# Patient Record
Sex: MALE | Race: WHITE | NOT HISPANIC OR LATINO | Employment: OTHER | URBAN - METROPOLITAN AREA
[De-identification: names, ages, dates, MRNs, and addresses within clinical notes are randomized per-mention and may not be internally consistent; named-entity substitution may affect disease eponyms.]

---

## 2017-03-17 ENCOUNTER — OFFICE VISIT (OUTPATIENT)
Dept: MEDICAL GROUP | Age: 67
End: 2017-03-17
Payer: COMMERCIAL

## 2017-03-17 VITALS
HEIGHT: 69 IN | TEMPERATURE: 97.7 F | DIASTOLIC BLOOD PRESSURE: 80 MMHG | OXYGEN SATURATION: 97 % | WEIGHT: 195 LBS | HEART RATE: 61 BPM | BODY MASS INDEX: 28.88 KG/M2 | SYSTOLIC BLOOD PRESSURE: 122 MMHG

## 2017-03-17 DIAGNOSIS — E11.40 CONTROLLED TYPE 2 DIABETES MELLITUS WITH DIABETIC NEUROPATHY, WITHOUT LONG-TERM CURRENT USE OF INSULIN (HCC): ICD-10-CM

## 2017-03-17 DIAGNOSIS — E78.5 DYSLIPIDEMIA ASSOCIATED WITH TYPE 2 DIABETES MELLITUS (HCC): ICD-10-CM

## 2017-03-17 DIAGNOSIS — Z23 NEED FOR PNEUMOCOCCAL VACCINATION: ICD-10-CM

## 2017-03-17 DIAGNOSIS — R80.9 MICROALBUMINURIA DUE TO TYPE 2 DIABETES MELLITUS (HCC): ICD-10-CM

## 2017-03-17 DIAGNOSIS — E11.69 DYSLIPIDEMIA ASSOCIATED WITH TYPE 2 DIABETES MELLITUS (HCC): ICD-10-CM

## 2017-03-17 DIAGNOSIS — E11.29 MICROALBUMINURIA DUE TO TYPE 2 DIABETES MELLITUS (HCC): ICD-10-CM

## 2017-03-17 DIAGNOSIS — I10 ESSENTIAL HYPERTENSION: ICD-10-CM

## 2017-03-17 PROCEDURE — 90732 PPSV23 VACC 2 YRS+ SUBQ/IM: CPT | Performed by: INTERNAL MEDICINE

## 2017-03-17 PROCEDURE — 99214 OFFICE O/P EST MOD 30 MIN: CPT | Mod: 25 | Performed by: INTERNAL MEDICINE

## 2017-03-17 PROCEDURE — 90471 IMMUNIZATION ADMIN: CPT | Performed by: INTERNAL MEDICINE

## 2017-03-17 ASSESSMENT — PAIN SCALES - GENERAL: PAINLEVEL: NO PAIN

## 2017-03-17 NOTE — ASSESSMENT & PLAN NOTE
He is taking Lipitor 40 mg daily. His last chemistry panel showed normal liver enzymes. His LDL at goal. He gained a few pounds since last visit his. Patient reported that he decreased physical exercise lately. He is advised to do walking exercise or cardio exercise 3-4 times a week to lose weight. We also discussed diabetes diet and low-fat diet today.

## 2017-03-17 NOTE — ASSESSMENT & PLAN NOTE
Patient reported that he is taking metformin 1000 mg twice a day and exenatide 2 mg every week. He is doing well with current regimens. He denied hypoglycemia or side effects from taking current medications. Last A1c was 7 on 10/10/16. He is going to follow-up with his endocrinologist at [facility] next month. Patient reported that he already has eye exam last year. We will request retinal exam report from ophthalmologist.

## 2017-03-17 NOTE — Clinical Note
[facility]  Venus Claros M.D.  [address]  [place] [address]  Fax: [phone]   Authorization for Release/Disclosure of   Protected Health Information   Name: AMAYA CLAIRE : 1950 SSN: [id number]   Address: [address] [place]  [place] [address] Phone:    [phone] (home)    I authorize the entity listed below to release/disclose the PHI below to:   [facility]/Venus Claros M.D. and Venus Claros M.D.   Provider or Entity Name:     Address   City, State, Zip   Phone:      Fax:     Reason for request: continuity of care   Information to be released:    [  ] LAST COLONOSCOPY,  including any PATH REPORT and follow-up  [  ] LAST FIT/COLOGUARD RESULT [  ] LAST DEXA  [  ] LAST MAMMOGRAM  [  ] LAST PAP  [  ] LAST LABS [ X ] RETINA EXAM REPORT  [  ] IMMUNIZATION RECORDS  [  ] Release all info      [  ] Check here and initial the line next to each item to release ALL health information INCLUDING  _____ Care and treatment for drug and / or alcohol abuse  _____ HIV testing, infection status, or AIDS  _____ Genetic Testing    DATES OF SERVICE OR TIME PERIOD TO BE DISCLOSED: _____________  I understand and acknowledge that:  * This Authorization may be revoked at any time by you in writing, except if your health information has already been used or disclosed.  * Your health information that will be used or disclosed as a result of you signing this authorization could be re-disclosed by the recipient. If this occurs, your re-disclosed health information may no longer be protected by State or Federal laws.  * You may refuse to sign this Authorization. Your refusal will not affect your ability to obtain treatment.  * This Authorization becomes effective upon signing and will  on (date) __________.      If no date is indicated, this Authorization will  one (1) year from the signature date.    Name: Amaya Claire    Signature:   Date:     3/17/2017       PLEASE FAX REQUESTED  RECORDS BACK TO: [phone]

## 2017-03-17 NOTE — ASSESSMENT & PLAN NOTE
Patient is taking amlodipine 5 mg daily and lisinopril 40 mg daily. His blood pressure is slightly elevated in clinic. He reported that his blood pressure reading at home has been 140-150 in systole. He does not want to increase or add on new medication for blood pressure. He agreed to control his blood pressure with healthy diet and exercise. He is advised to closely monitor his blood pressure at home and advised to call or return to clinic sooner if his blood pressure is not well-controlled. He has not had blood tests yet. He will do his blood tests in May and will follow-up in clinic in June.

## 2017-03-17 NOTE — PROGRESS NOTES
Subjective:   Ashish Wiley is a 67 y.o. male here today for evaluation and management of:      Controlled type 2 diabetes mellitus with diabetic neuropathy, without long-term current use of insulin (CMS-HCC)  Patient reported that he is taking metformin 1000 mg twice a day and exenatide 2 mg every week. He is doing well with current regimens. He denied hypoglycemia or side effects from taking current medications. Last A1c was 7 on 10/10/16. He is going to follow-up with his endocrinologist at [facility] next month. Patient reported that he already has eye exam last year. We will request retinal exam report from ophthalmologist.    Dyslipidemia associated with type 2 diabetes mellitus ([facility])  He is taking Lipitor 40 mg daily. His last chemistry panel showed normal liver enzymes. His LDL at goal. He gained a few pounds since last visit his. Patient reported that he decreased physical exercise lately. He is advised to do walking exercise or cardio exercise 3-4 times a week to lose weight. We also discussed diabetes diet and low-fat diet today.    Essential hypertension  Patient is taking amlodipine 5 mg daily and lisinopril 40 mg daily. His blood pressure is slightly elevated in clinic. He reported that his blood pressure reading at home has been 140-150 in systole. He does not want to increase or add on new medication for blood pressure. He agreed to control his blood pressure with healthy diet and exercise. He is advised to closely monitor his blood pressure at home and advised to call or return to clinic sooner if his blood pressure is not well-controlled. He has not had blood tests yet. He will do his blood tests in May and will follow-up in clinic in June.    Microalbuminuria due to type 2 diabetes mellitus (CMS-HCC)  Patient is taking lisinopril 40 mg daily. His microalbuminuria slightly improved on 10/10/16. He denies side effects from taking lisinopril. He is advised to control his blood pressure and diabetes  "well to prevent worsening of kidney function. He is advised to avoid NSAIDs as much as he can.          Current medicines (including changes today)  Current Outpatient Prescriptions   Medication Sig Dispense Refill   • Insulin Syringe-Needle U-100 31G X 5/16\" 0.5 ML Misc Use to inject lantus insulin once a day 100 Each 1   • Exenatide ER 2 MG Pen-injector Inject 2 mg as instructed every 7 days. 12 Each 0   • insulin glargine (LANTUS) 100 UNIT/ML Solution Inject  as instructed every evening.     • metformin (GLUCOPHAGE) 1000 MG tablet Take 1,000 mg by mouth 2 times a day, with meals.     • lisinopril (PRINIVIL, ZESTRIL) 40 MG tablet Take 40 mg by mouth every day.     • amlodipine (NORVASC) 5 MG Tab Take 5 mg by mouth every day.     • atorvastatin (LIPITOR) 40 MG Tab Take 40 mg by mouth every evening.     • aspirin EC (ECOTRIN) 81 MG Tablet Delayed Response Take 81 mg by mouth every day.       No current facility-administered medications for this visit.     He  has a past medical history of Diabetes (CMS-[facility]); Hypertension; and Hyperlipidemia.    ROS   No chest pain, no shortness of breath, no abdominal pain       Objective:     Blood pressure 122/80, pulse 61, temperature 36.5 °C (97.7 °F), height 1.753 m (5' 9.02\"), weight 88.451 kg (195 lb), SpO2 97 %. Body mass index is 28.78 kg/(m^2).   Physical Exam:  General: Alert, oriented and no acute distress.  Eye contact is good, speech goal directed, affect calm  HEENT: conjunctiva non-injected, sclera non-icteric.  Oral mucous membranes pink and moist with no lesions.  Pinna normal.  Lungs: Normal respiratory effort, clear to auscultation bilaterally with good excursion.  CV: regular rate and rhythm. No murmurs.   Abdomen: soft, non distended, nontender, Bowel sound normal.  Ext: no edema, color normal, vascularity normal, temperature normal      Assessment and Plan:   The following treatment plan was discussed     1. Controlled type 2 diabetes mellitus with diabetic " neuropathy, without long-term current use of insulin (CMS-HCC)  - Well-controlled. Continue current regimens. Recheck lab 1-2 weeks before next follow up visit.  - Follow up with endocrinologist in [place].  - We will request retinal exam report from ophthalmologist.  - Counseled to comply with medication and diet.   - Counseled signs and symptoms of hypoglycemia and management of hypoglycemia.   - Recommend to have retinal eye exam once a year.  - Advised to check both feet daily.     2. Dyslipidemia associated with type 2 diabetes mellitus ([facility])  - Well-controlled. Continue current regimens. Recheck lab 1-2 weeks before next follow up visit.    3. Essential hypertension  - Patient reported that his blood pressure at home was 140-150 in systole. Advise patient to keep monitor blood pressure and heart rate at home.  - Advised him to return to clinic sooner if his blood pressure is not well controlled. Target blood pressure for him is less than 130/80. He agrees with the plan.  - He will continue current regimens for now.    4. Microalbuminuria due to type 2 diabetes mellitus (CMS-HCC)  - Continue lisinopril 40 mg daily. Reviewed side effects of this lisinopril and amlodipine with patient.  - Advised to controlled diabetes and blood pressure well to prevent progression of kidney disease.  - Advised to avoid NSAID.  - Advised to keep hydrating well.    5. Need for pneumococcal vaccination  - Pneumovax 23 vaccine was given today after reviewing risks and benefits as well as side effects of vaccine.  - PNEUMOCOCCAL POLYSACCHARIDE VACCINE 23-VALENT =>3YO SQ/IM      Followup: Return in about 3 months (around 6/17/2017), or if symptoms worsen or fail to improve, for diabetes, hypertension, hyperlipidemia, microalbuminuria, lab review.      Please note that this dictation was created using voice recognition software. I have made every reasonable attempt to correct obvious errors, but I expect that there may have unintended  errors in text, spelling, punctuation, or grammar that I did not discover.

## 2017-03-17 NOTE — ASSESSMENT & PLAN NOTE
Patient is taking lisinopril 40 mg daily. His microalbuminuria slightly improved on 10/10/16. He denies side effects from taking lisinopril. He is advised to control his blood pressure and diabetes well to prevent worsening of kidney function. He is advised to avoid NSAIDs as much as he can.

## 2017-03-17 NOTE — MR AVS SNAPSHOT
"        Ashish Her Inez   3/17/2017 8:00 AM   Office Visit   MRN: [id number]    Department:  [address] [place] [address]   Dept Phone:  [phone]    Description:  Male : 1950   Provider:  Venus Claros M.D.           Reason for Visit     Follow-Up DM check up    Diabetes Mellitus     Hypertension     Hyperlipidemia           Allergies as of 3/17/2017     Allergen Noted Reactions    Sulfa Drugs 2016   Unspecified    Kidney Stones      You were diagnosed with     Controlled type 2 diabetes mellitus with diabetic neuropathy, without long-term current use of insulin (CMS-HCC)   [6179096]       Dyslipidemia associated with type 2 diabetes mellitus (CMS-HCC)   [748507]       Essential hypertension   [4184246]       Microalbuminuria due to type 2 diabetes mellitus (CMS-HCC)   [5989889]       Need for pneumococcal vaccination   [874516]         Vital Signs     Blood Pressure Pulse Temperature Height Weight Body Mass Index    122/80 mmHg 61 36.5 °C (97.7 °F) 1.753 m (5' 9.02\") 88.451 kg (195 lb) 28.78 kg/m2    Oxygen Saturation Smoking Status                97% Former Smoker          Basic Information     Date Of Birth Sex Race Ethnicity Preferred Language    1950 Male White Non- English      Your appointments     2017  8:20 AM   Established Patient with Venus Claros M.D.   [address] [id number] [phone]           You will be receiving a confirmation call a few days before your appointment from our automated call confirmation system.              Problem List              ICD-10-CM Priority Class Noted - Resolved    Controlled type 2 diabetes mellitus with diabetic neuropathy, without long-term current use of insulin (CMS-HCC) E11.40   2016 - Present    Dyslipidemia associated with type 2 diabetes mellitus ([facility]) E11.69, E78.5   2016 - Present    Essential hypertension I10   2016 - Present    Diabetic " peripheral neuropathy associated with type 2 diabetes mellitus (CMS-HCC) E11.42   6/16/2016 - Present    Microalbuminuria due to type 2 diabetes mellitus (CMS-HCC) E11.29, R80.9   10/18/2016 - Present      Health Maintenance        Date Due Completion Dates    RETINAL SCREENING 1/21/1967 ---    IMM DTaP/Tdap/Td Vaccine (1 - Tdap) 1/21/1968 ---    IMM ZOSTER VACCINE 1/21/2009 ---    IMM INFLUENZA (1) 9/1/2016 ---    A1C SCREENING 4/10/2017 10/10/2016, 7/21/2016    SERUM CREATININE 7/21/2017 7/21/2016, 7/18/2013, 6/28/2012, 5/2/2012, 10/3/2011    FASTING LIPID PROFILE 10/10/2017 10/10/2016, 7/21/2016, 7/18/2013, 6/28/2012, 5/2/2012, 10/3/2011    URINE ACR / MICROALBUMIN 10/10/2017 10/10/2016, 7/21/2016, 7/18/2013, 5/2/2012, 10/3/2011    DIABETES MONOFILAMENT / LE EXAM 10/18/2017 10/18/2016, 6/16/2016    IMM PNEUMOCOCCAL 65+ (ADULT) LOW/MEDIUM RISK SERIES (2 of 2 - PCV13) 3/17/2018 3/17/2017    COLONOSCOPY 6/14/2018 6/14/2011            Current Immunizations     Pneumococcal polysaccharide vaccine (PPSV-23) 3/17/2017      Below and/or attached are the medications your provider expects you to take. Review all of your home medications and newly ordered medications with your provider and/or pharmacist. Follow medication instructions as directed by your provider and/or pharmacist. Please keep your medication list with you and share with your provider. Update the information when medications are discontinued, doses are changed, or new medications (including over-the-counter products) are added; and carry medication information at all times in the event of emergency situations     Allergies:  SULFA DRUGS - Unspecified               Medications  Valid as of: March 17, 2017 -  8:47 AM    Generic Name Brand Name Tablet Size Instructions for use    AmLODIPine Besylate (Tab) NORVASC 5 MG Take 5 mg by mouth every day.        Aspirin (Tablet Delayed Response) ECOTRIN 81 MG Take 81 mg by mouth every day.        Atorvastatin Calcium  "(Tab) LIPITOR 40 MG Take 40 mg by mouth every evening.        Exenatide (Pen-injector) Exenatide ER 2 MG Inject 2 mg as instructed every 7 days.        Insulin Glargine (Solution) LANTUS 100 UNIT/ML Inject  as instructed every evening.        Insulin Syringe-Needle U-100 (Misc) Insulin Syringe-Needle U-100 31G X 5/16\" 0.5 ML Use to inject lantus insulin once a day        Lisinopril (Tab) PRINIVIL, ZESTRIL 40 MG Take 40 mg by mouth every day.        MetFORMIN HCl (Tab) GLUCOPHAGE 1000 MG Take 1,000 mg by mouth 2 times a day, with meals.        .                 Medicines prescribed today were sent to:     [facility] - [address] [address]    Phone: [phone] Fax: [phone]    Open 24 Hours?: No    [id number] - [place], [place] - [address]    [address] [facility] [address] [address]    Phone: [phone] Fax: [phone]    Open 24 Hours?: No      Medication refill instructions:       If your prescription bottle indicates you have medication refills left, it is not necessary to call your provider’s office. Please contact your pharmacy and they will refill your medication.    If your prescription bottle indicates you do not have any refills left, you may request refills at any time through one of the following ways: The online [employer] system (except Urgent Care), by calling your provider’s office, or by asking your pharmacy to contact your provider’s office with a refill request. Medication refills are processed only during regular business hours and may not be available until the next business day. Your provider may request additional information or to have a follow-up visit with you prior to refilling your medication.   *Please Note: Medication refills are assigned a new Rx number when refilled electronically. Your pharmacy may indicate that no refills were authorized even though a new prescription for the same medication is " available at the pharmacy. Please request the medicine by name with the pharmacy before contacting your provider for a refill.           MyChart Status: Patient Declined

## 2017-05-01 ENCOUNTER — TELEPHONE (OUTPATIENT)
Dept: MEDICAL GROUP | Age: 67
End: 2017-05-01

## 2017-05-01 DIAGNOSIS — E11.40 CONTROLLED TYPE 2 DIABETES MELLITUS WITH DIABETIC NEUROPATHY, WITHOUT LONG-TERM CURRENT USE OF INSULIN (HCC): ICD-10-CM

## 2017-05-01 NOTE — TELEPHONE ENCOUNTER
Patient requested to refer to [facility] endocrinologist. Referral was placed.    Venus Claros M.D.

## 2017-05-01 NOTE — TELEPHONE ENCOUNTER
Phone Number Called: [phone] (home)     Message: Left message for the patient to call us back regarding the note below.    Left Message for patient to call back: yes

## 2017-05-22 ENCOUNTER — HOSPITAL ENCOUNTER (OUTPATIENT)
Dept: LAB | Facility: MEDICAL CENTER | Age: 67
End: 2017-05-22
Attending: INTERNAL MEDICINE
Payer: COMMERCIAL

## 2017-05-22 DIAGNOSIS — E78.5 DYSLIPIDEMIA ASSOCIATED WITH TYPE 2 DIABETES MELLITUS (HCC): ICD-10-CM

## 2017-05-22 DIAGNOSIS — E11.40 CONTROLLED TYPE 2 DIABETES MELLITUS WITH DIABETIC NEUROPATHY, WITHOUT LONG-TERM CURRENT USE OF INSULIN (HCC): ICD-10-CM

## 2017-05-22 DIAGNOSIS — E11.69 DYSLIPIDEMIA ASSOCIATED WITH TYPE 2 DIABETES MELLITUS (HCC): ICD-10-CM

## 2017-05-22 DIAGNOSIS — I10 ESSENTIAL HYPERTENSION: ICD-10-CM

## 2017-05-22 LAB
ALBUMIN SERPL BCP-MCNC: 4 G/DL (ref 3.2–4.9)
ALBUMIN/GLOB SERPL: 1.4 G/DL
ALP SERPL-CCNC: 86 U/L (ref 30–99)
ALT SERPL-CCNC: 22 U/L (ref 2–50)
ANION GAP SERPL CALC-SCNC: 7 MMOL/L (ref 0–11.9)
AST SERPL-CCNC: 25 U/L (ref 12–45)
BASOPHILS # BLD AUTO: 0.7 % (ref 0–1.8)
BASOPHILS # BLD: 0.05 K/UL (ref 0–0.12)
BILIRUB SERPL-MCNC: 0.4 MG/DL (ref 0.1–1.5)
BUN SERPL-MCNC: 42 MG/DL (ref 8–22)
CALCIUM SERPL-MCNC: 9.3 MG/DL (ref 8.5–10.5)
CHLORIDE SERPL-SCNC: 107 MMOL/L (ref 96–112)
CHOLEST SERPL-MCNC: 105 MG/DL (ref 100–199)
CO2 SERPL-SCNC: 22 MMOL/L (ref 20–33)
CREAT SERPL-MCNC: 2.02 MG/DL (ref 0.5–1.4)
CREAT UR-MCNC: 114.9 MG/DL
EOSINOPHIL # BLD AUTO: 0.18 K/UL (ref 0–0.51)
EOSINOPHIL NFR BLD: 2.5 % (ref 0–6.9)
ERYTHROCYTE [DISTWIDTH] IN BLOOD BY AUTOMATED COUNT: 43.8 FL (ref 35.9–50)
EST. AVERAGE GLUCOSE BLD GHB EST-MCNC: 163 MG/DL
GFR SERPL CREATININE-BSD FRML MDRD: 33 ML/MIN/1.73 M 2
GLOBULIN SER CALC-MCNC: 2.8 G/DL (ref 1.9–3.5)
GLUCOSE SERPL-MCNC: 103 MG/DL (ref 65–99)
HBA1C MFR BLD: 7.3 % (ref 0–5.6)
HCT VFR BLD AUTO: 34.4 % (ref 42–52)
HDLC SERPL-MCNC: 42 MG/DL
HGB BLD-MCNC: 11.4 G/DL (ref 14–18)
IMM GRANULOCYTES # BLD AUTO: 0.02 K/UL (ref 0–0.11)
IMM GRANULOCYTES NFR BLD AUTO: 0.3 % (ref 0–0.9)
LDLC SERPL CALC-MCNC: 40 MG/DL
LYMPHOCYTES # BLD AUTO: 1.36 K/UL (ref 1–4.8)
LYMPHOCYTES NFR BLD: 19.1 % (ref 22–41)
MCH RBC QN AUTO: 30.9 PG (ref 27–33)
MCHC RBC AUTO-ENTMCNC: 33.1 G/DL (ref 33.7–35.3)
MCV RBC AUTO: 93.2 FL (ref 81.4–97.8)
MICROALBUMIN UR-MCNC: 39.5 MG/DL
MICROALBUMIN/CREAT UR: 344 MG/G (ref 0–30)
MONOCYTES # BLD AUTO: 0.75 K/UL (ref 0–0.85)
MONOCYTES NFR BLD AUTO: 10.5 % (ref 0–13.4)
NEUTROPHILS # BLD AUTO: 4.75 K/UL (ref 1.82–7.42)
NEUTROPHILS NFR BLD: 66.9 % (ref 44–72)
NRBC # BLD AUTO: 0 K/UL
NRBC BLD AUTO-RTO: 0 /100 WBC
PLATELET # BLD AUTO: 259 K/UL (ref 164–446)
PMV BLD AUTO: 10.2 FL (ref 9–12.9)
POTASSIUM SERPL-SCNC: 5.2 MMOL/L (ref 3.6–5.5)
PROT SERPL-MCNC: 6.8 G/DL (ref 6–8.2)
RBC # BLD AUTO: 3.69 M/UL (ref 4.7–6.1)
SODIUM SERPL-SCNC: 136 MMOL/L (ref 135–145)
TRIGL SERPL-MCNC: 117 MG/DL (ref 0–149)
WBC # BLD AUTO: 7.1 K/UL (ref 4.8–10.8)

## 2017-05-22 PROCEDURE — 80053 COMPREHEN METABOLIC PANEL: CPT

## 2017-05-22 PROCEDURE — 85025 COMPLETE CBC W/AUTO DIFF WBC: CPT

## 2017-05-22 PROCEDURE — 36415 COLL VENOUS BLD VENIPUNCTURE: CPT

## 2017-05-22 PROCEDURE — 82043 UR ALBUMIN QUANTITATIVE: CPT

## 2017-05-22 PROCEDURE — 80061 LIPID PANEL: CPT

## 2017-05-22 PROCEDURE — 83036 HEMOGLOBIN GLYCOSYLATED A1C: CPT

## 2017-05-22 PROCEDURE — 82570 ASSAY OF URINE CREATININE: CPT

## 2017-09-11 ENCOUNTER — TELEPHONE (OUTPATIENT)
Dept: MEDICAL GROUP | Age: 67
End: 2017-09-11

## 2017-09-11 PROBLEM — N18.30 CKD (CHRONIC KIDNEY DISEASE) STAGE 3, GFR 30-59 ML/MIN: Status: ACTIVE | Noted: 2017-09-11

## 2017-09-11 NOTE — TELEPHONE ENCOUNTER
ESTABLISHED PATIENT PRE-VISIT PLANNING     Note: Patient will not be contacted if there is no indication to call.     1.  Reviewed notes from the last few office visits within the medical group: Yes    2.  If any orders were placed at last visit or intended to be done for this visit (i.e. 6 mos follow-up), do we have Results/Consult Notes?        •  Labs - Labs ordered, completed and results are in chart.       •  Imaging - Imaging was not ordered at last office visit.       •  Referrals - No referrals were ordered at last office visit.    3. Is this appointment scheduled as a Hospital Follow-Up? No    4.  Immunizations were updated in [facility] using [employer]?: Yes       •  [employer] Recommendations: FLU    5.  Patient is due for the following Health Maintenance Topics:   Health Maintenance Due   Topic Date Due   • RETINAL SCREENING  01/21/1967   • IMM DTaP/Tdap/Td Vaccine (1 - Tdap) 01/21/1968   • IMM ZOSTER VACCINE  01/21/2009   • IMM INFLUENZA (1) 09/01/2017       - Patient has completed PNEUMOVAX (PPSV23), PREVNAR (PCV13) , TDAP and ZOSTAVAX (Shingles) Immunization(s) per [employer]. Chart has been updated.      6.  Patient was NOT informed to arrive 15 min prior to their scheduled appointment and bring in their medication bottles.

## 2017-09-12 ENCOUNTER — OFFICE VISIT (OUTPATIENT)
Dept: MEDICAL GROUP | Age: 67
End: 2017-09-12
Payer: COMMERCIAL

## 2017-09-12 VITALS
OXYGEN SATURATION: 97 % | SYSTOLIC BLOOD PRESSURE: 140 MMHG | WEIGHT: 194 LBS | DIASTOLIC BLOOD PRESSURE: 80 MMHG | TEMPERATURE: 98.1 F | HEART RATE: 87 BPM | BODY MASS INDEX: 28.73 KG/M2 | HEIGHT: 69 IN

## 2017-09-12 DIAGNOSIS — E78.5 DYSLIPIDEMIA ASSOCIATED WITH TYPE 2 DIABETES MELLITUS (HCC): ICD-10-CM

## 2017-09-12 DIAGNOSIS — I10 ESSENTIAL HYPERTENSION: ICD-10-CM

## 2017-09-12 DIAGNOSIS — R80.9 TYPE 2 DIABETES MELLITUS WITH MICROALBUMINURIA, WITH LONG-TERM CURRENT USE OF INSULIN (HCC): ICD-10-CM

## 2017-09-12 DIAGNOSIS — D64.9 NORMOCYTIC ANEMIA: ICD-10-CM

## 2017-09-12 DIAGNOSIS — Z79.4 TYPE 2 DIABETES MELLITUS WITH MICROALBUMINURIA, WITH LONG-TERM CURRENT USE OF INSULIN (HCC): ICD-10-CM

## 2017-09-12 DIAGNOSIS — Z23 NEED FOR VACCINATION: ICD-10-CM

## 2017-09-12 DIAGNOSIS — E11.29 TYPE 2 DIABETES MELLITUS WITH MICROALBUMINURIA, WITH LONG-TERM CURRENT USE OF INSULIN (HCC): ICD-10-CM

## 2017-09-12 DIAGNOSIS — N18.30 CKD (CHRONIC KIDNEY DISEASE) STAGE 3, GFR 30-59 ML/MIN (HCC): ICD-10-CM

## 2017-09-12 DIAGNOSIS — E11.69 DYSLIPIDEMIA ASSOCIATED WITH TYPE 2 DIABETES MELLITUS (HCC): ICD-10-CM

## 2017-09-12 PROCEDURE — 90662 IIV NO PRSV INCREASED AG IM: CPT | Performed by: INTERNAL MEDICINE

## 2017-09-12 PROCEDURE — 90471 IMMUNIZATION ADMIN: CPT | Performed by: INTERNAL MEDICINE

## 2017-09-12 PROCEDURE — 99215 OFFICE O/P EST HI 40 MIN: CPT | Mod: 25 | Performed by: INTERNAL MEDICINE

## 2017-09-12 RX ORDER — METOPROLOL SUCCINATE 25 MG/1
25 TABLET, EXTENDED RELEASE ORAL DAILY
Qty: 90 TAB | Refills: 3 | Status: SHIPPED | OUTPATIENT
Start: 2017-09-12 | End: 2018-06-21

## 2017-09-12 ASSESSMENT — PAIN SCALES - GENERAL: PAINLEVEL: NO PAIN

## 2017-09-12 NOTE — LETTER
[facility] [employer] [facility]  Venus Claros M.D.  [address]  [place] [address]  Fax: [phone]   Authorization for Release/Disclosure of   Protected Health Information   Name: AMAYA CLAIRE : 1950 SSN: [id number]   Address: [address] [place]  [place] [address] Phone:    [phone] (home)    I authorize the entity listed below to release/disclose the PHI below to:   [employer] [facility]/Venus Claros M.D. and Venus Claros M.D.   Provider or Entity Name:  [facility]   Address   City, State, Zip   Phone:      Fax:     Reason for request: continuity of care   Information to be released:    [  ] LAST COLONOSCOPY,  including any PATH REPORT and follow-up  [  ] LAST FIT/COLOGUARD RESULT [  ] LAST DEXA  [  ] LAST MAMMOGRAM  [  ] LAST PAP  [  ] LAST LABS [xxx  ] RETINA EXAM REPORT  [  ] IMMUNIZATION RECORDS  [  ] Release all info      [  ] Check here and initial the line next to each item to release ALL health information INCLUDING  _____ Care and treatment for drug and / or alcohol abuse  _____ HIV testing, infection status, or AIDS  _____ Genetic Testing    DATES OF SERVICE OR TIME PERIOD TO BE DISCLOSED: _____________  I understand and acknowledge that:  * This Authorization may be revoked at any time by you in writing, except if your health information has already been used or disclosed.  * Your health information that will be used or disclosed as a result of you signing this authorization could be re-disclosed by the recipient. If this occurs, your re-disclosed health information may no longer be protected by State or Federal laws.  * You may refuse to sign this Authorization. Your refusal will not affect your ability to obtain treatment.  * This Authorization becomes effective upon signing and will  on (date) __________.      If no date is indicated, this Authorization will  one (1) year from the signature date.    Name: Amaya Claire    Signature:   Date:     2017          PLEASE FAX REQUESTED RECORDS BACK TO: [phone]

## 2017-09-12 NOTE — ASSESSMENT & PLAN NOTE
Patient is taking Lipitor 40 mg every evening. He denies side effects from taking Lipitor. Last lipid panel on 5/22/17 showed that LDL was at goal.  Review his previous blood tests with him today.    Results for ALETHEA AMAYA SHAKA (MRN [id number]) as of 9/11/2017 19:24   Ref. Range 5/22/2017 06:55   Cholesterol,Tot Latest Ref Range: 100 - 199 mg/dL 105   Triglycerides Latest Ref Range: 0 - 149 mg/dL 117   HDL Latest Ref Range: >=40 mg/dL 42   LDL Latest Ref Range: <100 mg/dL 40

## 2017-09-12 NOTE — ASSESSMENT & PLAN NOTE
Patient is taking lisinopril 40 mg daily and amlodipine 5 mg daily. He has worsening kidney function with increased creatinine and lowering GFR. Creatinine clearance above 30, so we will continue lisinopril 40 mg daily. He denies side effects from taking lisinopril and amlodipine. His blood pressure is not well-controlled with current regimens. His blood pressure is between 140-160 in systole. We discussed to add on metoprolol SR 25 mg daily.

## 2017-09-12 NOTE — ASSESSMENT & PLAN NOTE
Patient has normocytic anemia. He denied abdominal pain or bloody bowel movement or black tarry stool. He stated that he took NSAIDs 3 Dose in one month's and rarely use it. He also drinks alcohol once or twice a week only. And he only drinks one or 2 glasses of wine each time. He denies binge drinking.

## 2017-09-12 NOTE — ASSESSMENT & PLAN NOTE
Patient has gradually worsening kidney function with elevated creatinine and decreasing GFR. He is taking lisinopril 40 mg daily. His creatinine clearance is above 30. We discussed to see nephrologist for his chronic kidney disease. Patient agreed to refer to nephrologist as his kidney function gets worse.    Results for AMAYA CLAIRE (MRN [id number]) as of 9/11/2017 19:24   Ref. Range 10/3/2011 07:24 5/2/2012 06:44 6/28/2012 07:00 7/18/2013 06:49 7/21/2016 06:51 10/10/2016 06:52 5/22/2017 06:55   Bun Latest Ref Range: 8 - 22 mg/dL 30 (H) 19 16 26 (H) 22  42 (H)   Creatinine Latest Ref Range: 0.50 - 1.40 mg/dL 1.22 1.08 1.09 1.28 1.29  2.02 (H)   GFR If  Latest Ref Range: >60 mL/min/1.73 m 2 >60 >60 >60 >60 >60  40 (A)   GFR If Non  Latest Ref Range: >60 mL/min/1.73 m 2 >60 >60 >60 56 (A) 55 (A)  33 (A)

## 2017-09-12 NOTE — ASSESSMENT & PLAN NOTE
Patient is taking metformin 1000 mg twice a day, insulin glargine 14 units every evening, exenatide 2 mg every weekly.  His last A1c increased to 7.3 on 5/22/17. He follows with Samaria Anderson, endocrinologist at [facility]. He denies side effects from taking current diabetes medications. He had retinal exam last month. We will request the report from ophthalmologist.  I discussed with patient to switch metformin to insulin since his kidney function gets worse. Patient stated that he just saw his endocrinologist one and half month ago who already knew his recent kidney function. He will call his endocrinologist today to confirm that he is okay to continue metformin. He also has follow-up appointment with endocrine next month.

## 2017-09-22 ENCOUNTER — OFFICE VISIT (OUTPATIENT)
Dept: NEPHROLOGY | Facility: MEDICAL CENTER | Age: 67
End: 2017-09-22
Payer: COMMERCIAL

## 2017-09-22 VITALS
BODY MASS INDEX: 28.73 KG/M2 | OXYGEN SATURATION: 98 % | HEIGHT: 69 IN | SYSTOLIC BLOOD PRESSURE: 134 MMHG | DIASTOLIC BLOOD PRESSURE: 76 MMHG | WEIGHT: 194 LBS | RESPIRATION RATE: 16 BRPM | HEART RATE: 92 BPM | TEMPERATURE: 97.8 F

## 2017-09-22 DIAGNOSIS — E11.29 TYPE 2 DIABETES MELLITUS WITH MICROALBUMINURIA, WITH LONG-TERM CURRENT USE OF INSULIN (HCC): ICD-10-CM

## 2017-09-22 DIAGNOSIS — Z79.4 TYPE 2 DIABETES MELLITUS WITH MICROALBUMINURIA, WITH LONG-TERM CURRENT USE OF INSULIN (HCC): ICD-10-CM

## 2017-09-22 DIAGNOSIS — R80.9 MICROALBUMINURIA DUE TO TYPE 2 DIABETES MELLITUS (HCC): ICD-10-CM

## 2017-09-22 DIAGNOSIS — E11.29 MICROALBUMINURIA DUE TO TYPE 2 DIABETES MELLITUS (HCC): ICD-10-CM

## 2017-09-22 DIAGNOSIS — N18.30 CKD (CHRONIC KIDNEY DISEASE) STAGE 3, GFR 30-59 ML/MIN (HCC): ICD-10-CM

## 2017-09-22 DIAGNOSIS — R80.9 TYPE 2 DIABETES MELLITUS WITH MICROALBUMINURIA, WITH LONG-TERM CURRENT USE OF INSULIN (HCC): ICD-10-CM

## 2017-09-22 PROCEDURE — 99203 OFFICE O/P NEW LOW 30 MIN: CPT | Performed by: INTERNAL MEDICINE

## 2017-09-22 ASSESSMENT — ENCOUNTER SYMPTOMS
FLANK PAIN: 0
CHILLS: 0
PALPITATIONS: 0
FEVER: 0

## 2017-09-22 NOTE — PROGRESS NOTES
"Subjective:      Ashish Wiley is a 67 y.o. male who presents with No chief complaint on file.            HPI     67 year old with a history of DM since 1997, has had good control over that period of time. He saw an endocrinologist since 1997.  In 1966 had a ruptured kidney playing football, had hematuria for a week after.  Has had a kidney stone 20 years ago. Serum Cr has gone up from 1.28 in 2013 to 1.29 in 2016 to 2.02 in May 2017 which prompted the visit. He has a history of taking ibuprofen which he took occasionally, though he took at 800mg TID for several days at a time. No family history of kidney disease. History of smoking until the age of 39.  No problems urinating or emptying his bladder. Wakes up once per night to urinate. No herbal medications or supplements. He is on glucophage 1000mg BID.    On an ACEi, microalbumin is 344.     Review of Systems   Constitutional: Negative for chills and fever.   Cardiovascular: Positive for chest pain. Negative for palpitations.   Genitourinary: Negative for dysuria, flank pain, frequency, hematuria and urgency.          Objective:     /76   Pulse 92   Temp 36.6 °C (97.8 °F)   Resp 16   Ht 1.753 m (5' 9.02\")   Wt 88 kg (194 lb)   SpO2 98%   BMI 28.64 kg/m²      Physical Exam   Constitutional: He is oriented to person, place, and time. He appears well-developed and well-nourished.   Cardiovascular: Normal rate and regular rhythm.    Pulmonary/Chest: Effort normal and breath sounds normal.   Abdominal: Soft. Bowel sounds are normal.   Neurological: He is alert and oriented to person, place, and time.   Skin: Skin is warm and dry.   Psychiatric: He has a normal mood and affect. His behavior is normal.               Assessment/Plan:     1. CKD (chronic kidney disease) stage 3, GFR 30-59 ml/min  Likely DM2, however unclear why he has more aggressive progression. Will check US, no obstructive sx. Check 24 hour urine. Suggest changing metformin as " below.    2. Type 2 diabetes mellitus with microalbuminuria, with long-term current use of insulin (CMS-[facility])  Already on ACEi, would suggest changing metformin now, currently GFR my MDRD 33 ml/min, if possible would stop metformin.     3. Microalbuminuria due to type 2 diabetes mellitus (CMS-[facility])  Continue ACEi, appears controlled.    PLAN  -Renal US  -Labs to monitor CKD, including 24 hour urine  -Would stop metformin if possible  -Follow up in 1 month

## 2017-09-28 ENCOUNTER — HOSPITAL ENCOUNTER (OUTPATIENT)
Dept: LAB | Facility: MEDICAL CENTER | Age: 67
End: 2017-09-28
Attending: INTERNAL MEDICINE
Payer: COMMERCIAL

## 2017-09-28 ENCOUNTER — HOSPITAL ENCOUNTER (OUTPATIENT)
Facility: MEDICAL CENTER | Age: 67
End: 2017-09-28
Attending: INTERNAL MEDICINE
Payer: COMMERCIAL

## 2017-09-28 DIAGNOSIS — N18.30 CKD (CHRONIC KIDNEY DISEASE) STAGE 3, GFR 30-59 ML/MIN (HCC): ICD-10-CM

## 2017-09-28 LAB
25(OH)D3 SERPL-MCNC: 24 NG/ML (ref 30–100)
ANION GAP SERPL CALC-SCNC: 6 MMOL/L (ref 0–11.9)
BUN SERPL-MCNC: 20 MG/DL (ref 8–22)
CALCIUM SERPL-MCNC: 9.4 MG/DL (ref 8.5–10.5)
CHLORIDE SERPL-SCNC: 111 MMOL/L (ref 96–112)
CO2 SERPL-SCNC: 23 MMOL/L (ref 20–33)
CREAT 24H UR-MSRATE: 1474 MG/24 HR (ref 1000–2000)
CREAT SERPL-MCNC: 1.29 MG/DL (ref 0.5–1.4)
CREAT UR-MCNC: 71.9 MG/DL
ERYTHROCYTE [DISTWIDTH] IN BLOOD BY AUTOMATED COUNT: 45.3 FL (ref 35.9–50)
GFR SERPL CREATININE-BSD FRML MDRD: 55 ML/MIN/1.73 M 2
GLUCOSE SERPL-MCNC: 161 MG/DL (ref 65–99)
HCT VFR BLD AUTO: 35.5 % (ref 42–52)
HGB BLD-MCNC: 11.7 G/DL (ref 14–18)
MCH RBC QN AUTO: 31 PG (ref 27–33)
MCHC RBC AUTO-ENTMCNC: 33 G/DL (ref 33.7–35.3)
MCV RBC AUTO: 93.9 FL (ref 81.4–97.8)
PLATELET # BLD AUTO: 261 K/UL (ref 164–446)
PMV BLD AUTO: 10.5 FL (ref 9–12.9)
POTASSIUM SERPL-SCNC: 5 MMOL/L (ref 3.6–5.5)
PROT UR-MCNC: 172.6 MG/DL (ref 0–15)
PTH-INTACT SERPL-MCNC: 62.1 PG/ML (ref 14–72)
RBC # BLD AUTO: 3.78 M/UL (ref 4.7–6.1)
SODIUM SERPL-SCNC: 140 MMOL/L (ref 135–145)
SPECIMEN VOL UR: 2050 ML
WBC # BLD AUTO: 7.1 K/UL (ref 4.8–10.8)

## 2017-09-28 PROCEDURE — 82274 ASSAY TEST FOR BLOOD FECAL: CPT

## 2017-09-28 PROCEDURE — 84156 ASSAY OF PROTEIN URINE: CPT

## 2017-09-28 PROCEDURE — 83970 ASSAY OF PARATHORMONE: CPT

## 2017-09-28 PROCEDURE — 82306 VITAMIN D 25 HYDROXY: CPT

## 2017-09-28 PROCEDURE — 80048 BASIC METABOLIC PNL TOTAL CA: CPT

## 2017-09-28 PROCEDURE — 82570 ASSAY OF URINE CREATININE: CPT

## 2017-09-28 PROCEDURE — 82575 CREATININE CLEARANCE TEST: CPT

## 2017-09-28 PROCEDURE — 36415 COLL VENOUS BLD VENIPUNCTURE: CPT

## 2017-09-28 PROCEDURE — 81050 URINALYSIS VOLUME MEASURE: CPT

## 2017-09-28 PROCEDURE — 85027 COMPLETE CBC AUTOMATED: CPT

## 2017-09-29 ENCOUNTER — HOSPITAL ENCOUNTER (OUTPATIENT)
Dept: RADIOLOGY | Facility: MEDICAL CENTER | Age: 67
End: 2017-09-29
Attending: INTERNAL MEDICINE
Payer: COMMERCIAL

## 2017-09-29 DIAGNOSIS — N18.30 CKD (CHRONIC KIDNEY DISEASE) STAGE 3, GFR 30-59 ML/MIN (HCC): ICD-10-CM

## 2017-09-29 LAB
COLLECT DURATION TIME UR: 24 HR
CREAT CL/1.73 SQ M ?TM UR+SERPL-ARVRAT: 66 ML/MIN (ref 97–137)
CREAT SERPL-MCNC: 1.29 MG/DL (ref 0.5–1.4)
CREAT UR-MCNC: 70.5 MG/DL
SPECIMEN VOL UR: 2050 ML
URINE CREATININE EXCRETED 1125: 1445 MG/24 HR

## 2017-09-29 PROCEDURE — 76775 US EXAM ABDO BACK WALL LIM: CPT

## 2017-10-03 DIAGNOSIS — D64.9 NORMOCYTIC ANEMIA: ICD-10-CM

## 2017-10-03 LAB — HEMOCCULT STL QL IA: NEGATIVE

## 2017-10-24 ENCOUNTER — TELEPHONE (OUTPATIENT)
Dept: MEDICAL GROUP | Age: 67
End: 2017-10-24

## 2017-10-24 PROBLEM — E55.9 VITAMIN D INSUFFICIENCY: Status: ACTIVE | Noted: 2017-10-24

## 2017-10-24 NOTE — ASSESSMENT & PLAN NOTE
Repeated BMP showed improvement on creatinine and GFR. He is still taking lisinopril 40 mg daily. Patient already seen by nephrologist.   Nephrologist agreed to stop metformin and to continue lisinopril.  Renal ultrasound was negative for blockage or hydronephrosis.      Results for ALETHEA AMAYA MATT (MRN [id number]) as of 10/24/2017 08:53   Ref. Range 7/21/2016 06:51 10/10/2016 06:52 5/22/2017 06:55 9/28/2017 06:55   Bun Latest Ref Range: 8 - 22 mg/dL 22  42 (H) 20   Creatinine Latest Ref Range: 0.50 - 1.40 mg/dL 1.29  2.02 (H) 1.29   GFR If  Latest Ref Range: >60 mL/min/1.73 m 2 >60  40 (A) >60   GFR If Non  Latest Ref Range: >60 mL/min/1.73 m 2 55 (A)  33 (A) 55 (A)

## 2017-10-24 NOTE — ASSESSMENT & PLAN NOTE
Patient has low vitamin D at 24 on 9/28/17. Patient has not taken any supplements yet. We discussed to stop vitamin D 4000 units daily.

## 2017-10-24 NOTE — ASSESSMENT & PLAN NOTE
Patient reported taking Lipitor 40 mg every evening as instructed. He denies side effects from taking it. Last lipid panel on 5/22/17 showed LDL was at goal.  His liver enzymes on 5/22/17 were normal.    Results for ALETHEA AMAYA MATT (MRN [id number]) as of 10/24/2017 08:53   Ref. Range 5/22/2017 06:55   Cholesterol,Tot Latest Ref Range: 100 - 199 mg/dL 105   Triglycerides Latest Ref Range: 0 - 149 mg/dL 117   HDL Latest Ref Range: >=40 mg/dL 42   LDL Latest Ref Range: <100 mg/dL 40

## 2017-10-24 NOTE — TELEPHONE ENCOUNTER
ESTABLISHED PATIENT PRE-VISIT PLANNING     Note: Patient will not be contacted if there is no indication to call.     1.  Reviewed notes from the last few office visits within the medical group: Yes    2.  If any orders were placed at last visit or intended to be done for this visit (i.e. 6 mos follow-up), do we have Results/Consult Notes?        •  Labs - Labs ordered, completed on 9/28 and results are in chart.   Note: If patient appointment is for lab review and patient did not complete labs,                check with provider if OK to reschedule patient until labs completed.       •  Imaging - Imaging ordered, completed and results are in chart.       •  Referrals - Referral ordered, patient has NOT been seen.    3. Is this appointment scheduled as a Hospital Follow-Up? No    4.  Immunizations were updated in Epic using WebIZ?: Epic matches WebIZ       •  Web Iz Recommendations: TD    5.  Patient is due for the following Health Maintenance Topics:   Health Maintenance Due   Topic Date Due   • RETINAL SCREENING  01/21/1968   • DIABETES MONOFILAMENT / LE EXAM  10/18/2017           6.  Patient was NOT informed to arrive 15 min prior to their scheduled appointment and bring in their medication bottles.

## 2017-10-24 NOTE — ASSESSMENT & PLAN NOTE
His blood pressure is his stable with current regimens. Lisinopril 40 mg daily and amlodipine 5 mg daily. He denies side effects from taking his current regimens. He is on baby aspirin. His kidney function slightly improved on recent blood tests on 9/28/17

## 2017-10-24 NOTE — ASSESSMENT & PLAN NOTE
Patient follows with nightly our endocrinologist, Samaria CAMPOS.   We discussed in previous visit on 9/12/17 2 she which metformin to insulin due to worsening kidney function. He is taking Lantus 14 units every evening, exenatide 2 mg every weekly. He already stopped taking metformin 1000 mg twice a day for one month.    Last A1c was 7.3 on 5/22/17.  He is overdue for retinal exam. He agreed to refer to ophthalmologist today.

## 2017-10-25 ENCOUNTER — OFFICE VISIT (OUTPATIENT)
Dept: MEDICAL GROUP | Age: 67
End: 2017-10-25
Payer: COMMERCIAL

## 2017-10-25 VITALS
OXYGEN SATURATION: 97 % | TEMPERATURE: 97.5 F | DIASTOLIC BLOOD PRESSURE: 70 MMHG | HEIGHT: 69 IN | BODY MASS INDEX: 28.11 KG/M2 | WEIGHT: 189.8 LBS | SYSTOLIC BLOOD PRESSURE: 124 MMHG | HEART RATE: 94 BPM

## 2017-10-25 DIAGNOSIS — E55.9 VITAMIN D INSUFFICIENCY: ICD-10-CM

## 2017-10-25 DIAGNOSIS — N18.30 CKD (CHRONIC KIDNEY DISEASE) STAGE 3, GFR 30-59 ML/MIN (HCC): ICD-10-CM

## 2017-10-25 DIAGNOSIS — E78.5 DYSLIPIDEMIA ASSOCIATED WITH TYPE 2 DIABETES MELLITUS (HCC): ICD-10-CM

## 2017-10-25 DIAGNOSIS — I10 ESSENTIAL HYPERTENSION: ICD-10-CM

## 2017-10-25 DIAGNOSIS — R80.9 TYPE 2 DIABETES MELLITUS WITH MICROALBUMINURIA, WITH LONG-TERM CURRENT USE OF INSULIN (HCC): ICD-10-CM

## 2017-10-25 DIAGNOSIS — E11.69 DYSLIPIDEMIA ASSOCIATED WITH TYPE 2 DIABETES MELLITUS (HCC): ICD-10-CM

## 2017-10-25 DIAGNOSIS — Z79.4 TYPE 2 DIABETES MELLITUS WITH MICROALBUMINURIA, WITH LONG-TERM CURRENT USE OF INSULIN (HCC): ICD-10-CM

## 2017-10-25 DIAGNOSIS — E11.29 TYPE 2 DIABETES MELLITUS WITH MICROALBUMINURIA, WITH LONG-TERM CURRENT USE OF INSULIN (HCC): ICD-10-CM

## 2017-10-25 PROCEDURE — 99214 OFFICE O/P EST MOD 30 MIN: CPT | Performed by: INTERNAL MEDICINE

## 2017-10-25 ASSESSMENT — PATIENT HEALTH QUESTIONNAIRE - PHQ9: CLINICAL INTERPRETATION OF PHQ2 SCORE: 0

## 2017-10-25 NOTE — PROGRESS NOTES
Subjective:   Ashish Claire is a 67 y.o. male here today for evaluation and management of:      Type 2 diabetes mellitus with microalbuminuria, with long-term current use of insulin (CMS-HCC)  Patient follows with nightly our endocrinologist, Samaria CAMPOS.   We discussed in previous visit on 9/12/17 2 she which metformin to insulin due to worsening kidney function. He is taking Lantus 14 units every evening, exenatide 2 mg every weekly. He already stopped taking metformin 1000 mg twice a day for one month.    Last A1c was 7.3 on 5/22/17.  He is overdue for retinal exam. He agreed to refer to ophthalmologist today.      Essential hypertension  His blood pressure is his stable with current regimens. Lisinopril 40 mg daily and amlodipine 5 mg daily. He denies side effects from taking his current regimens. He is on baby aspirin. His kidney function slightly improved on recent blood tests on 9/28/17    Dyslipidemia associated with type 2 diabetes mellitus ([facility])  Patient reported taking Lipitor 40 mg every evening as instructed. He denies side effects from taking it. Last lipid panel on 5/22/17 showed LDL was at goal.  His liver enzymes on 5/22/17 were normal.    Results for ASHISH CLAIRE (MRN [id number]) as of 10/24/2017 08:53   Ref. Range 5/22/2017 06:55   Cholesterol,Tot Latest Ref Range: 100 - 199 mg/dL 105   Triglycerides Latest Ref Range: 0 - 149 mg/dL 117   HDL Latest Ref Range: >=40 mg/dL 42   LDL Latest Ref Range: <100 mg/dL 40       CKD (chronic kidney disease) stage 3, GFR 30-59 ml/min  Repeated BMP showed improvement on creatinine and GFR. He is still taking lisinopril 40 mg daily. Patient already seen by nephrologist.   Nephrologist agreed to stop metformin and to continue lisinopril.  Renal ultrasound was negative for blockage or hydronephrosis.      Results for ASHISH CLAIRE (MRN [id number]) as of 10/24/2017 08:53   Ref. Range 7/21/2016 06:51 10/10/2016 06:52 5/22/2017 06:55  "9/28/2017 06:55   Bun Latest Ref Range: 8 - 22 mg/dL 22  42 (H) 20   Creatinine Latest Ref Range: 0.50 - 1.40 mg/dL 1.29  2.02 (H) 1.29   GFR If  Latest Ref Range: >60 mL/min/1.73 m 2 >60  40 (A) >60   GFR If Non  Latest Ref Range: >60 mL/min/1.73 m 2 55 (A)  33 (A) 55 (A)       Vitamin D insufficiency  Patient has low vitamin D at 24 on 9/28/17. Patient has not taken any supplements yet. We discussed to stop vitamin D 4000 units daily.         Current medicines (including changes today)  Current Outpatient Prescriptions   Medication Sig Dispense Refill   • metoprolol SR (TOPROL XL) 25 MG TABLET SR 24 HR Take 1 Tab by mouth every day. 90 Tab 3   • Insulin Syringe-Needle U-100 31G X 5/16\" 0.5 ML Misc Use to inject lantus insulin once a day 100 Each 1   • Exenatide ER 2 MG Pen-injector Inject 2 mg as instructed every 7 days. 12 Each 0   • insulin glargine (LANTUS) 100 UNIT/ML Solution Inject  as instructed every evening.     • lisinopril (PRINIVIL, ZESTRIL) 40 MG tablet Take 40 mg by mouth every day.     • amlodipine (NORVASC) 5 MG Tab Take 5 mg by mouth every day.     • atorvastatin (LIPITOR) 40 MG Tab Take 40 mg by mouth every evening.     • aspirin EC (ECOTRIN) 81 MG Tablet Delayed Response Take 81 mg by mouth every day.       No current facility-administered medications for this visit.      He  has a past medical history of Diabetes (CMS-[facility]); Hyperlipidemia; and Hypertension.    ROS   No chest pain, no shortness of breath, no abdominal pain       Objective:     Blood pressure 124/70, pulse 94, temperature 36.4 °C (97.5 °F), height 1.753 m (5' 9\"), weight 86.1 kg (189 lb 12.8 oz), SpO2 97 %. Body mass index is 28.03 kg/m².   Physical Exam:  General: Alert, oriented and no acute distress.  Eye contact is good, speech goal directed, affect calm  HEENT: conjunctiva non-injected, sclera non-icteric.  Oral mucous membranes pink and moist with no lesions.  Pinna normal.  Lungs: Normal " respiratory effort, clear to auscultation bilaterally with good excursion.  CV: regular rate and rhythm. No murmurs.   Abdomen: soft, non distended, nontender, Bowel sound normal.  Ext: no edema, color normal, vascularity normal, temperature normal    Diabetic foot exam:  Monofilament testing with a 10 gram force: sensation: decreased bilaterally  Visual Inspection: Feet without maceration, ulcers, or fissures. has calosity on bottom of right foot.  Pedal pulses: intact bilaterally      Assessment and Plan:   The following treatment plan was discussed     1. Type 2 diabetes mellitus with microalbuminuria, with long-term current use of insulin (CMS-HCC)  - Fairly controlled. Will continue current regimens. Continue to stop metformin. Patient will follow with his endocrinologist for A1c check and adjust his medication. Refer to ophthalmologist.  - Counseled to comply with medication and diet.   - Counseled signs and symptoms of hypoglycemia and management of hypoglycemia.   - Recommend to have retinal eye exam once a year.  - Advised to check both feet daily.   - REFERRAL TO OPHTHALMOLOGY  - Diabetic Monofilament Lower Extremity Exam    2. Essential hypertension  - Well-controlled. Continue current regimens. Recheck lab 1-2 weeks before next follow up visit.  - Recommend to monitor blood pressure and heart rate at home.  - CBC WITH DIFFERENTIAL; Future  - COMP METABOLIC PANEL; Future    3. Dyslipidemia associated with type 2 diabetes mellitus (HCC)  - Well-controlled. Continue current regimens. Recheck lab 1-2 weeks before next follow up visit.  - Advised to eat low fat, low carbohydrate and high fiber diet as well as do cardio physical exercise regularly.   - COMP METABOLIC PANEL; Future  - LIPID PROFILE; Future    4. CKD (chronic kidney disease) stage 3, GFR 30-59 ml/min  - Kidney function improved. Continue lisinopril.  - Recommend to keep well hydrated with water.  - Recommend to avoid NSAIDs.  - Discussed to  control blood sugar and blood pressure very well.    5. Vitamin D insufficiency  - Advised to take vitamin D 4000 units daily. Recheck vitamin D in 3 months.  - VITAMIN D,25 HYDROXY; Future      Followup: Return in about 6 months (around 4/25/2018), or if symptoms worsen or fail to improve, for diabetes, hypertension, hyperlipidemia, normocytic anemia, CK, stage III, vitamin D insufficiency.      Please note that this dictation was created using voice recognition software. I have made every reasonable attempt to correct obvious errors, but I expect that there may have unintended errors in text, spelling, punctuation, or grammar that I did not discover.

## 2017-10-25 NOTE — LETTER
[employer]  Venus Claros M.D.  [address]  [place] [address]  Fax: [phone]   Authorization for Release/Disclosure of   Protected Health Information   Name: AMAYA CLAIRE : 1950 SSN: [id number]   Address: [address]  [place] [address] Phone:    [phone] (home)    I authorize the entity listed below to release/disclose the PHI below to:   [employer]/Venus Claros M.D. and Venus Claros M.D.   Provider or Entity Name:  Ivan Maher M.D.   Address   [facility], [facility], [facility]   Phone:[phone]      Fax:  [phone]   Reason for request: continuity of care   Information to be released:    [  ] LAST COLONOSCOPY,  including any PATH REPORT and follow-up  [  ] LAST FIT/COLOGUARD RESULT [  ] LAST DEXA  [  ] LAST MAMMOGRAM  [  ] LAST PAP  [  ] LAST LABS [xxx  ] RETINA EXAM REPORT  [  ] IMMUNIZATION RECORDS  [  ] Release all info      [  ] Check here and initial the line next to each item to release ALL health information INCLUDING  _____ Care and treatment for drug and / or alcohol abuse  _____ HIV testing, infection status, or AIDS  _____ Genetic Testing    DATES OF SERVICE OR TIME PERIOD TO BE DISCLOSED: _____________  I understand and acknowledge that:  * This Authorization may be revoked at any time by you in writing, except if your health information has already been used or disclosed.  * Your health information that will be used or disclosed as a result of you signing this authorization could be re-disclosed by the recipient. If this occurs, your re-disclosed health information may no longer be protected by State or Federal laws.  * You may refuse to sign this Authorization. Your refusal will not affect your ability to obtain treatment.  * This Authorization becomes effective upon signing and will  on (date) __________.      If no date is indicated, this Authorization will  one (1) year from the signature date.    Name: Amaya Claire    Signature:   Date:     10/25/2017       PLEASE FAX REQUESTED RECORDS BACK TO: [phone]

## 2017-11-10 ENCOUNTER — OFFICE VISIT (OUTPATIENT)
Dept: NEPHROLOGY | Facility: MEDICAL CENTER | Age: 67
End: 2017-11-10
Payer: COMMERCIAL

## 2017-11-10 VITALS
TEMPERATURE: 98.2 F | SYSTOLIC BLOOD PRESSURE: 118 MMHG | OXYGEN SATURATION: 96 % | HEART RATE: 85 BPM | HEIGHT: 69 IN | DIASTOLIC BLOOD PRESSURE: 60 MMHG

## 2017-11-10 DIAGNOSIS — E11.29 TYPE 2 DIABETES MELLITUS WITH MICROALBUMINURIA, WITH LONG-TERM CURRENT USE OF INSULIN (HCC): ICD-10-CM

## 2017-11-10 DIAGNOSIS — Z79.4 TYPE 2 DIABETES MELLITUS WITH MICROALBUMINURIA, WITH LONG-TERM CURRENT USE OF INSULIN (HCC): ICD-10-CM

## 2017-11-10 DIAGNOSIS — N18.30 CKD (CHRONIC KIDNEY DISEASE) STAGE 3, GFR 30-59 ML/MIN (HCC): ICD-10-CM

## 2017-11-10 DIAGNOSIS — I10 ESSENTIAL HYPERTENSION: ICD-10-CM

## 2017-11-10 DIAGNOSIS — N18.2 STAGE 2 CHRONIC KIDNEY DISEASE: ICD-10-CM

## 2017-11-10 DIAGNOSIS — R80.9 TYPE 2 DIABETES MELLITUS WITH MICROALBUMINURIA, WITH LONG-TERM CURRENT USE OF INSULIN (HCC): ICD-10-CM

## 2017-11-10 PROCEDURE — 99214 OFFICE O/P EST MOD 30 MIN: CPT | Performed by: INTERNAL MEDICINE

## 2017-11-10 ASSESSMENT — ENCOUNTER SYMPTOMS
PALPITATIONS: 0
FLANK PAIN: 0
CHILLS: 0
FEVER: 0

## 2017-11-10 NOTE — PROGRESS NOTES
"Subjective:      Ashish Wiley is a 67 y.o. male who presents with Follow-Up ([facility] for labs)            HPI  67 year old with a history of DM since 1997, has had good control over that period of time. He saw an endocrinologist since 1997.  In 1966 had a ruptured kidney playing football, had hematuria for a week after.  Has had a kidney stone 20 years ago. Serum Cr has gone up from 1.28 in 2013 to 1.29 in 2016 to 2.02 in May 2017 which prompted the visit. He has a history of taking ibuprofen which he took occasionally, though he took at 800mg TID for several days at a time. No family history of kidney disease. History of smoking until the age of 39.  No problems urinating or emptying his bladder. Wakes up once per night to urinate. No herbal medications or supplements. He is on glucophage 1000mg BID.     Had labs done and 24 hour urine shows GFR 66 ml/min. Overall doing well. Now off metformin. Continues to take ACEi, and ASA. No sx at this point. Drinking fluids, but having some difficulty as he tends not to drink much. Renal US looks good.      Review of Systems   Constitutional: Negative for chills and fever.   Cardiovascular: Negative for chest pain and palpitations.   Genitourinary: Negative for dysuria, flank pain, frequency, hematuria and urgency.          Objective:     /60   Pulse 85   Temp 36.8 °C (98.2 °F)   Ht 1.753 m (5' 9\")   SpO2 96%      Physical Exam   Constitutional: He is oriented to person, place, and time. He appears well-developed and well-nourished.   Cardiovascular: Normal rate and regular rhythm.    Murmur heard.  Pulmonary/Chest: Effort normal and breath sounds normal.   Musculoskeletal: He exhibits no edema or deformity.   Neurological: He is alert and oriented to person, place, and time.   Skin: Skin is warm and dry.               Assessment/Plan:     1. CKD (chronic kidney disease) stage 2   Cr stable, appears to be doing well. GFR 66 ml/min, better than MDRD. On ACEi, ASA. " Off metformin.    2. Essential hypertension  BP doing well, better than prior. No hypotension.    4. Type 2 diabetes mellitus with microalbuminuria, with long-term current use of insulin (CMS-HCC)  Blood sugars are controlled    -discussed workup with patient, doing well, now CKD stage II  -Continue with current medications and ACEi  -Encourage him to continue to drink plenty of fluids, especially as he is on ACEi  -Avoid NSAIDs  -Follow up yearly for CKD II

## 2017-12-17 ENCOUNTER — OFFICE VISIT (OUTPATIENT)
Dept: URGENT CARE | Facility: CLINIC | Age: 67
End: 2017-12-17
Payer: COMMERCIAL

## 2017-12-17 VITALS
RESPIRATION RATE: 16 BRPM | TEMPERATURE: 97.6 F | BODY MASS INDEX: 29.09 KG/M2 | OXYGEN SATURATION: 97 % | HEART RATE: 90 BPM | SYSTOLIC BLOOD PRESSURE: 150 MMHG | DIASTOLIC BLOOD PRESSURE: 96 MMHG | WEIGHT: 196.4 LBS | HEIGHT: 69 IN

## 2017-12-17 DIAGNOSIS — H10.33 ACUTE BACTERIAL CONJUNCTIVITIS OF BOTH EYES: ICD-10-CM

## 2017-12-17 PROCEDURE — 99204 OFFICE O/P NEW MOD 45 MIN: CPT | Performed by: PHYSICIAN ASSISTANT

## 2017-12-17 RX ORDER — TOBRAMYCIN 3 MG/ML
1 SOLUTION/ DROPS OPHTHALMIC EVERY 4 HOURS
Qty: 1 BOTTLE | Refills: 0 | Status: ON HOLD | OUTPATIENT
Start: 2017-12-17 | End: 2018-07-02

## 2017-12-17 ASSESSMENT — ENCOUNTER SYMPTOMS
PHOTOPHOBIA: 0
COUGH: 0
CHILLS: 0
EYE DISCHARGE: 1
NAUSEA: 0
FEVER: 0
HEADACHES: 0
EYE REDNESS: 1
FOREIGN BODY SENSATION: 0
SINUS PAIN: 0
VOMITING: 0
EYE PAIN: 1
BLURRED VISION: 0
DOUBLE VISION: 0
DIZZINESS: 0
EYE ITCHING: 0
SORE THROAT: 0
MYALGIAS: 0

## 2017-12-17 ASSESSMENT — PAIN SCALES - GENERAL: PAINLEVEL: NO PAIN

## 2017-12-17 NOTE — PROGRESS NOTES
Subjective:      Asihsh Wiley is a 67 y.o. male who presents with Conjunctivitis (began friday night, has been fighting a cold )            Eye Problem    Both eyes are affected.This is a new problem. Episode onset: 2 days. The problem occurs constantly. The problem has been unchanged. There was no injury mechanism. The pain is mild. There is known exposure (grandson had pink eye) to pink eye. He does not wear contacts. Associated symptoms include an eye discharge, eye redness and a recent URI. Pertinent negatives include no blurred vision, double vision, fever, foreign body sensation, itching, nausea, photophobia or vomiting. He has tried nothing for the symptoms.     Past Medical History:   Diagnosis Date   • Diabetes (CMS-HCC)    • Hyperlipidemia    • Hypertension      History reviewed. No pertinent surgical history.    Family History   Problem Relation Age of Onset   • Lung Disease Mother      COPD   • Heart Disease Father    • Heart Failure Father    • Hypertension Father    • Hyperlipidemia Father    • Cancer Maternal Grandmother      BRAIN TUMOR   • Stroke Maternal Grandfather    • Other Paternal Grandmother      INTESTINAL PROBLEM   • Stroke Paternal Grandfather        Allergies   Allergen Reactions   • Sulfa Drugs Unspecified     Kidney Stones       Medications, Allergies, and current problem list reviewed today in Epic      Review of Systems   Constitutional: Negative for chills, fever and malaise/fatigue.   HENT: Positive for congestion. Negative for ear discharge, ear pain, sinus pain and sore throat.    Eyes: Positive for pain (slight irritation), discharge and redness. Negative for blurred vision, double vision, photophobia and itching.   Respiratory: Negative for cough.    Gastrointestinal: Negative for nausea and vomiting.   Musculoskeletal: Negative for myalgias.   Skin: Negative for rash.   Neurological: Negative for dizziness and headaches.     All other systems reviewed and are negative.  "       Objective:     /96   Pulse 90   Temp 36.4 °C (97.6 °F)   Resp 16   Ht 1.753 m (5' 9\")   Wt 89.1 kg (196 lb 6.4 oz)   SpO2 97%   BMI 29.00 kg/m²      Physical Exam   Constitutional: He is oriented to person, place, and time. He appears well-developed and well-nourished. No distress.   HENT:   Head: Normocephalic and atraumatic.   Right Ear: External ear normal.   Left Ear: External ear normal.   Nose: Nose normal.   Mouth/Throat: Oropharynx is clear and moist. No oropharyngeal exudate.   Eyes: EOM and lids are normal. Pupils are equal, round, and reactive to light. Right eye exhibits discharge and exudate. Left eye exhibits discharge and exudate. Right conjunctiva is injected. Left conjunctiva is injected.   Cardiovascular: Normal rate, regular rhythm and normal heart sounds.  Exam reveals no gallop and no friction rub.    No murmur heard.  Pulmonary/Chest: Effort normal and breath sounds normal. No respiratory distress. He has no wheezes. He has no rales.   Neurological: He is alert and oriented to person, place, and time. No cranial nerve deficit.   Skin: Skin is warm and dry.   Psychiatric: He has a normal mood and affect. His behavior is normal. Judgment and thought content normal.               Assessment/Plan:     1. Acute bacterial conjunctivitis of both eyes    - tobramycin (TOBREX) 0.3 % Solution ophthalmic solution; Place 1 Drop in both eyes every 4 hours. While awake for 5-7 days until symptoms resolve.  Dispense: 1 Bottle; Refill: 0     Differential diagnoses, Supportive care, and indications for immediate follow-up discussed with patient.   Instructed to return to clinic or nearest emergency department for any change in condition, further concerns, or worsening of symptoms.    The patient demonstrated a good understanding and agreed with the treatment plan.    Deanna Garrido P.A.-C.      "

## 2017-12-26 ENCOUNTER — OFFICE VISIT (OUTPATIENT)
Dept: URGENT CARE | Facility: CLINIC | Age: 67
End: 2017-12-26
Payer: COMMERCIAL

## 2017-12-26 VITALS
TEMPERATURE: 97.9 F | SYSTOLIC BLOOD PRESSURE: 160 MMHG | OXYGEN SATURATION: 97 % | HEART RATE: 89 BPM | WEIGHT: 191 LBS | RESPIRATION RATE: 14 BRPM | BODY MASS INDEX: 28.29 KG/M2 | HEIGHT: 69 IN | DIASTOLIC BLOOD PRESSURE: 86 MMHG

## 2017-12-26 DIAGNOSIS — J02.9 PHARYNGITIS, UNSPECIFIED ETIOLOGY: ICD-10-CM

## 2017-12-26 DIAGNOSIS — J02.9 SORE THROAT: ICD-10-CM

## 2017-12-26 LAB
INT CON NEG: NEGATIVE
INT CON POS: POSITIVE
S PYO AG THROAT QL: NEGATIVE

## 2017-12-26 PROCEDURE — 99214 OFFICE O/P EST MOD 30 MIN: CPT | Performed by: NURSE PRACTITIONER

## 2017-12-26 PROCEDURE — 87880 STREP A ASSAY W/OPTIC: CPT | Performed by: NURSE PRACTITIONER

## 2017-12-26 RX ORDER — AMOXICILLIN 500 MG/1
500 CAPSULE ORAL 3 TIMES DAILY
Qty: 30 CAP | Refills: 0 | Status: SHIPPED | OUTPATIENT
Start: 2017-12-26 | End: 2018-01-05

## 2017-12-26 ASSESSMENT — ENCOUNTER SYMPTOMS
SORE THROAT: 1
FEVER: 0
CHILLS: 0
TROUBLE SWALLOWING: 1
COUGH: 1
SWOLLEN GLANDS: 1

## 2017-12-26 NOTE — PROGRESS NOTES
Subjective:      Ashish Wiley is a 67 y.o. male who presents with Other (sore throat)    Past Medical History:   Diagnosis Date   • Diabetes (CMS-HCC)    • Hyperlipidemia    • Hypertension        Social History     Social History   • Marital status:      Spouse name: N/A   • Number of children: N/A   • Years of education: N/A     Occupational History   • Not on file.     Social History Main Topics   • Smoking status: Former Smoker     Packs/day: 1.00     Years: 22.00     Quit date: 1/1/1989   • Smokeless tobacco: Never Used      Comment: stop cigar in 2015   • Alcohol use 0.6 oz/week     1 Glasses of wine per week      Comment: OCCASIONALLY   • Drug use: No   • Sexual activity: Yes     Partners: Female     Other Topics Concern   • Not on file     Social History Narrative   • No narrative on file     Family History   Problem Relation Age of Onset   • Lung Disease Mother      COPD   • Heart Disease Father    • Heart Failure Father    • Hypertension Father    • Hyperlipidemia Father    • Cancer Maternal Grandmother      BRAIN TUMOR   • Stroke Maternal Grandfather    • Other Paternal Grandmother      INTESTINAL PROBLEM   • Stroke Paternal Grandfather        Allergies: Sulfa drugs    Patient presents today with complaint of sore throat over the last 2 weeks. Blood pressure was also noted to be elevated at 184 for 96 upon check-in. Patient states he does have a history of hypertension and has not yet taken his medication this morning. Denies chest pain, shortness breath, or headache.        Pharyngitis    This is a new problem. The current episode started 1 to 4 weeks ago. The problem has been unchanged. Neither side of throat is experiencing more pain than the other. There has been no fever. Associated symptoms include congestion, coughing, swollen glands and trouble swallowing. He has tried nothing for the symptoms. The treatment provided no relief.       Review of Systems   Constitutional: Positive for  "malaise/fatigue. Negative for chills and fever.   HENT: Positive for congestion, sore throat and trouble swallowing.    Respiratory: Positive for cough.    Skin: Negative.    All other systems reviewed and are negative.    poct strep:      Objective:     BP (!) 184/96   Pulse 89   Temp 36.6 °C (97.9 °F)   Resp 14   Ht 1.753 m (5' 9\")   Wt 86.6 kg (191 lb)   SpO2 97%   BMI 28.21 kg/m²      Physical Exam   Constitutional: He is oriented to person, place, and time. He appears well-developed and well-nourished.   HENT:   Head: Normocephalic.   Right Ear: External ear normal.   Left Ear: External ear normal.   Nose: Nose normal.   Mouth/Throat: No oropharyngeal exudate.   Oropharynx slightly red   Eyes: Conjunctivae and EOM are normal. Pupils are equal, round, and reactive to light.   Neck: Normal range of motion. Neck supple.   Cardiovascular: Normal rate and regular rhythm.    Pulmonary/Chest: Effort normal and breath sounds normal.   Musculoskeletal: Normal range of motion.   Neurological: He is alert and oriented to person, place, and time.   Skin: Skin is warm and dry. Capillary refill takes less than 2 seconds.   Psychiatric: He has a normal mood and affect. His behavior is normal. Judgment and thought content normal.   Vitals reviewed.    poct strep: negative           Assessment/Plan:     1. Sore throat    -warm saltwater gargles  -amoxil; start only for worsening of symptoms.  -follow up if symptoms persisti or worsen    2. Hypertension    Recheck BP at 9:30: 160/86  -take BP meds upon returning home  -monitor BP with cuff at home today  -return to  for BP persistently above 160/110  -ER precautions for chest pain, SOB, headache.       "

## 2018-02-21 ENCOUNTER — OFFICE VISIT (OUTPATIENT)
Dept: MEDICAL GROUP | Age: 68
End: 2018-02-21
Payer: COMMERCIAL

## 2018-02-21 ENCOUNTER — TELEPHONE (OUTPATIENT)
Dept: MEDICAL GROUP | Facility: MEDICAL CENTER | Age: 68
End: 2018-02-21

## 2018-02-21 VITALS
HEIGHT: 70 IN | TEMPERATURE: 96.4 F | SYSTOLIC BLOOD PRESSURE: 120 MMHG | DIASTOLIC BLOOD PRESSURE: 80 MMHG | BODY MASS INDEX: 28.63 KG/M2 | WEIGHT: 200 LBS | HEART RATE: 98 BPM | OXYGEN SATURATION: 94 %

## 2018-02-21 DIAGNOSIS — E11.69 DYSLIPIDEMIA ASSOCIATED WITH TYPE 2 DIABETES MELLITUS (HCC): ICD-10-CM

## 2018-02-21 DIAGNOSIS — E55.9 VITAMIN D INSUFFICIENCY: ICD-10-CM

## 2018-02-21 DIAGNOSIS — I10 ESSENTIAL HYPERTENSION: ICD-10-CM

## 2018-02-21 DIAGNOSIS — E78.5 DYSLIPIDEMIA ASSOCIATED WITH TYPE 2 DIABETES MELLITUS (HCC): ICD-10-CM

## 2018-02-21 DIAGNOSIS — N18.2 STAGE 2 CHRONIC KIDNEY DISEASE: ICD-10-CM

## 2018-02-21 DIAGNOSIS — R80.9 TYPE 2 DIABETES MELLITUS WITH MICROALBUMINURIA, WITH LONG-TERM CURRENT USE OF INSULIN (HCC): ICD-10-CM

## 2018-02-21 DIAGNOSIS — E11.29 TYPE 2 DIABETES MELLITUS WITH MICROALBUMINURIA, WITH LONG-TERM CURRENT USE OF INSULIN (HCC): ICD-10-CM

## 2018-02-21 DIAGNOSIS — Z79.4 TYPE 2 DIABETES MELLITUS WITH MICROALBUMINURIA, WITH LONG-TERM CURRENT USE OF INSULIN (HCC): ICD-10-CM

## 2018-02-21 LAB
HBA1C MFR BLD: 9.7 % (ref ?–5.8)
INT CON NEG: NEGATIVE
INT CON POS: POSITIVE

## 2018-02-21 PROCEDURE — 83036 HEMOGLOBIN GLYCOSYLATED A1C: CPT | Performed by: INTERNAL MEDICINE

## 2018-02-21 PROCEDURE — 99214 OFFICE O/P EST MOD 30 MIN: CPT | Performed by: INTERNAL MEDICINE

## 2018-02-21 RX ORDER — GLIPIZIDE 5 MG/1
5 TABLET ORAL
Qty: 90 TAB | Refills: 3 | Status: SHIPPED | OUTPATIENT
Start: 2018-02-21 | End: 2018-06-21

## 2018-02-21 RX ORDER — CHOLECALCIFEROL (VITAMIN D3) 50 MCG
4000 TABLET ORAL DAILY
Status: ON HOLD | COMMUNITY
End: 2018-07-02

## 2018-02-21 ASSESSMENT — PATIENT HEALTH QUESTIONNAIRE - PHQ9: CLINICAL INTERPRETATION OF PHQ2 SCORE: 0

## 2018-02-21 NOTE — ASSESSMENT & PLAN NOTE
Patient stopped taking metformin in September 2017 due to worsening kidney function. He is currently taking Lantus insulin 14 units daily and exenatide 2 mg once a week. His A1c in clinic today was 9.7. Patient stated that he has eye exam last year. We will request a report from ophthalmologist.  I discussed with patient to retry metformin if his kidney function improved. However, patient showed hesitation to retry metformin.  Patient left after A1c test and I have not had a chance to discuss with him for medication changes in office visit. I called patient and left voice message to advise him increase the dose of Lantus insulin to 16 units daily and to continue exenatide 2 mg once a week. I also advised patient to add on glipizide with low-dose 5 mg to take once a day with large meal. I also advised patient to follow with [facility] endocrinologist, Samaria Anderson to discuss diabetes treatment as well. Patient is advised to call back to our office for any concerning or question regarding diabetes treatment.

## 2018-02-21 NOTE — TELEPHONE ENCOUNTER
Left detailed message for pt. Regarding A1c and new medication also stated he needs to increase his Lantis to 16 units and take new meds with food, if pt. Has any questions asked to call us at [phone]

## 2018-02-21 NOTE — ASSESSMENT & PLAN NOTE
Patient has low vitamin D level at 24 on 9/28/17. Patient stated that he takes vitamin D supplement 1000 units daily only. We discussed to increase the dose of vitamin D to 4000 units daily.

## 2018-02-21 NOTE — ASSESSMENT & PLAN NOTE
Patient reported that he already saw nephrologist, which does not concern of his kidney function currently. I advised patient to control his blood pressure and diabetes very well to prevent worsening kidney function. I also advised patient to avoid NSAIDs. I ordered CMP in previous visit. However, patient has not done yet.

## 2018-02-21 NOTE — PROGRESS NOTES
Subjective:   Ashish Wiley is a 68 y.o. male here today for evaluation and management of:      Vitamin D insufficiency  Patient has low vitamin D level at 24 on 9/28/17. Patient stated that he takes vitamin D supplement 1000 units daily only. We discussed to increase the dose of vitamin D to 4000 units daily.    Type 2 diabetes mellitus with microalbuminuria, with long-term current use of insulin (CMS-HCC)  Patient stopped taking metformin in September 2017 due to worsening kidney function. He is currently taking Lantus insulin 14 units daily and exenatide 2 mg once a week. His A1c in clinic today was 9.7. Patient stated that he has eye exam last year. We will request a report from ophthalmologist.  I discussed with patient to retry metformin if his kidney function improved. However, patient showed hesitation to retry metformin.  Patient left after A1c test and I have not had a chance to discuss with him for medication changes in office visit. I called patient and left voice message to advise him increase the dose of Lantus insulin to 16 units daily and to continue exenatide 2 mg once a week. I also advised patient to add on glipizide with low-dose 5 mg to take once a day with large meal. I also advised patient to follow with [facility] endocrinologist, Samaria Anderson to discuss diabetes treatment as well. Patient is advised to call back to our office for any concerning or question regarding diabetes treatment.    Essential hypertension  He is taking lisinopril 40 mg daily, metoprolol XL 25 mg daily and amlodipine 5 mg daily. He denies side effects from taking lisinopril, amlodipine and metoprolol. His blood pressure is stable with current regimens.    Dyslipidemia associated with type 2 diabetes mellitus (HCC)  He is taking Lipitor 40 mg every evening. He denies side effects from taking Lipitor. His last lipid panel on 5/22/17 showed LDL 40, which is at goal. I ordered fasting blood test in previous visit, but  "patient has not done yet.    Stage 2 chronic kidney disease  Patient reported that he already saw nephrologist, which does not concern of his kidney function currently. I advised patient to control his blood pressure and diabetes very well to prevent worsening kidney function. I also advised patient to avoid NSAIDs. I ordered CMP in previous visit. However, patient has not done yet.         Current medicines (including changes today)  Current Outpatient Prescriptions   Medication Sig Dispense Refill   • glipiZIDE (GLUCOTROL) 5 MG Tab Take 1 Tab by mouth every day with lunch. 90 Tab 3   • Cholecalciferol (VITAMIN D) 2000 UNIT Tab Take 4,000 Units by mouth every day.     • tobramycin (TOBREX) 0.3 % Solution ophthalmic solution Place 1 Drop in both eyes every 4 hours. While awake for 5-7 days until symptoms resolve. 1 Bottle 0   • metoprolol SR (TOPROL XL) 25 MG TABLET SR 24 HR Take 1 Tab by mouth every day. 90 Tab 3   • Insulin Syringe-Needle U-100 31G X 5/16\" 0.5 ML Misc Use to inject lantus insulin once a day 100 Each 1   • Exenatide ER 2 MG Pen-injector Inject 2 mg as instructed every 7 days. 12 Each 0   • insulin glargine (LANTUS) 100 UNIT/ML Solution Inject 16 Units as instructed every evening.     • lisinopril (PRINIVIL, ZESTRIL) 40 MG tablet Take 40 mg by mouth every day.     • amlodipine (NORVASC) 5 MG Tab Take 5 mg by mouth every day.     • atorvastatin (LIPITOR) 40 MG Tab Take 40 mg by mouth every evening.     • aspirin EC (ECOTRIN) 81 MG Tablet Delayed Response Take 81 mg by mouth every day.       No current facility-administered medications for this visit.      He  has a past medical history of Diabetes (CMS-[facility]); Hyperlipidemia; and Hypertension.    ROS   No chest pain, no shortness of breath, no abdominal pain       Objective:     Blood pressure 120/80, pulse 98, temperature (!) 35.8 °C (96.4 °F), height 1.778 m (5' 10\"), weight 90.7 kg (200 lb), SpO2 94 %. Body mass index is 28.7 kg/m².   Physical " Exam:  General: Alert, oriented and no acute distress.  Eye contact is good, speech goal directed, affect calm  HEENT: conjunctiva non-injected, sclera non-icteric.  Oral mucous membranes pink and moist with no lesions.  Pinna normal.   Lungs: Normal respiratory effort, clear to auscultation bilaterally with good excursion.  CV: regular rate and rhythm. No murmurs.   Abdomen: soft, non distended, nontender, Bowel sound normal.  Ext: no edema, color normal, vascularity normal, temperature normal        Assessment and Plan:   The following treatment plan was discussed     1. Type 2 diabetes mellitus with microalbuminuria, with long-term current use of insulin (CMS-HCC)  - Not well controlled. I discussed with patient to retry metformin if his kidney function improved. , However, patient showed hesitation to retry metformin. He is past due for A1c test.  - A1c in clinic today was 9.7. Patient is currently taking Lantus insulin 14 units daily and exenatide 2 mg once a week. Patient left after A1c test. I have not had a chance to discuss medication changes with him in office visit.  - I called patient and left voice message to advise him to increase the dose of Lantus insulin from 14 units once a day to 16 units once a day, to continue exenatide 2 mg once a week and to add glipizide 5 mg once a day with large meal. I also advised patient to follow with [facility] endocrinologist to discuss his diabetes treatment as well.  - I also left voice message for patient to call back if he has any question or concern.  - Counseled to comply with medication and diet.   - Counseled signs and symptoms of hypoglycemia and management of hypoglycemia.   - Recommend to have retinal eye exam once a year.  - Advised to check both feet daily.     - POCT Hemoglobin A1C  - glipiZIDE (GLUCOTROL) 5 MG Tab; Take 1 Tab by mouth every day with lunch.  Dispense: 90 Tab; Refill: 3    2. Essential hypertension  - Well controlled. Continue lisinopril 40 mg  daily, metoprolol XL 25 mg daily and amlodipine 5 mg daily.  - Recommend to monitor blood pressure and heart rate at home.    3. Dyslipidemia associated with type 2 diabetes mellitus (HCC)  - LDL at goal based on previous lipid panel in May 2017. Continue atorvastatin 40 mg every evening. Reviewed potential side effects atorvastatin with patient.  - Advised to eat low fat, low carbohydrate and high fiber diet as well as do cardio physical exercise regularly.   - Reprinted blood test order for patient to do before next follow-up visit.    4. Vitamin D insufficiency  - Advised patient to take vitamin D 4000 units daily and recheck vitamin D in 3 months. Reprinted blood test order for patient.    5. Stage 2 chronic kidney disease  - Recommend patient to avoid taking NSAIDs. Advised to keep well hydrated.  - Discussed to control diabetes and blood pressure very well to prevent progression of kidney disease.        Followup: Return in about 3 months (around 5/21/2018), or if symptoms worsen or fail to improve, for diabetes, dyslipidemia, hypertension, vitamin D insufficiency, chronic kidney disease, lab review.      Please note that this dictation was created using voice recognition software. I have made every reasonable attempt to correct obvious errors, but I expect that there may have unintended errors in text, spelling, punctuation, or grammar that I did not discover.

## 2018-02-21 NOTE — LETTER
[facility]  Venus Claros M.D.  [address]  [place] [address]  Fax: [phone]   Authorization for Release/Disclosure of   Protected Health Information   Name: ASHISH CLAIRE : 1950 SSN: [id number]   Address: [address]  [place] [address] Phone:    [phone] (home)    I authorize the entity listed below to release/disclose the PHI below to:   [facility]/Venus Claros M.D. and Venus Claros M.D.   Provider or Entity Name:  Dr. Maher   Address   [facility], [address]  [address] [facility]  [place] Phone:      Fax:  [phone]   Reason for request: continuity of care   Information to be released:    [  ] LAST COLONOSCOPY,  including any PATH REPORT and follow-up  [  ] LAST FIT/COLOGUARD RESULT [  ] LAST DEXA  [  ] LAST MAMMOGRAM  [  ] LAST PAP  [  ] LAST LABS [  ] RETINA EXAM REPORT  [  ] IMMUNIZATION RECORDS  [ XXXX ] Release all info      [  ] Check here and initial the line next to each item to release ALL health information INCLUDING  _____ Care and treatment for drug and / or alcohol abuse  _____ HIV testing, infection status, or AIDS  _____ Genetic Testing    DATES OF SERVICE OR TIME PERIOD TO BE DISCLOSED: _____________  I understand and acknowledge that:  * This Authorization may be revoked at any time by you in writing, except if your health information has already been used or disclosed.  * Your health information that will be used or disclosed as a result of you signing this authorization could be re-disclosed by the recipient. If this occurs, your re-disclosed health information may no longer be protected by State or Federal laws.  * You may refuse to sign this Authorization. Your refusal will not affect your ability to obtain treatment.  * This Authorization becomes effective upon signing and will  on (date) __________.      If no date is indicated, this Authorization will  one (1) year from the signature date.    Name: Ashish Claire    Signature:   Date:     2/21/2018       PLEASE FAX REQUESTED RECORDS BACK TO: [phone]

## 2018-02-21 NOTE — ASSESSMENT & PLAN NOTE
He is taking Lipitor 40 mg every evening. He denies side effects from taking Lipitor. His last lipid panel on 5/22/17 showed LDL 40, which is at goal. I ordered fasting blood test in previous visit, but patient has not done yet.

## 2018-04-24 ENCOUNTER — TELEPHONE (OUTPATIENT)
Dept: HEALTH INFORMATION MANAGEMENT | Facility: OTHER | Age: 68
End: 2018-04-24

## 2018-04-24 DIAGNOSIS — E11.29 TYPE 2 DIABETES MELLITUS WITH MICROALBUMINURIA, WITH LONG-TERM CURRENT USE OF INSULIN (HCC): ICD-10-CM

## 2018-04-24 DIAGNOSIS — Z79.4 TYPE 2 DIABETES MELLITUS WITH MICROALBUMINURIA, WITH LONG-TERM CURRENT USE OF INSULIN (HCC): ICD-10-CM

## 2018-04-24 DIAGNOSIS — R80.9 TYPE 2 DIABETES MELLITUS WITH MICROALBUMINURIA, WITH LONG-TERM CURRENT USE OF INSULIN (HCC): ICD-10-CM

## 2018-04-24 NOTE — TELEPHONE ENCOUNTER
Rickey Claros,    [facility] is participating in an initiative to improve blood sugar control in diabetic patients.  Your patient has been identified as having an A1c >9% within the last 12 months. If you feel your patient would benefit from the pharmacotherapy for diabetes management service at [facility], please consider signing pended order.      Thank you for your consideration,  Jaswinder De Paz, PharmD [phone]

## 2018-04-24 NOTE — LETTER
April 26, 2018        Ashish Wiley  [address] [place]  [place] [address]        Dear Ashish:    Dr. Claros's office has been unable to reach you. Please call our office at [phone]. Thank you.        Sincerely,        [facility] Ass[facility]      Electronically Signed

## 2018-04-25 ENCOUNTER — TELEPHONE (OUTPATIENT)
Dept: VASCULAR LAB | Facility: MEDICAL CENTER | Age: 68
End: 2018-04-25

## 2018-04-25 NOTE — TELEPHONE ENCOUNTER
[facility]    Pt refuses to be seen in clinic for DM pharmacotherapy.  Pt states he only wants to work directly with endocrinologist.    Sarwat Pringle, PharmD

## 2018-04-25 NOTE — TELEPHONE ENCOUNTER
Phone Number Called: [phone] (home)     Message: LVM for patient to return call.    Left Message for patient to call back: yes

## 2018-04-25 NOTE — TELEPHONE ENCOUNTER
Please inform patient that I placed the referral for pharmacotherapy services to help to control his diabetes better.  He will receive a call from the referral coordinator regarding the referral within 1-2 week.    Thanks!    Venus Claros M.D.

## 2018-04-30 ENCOUNTER — OFFICE VISIT (OUTPATIENT)
Dept: URGENT CARE | Facility: CLINIC | Age: 68
End: 2018-04-30
Payer: COMMERCIAL

## 2018-04-30 VITALS
TEMPERATURE: 98.8 F | SYSTOLIC BLOOD PRESSURE: 128 MMHG | HEIGHT: 70 IN | WEIGHT: 202 LBS | HEART RATE: 100 BPM | OXYGEN SATURATION: 95 % | DIASTOLIC BLOOD PRESSURE: 80 MMHG | RESPIRATION RATE: 20 BRPM | BODY MASS INDEX: 28.92 KG/M2

## 2018-04-30 DIAGNOSIS — J30.2 SEASONAL ALLERGIC RHINITIS, UNSPECIFIED TRIGGER: ICD-10-CM

## 2018-04-30 DIAGNOSIS — R60.0 BILATERAL LOWER EXTREMITY EDEMA: ICD-10-CM

## 2018-04-30 PROCEDURE — 99214 OFFICE O/P EST MOD 30 MIN: CPT | Performed by: NURSE PRACTITIONER

## 2018-04-30 ASSESSMENT — ENCOUNTER SYMPTOMS
FEVER: 0
SHORTNESS OF BREATH: 0
COUGH: 1
WHEEZING: 0

## 2018-04-30 NOTE — PROGRESS NOTES
Subjective:      Ashish Wiley is a 68 y.o. male who presents with Cough (X 2 wks dry cough and congestion ) and Edema (X 7 days feet swollen .)            This is a new problem. Pt reports he has had sinus congestion, cough and clear nasal drainage for 3 weeks. He is taking allegra with some relief. He admits his most bothersome symptom is he cannot lie flat at night because he starts coughing. He has to sleep sitting up. He denies any fevers.   He also reports some new significant edema to both lower legs. He reports he was standing for long hours this weekend working at a [employer]. He does report that he usually has a more mild degree of edema in both legs, but after this weekend it is significantly worse. He denies any fatigue, SOB or wet cough. Hx chronic kidney disease.        Review of Systems   Constitutional: Negative for fever and malaise/fatigue.   HENT: Positive for congestion.    Respiratory: Positive for cough. Negative for shortness of breath and wheezing.    Cardiovascular: Positive for leg swelling.   All other systems reviewed and are negative.    Past Medical History:   Diagnosis Date   • Diabetes (CMS-HCC) (HCC)    • Hyperlipidemia    • Hypertension     No past surgical history on file.   Social History     Social History   • Marital status:      Spouse name: N/A   • Number of children: N/A   • Years of education: N/A     Occupational History   • Not on file.     Social History Main Topics   • Smoking status: Former Smoker     Packs/day: 1.00     Years: 22.00     Quit date: 1/1/1989   • Smokeless tobacco: Never Used      Comment: stop cigar in 2015   • Alcohol use 0.6 oz/week     1 Glasses of wine per week      Comment: OCCASIONALLY   • Drug use: No   • Sexual activity: Yes     Partners: Female     Other Topics Concern   • Not on file     Social History Narrative   • No narrative on file          Objective:     /80   Pulse 100   Temp 37.1 °C (98.8 °F)   Resp 20   " Ht 1.778 m (5' 10\")   Wt 91.6 kg (202 lb)   SpO2 95%   BMI 28.98 kg/m²      Physical Exam   Constitutional: He is oriented to person, place, and time. Vital signs are normal. He appears well-developed and well-nourished.   HENT:   Head: Normocephalic and atraumatic.   Right Ear: Tympanic membrane and external ear normal.   Left Ear: Tympanic membrane and external ear normal.   Nose: Mucosal edema and rhinorrhea present.   Mouth/Throat: Oropharynx is clear and moist.   Eyes: EOM are normal. Pupils are equal, round, and reactive to light.   Neck: Normal range of motion.   Cardiovascular: Normal rate and regular rhythm.    Pulmonary/Chest: Effort normal and breath sounds normal.   Musculoskeletal: Normal range of motion.        Right lower leg: He exhibits edema.        Left lower leg: He exhibits edema.   3+ pitting edema bilaterally  Distal [place] intact   Neurological: He is alert and oriented to person, place, and time.   Skin: Skin is warm and dry. Capillary refill takes less than 2 seconds.   Psychiatric: He has a normal mood and affect. His speech is normal and behavior is normal. Thought content normal.   Vitals reviewed.              Assessment/Plan:     1. Seasonal allergic rhinitis, unspecified trigger    Continue Allegra daily. Add in benadryl at night to help promote sleep and decrease cough. Use humidifier in room at night to increase moisture in the air    2. Bilateral lower extremity edema    Discussed with patient I would like for him to obtain SOTO hose compression stockings and wear this regularly for at least the next 3 days to help improve circulation and edema  Elevate legs at night when he gets home to again help improve circulation  Discussed with patient I would like to try these modalities first before we jump to putting him on a short course of medication to help with edema, especially with some chronic kidney disease, this could be more harmful than helpful. He understands.  He has an appt " with PCP next week, advised him to keep appt  Supportive care, differential diagnoses, and indications for immediate follow-up discussed with patient.    Pathogenesis of diagnosis discussed including typical length and natural progression.      Instructed to return to  or nearest emergency department if symptoms fail to improve, for any change in condition, further concerns, or new concerning symptoms.  Patient states understanding of the plan of care and discharge instructions.

## 2018-05-10 ENCOUNTER — HOSPITAL ENCOUNTER (OUTPATIENT)
Dept: LAB | Facility: MEDICAL CENTER | Age: 68
End: 2018-05-10
Attending: INTERNAL MEDICINE
Payer: COMMERCIAL

## 2018-05-10 DIAGNOSIS — I10 ESSENTIAL HYPERTENSION: ICD-10-CM

## 2018-05-10 DIAGNOSIS — N18.30 CKD (CHRONIC KIDNEY DISEASE) STAGE 3, GFR 30-59 ML/MIN (HCC): ICD-10-CM

## 2018-05-10 DIAGNOSIS — R80.9 TYPE 2 DIABETES MELLITUS WITH MICROALBUMINURIA, WITH LONG-TERM CURRENT USE OF INSULIN (HCC): ICD-10-CM

## 2018-05-10 DIAGNOSIS — E11.69 DYSLIPIDEMIA ASSOCIATED WITH TYPE 2 DIABETES MELLITUS (HCC): ICD-10-CM

## 2018-05-10 DIAGNOSIS — E78.5 DYSLIPIDEMIA ASSOCIATED WITH TYPE 2 DIABETES MELLITUS (HCC): ICD-10-CM

## 2018-05-10 DIAGNOSIS — E55.9 VITAMIN D INSUFFICIENCY: ICD-10-CM

## 2018-05-10 DIAGNOSIS — E11.29 TYPE 2 DIABETES MELLITUS WITH MICROALBUMINURIA, WITH LONG-TERM CURRENT USE OF INSULIN (HCC): ICD-10-CM

## 2018-05-10 DIAGNOSIS — Z79.4 TYPE 2 DIABETES MELLITUS WITH MICROALBUMINURIA, WITH LONG-TERM CURRENT USE OF INSULIN (HCC): ICD-10-CM

## 2018-05-10 DIAGNOSIS — D64.9 NORMOCYTIC ANEMIA: ICD-10-CM

## 2018-05-10 LAB
25(OH)D3 SERPL-MCNC: 33 NG/ML (ref 30–100)
ALBUMIN SERPL BCP-MCNC: 3.6 G/DL (ref 3.2–4.9)
ALBUMIN/GLOB SERPL: 1.2 G/DL
ALP SERPL-CCNC: 87 U/L (ref 30–99)
ALT SERPL-CCNC: 27 U/L (ref 2–50)
ANION GAP SERPL CALC-SCNC: 6 MMOL/L (ref 0–11.9)
AST SERPL-CCNC: 24 U/L (ref 12–45)
BASOPHILS # BLD AUTO: 1 % (ref 0–1.8)
BASOPHILS # BLD: 0.08 K/UL (ref 0–0.12)
BILIRUB SERPL-MCNC: 0.5 MG/DL (ref 0.1–1.5)
BUN SERPL-MCNC: 23 MG/DL (ref 8–22)
CALCIUM SERPL-MCNC: 9.3 MG/DL (ref 8.5–10.5)
CHLORIDE SERPL-SCNC: 107 MMOL/L (ref 96–112)
CHOLEST SERPL-MCNC: 108 MG/DL (ref 100–199)
CO2 SERPL-SCNC: 26 MMOL/L (ref 20–33)
CREAT SERPL-MCNC: 1.87 MG/DL (ref 0.5–1.4)
CREAT UR-MCNC: 119.6 MG/DL
EOSINOPHIL # BLD AUTO: 0.13 K/UL (ref 0–0.51)
EOSINOPHIL NFR BLD: 1.6 % (ref 0–6.9)
ERYTHROCYTE [DISTWIDTH] IN BLOOD BY AUTOMATED COUNT: 50.5 FL (ref 35.9–50)
EST. AVERAGE GLUCOSE BLD GHB EST-MCNC: 166 MG/DL
FERRITIN SERPL-MCNC: 50.6 NG/ML (ref 22–322)
FOLATE SERPL-MCNC: >24 NG/ML
GLOBULIN SER CALC-MCNC: 2.9 G/DL (ref 1.9–3.5)
GLUCOSE SERPL-MCNC: 75 MG/DL (ref 65–99)
HBA1C MFR BLD: 7.4 % (ref 0–5.6)
HCT VFR BLD AUTO: 37.7 % (ref 42–52)
HDLC SERPL-MCNC: 41 MG/DL
HGB BLD-MCNC: 12.1 G/DL (ref 14–18)
IMM GRANULOCYTES # BLD AUTO: 0.03 K/UL (ref 0–0.11)
IMM GRANULOCYTES NFR BLD AUTO: 0.4 % (ref 0–0.9)
IRON SATN MFR SERPL: 14 % (ref 15–55)
IRON SERPL-MCNC: 48 UG/DL (ref 50–180)
LDLC SERPL CALC-MCNC: 45 MG/DL
LYMPHOCYTES # BLD AUTO: 0.86 K/UL (ref 1–4.8)
LYMPHOCYTES NFR BLD: 10.4 % (ref 22–41)
MCH RBC QN AUTO: 31.3 PG (ref 27–33)
MCHC RBC AUTO-ENTMCNC: 32.1 G/DL (ref 33.7–35.3)
MCV RBC AUTO: 97.7 FL (ref 81.4–97.8)
MICROALBUMIN UR-MCNC: 119.3 MG/DL
MICROALBUMIN/CREAT UR: 997 MG/G (ref 0–30)
MONOCYTES # BLD AUTO: 0.55 K/UL (ref 0–0.85)
MONOCYTES NFR BLD AUTO: 6.7 % (ref 0–13.4)
NEUTROPHILS # BLD AUTO: 6.6 K/UL (ref 1.82–7.42)
NEUTROPHILS NFR BLD: 79.9 % (ref 44–72)
NRBC # BLD AUTO: 0 K/UL
NRBC BLD-RTO: 0 /100 WBC
PLATELET # BLD AUTO: 296 K/UL (ref 164–446)
PMV BLD AUTO: 9.7 FL (ref 9–12.9)
POTASSIUM SERPL-SCNC: 4.6 MMOL/L (ref 3.6–5.5)
PROT SERPL-MCNC: 6.5 G/DL (ref 6–8.2)
RBC # BLD AUTO: 3.86 M/UL (ref 4.7–6.1)
SODIUM SERPL-SCNC: 139 MMOL/L (ref 135–145)
TIBC SERPL-MCNC: 349 UG/DL (ref 250–450)
TRIGL SERPL-MCNC: 109 MG/DL (ref 0–149)
VIT B12 SERPL-MCNC: 812 PG/ML (ref 211–911)
WBC # BLD AUTO: 8.3 K/UL (ref 4.8–10.8)

## 2018-05-10 PROCEDURE — 80053 COMPREHEN METABOLIC PANEL: CPT

## 2018-05-10 PROCEDURE — 82746 ASSAY OF FOLIC ACID SERUM: CPT

## 2018-05-10 PROCEDURE — 36415 COLL VENOUS BLD VENIPUNCTURE: CPT

## 2018-05-10 PROCEDURE — 82728 ASSAY OF FERRITIN: CPT

## 2018-05-10 PROCEDURE — 83036 HEMOGLOBIN GLYCOSYLATED A1C: CPT

## 2018-05-10 PROCEDURE — 82043 UR ALBUMIN QUANTITATIVE: CPT

## 2018-05-10 PROCEDURE — 82306 VITAMIN D 25 HYDROXY: CPT

## 2018-05-10 PROCEDURE — 82570 ASSAY OF URINE CREATININE: CPT

## 2018-05-10 PROCEDURE — 80061 LIPID PANEL: CPT

## 2018-05-10 PROCEDURE — 83540 ASSAY OF IRON: CPT

## 2018-05-10 PROCEDURE — 83550 IRON BINDING TEST: CPT

## 2018-05-10 PROCEDURE — 82607 VITAMIN B-12: CPT

## 2018-05-10 PROCEDURE — 85025 COMPLETE CBC W/AUTO DIFF WBC: CPT

## 2018-05-23 ENCOUNTER — OFFICE VISIT (OUTPATIENT)
Dept: MEDICAL GROUP | Age: 68
End: 2018-05-23
Payer: COMMERCIAL

## 2018-05-23 ENCOUNTER — HOSPITAL ENCOUNTER (OUTPATIENT)
Dept: RADIOLOGY | Facility: MEDICAL CENTER | Age: 68
End: 2018-05-23
Attending: INTERNAL MEDICINE
Payer: COMMERCIAL

## 2018-05-23 ENCOUNTER — TELEPHONE (OUTPATIENT)
Dept: MEDICAL GROUP | Age: 68
End: 2018-05-23

## 2018-05-23 ENCOUNTER — HOSPITAL ENCOUNTER (OUTPATIENT)
Dept: LAB | Facility: MEDICAL CENTER | Age: 68
End: 2018-05-23
Attending: INTERNAL MEDICINE
Payer: COMMERCIAL

## 2018-05-23 VITALS
DIASTOLIC BLOOD PRESSURE: 84 MMHG | HEIGHT: 70 IN | SYSTOLIC BLOOD PRESSURE: 122 MMHG | WEIGHT: 202.6 LBS | HEART RATE: 90 BPM | TEMPERATURE: 97.6 F | BODY MASS INDEX: 29.01 KG/M2 | RESPIRATION RATE: 12 BRPM | OXYGEN SATURATION: 88 %

## 2018-05-23 DIAGNOSIS — R80.9 MICROALBUMINURIA DUE TO TYPE 2 DIABETES MELLITUS (HCC): ICD-10-CM

## 2018-05-23 DIAGNOSIS — Z79.4 TYPE 2 DIABETES MELLITUS WITH MICROALBUMINURIA, WITH LONG-TERM CURRENT USE OF INSULIN (HCC): ICD-10-CM

## 2018-05-23 DIAGNOSIS — E11.29 TYPE 2 DIABETES MELLITUS WITH MICROALBUMINURIA, WITH LONG-TERM CURRENT USE OF INSULIN (HCC): ICD-10-CM

## 2018-05-23 DIAGNOSIS — R60.0 BILATERAL LEG EDEMA: ICD-10-CM

## 2018-05-23 DIAGNOSIS — I10 ESSENTIAL HYPERTENSION: ICD-10-CM

## 2018-05-23 DIAGNOSIS — J20.9 ACUTE BRONCHITIS, UNSPECIFIED ORGANISM: ICD-10-CM

## 2018-05-23 DIAGNOSIS — R80.9 TYPE 2 DIABETES MELLITUS WITH MICROALBUMINURIA, WITH LONG-TERM CURRENT USE OF INSULIN (HCC): ICD-10-CM

## 2018-05-23 DIAGNOSIS — R06.02 SHORTNESS OF BREATH ON EXERTION: ICD-10-CM

## 2018-05-23 DIAGNOSIS — R06.01 ORTHOPNEA: ICD-10-CM

## 2018-05-23 DIAGNOSIS — E78.5 DYSLIPIDEMIA ASSOCIATED WITH TYPE 2 DIABETES MELLITUS (HCC): ICD-10-CM

## 2018-05-23 DIAGNOSIS — E11.29 MICROALBUMINURIA DUE TO TYPE 2 DIABETES MELLITUS (HCC): ICD-10-CM

## 2018-05-23 DIAGNOSIS — E11.69 DYSLIPIDEMIA ASSOCIATED WITH TYPE 2 DIABETES MELLITUS (HCC): ICD-10-CM

## 2018-05-23 LAB — BNP SERPL-MCNC: 980 PG/ML (ref 0–100)

## 2018-05-23 PROCEDURE — 36415 COLL VENOUS BLD VENIPUNCTURE: CPT

## 2018-05-23 PROCEDURE — 83880 ASSAY OF NATRIURETIC PEPTIDE: CPT

## 2018-05-23 PROCEDURE — 71046 X-RAY EXAM CHEST 2 VIEWS: CPT

## 2018-05-23 PROCEDURE — 99215 OFFICE O/P EST HI 40 MIN: CPT | Performed by: INTERNAL MEDICINE

## 2018-05-23 RX ORDER — FUROSEMIDE 40 MG/1
40 TABLET ORAL DAILY
Qty: 14 TAB | Refills: 0 | Status: SHIPPED | OUTPATIENT
Start: 2018-05-23 | End: 2018-06-06 | Stop reason: SDUPTHER

## 2018-05-23 RX ORDER — DOXYCYCLINE HYCLATE 100 MG
100 TABLET ORAL 2 TIMES DAILY
Qty: 14 TAB | Refills: 0 | Status: SHIPPED | OUTPATIENT
Start: 2018-05-23 | End: 2018-05-30

## 2018-05-23 NOTE — ASSESSMENT & PLAN NOTE
He reported having shortness of breath when he lies down flat. He reported that he has to sit in a recliner to sleep in past 6 weeks.

## 2018-05-23 NOTE — PROGRESS NOTES
Subjective:   Ashish Wiley is a 68 y.o. male here today for evaluation and management of:      Type 2 diabetes mellitus with microalbuminuria, with long-term current use of insulin (CMS-HCC)  Patient is taking exenatide 2 mg once a week and, Lantus insulin 16 units every evening. He stated that he tries to eat better and his diabetes improved. A1c decreased from 9.7 to 7.4 on 5/10/18. Patient is due to for retinal exam. He will follow with ophthalmologist for eye exam. He denies side effects from taking Lantus insulin and exenatide. He follows with endocrinologist from [facility].    Shortness of breath on exertion  This is a new problem. Patient reported that she noticed shortness of breath when he has daily activity or walking or exert himself. He noticed shortness of breath for 6 weeks. Symptoms gradually worse last week.    Orthopnea  He reported having shortness of breath when he lies down flat. He reported that he has to sit in a recliner to sleep in past 6 weeks.    Bilateral leg edema  Patient also noticed bilateral leg swelling for 6 weeks. He also has shortness of breath on physical exertion and and he is not able to lie flat at night due to the shortness of breath. He reported that he sometimes wake up with short of breath at night and he coughes at night. He reported that he has tried cough more. However, he reported having sinus allergies and he is taking Benadryl frequently throughout the day. I advised patient to stop taking Benadryl and try Claritin 10 mg daily and to wash sinus with [place] pot, then use Flonase nasal spray after sinus rinse. I also recommend patient to eat low salt diet and avoid flavor water drink.    Microalbuminuria due to type 2 diabetes mellitus (CMS-HCC)  Patient is taking lisinopril 40 mg daily. He still has microalbuminuria. His kidney function is slightly declining from previous blood test. His potassium is normal. Patient and wife stated that he is not good at  hydration.  Patient is advised to follow-up with nephrologist again.    Results for AMAYA CLAIRE (MRN [id number]) as of 5/23/2018 10:32   Ref. Range 5/10/2018 06:41   Micro Alb Creat Ratio Latest Ref Range: 0 - 30 mg/g 997 (H)   Creatinine, Urine Latest Units: mg/dL 119.60   Microalbumin, Urine Random Latest Units: mg/dL 119.3       Essential hypertension  Patient is taking amlodipine 5 mg daily, metoprolol XL 25 mg daily and lisinopril 40 mg daily. His blood pressure is stable and well-controlled with current regimens. He denies side effects from taking his current medication. Given his significant bilateral peripheral edema and orthopnea, we are going to try Lasix 40 mg daily. I advised patient to stop taking amlodipine when he is taking Lasix. I also discussed with patient and wife that his kidney function can be impaired more from taking Lasix. We will only tried Lasix 40 mg daily for 2 weeks. Patient and wife agree to try Lasix.    Dyslipidemia associated with type 2 diabetes mellitus (HCC)  Patient is taking Lipitor 40 mg every evening. He tolerates Lipitor without side effects. His liver enzymes are within normal. His lipid panel is well controlled with Lipitor.    Results for AMAYA CLAIRE (MRN [id number]) as of 5/23/2018 10:32   Ref. Range 5/10/2018 06:41   Cholesterol,Tot Latest Ref Range: 100 - 199 mg/dL 108   Triglycerides Latest Ref Range: 0 - 149 mg/dL 109   HDL Latest Ref Range: >=40 mg/dL 41   LDL Latest Ref Range: <100 mg/dL 45          Current medicines (including changes today)  Current Outpatient Prescriptions   Medication Sig Dispense Refill   • furosemide (LASIX) 40 MG Tab Take 1 Tab by mouth every day. 14 Tab 0   • doxycycline (VIBRAMYCIN) 100 MG Tab Take 1 Tab by mouth 2 times a day for 7 days. 14 Tab 0   • glipiZIDE (GLUCOTROL) 5 MG Tab Take 1 Tab by mouth every day with lunch. 90 Tab 3   • Cholecalciferol (VITAMIN D) 2000 UNIT Tab Take 4,000 Units by mouth every day.     •  "tobramycin (TOBREX) 0.3 % Solution ophthalmic solution Place 1 Drop in both eyes every 4 hours. While awake for 5-7 days until symptoms resolve. 1 Bottle 0   • metoprolol SR (TOPROL XL) 25 MG TABLET SR 24 HR Take 1 Tab by mouth every day. 90 Tab 3   • Exenatide ER 2 MG Pen-injector Inject 2 mg as instructed every 7 days. 12 Each 0   • insulin glargine (LANTUS) 100 UNIT/ML Solution Inject 16 Units as instructed every evening.     • lisinopril (PRINIVIL, ZESTRIL) 40 MG tablet Take 40 mg by mouth every day.     • amlodipine (NORVASC) 5 MG Tab Take 5 mg by mouth every day.     • atorvastatin (LIPITOR) 40 MG Tab Take 40 mg by mouth every evening.     • aspirin EC (ECOTRIN) 81 MG Tablet Delayed Response Take 81 mg by mouth every day.     • Insulin Syringe-Needle U-100 31G X 5/16\" 0.5 ML Misc Use to inject lantus insulin once a day 100 Each 1     No current facility-administered medications for this visit.      He  has a past medical history of Diabetes (HCC); Hyperlipidemia; and Hypertension.    ROS   No chest pain, no shortness of breath, no abdominal pain       Objective:     Blood pressure 122/84, pulse 90, temperature 36.4 °C (97.6 °F), resp. rate 12, height 1.778 m (5' 10\"), weight 91.9 kg (202 lb 9.6 oz), SpO2 88 %. Body mass index is 29.07 kg/m².   Physical Exam:  General: Alert, oriented and no acute distress.  Eye contact is good, speech goal directed, affect calm  HEENT: conjunctiva non-injected, sclera non-icteric.  Oral mucous membranes pink and moist with no lesions.  Pinna normal.   Lungs: Normal respiratory effort, clear to auscultation bilaterally with good excursion., No wheezing. No bibasilar crackles.  CV: regular rate and rhythm. No murmurs.   Abdomen: soft, non distended, nontender, Bowel sound normal.  Ext: no edema, color normal, vascularity normal, temperature normal      Assessment and Plan:   The following treatment plan was discussed     1. Type 2 diabetes mellitus with microalbuminuria, with " long-term current use of insulin (HCC)  - Improved. Continue current regimens.  - Counseled to comply with medication and diet.   - Counseled signs and symptoms of hypoglycemia and management of hypoglycemia.   - Recommend to have retinal eye exam once a year.  - Advised to check both feet daily.   - COMP METABOLIC PANEL; Future  - HEMOGLOBIN A1C; Future  - CBC WITH DIFFERENTIAL; Future  - MICROALBUMIN CREAT RATIO URINE; Future    2. Microalbuminuria due to type 2 diabetes mellitus (HCC)  - Not at goal yet. Continue lisinopril 40 mg daily.  - MICROALBUMIN CREAT RATIO URINE; Future    3. Essential hypertension  - Well-controlled. Continue lisinopril 40 mg daily. Metoprolol XL 25 mg daily. He was stop taking amlodipine 5 mg daily for 2 weeks when he is taking Lasix.  - Recommend to monitor blood pressure and heart rate at home.   - COMP METABOLIC PANEL; Future  - CBC WITH DIFFERENTIAL; Future    4. Dyslipidemia associated with type 2 diabetes mellitus (HCC)  - Well-controlled. Continue current regimens. Recheck lab 1-2 weeks before next follow up visit., Consider to cut down atorvastatin to 20 mg in the future.  - Recheck lab in 3 months.  - COMP METABOLIC PANEL; Future  - LIPID PROFILE; Future    5. Orthopnea  - Check BMP and echocardiogram for further workup. Will try Lasix 40 mg daily for 2 weeks and repeat BMP to follow up in kidney function and electrolytes in 2 weeks.  - Recommend to limit sodium intake and avoid drinking flavored water.  - Advised to go to ER if he has worsening symptoms or short of breath, chest congestion or chest pain or worsening leg swelling, or uncontrolled blood pressure or any urinary symptoms.   - B TYPE NATRIURETIC  - ECHOCARDIOGRAM COMP W/O CONT; Future    6. Bilateral leg edema  - Prescribed Lasix 40 mg to take one tablet once a day for 2 weeks. Recommend to stop taking amlodipine when he is taking Lasix.  - Patient is advised to do BNP, echocardiogram as soon as possible. He will  have basic metabolic panel 2 weeks after taking Lasix to follow-up on kidney and electrolytes.  - Return to clinic in 2 weeks for follow-up with blood tests.  - furosemide (LASIX) 40 MG Tab; Take 1 Tab by mouth every day.  Dispense: 14 Tab; Refill: 0  - BASIC METABOLIC PANEL; Future  - B TYPE NATRIURETIC  - ECHOCARDIOGRAM COMP W/O CONT; Future    7. Shortness of breath on exertion  - No acute respiratory distress on exam. Prescribed Lasix 40 mg daily for 2 weeks.  - Discussed ED precautions with patient: seek emergency evaluation for symptoms including but not limited to: worsening symptoms or developing any new symptoms, crushing chest pain, chest pain associated with difficulty breathing, nausea, or sweats, heart rate irregular or too fast to count.   - Any change or worsening of signs or symptoms, patient encouraged to follow-up or report to emergency room for further evaluation. Patient understands and agrees  - furosemide (LASIX) 40 MG Tab; Take 1 Tab by mouth every day.  Dispense: 14 Tab; Refill: 0  - BASIC METABOLIC PANEL; Future  - B TYPE NATRIURETIC  - ECHOCARDIOGRAM COMP W/O CONT; Future    8. Acute bronchitis, unspecified organism  - Patient is advised to take doxycycline 100 mg one tablet twice a day for 7 days. Reviewed potential side effects of doxycycline with patient and wife. Patient is advised to take probiotic once a day.  - Discussed at length to rinse sinuses with over the counter nasal wash or [place] pot once or twice a day and then use flonase nasal spray once or twice a day after rinsing sinuses  - Advised to try nasal steam therapy.   - Patient is a former smoker and he stopped cigarette smoking in 1989. Patient still smokes cigar daily. Discussed to stop smoking cigar and avoid all smoking.  - Patient does not feel wheezy or chest congestion. We will hold off inhaler. Patient is advised to seek urgent medical attention if he has any worsening symptoms or more shortness of breath.  - BASIC  METABOLIC PANEL; Future  - doxycycline (VIBRAMYCIN) 100 MG Tab; Take 1 Tab by mouth 2 times a day for 7 days.  Dispense: 14 Tab; Refill: 0  - DX-CHEST-2 VIEWS; Future    Face-to-face time spent 40 minutes with patient and more than half of that time spent for counseling and cooperating of care for medical problems listed above.       Followup: Return in about 2 weeks (around 6/6/2018), or if symptoms worsen or fail to improve, for shortness of breath, orthopnea, bilateral leg edema, chronic kidney disease stage III, lab review.      Please note that this dictation was created using voice recognition software. I have made every reasonable attempt to correct obvious errors, but I expect that there may have unintended errors in text, spelling, punctuation, or grammar that I did not discover.

## 2018-05-23 NOTE — ASSESSMENT & PLAN NOTE
Patient also noticed bilateral leg swelling for 6 weeks. He also has shortness of breath on physical exertion and and he is not able to lie flat at night due to the shortness of breath. He reported that he sometimes wake up with short of breath at night and he coughes at night. He reported that he has tried cough more. However, he reported having sinus allergies and he is taking Benadryl frequently throughout the day. I advised patient to stop taking Benadryl and try Claritin 10 mg daily and to wash sinus with [place] pot, then use Flonase nasal spray after sinus rinse. I also recommend patient to eat low salt diet and avoid flavor water drink.

## 2018-05-23 NOTE — ASSESSMENT & PLAN NOTE
Patient is taking exenatide 2 mg once a week and, Lantus insulin 16 units every evening. He stated that he tries to eat better and his diabetes improved. A1c decreased from 9.7 to 7.4 on 5/10/18. Patient is due to for retinal exam. He will follow with ophthalmologist for eye exam. He denies side effects from taking Lantus insulin and exenatide. He follows with endocrinologist from [facility].

## 2018-05-23 NOTE — TELEPHONE ENCOUNTER
----- Message from Venus Claros M.D. sent at 5/23/2018  2:29 PM PDT -----  Please call to inform patient that his recent chest x-ray did not show pneumonia but he has fluid inside his lung that is called pulmonary edema and trace pleural fluid on both lungs. He also has high BNP at 980 today.     So he needs to take Lasix 40 mg one tablet as we discussed today. I also want him to continue to take doxycycline 100 mg twice a day for 7 days for bronchitis.  Please advise patient to eat low-sodium diet.    Thanks!  Venus Claros M.D.

## 2018-05-23 NOTE — ASSESSMENT & PLAN NOTE
Patient is taking amlodipine 5 mg daily, metoprolol XL 25 mg daily and lisinopril 40 mg daily. His blood pressure is stable and well-controlled with current regimens. He denies side effects from taking his current medication. Given his significant bilateral peripheral edema and orthopnea, we are going to try Lasix 40 mg daily. I advised patient to stop taking amlodipine when he is taking Lasix. I also discussed with patient and wife that his kidney function can be impaired more from taking Lasix. We will only tried Lasix 40 mg daily for 2 weeks. Patient and wife agree to try Lasix.

## 2018-05-23 NOTE — ASSESSMENT & PLAN NOTE
This is a new problem. Patient reported that she noticed shortness of breath when he has daily activity or walking or exert himself. He noticed shortness of breath for 6 weeks. Symptoms gradually worse last week.

## 2018-05-23 NOTE — TELEPHONE ENCOUNTER
Phone Number Called: [phone] (home)       Message: informed pt    Left Message for patient to call back: no

## 2018-05-23 NOTE — ASSESSMENT & PLAN NOTE
Patient is taking Lipitor 40 mg every evening. He tolerates Lipitor without side effects. His liver enzymes are within normal. His lipid panel is well controlled with Lipitor.    Results for AMAYA CLAIRE (MRN [id number]) as of 5/23/2018 10:32   Ref. Range 5/10/2018 06:41   Cholesterol,Tot Latest Ref Range: 100 - 199 mg/dL 108   Triglycerides Latest Ref Range: 0 - 149 mg/dL 109   HDL Latest Ref Range: >=40 mg/dL 41   LDL Latest Ref Range: <100 mg/dL 45

## 2018-05-24 ENCOUNTER — TELEPHONE (OUTPATIENT)
Dept: MEDICAL GROUP | Age: 68
End: 2018-05-24

## 2018-05-24 NOTE — TELEPHONE ENCOUNTER
Phone Number Called: [phone] (home)     Message: I spoke to Dr. Ivan Maher M.D. (eye doctor) to request medical records, they stated that they do not have any records on the patient in the last 3 years when Dr. Maher joined the office.    I spoke to Ashish to let him know that Dr. Maher's office doesn't have any records on him in the last 3 years, he stated he hasn't since the eye doctor in 8 years but plans on making a future appointment.    Left Message for patient to call back: N\A

## 2018-06-01 ENCOUNTER — TELEPHONE (OUTPATIENT)
Dept: MEDICAL GROUP | Age: 68
End: 2018-06-01

## 2018-06-01 ENCOUNTER — HOSPITAL ENCOUNTER (OUTPATIENT)
Dept: CARDIOLOGY | Facility: MEDICAL CENTER | Age: 68
End: 2018-06-01
Attending: INTERNAL MEDICINE
Payer: COMMERCIAL

## 2018-06-01 DIAGNOSIS — R06.01 ORTHOPNEA: ICD-10-CM

## 2018-06-01 DIAGNOSIS — R06.02 SHORTNESS OF BREATH ON EXERTION: ICD-10-CM

## 2018-06-01 DIAGNOSIS — I35.0 AORTIC VALVE STENOSIS, ETIOLOGY OF CARDIAC VALVE DISEASE UNSPECIFIED: ICD-10-CM

## 2018-06-01 DIAGNOSIS — R60.0 BILATERAL LEG EDEMA: ICD-10-CM

## 2018-06-01 DIAGNOSIS — I50.21 ACUTE SYSTOLIC HEART FAILURE (HCC): ICD-10-CM

## 2018-06-01 LAB
LV EJECT FRACT  99904: 35
LV EJECT FRACT MOD 2C 99903: 42.3
LV EJECT FRACT MOD 4C 99902: 34.59
LV EJECT FRACT MOD BP 99901: 38.54

## 2018-06-01 PROCEDURE — 93306 TTE W/DOPPLER COMPLETE: CPT | Mod: 26 | Performed by: INTERNAL MEDICINE

## 2018-06-01 PROCEDURE — 93306 TTE W/DOPPLER COMPLETE: CPT

## 2018-06-01 NOTE — TELEPHONE ENCOUNTER
Please call to inform patient and wife that recent echocardiogram showed he has heart failure with aortic stenosis. I placed the urgent referral to cardiologist. Please advise patient to schedule with cardiologist as soon as possible.    Venus Claros M.D.

## 2018-06-02 NOTE — TELEPHONE ENCOUNTER
Phone Number Called: [phone] (home)       Message: Spoke to patient's wife regarding note below.  She understood.    Left Message for patient to call back: no

## 2018-06-04 ENCOUNTER — TELEPHONE (OUTPATIENT)
Dept: CARDIOLOGY | Facility: MEDICAL CENTER | Age: 68
End: 2018-06-04

## 2018-06-04 ENCOUNTER — TELEPHONE (OUTPATIENT)
Dept: MEDICAL GROUP | Age: 68
End: 2018-06-04

## 2018-06-04 DIAGNOSIS — I35.0 AORTIC STENOSIS, SEVERE: ICD-10-CM

## 2018-06-04 NOTE — TELEPHONE ENCOUNTER
Message   Received: Today   Message Contents   ANDRES Ellington.  Payal Mejia R.N.             Will you please place referral to cardiology-valve program and we can get him scheduled? :) thanks for the referral!    Previous Messages      ----- Message -----   From: Payal Mejia R.N.   Sent: 6/1/2018   5:09 PM   To: ANG Ellington Dr. is requesting this patient be seen next week for Severe Aortic Stenosis.       New patient.  Not established.       Possible TAVR candidate.  Can you help me get him scheduled?     Thank you!     Payal Fall initiated

## 2018-06-04 NOTE — TELEPHONE ENCOUNTER
----- Message from Venus Claros M.D. sent at 6/1/2018  4:54 PM PDT -----  Please call to inform patient and wife that recent echocardiogram showed he has heart failure with aortic stenosis. I placed the urgent referral to cardiologist. Please advise patient to schedule with cardiologist as soon as possible.     Venus Claros M.D.

## 2018-06-04 NOTE — TELEPHONE ENCOUNTER
"Phone Number Called: [phone] (home)       Message: informed pt. Pt state his appointment will be in 07/27/2018 and wondering if that should be scheduled earlier since its urgent. Pt state \"it doesn't sound urgent since the Cardiologist made an appointment with him that long.\"     Left Message for patient to call back: no        "

## 2018-06-05 ENCOUNTER — HOSPITAL ENCOUNTER (OUTPATIENT)
Dept: LAB | Facility: MEDICAL CENTER | Age: 68
End: 2018-06-05
Attending: INTERNAL MEDICINE
Payer: COMMERCIAL

## 2018-06-05 DIAGNOSIS — R06.02 SHORTNESS OF BREATH ON EXERTION: ICD-10-CM

## 2018-06-05 DIAGNOSIS — R60.0 BILATERAL LEG EDEMA: ICD-10-CM

## 2018-06-05 DIAGNOSIS — J20.9 ACUTE BRONCHITIS, UNSPECIFIED ORGANISM: ICD-10-CM

## 2018-06-05 LAB
ANION GAP SERPL CALC-SCNC: 10 MMOL/L (ref 0–11.9)
BUN SERPL-MCNC: 35 MG/DL (ref 8–22)
CALCIUM SERPL-MCNC: 9.3 MG/DL (ref 8.5–10.5)
CHLORIDE SERPL-SCNC: 107 MMOL/L (ref 96–112)
CO2 SERPL-SCNC: 24 MMOL/L (ref 20–33)
CREAT SERPL-MCNC: 2.01 MG/DL (ref 0.5–1.4)
GLUCOSE SERPL-MCNC: 102 MG/DL (ref 65–99)
POTASSIUM SERPL-SCNC: 5.1 MMOL/L (ref 3.6–5.5)
SODIUM SERPL-SCNC: 141 MMOL/L (ref 135–145)

## 2018-06-05 PROCEDURE — 80048 BASIC METABOLIC PNL TOTAL CA: CPT

## 2018-06-05 PROCEDURE — 36415 COLL VENOUS BLD VENIPUNCTURE: CPT

## 2018-06-05 NOTE — TELEPHONE ENCOUNTER
Please have patient know that I placed urgent referral to cardiologist. I will recommend to keep current appointment with cardiologist.     Please ask patient to do basal metabolic panel (BMP) to follow-up on his kidney function before his follow-up visit with me on 6/6/18.    Thanks!  Venus Claros M.D.

## 2018-06-06 ENCOUNTER — OFFICE VISIT (OUTPATIENT)
Dept: MEDICAL GROUP | Age: 68
End: 2018-06-06
Payer: COMMERCIAL

## 2018-06-06 VITALS
DIASTOLIC BLOOD PRESSURE: 70 MMHG | TEMPERATURE: 97.7 F | WEIGHT: 194.6 LBS | HEART RATE: 107 BPM | BODY MASS INDEX: 27.86 KG/M2 | HEIGHT: 70 IN | SYSTOLIC BLOOD PRESSURE: 120 MMHG | OXYGEN SATURATION: 90 %

## 2018-06-06 DIAGNOSIS — R06.02 SHORTNESS OF BREATH ON EXERTION: ICD-10-CM

## 2018-06-06 DIAGNOSIS — N18.30 STAGE 3 CHRONIC KIDNEY DISEASE (HCC): ICD-10-CM

## 2018-06-06 DIAGNOSIS — I50.21 ACUTE SYSTOLIC HEART FAILURE (HCC): ICD-10-CM

## 2018-06-06 DIAGNOSIS — R60.0 BILATERAL LEG EDEMA: ICD-10-CM

## 2018-06-06 PROCEDURE — 99214 OFFICE O/P EST MOD 30 MIN: CPT | Performed by: INTERNAL MEDICINE

## 2018-06-06 RX ORDER — FUROSEMIDE 20 MG/1
20 TABLET ORAL DAILY
Qty: 90 TAB | Refills: 1 | Status: ON HOLD | OUTPATIENT
Start: 2018-06-06 | End: 2018-06-29

## 2018-06-06 NOTE — ASSESSMENT & PLAN NOTE
Patient's wife does not accompany him today. I have discussed his clinical conditions and reviewed blood tests and echocardiogram report with patient physically presented in clinic and his wife through speaker phone. Patient feels much better on shortness of breath and less cough at night when he lies flat. He is able to lie flat and sleep. He stated that he still has shortness of breath on physical exertion when he plays golf yesterday, but he feels much better compared to 2 weeks ago.

## 2018-06-06 NOTE — PROGRESS NOTES
Subjective:   Ashish Wiley is a 68 y.o. male here today for evaluation and management of:      Shortness of breath on exertion  Patient's wife does not accompany him today. I have discussed his clinical conditions and reviewed blood tests and echocardiogram report with patient physically presented in clinic and his wife through speaker phone. Patient feels much better on shortness of breath and less cough at night when he lies flat. He is able to lie flat and sleep. He stated that he still has shortness of breath on physical exertion when he plays golf yesterday, but he feels much better compared to 2 weeks ago.     Bilateral leg edema  His leg swelling improved with Lasix 40 mg daily for 2 weeks. We discussed to discontinue amlodipine 5 mg daily as amlodipine can cause peripheral edema. I decreased the dose of Lasix to 20 mg daily as his kidney function get worse.    Acute systolic heart failure (HCC)  Patient was seen in clinic 2 weeks ago for increasing shortness of breath, orthopnea, peripheral edema and cough. I prescribed Lasix 40 mg daily for 2 weeks. I ordered chest x-ray on 5/23/18 that showed pulmonary edema. BNP was 980 on 5/23/18.  Echocardiogram on 61/18 showed moderately reduced left ventricular systolic function with EF 35%, global hypokinesis, moderate aortic stenosis, moderate mitral regurgitation.  Patient denied chest pain or chest pressure or chest congestion currently. Patient reports that he feels much better overall. He played golf yesterday.  Patient has appointment with cardiologist in 2 weeks.  I discussed with patient and wife for ER precaution. I advised him to call 911 and to go to ER if he has chest pain or chest congestion or shortness of breath or palpitation or irregular heartbeat or dizziness or syncopal or any worsening symptoms.  I also advised patient to keep in touch with me if he has any concern.     I discussed with patient to continue aspirin 81 mg daily, atorvastatin  "40 mg every evening, Lasix 20 mg daily, lisinopril 40 mg daily and metoprolol XL 25 mg daily. I advised patient to discontinue amlodipine. I recommend patient to close monitor his blood pressure and pulse at home. I also advised patient to limit his sodium intake.  I also discussed with patient to add on spironolactone after discussion with cardiologist. We also discussed to cut down lisinopril if he add on spironolactone due to risks of hyperkalemia and hypotension. Patient and wife understand and they will discuss with cardiologist in appointment.    Stage 3 chronic kidney disease  Patient has chronic kidney disease from diabetes and hypertension. He was seen by Dr. Kirkland, nephrologist on 11/10/17. His kidney function is worsening on recent blood test and it is likely due to recent diuresis for acute systolic heart failure. Patient is advised to call nephrologist to schedule follow-up appointment as soon as possible. His electrolytes remained stable, but his potassium is high normal.  Patient stopped taking metformin for a few months already.           Current medicines (including changes today)  Current Outpatient Prescriptions   Medication Sig Dispense Refill   • furosemide (LASIX) 20 MG Tab Take 1 Tab by mouth every day. 90 Tab 1   • glipiZIDE (GLUCOTROL) 5 MG Tab Take 1 Tab by mouth every day with lunch. 90 Tab 3   • Cholecalciferol (VITAMIN D) 2000 UNIT Tab Take 4,000 Units by mouth every day.     • tobramycin (TOBREX) 0.3 % Solution ophthalmic solution Place 1 Drop in both eyes every 4 hours. While awake for 5-7 days until symptoms resolve. 1 Bottle 0   • metoprolol SR (TOPROL XL) 25 MG TABLET SR 24 HR Take 1 Tab by mouth every day. 90 Tab 3   • Insulin Syringe-Needle U-100 31G X 5/16\" 0.5 ML Misc Use to inject lantus insulin once a day 100 Each 1   • Exenatide ER 2 MG Pen-injector Inject 2 mg as instructed every 7 days. 12 Each 0   • insulin glargine (LANTUS) 100 UNIT/ML Solution Inject 16 Units as " "instructed every evening.     • lisinopril (PRINIVIL, ZESTRIL) 40 MG tablet Take 40 mg by mouth every day.     • atorvastatin (LIPITOR) 40 MG Tab Take 40 mg by mouth every evening.     • aspirin EC (ECOTRIN) 81 MG Tablet Delayed Response Take 81 mg by mouth every day.       No current facility-administered medications for this visit.      He  has a past medical history of Diabetes (HCC); Hyperlipidemia; and Hypertension.    ROS   No chest pain, no shortness of breath, no abdominal pain       Objective:     Blood pressure 120/70, pulse (!) 107, temperature 36.5 °C (97.7 °F), height 1.778 m (5' 10\"), weight 88.3 kg (194 lb 9.6 oz), SpO2 90 %. Body mass index is 27.92 kg/m².   Physical Exam:  General: Alert, oriented and no acute distress.  Eye contact is good, speech goal directed, affect calm  HEENT: conjunctiva non-injected, sclera non-icteric.  Oral mucous membranes pink and moist with no lesions.  Pinna normal.   Lungs: Normal respiratory effort, clear to auscultation bilaterally with good excursion.  CV: regular rate and rhythm. Systolic murmurs on aortic and mitral.   Abdomen: soft, non distended, nontender, No CVAT, Bowel sound normal.  Ext: trace edema on both lower extremities, edema improved compared to previous exam 2 weeks ago, color normal, vascularity normal, temperature normal.        Assessment and Plan:   The following treatment plan was discussed     1. Acute systolic heart failure (HCC)  - Patient will continue aspirin 81 mg daily, Lasix 20 mg daily, metoprolol XL 25 mg daily, lisinopril 40 mg daily. We discussed to limit sodium intake. I advised patient to check his blood pressure closely at home. He will stop taking amlodipine 5 mg today. He can take amlodipine only if blood pressure is over 140/90.  - Patient has appointment with Dr. Fuller, cardiologist on 6/21/18.  - Echocardiogram showed low ejection fraction at 35% with hypokinesis. Patient denied chest pain. I concerned of any underlying " ischemic heart disease. I advised patient to call to ER if he has any chest pain, or any worsening symptoms. Patient is asymptomatic currently and his shortness of breath and peripheral edema improved.  - I ordered BNP and basal metabolic panel and I advised patient to repeat blood tests 2 days before follow-up with cardiologist in 2 weeks.  - Patient is advised to discuss with cardiologist to add on spironolactone.  - BASIC METABOLIC PANEL; Future  - BTYPE NATRIURETIC PEPTIDE; Future    2. Bilateral leg edema  - Improved. Decreased the dose of Lasix from 40 mg daily to 20 mg daily. His potassium is in high normal.   - Discontinue amlodipine.  - furosemide (LASIX) 20 MG Tab; Take 1 Tab by mouth every day.  Dispense: 90 Tab; Refill: 1  - BASIC METABOLIC PANEL; Future  - BTYPE NATRIURETIC PEPTIDE; Future    3. Shortness of breath on exertion  - Improved. Denied chest pain or shortness of breath at rest. He reported having short of breath on physical exertion. He does not have orthopnea or PND anymore. Continue Lasix 20 mg daily.  - furosemide (LASIX) 20 MG Tab; Take 1 Tab by mouth every day.  Dispense: 90 Tab; Refill: 1  - BASIC METABOLIC PANEL; Future  - BTYPE NATRIURETIC PEPTIDE; Future    4. Stage 3 chronic kidney disease  - Will repeat basic metabolic panel to follow-up on any function and electrolytes. Patient is advised to call his nephrologist to schedule appointment as soon as possible. Patient agrees with the plan.  - Patient does not take metformin. Patient is advised to avoid NSAIDs.    - Discussed ED precautions with patient: seek emergency evaluation for symptoms including but not limited to: worsening symptoms or developing any new symptoms, crushing chest pain, chest pain associated with difficulty breathing, nausea, or sweats, heart rate irregular or too fast to count.   - Any change or worsening of signs or symptoms, patient encouraged to follow-up or report to emergency room for further evaluation.  Patient and wife understand and agree.        Followup: Return in about 3 months (around 9/6/2018), or if symptoms worsen or fail to improve, for Diabetes, Hypertension, Hyperlipidemia, systolic heart failure, Lab review.      Please note that this dictation was created using voice recognition software. I have made every reasonable attempt to correct obvious errors, but I expect that there may have unintended errors in text, spelling, punctuation, or grammar that I did not discover.

## 2018-06-06 NOTE — ASSESSMENT & PLAN NOTE
Patient has chronic kidney disease from diabetes and hypertension. He was seen by Dr. Kirkland, nephrologist on 11/10/17. His kidney function is worsening on recent blood test and it is likely due to recent diuresis for acute systolic heart failure. Patient is advised to call nephrologist to schedule follow-up appointment as soon as possible. His electrolytes remained stable, but his potassium is high normal.  Patient stopped taking metformin for a few months already.

## 2018-06-06 NOTE — ASSESSMENT & PLAN NOTE
His leg swelling improved with Lasix 40 mg daily for 2 weeks. We discussed to discontinue amlodipine 5 mg daily as amlodipine can cause peripheral edema. I decreased the dose of Lasix to 20 mg daily as his kidney function get worse.

## 2018-06-08 ENCOUNTER — OFFICE VISIT (OUTPATIENT)
Dept: NEPHROLOGY | Facility: MEDICAL CENTER | Age: 68
End: 2018-06-08
Payer: COMMERCIAL

## 2018-06-08 VITALS
OXYGEN SATURATION: 97 % | HEIGHT: 68 IN | HEART RATE: 90 BPM | SYSTOLIC BLOOD PRESSURE: 120 MMHG | DIASTOLIC BLOOD PRESSURE: 74 MMHG | TEMPERATURE: 97.8 F | BODY MASS INDEX: 29.7 KG/M2 | RESPIRATION RATE: 14 BRPM | WEIGHT: 196 LBS

## 2018-06-08 DIAGNOSIS — N18.30 STAGE 3 CHRONIC KIDNEY DISEASE (HCC): ICD-10-CM

## 2018-06-08 DIAGNOSIS — R80.1 PERSISTENT PROTEINURIA: ICD-10-CM

## 2018-06-08 PROCEDURE — 99214 OFFICE O/P EST MOD 30 MIN: CPT | Performed by: INTERNAL MEDICINE

## 2018-06-08 ASSESSMENT — ENCOUNTER SYMPTOMS
FEVER: 0
PALPITATIONS: 0
CHILLS: 0

## 2018-06-08 NOTE — PROGRESS NOTES
"Subjective:      Ashish Wiley is a 68 y.o. male who presents with progressive CKD Follow-Up            HPI  67 year old with a history of DM since 1997, has had good control over that period of time. He saw an endocrinologist since 1997.  In 1966 had a ruptured kidney playing football, had hematuria for a week after.  Has had a kidney stone 20 years ago. Serum Cr has gone up from 1.28 in 2013 to 1.29 in 2016 to 2.02 in May 2017 which prompted the initial visit. He has a history of taking ibuprofen which he took occasionally, though he took at 800mg TID for several days at a time. No family history of kidney disease. History of smoking until the age of 39.  No problems urinating or emptying his bladder. Wakes up once per night to urinate. No herbal medications or supplements.       Last seen Nov 2017, since that time had an echocardiogram as he was feeling tired, and showed EF of 35% with moderate AS. No history of chest pain, last nuclear study in 2008. No history of MI, or chest pain. Given his Cr was once again elevated, he is now back to seeing us. Proteinuria noted 997 mg (estimated).  BNP is elevated at 980. Had LE edema, but was on lasix and edema has resolved. He is breathing better.    Review of Systems   Constitutional: Positive for malaise/fatigue. Negative for chills and fever.   Cardiovascular: Negative for chest pain and palpitations.   All other systems reviewed and are negative.         Objective:     /74   Pulse 90   Temp 36.6 °C (97.8 °F)   Resp 14   Ht 1.727 m (5' 8\")   Wt 88.9 kg (196 lb)   SpO2 97%   BMI 29.80 kg/m²      Physical Exam   Constitutional: He is oriented to person, place, and time. He appears well-developed and well-nourished.   HENT:   Head: Normocephalic and atraumatic.   Cardiovascular: Normal rate and regular rhythm.    Pulmonary/Chest: Effort normal and breath sounds normal.   Abdominal: Soft. Bowel sounds are normal.   Musculoskeletal: He exhibits no edema " or deformity.   Neurological: He is alert and oriented to person, place, and time.   Skin: Skin is warm and dry.   Psychiatric: He has a normal mood and affect. His behavior is normal.               Assessment/Plan:     1. Stage 3 chronic kidney disease  Appears to have significant hemodynamic variation, likely from reduced EF, on top of DN. D/w patient, new 24 hour urine for now, to also quantify protein. Will monitor carefully. May need contrast study by cardiology, okay from a renal standpoint with IVF.    - PTH INTACT (PTH ONLY); Future  - BASIC METABOLIC PANEL; Future  - VITAMIN D,25 HYDROXY; Future  - CBC WITHOUT DIFFERENTIAL; Future  - URINE SODIUM, 24 HR  - CREATININE 24 HR URINE  - PROTEIN TOTAL, URINE    2. Persistent proteinuria  From DN, await 24 hour reading.

## 2018-06-18 ENCOUNTER — HOSPITAL ENCOUNTER (OUTPATIENT)
Dept: LAB | Facility: MEDICAL CENTER | Age: 68
End: 2018-06-18
Attending: INTERNAL MEDICINE
Payer: COMMERCIAL

## 2018-06-18 ENCOUNTER — HOSPITAL ENCOUNTER (OUTPATIENT)
Facility: MEDICAL CENTER | Age: 68
End: 2018-06-18
Attending: INTERNAL MEDICINE
Payer: COMMERCIAL

## 2018-06-18 DIAGNOSIS — R60.0 BILATERAL LEG EDEMA: ICD-10-CM

## 2018-06-18 DIAGNOSIS — R06.02 SHORTNESS OF BREATH ON EXERTION: ICD-10-CM

## 2018-06-18 DIAGNOSIS — I50.21 ACUTE SYSTOLIC HEART FAILURE (HCC): ICD-10-CM

## 2018-06-18 DIAGNOSIS — N18.30 STAGE 3 CHRONIC KIDNEY DISEASE (HCC): ICD-10-CM

## 2018-06-18 LAB
25(OH)D3 SERPL-MCNC: 49 NG/ML (ref 30–100)
ANION GAP SERPL CALC-SCNC: 9 MMOL/L (ref 0–11.9)
BNP SERPL-MCNC: 1647 PG/ML (ref 0–100)
BUN SERPL-MCNC: 25 MG/DL (ref 8–22)
CALCIUM SERPL-MCNC: 9 MG/DL (ref 8.5–10.5)
CHLORIDE SERPL-SCNC: 105 MMOL/L (ref 96–112)
CO2 SERPL-SCNC: 24 MMOL/L (ref 20–33)
CREAT 24H UR-MSRATE: 1488 MG/24 HR (ref 1000–2000)
CREAT SERPL-MCNC: 1.34 MG/DL (ref 0.5–1.4)
CREAT UR-MCNC: 141.7 MG/DL
ERYTHROCYTE [DISTWIDTH] IN BLOOD BY AUTOMATED COUNT: 44.3 FL (ref 35.9–50)
GLUCOSE SERPL-MCNC: 115 MG/DL (ref 65–99)
HCT VFR BLD AUTO: 37.1 % (ref 42–52)
HGB BLD-MCNC: 11.7 G/DL (ref 14–18)
MCH RBC QN AUTO: 29.7 PG (ref 27–33)
MCHC RBC AUTO-ENTMCNC: 31.5 G/DL (ref 33.7–35.3)
MCV RBC AUTO: 94.2 FL (ref 81.4–97.8)
PLATELET # BLD AUTO: 470 K/UL (ref 164–446)
PMV BLD AUTO: 9.2 FL (ref 9–12.9)
POTASSIUM SERPL-SCNC: 4.6 MMOL/L (ref 3.6–5.5)
PROT 24H UR-MCNC: 2516.9 MG/24 HR (ref 30–150)
PROT 24H UR-MRATE: 239.7 MG/DL (ref 0–15)
PTH-INTACT SERPL-MCNC: 72.6 PG/ML (ref 14–72)
RBC # BLD AUTO: 3.94 M/UL (ref 4.7–6.1)
SODIUM SERPL-SCNC: 138 MMOL/L (ref 135–145)
SPECIMEN VOL UR: 1050 ML
SPECIMEN VOL UR: 1050 ML
WBC # BLD AUTO: 8.8 K/UL (ref 4.8–10.8)

## 2018-06-18 PROCEDURE — 81050 URINALYSIS VOLUME MEASURE: CPT

## 2018-06-18 PROCEDURE — 84300 ASSAY OF URINE SODIUM: CPT

## 2018-06-18 PROCEDURE — 82570 ASSAY OF URINE CREATININE: CPT

## 2018-06-18 PROCEDURE — 85027 COMPLETE CBC AUTOMATED: CPT

## 2018-06-18 PROCEDURE — 80048 BASIC METABOLIC PNL TOTAL CA: CPT

## 2018-06-18 PROCEDURE — 84156 ASSAY OF PROTEIN URINE: CPT

## 2018-06-18 PROCEDURE — 82306 VITAMIN D 25 HYDROXY: CPT

## 2018-06-18 PROCEDURE — 83880 ASSAY OF NATRIURETIC PEPTIDE: CPT

## 2018-06-18 PROCEDURE — 83970 ASSAY OF PARATHORMONE: CPT

## 2018-06-18 PROCEDURE — 36415 COLL VENOUS BLD VENIPUNCTURE: CPT

## 2018-06-20 LAB
COLLECT DURATION TIME SPEC: 24 HRS
CREAT 24H UR-MCNC: 127 MG/DL
CREAT 24H UR-MRATE: 1334 MG/D (ref 800–2100)
SODIUM 24H UR-SCNC: 88 MMOL/L
SODIUM 24H UR-SRATE: 92 MMOL/D (ref 51–286)
SPECIMEN VOL ?TM UR: 1050 ML

## 2018-06-21 ENCOUNTER — TELEPHONE (OUTPATIENT)
Dept: CARDIOLOGY | Facility: MEDICAL CENTER | Age: 68
End: 2018-06-21

## 2018-06-21 ENCOUNTER — OFFICE VISIT (OUTPATIENT)
Dept: CARDIOLOGY | Facility: MEDICAL CENTER | Age: 68
End: 2018-06-21
Payer: COMMERCIAL

## 2018-06-21 VITALS
HEIGHT: 69 IN | HEART RATE: 100 BPM | BODY MASS INDEX: 29.62 KG/M2 | DIASTOLIC BLOOD PRESSURE: 80 MMHG | WEIGHT: 200 LBS | OXYGEN SATURATION: 98 % | SYSTOLIC BLOOD PRESSURE: 140 MMHG

## 2018-06-21 DIAGNOSIS — I35.9 AORTIC VALVE DISORDER: ICD-10-CM

## 2018-06-21 DIAGNOSIS — I42.0 DILATED CARDIOMYOPATHY (HCC): ICD-10-CM

## 2018-06-21 DIAGNOSIS — I35.0 AORTIC VALVE STENOSIS, ETIOLOGY OF CARDIAC VALVE DISEASE UNSPECIFIED: ICD-10-CM

## 2018-06-21 DIAGNOSIS — N18.30 STAGE 3 CHRONIC KIDNEY DISEASE (HCC): ICD-10-CM

## 2018-06-21 DIAGNOSIS — I50.22 CHRONIC SYSTOLIC CHF (CONGESTIVE HEART FAILURE) (HCC): ICD-10-CM

## 2018-06-21 DIAGNOSIS — I10 ESSENTIAL HYPERTENSION: ICD-10-CM

## 2018-06-21 PROBLEM — I50.21 ACUTE SYSTOLIC HEART FAILURE (HCC): Status: RESOLVED | Noted: 2018-06-06 | Resolved: 2018-06-21

## 2018-06-21 LAB — EKG IMPRESSION: NORMAL

## 2018-06-21 PROCEDURE — 99204 OFFICE O/P NEW MOD 45 MIN: CPT | Mod: 25 | Performed by: INTERNAL MEDICINE

## 2018-06-21 PROCEDURE — 93000 ELECTROCARDIOGRAM COMPLETE: CPT | Performed by: INTERNAL MEDICINE

## 2018-06-21 ASSESSMENT — ENCOUNTER SYMPTOMS
GASTROINTESTINAL NEGATIVE: 1
EYES NEGATIVE: 1
WHEEZING: 1
MUSCULOSKELETAL NEGATIVE: 1
NERVOUS/ANXIOUS: 0
SHORTNESS OF BREATH: 1
DIZZINESS: 0
DOUBLE VISION: 0
WEIGHT LOSS: 0
FOCAL WEAKNESS: 0
VOMITING: 0
HEADACHES: 0
CHILLS: 0
COUGH: 1
NEUROLOGICAL NEGATIVE: 1
PALPITATIONS: 0
DEPRESSION: 1
WEAKNESS: 0
ORTHOPNEA: 1
NAUSEA: 0
BRUISES/BLEEDS EASILY: 1
ABDOMINAL PAIN: 0
MYALGIAS: 0
CLAUDICATION: 0
BLURRED VISION: 0
FEVER: 0

## 2018-06-21 NOTE — TELEPHONE ENCOUNTER
Patient scheduled for RHC and LHC w/poss (pre TAVR) on 6-22-18 at [facility] with Dr. Fuller. NOAH Weeks.

## 2018-06-21 NOTE — PROGRESS NOTES
Chief Complaint   Patient presents with   • Aortic Stenosis     New Patient Valve        Subjective:   Ashish Wiley is a 68 y.o. male who presents today as a consult from [facility] for aortic stenosis.    Thank you for allowing me to evaluate Mr. Wiley, who as you know is a 68 year old male with cardiovascular history including aortic stenosis and dilated cardiomyopathy. He was well until 2 and half month ago when he began to experience progressive severe fatigue, shortness of breath, dyspnea on exertion, orthopnea, and lower extremity edema. He has reduced his diuretic dose and his lower extremity edema has increased.    Past Medical History:   Diagnosis Date   • Aortic stenosis    • CHF (congestive heart failure) (HCC)    • Diabetes (HCC)    • Dilated cardiomyopathy (HCC)    • Hyperlipidemia    • Hypertension      Past Surgical History:   Procedure Laterality Date   • TONSILLECTOMY       Family History   Problem Relation Age of Onset   • Lung Disease Mother      COPD   • Heart Disease Father    • Heart Failure Father    • Hypertension Father    • Hyperlipidemia Father    • Cancer Maternal Grandmother      BRAIN TUMOR   • Stroke Maternal Grandfather    • Other Paternal Grandmother      INTESTINAL PROBLEM   • Stroke Paternal Grandfather      Social History     Social History   • Marital status:      Spouse name: N/A   • Number of children: N/A   • Years of education: N/A     Occupational History   • Not on file.     Social History Main Topics   • Smoking status: Former Smoker     Packs/day: 1.00     Years: 22.00     Quit date: 1/1/1989   • Smokeless tobacco: Never Used      Comment: stop cigar in 2015   • Alcohol use 0.6 oz/week     1 Glasses of wine per week      Comment: OCCASIONALLY   • Drug use: No   • Sexual activity: Yes     Partners: Female     Other Topics Concern   • Not on file     Social History Narrative   • No narrative on file     Allergies   Allergen Reactions   • Sulfa Drugs  "Unspecified     Kidney Stones     Medications reviewed.    Outpatient Encounter Prescriptions as of 6/21/2018   Medication Sig Dispense Refill   • furosemide (LASIX) 20 MG Tab Take 1 Tab by mouth every day. 90 Tab 1   • Cholecalciferol (VITAMIN D) 2000 UNIT Tab Take 4,000 Units by mouth every day.     • tobramycin (TOBREX) 0.3 % Solution ophthalmic solution Place 1 Drop in both eyes every 4 hours. While awake for 5-7 days until symptoms resolve. 1 Bottle 0   • Insulin Syringe-Needle U-100 31G X 5/16\" 0.5 ML Misc Use to inject lantus insulin once a day 100 Each 1   • Exenatide ER 2 MG Pen-injector Inject 2 mg as instructed every 7 days. 12 Each 0   • insulin glargine (LANTUS) 100 UNIT/ML Solution Inject 16 Units as instructed every evening.     • lisinopril (PRINIVIL, ZESTRIL) 40 MG tablet Take 40 mg by mouth every day.     • atorvastatin (LIPITOR) 40 MG Tab Take 40 mg by mouth every evening.     • aspirin EC (ECOTRIN) 81 MG Tablet Delayed Response Take 81 mg by mouth every day.     • [DISCONTINUED] glipiZIDE (GLUCOTROL) 5 MG Tab Take 1 Tab by mouth every day with lunch. (Patient not taking: Reported on 6/21/2018) 90 Tab 3   • [DISCONTINUED] metoprolol SR (TOPROL XL) 25 MG TABLET SR 24 HR Take 1 Tab by mouth every day. (Patient not taking: Reported on 6/21/2018) 90 Tab 3     No facility-administered encounter medications on file as of 6/21/2018.      Review of Systems   Constitutional: Positive for malaise/fatigue. Negative for chills, fever and weight loss.   HENT: Positive for nosebleeds. Negative for hearing loss.    Eyes: Negative.  Negative for blurred vision and double vision.   Respiratory: Positive for cough, shortness of breath and wheezing.    Cardiovascular: Positive for orthopnea and leg swelling. Negative for chest pain, palpitations and claudication.   Gastrointestinal: Negative.  Negative for abdominal pain, nausea and vomiting.   Genitourinary: Negative.  Negative for dysuria and urgency. " "  Musculoskeletal: Negative.  Negative for joint pain and myalgias.   Skin: Negative.  Negative for itching and rash.   Neurological: Negative.  Negative for dizziness, focal weakness, weakness and headaches.   Endo/Heme/Allergies: Positive for environmental allergies. Bruises/bleeds easily.   Psychiatric/Behavioral: Positive for depression. The patient is not nervous/anxious.         Objective:   /80   Pulse 100   Ht 1.753 m (5' 9\")   Wt 90.7 kg (200 lb)   SpO2 98%   BMI 29.53 kg/m²     Physical Exam   Constitutional: He is oriented to person, place, and time. He appears well-developed and well-nourished.   HENT:   Head: Normocephalic and atraumatic.   Eyes: Conjunctivae are normal. Pupils are equal, round, and reactive to light.   Neck: Normal range of motion. Neck supple.   Cardiovascular: Normal rate and regular rhythm.    Murmur heard.  Pulmonary/Chest: Effort normal and breath sounds normal.   Abdominal: Soft. Bowel sounds are normal.   Musculoskeletal: Normal range of motion. He exhibits edema.   Neurological: He is alert and oriented to person, place, and time.   Skin: Skin is warm and dry. There is pallor.   Psychiatric: He has a normal mood and affect.     CARDIAC STUDIES/PROCEDURES:    ECHOCARDIOGRAM CONCLUSIONS (06/01/18)  Moderately reduced left ventricular systolic function.  Left ventricular ejection fraction is visually estimated to be 35%.  Global hypokinesis with regional variation with severe hypokinesis of the anteroapical and septal walls.  Moderate aortic stenosis; severely of aortic stenosis   Aortic valve area calculated from the continuity equation is 0.82.   Vmax is 2.8 m/s. Transvalvular gradients are - Peak: 32 mmHg, Mean: 21 mmHg.  Unable to estimate pulmonary artery pressure due to an inadequate tricuspid regurgitant jet.  Moderate mitral regurgitation.  probably underestimated due to low ejection fraction.No prior study is available for comparison.     EKG was ordered for " aortic stenosis, performed on (06/21/18) and reviewed: EKG shows sinus rhythm.    Laboratory results of (06/18/18) were reviewed. Bun of 25 mg/dl, creatinine levels of 1.34 mg/dl noted.    Assessment:     1. Aortic valve stenosis, etiology of cardiac valve disease unspecified  EKG    [facility] EKG (Clinic Performed)   2. Chronic systolic CHF (congestive heart failure) (HCC)     3. Dilated cardiomyopathy (HCC)     4. Essential hypertension     5. Stage 3 chronic kidney disease       Medical Decision Making:  Today's Assessment / Status / Plan:     1. Aortic stenosis: He is symptomatic with his aortic stenosis, NYHA class III-IV. He does not appear to be a good surgical candidate due to excessive risk. We will schedule dobutamine stress echocardiogram and cardiac catheterization and refer him to Dr. Strong. He understands the risks and benefits and agrees with plan.  2. Acute on chronic systolic congestive heart failure with cardiomyopathy: The overall volume status is elevated. We will increase his furosemide dose.  3. Chronic kidney disease stage III (managed by Dr. Kirkland): We will continue to follow labs.  4. Additional information: his wife works in IT department at [employer].    The risks, benefits, and alternatives to coronary angiography with IV sedation were discussed in great detail. Specific risks mentioned include bleeding, infection, kidney damage, allergic reaction, cardiac perforation with possible tamponade requiring kenneth-cardiocentesis or possible open heart surgery. In addition, we discussed that 10% of patients will experience small to moderate bruising at the side of the arterial puncture. Lastly the risks of heart attack, stroke, and death were discussed; the risks of major complications such as heart attack or stroke caused by the angiogram is less than 1%; the risk of death is approximately 1 in 1000. The patient verbalized understanding of these potential complications and  wishes to proceed with this procedure.    The risks, benefits, and alternatives to TAVR, general anesthesia and transesophageal echocardiogram were discussed in great detail. Specific risks mentioned include bleeding, infection, kidney damage, allergic reaction, cardiac perforation with possible tamponade requiring kenneth-cardiocentesis or possible open heart surgery if the patient is a candidate. Lastly the risks of heart attack, stroke, and death were discussed; the risks of major complications includingall cause mortality of 2.2%, disabling stroke of 1.1%, the risk on new pacemaker of 12% and the risk of vascular complications of 4.1%. The patient verbalized understanding of these potential complications and wishes to proceed with this procedure. ([facility]).  The procedure will be performed completely in collaboration with cardiac surgery.    We will follow up in one month in valve clinic.    Thank you for this consult.

## 2018-06-21 NOTE — LETTER
[facility]   [address]  [place] [address]  Phone: [phone]  Fax: [phone]              Ashish Wiley  1950    Encounter Date: 6/21/2018    Markus Fuller M.D.          PROGRESS NOTE:  Chief Complaint   Patient presents with   • Aortic Stenosis     New Patient Valve        Subjective:   Ashish Wiley is a 68 y.o. male who presents today as a consult from Venus Collins for aortic stenosis.    Thank you for allowing me to evaluate Mr. Wiley, who as you know is a 68 year old male with cardiovascular history including aortic stenosis and dilated cardiomyopathy. He was well until 2 and half month ago when he began to experience progressive severe fatigue, shortness of breath, dyspnea on exertion, orthopnea, and lower extremity edema. He has reduced his diuretic dose and his lower extremity edema has increased.    Past Medical History:   Diagnosis Date   • Aortic stenosis    • CHF (congestive heart failure) (HCC)    • Diabetes (HCC)    • Dilated cardiomyopathy (HCC)    • Hyperlipidemia    • Hypertension      Past Surgical History:   Procedure Laterality Date   • TONSILLECTOMY       Family History   Problem Relation Age of Onset   • Lung Disease Mother      COPD   • Heart Disease Father    • Heart Failure Father    • Hypertension Father    • Hyperlipidemia Father    • Cancer Maternal Grandmother      BRAIN TUMOR   • Stroke Maternal Grandfather    • Other Paternal Grandmother      INTESTINAL PROBLEM   • Stroke Paternal Grandfather      Social History     Social History   • Marital status:      Spouse name: N/A   • Number of children: N/A   • Years of education: N/A     Occupational History   • Not on file.     Social History Main Topics   • Smoking status: Former Smoker     Packs/day: 1.00     Years: 22.00     Quit date: 1/1/1989   • Smokeless tobacco: Never Used      Comment: stop cigar in 2015   • Alcohol use 0.6 oz/week    "1 Glasses of wine per week      Comment: OCCASIONALLY   • Drug use: No   • Sexual activity: Yes     Partners: Female     Other Topics Concern   • Not on file     Social History Narrative   • No narrative on file     Allergies   Allergen Reactions   • Sulfa Drugs Unspecified     Kidney Stones     Medications reviewed.    Outpatient Encounter Prescriptions as of 6/21/2018   Medication Sig Dispense Refill   • furosemide (LASIX) 20 MG Tab Take 1 Tab by mouth every day. 90 Tab 1   • Cholecalciferol (VITAMIN D) 2000 UNIT Tab Take 4,000 Units by mouth every day.     • tobramycin (TOBREX) 0.3 % Solution ophthalmic solution Place 1 Drop in both eyes every 4 hours. While awake for 5-7 days until symptoms resolve. 1 Bottle 0   • Insulin Syringe-Needle U-100 31G X 5/16\" 0.5 ML Misc Use to inject lantus insulin once a day 100 Each 1   • Exenatide ER 2 MG Pen-injector Inject 2 mg as instructed every 7 days. 12 Each 0   • insulin glargine (LANTUS) 100 UNIT/ML Solution Inject 16 Units as instructed every evening.     • lisinopril (PRINIVIL, ZESTRIL) 40 MG tablet Take 40 mg by mouth every day.     • atorvastatin (LIPITOR) 40 MG Tab Take 40 mg by mouth every evening.     • aspirin EC (ECOTRIN) 81 MG Tablet Delayed Response Take 81 mg by mouth every day.     • [DISCONTINUED] glipiZIDE (GLUCOTROL) 5 MG Tab Take 1 Tab by mouth every day with lunch. (Patient not taking: Reported on 6/21/2018) 90 Tab 3   • [DISCONTINUED] metoprolol SR (TOPROL XL) 25 MG TABLET SR 24 HR Take 1 Tab by mouth every day. (Patient not taking: Reported on 6/21/2018) 90 Tab 3     No facility-administered encounter medications on file as of 6/21/2018.      Review of Systems   Constitutional: Positive for malaise/fatigue. Negative for chills, fever and weight loss.   HENT: Positive for nosebleeds. Negative for hearing loss.    Eyes: Negative.  Negative for blurred vision and double vision.   Respiratory: Positive for cough, shortness of breath and wheezing.    " "  Cardiovascular: Positive for orthopnea and leg swelling. Negative for chest pain, palpitations and claudication.   Gastrointestinal: Negative.  Negative for abdominal pain, nausea and vomiting.   Genitourinary: Negative.  Negative for dysuria and urgency.   Musculoskeletal: Negative.  Negative for joint pain and myalgias.   Skin: Negative.  Negative for itching and rash.   Neurological: Negative.  Negative for dizziness, focal weakness, weakness and headaches.   Endo/Heme/Allergies: Positive for environmental allergies. Bruises/bleeds easily.   Psychiatric/Behavioral: Positive for depression. The patient is not nervous/anxious.         Objective:   /80   Pulse 100   Ht 1.753 m (5' 9\")   Wt 90.7 kg (200 lb)   SpO2 98%   BMI 29.53 kg/m²      Physical Exam   Constitutional: He is oriented to person, place, and time. He appears well-developed and well-nourished.   HENT:   Head: Normocephalic and atraumatic.   Eyes: Conjunctivae are normal. Pupils are equal, round, and reactive to light.   Neck: Normal range of motion. Neck supple.   Cardiovascular: Normal rate and regular rhythm.    Murmur heard.  Pulmonary/Chest: Effort normal and breath sounds normal.   Abdominal: Soft. Bowel sounds are normal.   Musculoskeletal: Normal range of motion. He exhibits edema.   Neurological: He is alert and oriented to person, place, and time.   Skin: Skin is warm and dry. There is pallor.   Psychiatric: He has a normal mood and affect.     CARDIAC STUDIES/PROCEDURES:    ECHOCARDIOGRAM CONCLUSIONS (06/01/18)  Moderately reduced left ventricular systolic function.  Left ventricular ejection fraction is visually estimated to be 35%.  Global hypokinesis with regional variation with severe hypokinesis of the anteroapical and septal walls.  Moderate aortic stenosis; severely of aortic stenosis   Aortic valve area calculated from the continuity equation is 0.82.   Vmax is 2.8 m/s. Transvalvular gradients are - Peak: 32 mmHg, Mean: " 21 mmHg.  Unable to estimate pulmonary artery pressure due to an inadequate tricuspid regurgitant jet.  Moderate mitral regurgitation.  probably underestimated due to low ejection fraction.No prior study is available for comparison.     EKG was ordered for aortic stenosis, performed on (06/21/18) and reviewed: EKG shows sinus rhythm.    Laboratory results of (06/18/18) were reviewed. Bun of 25 mg/dl, creatinine levels of 1.34 mg/dl noted.    Assessment:     1. Aortic valve stenosis, etiology of cardiac valve disease unspecified  EKG    [facility] EKG (Clinic Performed)   2. Chronic systolic CHF (congestive heart failure) ([facility])     3. Dilated cardiomyopathy ([facility])     4. Essential hypertension     5. Stage 3 chronic kidney disease       Medical Decision Making:  Today's Assessment / Status / Plan:     1. Aortic stenosis: He is symptomatic with his aortic stenosis, NYHA class III-IV. He does not appear to be a good surgical candidate due to excessive risk. We will schedule dobutamine stress echocardiogram and cardiac catheterization and refer him to Dr. Strong. He understands the risks and benefits and agrees with plan.  2. Acute on chronic systolic congestive heart failure with cardiomyopathy: The overall volume status is elevated. We will increase his furosemide dose.  3. Chronic kidney disease stage III (managed by Dr. Kirkland): We will continue to follow labs.  4. Additional information: his wife works in IT department at [employer].    The risks, benefits, and alternatives to coronary angiography with IV sedation were discussed in great detail. Specific risks mentioned include bleeding, infection, kidney damage, allergic reaction, cardiac perforation with possible tamponade requiring kenneth-cardiocentesis or possible open heart surgery. In addition, we discussed that 10% of patients will experience small to moderate bruising at the side of the arterial puncture. Lastly the risks of heart attack,  stroke, and death were discussed; the risks of major complications such as heart attack or stroke caused by the angiogram is less than 1%; the risk of death is approximately 1 in 1000. The patient verbalized understanding of these potential complications and wishes to proceed with this procedure.    The risks, benefits, and alternatives to TAVR, general anesthesia and transesophageal echocardiogram were discussed in great detail. Specific risks mentioned include bleeding, infection, kidney damage, allergic reaction, cardiac perforation with possible tamponade requiring kenneth-cardiocentesis or possible open heart surgery if the patient is a candidate. Lastly the risks of heart attack, stroke, and death were discussed; the risks of major complications includingall cause mortality of 2.2%, disabling stroke of 1.1%, the risk on new pacemaker of 12% and the risk of vascular complications of 4.1%. The patient verbalized understanding of these potential complications and wishes to proceed with this procedure. ([facility]).  The procedure will be performed completely in collaboration with cardiac surgery.    We will follow up in one month in valve clinic.    Thank you for this consult.        No Recipients

## 2018-06-22 ENCOUNTER — HOSPITAL ENCOUNTER (OUTPATIENT)
Dept: CARDIOLOGY | Facility: MEDICAL CENTER | Age: 68
End: 2018-06-22
Attending: NURSE PRACTITIONER
Payer: COMMERCIAL

## 2018-06-22 ENCOUNTER — APPOINTMENT (OUTPATIENT)
Dept: RADIOLOGY | Facility: MEDICAL CENTER | Age: 68
DRG: 250 | End: 2018-06-22
Attending: INTERNAL MEDICINE
Payer: COMMERCIAL

## 2018-06-22 ENCOUNTER — HOSPITAL ENCOUNTER (INPATIENT)
Facility: MEDICAL CENTER | Age: 68
LOS: 7 days | DRG: 250 | End: 2018-06-29
Attending: INTERNAL MEDICINE | Admitting: INTERNAL MEDICINE
Payer: COMMERCIAL

## 2018-06-22 LAB
APTT PPP: 93.2 SEC (ref 24.7–36)
GLUCOSE BLD-MCNC: 298 MG/DL (ref 65–99)
INR PPP: 1 (ref 0.87–1.13)
LV EJECT FRACT  99904: 30
LV EJECT FRACT MOD 2C 99903: 30.42
LV EJECT FRACT MOD 4C 99902: 28.17
LV EJECT FRACT MOD BP 99901: 30.88
PROTHROMBIN TIME: 12.9 SEC (ref 12–14.6)

## 2018-06-22 PROCEDURE — C1769 GUIDE WIRE: HCPCS

## 2018-06-22 PROCEDURE — 360979 HCHG DIAGNOSTIC CATH

## 2018-06-22 PROCEDURE — C1894 INTRO/SHEATH, NON-LASER: HCPCS

## 2018-06-22 PROCEDURE — A9270 NON-COVERED ITEM OR SERVICE: HCPCS | Performed by: NURSE PRACTITIONER

## 2018-06-22 PROCEDURE — 4A023N6 MEASUREMENT OF CARDIAC SAMPLING AND PRESSURE, RIGHT HEART, PERCUTANEOUS APPROACH: ICD-10-PCS | Performed by: INTERNAL MEDICINE

## 2018-06-22 PROCEDURE — 700101 HCHG RX REV CODE 250

## 2018-06-22 PROCEDURE — 99152 MOD SED SAME PHYS/QHP 5/>YRS: CPT

## 2018-06-22 PROCEDURE — 71046 X-RAY EXAM CHEST 2 VIEWS: CPT

## 2018-06-22 PROCEDURE — 93880 EXTRACRANIAL BILAT STUDY: CPT | Mod: 26 | Performed by: INTERNAL MEDICINE

## 2018-06-22 PROCEDURE — 160002 HCHG RECOVERY MINUTES (STAT)

## 2018-06-22 PROCEDURE — 700102 HCHG RX REV CODE 250 W/ 637 OVERRIDE(OP)

## 2018-06-22 PROCEDURE — 93350 STRESS TTE ONLY: CPT

## 2018-06-22 PROCEDURE — 93880 EXTRACRANIAL BILAT STUDY: CPT

## 2018-06-22 PROCEDURE — B2111ZZ FLUOROSCOPY OF MULTIPLE CORONARY ARTERIES USING LOW OSMOLAR CONTRAST: ICD-10-PCS | Performed by: INTERNAL MEDICINE

## 2018-06-22 PROCEDURE — 93350 STRESS TTE ONLY: CPT | Mod: 26 | Performed by: INTERNAL MEDICINE

## 2018-06-22 PROCEDURE — 700111 HCHG RX REV CODE 636 W/ 250 OVERRIDE (IP)

## 2018-06-22 PROCEDURE — 307093 HCHG TR BAND RADIAL

## 2018-06-22 PROCEDURE — 700101 HCHG RX REV CODE 250: Performed by: NURSE PRACTITIONER

## 2018-06-22 PROCEDURE — 99153 MOD SED SAME PHYS/QHP EA: CPT

## 2018-06-22 PROCEDURE — A9270 NON-COVERED ITEM OR SERVICE: HCPCS

## 2018-06-22 PROCEDURE — 85610 PROTHROMBIN TIME: CPT

## 2018-06-22 PROCEDURE — 82962 GLUCOSE BLOOD TEST: CPT

## 2018-06-22 PROCEDURE — 700102 HCHG RX REV CODE 250 W/ 637 OVERRIDE(OP): Performed by: NURSE PRACTITIONER

## 2018-06-22 PROCEDURE — 36415 COLL VENOUS BLD VENIPUNCTURE: CPT

## 2018-06-22 PROCEDURE — 93456 R HRT CORONARY ARTERY ANGIO: CPT

## 2018-06-22 PROCEDURE — 361014 HCHG 6FR ARROW SWAN/THERMO

## 2018-06-22 PROCEDURE — 85730 THROMBOPLASTIN TIME PARTIAL: CPT

## 2018-06-22 PROCEDURE — 770020 HCHG ROOM/CARE - TELE (206)

## 2018-06-22 RX ORDER — INSULIN GLARGINE 100 [IU]/ML
16 INJECTION, SOLUTION SUBCUTANEOUS EVERY EVENING
Status: DISCONTINUED | OUTPATIENT
Start: 2018-06-22 | End: 2018-06-29 | Stop reason: HOSPADM

## 2018-06-22 RX ORDER — HALOPERIDOL 5 MG/ML
1 INJECTION INTRAMUSCULAR EVERY 6 HOURS PRN
Status: DISCONTINUED | OUTPATIENT
Start: 2018-06-22 | End: 2018-06-26

## 2018-06-22 RX ORDER — ONDANSETRON 2 MG/ML
4 INJECTION INTRAMUSCULAR; INTRAVENOUS EVERY 4 HOURS PRN
Status: DISCONTINUED | OUTPATIENT
Start: 2018-06-22 | End: 2018-06-26

## 2018-06-22 RX ORDER — FUROSEMIDE 10 MG/ML
40 INJECTION INTRAMUSCULAR; INTRAVENOUS ONCE
Status: COMPLETED | OUTPATIENT
Start: 2018-06-22 | End: 2018-06-22

## 2018-06-22 RX ORDER — FUROSEMIDE 10 MG/ML
INJECTION INTRAMUSCULAR; INTRAVENOUS
Status: COMPLETED
Start: 2018-06-22 | End: 2018-06-22

## 2018-06-22 RX ORDER — HEPARIN SODIUM 1000 [USP'U]/ML
3200 INJECTION, SOLUTION INTRAVENOUS; SUBCUTANEOUS PRN
Status: DISCONTINUED | OUTPATIENT
Start: 2018-06-22 | End: 2018-06-24

## 2018-06-22 RX ORDER — MIDAZOLAM HYDROCHLORIDE 1 MG/ML
INJECTION INTRAMUSCULAR; INTRAVENOUS
Status: COMPLETED
Start: 2018-06-22 | End: 2018-06-22

## 2018-06-22 RX ORDER — LISINOPRIL 20 MG/1
40 TABLET ORAL DAILY
Status: DISCONTINUED | OUTPATIENT
Start: 2018-06-23 | End: 2018-06-22

## 2018-06-22 RX ORDER — FUROSEMIDE 10 MG/ML
40 INJECTION INTRAMUSCULAR; INTRAVENOUS
Status: DISCONTINUED | OUTPATIENT
Start: 2018-06-23 | End: 2018-06-23

## 2018-06-22 RX ORDER — DEXAMETHASONE SODIUM PHOSPHATE 4 MG/ML
4 INJECTION, SOLUTION INTRA-ARTICULAR; INTRALESIONAL; INTRAMUSCULAR; INTRAVENOUS; SOFT TISSUE
Status: DISCONTINUED | OUTPATIENT
Start: 2018-06-22 | End: 2018-06-26

## 2018-06-22 RX ORDER — TOBRAMYCIN 3 MG/ML
1 SOLUTION/ DROPS OPHTHALMIC EVERY 4 HOURS
Status: DISCONTINUED | OUTPATIENT
Start: 2018-06-22 | End: 2018-06-23

## 2018-06-22 RX ORDER — LISINOPRIL 20 MG/1
40 TABLET ORAL DAILY
Status: DISCONTINUED | OUTPATIENT
Start: 2018-06-22 | End: 2018-06-24

## 2018-06-22 RX ORDER — VERAPAMIL HYDROCHLORIDE 2.5 MG/ML
INJECTION, SOLUTION INTRAVENOUS
Status: DISCONTINUED
Start: 2018-06-22 | End: 2018-06-23

## 2018-06-22 RX ORDER — SCOLOPAMINE TRANSDERMAL SYSTEM 1 MG/1
1 PATCH, EXTENDED RELEASE TRANSDERMAL
Status: DISCONTINUED | OUTPATIENT
Start: 2018-06-22 | End: 2018-06-26

## 2018-06-22 RX ORDER — SODIUM CHLORIDE 9 MG/ML
INJECTION, SOLUTION INTRAVENOUS
Status: DISCONTINUED | OUTPATIENT
Start: 2018-06-22 | End: 2018-06-24

## 2018-06-22 RX ORDER — ATORVASTATIN CALCIUM 40 MG/1
40 TABLET, FILM COATED ORAL NIGHTLY
Status: DISCONTINUED | OUTPATIENT
Start: 2018-06-22 | End: 2018-06-29 | Stop reason: HOSPADM

## 2018-06-22 RX ORDER — LABETALOL HYDROCHLORIDE 5 MG/ML
INJECTION, SOLUTION INTRAVENOUS
Status: DISCONTINUED
Start: 2018-06-22 | End: 2018-06-22

## 2018-06-22 RX ORDER — HEPARIN SODIUM,PORCINE 1000/ML
VIAL (ML) INJECTION
Status: DISCONTINUED
Start: 2018-06-22 | End: 2018-06-23

## 2018-06-22 RX ADMIN — FENTANYL CITRATE 100 MCG: 50 INJECTION, SOLUTION INTRAMUSCULAR; INTRAVENOUS at 12:35

## 2018-06-22 RX ADMIN — INSULIN GLARGINE 16 UNITS: 100 INJECTION, SOLUTION SUBCUTANEOUS at 20:48

## 2018-06-22 RX ADMIN — DOBUTAMINE HYDROCHLORIDE: 100 INJECTION INTRAVENOUS at 09:30

## 2018-06-22 RX ADMIN — FUROSEMIDE 40 MG: 10 INJECTION, SOLUTION INTRAMUSCULAR; INTRAVENOUS at 13:14

## 2018-06-22 RX ADMIN — MIDAZOLAM 2 MG: 1 INJECTION INTRAMUSCULAR; INTRAVENOUS at 12:30

## 2018-06-22 RX ADMIN — TOBRAMYCIN 1 DROP: 3 SOLUTION OPHTHALMIC at 19:12

## 2018-06-22 RX ADMIN — SODIUM CHLORIDE: 9 INJECTION, SOLUTION INTRAVENOUS at 10:40

## 2018-06-22 RX ADMIN — ATORVASTATIN CALCIUM 40 MG: 40 TABLET, FILM COATED ORAL at 19:08

## 2018-06-22 RX ADMIN — FUROSEMIDE 40 MG: 10 INJECTION INTRAMUSCULAR; INTRAVENOUS at 13:14

## 2018-06-22 RX ADMIN — HEPARIN SODIUM 1450 UNITS/HR: 5000 INJECTION, SOLUTION INTRAVENOUS at 13:15

## 2018-06-22 RX ADMIN — LISINOPRIL 40 MG: 20 TABLET ORAL at 19:08

## 2018-06-22 RX ADMIN — ASPIRIN 81 MG: 81 TABLET, COATED ORAL at 14:00

## 2018-06-22 ASSESSMENT — LIFESTYLE VARIABLES
ALCOHOL_USE: YES
TOTAL SCORE: 0
EVER FELT BAD OR GUILTY ABOUT YOUR DRINKING: NO
AVERAGE NUMBER OF DAYS PER WEEK YOU HAVE A DRINK CONTAINING ALCOHOL: 3
TOTAL SCORE: 0
EVER HAD A DRINK FIRST THING IN THE MORNING TO STEADY YOUR NERVES TO GET RID OF A HANGOVER: NO
TOTAL SCORE: 0
CONSUMPTION TOTAL: NEGATIVE
HOW MANY TIMES IN THE PAST YEAR HAVE YOU HAD 5 OR MORE DRINKS IN A DAY: 0
HAVE PEOPLE ANNOYED YOU BY CRITICIZING YOUR DRINKING: NO
HAVE YOU EVER FELT YOU SHOULD CUT DOWN ON YOUR DRINKING: NO
ON A TYPICAL DAY WHEN YOU DRINK ALCOHOL HOW MANY DRINKS DO YOU HAVE: 1

## 2018-06-22 ASSESSMENT — PAIN SCALES - GENERAL
PAINLEVEL_OUTOF10: 0

## 2018-06-22 ASSESSMENT — PATIENT HEALTH QUESTIONNAIRE - PHQ9
1. LITTLE INTEREST OR PLEASURE IN DOING THINGS: NOT AT ALL
SUM OF ALL RESPONSES TO PHQ9 QUESTIONS 1 AND 2: 0
2. FEELING DOWN, DEPRESSED, IRRITABLE, OR HOPELESS: NOT AT ALL

## 2018-06-22 NOTE — PROGRESS NOTES
Dobutamine Stress Test    PRE-PROCEDURE:   Clinical History: Completed, valvular HF  Medical Diagnosis: {MEDICAL DIAGNOSIS ( STRESS TEST): AS, DCM, severe fatigue., dyspnea,   Pertinent Previous Physical Findings: Murmurs  History of Symptoms: Shortness of Breath  Recent Health: None  Other Habits: None  Family History: Not Applicable  NPO Status: after MN  Medications Taken Day of Test:   Consent: Completed  Time Out: Completed    ASSESSMENT:  Neuro: Within Normal Limits  Pulse Rate: Regular   Resting Blood Pressure: 140/80  Lung Auscultation: Clear to Auscultation, Good Aeration, No Wheezes, Rales or Rhonchi  Palpation and Auscultation of Carotid Arteries: not done  Auscultation of Heart Sounds: Within Normal Limits  Palpation and Inspection of Lower Extremities: {EDEMA ( STRESS TEST):none    CONTRAINDICATIONS: {CONTRAINDICATIONS ( STRESS TEST):none    PROCEDURE PREPARATION:  IV started: # 20 gauge IV started in R Antecubital   Explanation of procedure to patient: done  Crash cart: present  Labetalol, atropine, and nitro at bedside: yes    PRE-PROCEDURE:  HR: 93  BP: 155/90    5 mcg at 5 minutes:  HR: 94  BP:160/83    10 mcg at 10 minutes:  HR:105  BP: 160/70    15 mcg at 15 minutes:  HR: 115  BP: 155/66    STOPPED: at 15 mcg, patient reached the mean gradient of 40 mmHg & velocity of 4m/s  HR   114  BP: 150/66    Please refer to MAR for medications.     POST PROCEDURE (RECOVERY):  HR: 104  BP: 150/75  IV Discontinued: {COMPLETED:no going for cath    TEST TERMINATION: {TEST TERMINATION 1 ( STRESS T EST): reached 40 mmHg mean gradient , Dr dodge was notified  [facility] Hospitalist Progress Note        ROS     Laboratory/Imaging   No Data Recorded                                   Physical Exam                                    Quality-Core Measures

## 2018-06-22 NOTE — PROCEDURES
DATE OF SERVICE:  06/22/2018    REFERRING PHYSICIAN:  Nixon Claros MD    PROCEDURE:  1.  Right heart catheterization.  2.  Coronary angiography.    PREPROCEDURE DIAGNOSIS:    1.  Low flow, low gradient severe symptomatic aortic stenosis,   New York Heart Association class IV.    POSTPROCEDURE DIAGNOSES:  1.  Multivessel severe coronary artery disease with long, high-grade mid left   anterior descending artery stenosis, high-grade ostial first and second   diagonal branch stenosis, nonobstructive ostial circumflex artery stenosis and   nonobstructive right coronary artery stenosis.  2.  Elevated right heart pressures with PA systolic pressure of 75 mmHg.    INDICATION:  The patient is a 68-year-old male with past medical history   significant for chronic kidney disease stage III.  He was well until 2-1/2 months   ago when he began to experience progressive fatigue, shortness of breath   and lower extremity edema.  He underwent an echocardiogram which showed  moderate aortic stenosis with new dilated cardiomyopathy of 35%.  He was   diagnosed with acute systolic congestive heart failure and started on diuretic  therapy.  He underwent a dobutamine stress echocardiogram today, which   confirmed low flow, low gradient severe aortic stenosis with Vmax of 4.1 and   mean gradient of 44 mmHg at peak stress.  He was then scheduled for cardiac   catheterization.    DESCRIPTION OF PROCEDURE:  After informed consent was signed with the   patient, the patient was brought to the cardiac catheterization laboratory.  He was   prepped and draped in usual sterile manner.  The right brachial area was   anesthetized with 2% Xylocaine.  A 6-Mosotho sheath was inserted into the right   brachial artery over an existing IV.  A 5.5-Mosotho [place]-Stefano catheter was   positioned at the pulmonary artery.  Thermodilution cardiac outputs were   obtained.  Right heart pressure measurements were obtained.  The Pal[place]-Stefano   catheter was removed.  The  right wrist area was anesthetized with 2%   Xylocaine.  A 6-Faroese sheath was inserted into the right radial artery using   the modified Seldinger technique.  Intra-arterial verapamil and nitroglycerin   were given.  IV heparin was given.  A JL 3.5 catheter was positioned into the   left main coronary artery.  Coronary angiography was performed.  This was   exchanged for a JR4 catheter, which was positioned into the right coronary   artery.  Coronary angiography was performed.  The patient tolerated the   procedure well.  At the end of the procedure, all catheters were removed.  The   case was discussed with Dr. Melissa Strong.  The sheath was removed and   Hemoband was placed.  He was started on IV heparin.  He was transferred to PPU   in stable condition.    HEMODYNAMIC DATA:  Hemodynamic data shows right heart pressures of RA 25 mmHg,   RV 75/30 with mean of 33 mmHg, PA 75/50 with mean of 60 mmHg.  Pulmonary   wedge 40 mmHg.  Cardiac output by thermodilution 4.2, cardiac index 2.05.    Left heart pressures, /80 with mean of 100 mmHg.    AORTIC VALVE:  Aortic valve was not crossed.    LEFT VENTRICULOGRAM:  Left ventriculogram was not performed.    ANGIOGRAM:  Left main coronary artery:  Left main coronary artery is a short,   moderate-to-large caliber vessel free of disease.  Left anterior descending artery:  Left anterior descending artery is a long   moderate-to-large caliber vessel.  Mid portion of the vessel, there is a   concentric 70-80% stenosis.  Following a short segment of relatively free   vessel, there is a concentric 99% stenosis.  There is another 30-40% stenosis   to distal portion of the mid vessel.  At the proximal lesion, there are 2   moderate caliber diagonal branches noted, both with high-grade ostial stenosis   of 80% for the first diagonal and 90% for the second diagonal.  Left circumflex artery:  Left circumflex artery is a nondominant   moderate-caliber vessel with proximal diffuse  luminal irregularities of   30-40%.  There is a moderate caliber OM1 branch with ostial concentric 50-60%   stenosis.  Right coronary artery:  Right coronary artery is a dominant moderate-caliber   vessel with diffuse luminal irregularities of 30-40%.  Distally, there is a   long large posterior descending artery branch and small posterolateral   branches noted free of disease.    IMPRESSION:   1.  Multivessel severe coronary artery disease with long, high-grade mid left   anterior descending artery stenosis, high-grade ostial first and second   diagonal branch stenosis, nonobstructive ostial circumflex artery stenosis and   nonobstructive right coronary artery stenosis.  2.  Elevated right heart pressures with PA systolic pressure of 75 mmHg.    RECOMMENDATIONS:  Recommend for the patient to be admitted, started on IV   heparin for the high-grade LAD stenosis.  We will also start IV Lasix for   diuresis.  He will consult with Dr. Strong in the next 2-3 days and plan for   surgical aortic valve replacement and coronary artery bypass graft surgery.       ____________________________________     MD JASE KIRBY / CLARIBEL    DD:  06/22/2018 13:12:25  DT:  06/22/2018 14:04:28    D#:  [id number]  Job#:  [id number]

## 2018-06-22 NOTE — PROGRESS NOTES
1330 pt to PPU 9, s/p angiogram. VSS, no c/o pain or discomfort. Right brachial access site has clean clear dressing in place. Transradial band in place on right wrist, no bleeding noted. Heparin drip infusing at 1450 units/ hr. Wife at bedside and plan of care reviewed w/ them. Call light within reach.    1415 pt resting, snacks and water given. TR band in place, no bleeding noted.     1445 3ml of air removed from TR band.     1500 3ml of air removed from TR band, no bleeding noted.     1505 Bed assignment received, pt to transfer to [facility]. Report called to receiving RN. Pt updated w/ plan of care. Request for transport placed.     1530 3ml of air removed from TR band, no bleeding noted.     1555 3ml of air removed from TR band, no bleeding noted. No c/o pain or discomfort. Awaiting for transport at this time.     1615 pt transferred to room w/ out incident.

## 2018-06-22 NOTE — PROGRESS NOTES
Patient arrived to room at 1615 in stable condition.  Ambulated to bathroom.  Oriented to room.  Call bell and personal belongings in place.

## 2018-06-23 LAB
ANION GAP SERPL CALC-SCNC: 8 MMOL/L (ref 0–11.9)
APTT PPP: 80.4 SEC (ref 24.7–36)
APTT PPP: 94.7 SEC (ref 24.7–36)
BUN SERPL-MCNC: 28 MG/DL (ref 8–22)
CALCIUM SERPL-MCNC: 8.6 MG/DL (ref 8.5–10.5)
CHLORIDE SERPL-SCNC: 109 MMOL/L (ref 96–112)
CO2 SERPL-SCNC: 22 MMOL/L (ref 20–33)
CREAT SERPL-MCNC: 1.57 MG/DL (ref 0.5–1.4)
GLUCOSE BLD-MCNC: 141 MG/DL (ref 65–99)
GLUCOSE BLD-MCNC: 168 MG/DL (ref 65–99)
GLUCOSE SERPL-MCNC: 230 MG/DL (ref 65–99)
POTASSIUM SERPL-SCNC: 4.5 MMOL/L (ref 3.6–5.5)
SODIUM SERPL-SCNC: 139 MMOL/L (ref 135–145)

## 2018-06-23 PROCEDURE — 700111 HCHG RX REV CODE 636 W/ 250 OVERRIDE (IP): Performed by: NURSE PRACTITIONER

## 2018-06-23 PROCEDURE — 82962 GLUCOSE BLOOD TEST: CPT

## 2018-06-23 PROCEDURE — 80048 BASIC METABOLIC PNL TOTAL CA: CPT

## 2018-06-23 PROCEDURE — 700111 HCHG RX REV CODE 636 W/ 250 OVERRIDE (IP): Performed by: INTERNAL MEDICINE

## 2018-06-23 PROCEDURE — 700102 HCHG RX REV CODE 250 W/ 637 OVERRIDE(OP): Performed by: INTERNAL MEDICINE

## 2018-06-23 PROCEDURE — 770020 HCHG ROOM/CARE - TELE (206)

## 2018-06-23 PROCEDURE — A9270 NON-COVERED ITEM OR SERVICE: HCPCS | Performed by: INTERNAL MEDICINE

## 2018-06-23 PROCEDURE — 85730 THROMBOPLASTIN TIME PARTIAL: CPT

## 2018-06-23 PROCEDURE — A9270 NON-COVERED ITEM OR SERVICE: HCPCS | Performed by: NURSE PRACTITIONER

## 2018-06-23 PROCEDURE — 700102 HCHG RX REV CODE 250 W/ 637 OVERRIDE(OP): Performed by: NURSE PRACTITIONER

## 2018-06-23 PROCEDURE — 36415 COLL VENOUS BLD VENIPUNCTURE: CPT

## 2018-06-23 RX ORDER — FUROSEMIDE 10 MG/ML
40 INJECTION INTRAMUSCULAR; INTRAVENOUS
Status: DISCONTINUED | OUTPATIENT
Start: 2018-06-25 | End: 2018-06-24

## 2018-06-23 RX ORDER — DEXTROSE MONOHYDRATE 25 G/50ML
25 INJECTION, SOLUTION INTRAVENOUS
Status: DISCONTINUED | OUTPATIENT
Start: 2018-06-23 | End: 2018-06-23

## 2018-06-23 RX ORDER — CARVEDILOL 6.25 MG/1
6.25 TABLET ORAL 2 TIMES DAILY WITH MEALS
Status: DISCONTINUED | OUTPATIENT
Start: 2018-06-23 | End: 2018-06-24

## 2018-06-23 RX ORDER — DEXTROSE MONOHYDRATE 25 G/50ML
25 INJECTION, SOLUTION INTRAVENOUS
Status: DISCONTINUED | OUTPATIENT
Start: 2018-06-23 | End: 2018-06-29 | Stop reason: HOSPADM

## 2018-06-23 RX ADMIN — LISINOPRIL 40 MG: 20 TABLET ORAL at 06:01

## 2018-06-23 RX ADMIN — HEPARIN SODIUM 1450 UNITS/HR: 5000 INJECTION, SOLUTION INTRAVENOUS at 06:09

## 2018-06-23 RX ADMIN — CARVEDILOL 6.25 MG: 6.25 TABLET, FILM COATED ORAL at 12:54

## 2018-06-23 RX ADMIN — INSULIN HUMAN 2 UNITS: 100 INJECTION, SOLUTION PARENTERAL at 20:36

## 2018-06-23 RX ADMIN — ASPIRIN 81 MG: 81 TABLET, COATED ORAL at 06:02

## 2018-06-23 RX ADMIN — CARVEDILOL 6.25 MG: 6.25 TABLET, FILM COATED ORAL at 17:21

## 2018-06-23 RX ADMIN — FUROSEMIDE 40 MG: 10 INJECTION, SOLUTION INTRAMUSCULAR; INTRAVENOUS at 06:02

## 2018-06-23 RX ADMIN — ATORVASTATIN CALCIUM 40 MG: 40 TABLET, FILM COATED ORAL at 17:21

## 2018-06-23 RX ADMIN — INSULIN GLARGINE 16 UNITS: 100 INJECTION, SOLUTION SUBCUTANEOUS at 20:36

## 2018-06-23 ASSESSMENT — PAIN SCALES - GENERAL
PAINLEVEL_OUTOF10: 0
PAINLEVEL_OUTOF10: 0

## 2018-06-23 ASSESSMENT — ENCOUNTER SYMPTOMS
FEVER: 0
CLAUDICATION: 0
PND: 0
VOMITING: 0
COUGH: 0
ORTHOPNEA: 1
PALPITATIONS: 0
SHORTNESS OF BREATH: 1
NAUSEA: 0
CHILLS: 0

## 2018-06-23 NOTE — PROGRESS NOTES
Report received at bedside.  RN assumed care.  Chart reviewed.  Patient resting in bed. Updated plan of care with patient. Patient verified on telemetry.   Call light in reach.  Bed in lowest position.  Non-slip socks on.  Patient alert and oriented x4.  Pain addressed.

## 2018-06-23 NOTE — PROGRESS NOTES
Cardiology Progress Note               Author: Lalo Sánchez Date & Time created: 2018  12:24 PM     Admitted for PRE TAVR cath    History of chronic kidney disease stage III, feeling well up until 2 months ago , he began to experience progressive fatigue, dyspnea, lower extremity edema, echo showed moderate aortic stenosis with new dilated cardiomyopathy of 35%, dobutamine stress test on 18 confirmed low flow low gradient severe aortic stenosis with a V-max of 4.1 and mean gradient of 44 mmHg at peak stress, diabetes, hypertension, hyperlipidemia      Coronary angiography 18 showed multivessel CAD with high-grade mid LAD, high-grade ostial first and second diagonal branch, nonobstructive circumflex, nonobstructive RCA, elevated right heart pressures with  PA systolic pressure of 75 mmHg      Labs reviewed  Sodium, potassium stable  Creatinine 1.57 post cath, repeat BMP in a.m.        Blood pressure = 150/98  Heartrate =  normal sinus rhythm    Review of Systems   Respiratory: Positive for shortness of breath. Negative for cough.    Cardiovascular: Positive for orthopnea. Negative for chest pain, palpitations, claudication, leg swelling and PND.       Physical Exam   Constitutional: He is oriented to person, place, and time.   HENT:   Head: Normocephalic.   Eyes: Conjunctivae are normal.   Neck: No thyromegaly present.   Cardiovascular:   Murmur heard.   Systolic murmur is present   Pulses:       Carotid pulses are 2+ on the right side, and 2+ on the left side.       Radial pulses are 2+ on the right side, and 2+ on the left side.   Pulmonary/Chest: He has no wheezes.   Abdominal: Soft.   Musculoskeletal: He exhibits no edema.   Neurological: He is alert and oriented to person, place, and time.   Skin: Skin is warm and dry.       Hemodynamics:  Temp (24hrs), Av.2 °C (97.2 °F), Min:35.9 °C (96.6 °F), Max:37.1 °C (98.8 °F)  Temperature: 36.1 °C (97 °F)  Pulse  Av.3  Min: 75  Max:  106Heart Rate (Monitored): 71  Blood Pressure : 151/98, NIBP: (!) 166/91     Respiratory:    Respiration: 17, Pulse Oximetry: 98 %           Fluids:  Date 06/23/18 0700 - 06/24/18 0659   Shift 7221-6584 4043-6439 5911-1094 24 Hour Total   I  N  T  A  K  E   P.O. 360   360    Shift Total 360   360   O  U  T  P  U  T   Urine 850   850    Shift Total 850   850   Weight (kg) 89.4 89.4 89.4 89.4       Weight: 89.4 kg (197 lb 1.5 oz)  GI/Nutrition:  Orders Placed This Encounter   Procedures   • Diet Order Cardiac, Diabetic     Standing Status:   Standing     Number of Occurrences:   1     Order Specific Question:   Diet:     Answer:   Cardiac [6]     Order Specific Question:   Diet:     Answer:   Diabetic [3]     Lab Results:      Recent Labs      06/23/18   0138   SODIUM  139   POTASSIUM  4.5   CHLORIDE  109   CO2  22   GLUCOSE  230*   BUN  28*   CREATININE  1.57*   CALCIUM  8.6     Recent Labs      06/22/18   1122  06/22/18   1932  06/23/18   0139  06/23/18   0742   APTT   --   93.2*  94.7*  80.4*   INR  1.00   --    --    --                      Medical Decision Making, by Problem:  Multivessel CAD  Severe aortic stenosis  Chronic kidney disease stage III  Ischemic cardiomyopathy, EF 35%  Hypertension  Hyperlipidemia  Diabetes      Assessment/Plan:    Multivessel CAD by recent cath 6/22/18  -Has high-grade LAD disease, in addition high-grade diagonal  -Plan for aortic valve replacement and CABG next week  -On IV heparin  -CBC in a.m.    Severe aortic stenosis  -Low flow low gradient  -DST 6/22/18 showed  V-max 4, mean gradient 44 mmHg  -AVR next week      Mild bump in creatinine  -Repeat BMP in a.m.  -Received IV Lasix this am      Valvular heart failure  -Check creatinine in a.m. prior to Lasix dose      Cardiomyopathy, LVEF 35%  -Has LAD disease  -Plan for CABG and aortic valve replacement      Currently on aspirin 81, Lipitor 40, carvedilol 6.25, lisinopril 40 mg      Diabetes 2  -start  insulin scale,  medium    Quality-Core Measures

## 2018-06-23 NOTE — CARE PLAN
Problem: Safety  Goal: Will remain free from injury  Outcome: PROGRESSING AS EXPECTED  Assist patient with mobility if needed.  Goal: Will remain free from falls  Outcome: PROGRESSING AS EXPECTED  Patient to remain free from falls while ambulating with medical equipment

## 2018-06-23 NOTE — PROGRESS NOTES
Received report from Vianney at change of shift. Pt sitting up in bed. No complaints of pain or sob. Call light within reach.

## 2018-06-23 NOTE — PROGRESS NOTES
Cardiology Progress Note               Author: Tammi Martinez Date & Time created: 2018  10:51 AM     Interval History:  BP high, diuresing, tolerating hep gtt, no ectopy on tele.  Creat up a bit. Not on BB, not sure why but started coreg today.  No chest pain, no palpitations.  Still has dyspnea and pedal edema but both better.    Data reviewed by me personally:  Current medications  Tele- NSR  ECG 18  sinus tach, 101, LVH with strain  Echo 18 EF 35% with WMA, Mod MR, mod AS, RVSP could not be calculated  Low dose  stress echo CW severe AS  RHC/LHC 18, severe 2 vessel disease, severe sec pulm HTN      Chief Complaint:  Severe AS, CMO    Review of Systems   Constitutional: Negative for chills and fever.   Gastrointestinal: Negative for nausea and vomiting.   Skin: Negative for itching and rash.       Physical Exam   General: No acute distress. Well nourished.  HEENT: EOM grossly intact, no scleral icterus, no pharyngeal erythema.   Neck:  + JVD, no bruits, trachea midline  CVS: RRR. 3/6 late peaking syst murmur, Trace LE edema.  2+ radial pulses, 2+ PT pulses  Resp: CTAB. No wheezing or crackles/rhonchi. Normal respiratory effort.  Abdomen: Soft, NT, no adolfo hepatomegaly.  MSK/Ext: No clubbing or cyanosis.  Skin: Warm and dry, no rashes.  Neurological: CN III-XII grossly intact. No focal deficits.   Psych: A&O x 3, appropriate affect, good judgement      Hemodynamics:  Temp (24hrs), Av.2 °C (97.2 °F), Min:35.9 °C (96.6 °F), Max:37.1 °C (98.8 °F)  Temperature: 36.1 °C (97 °F)  Pulse  Av.3  Min: 75  Max: 106Heart Rate (Monitored): 71  Blood Pressure : 151/98, NIBP: (!) 166/91     Respiratory:    Respiration: 17, Pulse Oximetry: 98 %           Fluids:  Date 18 0700 - 18 0659   Shift 4857-5813 6959-5130 4969-8833 24 Hour Total   I  N  T  A  K  E   Shift Total       O  U  T  P  U  T   Urine 475   475    Shift Total 475   475   Weight (kg) 89.4 89.4 89.4 89.4       Weight:  89.4 kg (197 lb 1.5 oz)  GI/Nutrition:  Orders Placed This Encounter   Procedures   • Diet Order Cardiac, Diabetic     Standing Status:   Standing     Number of Occurrences:   1     Order Specific Question:   Diet:     Answer:   Cardiac [6]     Order Specific Question:   Diet:     Answer:   Diabetic [3]     Lab Results:      Recent Labs      06/23/18   0138   SODIUM  139   POTASSIUM  4.5   CHLORIDE  109   CO2  22   GLUCOSE  230*   BUN  28*   CREATININE  1.57*   CALCIUM  8.6     Recent Labs      06/22/18   1122  06/22/18   1932  06/23/18   0139  06/23/18   0742   APTT   --   93.2*  94.7*  80.4*   INR  1.00   --    --    --                      Medical Decision Making, by Problem:  There are no active hospital problems to display for this patient.      Plan:  1. Symptomatic severe AS, NHYC III-IV, low flow, low gradient  2. Severe CAD, LAD, Circ  3. Ischemic CMO, EF 35%, also due to AS  4. Severe secondary pulm HTN, PA syst pressure 75 mmHg.  5. CKD 3, creat up a bit  6. HTN, not controlled  7. DM2    -On heparin for high grade LAD lesion  -Diuresis at tolerated, on IV lasix  - on ASA, statin and ACE but no BB, I am not sure why, initiated low dose coreg and titrate  -Creat up after cath, consider holding ACE briefly tomorrow if higher, cont lasix  -[facility] consult for  AV replacement and CABG   *Our service will contact Dr. Strong on Monday to ensure consult is passed off since all providers changing over*  -HENOK Galeano with [facility], they are aware  -Appreciate Hospitalist service assistance    Quality-Core Measures   Reviewed items::  EKG reviewed, Labs reviewed, Medications reviewed and Radiology images reviewed

## 2018-06-24 LAB
ANION GAP SERPL CALC-SCNC: 10 MMOL/L (ref 0–11.9)
APTT PPP: 135.9 SEC (ref 24.7–36)
APTT PPP: 60.9 SEC (ref 24.7–36)
BASOPHILS # BLD AUTO: 0.6 % (ref 0–1.8)
BASOPHILS # BLD: 0.05 K/UL (ref 0–0.12)
BNP SERPL-MCNC: 1474 PG/ML (ref 0–100)
BUN SERPL-MCNC: 24 MG/DL (ref 8–22)
CALCIUM SERPL-MCNC: 9.2 MG/DL (ref 8.5–10.5)
CHLORIDE SERPL-SCNC: 107 MMOL/L (ref 96–112)
CO2 SERPL-SCNC: 24 MMOL/L (ref 20–33)
CREAT SERPL-MCNC: 1.45 MG/DL (ref 0.5–1.4)
EOSINOPHIL # BLD AUTO: 0.19 K/UL (ref 0–0.51)
EOSINOPHIL NFR BLD: 2.2 % (ref 0–6.9)
ERYTHROCYTE [DISTWIDTH] IN BLOOD BY AUTOMATED COUNT: 43.8 FL (ref 35.9–50)
GLUCOSE BLD-MCNC: 129 MG/DL (ref 65–99)
GLUCOSE BLD-MCNC: 190 MG/DL (ref 65–99)
GLUCOSE BLD-MCNC: 217 MG/DL (ref 65–99)
GLUCOSE BLD-MCNC: 225 MG/DL (ref 65–99)
GLUCOSE SERPL-MCNC: 76 MG/DL (ref 65–99)
HCT VFR BLD AUTO: 36.2 % (ref 42–52)
HGB BLD-MCNC: 11.5 G/DL (ref 14–18)
IMM GRANULOCYTES # BLD AUTO: 0.03 K/UL (ref 0–0.11)
IMM GRANULOCYTES NFR BLD AUTO: 0.3 % (ref 0–0.9)
LYMPHOCYTES # BLD AUTO: 0.73 K/UL (ref 1–4.8)
LYMPHOCYTES NFR BLD: 8.4 % (ref 22–41)
MCH RBC QN AUTO: 29.4 PG (ref 27–33)
MCHC RBC AUTO-ENTMCNC: 31.8 G/DL (ref 33.7–35.3)
MCV RBC AUTO: 92.6 FL (ref 81.4–97.8)
MONOCYTES # BLD AUTO: 0.5 K/UL (ref 0–0.85)
MONOCYTES NFR BLD AUTO: 5.8 % (ref 0–13.4)
NEUTROPHILS # BLD AUTO: 7.16 K/UL (ref 1.82–7.42)
NEUTROPHILS NFR BLD: 82.7 % (ref 44–72)
NRBC # BLD AUTO: 0 K/UL
NRBC BLD-RTO: 0 /100 WBC
PLATELET # BLD AUTO: 341 K/UL (ref 164–446)
PMV BLD AUTO: 9.9 FL (ref 9–12.9)
POTASSIUM SERPL-SCNC: 4.3 MMOL/L (ref 3.6–5.5)
RBC # BLD AUTO: 3.91 M/UL (ref 4.7–6.1)
SODIUM SERPL-SCNC: 141 MMOL/L (ref 135–145)
WBC # BLD AUTO: 8.7 K/UL (ref 4.8–10.8)

## 2018-06-24 PROCEDURE — 36415 COLL VENOUS BLD VENIPUNCTURE: CPT

## 2018-06-24 PROCEDURE — 85025 COMPLETE CBC W/AUTO DIFF WBC: CPT

## 2018-06-24 PROCEDURE — A9270 NON-COVERED ITEM OR SERVICE: HCPCS | Performed by: NURSE PRACTITIONER

## 2018-06-24 PROCEDURE — 770020 HCHG ROOM/CARE - TELE (206)

## 2018-06-24 PROCEDURE — 85730 THROMBOPLASTIN TIME PARTIAL: CPT | Mod: 91

## 2018-06-24 PROCEDURE — 80048 BASIC METABOLIC PNL TOTAL CA: CPT

## 2018-06-24 PROCEDURE — 700111 HCHG RX REV CODE 636 W/ 250 OVERRIDE (IP): Performed by: NURSE PRACTITIONER

## 2018-06-24 PROCEDURE — A9270 NON-COVERED ITEM OR SERVICE: HCPCS | Performed by: INTERNAL MEDICINE

## 2018-06-24 PROCEDURE — 83880 ASSAY OF NATRIURETIC PEPTIDE: CPT

## 2018-06-24 PROCEDURE — 82962 GLUCOSE BLOOD TEST: CPT

## 2018-06-24 PROCEDURE — 700111 HCHG RX REV CODE 636 W/ 250 OVERRIDE (IP): Performed by: INTERNAL MEDICINE

## 2018-06-24 PROCEDURE — 700102 HCHG RX REV CODE 250 W/ 637 OVERRIDE(OP): Performed by: INTERNAL MEDICINE

## 2018-06-24 PROCEDURE — 700102 HCHG RX REV CODE 250 W/ 637 OVERRIDE(OP): Performed by: NURSE PRACTITIONER

## 2018-06-24 RX ORDER — CARVEDILOL 12.5 MG/1
12.5 TABLET ORAL 2 TIMES DAILY WITH MEALS
Status: DISCONTINUED | OUTPATIENT
Start: 2018-06-24 | End: 2018-06-29 | Stop reason: HOSPADM

## 2018-06-24 RX ORDER — FUROSEMIDE 10 MG/ML
40 INJECTION INTRAMUSCULAR; INTRAVENOUS
Status: DISCONTINUED | OUTPATIENT
Start: 2018-06-24 | End: 2018-06-29

## 2018-06-24 RX ORDER — CARVEDILOL 12.5 MG/1
12.5 TABLET ORAL 2 TIMES DAILY WITH MEALS
Status: DISCONTINUED | OUTPATIENT
Start: 2018-06-24 | End: 2018-06-24

## 2018-06-24 RX ORDER — LISINOPRIL 10 MG/1
10 TABLET ORAL DAILY
Status: DISCONTINUED | OUTPATIENT
Start: 2018-06-25 | End: 2018-06-29 | Stop reason: HOSPADM

## 2018-06-24 RX ADMIN — INSULIN HUMAN 3 UNITS: 100 INJECTION, SOLUTION PARENTERAL at 12:34

## 2018-06-24 RX ADMIN — INSULIN GLARGINE 16 UNITS: 100 INJECTION, SOLUTION SUBCUTANEOUS at 20:30

## 2018-06-24 RX ADMIN — CARVEDILOL 12.5 MG: 12.5 TABLET, FILM COATED ORAL at 18:49

## 2018-06-24 RX ADMIN — ATORVASTATIN CALCIUM 40 MG: 40 TABLET, FILM COATED ORAL at 18:48

## 2018-06-24 RX ADMIN — LISINOPRIL 40 MG: 20 TABLET ORAL at 05:46

## 2018-06-24 RX ADMIN — ASPIRIN 81 MG: 81 TABLET, COATED ORAL at 05:46

## 2018-06-24 RX ADMIN — HEPARIN SODIUM 1450 UNITS: 5000 INJECTION, SOLUTION INTRAVENOUS at 00:11

## 2018-06-24 RX ADMIN — CARVEDILOL 12.5 MG: 12.5 TABLET, FILM COATED ORAL at 09:34

## 2018-06-24 RX ADMIN — INSULIN HUMAN 3 UNITS: 100 INJECTION, SOLUTION PARENTERAL at 20:31

## 2018-06-24 RX ADMIN — FUROSEMIDE 40 MG: 10 INJECTION, SOLUTION INTRAMUSCULAR; INTRAVENOUS at 13:54

## 2018-06-24 ASSESSMENT — ENCOUNTER SYMPTOMS
CHILLS: 0
FEVER: 0
NAUSEA: 0
SHORTNESS OF BREATH: 0
VOMITING: 0
ORTHOPNEA: 0
CLAUDICATION: 0
PND: 0
COUGH: 0
PALPITATIONS: 0

## 2018-06-24 ASSESSMENT — PAIN SCALES - GENERAL
PAINLEVEL_OUTOF10: 0

## 2018-06-24 NOTE — PROGRESS NOTES
Cardiology Progress Note               Author: Lalo Sánchez Date & Time created: 2018  1:37 PM     Admitted for PRE TAVR cath    History of chronic kidney disease stage III, feeling well up until 2 months ago , he began to experience progressive fatigue, dyspnea, lower extremity edema, echo showed moderate aortic stenosis with new dilated cardiomyopathy of 35%, dobutamine stress test on 18 confirmed low flow low gradient severe aortic stenosis with a V-max of 4.1 and mean gradient of 44 mmHg at peak stress, diabetes, hypertension, hyperlipidemia      Coronary angiography 18 showed multivessel CAD with high-grade mid LAD, high-grade ostial first and second diagonal branch, nonobstructive circumflex, nonobstructive RCA, elevated right heart pressures with  PA systolic pressure of 75 mmHg      Labs reviewed  Sodium, potassium stable  Creatinine 1.45 post cath, repeat BMP in a.m.  CBC stable        Blood pressure = 145/85  Heartrate = 80 normal sinus rhythm    Review of Systems   Respiratory: Negative for cough and shortness of breath.    Cardiovascular: Negative for chest pain, palpitations, orthopnea, claudication, leg swelling and PND.       Physical Exam   Constitutional: He is oriented to person, place, and time.   HENT:   Head: Normocephalic.   Eyes: Conjunctivae are normal.   Neck: No thyromegaly present.   Cardiovascular:   Murmur heard.   Systolic murmur is present   Pulses:       Carotid pulses are 2+ on the right side, and 2+ on the left side.       Radial pulses are 2+ on the right side, and 2+ on the left side.   Pulmonary/Chest: He has no wheezes.   Abdominal: Soft.   Musculoskeletal: He exhibits no edema.   Neurological: He is alert and oriented to person, place, and time.   Skin: Skin is warm and dry.       Hemodynamics:  Temp (24hrs), Av.2 °C (97.2 °F), Min:36.1 °C (96.9 °F), Max:36.5 °C (97.7 °F)  Temperature: 36.5 °C (97.7 °F)  Pulse  Av.3  Min: 73  Max: 106   Blood Pressure  : 145/85     Respiratory:    Respiration: 18, Pulse Oximetry: 97 %           Fluids:  Date 06/23/18 0700 - 06/24/18 0659   Shift 6878-8520 5561-6012 7535-0778 24 Hour Total   I  N  T  A  K  E   P.O. 360   360    Shift Total 360   360   O  U  T  P  U  T   Urine 850   850    Shift Total 850   850   Weight (kg) 89.4 89.4 89.4 89.4       Weight: 92.4 kg (203 lb 11.3 oz)  GI/Nutrition:  Orders Placed This Encounter   Procedures   • Diet Order Cardiac, Diabetic     Standing Status:   Standing     Number of Occurrences:   1     Order Specific Question:   Diet:     Answer:   Cardiac [6]     Order Specific Question:   Diet:     Answer:   Diabetic [3]     Lab Results:  Recent Labs      06/24/18   0750   WBC  8.7   RBC  3.91*   HEMOGLOBIN  11.5*   HEMATOCRIT  36.2*   MCV  92.6   MCH  29.4   MCHC  31.8*   RDW  43.8   PLATELETCT  341   MPV  9.9     Recent Labs      06/23/18   0138  06/24/18   0751   SODIUM  139  141   POTASSIUM  4.5  4.3   CHLORIDE  109  107   CO2  22  24   GLUCOSE  230*  76   BUN  28*  24*   CREATININE  1.57*  1.45*   CALCIUM  8.6  9.2     Recent Labs      06/22/18   1122   06/23/18   0139  06/23/18   0742  06/24/18   0751   APTT   --    < >  94.7*  80.4*  135.9*   INR  1.00   --    --    --    --     < > = values in this interval not displayed.     Recent Labs      06/24/18   0750   BNPBTYPENAT  1474*     Recent Labs      06/24/18   0750   BNPBTYPENAT  1474*             Medical Decision Making, by Problem:  Multivessel CAD  Severe aortic stenosis  Chronic kidney disease stage III  Ischemic cardiomyopathy, EF 35%  Hypertension  Hyperlipidemia  Diabetes      Assessment/Plan:     -NO rhythm issues overnight  -Dyspnea much improved  -overnight high BP, coreg was increased  -BNP 1400, continue with IV Lasix  -Cr improved, repeat in am     Multivessel CAD by recent cath 6/22/18  -Has high-grade LAD disease, in addition high-grade diagonal  -Plan for aortic valve replacement and CABG next week  -On IV  heparin      Severe aortic stenosis  -Low flow low gradient  -DST 6/22/18 showed  V-max 4, mean gradient 44 mmHg  -AVR next week      Mild bump in creatinine post cath  -Cr improved 1.45  -Repeat BMP in a.m.  -continue with  IV Lasix   -Decrease lisinopril 10 mg      Valvular heart failure  -Plan for SAVR      Cardiomyopathy, LVEF 35%  -Has LAD disease  -Plan for CABG and aortic valve replacement      Currently on aspirin 81, Lipitor 40, carvedilol 12,5 mg BID  lisinopril 10 mg      Diabetes 2  -start  insulin scale, medium    Quality-Core Measures

## 2018-06-24 NOTE — PROGRESS NOTES
Report received at bedside.  RN assumed care.  Chart reviewed.  Patient resting in bed. Updated plan of care. Patient verified on telemetry.  Call light in reach.  Bed in lowest position.  Non-slip socks on.  Patient alert and oriented x4.  Pain addressed.

## 2018-06-24 NOTE — PROGRESS NOTES
Received report from Vianney at change of shift. Pt sitting up in bed. No complaints of pain or sob. call light with reach.

## 2018-06-24 NOTE — PROGRESS NOTES
Patient is awake and is in bed.  Denies pain.  Call bell in reach. All needs addressed at this time.  Will continue to monitor.

## 2018-06-24 NOTE — PROGRESS NOTES
Patient is in bed and resting.  Denies pain.  Call bell in reach. All needs addressed at this time.  Will continue to monitor.

## 2018-06-24 NOTE — CARE PLAN
Problem: Infection  Goal: Will remain free from infection  Outcome: PROGRESSING AS EXPECTED  Standard precautions in place

## 2018-06-24 NOTE — PROGRESS NOTES
Cardiology Progress Note               Author: Tammi Martinez Date & Time created: 2018  8:17 AM     Interval History:  BP high at times but better, diuresing, tolerating hep gtt, 5 beats NSVT on tele  Still on heparin, no chest pain  I/O 2.3 L negative  Creat looking good  No chest pain, no palpitations.  Almost no more dyspnea and pedal edema- both better.    Data reviewed by me personally:  Current medications  Tele- NSR,  5 beats NSVT   ECG 18  sinus tach, 101, LVH with strain  Echo 18 EF 35% with WMA, Mod MR, mod AS, RVSP could not be calculated  Low dose  stress echo CW severe AS  RHC/LHC 18, severe 2 vessel disease, severe sec pulm HTN      Chief Complaint:  Severe AS, CMO    Review of Systems   Constitutional: Negative for chills and fever.   Gastrointestinal: Negative for nausea and vomiting.   Skin: Negative for itching and rash.       Physical Exam     General: No acute distress. Well nourished.  HEENT: EOM grossly intact, no scleral icterus, no pharyngeal erythema.   Neck:  + JVD, no bruits, trachea midline  CVS: RRR. 3/6 late peaking syst murmur, Trace LE edema.  2+ radial pulses, 2+ PT pulses  Resp: CTAB. No wheezing or crackles/rhonchi. Normal respiratory effort.  Abdomen: Soft, NT, no adolfo hepatomegaly.  MSK/Ext: No clubbing or cyanosis.  Skin: Warm and dry, no rashes.  Neurological: CN III-XII grossly intact. No focal deficits.   Psych: A&O x 3, appropriate affect, good judgement        Hemodynamics:  Temp (24hrs), Av.1 °C (97 °F), Min:36.1 °C (96.9 °F), Max:36.2 °C (97.2 °F)  Temperature: 36.1 °C (96.9 °F)  Pulse  Av.6  Min: 75  Max: 106   Blood Pressure : (!) 168/98 (manual)     Respiratory:    Respiration: 18, Pulse Oximetry: 96 %           Fluids:  Date 18 0700 - 18 0659   Shift 5192-6226 6049-3689 8674-3811 24 Hour Total   I  N  T  A  K  E   Shift Total       O  U  T  P  U  T   Urine 475   475    Shift Total 475   475   Weight (kg) 89.4 89.4 89.4  89.4       Weight: 92.4 kg (203 lb 11.3 oz)  GI/Nutrition:  Orders Placed This Encounter   Procedures   • Diet Order Cardiac, Diabetic     Standing Status:   Standing     Number of Occurrences:   1     Order Specific Question:   Diet:     Answer:   Cardiac [6]     Order Specific Question:   Diet:     Answer:   Diabetic [3]     Lab Results:      Recent Labs      06/23/18   0138   SODIUM  139   POTASSIUM  4.5   CHLORIDE  109   CO2  22   GLUCOSE  230*   BUN  28*   CREATININE  1.57*   CALCIUM  8.6     Recent Labs      06/22/18   1122  06/22/18   1932  06/23/18   0139  06/23/18   0742   APTT   --   93.2*  94.7*  80.4*   INR  1.00   --    --    --                      Medical Decision Making, by Problem:  There are no active hospital problems to display for this patient.      Plan:  1. Symptomatic severe AS, NHYC III-IV, low flow, low gradient  2. Severe CAD, LAD, Circ  3. Ischemic CMO, EF 35%, also due to AS  4. Severe secondary pulm HTN, PA syst pressure 75 mmHg.  5. CKD 3, creat up a bit  6. HTN, not controlled  7. DM2, Insulin sliding scale and lantus    -On heparin for high grade LAD lesion but I am going to stop this now.  No chest pain.  -Has been on IV lasix BID- will reduce to once daily  -Increase coreg  -Cont Lantus, ISS  -BMP/CBC pending, lowered dose of ACE-I to 10 mg temporarily while diuresing  - on ASA, statin and ACE, BB  -[facility] consult for  AV replacement and CABG   *Our service will contact Dr. Strong on Monday to ensure consult is passed off since all providers changing over*  -HENOK Galeano with [facility], they are aware  -Dr. Carpenter will see tomorrow      Quality-Core Measures   Reviewed items::  EKG reviewed, Labs reviewed, Medications reviewed and Radiology images reviewed

## 2018-06-25 ENCOUNTER — TELEPHONE (OUTPATIENT)
Dept: CARDIOLOGY | Facility: MEDICAL CENTER | Age: 68
End: 2018-06-25

## 2018-06-25 PROBLEM — I50.23 NYHA CLASS 3 ACUTE ON CHRONIC SYSTOLIC HEART FAILURE (HCC): Status: ACTIVE | Noted: 2018-06-25

## 2018-06-25 PROBLEM — I35.0 SEVERE AORTIC STENOSIS: Status: ACTIVE | Noted: 2018-06-25

## 2018-06-25 PROBLEM — I25.10 CAD (CORONARY ARTERY DISEASE): Status: ACTIVE | Noted: 2018-06-25

## 2018-06-25 PROBLEM — I50.9 ACUTE EXACERBATION OF CHF (CONGESTIVE HEART FAILURE) (HCC): Status: ACTIVE | Noted: 2018-06-25

## 2018-06-25 LAB
ALBUMIN SERPL BCP-MCNC: 3.1 G/DL (ref 3.2–4.9)
ALBUMIN/GLOB SERPL: 1 G/DL
ALP SERPL-CCNC: 96 U/L (ref 30–99)
ALT SERPL-CCNC: 30 U/L (ref 2–50)
ANION GAP SERPL CALC-SCNC: 10 MMOL/L (ref 0–11.9)
ANION GAP SERPL CALC-SCNC: 10 MMOL/L (ref 0–11.9)
AST SERPL-CCNC: 23 U/L (ref 12–45)
BILIRUB SERPL-MCNC: 0.3 MG/DL (ref 0.1–1.5)
BUN SERPL-MCNC: 30 MG/DL (ref 8–22)
BUN SERPL-MCNC: 30 MG/DL (ref 8–22)
CALCIUM SERPL-MCNC: 9.2 MG/DL (ref 8.5–10.5)
CALCIUM SERPL-MCNC: 9.2 MG/DL (ref 8.5–10.5)
CHLORIDE SERPL-SCNC: 105 MMOL/L (ref 96–112)
CHLORIDE SERPL-SCNC: 105 MMOL/L (ref 96–112)
CO2 SERPL-SCNC: 24 MMOL/L (ref 20–33)
CO2 SERPL-SCNC: 24 MMOL/L (ref 20–33)
CREAT SERPL-MCNC: 1.8 MG/DL (ref 0.5–1.4)
CREAT SERPL-MCNC: 1.8 MG/DL (ref 0.5–1.4)
GLOBULIN SER CALC-MCNC: 3.2 G/DL (ref 1.9–3.5)
GLUCOSE BLD-MCNC: 181 MG/DL (ref 65–99)
GLUCOSE BLD-MCNC: 187 MG/DL (ref 65–99)
GLUCOSE BLD-MCNC: 212 MG/DL (ref 65–99)
GLUCOSE BLD-MCNC: 290 MG/DL (ref 65–99)
GLUCOSE SERPL-MCNC: 96 MG/DL (ref 65–99)
GLUCOSE SERPL-MCNC: 96 MG/DL (ref 65–99)
POTASSIUM SERPL-SCNC: 4.4 MMOL/L (ref 3.6–5.5)
POTASSIUM SERPL-SCNC: 4.4 MMOL/L (ref 3.6–5.5)
PROT SERPL-MCNC: 6.3 G/DL (ref 6–8.2)
SODIUM SERPL-SCNC: 139 MMOL/L (ref 135–145)
SODIUM SERPL-SCNC: 139 MMOL/L (ref 135–145)

## 2018-06-25 PROCEDURE — 770020 HCHG ROOM/CARE - TELE (206)

## 2018-06-25 PROCEDURE — 82962 GLUCOSE BLOOD TEST: CPT

## 2018-06-25 PROCEDURE — 700102 HCHG RX REV CODE 250 W/ 637 OVERRIDE(OP): Performed by: NURSE PRACTITIONER

## 2018-06-25 PROCEDURE — A9270 NON-COVERED ITEM OR SERVICE: HCPCS | Performed by: NURSE PRACTITIONER

## 2018-06-25 PROCEDURE — 700102 HCHG RX REV CODE 250 W/ 637 OVERRIDE(OP): Performed by: INTERNAL MEDICINE

## 2018-06-25 PROCEDURE — A9270 NON-COVERED ITEM OR SERVICE: HCPCS | Performed by: INTERNAL MEDICINE

## 2018-06-25 PROCEDURE — 93005 ELECTROCARDIOGRAM TRACING: CPT | Performed by: NURSE PRACTITIONER

## 2018-06-25 PROCEDURE — 36415 COLL VENOUS BLD VENIPUNCTURE: CPT

## 2018-06-25 PROCEDURE — 80053 COMPREHEN METABOLIC PANEL: CPT

## 2018-06-25 PROCEDURE — 700105 HCHG RX REV CODE 258: Performed by: NURSE PRACTITIONER

## 2018-06-25 RX ORDER — SODIUM CHLORIDE 9 MG/ML
INJECTION, SOLUTION INTRAVENOUS CONTINUOUS
Status: DISCONTINUED | OUTPATIENT
Start: 2018-06-25 | End: 2018-06-27

## 2018-06-25 RX ADMIN — INSULIN GLARGINE 16 UNITS: 100 INJECTION, SOLUTION SUBCUTANEOUS at 21:05

## 2018-06-25 RX ADMIN — ASPIRIN 81 MG: 81 TABLET, COATED ORAL at 05:03

## 2018-06-25 RX ADMIN — INSULIN HUMAN 3 UNITS: 100 INJECTION, SOLUTION PARENTERAL at 21:04

## 2018-06-25 RX ADMIN — SODIUM CHLORIDE: 9 INJECTION, SOLUTION INTRAVENOUS at 21:00

## 2018-06-25 RX ADMIN — INSULIN HUMAN 2 UNITS: 100 INJECTION, SOLUTION PARENTERAL at 17:28

## 2018-06-25 RX ADMIN — CARVEDILOL 12.5 MG: 12.5 TABLET, FILM COATED ORAL at 17:26

## 2018-06-25 RX ADMIN — ATORVASTATIN CALCIUM 40 MG: 40 TABLET, FILM COATED ORAL at 21:05

## 2018-06-25 RX ADMIN — INSULIN HUMAN 5 UNITS: 100 INJECTION, SOLUTION PARENTERAL at 11:58

## 2018-06-25 RX ADMIN — CARVEDILOL 12.5 MG: 12.5 TABLET, FILM COATED ORAL at 08:56

## 2018-06-25 RX ADMIN — LISINOPRIL 10 MG: 10 TABLET ORAL at 05:03

## 2018-06-25 RX ADMIN — INSULIN HUMAN 2 UNITS: 100 INJECTION, SOLUTION PARENTERAL at 09:00

## 2018-06-25 ASSESSMENT — PAIN SCALES - GENERAL
PAINLEVEL_OUTOF10: 0

## 2018-06-25 ASSESSMENT — ENCOUNTER SYMPTOMS
MEMORY LOSS: 0
SENSORY CHANGE: 0
DIZZINESS: 0
MYALGIAS: 0
INSOMNIA: 0
DIAPHORESIS: 0
LOSS OF CONSCIOUSNESS: 0
DOUBLE VISION: 0
FALLS: 0
BRUISES/BLEEDS EASILY: 0
SHORTNESS OF BREATH: 1
DEPRESSION: 0
HEADACHES: 0
CHILLS: 0
FEVER: 0
PALPITATIONS: 0
BLURRED VISION: 0
COUGH: 0
ABDOMINAL PAIN: 0
ORTHOPNEA: 0
WEAKNESS: 1
PND: 0

## 2018-06-25 ASSESSMENT — PATIENT HEALTH QUESTIONNAIRE - PHQ9
SUM OF ALL RESPONSES TO PHQ9 QUESTIONS 1 AND 2: 0
2. FEELING DOWN, DEPRESSED, IRRITABLE, OR HOPELESS: NOT AT ALL
1. LITTLE INTEREST OR PLEASURE IN DOING THINGS: NOT AT ALL

## 2018-06-25 NOTE — CONSULTS
DATE OF SERVICE:  06/25/2018    REFERRING PHYSICIAN:  Markus Fuller MD    REASON FOR CONSULTATION:  Consideration for aortic valve replacement and   coronary artery bypass grafting.    CHIEF COMPLAINT:  Shortness of breath.    HISTORY OF PRESENT ILLNESS:  The patient is a very pleasant 68-year-old white   male with worsening shortness of breath and fatigue usually with exertion.    This has become progressively worse over the past 2-1/2 months.  He has also   noted some lower extremity edema.  The patient underwent cardiac   catheterization last Friday, which revealed a very tight left anterior   descending artery stenosis and 2 small diagonal branch stenoses.  There was   mild disease elsewhere.  Left ventriculography was not performed.    Echocardiography showed aortic stenosis with a peak and mean transvalvular   gradients of 32 and 21 mmHg respectively.  However, his left ventricular   ejection fraction was severely depressed approximately 30-35%.  He   subsequently underwent a dobutamine stress echocardiography, which confirmed   severe aortic stenosis (low-flow, low-gradient) with peak and mean   transvalvular gradients of 72 and 43 mmHg respectively.  His left ventricular   ejection fraction was approximately 30%.  The patient denies any chest pain,   nausea, vomiting, diaphoresis, fever, or syncope.    PAST MEDICAL HISTORY:  Significant for aortic stenosis, congestive heart   failure (NYHA class III), diabetes mellitus, dilated cardiomyopathy,   hypertension, dyslipidemia.    ALLERGIES:  TO SULFA DRUGS.    MEDICATIONS:  Lasix 20 mg p.o. daily, vitamin D 4000 units p.o. daily,   insulin, exenatide ER 2 mg every 7 days, lisinopril 40 mg p.o. daily,   atorvastatin 40 mg p.o. at bedtime, aspirin 81 mg p.o. daily.    FAMILY HISTORY:  Negative for premature coronary artery disease.  His father   had heart failure.    PSYCHOSOCIAL HISTORY:  The patient has a 22 pack-year history of smoking.  He   quit in 1989.   He has 1 glass of wine per week.    REVIEW OF SYSTEMS:  NEUROLOGIC:  There is no history of strokes.  RESPIRATORY:  Shortness of breath with exertion.  CARDIAC:  As per history of present illness.  All the rest of the systems were   reviewed with the patient and were negative.    PHYSICAL EXAMINATION:  GENERAL:  Well-developed, well-nourished 68-year-old white male, in no acute   distress.  He is alert and oriented x3.  VITAL SIGNS:  Blood pressure is 140/88, heart rate 94 and regular,   respirations 18.  Height 1.73 meters, weight 91.4 kilograms.  NECK:  Supple, without jugular venous distention.  There is no cervical   lymphadenopathy.  HEENT:  Normocephalic, atraumatic.  Extraocular muscles intact.  His nasal and   oral mucosa are pink and moist.  SKIN:  Normal.  RESPIRATORY:  Lungs are clear to auscultation bilaterally.  CARDIAC:  Regular rate and rhythm with a II/VI systolic ejection murmur.  ABDOMEN:  Soft, nondistended, nontender with bowel sounds present.  EXTREMITIES:  There is no clubbing, cyanosis, or edema.  VASCULAR:  There are good peripheral pulses bilaterally.  MUSCULOSKELETAL:  Normal range of motion.  NEUROLOGIC:  Grossly intact.    IMPRESSION:  Severe aortic stenosis (low-flow, low-gradient), coronary artery   disease, chronic left ventricular systolic and diastolic failure, dilated   cardiomyopathy, congestive heart failure (New York Heart Association class   III).    PLAN:  I do not recommend surgical aortic valve replacement and coronary   artery bypass grafting due to excessive operative risk.  In addition to the   aortic valve and coronary artery disease, the patient also has at least   moderate mitral regurgitation due to degenerative disease.  I estimate his   operative risk to be greater than 8%.  His STS mortality risk score is 5.5%,   and his morbidity and mortality risk score is 38.3%.  It would be preferable   if the patient could be treated with percutaneous interventions to the left    anterior descending artery followed by transcatheter aortic valve replacement.    Risks, benefits, potential complications, and alternative treatments were   discussed with the patient in detail including his risks should he decide not   to undergo my recommended treatment.  The patient understands his risks and is   willing to proceed.  The case has been discussed extensively with Dr. Fuller.    Thank you for this very challenging consultation and participation in the   patient's care.       ____________________________________     MD CORINA MANNING / CLARIBEL    DD:  06/25/2018 13:07:50  DT:  06/25/2018 13:38:06    D#:  [id number]  Job#:  [id number]    cc: ALFREDITO FULLER MD

## 2018-06-25 NOTE — PROGRESS NOTES
Cardiology Progress Note               Author: Markus Fuller Date & Time created: 2018  1:22 PM     Interval History.Chief Complaint:  68 year old male with severe aortic stenosis, acute systolic congestive heart failure, cardiomyopathy, coronary artery disease and chronic kidney disease.    Review of Systems   Constitutional: Positive for malaise/fatigue. Negative for chills and fever.   HENT: Negative for congestion.    Eyes: Negative for blurred vision.   Respiratory: Positive for shortness of breath.    Cardiovascular: Negative for chest pain, palpitations, orthopnea, leg swelling and PND.   Gastrointestinal: Negative for abdominal pain.   Genitourinary: Negative for dysuria.   Musculoskeletal: Negative for joint pain and myalgias.   Skin: Negative for rash.   Neurological: Positive for weakness. Negative for dizziness and loss of consciousness.   Psychiatric/Behavioral: The patient does not have insomnia.        Physical Exam   Constitutional: He is oriented to person, place, and time. He appears well-developed and well-nourished.   HENT:   Head: Normocephalic and atraumatic.   Eyes: Conjunctivae are normal. Pupils are equal, round, and reactive to light.   Neck: Normal range of motion. Neck supple.   Cardiovascular: Normal rate and regular rhythm.    Murmur heard.  Pulmonary/Chest: Effort normal and breath sounds normal.   Abdominal: Soft. Bowel sounds are normal.   Musculoskeletal: Normal range of motion. He exhibits no edema.   Neurological: He is alert and oriented to person, place, and time.   Skin: Skin is warm and dry.   Psychiatric: He has a normal mood and affect.       Hemodynamics:  Temp (24hrs), Av.6 °C (97.8 °F), Min:36.4 °C (97.6 °F), Max:36.8 °C (98.2 °F)  Temperature: 36.5 °C (97.7 °F)  Pulse  Av.8  Min: 73  Max: 106   Blood Pressure : 140/88     Respiratory:    Respiration: 17, Pulse Oximetry: 90 %           Fluids:     Weight: 91.4 kg (201 lb 8 oz)  GI/Nutrition:  Orders Placed  This Encounter   Procedures   • Diet Order Cardiac, Diabetic     Standing Status:   Standing     Number of Occurrences:   1     Order Specific Question:   Diet:     Answer:   Cardiac [6]     Order Specific Question:   Diet:     Answer:   Diabetic [3]     Lab Results:  Recent Labs      06/24/18   0750   WBC  8.7   RBC  3.91*   HEMOGLOBIN  11.5*   HEMATOCRIT  36.2*   MCV  92.6   MCH  29.4   MCHC  31.8*   RDW  43.8   PLATELETCT  341   MPV  9.9     Recent Labs      06/23/18   0138  06/24/18   0751  06/25/18   0223   SODIUM  139  141  139   POTASSIUM  4.5  4.3  4.4   CHLORIDE  109  107  105   CO2  22  24  24   GLUCOSE  230*  76  96   BUN  28*  24*  30*   CREATININE  1.57*  1.45*  1.80*   CALCIUM  8.6  9.2  9.2     Recent Labs      06/23/18   0742  06/24/18   0751  06/24/18   1636   APTT  80.4*  135.9*  60.9*     Recent Labs      06/24/18   0750   BNPBTYPENAT  1474*     Recent Labs      06/24/18   0750   BNPBTYPENAT  1474*         Medications reviewed.      Current Facility-Administered Medications:   •  lisinopril (PRINIVIL) 10 MG tablet 10 mg, 10 mg, Oral, DAILY, Tammi Martinez M.D., 10 mg at 06/25/18 0503  •  carvedilol (COREG) tablet 12.5 mg, 12.5 mg, Oral, BID WITH MEALS, Tammi Martinez M.D., 12.5 mg at 06/25/18 0856  •  furosemide (LASIX) injection 40 mg, 40 mg, Intravenous, Q DAY, Lalo Sánchez, A.P.N., 40 mg at 06/24/18 1354  •  insulin regular (HUMULIN R) injection 2-9 Units, 2-9 Units, Subcutaneous, 4X/DAY ACHS, 5 Units at 06/25/18 1158 **AND** Accu-Chek ACHS, , , Q AC AND BEDTIME(S) **AND** NOTIFY MD and PharmD, , , Once **AND** glucose 4 g chewable tablet 16 g, 16 g, Oral, Q15 MIN PRN **AND** dextrose 50% (D50W) injection 25 mL, 25 mL, Intravenous, Q15 MIN PRN, LUAN Rivas  •  ondansetron (ZOFRAN) syringe/vial injection 4 mg, 4 mg, Intravenous, Q4HRS PRN, Markus Fuller M.D.  •  dexamethasone (DECADRON) injection 4 mg, 4 mg, Intravenous, Once PRN, Markus Fuller M.D.  •  haloperidol  lactate (HALDOL) injection 1 mg, 1 mg, Intravenous, Q6HRS PRN, Markus Fuller M.D.  •  scopolamine (TRANSDERM-SCOP) patch 1 Patch, 1 Patch, Transdermal, Q72HRS PRN, Markus Fuller M.D.  •  aspirin EC (ECOTRIN) tablet 81 mg, 81 mg, Oral, DAILY, Markus Fuller M.D., 81 mg at 06/25/18 0503  •  atorvastatin (LIPITOR) tablet 40 mg, 40 mg, Oral, Nightly, [facility], A.P.N., 40 mg at 06/24/18 1848  •  insulin glargine (LANTUS) injection 16 Units, 16 Units, Subcutaneous, Q EVENING, [facility], A.P.N., 16 Units at 06/24/18 2030    CARDIAC STUDIES/PROCEDURES:    CARDIAC CATHETERIZATION CONCLUSIONS (06/02/18)  1.  Multivessel severe coronary artery disease with long, high-grade mid left   anterior descending artery stenosis, high-grade ostial first and second   diagonal branch stenosis, nonobstructive ostial circumflex artery stenosis and   nonobstructive right coronary artery stenosis.  2.  Elevated right heart pressures with PA systolic pressure of 75 mmHg.    CAROTID ULTRASOUND (06/22/18)  Mild (<50%) bilateral internal carotid disease  Normal vertebral and subclavianartery flow bilaterally.    DOBUTAMINE STRESS ECHOCARDIOGRAM (06/22/18)  Abnormal resting echo with mild dyskinesis of the LV apex and EF 30%. Resting aortic valve peak and mean gradients 36 / 20.  Stress images reveal increased contractility of basal to mid anterior and inferior walls, with more pronounced apical dyskinesis.  Peak / mean aortic valve gradients on 15 mcg dobutamine are 72 /43  Abnormal dobutamine stress echo demonstrating significant increase in aortic valve gradients with dobutamine infusion.  LV dysfunction at rest and stress. Worsening apical dyskinesis with stress suggesting ischemia.     ECHOCARDIOGRAM CONCLUSIONS (06/01/18)  Moderately reduced left ventricular systolic function.  Left ventricular ejection fraction is visually estimated to be 35%.  Global hypokinesis with regional variation with severe hypokinesis of the  anteroapical and septal walls.  Moderate aortic stenosis; severely of aortic stenosis   Aortic valve area calculated from the continuity equation is 0.82.   Vmax is 2.8 m/s. Transvalvular gradients are - Peak: 32 mmHg, Mean: 21 mmHg.  Unable to estimate pulmonary artery pressure due to an inadequate tricuspid regurgitant jet.  Moderate mitral regurgitation.  probably underestimated due to low ejection fraction.No prior study is available for comparison.      EKG performed on (06/21/18) was reviewed: EKG shows sinus rhythm.     Laboratory results of (06/25/18) were reviewed. Bun of 30 mg/dl, creatinine levels of 1.8 mg/dl noted.  Laboratory results of (06/18/18) Bun of 25 mg/dl, creatinine levels of 1.34 mg/dl noted.    Medical Decision Making, by Problem:  Active Hospital Problems    Diagnosis   • *Severe aortic stenosis [I35.0]   • Acute exacerbation of CHF (congestive heart failure) ([facility]) [I50.9]   • Dilated cardiomyopathy ([facility]) [I42.0]   • CAD (coronary artery disease) [I25.10]   • Stage 3 chronic kidney disease [N18.3]   • NYHA class 3 acute on chronic systolic heart failure ([facility]) [I50.23]       Plan:    1. Aortic stenosis: He is symptomatic with his aortic stenosis, NYHA class III-IV. He does not appear to be a good surgical candidate per Dr. Strong. We will plan for TAVR. He understands the risks and benefits and agrees with plan.  2. Acute on chronic systolic congestive heart failure with cardiomyopathy: The overall volume status is elevated. His furosemide has been held due to worsened renal function.  3. Coronary artery disease:  We will plan for percutaneous intervention with stent placement of his left anterior descending artery only. He understands the risks incruding renal failure and benefits and agrees with plan.  4. Chronic kidney disease stage III (managed by Dr. Kirkland): We will continue to follow labs.  5. 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      The risks, benefits, and alternatives to coronary angiography with IV sedation were discussed in great detail. Specific risks mentioned include bleeding, infection, kidney damage, allergic reaction, cardiac perforation with possible tamponade requiring kenneth-cardiocentesis or possible open heart surgery. In addition, we discussed that 10% of patients will experience small to moderate bruising at the side of the arterial puncture. Lastly the risks of heart attack, stroke, and death were discussed; the risks of major complications such as heart attack or stroke caused by the angiogram is less than 1%; the risk of death is approximately 1 in 1000. The patient verbalized understanding of these potential complications and wishes to proceed with this procedure.     The risks, benefits, and alternatives to TAVR, general anesthesia and transesophageal echocardiogram were discussed in great detail. Specific risks mentioned include bleeding, infection, kidney damage, allergic reaction, cardiac perforation with possible tamponade requiring kenneth-cardiocentesis or possible open heart surgery if the patient is a candidate. Lastly the risks of heart attack, stroke, and death were discussed; the risks of major complications includingall cause mortality of 2.2%, disabling stroke of 1.1%, the risk on new pacemaker of 12% and the risk of vascular complications of 4.1%. The patient verbalized understanding of these potential complications and wishes to proceed with this procedure. ([facility]).  The procedure will be performed completely in collaboration with cardiac surgery.    CC Dr. Nixon Claros MD    Quality-Core Measures

## 2018-06-25 NOTE — PROGRESS NOTES
Cardiology Progress Note               Author: Vincenzo To Date & Time created: 2018  10:44 AM     Interval History:  No telemetry events.  No acute events overnight.  Feeling a little better.    Chief Complaint:  Dyspnea.    Review of Systems   Constitutional: Negative for diaphoresis and fever.   HENT: Negative for nosebleeds.    Eyes: Negative for blurred vision and double vision.   Respiratory: Positive for shortness of breath. Negative for cough.    Cardiovascular: Negative for chest pain and palpitations.   Gastrointestinal: Negative for abdominal pain.   Genitourinary: Negative for dysuria and frequency.   Musculoskeletal: Negative for falls and myalgias.   Skin: Negative for rash.   Neurological: Negative for dizziness, sensory change and headaches.   Endo/Heme/Allergies: Does not bruise/bleed easily.   Psychiatric/Behavioral: Negative for depression and memory loss.       Physical Exam   Constitutional: He is oriented to person, place, and time. No distress.   HENT:   Head: Atraumatic.   Eyes: EOM are normal. Right eye exhibits no discharge. Left eye exhibits no discharge.   Neck: Neck supple. No JVD present.   Cardiovascular: Normal rate and regular rhythm.    Murmur heard.  there is 2-3/6 systolic murmur heard best at the right border of the aortic valve area. The murmur does not radiate to the carotids.     Pulmonary/Chest: No respiratory distress. He has no rales.   Abdominal: There is no rebound and no guarding.   Musculoskeletal: He exhibits no edema or tenderness.   Neurological: He is alert and oriented to person, place, and time.   Skin: Skin is warm and dry.   Psychiatric: He has a normal mood and affect.   Nursing note and vitals reviewed.      Hemodynamics:  Temp (24hrs), Av.6 °C (97.8 °F), Min:36.4 °C (97.6 °F), Max:36.8 °C (98.2 °F)  Temperature: 36.6 °C (97.8 °F)  Pulse  Av.7  Min: 73  Max: 106   Blood Pressure : 140/92     Respiratory:    Respiration: 19, Pulse Oximetry:  92 %           Fluids:     Weight: 91.4 kg (201 lb 8 oz)  GI/Nutrition:  Orders Placed This Encounter   Procedures   • Diet Order Cardiac, Diabetic     Standing Status:   Standing     Number of Occurrences:   1     Order Specific Question:   Diet:     Answer:   Cardiac [6]     Order Specific Question:   Diet:     Answer:   Diabetic [3]     Lab Results:  Recent Labs      06/24/18   0750   WBC  8.7   RBC  3.91*   HEMOGLOBIN  11.5*   HEMATOCRIT  36.2*   MCV  92.6   MCH  29.4   MCHC  31.8*   RDW  43.8   PLATELETCT  341   MPV  9.9     Recent Labs      06/23/18   0138  06/24/18   0751  06/25/18   0223   SODIUM  139  141  139   POTASSIUM  4.5  4.3  4.4   CHLORIDE  109  107  105   CO2  22  24  24   GLUCOSE  230*  76  96   BUN  28*  24*  30*   CREATININE  1.57*  1.45*  1.80*   CALCIUM  8.6  9.2  9.2     Recent Labs      06/22/18   1122   06/23/18   0742  06/24/18   0751  06/24/18   1636   APTT   --    < >  80.4*  135.9*  60.9*   INR  1.00   --    --    --    --     < > = values in this interval not displayed.     Recent Labs      06/24/18   0750   BNPBTYPENAT  1474*     Recent Labs      06/24/18   0750   BNPBTYPENAT  1474*             Medical Decision Making, by Problem:  Severe AS (symptomatic)  CAD   Stage 3 CKD.  HTN  Heart failure exacerbation   Stage C systolic HF LVEF of 35%  NYHA class 3      Plan:    Awaiting evaluation from CTS.  In the meantime, will optimize medical therapy.  Coreg 12.5 mg bid, Lisinopril 10 mg po daily.  Lasix 40 mg IV daily.  Optimize electrolytes to keep Mg above 2.5 and Potassium above 4.5    Thank you for referring this patient to our cardiology service.  We will follow patient with you.    Vincenzo Carpenter MD.  [facility].      Quality-Core Measures

## 2018-06-25 NOTE — PROGRESS NOTES
Patient is A&0 X4 sitting up in a chair at this time. Currently awaiting decision from MD concerning surgical plan. Call bell within reach, all needs are currently met, will continue to monitor.

## 2018-06-25 NOTE — PROGRESS NOTES
Patient is currently up in a chair, no complaints of pain at this time. Call light in reach, all needs are currently met, will continue to monitor.

## 2018-06-25 NOTE — CARE PLAN
Problem: Safety  Goal: Will remain free from injury  Outcome: PROGRESSING AS EXPECTED  Pt A&O x 4, up self, calling appropriately.    Problem: Venous Thromboembolism (VTW)/Deep Vein Thrombosis (DVT) Prevention:  Goal: Patient will participate in Venous Thrombosis (VTE)/Deep Vein Thrombosis (DVT)Prevention Measures  Outcome: PROGRESSING AS EXPECTED  Per MD and orders, pt not indicated for mechanical or pharmacological DVT prophylaxis. Up self ambulating.

## 2018-06-25 NOTE — PROGRESS NOTES
Consent for cardiac cath and angiography signed and placed in chart at this time. Discussed with pt and pt to be NPO at 0000.

## 2018-06-25 NOTE — TELEPHONE ENCOUNTER
----- Message from ANG Ellington sent at 2018  4:23 PM PDT -----  Regarding: VALVE NEW  VALVE NEW    Name: Ashish Wiley  : [age] y/o M  MR: [id number]  Referring MD: Venus Claros MD  Inpatient/Outpatient Referral: outpatient  Referral to Valve Program done: yes  Referral to [facility] done: yes, completed today  Echo Done: yes, needs DSE  Echo Reading: EF 35%, Vmax 2.8 m/s, Peak 31 mmHg, mean 19 mmHg  Symptoms of AS: [place], fatigue, heart failure symptoms  Cardiology Consult: yes, Ichino  Angiogram: tomorrow 18 with Ichino  Need to schedule angiogram: scheduled today in office, needs IV hydration pre and post cath  Pertinent Hx: CKD, DM, anemia, aorta dilation, HTN, HLD, and rEF of 35%  Allergy to metal or contrast: sulfa  Cr level (within 30 days): 1.34  Oral/IV hydration needed prior to CTA/cath: YES NEEDS, alerted cath lab of needing IV hydration  Social Concerns: unknown at this time  Tentative Plan: TAVR v. SAVR (young but rEF)  STS: 4.2% (I will have to fax over the STS print out), CCI score of 6/2% survival at 10 years      PATIENT CLASS: TAVR v. SAVR  [facility] CONSULT WARRANTED AT THIS TIME: yes, please schedule on SLOT C on 7/3 and     PER YOLI, this patient is spiraling and needs to be seen and worked up soon.

## 2018-06-25 NOTE — PROGRESS NOTES
Received report from Vianney at change of shift. Pt laying in bed. No complaints of pain or sob. Call light within reach.

## 2018-06-26 ENCOUNTER — APPOINTMENT (OUTPATIENT)
Dept: RADIOLOGY | Facility: MEDICAL CENTER | Age: 68
DRG: 250 | End: 2018-06-26
Attending: NURSE PRACTITIONER
Payer: COMMERCIAL

## 2018-06-26 LAB
ALBUMIN SERPL BCP-MCNC: 3.4 G/DL (ref 3.2–4.9)
ALBUMIN/GLOB SERPL: 1.1 G/DL
ALP SERPL-CCNC: 115 U/L (ref 30–99)
ALT SERPL-CCNC: 42 U/L (ref 2–50)
ANION GAP SERPL CALC-SCNC: 8 MMOL/L (ref 0–11.9)
AST SERPL-CCNC: 27 U/L (ref 12–45)
BILIRUB SERPL-MCNC: 0.4 MG/DL (ref 0.1–1.5)
BUN SERPL-MCNC: 31 MG/DL (ref 8–22)
CALCIUM SERPL-MCNC: 9.3 MG/DL (ref 8.5–10.5)
CHLORIDE SERPL-SCNC: 108 MMOL/L (ref 96–112)
CO2 SERPL-SCNC: 23 MMOL/L (ref 20–33)
CREAT SERPL-MCNC: 1.63 MG/DL (ref 0.5–1.4)
EKG IMPRESSION: NORMAL
GLOBULIN SER CALC-MCNC: 3.1 G/DL (ref 1.9–3.5)
GLUCOSE BLD-MCNC: 107 MG/DL (ref 65–99)
GLUCOSE BLD-MCNC: 129 MG/DL (ref 65–99)
GLUCOSE BLD-MCNC: 150 MG/DL (ref 65–99)
GLUCOSE BLD-MCNC: 211 MG/DL (ref 65–99)
GLUCOSE SERPL-MCNC: 117 MG/DL (ref 65–99)
POTASSIUM SERPL-SCNC: 4.5 MMOL/L (ref 3.6–5.5)
PROT SERPL-MCNC: 6.5 G/DL (ref 6–8.2)
SODIUM SERPL-SCNC: 139 MMOL/L (ref 135–145)

## 2018-06-26 PROCEDURE — C1769 GUIDE WIRE: HCPCS

## 2018-06-26 PROCEDURE — B2101ZZ FLUOROSCOPY OF SINGLE CORONARY ARTERY USING LOW OSMOLAR CONTRAST: ICD-10-PCS | Performed by: INTERNAL MEDICINE

## 2018-06-26 PROCEDURE — 99152 MOD SED SAME PHYS/QHP 5/>YRS: CPT

## 2018-06-26 PROCEDURE — A9270 NON-COVERED ITEM OR SERVICE: HCPCS | Performed by: NURSE PRACTITIONER

## 2018-06-26 PROCEDURE — 700102 HCHG RX REV CODE 250 W/ 637 OVERRIDE(OP): Performed by: NURSE PRACTITIONER

## 2018-06-26 PROCEDURE — 93010 ELECTROCARDIOGRAM REPORT: CPT | Performed by: INTERNAL MEDICINE

## 2018-06-26 PROCEDURE — 92920 PRQ TRLUML C ANGIOP 1ART&/BR: CPT | Mod: LD

## 2018-06-26 PROCEDURE — 93454 CORONARY ARTERY ANGIO S&I: CPT

## 2018-06-26 PROCEDURE — 304952 HCHG R 2 PADS

## 2018-06-26 PROCEDURE — C1725 CATH, TRANSLUMIN NON-LASER: HCPCS

## 2018-06-26 PROCEDURE — C1894 INTRO/SHEATH, NON-LASER: HCPCS

## 2018-06-26 PROCEDURE — A9270 NON-COVERED ITEM OR SERVICE: HCPCS

## 2018-06-26 PROCEDURE — 700102 HCHG RX REV CODE 250 W/ 637 OVERRIDE(OP)

## 2018-06-26 PROCEDURE — C1887 CATHETER, GUIDING: HCPCS

## 2018-06-26 PROCEDURE — 99153 MOD SED SAME PHYS/QHP EA: CPT

## 2018-06-26 PROCEDURE — 700102 HCHG RX REV CODE 250 W/ 637 OVERRIDE(OP): Performed by: INTERNAL MEDICINE

## 2018-06-26 PROCEDURE — 307093 HCHG TR BAND RADIAL

## 2018-06-26 PROCEDURE — 82962 GLUCOSE BLOOD TEST: CPT | Mod: 91

## 2018-06-26 PROCEDURE — A9270 NON-COVERED ITEM OR SERVICE: HCPCS | Performed by: INTERNAL MEDICINE

## 2018-06-26 PROCEDURE — 700111 HCHG RX REV CODE 636 W/ 250 OVERRIDE (IP)

## 2018-06-26 PROCEDURE — 700117 HCHG RX CONTRAST REV CODE 255: Performed by: INTERNAL MEDICINE

## 2018-06-26 PROCEDURE — 770020 HCHG ROOM/CARE - TELE (206)

## 2018-06-26 PROCEDURE — 80053 COMPREHEN METABOLIC PANEL: CPT

## 2018-06-26 PROCEDURE — 93005 ELECTROCARDIOGRAM TRACING: CPT | Performed by: INTERNAL MEDICINE

## 2018-06-26 PROCEDURE — 027035Z DILATION OF CORONARY ARTERY, ONE ARTERY WITH TWO DRUG-ELUTING INTRALUMINAL DEVICES, PERCUTANEOUS APPROACH: ICD-10-PCS | Performed by: INTERNAL MEDICINE

## 2018-06-26 PROCEDURE — 36415 COLL VENOUS BLD VENIPUNCTURE: CPT

## 2018-06-26 RX ORDER — DEXAMETHASONE SODIUM PHOSPHATE 4 MG/ML
4 INJECTION, SOLUTION INTRA-ARTICULAR; INTRALESIONAL; INTRAMUSCULAR; INTRAVENOUS; SOFT TISSUE
Status: DISCONTINUED | OUTPATIENT
Start: 2018-06-26 | End: 2018-06-29 | Stop reason: HOSPADM

## 2018-06-26 RX ORDER — BIVALIRUDIN 250 MG/5ML
INJECTION, POWDER, LYOPHILIZED, FOR SOLUTION INTRAVENOUS
Status: COMPLETED
Start: 2018-06-26 | End: 2018-06-26

## 2018-06-26 RX ORDER — DIPHENHYDRAMINE HYDROCHLORIDE 50 MG/ML
25 INJECTION INTRAMUSCULAR; INTRAVENOUS EVERY 6 HOURS PRN
Status: DISCONTINUED | OUTPATIENT
Start: 2018-06-26 | End: 2018-06-29 | Stop reason: HOSPADM

## 2018-06-26 RX ORDER — SODIUM CHLORIDE 9 MG/ML
INJECTION, SOLUTION INTRAVENOUS CONTINUOUS
Status: ACTIVE | OUTPATIENT
Start: 2018-06-26 | End: 2018-06-26

## 2018-06-26 RX ORDER — CLOPIDOGREL 300 MG/1
TABLET, FILM COATED ORAL
Status: COMPLETED
Start: 2018-06-26 | End: 2018-06-26

## 2018-06-26 RX ORDER — HEPARIN SODIUM,PORCINE 1000/ML
VIAL (ML) INJECTION
Status: COMPLETED
Start: 2018-06-26 | End: 2018-06-26

## 2018-06-26 RX ORDER — MIDAZOLAM HYDROCHLORIDE 1 MG/ML
INJECTION INTRAMUSCULAR; INTRAVENOUS
Status: COMPLETED
Start: 2018-06-26 | End: 2018-06-26

## 2018-06-26 RX ORDER — SCOLOPAMINE TRANSDERMAL SYSTEM 1 MG/1
1 PATCH, EXTENDED RELEASE TRANSDERMAL
Status: DISCONTINUED | OUTPATIENT
Start: 2018-06-26 | End: 2018-06-29 | Stop reason: HOSPADM

## 2018-06-26 RX ORDER — HALOPERIDOL 5 MG/ML
1 INJECTION INTRAMUSCULAR EVERY 6 HOURS PRN
Status: DISCONTINUED | OUTPATIENT
Start: 2018-06-26 | End: 2018-06-29 | Stop reason: HOSPADM

## 2018-06-26 RX ORDER — ONDANSETRON 2 MG/ML
4 INJECTION INTRAMUSCULAR; INTRAVENOUS EVERY 4 HOURS PRN
Status: DISCONTINUED | OUTPATIENT
Start: 2018-06-26 | End: 2018-06-29 | Stop reason: HOSPADM

## 2018-06-26 RX ORDER — CLOPIDOGREL BISULFATE 75 MG/1
75 TABLET ORAL DAILY
Status: DISCONTINUED | OUTPATIENT
Start: 2018-06-27 | End: 2018-06-29 | Stop reason: HOSPADM

## 2018-06-26 RX ADMIN — FENTANYL CITRATE 100 MCG: 50 INJECTION, SOLUTION INTRAMUSCULAR; INTRAVENOUS at 09:27

## 2018-06-26 RX ADMIN — NITROGLYCERIN 10 ML: 20 INJECTION INTRAVENOUS at 08:43

## 2018-06-26 RX ADMIN — BIVALIRUDIN 250 MG: 250 INJECTION, POWDER, LYOPHILIZED, FOR SOLUTION INTRAVENOUS at 09:27

## 2018-06-26 RX ADMIN — INSULIN HUMAN 3 UNITS: 100 INJECTION, SOLUTION PARENTERAL at 20:07

## 2018-06-26 RX ADMIN — CARVEDILOL 12.5 MG: 12.5 TABLET, FILM COATED ORAL at 17:00

## 2018-06-26 RX ADMIN — BIVALIRUDIN 250 MG: 250 INJECTION, POWDER, LYOPHILIZED, FOR SOLUTION INTRAVENOUS at 08:44

## 2018-06-26 RX ADMIN — LISINOPRIL 10 MG: 10 TABLET ORAL at 05:05

## 2018-06-26 RX ADMIN — CLOPIDOGREL BISULFATE 300 MG: 300 TABLET, FILM COATED ORAL at 09:48

## 2018-06-26 RX ADMIN — ATORVASTATIN CALCIUM 40 MG: 40 TABLET, FILM COATED ORAL at 20:08

## 2018-06-26 RX ADMIN — CARVEDILOL 12.5 MG: 12.5 TABLET, FILM COATED ORAL at 07:42

## 2018-06-26 RX ADMIN — MIDAZOLAM 2 MG: 1 INJECTION INTRAMUSCULAR; INTRAVENOUS at 09:27

## 2018-06-26 RX ADMIN — INSULIN GLARGINE 16 UNITS: 100 INJECTION, SOLUTION SUBCUTANEOUS at 20:06

## 2018-06-26 RX ADMIN — IOHEXOL 40 ML: 350 INJECTION, SOLUTION INTRAVENOUS at 10:00

## 2018-06-26 RX ADMIN — HEPARIN SODIUM 2000 UNITS: 200 INJECTION, SOLUTION INTRAVENOUS at 08:43

## 2018-06-26 RX ADMIN — HEPARIN SODIUM: 1000 INJECTION, SOLUTION INTRAVENOUS; SUBCUTANEOUS at 08:43

## 2018-06-26 RX ADMIN — ASPIRIN 81 MG: 81 TABLET, COATED ORAL at 05:05

## 2018-06-26 ASSESSMENT — PAIN SCALES - GENERAL
PAINLEVEL_OUTOF10: 0

## 2018-06-26 NOTE — PROGRESS NOTES
Pt back from cath lab. R radial hemoband in place. No drainage or swelling at this time. Pt denies pain, denies numbness or tingling. Post op vitals in place. Angiomax running from cath lab at this time. Discussed with cath lab RN and MD to place appropriate orders. Pt sitting up in bed eating breakfast at this time and reminded to limit his R wrist movement.

## 2018-06-26 NOTE — CARE PLAN
Problem: Safety  Goal: Will remain free from injury  Outcome: PROGRESSING AS EXPECTED  Pt A&O x 4, up self steady on feet.    Problem: Infection  Goal: Will remain free from infection  Outcome: PROGRESSING AS EXPECTED  No new s/s of infection at this time.

## 2018-06-26 NOTE — TELEPHONE ENCOUNTER
Message     The referral to Cardiothoracic Surgery will be sent to [facility]. The contact number is [phone].

## 2018-06-26 NOTE — PROGRESS NOTES
Pt A&O x 4, up self, steady on feet. Pt denies pain, denies numbness and tingling. NPO since 0000 for cath lab today. Pt's wife at bedside. Plan of care discussed and pt resting at this time.

## 2018-06-26 NOTE — HEART FAILURE PROGRAM
Cardiovascular Nurse Navigator () Advanced Heart Failure Program Inpatient Progress Note:    Discussed patient with Xiomara PALMER. Patient likely to DC tomorrow and has a follow up appointment at the Valve Program on 7/5. This would be one day too late for acute HF guidelines.    Desi will find and schedule a sooner appointment for the patient.    Please do not discharge patient without a seven day follow up appointment.     Thank you and please call with questions, Deanna    Jun 28, 2018 11:20 AM PDT  Valve Established Patient with Markus Fuller M.D.  [facility] (--) [address]  [place] [address]  [phone]

## 2018-06-26 NOTE — PROGRESS NOTES
Air removed from radial band and gauze with tegaderm placed, CDI at this time. Pt given IS and encouraging use, O2 dropping to 89 on RA, currently 93% on 1L. Pt pulling 1500 on IS. Continuing to monitor. Pt denies any pain.

## 2018-06-26 NOTE — PROCEDURES
DATE OF SERVICE:  06/26/2018    REFERRING PHYSICIAN:  Venus Claros MD and Melissa Strong MD    PROCEDURE:  1.  Coronary angiography.  2.  PTCA of the mid left anterior descending artery.  3.  Unsuccessful further percutaneous intervention or stent placement.    PREROCEDURE DIAGNOSIS:  Known coronary artery disease including high-grade   proximal and mid left anterior descending artery stenosis.    POSTPROCEDURE DIAGNOSIS:  Percutaneous transluminal coronary angioplasty of   the mid left anterior descending artery stenosis with 1.5x15 mm    balloon; however, unsuccessful attempts to balloon with any additional larger   size equipment.    INDICATION:  The patient is a 68-year-old male with past medical history   significant for severe symptomatic aortic stenosis, NYHA class IIIB/4, acute   systolic congestive heart failure with ejection fraction of 35% and stage III   chronic kidney disease.    He underwent cardiac catheterization recently, which showed high-grade   proximal and mid left anterior descending artery as described above.  He was   evaluated by Dr. Strong and was not found to be a surgical candidate with   STS score greater than 8.  He was scheduled for percutaneous intervention.    DESCRIPTION OF PROCEDURE:  After informed consent was signed by the patient,   the patient was brought to the cardiac catheterization laboratory.  He was   prepped and draped in the usual sterile manner.  The right wrist area was   anesthetized with 2% Xylocaine.  A 6-Greenlandic sheath was inserted into the right   radial artery using the modified Seldinger technique.  Intra-arterial   verapamil and nitroglycerin were given.  IV heparin was given.  An EBU 3.5   guide catheter was positioned into the left main coronary artery.  Coronary   angiography was performed.  IV Angiomax was started.  A Prowater wire was used   to cross the identified stenosis.  Attempts to advance a 1.5  was   unsuccessful.  Attempts  were made to position ____ wires including Whisper and    150 wire were unsuccessful.  A Guidezilla was positioned into place.  A   1.5x15 mm  balloon was used to predilate the identified stenosis up   to 20 atmospheres.  With a Guidezilla, attempts were made to position a 1.5x8   mm Emerge balloon, however, but this was unsuccessful.  The Guidezilla was   removed.  A second Prowater wire was used to cross the identified stenosis.    Again, attempts were made to position a 1.5x8 mm Emerge and 2.0x8 mm Emerge   balloons were unsuccessful.  The case was discussed with Dr. Stanley.    The patient tolerated the procedure well.  At the end of the procedure, all   wires, balloons, guide, and sheaths were removed.  Hemoband was placed on the   right wrist.  He was given oral Plavix and transferred to telemetry in stable   condition.    HEMODYNAMIC DATA:  Hemodynamic data shows aortic pressures of 140/80 with mean   of 100 mmHg.    AORTIC VALVE:  Aortic valve was not crossed.    LEFT VENTRICULOGRAM:  Left ventriculogram was not performed.    ANGIOGRAM:  For full complete angiogram results, please refer to my prior   cardiac catheterization.  However, there is a high-grade heavily calcified   proximal left anterior descending artery stenosis as well as high-grade   concentric 99 grade stenosis again heavily calcified.  From the proximal   stenosis, there are very small caliber diagonal branches noted.    PERCUTANEOUS INTERVENTION:  PTCA with 1.2x12 mm  balloon unsuccessful   to cross with 1.5x8 mm and 2.0x8 mm Emerge balloons.    IMPRESSION:  Percutaneous transluminal coronary angioplasty of the mid left   anterior descending artery stenosis with 1.5x15 mm  balloon; however,   unsuccessful attempts to balloon with any additional larger size equipment.    RECOMMENDATIONS:  Recommend to proceed with  type procedure with Dr. Stanley with possible use of laser therapy.        ____________________________________     MD JASE KIRBY    DD:  06/26/2018 10:00:16  DT:  06/26/2018 11:03:53    D#:  [id number]  Job#:  [id number]

## 2018-06-26 NOTE — CARE PLAN
Problem: Communication  Goal: The ability to communicate needs accurately and effectively will improve  Outcome: PROGRESSING AS EXPECTED  Patient communicates needs accurately and effectively.    Problem: Safety  Goal: Will remain free from falls  Outcome: PROGRESSING AS EXPECTED  Patient is up independently with steady gait.

## 2018-06-26 NOTE — CONSULTS
DATE OF SERVICE:  06/26/2018    CARDIOVASCULAR SURGERY CONSULT, SECOND OPINION.    REFERRING PHYSICIAN:  Dr. Markus Fuller.    REASON FOR CONSULTATION:  Evaluation of aortic stenosis, SAVR versus TAVR.    CHIEF COMPLAINT:  Shortness of breath and lower extremity edema.    HISTORY OF PRESENT ILLNESS:  The patient is a 68-year-old white male who   presented to Dr. Fuller with worsening shortness of breath and fatigue   associated with exertion.  It had occurred in 2-1/2 months prior to this   admission, also noted lower extremity edema.  The patient was admitted,   underwent heart catheterization and angiogram, which revealed a tight disease   of the left anterior descending and 2 small diagonal branch stenosis.  Mild   disease in the left system of the circumflex and right coronary artery.  An   echocardiogram was performed which ultimately revealed severely depressed   ejection fraction of 30-35%, underwent a dopamine stress echocardiography,   which showed severe aortic stenosis, and low flow, low gradient with a peak   transvalvular gradient of 72 and a mean of 43.  The ejection fraction was   thought to be approximately 30%  Presently, the patient denies any chest pain,   nausea, vomiting, diaphoresis, fever, or syncope.    PAST MEDICAL HISTORY:  Significant for congestive heart failure, diabetes,   dilated cardiomyopathy, aortic stenosis, hypertension, and dyslipidemia.    ALLERGIES:  TO SULFA DRUGS.    MEDICATIONS:  Patient is presently on Lasix, lisinopril, atorvastatin,   exenatide, and aspirin.    FAMILY HISTORY:  Is negative for premature coronary artery disease.    PSYCHOSOCIAL HISTORY:  The patient has a 22-pack-year history of smoking.  He   quit in 1989.  Moderate alcohol consumption.    REVIEW OF SYSTEMS:  NEUROLOGIC:  No history of strokes or TIAs.  RESPIRATORY:  Shortness of breath.  CARDIAC:  As noted above.    PHYSICAL EXAMINATION:  GENERAL:  Well-developed, well-nourished, 68-year-old male, in no  acute   distress.  VITAL SIGNS:  Blood pressure 140/88, heart rate 94.  NECK:  No jugular venous distention, no cervical adenopathy.  HEENT:  Extraocular muscles intact.  Mucosa is pink and moist.  SKIN:  Normal.  RESPIRATORY:  Lungs are clear.  Auscultatory II/VI systolic ejection murmur.  ABDOMEN:  No masses.  Positive bowel sounds.  EXTREMITIES:  There is no clubbing, cyanosis or edema.  MUSCULOSKELETAL:  Normal range of motion.  NEUROLOGIC:  ____ grossly intact.    IMPRESSION:  Severe aortic stenosis, symptomatic, with coronary artery   disease.    PLAN:  After reviewing the patient's chart and discussing with the patient and   discussing it with Dr. Strong.  I agreed with Dr. Strong that the patient   has a high operative mortality of STS risk score of 5.5 and morbidity   mortality risk score of 38.3; therefore, I agree with Dr. Strong.  The   patient should be considered for stenting of the left anterior descending and   TAVR.       ____________________________________     MD ALEKSANDAR CRESPO / CLARIBEL    DD:  06/26/2018 09:40:28  DT:  06/26/2018 10:25:59    D#:  [id number]  Job#:  [id number]

## 2018-06-27 ENCOUNTER — APPOINTMENT (OUTPATIENT)
Dept: RADIOLOGY | Facility: MEDICAL CENTER | Age: 68
DRG: 250 | End: 2018-06-27
Attending: NURSE PRACTITIONER
Payer: COMMERCIAL

## 2018-06-27 DIAGNOSIS — Z00.6 EXAMINATION OF PARTICIPANT IN CLINICAL TRIAL: ICD-10-CM

## 2018-06-27 DIAGNOSIS — I35.0 SEVERE AORTIC STENOSIS: ICD-10-CM

## 2018-06-27 LAB
ALBUMIN SERPL BCP-MCNC: 3.2 G/DL (ref 3.2–4.9)
ALBUMIN/GLOB SERPL: 1 G/DL
ALP SERPL-CCNC: 114 U/L (ref 30–99)
ALT SERPL-CCNC: 47 U/L (ref 2–50)
ANION GAP SERPL CALC-SCNC: 8 MMOL/L (ref 0–11.9)
ANION GAP SERPL CALC-SCNC: 8 MMOL/L (ref 0–11.9)
APTT PPP: 41.7 SEC (ref 24.7–36)
AST SERPL-CCNC: 38 U/L (ref 12–45)
BILIRUB SERPL-MCNC: 0.4 MG/DL (ref 0.1–1.5)
BUN SERPL-MCNC: 33 MG/DL (ref 8–22)
BUN SERPL-MCNC: 34 MG/DL (ref 8–22)
CALCIUM SERPL-MCNC: 8.7 MG/DL (ref 8.5–10.5)
CALCIUM SERPL-MCNC: 8.9 MG/DL (ref 8.5–10.5)
CHLORIDE SERPL-SCNC: 107 MMOL/L (ref 96–112)
CHLORIDE SERPL-SCNC: 108 MMOL/L (ref 96–112)
CO2 SERPL-SCNC: 22 MMOL/L (ref 20–33)
CO2 SERPL-SCNC: 23 MMOL/L (ref 20–33)
CREAT SERPL-MCNC: 1.69 MG/DL (ref 0.5–1.4)
CREAT SERPL-MCNC: 1.84 MG/DL (ref 0.5–1.4)
EKG IMPRESSION: NORMAL
ERYTHROCYTE [DISTWIDTH] IN BLOOD BY AUTOMATED COUNT: 45.8 FL (ref 35.9–50)
ERYTHROCYTE [DISTWIDTH] IN BLOOD BY AUTOMATED COUNT: 46.8 FL (ref 35.9–50)
GLOBULIN SER CALC-MCNC: 3.1 G/DL (ref 1.9–3.5)
GLUCOSE BLD-MCNC: 101 MG/DL (ref 65–99)
GLUCOSE BLD-MCNC: 186 MG/DL (ref 65–99)
GLUCOSE BLD-MCNC: 206 MG/DL (ref 65–99)
GLUCOSE BLD-MCNC: 98 MG/DL (ref 65–99)
GLUCOSE SERPL-MCNC: 119 MG/DL (ref 65–99)
GLUCOSE SERPL-MCNC: 257 MG/DL (ref 65–99)
HCT VFR BLD AUTO: 33.6 % (ref 42–52)
HCT VFR BLD AUTO: 34.1 % (ref 42–52)
HGB BLD-MCNC: 10.6 G/DL (ref 14–18)
HGB BLD-MCNC: 10.8 G/DL (ref 14–18)
INR PPP: 1.09 (ref 0.87–1.13)
MCH RBC QN AUTO: 29.4 PG (ref 27–33)
MCH RBC QN AUTO: 30.2 PG (ref 27–33)
MCHC RBC AUTO-ENTMCNC: 31.1 G/DL (ref 33.7–35.3)
MCHC RBC AUTO-ENTMCNC: 32.1 G/DL (ref 33.7–35.3)
MCV RBC AUTO: 93.9 FL (ref 81.4–97.8)
MCV RBC AUTO: 94.7 FL (ref 81.4–97.8)
PLATELET # BLD AUTO: 297 K/UL (ref 164–446)
PLATELET # BLD AUTO: 300 K/UL (ref 164–446)
PMV BLD AUTO: 9.5 FL (ref 9–12.9)
PMV BLD AUTO: 9.5 FL (ref 9–12.9)
POTASSIUM SERPL-SCNC: 4.6 MMOL/L (ref 3.6–5.5)
POTASSIUM SERPL-SCNC: 4.8 MMOL/L (ref 3.6–5.5)
PROT SERPL-MCNC: 6.3 G/DL (ref 6–8.2)
PROTHROMBIN TIME: 13.8 SEC (ref 12–14.6)
RBC # BLD AUTO: 3.58 M/UL (ref 4.7–6.1)
RBC # BLD AUTO: 3.6 M/UL (ref 4.7–6.1)
SODIUM SERPL-SCNC: 137 MMOL/L (ref 135–145)
SODIUM SERPL-SCNC: 139 MMOL/L (ref 135–145)
WBC # BLD AUTO: 8 K/UL (ref 4.8–10.8)
WBC # BLD AUTO: 8.1 K/UL (ref 4.8–10.8)

## 2018-06-27 PROCEDURE — 71046 X-RAY EXAM CHEST 2 VIEWS: CPT

## 2018-06-27 PROCEDURE — 700102 HCHG RX REV CODE 250 W/ 637 OVERRIDE(OP): Performed by: INTERNAL MEDICINE

## 2018-06-27 PROCEDURE — 74176 CT ABD & PELVIS W/O CONTRAST: CPT

## 2018-06-27 PROCEDURE — 80048 BASIC METABOLIC PNL TOTAL CA: CPT

## 2018-06-27 PROCEDURE — 85027 COMPLETE CBC AUTOMATED: CPT

## 2018-06-27 PROCEDURE — 85730 THROMBOPLASTIN TIME PARTIAL: CPT

## 2018-06-27 PROCEDURE — 36415 COLL VENOUS BLD VENIPUNCTURE: CPT

## 2018-06-27 PROCEDURE — 770020 HCHG ROOM/CARE - TELE (206)

## 2018-06-27 PROCEDURE — A9270 NON-COVERED ITEM OR SERVICE: HCPCS | Performed by: INTERNAL MEDICINE

## 2018-06-27 PROCEDURE — 85610 PROTHROMBIN TIME: CPT

## 2018-06-27 PROCEDURE — 700102 HCHG RX REV CODE 250 W/ 637 OVERRIDE(OP): Performed by: NURSE PRACTITIONER

## 2018-06-27 PROCEDURE — 82962 GLUCOSE BLOOD TEST: CPT | Mod: 91

## 2018-06-27 PROCEDURE — A9270 NON-COVERED ITEM OR SERVICE: HCPCS | Performed by: NURSE PRACTITIONER

## 2018-06-27 PROCEDURE — 80053 COMPREHEN METABOLIC PANEL: CPT

## 2018-06-27 RX ORDER — SODIUM CHLORIDE 9 MG/ML
INJECTION, SOLUTION INTRAVENOUS CONTINUOUS
Status: DISCONTINUED | OUTPATIENT
Start: 2018-06-27 | End: 2018-06-28

## 2018-06-27 RX ADMIN — CARVEDILOL 12.5 MG: 12.5 TABLET, FILM COATED ORAL at 07:31

## 2018-06-27 RX ADMIN — CARVEDILOL 12.5 MG: 12.5 TABLET, FILM COATED ORAL at 17:10

## 2018-06-27 RX ADMIN — ASPIRIN 81 MG: 81 TABLET, COATED ORAL at 06:04

## 2018-06-27 RX ADMIN — INSULIN HUMAN 2 UNITS: 100 INJECTION, SOLUTION PARENTERAL at 21:30

## 2018-06-27 RX ADMIN — ATORVASTATIN CALCIUM 40 MG: 40 TABLET, FILM COATED ORAL at 21:28

## 2018-06-27 RX ADMIN — INSULIN HUMAN 3 UNITS: 100 INJECTION, SOLUTION PARENTERAL at 12:04

## 2018-06-27 RX ADMIN — LISINOPRIL 10 MG: 10 TABLET ORAL at 06:05

## 2018-06-27 RX ADMIN — CLOPIDOGREL 75 MG: 75 TABLET, FILM COATED ORAL at 06:05

## 2018-06-27 ASSESSMENT — ENCOUNTER SYMPTOMS
VOMITING: 0
ORTHOPNEA: 0
WEAKNESS: 0
DIZZINESS: 0
HEMOPTYSIS: 0
NAUSEA: 0
LOSS OF CONSCIOUSNESS: 0
INSOMNIA: 0
CLAUDICATION: 0
CHILLS: 0
SPUTUM PRODUCTION: 0
BLURRED VISION: 0
PND: 0
ABDOMINAL PAIN: 0
WEAKNESS: 1
PALPITATIONS: 0
SHORTNESS OF BREATH: 1
COUGH: 0
FEVER: 0
MYALGIAS: 0
WHEEZING: 0

## 2018-06-27 ASSESSMENT — COGNITIVE AND FUNCTIONAL STATUS - GENERAL
SUGGESTED CMS G CODE MODIFIER MOBILITY: CH
SUGGESTED CMS G CODE MODIFIER DAILY ACTIVITY: CH
MOBILITY SCORE: 24
DAILY ACTIVITIY SCORE: 24

## 2018-06-27 ASSESSMENT — PAIN SCALES - GENERAL
PAINLEVEL_OUTOF10: 0
PAINLEVEL_OUTOF10: 2

## 2018-06-27 NOTE — CARE PLAN
Problem: Safety  Goal: Will remain free from falls    Intervention: Implement fall precautions   06/26/18 2000 06/26/18 0050   OTHER   Environmental Precautions Treaded Slipper Socks on Patient;Personal Belongings, Wastebasket, Call Bell etc. in Easy Reach;Report Given to Other Health Care Providers Regarding Fall Risk;Bed in Low Position;Communication Sign for Patients & Families;Mobility Assessed & Appropriate Sign Placed --    IV Pole on Same Side of Bed as Bathroom --  Yes   Bedrails --  Bedrails Closest to Bathroom Down         Problem: Knowledge Deficit  Goal: Knowledge of disease process/condition, treatment plan, diagnostic tests, and medications will improve    Intervention: Explain information regarding disease process/condition, treatment plan, diagnostic tests, and medications and document in education  Discussed information regarding plan of care, post cath instructions, and medications; all questions answered

## 2018-06-27 NOTE — PROGRESS NOTES
Pt back from CT. Placed on RA and ambulated around unit, 500 feet. O2 sat monitored and pt noted to sat 93% on 1L O2. On RA, pt sat dropping to 85% with ambulation and 87% with rest. Encouraging IS and pt pulling 1500. Continuing to monitor O2 sats.

## 2018-06-27 NOTE — PROGRESS NOTES
Patient is resting comfortably in bed. Needs addressed; nothing needed at this time. Provided 1 water bottle. Bed is locked and in lowest position. Side rails up x2. Call light, phone, and personal possessions within reach. Will continue to monitor.

## 2018-06-27 NOTE — PROGRESS NOTES
Cardiology Progress Note               Author: Salas Beltran Date & Time created: 6/27/2018  8:35 AM     Interval History:  68 years old male who presented with elective coronary angiogram.  He was noted to have multiple severe coronary artery disease.  He was evaluated by CT surgeon in which they deemed him high risk with high STS score.  Recommended high risk PCI and TAVR.       HX: of chronic kidney disease stage III, feeling well up until 2 months ago , he began to experience progressive fatigue, dyspnea, lower extremity edema, echo showed moderate aortic stenosis with new dilated cardiomyopathy of 35%, dobutamine stress test on 6/22/18 confirmed low flow low gradient severe aortic stenosis with a V-max of 4.1 and mean gradient of 44 mmHg at peak stress, diabetes, hypertension, hyperlipidemia    6/27: patient is SOB which is stable. Sitting up in a chair, talking in full sentence.     Cardiac catheterization  6/27/18  Percutaneous transluminal coronary angioplasty of the mid left   anterior descending artery stenosis with 1.5x15 mm  balloon; however,   unsuccessful attempts to balloon with any additional larger size equipment.      Cardiac catheterization  6/22/18  1.  Multivessel severe coronary artery disease with long, high-grade mid left   anterior descending artery stenosis, high-grade ostial first and second   diagonal branch stenosis, nonobstructive ostial circumflex artery stenosis and   nonobstructive right coronary artery stenosis.  2.  Elevated right heart pressures with PA systolic pressure of 75 mmHg.    Review of Systems   Constitutional: Negative for malaise/fatigue.   Respiratory: Positive for shortness of breath. Negative for cough, hemoptysis, sputum production and wheezing.    Cardiovascular: Positive for leg swelling. Negative for chest pain, palpitations, orthopnea, claudication and PND.   Gastrointestinal: Negative for nausea and vomiting.   Neurological: Negative for dizziness and  weakness.       Physical Exam   Constitutional: He is oriented to person, place, and time. He appears well-developed and well-nourished. No distress.   HENT:   Head: Normocephalic.   Neck: Normal range of motion. No JVD present.   Cardiovascular: Normal rate, regular rhythm and intact distal pulses.    Murmur heard.  Pulmonary/Chest: Effort normal and breath sounds normal. No respiratory distress. He has no wheezes.   Abdominal: He exhibits no distension. There is no tenderness.   Musculoskeletal: He exhibits edema (2+ pitting edema noted L>R). He exhibits no tenderness.   Neurological: He is alert and oriented to person, place, and time.   Skin: Skin is warm and dry. No rash noted. He is not diaphoretic.   Psychiatric: His behavior is normal. Judgment normal.   Nursing note and vitals reviewed.      Hemodynamics:  Temp (24hrs), Av.6 °C (97.9 °F), Min:36.3 °C (97.4 °F), Max:37.1 °C (98.7 °F)  Temperature: 37.1 °C (98.7 °F)  Pulse  Av.7  Min: 70  Max: 106   Blood Pressure : 128/78     Respiratory:    Respiration: 18, Pulse Oximetry: 92 %        RUL Breath Sounds: Clear, RML Breath Sounds: Clear, RLL Breath Sounds: Diminished, DANDY Breath Sounds: Clear, LLL Breath Sounds: Diminished  Fluids:     Weight: 91.6 kg (201 lb 15.1 oz)  GI/Nutrition:  Orders Placed This Encounter   Procedures   • Diet Order Cardiac     Standing Status:   Standing     Number of Occurrences:   1     Order Specific Question:   Diet:     Answer:   Cardiac [6]     Lab Results:  Recent Labs      18   0341   WBC  8.0   RBC  3.60*   HEMOGLOBIN  10.6*   HEMATOCRIT  34.1*   MCV  94.7   MCH  29.4   MCHC  31.1*   RDW  46.8   PLATELETCT  300   MPV  9.5     Recent Labs      18   0223  18   0741  18   0341   SODIUM  139  139  139  139   POTASSIUM  4.4  4.4  4.5  4.8   CHLORIDE  105  105  108  108   CO2  24  24  23  23   GLUCOSE  96  96  117*  119*   BUN  30*  30*  31*  34*   CREATININE  1.80*  1.80*  1.63*  1.84*    CALCIUM  9.2  9.2  9.3  8.9     Recent Labs      06/24/18   1636   APTT  60.9*                     Medical Decision Making, by Problem:  Active Hospital Problems    Diagnosis   • *Severe aortic stenosis [I35.0]   • Acute exacerbation of CHF (congestive heart failure) ([facility]) [I50.9]   • Dilated cardiomyopathy (HCC) [I42.0]   • CAD (coronary artery disease) [I25.10]   • Stage 3 chronic kidney disease [N18.3]   • NYHA class 3 acute on chronic systolic heart failure (HCC) [I50.23]       Plan:  1. Multivessel CAD by recent cath 6/22/18  -Has high-grade LAD disease, in addition high-grade diagonal  -CTS recommend high risk PCI and TAVR not SAVR or CABG candidate   - unsuccessful PCI LAD, with Dr. Bruce tomorrow, TAVR on July 2  - cath tomorrow, NPO at midnight     2. Severe aortic stenosis  -Low flow low gradient  -DST 6/22/18 showed  V-max 4, mean gradient 44 mmHg  - plan for TAVR (July 2) after PCI        3. CKD:  - creatinine up 1.84  - will give NS 50ml/hr prior to cath tomorrow     Future Appointments  Date Time Provider Department Center   6/28/2018 11:20 AM Markus Fuller M.D. [facility] None   9/10/2018 8:20 AM Venus Claros M.D. [facility]               Quality-Core Measures

## 2018-06-27 NOTE — DIETARY
Nutrition Services: cardiac rehab consult panel - CAD  Duplicate consult - RD visited pt on 6/23 for same consult.   RD visit was documented in education tab - diabetic and heart healthy nutrition were discussed.     RD available prn

## 2018-06-27 NOTE — PROGRESS NOTES
Pt A&O x 4, c/o slight pain to R wrist cath site, 2/10, declining medication. Pt denies numbness and tingling. Up self ambulating in room. Pt on 1L oxygen at this time, O2 sats dropping to 89 on RA. Encouraging ambulation and IS, pt performing effectively. Plan of care discussed with pt and pt's wife. Pt resting at this time.

## 2018-06-27 NOTE — CARE PLAN
Problem: Bowel/Gastric:  Goal: Normal bowel function is maintained or improved  Outcome: PROGRESSING AS EXPECTED  Bowel sounds normoactive, last BM yesterday    Problem: Pain Management  Goal: Pain level will decrease to patient's comfort goal  Outcome: PROGRESSING AS EXPECTED  Pt states pain tolerable at this time

## 2018-06-27 NOTE — PROGRESS NOTES
Cardiology Progress Note               Author: Markus Fuller Date & Time created: 2018  8:19 AM     Interval History/Chief Complaint:  68 year old male with low flow low gradient severe symptomatic aortic stenosis, acute systolic congestive heart failure, cardiomyopathy, coronary artery disease and chronic kidney disease.    Review of Systems   Constitutional: Positive for malaise/fatigue. Negative for chills and fever.   HENT: Negative for congestion.    Eyes: Negative for blurred vision.   Respiratory: Positive for shortness of breath.    Cardiovascular: Positive for leg swelling. Negative for chest pain, palpitations, orthopnea and PND.   Gastrointestinal: Negative for abdominal pain.   Genitourinary: Negative for dysuria.   Musculoskeletal: Negative for joint pain and myalgias.   Skin: Negative for rash.   Neurological: Positive for weakness. Negative for dizziness and loss of consciousness.   Psychiatric/Behavioral: The patient does not have insomnia.        Physical Exam   Constitutional: He is oriented to person, place, and time. He appears well-developed and well-nourished.   HENT:   Head: Normocephalic and atraumatic.   Eyes: Conjunctivae are normal.   Neck: Normal range of motion. Neck supple.   Cardiovascular: Normal rate and regular rhythm.    Murmur heard.  Pulmonary/Chest: Effort normal and breath sounds normal.   Abdominal: Soft. Bowel sounds are normal.   Musculoskeletal: Normal range of motion. He exhibits edema.   Neurological: He is alert and oriented to person, place, and time.   Skin: Skin is warm and dry.   Psychiatric: He has a normal mood and affect.       Hemodynamics:  Temp (24hrs), Av.6 °C (97.8 °F), Min:36.3 °C (97.4 °F), Max:36.7 °C (98.1 °F)  Temperature: 36.7 °C (98 °F)  Pulse  Av.8  Min: 70  Max: 106   Blood Pressure : 138/89     Respiratory:    Respiration: 17, Pulse Oximetry: 94 %        RUL Breath Sounds: Clear, RML Breath Sounds: Clear, RLL Breath Sounds: Diminished,  DANDY Breath Sounds: Clear, LLL Breath Sounds: Diminished  Fluids:     Weight: 91.6 kg (201 lb 15.1 oz)  GI/Nutrition:  Orders Placed This Encounter   Procedures   • Diet Order Cardiac     Standing Status:   Standing     Number of Occurrences:   1     Order Specific Question:   Diet:     Answer:   Cardiac [6]     Lab Results:  Recent Labs      06/27/18   0341   WBC  8.0   RBC  3.60*   HEMOGLOBIN  10.6*   HEMATOCRIT  34.1*   MCV  94.7   MCH  29.4   MCHC  31.1*   RDW  46.8   PLATELETCT  300   MPV  9.5     Recent Labs      06/25/18   0223  06/26/18   0741  06/27/18   0341   SODIUM  139  139  139  139   POTASSIUM  4.4  4.4  4.5  4.8   CHLORIDE  105  105  108  108   CO2  24  24  23  23   GLUCOSE  96  96  117*  119*   BUN  30*  30*  31*  34*   CREATININE  1.80*  1.80*  1.63*  1.84*   CALCIUM  9.2  9.2  9.3  8.9     Recent Labs      06/24/18   1636   APTT  60.9*                 Medications reviewed.      Current Facility-Administered Medications:   •  ondansetron (ZOFRAN) syringe/vial injection 4 mg, 4 mg, Intravenous, Q4HRS PRN, Markus Fuller M.D.  •  dexamethasone (DECADRON) injection 4 mg, 4 mg, Intravenous, Once PRN, Markus Fuller M.D.  •  diphenhydrAMINE (BENADRYL) injection 25 mg, 25 mg, Intravenous, Q6HRS PRN, Markus Fuller M.D.  •  haloperidol lactate (HALDOL) injection 1 mg, 1 mg, Intravenous, Q6HRS PRN, Markus Fuller M.D.  •  scopolamine (TRANSDERM-SCOP) patch 1 Patch, 1 Patch, Transdermal, Q72HRS PRN, Markus Fuller M.D.  •  clopidogrel (PLAVIX) tablet 75 mg, 75 mg, Oral, DAILY, Markus Fuller M.D., 75 mg at 06/27/18 0605  •  NS infusion, , Intravenous, Continuous, Desi Solano A.P.R.NHeidi, Last Rate: 50 mL/hr at 06/25/18 2100  •  lisinopril (PRINIVIL) 10 MG tablet 10 mg, 10 mg, Oral, DAILY, Tammi Martinez M.D., 10 mg at 06/27/18 0605  •  carvedilol (COREG) tablet 12.5 mg, 12.5 mg, Oral, BID WITH MEALS, Tammi Martinez M.D., 12.5 mg at 06/27/18 0731  •  furosemide (LASIX) injection 40 mg,  40 mg, Intravenous, Q DAY, Sayeh Fesharaki, A.P.N., Stopped at 06/26/18 0600  •  insulin regular (HUMULIN R) injection 2-9 Units, 2-9 Units, Subcutaneous, 4X/DAY ACHS, Stopped at 06/27/18 0733 **AND** Accu-Chek ACHS, , , Q AC AND BEDTIME(S) **AND** NOTIFY MD and PharmD, , , Once **AND** glucose 4 g chewable tablet 16 g, 16 g, Oral, Q15 MIN PRN **AND** dextrose 50% (D50W) injection 25 mL, 25 mL, Intravenous, Q15 MIN PRN, Sayeh Fesharaki, A.P.N.  •  aspirin EC (ECOTRIN) tablet 81 mg, 81 mg, Oral, DAILY, Markus Fuller M.D., 81 mg at 06/27/18 0604  •  atorvastatin (LIPITOR) tablet 40 mg, 40 mg, Oral, Nightly, Sayeh Fesharaki, A.P.N., 40 mg at 06/26/18 2008  •  insulin glargine (LANTUS) injection 16 Units, 16 Units, Subcutaneous, Q EVENING, Sayeh Fesharaki, A.P.N., 16 Units at 06/26/18 2006    CARDIAC STUDIES/PROCEDURES:    CARDIAC CATHETERIZATION CONCLUSIONS (06/26/18)  Percutaneous transluminal coronary angioplasty of the mid left   anterior descending artery stenosis with 1.5x15 mm  balloon; however,   unsuccessful attempts to balloon with any additional larger size equipment.      CARDIAC CATHETERIZATION CONCLUSIONS (06/02/18)  1.  Multivessel severe coronary artery disease with long, high-grade mid left   anterior descending artery stenosis, high-grade ostial first and second   diagonal branch stenosis, nonobstructive ostial circumflex artery stenosis and   nonobstructive right coronary artery stenosis.  2.  Elevated right heart pressures with PA systolic pressure of 75 mmHg.     CAROTID ULTRASOUND (06/22/18)  Mild (<50%) bilateral internal carotid disease  Normal vertebral and subclavianartery flow bilaterally.     DOBUTAMINE STRESS ECHOCARDIOGRAM (06/22/18)  Abnormal resting echo with mild dyskinesis of the LV apex and EF 30%. Resting aortic valve peak and mean gradients 36 / 20.  Stress images reveal increased contractility of basal to mid anterior and inferior walls, with more pronounced apical  dyskinesis.  Peak / mean aortic valve gradients on 15 mcg dobutamine are 72 /43  Abnormal dobutamine stress echo demonstrating significant increase in aortic valve gradients with dobutamine infusion.  LV dysfunction at rest and stress. Worsening apical dyskinesis with stress suggesting ischemia.     ECHOCARDIOGRAM CONCLUSIONS (06/01/18)  Moderately reduced left ventricular systolic function.  Left ventricular ejection fraction is visually estimated to be 35%.  Global hypokinesis with regional variation with severe hypokinesis of the anteroapical and septal walls.  Moderate aortic stenosis; severely of aortic stenosis   Aortic valve area calculated from the continuity equation is 0.82.   Vmax is 2.8 m/s. Transvalvular gradients are - Peak: 32 mmHg, Mean: 21 mmHg.  Unable to estimate pulmonary artery pressure due to an inadequate tricuspid regurgitant jet.  Moderate mitral regurgitation.  probably underestimated due to low ejection fraction.No prior study is available for comparison.      EKG performed on (06/26/18) was reviewed: EKG shows sinus rhythm with anterior Q waves with non-specific T wave abnormalities.  EKG performed on (06/25/18) was reviewed: EKG shows sinus rhythm with anterior Q waves with lateral ST segment depressions.  EKG performed on (06/21/18) EKG shows sinus rhythm.     Laboratory results of (06/27/18) were reviewed. Bun of 34 mg/dl, creatinine levels of 1.84 mg/dl noted.  Laboratory results of (06/26/18) were reviewed. Bun of 31 mg/dl, creatinine levels of 1.63 mg/dl noted.  Laboratory results of (06/25/18) Bun of 30 mg/dl, creatinine levels of 1.8 mg/dl noted.  Laboratory results of (06/18/18) Bun of 25 mg/dl, creatinine levels of 1.34 mg/dl noted.    Medical Decision Making, by Problem:  Active Hospital Problems    Diagnosis   • Severe aortic stenosis [I35.0]   • Acute exacerbation of CHF (congestive heart failure) (HCC) [I50.9]   • Dilated cardiomyopathy (HCC) [I42.0]   • CAD (coronary artery  disease) [I25.10]   • Stage 3 chronic kidney disease [N18.3]   • NYHA class 3 acute on chronic systolic heart failure (HCC) [I50.23]       Plan:    1. Aortic stenosis: He is symptomatic with his aortic stenosis, NYHA class III-IV. He is not surgical candidate per Dr. Strong with STS score of 5.5. We will plan for TAVR for 07/02/18. He is scheduled for non contrast CT tomorrow. He understands the risks and benefits and agrees with plan.  2. Acute on chronic systolic congestive heart failure with cardiomyopathy: The overall volume status has improved. We will continue to hold his furosemide worsening renal function.  3. Coronary artery disease:  We will plan for percutaneous intervention with stent placement of his left anterior descending artery as  work with Dr. Stanley tomorrow. He understands the risks incruding renal failure and benefits and agrees with plan.  4. Chronic kidney disease stage III (managed by Dr. Kirkland): He will be prehydrated tonight. We will continue to follow labs.  5. 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.     The risks, benefits, and alternatives to coronary angiography with IV sedation were discussed in great detail. Specific risks mentioned include bleeding, infection, kidney damage, allergic reaction, cardiac perforation with possible tamponade requiring kenneth-cardiocentesis or possible open heart surgery. In addition, we discussed that 10% of patients will experience small to moderate bruising at the side of the arterial puncture. Lastly the risks of heart attack, stroke, and death were discussed; the risks of major complications such as heart attack or stroke caused by the angiogram is less than 1%; the risk of death is approximately 1 in 1000. The patient verbalized understanding of these potential complications and wishes to proceed with this procedure.     The risks, benefits, and alternatives to TAVR, general anesthesia and  transesophageal echocardiogram were discussed in great detail. Specific risks mentioned include bleeding, infection, kidney damage, allergic reaction, cardiac perforation with possible tamponade requiring kenneth-cardiocentesis or possible open heart surgery if the patient is a candidate. Lastly the risks of heart attack, stroke, and death were discussed; the risks of major complications includingall cause mortality of 2.2%, disabling stroke of 1.1%, the risk on new pacemaker of 12% and the risk of vascular complications of 4.1%. The patient verbalized understanding of these potential complications and wishes to proceed with this procedure. ([facility]).  The procedure will be performed completely in collaboration with cardiac surgery.     CC Dr. Nixon Claros MD      Quality-Core Measures

## 2018-06-28 LAB
ANION GAP SERPL CALC-SCNC: 8 MMOL/L (ref 0–11.9)
BUN SERPL-MCNC: 30 MG/DL (ref 8–22)
CALCIUM SERPL-MCNC: 8.4 MG/DL (ref 8.5–10.5)
CHLORIDE SERPL-SCNC: 109 MMOL/L (ref 96–112)
CO2 SERPL-SCNC: 19 MMOL/L (ref 20–33)
CREAT SERPL-MCNC: 1.34 MG/DL (ref 0.5–1.4)
GLUCOSE BLD-MCNC: 132 MG/DL (ref 65–99)
GLUCOSE BLD-MCNC: 197 MG/DL (ref 65–99)
GLUCOSE BLD-MCNC: 93 MG/DL (ref 65–99)
GLUCOSE SERPL-MCNC: 119 MG/DL (ref 65–99)
POTASSIUM SERPL-SCNC: 4.8 MMOL/L (ref 3.6–5.5)
SODIUM SERPL-SCNC: 136 MMOL/L (ref 135–145)

## 2018-06-28 PROCEDURE — C1894 INTRO/SHEATH, NON-LASER: HCPCS

## 2018-06-28 PROCEDURE — C1725 CATH, TRANSLUMIN NON-LASER: HCPCS

## 2018-06-28 PROCEDURE — 99152 MOD SED SAME PHYS/QHP 5/>YRS: CPT

## 2018-06-28 PROCEDURE — 99153 MOD SED SAME PHYS/QHP EA: CPT

## 2018-06-28 PROCEDURE — 700102 HCHG RX REV CODE 250 W/ 637 OVERRIDE(OP): Performed by: INTERNAL MEDICINE

## 2018-06-28 PROCEDURE — 700102 HCHG RX REV CODE 250 W/ 637 OVERRIDE(OP)

## 2018-06-28 PROCEDURE — 93454 CORONARY ARTERY ANGIO S&I: CPT

## 2018-06-28 PROCEDURE — C1769 GUIDE WIRE: HCPCS

## 2018-06-28 PROCEDURE — 36415 COLL VENOUS BLD VENIPUNCTURE: CPT

## 2018-06-28 PROCEDURE — A9270 NON-COVERED ITEM OR SERVICE: HCPCS | Performed by: INTERNAL MEDICINE

## 2018-06-28 PROCEDURE — 304952 HCHG R 2 PADS

## 2018-06-28 PROCEDURE — 93010 ELECTROCARDIOGRAM REPORT: CPT | Performed by: INTERNAL MEDICINE

## 2018-06-28 PROCEDURE — 02C03ZZ EXTIRPATION OF MATTER FROM CORONARY ARTERY, ONE ARTERY, PERCUTANEOUS APPROACH: ICD-10-PCS | Performed by: INTERNAL MEDICINE

## 2018-06-28 PROCEDURE — 770020 HCHG ROOM/CARE - TELE (206)

## 2018-06-28 PROCEDURE — A9270 NON-COVERED ITEM OR SERVICE: HCPCS

## 2018-06-28 PROCEDURE — 700102 HCHG RX REV CODE 250 W/ 637 OVERRIDE(OP): Performed by: NURSE PRACTITIONER

## 2018-06-28 PROCEDURE — B2101ZZ FLUOROSCOPY OF SINGLE CORONARY ARTERY USING LOW OSMOLAR CONTRAST: ICD-10-PCS | Performed by: INTERNAL MEDICINE

## 2018-06-28 PROCEDURE — C1874 STENT, COATED/COV W/DEL SYS: HCPCS

## 2018-06-28 PROCEDURE — C1887 CATHETER, GUIDING: HCPCS

## 2018-06-28 PROCEDURE — 80048 BASIC METABOLIC PNL TOTAL CA: CPT

## 2018-06-28 PROCEDURE — C1760 CLOSURE DEV, VASC: HCPCS

## 2018-06-28 PROCEDURE — 700105 HCHG RX REV CODE 258: Performed by: NURSE PRACTITIONER

## 2018-06-28 PROCEDURE — 700111 HCHG RX REV CODE 636 W/ 250 OVERRIDE (IP)

## 2018-06-28 PROCEDURE — 82962 GLUCOSE BLOOD TEST: CPT | Mod: 91

## 2018-06-28 PROCEDURE — 700105 HCHG RX REV CODE 258: Performed by: INTERNAL MEDICINE

## 2018-06-28 PROCEDURE — A9270 NON-COVERED ITEM OR SERVICE: HCPCS | Performed by: NURSE PRACTITIONER

## 2018-06-28 PROCEDURE — C1724 CATH, TRANS ATHEREC,ROTATION: HCPCS

## 2018-06-28 PROCEDURE — C1885 CATH, TRANSLUMIN ANGIO LASER: HCPCS

## 2018-06-28 PROCEDURE — 02703ZZ DILATION OF CORONARY ARTERY, ONE ARTERY, PERCUTANEOUS APPROACH: ICD-10-PCS | Performed by: INTERNAL MEDICINE

## 2018-06-28 PROCEDURE — C9602 PERC D-E COR STENT ATHER S: HCPCS | Mod: LD

## 2018-06-28 PROCEDURE — 700117 HCHG RX CONTRAST REV CODE 255: Performed by: INTERNAL MEDICINE

## 2018-06-28 PROCEDURE — 93005 ELECTROCARDIOGRAM TRACING: CPT | Performed by: INTERNAL MEDICINE

## 2018-06-28 RX ORDER — MIDAZOLAM HYDROCHLORIDE 1 MG/ML
INJECTION INTRAMUSCULAR; INTRAVENOUS
Status: COMPLETED
Start: 2018-06-28 | End: 2018-06-28

## 2018-06-28 RX ORDER — ONDANSETRON 2 MG/ML
4 INJECTION INTRAMUSCULAR; INTRAVENOUS EVERY 4 HOURS PRN
Status: DISCONTINUED | OUTPATIENT
Start: 2018-06-28 | End: 2018-06-28

## 2018-06-28 RX ORDER — HEPARIN SODIUM,PORCINE 1000/ML
VIAL (ML) INJECTION
Status: COMPLETED
Start: 2018-06-28 | End: 2018-06-28

## 2018-06-28 RX ORDER — NITROGLYCERIN 0.4 MG/1
0.4 TABLET SUBLINGUAL
Status: DISCONTINUED | OUTPATIENT
Start: 2018-06-28 | End: 2018-06-29 | Stop reason: HOSPADM

## 2018-06-28 RX ORDER — HALOPERIDOL 5 MG/ML
1 INJECTION INTRAMUSCULAR EVERY 6 HOURS PRN
Status: DISCONTINUED | OUTPATIENT
Start: 2018-06-28 | End: 2018-06-28

## 2018-06-28 RX ORDER — BIVALIRUDIN 250 MG/5ML
INJECTION, POWDER, LYOPHILIZED, FOR SOLUTION INTRAVENOUS
Status: COMPLETED
Start: 2018-06-28 | End: 2018-06-28

## 2018-06-28 RX ORDER — SCOLOPAMINE TRANSDERMAL SYSTEM 1 MG/1
1 PATCH, EXTENDED RELEASE TRANSDERMAL
Status: DISCONTINUED | OUTPATIENT
Start: 2018-06-28 | End: 2018-06-28

## 2018-06-28 RX ORDER — SODIUM CHLORIDE 9 MG/ML
INJECTION, SOLUTION INTRAVENOUS CONTINUOUS
Status: DISPENSED | OUTPATIENT
Start: 2018-06-28 | End: 2018-06-28

## 2018-06-28 RX ORDER — ACETAMINOPHEN 325 MG/1
650 TABLET ORAL EVERY 6 HOURS PRN
Status: DISCONTINUED | OUTPATIENT
Start: 2018-06-28 | End: 2018-06-29 | Stop reason: HOSPADM

## 2018-06-28 RX ORDER — DEXAMETHASONE SODIUM PHOSPHATE 4 MG/ML
4 INJECTION, SOLUTION INTRA-ARTICULAR; INTRALESIONAL; INTRAMUSCULAR; INTRAVENOUS; SOFT TISSUE
Status: DISCONTINUED | OUTPATIENT
Start: 2018-06-28 | End: 2018-06-28

## 2018-06-28 RX ORDER — CLOPIDOGREL 300 MG/1
TABLET, FILM COATED ORAL
Status: COMPLETED
Start: 2018-06-28 | End: 2018-06-28

## 2018-06-28 RX ORDER — VERAPAMIL HYDROCHLORIDE 2.5 MG/ML
INJECTION, SOLUTION INTRAVENOUS
Status: COMPLETED
Start: 2018-06-28 | End: 2018-06-28

## 2018-06-28 RX ORDER — CLOPIDOGREL BISULFATE 75 MG/1
300 TABLET ORAL ONCE
Status: DISCONTINUED | OUTPATIENT
Start: 2018-06-28 | End: 2018-06-28

## 2018-06-28 RX ORDER — DIPHENHYDRAMINE HYDROCHLORIDE 50 MG/ML
25 INJECTION INTRAMUSCULAR; INTRAVENOUS EVERY 6 HOURS PRN
Status: DISCONTINUED | OUTPATIENT
Start: 2018-06-28 | End: 2018-06-28

## 2018-06-28 RX ADMIN — BIVALIRUDIN 250 MG: 250 INJECTION, POWDER, LYOPHILIZED, FOR SOLUTION INTRAVENOUS at 12:03

## 2018-06-28 RX ADMIN — FENTANYL CITRATE 100 MCG: 50 INJECTION, SOLUTION INTRAMUSCULAR; INTRAVENOUS at 10:26

## 2018-06-28 RX ADMIN — HEPARIN SODIUM: 1000 INJECTION, SOLUTION INTRAVENOUS; SUBCUTANEOUS at 10:12

## 2018-06-28 RX ADMIN — HEPARIN SODIUM 2000 UNITS: 200 INJECTION, SOLUTION INTRAVENOUS at 10:14

## 2018-06-28 RX ADMIN — NITROGLYCERIN 20 ML: 20 INJECTION INTRAVENOUS at 10:17

## 2018-06-28 RX ADMIN — MIDAZOLAM 2 MG: 1 INJECTION INTRAMUSCULAR; INTRAVENOUS at 10:26

## 2018-06-28 RX ADMIN — ATORVASTATIN CALCIUM 40 MG: 40 TABLET, FILM COATED ORAL at 20:46

## 2018-06-28 RX ADMIN — CLOPIDOGREL BISULFATE 300 MG: 300 TABLET, FILM COATED ORAL at 12:11

## 2018-06-28 RX ADMIN — NITROGLYCERIN 10 ML: 20 INJECTION INTRAVENOUS at 10:12

## 2018-06-28 RX ADMIN — HEPARIN SODIUM 10000 UNITS: 1000 INJECTION, SOLUTION INTRAVENOUS; SUBCUTANEOUS at 10:17

## 2018-06-28 RX ADMIN — CARVEDILOL 12.5 MG: 12.5 TABLET, FILM COATED ORAL at 09:05

## 2018-06-28 RX ADMIN — MIDAZOLAM 2 MG: 1 INJECTION INTRAMUSCULAR; INTRAVENOUS at 12:12

## 2018-06-28 RX ADMIN — VERAPAMIL HYDROCHLORIDE 5 MG: 2.5 INJECTION, SOLUTION INTRAVENOUS at 10:12

## 2018-06-28 RX ADMIN — ASPIRIN 81 MG: 81 TABLET, COATED ORAL at 04:41

## 2018-06-28 RX ADMIN — CARVEDILOL 12.5 MG: 12.5 TABLET, FILM COATED ORAL at 17:37

## 2018-06-28 RX ADMIN — CLOPIDOGREL 75 MG: 75 TABLET, FILM COATED ORAL at 04:41

## 2018-06-28 RX ADMIN — SODIUM CHLORIDE: 9 INJECTION, SOLUTION INTRAVENOUS at 00:00

## 2018-06-28 RX ADMIN — SODIUM CHLORIDE: 9 INJECTION, SOLUTION INTRAVENOUS at 13:13

## 2018-06-28 RX ADMIN — LIDOCAINE HYDROCHLORIDE 40 MG: 20 INJECTION, SOLUTION INTRAVENOUS at 09:45

## 2018-06-28 RX ADMIN — INSULIN HUMAN 2 UNITS: 100 INJECTION, SOLUTION PARENTERAL at 20:42

## 2018-06-28 RX ADMIN — LISINOPRIL 10 MG: 10 TABLET ORAL at 04:41

## 2018-06-28 RX ADMIN — INSULIN GLARGINE 16 UNITS: 100 INJECTION, SOLUTION SUBCUTANEOUS at 20:44

## 2018-06-28 RX ADMIN — IOHEXOL 174 ML: 350 INJECTION, SOLUTION INTRAVENOUS at 12:11

## 2018-06-28 RX ADMIN — BIVALIRUDIN 250 MG: 250 INJECTION, POWDER, LYOPHILIZED, FOR SOLUTION INTRAVENOUS at 10:13

## 2018-06-28 RX ADMIN — LIDOCAINE HYDROCHLORIDE 20 MG: 20 INJECTION, SOLUTION INTRAVENOUS at 09:45

## 2018-06-28 ASSESSMENT — ENCOUNTER SYMPTOMS
COUGH: 0
SPUTUM PRODUCTION: 0
CLAUDICATION: 0
ORTHOPNEA: 0
SHORTNESS OF BREATH: 1
VOMITING: 0
HEMOPTYSIS: 0
PALPITATIONS: 0
DIZZINESS: 0
NAUSEA: 0
PND: 0
WEAKNESS: 0
WHEEZING: 0

## 2018-06-28 ASSESSMENT — PAIN SCALES - GENERAL
PAINLEVEL_OUTOF10: 0

## 2018-06-28 NOTE — CATH LAB
Immediate Post-Operative Note      PreOp Diagnosis: Sequential calcified 80 and 99% mid LAD stenoses    PostOp Diagnosis: s/p Laser and rotational atherectomy and 2.5x16 mm and 2.25x12 mm Synergy TENISHA to mid LAD no sig residual, MACK III    Procedure(s) :    Laser atherectomy  Rotational atherectomy  PCI LAD    Surgeon(s):  Katelin Stanley M.D.    Type of Anesthesia: Moderate Sedation    Specimen: None    Estimated Blood Loss: 20 cc's    Contrast Media:  167 cc's      Findings: As above    Complications: none      Katelin Stanley M.D.  6/28/2018 12:32 PM

## 2018-06-28 NOTE — PROGRESS NOTES
Fem stop removed. No signs of bleeding. Placed 4x4 and tegaderm over right groin cath site. HOB at 30 degress. Will continue to monitor

## 2018-06-28 NOTE — DIETARY
Nutrition Services: cardiac rehab consult received for cardiac diet education  Duplicate consult- RD provided DM and Cardiac diet education to patient on 6/23, see education tab for further details.     RD available prn

## 2018-06-28 NOTE — PROGRESS NOTES
Cardiology Progress Note               Author: Salas Beltran Date & Time created: 6/28/2018  7:54 AM     Interval History:  68 years old male who presented with elective coronary angiogram.  He was noted to have multiple severe coronary artery disease.  He was evaluated by CT surgeon in which they deemed him high risk with high STS score.  Recommended high risk PCI and TAVR.       HX: of chronic kidney disease stage III, feeling well up until 2 months ago , he began to experience progressive fatigue, dyspnea, lower extremity edema, echo showed moderate aortic stenosis with new dilated cardiomyopathy of 35%, dobutamine stress test on 6/22/18 confirmed low flow low gradient severe aortic stenosis with a V-max of 4.1 and mean gradient of 44 mmHg at peak stress, diabetes, hypertension, hyperlipidemia    6/28: cath today, denies any chest pain and mild SOB     6/27: patient is SOB which is stable. Sitting up in a chair, talking in full sentence.     Cardiac catheterization  6/27/18  Percutaneous transluminal coronary angioplasty of the mid left   anterior descending artery stenosis with 1.5x15 mm  balloon; however,   unsuccessful attempts to balloon with any additional larger size equipment.      Cardiac catheterization  6/22/18  1.  Multivessel severe coronary artery disease with long, high-grade mid left   anterior descending artery stenosis, high-grade ostial first and second   diagonal branch stenosis, nonobstructive ostial circumflex artery stenosis and   nonobstructive right coronary artery stenosis.  2.  Elevated right heart pressures with PA systolic pressure of 75 mmHg.    Review of Systems   Constitutional: Negative for malaise/fatigue.   Respiratory: Positive for shortness of breath. Negative for cough, hemoptysis, sputum production and wheezing.    Cardiovascular: Positive for leg swelling. Negative for chest pain, palpitations, orthopnea, claudication and PND.   Gastrointestinal: Negative for nausea  and vomiting.   Neurological: Negative for dizziness and weakness.       Physical Exam   Constitutional: He is oriented to person, place, and time. He appears well-developed and well-nourished. No distress.   HENT:   Head: Normocephalic.   Neck: Normal range of motion. No JVD present.   Cardiovascular: Normal rate, regular rhythm and intact distal pulses.    Murmur heard.  Pulmonary/Chest: Effort normal and breath sounds normal. No respiratory distress. He has no wheezes.   Abdominal: He exhibits no distension. There is no tenderness.   Musculoskeletal: He exhibits edema (2+ pitting edema noted L>R). He exhibits no tenderness.   Neurological: He is alert and oriented to person, place, and time.   Skin: Skin is warm and dry. No rash noted. He is not diaphoretic.   Psychiatric: His behavior is normal. Judgment normal.   Nursing note and vitals reviewed.    No changes to previous physical assessment.     Hemodynamics:  Temp (24hrs), Av.3 °C (97.4 °F), Min:36 °C (96.8 °F), Max:37.1 °C (98.7 °F)  Temperature: 36 °C (96.8 °F)  Pulse  Av.6  Min: 70  Max: 106   Blood Pressure : 132/80     Respiratory:    Respiration: 16, Pulse Oximetry: 96 %        RUL Breath Sounds: Clear, RML Breath Sounds: Clear, RLL Breath Sounds: Diminished, DANDY Breath Sounds: Clear, LLL Breath Sounds: Diminished  Fluids:     Weight: 92.8 kg (204 lb 9.4 oz)  GI/Nutrition:  Orders Placed This Encounter   Procedures   • Diet NPO after Midnight     Standing Status:   Standing     Number of Occurrences:   9     Order Specific Question:   Restrict to:     Answer:   Sips with Medications [3]     Lab Results:  Recent Labs      18   0341  18   0950   WBC  8.0  8.1   RBC  3.60*  3.58*   HEMOGLOBIN  10.6*  10.8*   HEMATOCRIT  34.1*  33.6*   MCV  94.7  93.9   MCH  29.4  30.2   MCHC  31.1*  32.1*   RDW  46.8  45.8   PLATELETCT  300  297   MPV  9.5  9.5     Recent Labs      18   0741  18   0341  18   0950   SODIUM  139  139   137   POTASSIUM  4.5  4.8  4.6   CHLORIDE  108  108  107   CO2  23  23  22   GLUCOSE  117*  119*  257*   BUN  31*  34*  33*   CREATININE  1.63*  1.84*  1.69*   CALCIUM  9.3  8.9  8.7     Recent Labs      06/27/18   0950   APTT  41.7*   INR  1.09                     Medical Decision Making, by Problem:  Active Hospital Problems    Diagnosis   • *Severe aortic stenosis [I35.0]   • Acute exacerbation of CHF (congestive heart failure) ([facility]) [I50.9]   • Dilated cardiomyopathy (HCC) [I42.0]   • CAD (coronary artery disease) [I25.10]   • Stage 3 chronic kidney disease [N18.3]   • NYHA class 3 acute on chronic systolic heart failure (HCC) [I50.23]       Plan:  1. Multivessel CAD by recent cath 6/22/18  -Has high-grade LAD disease, in addition high-grade diagonal  -CTS recommend high risk PCI and TAVR not SAVR or CABG candidate   - unsuccessful PCI LAD, with Dr. Bruce today TAVR on July 2      2. Severe aortic stenosis  -Low flow low gradient  -DST 6/22/18 showed  V-max 4, mean gradient 44 mmHg  - plan for TAVR (July 2) after PCI        3. CKD:  - creatinine up 1.69, repeat BMP  - iv fluids       Future Appointments  Date Time Provider Department Center   6/28/2018 11:20 AM Markus Fuller M.D. [facility] None   9/10/2018 8:20 AM Venus Claros M.D. [address] [facility]               Quality-Core Measures

## 2018-06-28 NOTE — PROGRESS NOTES
Report received at bedside. Pt resting, no family at bedside. No signs of distress/pain/s.o.b. Fall precautions in place, call light in reach. Will continue to monitor.

## 2018-06-29 VITALS
OXYGEN SATURATION: 94 % | SYSTOLIC BLOOD PRESSURE: 128 MMHG | HEIGHT: 68 IN | WEIGHT: 208.11 LBS | DIASTOLIC BLOOD PRESSURE: 71 MMHG | BODY MASS INDEX: 31.54 KG/M2 | HEART RATE: 90 BPM | TEMPERATURE: 98.4 F | RESPIRATION RATE: 16 BRPM

## 2018-06-29 PROBLEM — Z95.5 STENTED CORONARY ARTERY: Status: ACTIVE | Noted: 2018-06-29

## 2018-06-29 LAB
ANION GAP SERPL CALC-SCNC: 8 MMOL/L (ref 0–11.9)
BUN SERPL-MCNC: 35 MG/DL (ref 8–22)
CALCIUM SERPL-MCNC: 9 MG/DL (ref 8.5–10.5)
CHLORIDE SERPL-SCNC: 110 MMOL/L (ref 96–112)
CO2 SERPL-SCNC: 22 MMOL/L (ref 20–33)
CREAT SERPL-MCNC: 1.74 MG/DL (ref 0.5–1.4)
EKG IMPRESSION: NORMAL
ERYTHROCYTE [DISTWIDTH] IN BLOOD BY AUTOMATED COUNT: 49.4 FL (ref 35.9–50)
GLUCOSE BLD-MCNC: 113 MG/DL (ref 65–99)
GLUCOSE SERPL-MCNC: 119 MG/DL (ref 65–99)
HCT VFR BLD AUTO: 33.8 % (ref 42–52)
HGB BLD-MCNC: 10.1 G/DL (ref 14–18)
MCH RBC QN AUTO: 29 PG (ref 27–33)
MCHC RBC AUTO-ENTMCNC: 29.9 G/DL (ref 33.7–35.3)
MCV RBC AUTO: 97.1 FL (ref 81.4–97.8)
MORPHOLOGY BLD-IMP: NORMAL
PLATELET # BLD AUTO: 284 K/UL (ref 164–446)
PMV BLD AUTO: 9.9 FL (ref 9–12.9)
POTASSIUM SERPL-SCNC: 4.8 MMOL/L (ref 3.6–5.5)
RBC # BLD AUTO: 3.48 M/UL (ref 4.7–6.1)
SODIUM SERPL-SCNC: 140 MMOL/L (ref 135–145)
WBC # BLD AUTO: 7.8 K/UL (ref 4.8–10.8)

## 2018-06-29 PROCEDURE — G8978 MOBILITY CURRENT STATUS: HCPCS | Mod: CH

## 2018-06-29 PROCEDURE — 700102 HCHG RX REV CODE 250 W/ 637 OVERRIDE(OP): Performed by: INTERNAL MEDICINE

## 2018-06-29 PROCEDURE — 700111 HCHG RX REV CODE 636 W/ 250 OVERRIDE (IP): Performed by: NURSE PRACTITIONER

## 2018-06-29 PROCEDURE — A9270 NON-COVERED ITEM OR SERVICE: HCPCS | Performed by: INTERNAL MEDICINE

## 2018-06-29 PROCEDURE — 85027 COMPLETE CBC AUTOMATED: CPT

## 2018-06-29 PROCEDURE — G8980 MOBILITY D/C STATUS: HCPCS | Mod: CH

## 2018-06-29 PROCEDURE — 97161 PT EVAL LOW COMPLEX 20 MIN: CPT

## 2018-06-29 PROCEDURE — 82962 GLUCOSE BLOOD TEST: CPT

## 2018-06-29 PROCEDURE — G8979 MOBILITY GOAL STATUS: HCPCS | Mod: CH

## 2018-06-29 PROCEDURE — 80048 BASIC METABOLIC PNL TOTAL CA: CPT

## 2018-06-29 PROCEDURE — 36415 COLL VENOUS BLD VENIPUNCTURE: CPT

## 2018-06-29 RX ORDER — LISINOPRIL 10 MG/1
10 TABLET ORAL DAILY
Qty: 30 TAB | Refills: 11 | Status: ON HOLD | OUTPATIENT
Start: 2018-06-30 | End: 2018-07-04

## 2018-06-29 RX ORDER — CLOPIDOGREL BISULFATE 75 MG/1
75 TABLET ORAL DAILY
Qty: 30 TAB | Refills: 11 | Status: SHIPPED | OUTPATIENT
Start: 2018-06-30 | End: 2019-04-25 | Stop reason: SDUPTHER

## 2018-06-29 RX ORDER — CARVEDILOL 12.5 MG/1
12.5 TABLET ORAL 2 TIMES DAILY WITH MEALS
Qty: 60 TAB | Refills: 11 | Status: ON HOLD | OUTPATIENT
Start: 2018-06-29 | End: 2018-07-04

## 2018-06-29 RX ADMIN — CARVEDILOL 12.5 MG: 12.5 TABLET, FILM COATED ORAL at 09:08

## 2018-06-29 RX ADMIN — LISINOPRIL 10 MG: 10 TABLET ORAL at 05:57

## 2018-06-29 RX ADMIN — FUROSEMIDE 40 MG: 10 INJECTION, SOLUTION INTRAMUSCULAR; INTRAVENOUS at 05:58

## 2018-06-29 RX ADMIN — ASPIRIN 81 MG: 81 TABLET, COATED ORAL at 05:57

## 2018-06-29 RX ADMIN — CLOPIDOGREL 75 MG: 75 TABLET, FILM COATED ORAL at 05:58

## 2018-06-29 ASSESSMENT — COGNITIVE AND FUNCTIONAL STATUS - GENERAL
SUGGESTED CMS G CODE MODIFIER MOBILITY: CH
MOBILITY SCORE: 24

## 2018-06-29 ASSESSMENT — ENCOUNTER SYMPTOMS
DIAPHORESIS: 0
FEVER: 0
DEPRESSION: 0
MEMORY LOSS: 0
CHILLS: 0
ORTHOPNEA: 0
MYALGIAS: 0
ABDOMINAL PAIN: 0
DOUBLE VISION: 0
SENSORY CHANGE: 0
WEAKNESS: 1
SHORTNESS OF BREATH: 1
FALLS: 0
PALPITATIONS: 0
BLURRED VISION: 0
BRUISES/BLEEDS EASILY: 0
INSOMNIA: 0
DIZZINESS: 0
PND: 0
LOSS OF CONSCIOUSNESS: 0
HEADACHES: 0
COUGH: 0

## 2018-06-29 ASSESSMENT — GAIT ASSESSMENTS
DISTANCE (FEET): 1000
GAIT LEVEL OF ASSIST: SUPERVISED

## 2018-06-29 NOTE — THERAPY
"67 y/o male adm for nonrheumatic aortic valve stenosis s/p cardiac cath. Planned TAVR on 7/2/18 . Pt to DC home today. Cardiac education given.  ECT/activity pacing education. No further acute PT services required at this time.    Physical Therapy Evaluation completed.   Bed Mobility:  Supine to Sit: Independent  Transfers: Sit to Stand: Independent  Gait: Level Of Assist: Supervised with No Equipment Needed       Plan of Care: Patient with no further skilled PT needs in the acute care setting at this time  Discharge Recommendations: Equipment: No Equipment Needed. Post-acute therapy Currently anticipate no further skilled therapy needs once patient is discharged from the inpatient setting.    See \"Rehab Therapy-Acute\" Patient Summary Report for complete documentation.     "

## 2018-06-29 NOTE — DISCHARGE INSTRUCTIONS
Discharge Instructions    Discharged to home by car with relative. Discharged via wheelchair, hospital escort: Yes.  Special equipment needed: Not Applicable    Be sure to schedule a follow-up appointment with your primary care doctor or any specialists as instructed.     Discharge Plan:   Diet Plan: Discussed  Activity Level: Discussed  Confirmed Follow up Appointment: Appointment Scheduled  Confirmed Symptoms Management: Discussed  Medication Reconciliation Updated: Yes  Pneumococcal Vaccine Administered/Refused: Not given - Patient refused pneumococcal vaccine  Influenza Vaccine Indication: Not indicated: Previously immunized this influenza season and > 8 years of age    I understand that a diet low in cholesterol, fat, and sodium is recommended for good health. Unless I have been given specific instructions below for another diet, I accept this instruction as my diet prescription.   Other diet: Cardiac Diabetic    Special Instructions: Yes    DO NOT TAKE LASIX  DO NOT TAKE LISINOPRIL for 2 DAYS    Angiogram, Angioplasty, or Stent Placement  Care After  One of the following procedures was done today.  ANGIOGRAM:  A catheter was placed through the blood vessel in your groin, contrast was injected into the vessels, and pictures were taken.  ANGIOPLASTY:  A catheter was placed through the blood vessel in your groin and directed to an area of blocked blood flow. A balloon, and possibly a metal stent were used to open the blockage. If no other blockages are present below this area, your symptoms should improve. If blockages are present below this area, surgery may still be necessary.  STENT:  A catheter was placed in your groin through which a metal mesh tube was placed in a narrowed part of the artery to facilitate blood flow.  You were given intravenous sedation. These medications are rapidly cleared from your bloodstream. You may feel some discomfort at the insertion site after the local anesthetic wears off. This  discomfort should gradually improve over the next several days.  · Only take over-the-counter or prescription medicines for pain, discomfort, or fever as directed by your caregiver.  · Complications are very uncommon after this procedure. Go to the nearest emergency department if you develop any of the following symptoms:  · Worsening pain.  · Bleeding.  · Swelling at the puncture site.  · Lightheadedness.  · Dizziness or fainting.  · Fever or chills.  · If oozing, bleeding, or a lump appears at the puncture site, apply firm pressure directly to the site steadily for 15 minutes and go to the emergency department.  · Keep the skin around the insertion site dry. You may take showers after 24 hours. If the area does get wet, dry the skin completely. Avoid baths until the skin puncture site heals, usually 5 to 7 days.  · Development of redness, increased soreness, or swelling may be signs of a skin infection. Contact your physician.  · Rest for the remainder of the day and avoid any heavy lifting (more than 10 pounds or 4.5 kg). Do not operate heavy machinery, drive, or make legal decisions for the first 24 hours after the procedure. Have a responsible person drive you home.  · You may resume your usual diet after the procedure. Avoid alcoholic beverages for 24 hours after the procedure.  Document Released: 12/18/2006 Document Revised: 03/11/2013 Document Reviewed: 10/17/2007  ExitCare® Patient Information ©2014 [employer].      Clopidogrel tablets  What is this medicine?  CLOPIDOGREL (kloh PID oh grel) helps to prevent blood clots. This medicine is used to prevent heart attack, stroke, or other vascular events in people who are at high risk.  This medicine may be used for other purposes; ask your health care provider or pharmacist if you have questions.  COMMON BRAND NAME(S): Plavix  What should I tell my health care provider before I take this medicine?  They need to know if you have any of the following  conditions:  -bleeding disorders  -bleeding in the brain  -having surgery  -history of stomach bleeding  -an unusual or allergic reaction to clopidogrel, other medicines, foods, dyes, or preservatives  -pregnant or trying to get pregnant  -breast-feeding  How should I use this medicine?  Take this medicine by mouth with a glass of water. Follow the directions on the prescription label. You may take this medicine with or without food. If it upsets your stomach, take it with food. Take your medicine at regular intervals. Do not take it more often than directed. Do not stop taking except on your doctor's advice.  A special MedGuide will be given to you by the pharmacist with each prescription and refill. Be sure to read this information carefully each time.  Talk to your pediatrician regarding the use of this medicine in children. Special care may be needed.  Overdosage: If you think you have taken too much of this medicine contact a poison control center or emergency room at once.  NOTE: This medicine is only for you. Do not share this medicine with others.  What if I miss a dose?  If you miss a dose, take it as soon as you can. If it is almost time for your next dose, take only that dose. Do not take double or extra doses.  What may interact with this medicine?  Do not take this medicine with the following medications:  -dasabuvir; ombitasvir; paritaprevir; ritonavir  -defibrotide  This medicine may also interact with the following medications:  -antiviral medicines for HIV or AIDS  -aspirin  -certain medicines for depression like citalopram, fluoxetine, fluvoxamine  -certain medicines for fungal infections like ketoconazole, fluconazole, voriconazole  -certain medicines for seizures like felbamate, oxcarbazepine, phenytoin  -certain medicines for stomach problems like cimetidine, omeprazole, esomeprazole  -certain medicines that treat or prevent blood clots like warfarin, enoxaparin, dalteparin, apixaban,  dabigatran, rivaroxaban, ticlopidine  -chloramphenicol  -cilostazol  -fluvastatin  -isoniazid  -modafinil  -nicardipine  -NSAIDS, medicines for pain and inflammation, like ibuprofen or naproxen  -quinine  -repaglinide  -tamoxifen  -tolbutamide  -topiramate  -torsemide  This list may not describe all possible interactions. Give your health care provider a list of all the medicines, herbs, non-prescription drugs, or dietary supplements you use. Also tell them if you smoke, drink alcohol, or use illegal drugs. Some items may interact with your medicine.  What should I watch for while using this medicine?  Visit your doctor or health care professional for regular check ups. Do not stop taking your medicine unless your doctor tells you to.  Notify your doctor or health care professional and seek emergency treatment if you develop breathing problems; changes in vision; chest pain; severe, sudden headache; pain, swelling, warmth in the leg; trouble speaking; sudden numbness or weakness of the face, arm or leg. These can be signs that your condition has gotten worse.  If you are going to have surgery or dental work, tell your doctor or health care professional that you are taking this medicine.  Certain genetic factors may reduce the effect of this medicine. Your doctor may use genetic tests to determine treatment.  What side effects may I notice from receiving this medicine?  Side effects that you should report to your doctor or health care professional as soon as possible:  -allergic reactions like skin rash, itching or hives, swelling of the face, lips, or tongue  -signs and symptoms of bleeding such as bloody or black, tarry stools; red or dark-brown urine; spitting up blood or brown material that looks like coffee grounds; red spots on the skin; unusual bruising or bleeding from the eye, gums, or nose  -signs and symptoms of a blood clot such as breathing problems; changes in vision; chest pain; severe, sudden  headache; pain, swelling, warmth in the leg; trouble speaking; sudden numbness or weakness of the face, arm or leg  Side effects that usually do not require medical attention (report to your doctor or health care professional if they continue or are bothersome):  -constipation  -diarrhea  -headache  -upset stomach  This list may not describe all possible side effects. Call your doctor for medical advice about side effects. You may report side effects to FDA at 3-991-DQT-1608.  Where should I keep my medicine?  Keep out of the reach of children.  Store at room temperature of 59 to 86 degrees F (15 to 30 degrees C). Throw away any unused medicine after the expiration date.  NOTE: This sheet is a summary. It may not cover all possible information. If you have questions about this medicine, talk to your doctor, pharmacist, or health care provider.  © 2018 Elsevier/Gold Standard (2016-09-22 10:00:44)      Depression / Suicide Risk    As you are discharged from this [facility] facility, it is important to learn how to keep safe from harming yourself.    Recognize the warning signs:  · Abrupt changes in personality, positive or negative- including increase in energy   · Giving away possessions  · Change in eating patterns- significant weight changes-  positive or negative  · Change in sleeping patterns- unable to sleep or sleeping all the time   · Unwillingness or inability to communicate  · Depression  · Unusual sadness, discouragement and loneliness  · Talk of wanting to die  · Neglect of personal appearance   · Rebelliousness- reckless behavior  · Withdrawal from people/activities they love  · Confusion- inability to concentrate     If you or a loved one observes any of these behaviors or has concerns about self-harm, here's what you can do:  · Talk about it- your feelings and reasons for harming yourself  · Remove any means that you might use to hurt yourself (examples: pills, rope, extension cords, firearm)  · Get  professional help from the community (Mental Health, Substance Abuse, psychological counseling)  · Do not be alone:Call your Safe Contact- someone whom you trust who will be there for you.  · Call your local CRISIS HOTLINE 375-6102 or 979-847-6783  · Call your local Children's Mobile Crisis Response Team [place] (151) 574-7224 or www.[employer]  · Call the toll free National Suicide Prevention Hotlines   · National Suicide Prevention Lifeline 371-339-UFOT (4260)  · National Hope Line Network 800-SUICIDE (115-0404)

## 2018-06-29 NOTE — PROCEDURES
DATE OF SERVICE:  06/28/2018    PROCEDURE:  1.  Atherectomy and stenting of the left anterior descending artery.  2.  Supervision of conscious sedation.    INDICATION:  Severe calcified left anterior descending artery stenosis along   with severe aortic stenosis in need of coronary intervention prior to transcatheter   aortic valve replacement.    COMPLICATIONS:  None.    DESCRIPTION OF PROCEDURE:  After informed consent was obtained, the patient   was brought to cardiac catheterization laboratory in fasting state.  Both   groins were prepped and draped in the usual sterile fashion.  Versed and   fentanyl were administered for conscious sedation.  Xylocaine 2% was then used   as local anesthesia.  Using portable ultrasound, the right common femoral   artery was identified.  A 7-Lao sheath was then placed in the right common   femoral artery using modified Seldinger technique.    Next, a 7-Lao EBU 3.5 guide was seated into the left main.  Guiding shots   were taken in two views.  Due to the severe calcification of the left anterior   descending artery and failed attempt with balloon angioplasty by another   interventionalist a couple of days ago, we initially planned to perform rotational   atherectomy.  Angiomax was then started for anticoagulation.  A Whisper wire   was then advanced to the left anterior descending artery.  With relatively   brief manipulation, it crossed the stenosis into the distal left anterior   descending artery.  A  1.2x12 mm balloon was then advanced over the   Whisper wire to allow wire exchange into rotablator wire, but it could not be   advanced pass the more distal lesion.    At this point, we decided to perform laser athrectomy.   A 0.009 Excimer laser was then advanced into the left anterior descending artery.    Multiple passes were carried out starting with 45x25 fluency and frequency   setting. Some improvement in the stenosis was noted, but severe stenosis   remained.   Multiple passes were then carried out using 45 fluency and 80   frequency, particularly in the more distal lesion.  Next, we performed balloon   angioplasty using 2x8 Emerge balloon.  However, despite of high pressure up   to 14 atmospheres, the balloon was not seen expanded.  Subsequent angiography   also continued to show significant residual stenosis in the left anterior   descending artery.  Due to the fact that the balloon was not visible, we were   uncertain about placing the stent.  At this point, we decided to perform rotational  atherectomy. A  balloon was then advanced to the distal left anterior   descending artery. This time, it could be successfully advanced to allow exchange   of the wire into the [place].  Rotational atherectomy was subsequently  performed using 1.5 mm bur at 150 RPM.  Multiple passes were carried out on   both lesions.  Subsequent angiography showed improvement in stenosis, but   with significant dissection at the more distal lesion.  The [place] was then   exchanged to Ironman wire.  Additional balloon angioplasty was then performed with   2 mm balloon.  At this time, the balloon appeared to be inflated reasonably well.    The balloon was then withdrawn back and a Guidezilla was then advanced into   the mid left anterior descending artery followed by placement of Synergy   2.25x12 mm balloon in the more distal lesion and Synergy 2.5x16 mm into the   more proximal segment. The more proximal stent was postdilated with a 2.5x12 mm  noncompliant balloon up to 16 atmospheres.  Subsequent angiography showed   minimal residual stenosis in the stented segments with brisk MACK-3 flow.  We, therefore, decided to terminate the procedure. The guidewire was withdrawn back.  The final angiography was performed with confirmed acceptable result.    The guiding catheter was removed.    Angiography of the right common femoral artery was performed. It was felt to be   suitable for closure  device.  We attempted to place Perclose, but was not   successful.  Hemostasis obtained using manual compression followed by   placement of FemoStop.  He was given 300 mg of Plavix reloading.  Angiomax was  discontinued at the end of the case.  The patient tolerated the procedure well.    He had no chest discomfort and was transported back to his room in stable   condition.      I did supervise monitoring under conscious sedation starting at   10:11 a.m. until the end of the case at 12:22 p.m.  Of note, Dr. Freddy Phillip was also present as an assistant during the laser atherectomy   portion.    FINDINGS:  Pre-intervention the left anterior descending artery had severely   calcified, eccentric 80% stenosis in the mid portion right around the   first diagonal branch followed by another focal 99% calcified stenosis.    Antegrade flow was slightly sluggish with MACK-2.  Beyond that,   there is another calcified moderate stenosis in the range of 50%.      After intervention, there was about 10-20% residual stenosis in the stented   segments in the mid left anterior descending artery with brisk MACK-3 flow.    PLAN:  Bed rest for 6 hours.  Hydration.  Follow renal function closely.    Dual antiplatelet therapy.  Aggressive risk factor modification.       ____________________________________     MD RANDELL ACOSTA / CLARIBEL    DD:  06/28/2018 17:31:33  DT:  06/28/2018 20:26:28    D#:  [id number]  Job#:  [id number]

## 2018-06-29 NOTE — PROGRESS NOTES
Assumed pt care.  Pt in chair, VSS.  Denies any pain.  Titrated 1.5 L O2 to .5L O2.  SaO2 on 1.5 L 92-96%. Fall precautions in place, call light w/in reach. Will continue to monitor.

## 2018-06-29 NOTE — PROGRESS NOTES
Cardiology Progress Note               Author: Markus Fuller Date & Time created: 2018  6:55 AM     Interval History/Chief Complaint:  68 year old male with low flow low gradient severe symptomatic aortic stenosis, acute systolic congestive heart failure, cardiomyopathy, coronary artery disease with percutaneous intervention with stent placement and chronic kidney disease.    Review of Systems   Constitutional: Positive for malaise/fatigue. Negative for chills and fever.   HENT: Negative for congestion.    Eyes: Negative for blurred vision.   Respiratory: Positive for shortness of breath.    Cardiovascular: Positive for leg swelling. Negative for chest pain, palpitations, orthopnea and PND.   Gastrointestinal: Negative for abdominal pain.   Genitourinary: Negative for dysuria.   Musculoskeletal: Negative for joint pain and myalgias.   Skin: Negative for rash.   Neurological: Positive for weakness. Negative for dizziness and loss of consciousness.   Psychiatric/Behavioral: The patient does not have insomnia.        Physical Exam   Constitutional: He is oriented to person, place, and time. He appears well-developed and well-nourished.   HENT:   Head: Normocephalic and atraumatic.   Eyes: Conjunctivae are normal.   Neck: Normal range of motion. Neck supple.   Cardiovascular: Normal rate and regular rhythm.    Murmur heard.  Pulmonary/Chest: Effort normal and breath sounds normal.   Abdominal: Soft. Bowel sounds are normal.   Musculoskeletal: Normal range of motion. He exhibits edema.   Neurological: He is alert and oriented to person, place, and time.   Skin: Skin is warm and dry.   Psychiatric: He has a normal mood and affect.       Hemodynamics:  Temp (24hrs), Av.4 °C (97.6 °F), Min:36.3 °C (97.3 °F), Max:36.8 °C (98.3 °F)  Temperature: 36.3 °C (97.3 °F)  Pulse  Av.3  Min: 70  Max: 106   Blood Pressure : 106/69     Respiratory:    Respiration: 20, Pulse Oximetry: 98 %     Work Of Breathing / Effort:  Mild  RUL Breath Sounds: Clear, RML Breath Sounds: Clear, RLL Breath Sounds: Diminished, DANDY Breath Sounds: Clear, LLL Breath Sounds: Diminished  Fluids:     Weight: 94.4 kg (208 lb 1.8 oz)  GI/Nutrition:  Orders Placed This Encounter   Procedures   • Diet Order Cardiac, Diabetic     Standing Status:   Standing     Number of Occurrences:   1     Order Specific Question:   Diet:     Answer:   Cardiac [6]     Order Specific Question:   Diet:     Answer:   Diabetic [3]     Lab Results:  Recent Labs      06/27/18   0341  06/27/18   0950  06/29/18   0414   WBC  8.0  8.1  7.8   RBC  3.60*  3.58*  3.48*   HEMOGLOBIN  10.6*  10.8*  10.1*   HEMATOCRIT  34.1*  33.6*  33.8*   MCV  94.7  93.9  97.1   MCH  29.4  30.2  29.0   MCHC  31.1*  32.1*  29.9*   RDW  46.8  45.8  49.4   PLATELETCT  300  297  284   MPV  9.5  9.5  9.9     Recent Labs      06/27/18   0950  06/28/18   1520  06/29/18   0413   SODIUM  137  136  140   POTASSIUM  4.6  4.8  4.8   CHLORIDE  107  109  110   CO2  22  19*  22   GLUCOSE  257*  119*  119*   BUN  33*  30*  35*   CREATININE  1.69*  1.34  1.74*   CALCIUM  8.7  8.4*  9.0     Recent Labs      06/27/18   0950   APTT  41.7*   INR  1.09                 Medications reviewed.      Current Facility-Administered Medications:   •  nitroglycerin (NITROSTAT) tablet 0.4 mg, 0.4 mg, Sublingual, Q5 MIN PRN, Katelin Stanley M.D.  •  acetaminophen (TYLENOL) tablet 650 mg, 650 mg, Oral, Q6HRS PRN, Katelin Stanley M.D.  •  ondansetron (ZOFRAN) syringe/vial injection 4 mg, 4 mg, Intravenous, Q4HRS PRN, Markus Fuller M.D.  •  dexamethasone (DECADRON) injection 4 mg, 4 mg, Intravenous, Once PRN, Markus Fuller M.D.  •  diphenhydrAMINE (BENADRYL) injection 25 mg, 25 mg, Intravenous, Q6HRS PRN, Markus Fuller M.D.  •  haloperidol lactate (HALDOL) injection 1 mg, 1 mg, Intravenous, Q6HRS PRN, Markus Fuller M.D.  •  scopolamine (TRANSDERM-SCOP) patch 1 Patch, 1 Patch, Transdermal, Q72HRS PRN, Markus Fuller M.D.  •   clopidogrel (PLAVIX) tablet 75 mg, 75 mg, Oral, DAILY, Markus Fuller M.D., 75 mg at 06/29/18 0558  •  lisinopril (PRINIVIL) 10 MG tablet 10 mg, 10 mg, Oral, DAILY, Tammi Martinez M.D., 10 mg at 06/29/18 0557  •  carvedilol (COREG) tablet 12.5 mg, 12.5 mg, Oral, BID WITH MEALS, Tammi Martinez M.D., 12.5 mg at 06/28/18 1737  •  furosemide (LASIX) injection 40 mg, 40 mg, Intravenous, Q DAY, Lalo Fesharaki, A.P.N., 40 mg at 06/29/18 0558  •  insulin regular (HUMULIN R) injection 2-9 Units, 2-9 Units, Subcutaneous, 4X/DAY ACHS, 2 Units at 06/28/18 2042 **AND** Accu-Chek ACHS, , , Q AC AND BEDTIME(S) **AND** NOTIFY MD and PharmD, , , Once **AND** glucose 4 g chewable tablet 16 g, 16 g, Oral, Q15 MIN PRN **AND** dextrose 50% (D50W) injection 25 mL, 25 mL, Intravenous, Q15 MIN PRN, Sayeh Fesharaki, A.P.N.  •  aspirin EC (ECOTRIN) tablet 81 mg, 81 mg, Oral, DAILY, Markus Fuller M.D., 81 mg at 06/29/18 0557  •  atorvastatin (LIPITOR) tablet 40 mg, 40 mg, Oral, Nightly, Lalo Fesharaki, A.P.N., 40 mg at 06/28/18 2046  •  insulin glargine (LANTUS) injection 16 Units, 16 Units, Subcutaneous, Q EVENING, Darryleh Fesharaki, A.P.N., 16 Units at 06/28/18 2044    CARDIAC STUDIES/PROCEDURES:    CARDIAC CATHETERIZATION CONCLUSIONS by Dr. Stanley (06/28/18)  Atherectomy and stenting of the left anterior descending artery with placement orSynergy 2.5x16 mm into the more proximal segment     CARDIAC CATHETERIZATION CONCLUSIONS (06/26/18)  Percutaneous transluminal coronary angioplasty of the mid left   anterior descending artery stenosis with 1.5x15 mm  balloon; however,   unsuccessful attempts to balloon with any additional larger size equipment.      CARDIAC CATHETERIZATION CONCLUSIONS (06/02/18)  1.  Multivessel severe coronary artery disease with long, high-grade mid left   anterior descending artery stenosis, high-grade ostial first and second   diagonal branch stenosis, nonobstructive ostial circumflex artery  stenosis and   nonobstructive right coronary artery stenosis.  2.  Elevated right heart pressures with PA systolic pressure of 75 mmHg.     CAROTID ULTRASOUND (06/22/18)  Mild (<50%) bilateral internal carotid disease  Normal vertebral and subclavianartery flow bilaterally.    CTA OF CHEST AND ABDOMEN (06/27/18)  1.  Very limited exam for evaluation of aortic structures as no intravenous contrast could be administered.  2.  Aortic annulus area of approximately 534 sq mm.  3.  Coronary ostia appear to be greater than 10 mm from the annulus.  4.  Bilateral pleural fluid collections, moderate on the RIGHT and small on the LEFT, with associated atelectasis.  5.  Mildly prominent pulmonary interstitium concerning for edema.  6.  Minimal ascites.  7.  No abdominal aortic aneurysm.  8.  Mild atherosclerotic calcification of the iliac arterial system bilaterally.  Evaluation is limited due to lack of IV contrast.  9.  Increased colonic stool.    DOBUTAMINE STRESS ECHOCARDIOGRAM (06/22/18)  Abnormal resting echo with mild dyskinesis of the LV apex and EF 30%. Resting aortic valve peak and mean gradients 36 / 20.  Stress images reveal increased contractility of basal to mid anterior and inferior walls, with more pronounced apical dyskinesis.  Peak / mean aortic valve gradients on 15 mcg dobutamine are 72 /43  Abnormal dobutamine stress echo demonstrating significant increase in aortic valve gradients with dobutamine infusion.  LV dysfunction at rest and stress. Worsening apical dyskinesis with stress suggesting ischemia.     ECHOCARDIOGRAM CONCLUSIONS (06/01/18)  Moderately reduced left ventricular systolic function.  Left ventricular ejection fraction is visually estimated to be 35%.  Global hypokinesis with regional variation with severe hypokinesis of the anteroapical and septal walls.  Moderate aortic stenosis; severely of aortic stenosis   Aortic valve area calculated from the continuity equation is 0.82.   Vmax is 2.8  m/s. Transvalvular gradients are - Peak: 32 mmHg, Mean: 21 mmHg.  Unable to estimate pulmonary artery pressure due to an inadequate tricuspid regurgitant jet.  Moderate mitral regurgitation.  probably underestimated due to low ejection fraction.No prior study is available for comparison.      EKG performed on (06/28/18) was reviewed: EKG shows sinus rhythm with low voltage of frontal leads and non-specific T wave abnormalities..  EKG performed on (06/26/18) EKG shows sinus rhythm with anterior Q waves with non-specific T wave abnormalities.  EKG performed on (06/25/18) EKG shows sinus rhythm with anterior Q waves with lateral ST segment depressions.  EKG performed on (06/21/18) EKG shows sinus rhythm.     Laboratory results of (06/29/18) were reviewed. Bun of 35 mg/dl, creatinine levels of 1.75 mg/dl noted.  Laboratory results of (06/28/18) were reviewed. Bun of 30 mg/dl, creatinine levels of 1.34 mg/dl noted.  Laboratory results of (06/27/18) Bun of 34 mg/dl, creatinine levels of 1.84 mg/dl noted.  Laboratory results of (06/26/18) Bun of 31 mg/dl, creatinine levels of 1.63 mg/dl noted.  Laboratory results of (06/25/18) Bun of 30 mg/dl, creatinine levels of 1.8 mg/dl noted.  Laboratory results of (06/18/18) Bun of 25 mg/dl, creatinine levels of 1.34 mg/dl noted.    Medical Decision Making, by Problem:  Active Hospital Problems    Diagnosis   • Severe aortic stenosis [I35.0]   • Acute exacerbation of CHF (congestive heart failure) (HCC) [I50.9]   • Dilated cardiomyopathy (HCC) [I42.0]   • CAD (coronary artery disease) [I25.10]   • Stage 3 chronic kidney disease [N18.3]   • NYHA class 3 acute on chronic systolic heart failure (HCC) [I50.23]       Plan:    1. Aortic stenosis: He is symptomatic with his aortic stenosis, NYHA class III-IV. He is not surgical candidate per Dr. Strong with STS score of 5.5. We will plan for TAVR for 07/02/18. he understands the risks and benefits and agrees with plan.  2. Status post  acute on chronic systolic congestive heart failure with cardiomyopathy: The overall volume status is aequtate.  3. Coronary artery disease with stent placement: He is clinically doing well without recurrence of his angina.  We will continue with current medical care.  4. Chronic kidney disease stage III (managed by Dr. Kirkland): We will continue to follow labs.  5. 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  6. Disposition: We will plan for discharge today.     The risks, benefits, and alternatives to TAVR, general anesthesia and transesophageal echocardiogram were discussed in great detail. Specific risks mentioned include bleeding, infection, kidney damage, allergic reaction, cardiac perforation with possible tamponade requiring kenneth-cardiocentesis or possible open heart surgery if the patient is a candidate. Lastly the risks of heart attack, stroke, and death were discussed; the risks of major complications includingall cause mortality of 2.2%, disabling stroke of 1.1%, the risk on new pacemaker of 12% and the risk of vascular complications of 4.1%. The patient verbalized understanding of these potential complications and wishes to proceed with this procedure. (Source [facility] Registry).  The procedure will be performed completely in collaboration with cardiac surgery.     CC Dr. Nixon Claros MD      Quality-Core Measures

## 2018-06-29 NOTE — PROGRESS NOTES
"Pt alert and oriented. Needs minimal assistance with ADLs and ambulation. VSS on 1.5L BNC. /70   Pulse 89   Temp 36.4 °C (97.6 °F)   Resp 18   Ht 1.727 m (5' 8\")   Wt 94.4 kg (208 lb 1.8 oz)   SpO2 98%   BMI 31.64 kg/m²  No complaints of pain during shift. Dressing to R groin site is dry and intact. No signs of hematoma. Telemetry monitoring continued. Blood glucose monitoring continued. Safety and fall precautions maintained. No significant events overnight. Will continue to monitor.   "

## 2018-06-30 NOTE — DISCHARGE SUMMARY
DATE OF ADMISSION:  06/22/2018.    DATE OF DISCHARGE:  06/29/2018.    DISCHARGE DIAGNOSES:  1.  Low flow, low gradient severe aortic stenosis noted by Dobutamine   stress test  06/22/2018, plan for transcatheter aortic valve replacement   on 07/02/2018.  2.  New cardiomyopathy with ejection fraction of 30-35%.  3.  Coronary angiography on 06/22/2018 revealed tight left anterior descending   stenosis with 2 small diagonal branches stenosis.  4.  Coronary angiography on 06/26/2018 s/p percutaneous transluminal coronary   angiography to left anterior descending.  5.  Coronary angiography on 06/28/2018 status post laser and rotational   atherectomy & drug-eluting stents x2 to mid LAD.  6.  Diabetes mellitus type 2.  7.  Valvular heart failure, New York Heart Association class III/IV.  8.  Hypertension.  9.  Dyslipidemia.  10.  Chronic kidney disease stage III.  11. Moderate MR    CONSULTS:  Dr. Strong.    PROCEDURES:  1.  Dobutamine stress test on 06/22/2018 reveals low flow, low gradient,   severe aortic stenosis.  2.  Echocardiogram on 06/01/2018 revealed LVEF 35%, global hypokinesis,   moderate to severe aortic stenosis, moderate mitral regurgitation.  3.  Coronary angiography by Dr. Fuller on 06/22/2018 revealed high-grade mid   LAD stenosis, high-grade ostial first and second diagonal branch stenosis,   non-obstructive ostial circumflex and non-obstructive RCA stenosis.  4.  Coronary angiography on 06/26/2018, PTCA of the mid LAD, unsuccessful   surgery of PCI or stent placement of the mid left anterior descending.  5.  Coronary angiography on 06/28/2018 by Dr. Stanley, status post laser   and rotational atherectomy and drug-eluting stents x2 to the mid LAD with no   significant residual.    HISTORY OF PRESENT ILLNESS AND HOSPITAL COURSE:  The patient is a very   pleasant 68-year-old gentleman who presented with worsening of dyspnea and   fatigue with exertion.  The patient has been experiencing LE edema and  [place]   for the past 2-1/2 months . He underwent cardiac catheterization on 6/22/18  which revealed tight disease to mid LAD and two small diagonal branch stenosis,  echocardiogram showed severely depressed EF with LVEF of 30-35%.  He underwent dobutamine stress echo which showed severe   aortic stenosis, low flow, low gradient with peak gradient of 72 and mean   gradient of 43.  He also has history of diabetes , hypertension, and dyslipidemia.    Dr. Strong was consulted and he recommended TAVR secondary to   high operative mortality and morbidity with SAVR.  He then underwent PCI of the LAD on 6/28/18.    Patient is being discharged today in stable condition.  He is scheduled for   transcatheter aortic valve replacement on 07/02/2018.  His creatinine is  elevated this morning.  Lasix was discontinued , repeat BMP prior to TAVR, .   Case was discussed with DR Fuller.    LABORATORY DATA:  WBC 7.8, hemoglobin 10, hematocrit 33.8, and platelet count   284.  Sodium 140, potassium 4.8, creatinine is 1.74, and BUN 35.    DISCHARGE MEDICATIONS:  1.  Resume aspirin 81 mg daily.  2.  Resume Lantus insulin 16 units as instructed every evening.  3.  Resume Lipitor 40 mg daily.  4.  Stop Lisinopril 40 mg daily.  5.  Start Lisinopril 10 mg daily.  6.  Carvedilol 12.5 mg twice a day.  7.  Stop Lasix.  8.  Clopidogrel (Plavix) 75 mg daily.  9.  Resume tobramycin ophthalmic solution 1 drop in both eyes.  10.  Resume vitamin D 2000 units daily.    DISCHARGE INSTRUCTIONS:  1.  The patient is scheduled for transcatheter aortic valve replacement     on Monday 07/02/2018.  2.  Our office will contact the patient for repeat BMP prior to   TAVR.  3.  The patient understands to contact our office with   any change in clinical condition.       ____________________________________     HENOK MONTILLA / CLARIBEL    DD:  06/29/2018 16:23:31  DT:  06/29/2018 18:12:52    D#:  [id number]  Job#:  [id number]

## 2018-06-30 NOTE — PROGRESS NOTES
Cardiology Progress Note               Author: Vincenzo To Date & Time created: 2018  5:32 PM     Interval History:  No telemetry events.  No acute events overnight.  Feeling a little better.    PCI of LAD done.    Chief Complaint:  Dyspnea.    Review of Systems   Constitutional: Negative for diaphoresis and fever.   HENT: Negative for nosebleeds.    Eyes: Negative for blurred vision and double vision.   Respiratory: Positive for shortness of breath. Negative for cough.    Cardiovascular: Negative for chest pain and palpitations.   Gastrointestinal: Negative for abdominal pain.   Genitourinary: Negative for dysuria and frequency.   Musculoskeletal: Negative for falls and myalgias.   Skin: Negative for rash.   Neurological: Negative for dizziness, sensory change and headaches.   Endo/Heme/Allergies: Does not bruise/bleed easily.   Psychiatric/Behavioral: Negative for depression and memory loss.       Physical Exam   Constitutional: He is oriented to person, place, and time. No distress.   HENT:   Head: Atraumatic.   Eyes: EOM are normal. Right eye exhibits no discharge. Left eye exhibits no discharge.   Neck: Neck supple. No JVD present.   Cardiovascular: Normal rate and regular rhythm.    Murmur heard.  there is 4/6 systolic murmur heard best at the right border of the aortic valve area. The murmur does not radiate to the carotids.     Pulmonary/Chest: No respiratory distress. He has no rales.   Abdominal: There is no rebound and no guarding.   Musculoskeletal: He exhibits no edema or tenderness.   Neurological: He is alert and oriented to person, place, and time.   Skin: Skin is warm and dry.   Psychiatric: He has a normal mood and affect.   Nursing note and vitals reviewed.      Hemodynamics:  Temp (24hrs), Av.6 °C (97.9 °F), Min:36.3 °C (97.3 °F), Max:36.9 °C (98.4 °F)  Temperature: 36.9 °C (98.4 °F)  Pulse  Av.3  Min: 70  Max: 106   Blood Pressure : 128/71     Respiratory:    Respiration: 16,  Pulse Oximetry: 94 %     Work Of Breathing / Effort: Mild  RUL Breath Sounds: Clear, RML Breath Sounds: Clear, RLL Breath Sounds: Diminished, DANDY Breath Sounds: Clear, LLL Breath Sounds: Diminished  Fluids:     Weight: 94.4 kg (208 lb 1.8 oz)  GI/Nutrition:  No orders of the defined types were placed in this encounter.    Lab Results:  Recent Labs      06/27/18   0341  06/27/18   0950  06/29/18   0414   WBC  8.0  8.1  7.8   RBC  3.60*  3.58*  3.48*   HEMOGLOBIN  10.6*  10.8*  10.1*   HEMATOCRIT  34.1*  33.6*  33.8*   MCV  94.7  93.9  97.1   MCH  29.4  30.2  29.0   MCHC  31.1*  32.1*  29.9*   RDW  46.8  45.8  49.4   PLATELETCT  300  297  284   MPV  9.5  9.5  9.9     Recent Labs      06/27/18   0950  06/28/18   1520  06/29/18   0413   SODIUM  137  136  140   POTASSIUM  4.6  4.8  4.8   CHLORIDE  107  109  110   CO2  22  19*  22   GLUCOSE  257*  119*  119*   BUN  33*  30*  35*   CREATININE  1.69*  1.34  1.74*   CALCIUM  8.7  8.4*  9.0     Recent Labs      06/27/18   0950   APTT  41.7*   INR  1.09                     Medical Decision Making, by Problem:  Severe AS (symptomatic)  CAD   Stage 3 CKD.  HTN  Heart failure exacerbation   Stage C systolic HF LVEF of 35%  NYHA class 3      Plan:    Cont current medications at current dose.   DC home.  Back on Monday for TAVR.    Vincenzo Carpenter MD.  [facility].      Quality-Core Measures

## 2018-07-02 ENCOUNTER — HOSPITAL ENCOUNTER (INPATIENT)
Facility: MEDICAL CENTER | Age: 68
LOS: 2 days | DRG: 266 | End: 2018-07-04
Attending: INTERNAL MEDICINE | Admitting: INTERNAL MEDICINE
Payer: COMMERCIAL

## 2018-07-02 ENCOUNTER — APPOINTMENT (OUTPATIENT)
Dept: RADIOLOGY | Facility: MEDICAL CENTER | Age: 68
DRG: 266 | End: 2018-07-02
Attending: NURSE PRACTITIONER
Payer: COMMERCIAL

## 2018-07-02 PROBLEM — Z95.2 S/P TAVR (TRANSCATHETER AORTIC VALVE REPLACEMENT): Status: ACTIVE | Noted: 2018-07-02

## 2018-07-02 PROBLEM — I35.0 SEVERE AORTIC STENOSIS: Status: RESOLVED | Noted: 2018-06-25 | Resolved: 2018-07-02

## 2018-07-02 PROBLEM — I35.0 AORTIC STENOSIS: Status: RESOLVED | Noted: 2018-06-21 | Resolved: 2018-07-02

## 2018-07-02 LAB
ABO GROUP BLD: NORMAL
ABO GROUP BLD: NORMAL
ALBUMIN SERPL BCP-MCNC: 2.7 G/DL (ref 3.2–4.9)
ALBUMIN/GLOB SERPL: 1.1 G/DL
ALP SERPL-CCNC: 95 U/L (ref 30–99)
ALT SERPL-CCNC: 19 U/L (ref 2–50)
ANION GAP SERPL CALC-SCNC: 5 MMOL/L (ref 0–11.9)
AST SERPL-CCNC: 13 U/L (ref 12–45)
BILIRUB SERPL-MCNC: 0.4 MG/DL (ref 0.1–1.5)
BLD GP AB SCN SERPL QL: NORMAL
BNP SERPL-MCNC: 2554 PG/ML (ref 0–100)
BNP SERPL-MCNC: 3326 PG/ML (ref 0–100)
BUN SERPL-MCNC: 37 MG/DL (ref 8–22)
CALCIUM SERPL-MCNC: 8.4 MG/DL (ref 8.5–10.5)
CHLORIDE SERPL-SCNC: 110 MMOL/L (ref 96–112)
CO2 SERPL-SCNC: 21 MMOL/L (ref 20–33)
CREAT SERPL-MCNC: 1.81 MG/DL (ref 0.5–1.4)
EKG IMPRESSION: NORMAL
ERYTHROCYTE [DISTWIDTH] IN BLOOD BY AUTOMATED COUNT: 48.7 FL (ref 35.9–50)
GLOBULIN SER CALC-MCNC: 2.5 G/DL (ref 1.9–3.5)
GLUCOSE BLD-MCNC: 115 MG/DL (ref 65–99)
GLUCOSE BLD-MCNC: 118 MG/DL (ref 65–99)
GLUCOSE BLD-MCNC: 171 MG/DL (ref 65–99)
GLUCOSE BLD-MCNC: 250 MG/DL (ref 65–99)
GLUCOSE SERPL-MCNC: 116 MG/DL (ref 65–99)
HCT VFR BLD AUTO: 30.2 % (ref 42–52)
HGB BLD-MCNC: 9.7 G/DL (ref 14–18)
MCH RBC QN AUTO: 30 PG (ref 27–33)
MCHC RBC AUTO-ENTMCNC: 32.1 G/DL (ref 33.7–35.3)
MCV RBC AUTO: 93.5 FL (ref 81.4–97.8)
PLATELET # BLD AUTO: 221 K/UL (ref 164–446)
PMV BLD AUTO: 9.7 FL (ref 9–12.9)
POTASSIUM SERPL-SCNC: 4.9 MMOL/L (ref 3.6–5.5)
PROT SERPL-MCNC: 5.2 G/DL (ref 6–8.2)
RBC # BLD AUTO: 3.23 M/UL (ref 4.7–6.1)
RH BLD: NORMAL
RH BLD: NORMAL
SODIUM SERPL-SCNC: 136 MMOL/L (ref 135–145)
WBC # BLD AUTO: 4.5 K/UL (ref 4.8–10.8)

## 2018-07-02 PROCEDURE — B24BZZ4 ULTRASONOGRAPHY OF HEART WITH AORTA, TRANSESOPHAGEAL: ICD-10-PCS | Performed by: THORACIC SURGERY (CARDIOTHORACIC VASCULAR SURGERY)

## 2018-07-02 PROCEDURE — 93325 DOPPLER ECHO COLOR FLOW MAPG: CPT

## 2018-07-02 PROCEDURE — 160009 HCHG ANES TIME/MIN: Performed by: INTERNAL MEDICINE

## 2018-07-02 PROCEDURE — 700101 HCHG RX REV CODE 250

## 2018-07-02 PROCEDURE — 84295 ASSAY OF SERUM SODIUM: CPT

## 2018-07-02 PROCEDURE — 93010 ELECTROCARDIOGRAM REPORT: CPT | Performed by: INTERNAL MEDICINE

## 2018-07-02 PROCEDURE — 110372 HCHG SHELL REV 278: Performed by: INTERNAL MEDICINE

## 2018-07-02 PROCEDURE — A9270 NON-COVERED ITEM OR SERVICE: HCPCS | Performed by: NURSE PRACTITIONER

## 2018-07-02 PROCEDURE — A6402 STERILE GAUZE <= 16 SQ IN: HCPCS | Performed by: INTERNAL MEDICINE

## 2018-07-02 PROCEDURE — 160036 HCHG PACU - EA ADDL 30 MINS PHASE I: Performed by: INTERNAL MEDICINE

## 2018-07-02 PROCEDURE — C1883 ADAPT/EXT, PACING/NEURO LEAD: HCPCS | Performed by: INTERNAL MEDICINE

## 2018-07-02 PROCEDURE — C1760 CLOSURE DEV, VASC: HCPCS | Performed by: INTERNAL MEDICINE

## 2018-07-02 PROCEDURE — 83880 ASSAY OF NATRIURETIC PEPTIDE: CPT | Mod: 91

## 2018-07-02 PROCEDURE — 160048 HCHG OR STATISTICAL LEVEL 1-5: Performed by: INTERNAL MEDICINE

## 2018-07-02 PROCEDURE — C1769 GUIDE WIRE: HCPCS | Performed by: INTERNAL MEDICINE

## 2018-07-02 PROCEDURE — B3101ZZ FLUOROSCOPY OF THORACIC AORTA USING LOW OSMOLAR CONTRAST: ICD-10-PCS | Performed by: INTERNAL MEDICINE

## 2018-07-02 PROCEDURE — 85347 COAGULATION TIME ACTIVATED: CPT

## 2018-07-02 PROCEDURE — 160035 HCHG PACU - 1ST 60 MINS PHASE I: Performed by: INTERNAL MEDICINE

## 2018-07-02 PROCEDURE — 82330 ASSAY OF CALCIUM: CPT

## 2018-07-02 PROCEDURE — 93321 DOPPLER ECHO F-UP/LMTD STD: CPT

## 2018-07-02 PROCEDURE — X2RF332 REPLACEMENT OF AORTIC VALVE USING ZOOPLASTIC TISSUE, RAPID DEPLOYMENT TECHNIQUE, PERCUTANEOUS APPROACH, NEW TECHNOLOGY GROUP 2: ICD-10-PCS | Performed by: INTERNAL MEDICINE

## 2018-07-02 PROCEDURE — 85027 COMPLETE CBC AUTOMATED: CPT

## 2018-07-02 PROCEDURE — 700105 HCHG RX REV CODE 258

## 2018-07-02 PROCEDURE — 503001 HCHG PERFUSION: Performed by: INTERNAL MEDICINE

## 2018-07-02 PROCEDURE — 85014 HEMATOCRIT: CPT

## 2018-07-02 PROCEDURE — 700111 HCHG RX REV CODE 636 W/ 250 OVERRIDE (IP)

## 2018-07-02 PROCEDURE — 503000 HCHG SUTURE, OHS: Performed by: INTERNAL MEDICINE

## 2018-07-02 PROCEDURE — 700102 HCHG RX REV CODE 250 W/ 637 OVERRIDE(OP): Performed by: NURSE PRACTITIONER

## 2018-07-02 PROCEDURE — 93312 ECHO TRANSESOPHAGEAL: CPT

## 2018-07-02 PROCEDURE — 80053 COMPREHEN METABOLIC PANEL: CPT

## 2018-07-02 PROCEDURE — 84132 ASSAY OF SERUM POTASSIUM: CPT

## 2018-07-02 PROCEDURE — 86900 BLOOD TYPING SEROLOGIC ABO: CPT

## 2018-07-02 PROCEDURE — 82962 GLUCOSE BLOOD TEST: CPT | Mod: 91

## 2018-07-02 PROCEDURE — C1894 INTRO/SHEATH, NON-LASER: HCPCS | Performed by: INTERNAL MEDICINE

## 2018-07-02 PROCEDURE — 160042 HCHG SURGERY MINUTES - EA ADDL 1 MIN LEVEL 5: Performed by: INTERNAL MEDICINE

## 2018-07-02 PROCEDURE — 770020 HCHG ROOM/CARE - TELE (206)

## 2018-07-02 PROCEDURE — 86850 RBC ANTIBODY SCREEN: CPT

## 2018-07-02 PROCEDURE — 86901 BLOOD TYPING SEROLOGIC RH(D): CPT

## 2018-07-02 PROCEDURE — 82803 BLOOD GASES ANY COMBINATION: CPT

## 2018-07-02 PROCEDURE — 500002 HCHG ADHESIVE, DERMABOND: Performed by: INTERNAL MEDICINE

## 2018-07-02 PROCEDURE — 160002 HCHG RECOVERY MINUTES (STAT): Performed by: INTERNAL MEDICINE

## 2018-07-02 PROCEDURE — 93005 ELECTROCARDIOGRAM TRACING: CPT | Performed by: NURSE PRACTITIONER

## 2018-07-02 PROCEDURE — 160031 HCHG SURGERY MINUTES - 1ST 30 MINS LEVEL 5: Performed by: INTERNAL MEDICINE

## 2018-07-02 PROCEDURE — 71045 X-RAY EXAM CHEST 1 VIEW: CPT

## 2018-07-02 PROCEDURE — 94760 N-INVAS EAR/PLS OXIMETRY 1: CPT

## 2018-07-02 PROCEDURE — 700111 HCHG RX REV CODE 636 W/ 250 OVERRIDE (IP): Performed by: NURSE PRACTITIONER

## 2018-07-02 PROCEDURE — 82947 ASSAY GLUCOSE BLOOD QUANT: CPT

## 2018-07-02 PROCEDURE — 502240 HCHG MISC OR SUPPLY RC 0272: Performed by: INTERNAL MEDICINE

## 2018-07-02 PROCEDURE — C1725 CATH, TRANSLUMIN NON-LASER: HCPCS | Performed by: INTERNAL MEDICINE

## 2018-07-02 DEVICE — KIT TRANSCATHETER HEART VALVE  SAPIEN-3 29MM: Type: IMPLANTABLE DEVICE | Status: FUNCTIONAL

## 2018-07-02 DEVICE — DEVICE CLSR 6FR HMST IMPL SLF STS PLUS ANGIOSEAL (10EA/CA): Type: IMPLANTABLE DEVICE | Status: FUNCTIONAL

## 2018-07-02 RX ORDER — SODIUM CHLORIDE 9 MG/ML
INJECTION, SOLUTION INTRAVENOUS CONTINUOUS
Status: ACTIVE | OUTPATIENT
Start: 2018-07-02 | End: 2018-07-02

## 2018-07-02 RX ORDER — SODIUM CHLORIDE 9 MG/ML
INJECTION, SOLUTION INTRAVENOUS
Status: DISPENSED
Start: 2018-07-02 | End: 2018-07-02

## 2018-07-02 RX ORDER — LIDOCAINE HYDROCHLORIDE 10 MG/ML
INJECTION, SOLUTION INFILTRATION; PERINEURAL
Status: COMPLETED
Start: 2018-07-02 | End: 2018-07-02

## 2018-07-02 RX ORDER — CARVEDILOL 12.5 MG/1
12.5 TABLET ORAL 2 TIMES DAILY WITH MEALS
Status: DISCONTINUED | OUTPATIENT
Start: 2018-07-02 | End: 2018-07-02

## 2018-07-02 RX ORDER — POTASSIUM CHLORIDE 20 MEQ/1
20 TABLET, EXTENDED RELEASE ORAL DAILY
Status: DISCONTINUED | OUTPATIENT
Start: 2018-07-02 | End: 2018-07-04 | Stop reason: HOSPADM

## 2018-07-02 RX ORDER — ENALAPRILAT 1.25 MG/ML
2.5 INJECTION INTRAVENOUS EVERY 6 HOURS PRN
Status: DISCONTINUED | OUTPATIENT
Start: 2018-07-02 | End: 2018-07-04 | Stop reason: HOSPADM

## 2018-07-02 RX ORDER — FUROSEMIDE 10 MG/ML
40 INJECTION INTRAMUSCULAR; INTRAVENOUS ONCE
Status: COMPLETED | OUTPATIENT
Start: 2018-07-02 | End: 2018-07-02

## 2018-07-02 RX ORDER — DIPHENHYDRAMINE HCL 25 MG
25 TABLET ORAL NIGHTLY PRN
Status: DISCONTINUED | OUTPATIENT
Start: 2018-07-02 | End: 2018-07-04 | Stop reason: HOSPADM

## 2018-07-02 RX ORDER — DIPHENHYDRAMINE HYDROCHLORIDE 50 MG/ML
25 INJECTION INTRAMUSCULAR; INTRAVENOUS EVERY 6 HOURS PRN
Status: DISCONTINUED | OUTPATIENT
Start: 2018-07-02 | End: 2018-07-04 | Stop reason: HOSPADM

## 2018-07-02 RX ORDER — INSULIN GLARGINE 100 [IU]/ML
16 INJECTION, SOLUTION SUBCUTANEOUS NIGHTLY
Status: DISCONTINUED | OUTPATIENT
Start: 2018-07-02 | End: 2018-07-04 | Stop reason: HOSPADM

## 2018-07-02 RX ORDER — ATORVASTATIN CALCIUM 40 MG/1
40 TABLET, FILM COATED ORAL NIGHTLY
Status: DISCONTINUED | OUTPATIENT
Start: 2018-07-02 | End: 2018-07-04 | Stop reason: HOSPADM

## 2018-07-02 RX ORDER — LISINOPRIL 10 MG/1
10 TABLET ORAL DAILY
Status: DISCONTINUED | OUTPATIENT
Start: 2018-07-03 | End: 2018-07-02

## 2018-07-02 RX ORDER — LIDOCAINE HYDROCHLORIDE 10 MG/ML
0.5 INJECTION, SOLUTION INFILTRATION; PERINEURAL
Status: ACTIVE | OUTPATIENT
Start: 2018-07-02 | End: 2018-07-03

## 2018-07-02 RX ORDER — FUROSEMIDE 10 MG/ML
INJECTION INTRAMUSCULAR; INTRAVENOUS
Status: COMPLETED
Start: 2018-07-02 | End: 2018-07-02

## 2018-07-02 RX ORDER — CARVEDILOL 25 MG/1
25 TABLET ORAL 2 TIMES DAILY WITH MEALS
Status: DISCONTINUED | OUTPATIENT
Start: 2018-07-02 | End: 2018-07-04 | Stop reason: HOSPADM

## 2018-07-02 RX ORDER — FUROSEMIDE 10 MG/ML
40 INJECTION INTRAMUSCULAR; INTRAVENOUS
Status: DISCONTINUED | OUTPATIENT
Start: 2018-07-02 | End: 2018-07-04 | Stop reason: HOSPADM

## 2018-07-02 RX ORDER — CLOPIDOGREL BISULFATE 75 MG/1
75 TABLET ORAL DAILY
Status: DISCONTINUED | OUTPATIENT
Start: 2018-07-02 | End: 2018-07-04 | Stop reason: HOSPADM

## 2018-07-02 RX ORDER — LISINOPRIL 20 MG/1
20 TABLET ORAL DAILY
Status: DISCONTINUED | OUTPATIENT
Start: 2018-07-03 | End: 2018-07-04 | Stop reason: HOSPADM

## 2018-07-02 RX ORDER — ONDANSETRON 2 MG/ML
4 INJECTION INTRAMUSCULAR; INTRAVENOUS EVERY 4 HOURS PRN
Status: DISCONTINUED | OUTPATIENT
Start: 2018-07-02 | End: 2018-07-04 | Stop reason: HOSPADM

## 2018-07-02 RX ORDER — ACETAMINOPHEN 325 MG/1
650 TABLET ORAL EVERY 6 HOURS PRN
Status: DISCONTINUED | OUTPATIENT
Start: 2018-07-02 | End: 2018-07-04 | Stop reason: HOSPADM

## 2018-07-02 RX ORDER — HYDRALAZINE HYDROCHLORIDE 20 MG/ML
10 INJECTION INTRAMUSCULAR; INTRAVENOUS
Status: DISCONTINUED | OUTPATIENT
Start: 2018-07-02 | End: 2018-07-02

## 2018-07-02 RX ORDER — SODIUM CHLORIDE 9 MG/ML
INJECTION, SOLUTION INTRAVENOUS CONTINUOUS
Status: DISCONTINUED | OUTPATIENT
Start: 2018-07-02 | End: 2018-07-02

## 2018-07-02 RX ADMIN — ATORVASTATIN CALCIUM 40 MG: 40 TABLET, FILM COATED ORAL at 17:58

## 2018-07-02 RX ADMIN — FUROSEMIDE 40 MG: 10 INJECTION INTRAMUSCULAR; INTRAVENOUS at 11:06

## 2018-07-02 RX ADMIN — INSULIN GLARGINE 16 UNITS: 100 INJECTION, SOLUTION SUBCUTANEOUS at 20:36

## 2018-07-02 RX ADMIN — FUROSEMIDE 40 MG: 10 INJECTION, SOLUTION INTRAMUSCULAR; INTRAVENOUS at 17:58

## 2018-07-02 RX ADMIN — POTASSIUM CHLORIDE 20 MEQ: 1500 TABLET, EXTENDED RELEASE ORAL at 13:52

## 2018-07-02 RX ADMIN — HYDRALAZINE HYDROCHLORIDE 10 MG: 20 INJECTION, SOLUTION INTRAMUSCULAR; INTRAVENOUS at 14:21

## 2018-07-02 RX ADMIN — CARVEDILOL 25 MG: 25 TABLET, FILM COATED ORAL at 17:57

## 2018-07-02 RX ADMIN — INSULIN LISPRO 2 UNITS: 100 INJECTION, SOLUTION INTRAVENOUS; SUBCUTANEOUS at 18:02

## 2018-07-02 RX ADMIN — FUROSEMIDE 40 MG: 10 INJECTION, SOLUTION INTRAMUSCULAR; INTRAVENOUS at 11:06

## 2018-07-02 RX ADMIN — VITAMIN D, TAB 1000IU (100/BT) 4000 UNITS: 25 TAB at 13:52

## 2018-07-02 RX ADMIN — INSULIN LISPRO 3 UNITS: 100 INJECTION, SOLUTION INTRAVENOUS; SUBCUTANEOUS at 20:37

## 2018-07-02 ASSESSMENT — ENCOUNTER SYMPTOMS
BLURRED VISION: 0
NAUSEA: 0
ORTHOPNEA: 1
WEIGHT LOSS: 0
PALPITATIONS: 0
PSYCHIATRIC NEGATIVE: 1
NERVOUS/ANXIOUS: 0
EYES NEGATIVE: 1
VOMITING: 0
WEAKNESS: 1
DOUBLE VISION: 0
BRUISES/BLEEDS EASILY: 0
WEAKNESS: 0
DIZZINESS: 0
MUSCULOSKELETAL NEGATIVE: 1
CLAUDICATION: 0
CONSTITUTIONAL NEGATIVE: 1
BRUISES/BLEEDS EASILY: 1
CHILLS: 0
FEVER: 0
NEUROLOGICAL NEGATIVE: 1
SHORTNESS OF BREATH: 1
GASTROINTESTINAL NEGATIVE: 1
FOCAL WEAKNESS: 0
COUGH: 0
DEPRESSION: 0
MYALGIAS: 0
HEADACHES: 0
ABDOMINAL PAIN: 0

## 2018-07-02 ASSESSMENT — PAIN SCALES - GENERAL
PAINLEVEL_OUTOF10: 0

## 2018-07-02 ASSESSMENT — COPD QUESTIONNAIRES
COPD SCREENING SCORE: 6
DO YOU EVER COUGH UP ANY MUCUS OR PHLEGM?: NO/ONLY WITH OCCASIONAL COLDS OR INFECTIONS
DURING THE PAST 4 WEEKS HOW MUCH DID YOU FEEL SHORT OF BREATH: SOME OF THE TIME
HAVE YOU SMOKED AT LEAST 100 CIGARETTES IN YOUR ENTIRE LIFE: YES

## 2018-07-02 ASSESSMENT — LIFESTYLE VARIABLES
EVER FELT BAD OR GUILTY ABOUT YOUR DRINKING: NO
EVER_SMOKED: YES
TOTAL SCORE: 0
EVER HAD A DRINK FIRST THING IN THE MORNING TO STEADY YOUR NERVES TO GET RID OF A HANGOVER: NO
HOW MANY TIMES IN THE PAST YEAR HAVE YOU HAD 5 OR MORE DRINKS IN A DAY: 0
ON A TYPICAL DAY WHEN YOU DRINK ALCOHOL HOW MANY DRINKS DO YOU HAVE: 1
TOTAL SCORE: 0
TOTAL SCORE: 0
AVERAGE NUMBER OF DAYS PER WEEK YOU HAVE A DRINK CONTAINING ALCOHOL: 3
PACK_YEARS: 22
EVER_SMOKED: YES
HAVE YOU EVER FELT YOU SHOULD CUT DOWN ON YOUR DRINKING: NO
HAVE PEOPLE ANNOYED YOU BY CRITICIZING YOUR DRINKING: NO
ALCOHOL_USE: YES
CONSUMPTION TOTAL: NEGATIVE

## 2018-07-02 ASSESSMENT — COGNITIVE AND FUNCTIONAL STATUS - GENERAL
SUGGESTED CMS G CODE MODIFIER MOBILITY: CH
SUGGESTED CMS G CODE MODIFIER DAILY ACTIVITY: CH
DAILY ACTIVITIY SCORE: 24
MOBILITY SCORE: 24

## 2018-07-02 ASSESSMENT — PATIENT HEALTH QUESTIONNAIRE - PHQ9
SUM OF ALL RESPONSES TO PHQ9 QUESTIONS 1 AND 2: 0
1. LITTLE INTEREST OR PLEASURE IN DOING THINGS: NOT AT ALL
2. FEELING DOWN, DEPRESSED, IRRITABLE, OR HOPELESS: NOT AT ALL

## 2018-07-02 NOTE — OP REPORT
DATE OF PROCEDURE: 07/02/18    REFERRING PHYSICIAN: Dr. Venus Claros    PROCEDURES:  1. Transcatheter aortic valve replacement.  2. Preclose closure.  3. Angio-Seal closure.  4. Ultrasound guided femoral artery and femoral vein access.    PRE PROCEDURAL DIAGNOSIS:  1. Severe symptomatic aortic stenosis, NYHA IIIB/IV.    POST PROCEDURAL DIAGNOSIS:  1. Successful transcatheter aortic valve replacement (TAVR) with # 29 Monroy Prashanth S3 valve, transfemoral approach, under general anesthesia.  2. Successful Preclose closure.  3. Successful Angio-Seal closure.    INDICATION:    68 year old male with low flow low gradient severe symptomatic aortic stenosis, acute systolic congestive heart failure, cardiomyopathy, coronary artery disease with percutaneous intervention with stent placement and chronic kidney disease. Due to his symptoms he was scheduled for TAVR.    DESCRIPTION OF PROCEDURE:  After informed consent was signed by the   patient, the patient was brought into the OR 19.  He was prepped and draped in   usual sterile manner.  Prior to the procedure, right radial arterial insertion, intubation   and transesophageal echocardiogram were performed by Kimo Pena.    The initial timeout was performed.     A 6-Greenlandic sheath was placed in the right femoral artery and a 6-Greenlandic sheath was   placed in the right femoral vein by myself. A 6-Greenlandic arterial sheath was placed in the left   femoral artery by Melissa Carney.  Of note, all sheaths were placed under ultrasound guidance.     Through the venous femoral sheath, a temporary pacemaker was positioned at the   right ventricle.  Pacing was confirmed.  Through the right arterial sheath, a pigtail catheter   was positioned in the distal aorta and aortogram was performed. This catheter was advanced   to the ascending aorta at the valve level and aortogram was repeated. Over the left femoral   sheath PreClose closure was performed with 2 devices. An 8 -Greenlandic  Sheath was placed.   Through this 8-Anguillan sheath, an AL1 was positioned into the ascending aorta. This catheter   and sheath were removed after the insertion of a Lunderquist wire.  Over the Lunderquist   wire a 16-Anguillan expandable-sheath was advanced by Dr. Strong.  IV heparin was given.   Through the expandable sheath,  an AL1 catheter was positioned at the ascending aorta.    The Lunderquist wire was removed and exchanged for a straightwire. The AL1 catheter   was used to cross the aortic valve with this wire.  The AL1 catheter was exchanged for an   exchange length J wire.  Over this wire, a 6-Anguillan pigtail catheter was positioned into   the left ventricle. Through the pigtail catheter an Amplatz extra stiff wire was positioned   across  the aortic valve. Over the Amplatz wire the Commander delivery system was inserted.    The Prashanth S3 valve stent was loaded onto the balloon and positioned across the aortic valve.    Rapid pacing was performed and the stent was deployed. The delivery system was removed.   The valve was deployed by Dr. Strong.    The patient tolerated the procedure well. At the end of procedure, all pacemaker wire,   pigtail catheters and sheaths were removed.  The left femoral arteriotomy site was closed   via the PreClose system.  The eight femoral arteriotomy site was closed via Angio-Seal.   The patient was then extubated and transferred to PACU in stable condition.    IMPRESSION:  1. Successful transcatheter aortic valve replacement (TAVR) with # 29 Monroy Prashanth S3 valve, transfemoral approach, under general anesthesia.  2. Successful Preclose closure.  3. Successful Angio-Seal closure.    RECOMMENDATION: Medical therapy with addition of Plavix for one year after coronary stent placement.

## 2018-07-02 NOTE — PROGRESS NOTES
Cardiology Progress Note TAVR               Author: Markus LISA Mcnairguillaume Date & Time created: 2018  10:34 AM     Interval History/Chief Complaint:  68 year old male status post TAVR.    Review of Systems   Constitutional: Negative.  Negative for chills, fever, malaise/fatigue and weight loss.   HENT: Negative.  Negative for hearing loss.    Eyes: Negative.  Negative for blurred vision and double vision.   Respiratory: Positive for shortness of breath. Negative for cough.    Cardiovascular: Positive for leg swelling. Negative for chest pain, palpitations and claudication.   Gastrointestinal: Negative.  Negative for abdominal pain, nausea and vomiting.   Genitourinary: Negative.  Negative for dysuria and urgency.   Musculoskeletal: Negative.  Negative for joint pain and myalgias.   Skin: Negative.  Negative for itching and rash.   Neurological: Negative.  Negative for dizziness, focal weakness, weakness and headaches.   Endo/Heme/Allergies: Negative.  Does not bruise/bleed easily.   Psychiatric/Behavioral: Negative.  Negative for depression. The patient is not nervous/anxious.        Physical Exam   Constitutional: He appears well-developed and well-nourished.   HENT:   Head: Normocephalic and atraumatic.   Eyes: Conjunctivae are normal. Pupils are equal, round, and reactive to light.   Neck: Normal range of motion. Neck supple.   Cardiovascular: Normal rate and regular rhythm.    No murmur heard.  Pulmonary/Chest: Effort normal and breath sounds normal.   Decreased sounds of right base.   Abdominal: Soft. Bowel sounds are normal.   Musculoskeletal: Normal range of motion. He exhibits edema.   Skin: Skin is warm and dry.   Psychiatric: He has a normal mood and affect.       Hemodynamics:  Temp (24hrs), Av.9 °C (96.6 °F), Min:35.9 °C (96.6 °F), Max:35.9 °C (96.6 °F)  Temperature: 35.9 °C (96.6 °F)  Pulse  Av  Min: 87  Max: 87   Blood Pressure : 138/76     Respiratory:    Respiration: 14, Pulse Oximetry: 93 %            Fluids:     Weight: 92.1 kg (203 lb 0.7 oz)  GI/Nutrition:  No orders of the defined types were placed in this encounter.    Lab Results:              Recent [facility]      [facility]02[facility]18 0620   [facility]     [facility] Labs      07/02/18 0620   [facility]         Medications reviewed.      Current Facility-Administered Medications:   •  lidocaine (XYLOCAINE) 1%  injection, 0.5 mL, Intradermal, Once PRN, Markus Fuller M.D.  •  aspirin EC (ECOTRIN) tablet 81 mg, 81 mg, Oral, DAILY, Desi Solano, A.P.R.N.  •  atorvastatin (LIPITOR) tablet 40 mg, 40 mg, Oral, Nightly, Desi Solano, A.P.R.N.  •  vitamin D (cholecalciferol) tablet 4,000 Units, 4,000 Units, Oral, DAILY, Desi Solano, A.P.R.N.  •  carvedilol (COREG) tablet 12.5 mg, 12.5 mg, Oral, BID WITH MEALS, Desi Solano, A.P.R.N.  •  clopidogrel (PLAVIX) tablet 75 mg, 75 mg, Oral, DAILY, Desi Solano, A.P.R.N.  •  insulin glargine (LANTUS) injection 16 Units, 16 Units, Subcutaneous, Nightly, Desi Solaon, A.P.R.N.  •  lisinopril (PRINIVIL) tablet 10 mg, 10 mg, Oral, DAILY, Desi Solano, A.P.R.N.  •  Respiratory Care per Protocol, , Nebulization, Continuous RT, Desi Solano, A.P.R.N.  •  NS infusion, , Intravenous, Continuous, Desi Solano, A.P.R.N.  •  hydrALAZINE (APRESOLINE) injection 10 mg, 10 mg, Intravenous, Q30 MIN PRN, Desi Solano, A.P.R.N.  •  acetaminophen (TYLENOL) tablet 650 mg, 650 mg, Oral, Q6HRS PRN, Desi Solano, A.P.R.N.  •  diphenhydrAMINE (BENADRYL) tablet/capsule 25 mg, 25 mg, Oral, HS PRN, Desi Solano, A.P.R.N.  •  ondansetron (ZOFRAN) syringe/vial injection 4 mg, 4 mg, Intravenous, Q4HRS PRN, Desi Solano, A.P.R.N.  •  diphenhydrAMINE (BENADRYL) injection 25 mg, 25 mg, Intravenous, Q6HRS PRN, Desi Solano, A.P.R.N.  •  insulin lispro (HUMALOG) injection 2-9 Units, 2-9 Units, Subcutaneous, 4X/DAY ACHS, Desi Solano, A.P.R.N.  •   furosemide (LASIX) injection 40 mg, 40 mg, Intravenous, BID DIURETIC, Desi Solano, A.P.R.N.  •  potassium chloride SA (Kdur) tablet 20 mEq, 20 mEq, Oral, DAILY, Desi Solano, A.P.R.N.    CARDIAC STUDIES/PROCEDURES:     CARDIAC CATHETERIZATION CONCLUSIONS by Dr. Stanley (06/28/18)  Atherectomy and stenting of the left anterior descending artery with placement orSynergy 2.5x16 mm into the more proximal segment      CARDIAC CATHETERIZATION CONCLUSIONS (06/26/18)  Percutaneous transluminal coronary angioplasty of the mid left   anterior descending artery stenosis with 1.5x15 mm  balloon; however,   unsuccessful attempts to balloon with any additional larger size equipment.     CARDIAC CATHETERIZATION CONCLUSIONS (06/02/18)  1.  Multivessel severe coronary artery disease with long, high-grade mid left   anterior descending artery stenosis, high-grade ostial first and second   diagonal branch stenosis, nonobstructive ostial circumflex artery stenosis and   nonobstructive right coronary artery stenosis.  2.  Elevated right heart pressures with PA systolic pressure of 75 mmHg.     CAROTID ULTRASOUND (06/22/18)  Mild (<50%) bilateral internal carotid disease  Normal vertebral and subclavianartery flow bilaterally.     CTA OF CHEST AND ABDOMEN (06/27/18)  1.  Very limited exam for evaluation of aortic structures as no intravenous contrast could be administered.  2.  Aortic annulus area of approximately 534 sq mm.  3.  Coronary ostia appear to be greater than 10 mm from the annulus.  4.  Bilateral pleural fluid collections, moderate on the RIGHT and small on the LEFT, with associated atelectasis.  5.  Mildly prominent pulmonary interstitium concerning for edema.  6.  Minimal ascites.  7.  No abdominal aortic aneurysm.  8.  Mild atherosclerotic calcification of the iliac arterial system bilaterally.  Evaluation is limited due to lack of IV contrast.  9.  Increased colonic stool.     DOBUTAMINE STRESS  ECHOCARDIOGRAM (06/22/18)  Abnormal resting echo with mild dyskinesis of the LV apex and EF 30%. Resting aortic valve peak and mean gradients 36 / 20.  Stress images reveal increased contractility of basal to mid anterior and inferior walls, with more pronounced apical dyskinesis.  Peak / mean aortic valve gradients on 15 mcg dobutamine are 72 /43  Abnormal dobutamine stress echo demonstrating significant increase in aortic valve gradients with dobutamine infusion.  LV dysfunction at rest and stress. Worsening apical dyskinesis with stress suggesting ischemia.     ECHOCARDIOGRAM CONCLUSIONS (06/01/18)  Moderately reduced left ventricular systolic function.  Left ventricular ejection fraction is visually estimated to be 35%.  Global hypokinesis with regional variation with severe hypokinesis of the anteroapical and septal walls.  Moderate aortic stenosis; severely of aortic stenosis   Aortic valve area calculated from the continuity equation is 0.82.   Vmax is 2.8 m/s. Transvalvular gradients are - Peak: 32 mmHg, Mean: 21 mmHg.  Unable to estimate pulmonary artery pressure due to an inadequate tricuspid regurgitant jet.  Moderate mitral regurgitation.  probably underestimated due to low ejection fraction.No prior study is available for comparison.      EKG performed on (07/02/18) was reviewed: EKG shows sinus rhythm with low voltage of frontal leads and non-specific T wave abnormalities..  EKG performed on (06/28/18) EKG shows sinus rhythm with low voltage of frontal leads and non-specific T wave abnormalities.  EKG performed on (06/26/18) EKG shows sinus rhythm with anterior Q waves with non-specific T wave abnormalities.  EKG performed on (06/25/18) EKG shows sinus rhythm with anterior Q waves with lateral ST segment depressions.  EKG performed on (06/21/18) EKG shows sinus rhythm.     Laboratory results of (07/02/18) were reviewed. Bun of 37 mg/dl, creatinine levels of 1.81 mg/dl noted.  Laboratory results of  (06/29/18) Bun of 35 mg/dl, creatinine levels of 1.75 mg/dl noted.  Laboratory results of (06/28/18) Bun of 30 mg/dl, creatinine levels of 1.34 mg/dl noted.  Laboratory results of (06/27/18) Bun of 34 mg/dl, creatinine levels of 1.84 mg/dl noted.  Laboratory results of (06/26/18) Bun of 31 mg/dl, creatinine levels of 1.63 mg/dl noted.  Laboratory results of (06/25/18) Bun of 30 mg/dl, creatinine levels of 1.8 mg/dl noted.  Laboratory results of (06/18/18) Bun of 25 mg/dl, creatinine levels of 1.34 mg/dl noted.     TRANSESOPHAGEAL ECHOCARDIOGRAM CONCLUSIONS by Dr. Godwin (07/02/18)  Results pending.    Medical Decision Making, by Problem:  Active Hospital Problems    Diagnosis   • *S/P TAVR (transcatheter aortic valve replacement) [Z95.2]   • Acute exacerbation of CHF (congestive heart failure) ([facility]) [I50.9]   • Stented coronary artery [Z95.5]   • CAD (coronary artery disease) [I25.10]   • Stage 3 chronic kidney disease [N18.3]       Plan:    1. Successful transcatheter aortic valve replacement (TAVR) with # 29 Monroy Prashanth S3 valve, transfemoral approach, under general anesthesia (07/02/18).  2. Acute on chronic systolic congestive heart failure with cardiomyopathy: The overall volume status is elevated. We have started diuresis.  3. Coronary artery disease with stent placement: He is clinically doing well without recurrence of his angina.  e will continue with current medical care.  4. Pleural effusion: We will order thoracentesis for tomorrow.  5. Chronic kidney disease stage III (managed by Dr. Kirkland): We will continue to follow labs.  6. Additional information: his wife works in IT department at [employer].    CC Dr. Nixon Claros MD      Quality-Core Measures

## 2018-07-02 NOTE — OP REPORT
DATE OF SERVICE:  07/02/2018    REFERRING PHYSICIAN:  Markus Fuller MD and Venus Claros MD    PREOPERATIVE DIAGNOSES:  Severe aortic stenosis (calcific or degenerative),   acute on chronic left ventricular systolic and diastolic failure, congestive   heart failure (NYHA class 3-4), dilated cardiomyopathy, chronic kidney disease   (stage III), coronary artery disease, status post recent percutaneous   coronary intervention.    POSTOPERATIVE DIAGNOSES:  Severe aortic stenosis (calcific or degenerative),   acute on chronic left ventricular systolic and diastolic failure, congestive   heart failure (NYHA class 3-4), dilated cardiomyopathy, chronic kidney disease   (stage III), coronary artery disease, status post recent percutaneous   coronary intervention.    PROCEDURES:  Transcatheter aortic valve replacement (TAVR 29 mm S3 Monroy   pericardial valve), and intraoperative transesophageal echocardiography.    SURGEON:  Melissa Strong MD and Markus Fuller MD    ASSISTANT:  Stephon Tamayo MD    ANESTHESIOLOGIST:  Kimo Godwin MD    ANESTHESIA:  General endotracheal.    ESTIMATED BLOOD LOSS:  Minimal.    COMPLICATIONS:  None.    INDICATIONS:  The patient is a very pleasant 68-year-old white male with   severe symptomatic aortic stenosis.  Echocardiography (DSE) showed peak and   mean transvalvular gradients of 72 and 43 mmHg respectively.  His left   ventricular ejection fraction was approximately 30%.  Cardiac catheterization   showed 2-vessel coronary artery disease.  He underwent stenting of the left   anterior descending artery.    DESCRIPTION OF PROCEDURE:  The patient was brought to the operating room and   placed on the operating room table in the supine position.  After successful   induction of general anesthesia and endotracheal intubation, the patient was   prepped and draped in the usual sterile fashion.  Intraoperative   transesophageal echocardiography confirmed a size 29 aortic valve.  In    collaboration with Dr. Fuller, bilateral femoral ultrasound-guided   arteriovenous access was obtained.  Dr. Fuller positioned a temporary   transvenous pacemaker in the right ventricle and a pigtail catheter in the   right coronary sinus.  The deployment angle was determined.  A small 1 cm   incision was made in the left groin and 2 Perclose sutures were deployed.  A   16-[place]ian eSheath was then placed in the left common femoral artery and its   tip was positioned in the abdominal aorta.  The aortic valve was crossed with   a wire.  Aortic balloon valvuloplasty was not performed.  Deployment catheter   with a 29 mm S3 Monroy pericardial valve at its tip was positioned across the   aortic annulus.  Dr. Fuller inflated the balloon during valve implantation.    Wires and catheters were removed.  Intraoperative transesophageal   echocardiography showed a rather trivial paravalvular leak.  The left femoral   eSheath was removed and the Perclose sutures were tightened down.  When   adequate hemostasis had been obtained, a small left groin incision was closed   in 2 layers using Vicryl sutures.  The remainder of the operation will be   dictated by Dr. Fuller.  There were no apparent complications.  The patient   tolerated the procedure well and left the operating room in stable condition.       ____________________________________     MD CORINA MANNING / CLARIBEL    DD:  07/02/2018 10:35:42  DT:  07/02/2018 10:50:08    D#:  [id number]  Job#:  [id number]    cc: OLESYA FALLON MD

## 2018-07-02 NOTE — CARE PLAN
Problem: Pain  Goal: Alleviation of Pain or a reduction in pain to the patient's comfort goal  No signs or symptoms of pain at this time. Educated pt on informing staff of any discomfort and pain.

## 2018-07-02 NOTE — H&P
Physician H&P    Patient ID:  Ashish Wiley  [id number]  68 y.o. male  1950    History:  Primary Diagnosis: Outpatient TAVR.    HPI:  68 year old male with low flow low gradient severe symptomatic aortic stenosis, acute systolic congestive heart failure, cardiomyopathy, coronary artery disease with percutaneous intervention with stent placement and chronic kidney disease. Due to his symptoms he was scheduled for TAVR.    Past Medical History:  has a past medical history of Acute exacerbation of CHF (congestive heart failure) ([facility]) (6/25/2018); Aortic stenosis; CHF (congestive heart failure) ([facility]); Diabetes ([facility]); Dilated cardiomyopathy ([facility]); Hyperlipidemia; Hypertension; NYHA class 3 acute on chronic systolic heart failure ([facility]) (6/25/2018); and Severe aortic stenosis (6/25/2018).  Past Surgical History:  has a past surgical history that includes tonsillectomy.  Past Social History:  reports that he quit smoking about 29 years ago. He has a 22.00 pack-year smoking history. He has never used smokeless tobacco. He reports that he drinks about 0.6 oz of alcohol per week . He reports that he does not use drugs.  Past Family History:   Family History   Problem Relation Age of Onset   • Lung Disease Mother      COPD   • Heart Disease Father    • Heart Failure Father    • Hypertension Father    • Hyperlipidemia Father    • Cancer Maternal Grandmother      BRAIN TUMOR   • Stroke Maternal Grandfather    • Other Paternal Grandmother      INTESTINAL PROBLEM   • Stroke Paternal Grandfather      Allergies: Sulfa drugs    Medications reviewed.    Current Medications:  Prior to Admission medications    Medication Sig Start Date End Date Taking? Authorizing Provider   Cholecalciferol (HM VITAMIN D3) 4000 units Cap Take 1 Cap by mouth every day.   Yes Physician Outpatient   lisinopril (PRINIVIL) 10 MG Tab Take 1 Tab by mouth every day. 6/30/18   LUAN Rivas   carvedilol (COREG) 12.5 MG Tab Take 1 Tab by mouth 2  "times a day, with meals. 6/29/18   LUAN Rivas   clopidogrel (PLAVIX) 75 MG Tab Take 1 Tab by mouth every day. 6/30/18   LUAN Rivas   Exenatide ER 2 MG Pen-injector Inject 2 mg as instructed every 7 days. 10/18/16   Venus Claros M.D.   insulin glargine (LANTUS) 100 UNIT/ML Solution Inject 16 Units as instructed every evening. Pt had 8 units night prior to surgery    Physician Outpatient   atorvastatin (LIPITOR) 40 MG Tab Take 40 mg by mouth every evening.    Physician Outpatient   aspirin EC (ECOTRIN) 81 MG Tablet Delayed Response Take 81 mg by mouth every day.    Physician Outpatient       Review of Systems:  Review of Systems   Constitutional: Positive for malaise/fatigue. Negative for chills, fever and weight loss.   HENT: Negative.  Negative for hearing loss.    Eyes: Negative.  Negative for blurred vision and double vision.   Respiratory: Positive for shortness of breath. Negative for cough.    Cardiovascular: Positive for orthopnea and leg swelling. Negative for chest pain, palpitations and claudication.   Gastrointestinal: Negative.  Negative for abdominal pain, nausea and vomiting.   Genitourinary: Negative.  Negative for dysuria and urgency.   Musculoskeletal: Negative.  Negative for joint pain and myalgias.   Skin: Negative.  Negative for itching and rash.   Neurological: Positive for weakness. Negative for dizziness, focal weakness and headaches.   Endo/Heme/Allergies: Positive for environmental allergies. Bruises/bleeds easily.   Psychiatric/Behavioral: Negative.  Negative for depression. The patient is not nervous/anxious.      Blood pressure 138/76, pulse 87, temperature 35.9 °C (96.6 °F), resp. rate 14, height 1.727 m (5' 8\"), weight 92.1 kg (203 lb 0.7 oz), SpO2 93 %.    Physical Examination:  Physical Exam   Constitutional: He is oriented to person, place, and time. He appears well-developed and well-nourished.   HENT:   Head: Normocephalic and atraumatic.   Eyes: " Conjunctivae are normal. Pupils are equal, round, and reactive to light.   Neck: Normal range of motion. Neck supple.   Cardiovascular: Normal rate and regular rhythm.    Murmur heard.  Pulmonary/Chest: Effort normal and breath sounds normal.   Abdominal: Soft. Bowel sounds are normal.   Musculoskeletal: Normal range of motion. He exhibits no edema.   Neurological: He is alert and oriented to person, place, and time.   Skin: Skin is warm and dry.   Psychiatric: He has a normal mood and affect.     CARDIAC STUDIES/PROCEDURES:     CARDIAC CATHETERIZATION CONCLUSIONS by Dr. Stanley (06/28/18)  Atherectomy and stenting of the left anterior descending artery with placement orSynergy 2.5x16 mm into the more proximal segment      CARDIAC CATHETERIZATION CONCLUSIONS (06/26/18)  Percutaneous transluminal coronary angioplasty of the mid left   anterior descending artery stenosis with 1.5x15 mm  balloon; however,   unsuccessful attempts to balloon with any additional larger size equipment.      CARDIAC CATHETERIZATION CONCLUSIONS (06/02/18)  1.  Multivessel severe coronary artery disease with long, high-grade mid left   anterior descending artery stenosis, high-grade ostial first and second   diagonal branch stenosis, nonobstructive ostial circumflex artery stenosis and   nonobstructive right coronary artery stenosis.  2.  Elevated right heart pressures with PA systolic pressure of 75 mmHg.     CAROTID ULTRASOUND (06/22/18)  Mild (<50%) bilateral internal carotid disease  Normal vertebral and subclavianartery flow bilaterally.     CTA OF CHEST AND ABDOMEN (06/27/18)  1.  Very limited exam for evaluation of aortic structures as no intravenous contrast could be administered.  2.  Aortic annulus area of approximately 534 sq mm.  3.  Coronary ostia appear to be greater than 10 mm from the annulus.  4.  Bilateral pleural fluid collections, moderate on the RIGHT and small on the LEFT, with associated atelectasis.  5.   Mildly prominent pulmonary interstitium concerning for edema.  6.  Minimal ascites.  7.  No abdominal aortic aneurysm.  8.  Mild atherosclerotic calcification of the iliac arterial system bilaterally.  Evaluation is limited due to lack of IV contrast.  9.  Increased colonic stool.     DOBUTAMINE STRESS ECHOCARDIOGRAM (06/22/18)  Abnormal resting echo with mild dyskinesis of the LV apex and EF 30%. Resting aortic valve peak and mean gradients 36 / 20.  Stress images reveal increased contractility of basal to mid anterior and inferior walls, with more pronounced apical dyskinesis.  Peak / mean aortic valve gradients on 15 mcg dobutamine are 72 /43  Abnormal dobutamine stress echo demonstrating significant increase in aortic valve gradients with dobutamine infusion.  LV dysfunction at rest and stress. Worsening apical dyskinesis with stress suggesting ischemia.     ECHOCARDIOGRAM CONCLUSIONS (06/01/18)  Moderately reduced left ventricular systolic function.  Left ventricular ejection fraction is visually estimated to be 35%.  Global hypokinesis with regional variation with severe hypokinesis of the anteroapical and septal walls.  Moderate aortic stenosis; severely of aortic stenosis   Aortic valve area calculated from the continuity equation is 0.82.   Vmax is 2.8 m/s. Transvalvular gradients are - Peak: 32 mmHg, Mean: 21 mmHg.  Unable to estimate pulmonary artery pressure due to an inadequate tricuspid regurgitant jet.  Moderate mitral regurgitation.  probably underestimated due to low ejection fraction.No prior study is available for comparison.      EKG performed on (06/28/18) EKG shows sinus rhythm with low voltage of frontal leads and non-specific T wave abnormalities..  EKG performed on (06/26/18) EKG shows sinus rhythm with anterior Q waves with non-specific T wave abnormalities.  EKG performed on (06/25/18) EKG shows sinus rhythm with anterior Q waves with lateral ST segment depressions.  EKG performed on  (06/21/18) EKG shows sinus rhythm.     Laboratory results of (06/29/18) Bun of 35 mg/dl, creatinine levels of 1.75 mg/dl noted.  Laboratory results of (06/28/18) Bun of 30 mg/dl, creatinine levels of 1.34 mg/dl noted.  Laboratory results of (06/27/18) Bun of 34 mg/dl, creatinine levels of 1.84 mg/dl noted.  Laboratory results of (06/26/18) Bun of 31 mg/dl, creatinine levels of 1.63 mg/dl noted.  Laboratory results of (06/25/18) Bun of 30 mg/dl, creatinine levels of 1.8 mg/dl noted.  Laboratory results of (06/18/18) Bun of 25 mg/dl, creatinine levels of 1.34 mg/dl noted.    Impression/Plan:    1. Aortic stenosis: He is symptomatic with his aortic stenosis, NYHA class III-IV. He is not surgical candidate per Dr. Strong with STS score of 5.5. We will proceed with TAVR. He understands the risks and benefits and agrees with plan.  2. Status post acute on chronic systolic congestive heart failure with cardiomyopathy: The overall volume status is aequtate.  3. Coronary artery disease with stent placement: He is clinically doing well without recurrence of his angina.  We will continue with current medical care.  4. Chronic kidney disease stage III (managed by Dr. Kirkland): We will continue to follow labs.  5. Additional information: his wife works in IT department at [employer].  6. Disposition: We will plan for discharge today.     The risks, benefits, and alternatives to TAVR, general anesthesia and transesophageal echocardiogram were discussed in great detail. Specific risks mentioned include bleeding, infection, kidney damage, allergic reaction, cardiac perforation with possible tamponade requiring kenneth-cardiocentesis or possible open heart surgery if the patient is a candidate. Lastly the risks of heart attack, stroke, and death were discussed; the risks of major complications includingall cause mortality of 2.2%, disabling stroke of 1.1%, the risk on new pacemaker of 12% and the risk of vascular  complications of 4.1%. The patient verbalized understanding of these potential complications and wishes to proceed with this procedure. ([employer]).  The procedure will be performed completely in collaboration with cardiac surgery.     CC Dr. Nixon Claros MD    More than 31 minutes from 04:00 to 4:20 pm on (06/25/18) and 05:41 to 05:22 am on (07/02/18) were spent to review the patient's chart including patient information, all studies, labs and EKG and recommendations from the device company. The information was presented by myself at the [facility] [facility] Conference on (07/27/18). The decision was made by this committee to proceed with TAVR.    Pre Procedure Assessment:  <EXAMSHORT2>      Pre Procedure update:   No changes.    Markus Fuller  7/2/2018

## 2018-07-02 NOTE — PROGRESS NOTES
Received pt via [facility]. Pt care assumed. Wife and daughter at bedside. Telemetry monitor placed on pt. SR. Pt AAOx4, pt denies pain at this time. Right groin site oozing small amount of blood, but is soft with no hematoma. Gauze and tegaderm dressing placed on site. No further bleeding at this time. Left groin site CDI, DANILO with dermabond. Site soft with no hematoma. Pt on 3L oxymask. Denies SOB. No signs of acute distress noted at this time. Pt to be off of bedrest at 1610 per recovery RN. POC discussed with pt and verbalizes no questions. Pt denies any additional needs at this time. Bed in lowest position, bed alarm on, pt educated on fall risk and verbalized understanding, call light within reach, hourly rounding initiated.

## 2018-07-02 NOTE — OR SURGEON
Immediate Post OP Note    PreOp Diagnosis: AS    PostOp Diagnosis: AS    Procedure(s):  TAVR (29mm S3 Monroy pericardial valve)  DAYANNA    Surgeon(s):  RASTA Gruber M.D.    Assistant:  Stephon Tamayo M.D.    Anesthesiologist/Type of Anesthesia:  Anesthesiologist: Kimo Godwin M.D./General    Surgical Staff:  Circulator: Kristi Lai R.N.; Jonathan Loomis R.N.  Perfusionist: Sonali Lam  Scrub Person: AYAH Eastman IV; Timothy Lassiter  Radiology Technologist: Paula Cabral  Cath Lab Tech: Calos Decker    Specimens removed if any: None    Estimated Blood Loss: Min    Findings: AS    Complications: None        7/2/2018 10:28 AM Melissa Strong M.D.

## 2018-07-02 NOTE — OR NURSING
"Pt A&OX4. VSS. Pt on 4 L of oxygen via oxymask. Bilateral groin sites post TAVR, Dermabond closure. Sites soft and pt has no complaints of pain. Bruising to R groin noted upon arrival from OR. Pt states tingling to LE. 2+ bilateral pedal pulses. No complaints of nausea. ART line removed from R radial artery, dressing CDI. Pt states he \"feels better\" post IV Lasix administration. Pt's initial compliant in PACU was \"I can't breathe.\" STAT CXR, EKG, and labs obtained. Pt to go to IR post bedrest for R pleurocentesis based on CXR results. Report called to [facility] Nguyen, Tele 7. Pt out of PACU to room via kaley, with ACLS RNs and monitor.   "

## 2018-07-02 NOTE — PROGRESS NOTES
Desi WHITING and Dr. Fuller at bedside. RN notified them of ST depression on tele strips, pt's SBPs having been in the 140+s-150s. More hydralazine IV prn will be needed to sustain BP less than 140. Also, RN notified them that per ultrasound department, pt's thoracentesis is deferred until tomorrow. No orders received at this time.

## 2018-07-02 NOTE — CARE PLAN
Problem: Safety  Goal: Will remain free from injury  Bed alarm on. Call light within reach. Treaded socks on. RN educated pt on using call light for any assistance.

## 2018-07-03 ENCOUNTER — APPOINTMENT (OUTPATIENT)
Dept: RADIOLOGY | Facility: MEDICAL CENTER | Age: 68
DRG: 266 | End: 2018-07-03
Attending: NURSE PRACTITIONER
Payer: COMMERCIAL

## 2018-07-03 LAB
ALBUMIN SERPL BCP-MCNC: 2.9 G/DL (ref 3.2–4.9)
ALBUMIN/GLOB SERPL: 1.2 G/DL
ALP SERPL-CCNC: 77 U/L (ref 30–99)
ALT SERPL-CCNC: 16 U/L (ref 2–50)
ANION GAP SERPL CALC-SCNC: 6 MMOL/L (ref 0–11.9)
AST SERPL-CCNC: 15 U/L (ref 12–45)
BILIRUB SERPL-MCNC: 0.5 MG/DL (ref 0.1–1.5)
BNP SERPL-MCNC: 2208 PG/ML (ref 0–100)
BUN SERPL-MCNC: 42 MG/DL (ref 8–22)
CALCIUM SERPL-MCNC: 9.2 MG/DL (ref 8.5–10.5)
CHLORIDE SERPL-SCNC: 105 MMOL/L (ref 96–112)
CO2 SERPL-SCNC: 25 MMOL/L (ref 20–33)
CREAT SERPL-MCNC: 2.18 MG/DL (ref 0.5–1.4)
EKG IMPRESSION: NORMAL
ERYTHROCYTE [DISTWIDTH] IN BLOOD BY AUTOMATED COUNT: 49.1 FL (ref 35.9–50)
GLOBULIN SER CALC-MCNC: 2.5 G/DL (ref 1.9–3.5)
GLUCOSE BLD-MCNC: 117 MG/DL (ref 65–99)
GLUCOSE BLD-MCNC: 161 MG/DL (ref 65–99)
GLUCOSE BLD-MCNC: 172 MG/DL (ref 65–99)
GLUCOSE SERPL-MCNC: 156 MG/DL (ref 65–99)
HCT VFR BLD AUTO: 30.5 % (ref 42–52)
HGB BLD-MCNC: 9.6 G/DL (ref 14–18)
LV EJECT FRACT  99904: 35
LV EJECT FRACT MOD 2C 99903: 42.41
LV EJECT FRACT MOD 4C 99902: 47
LV EJECT FRACT MOD BP 99901: 43.42
MCH RBC QN AUTO: 29.5 PG (ref 27–33)
MCHC RBC AUTO-ENTMCNC: 31.5 G/DL (ref 33.7–35.3)
MCV RBC AUTO: 93.8 FL (ref 81.4–97.8)
PLATELET # BLD AUTO: 246 K/UL (ref 164–446)
PMV BLD AUTO: 10.2 FL (ref 9–12.9)
POTASSIUM SERPL-SCNC: 5.1 MMOL/L (ref 3.6–5.5)
PROT SERPL-MCNC: 5.4 G/DL (ref 6–8.2)
RBC # BLD AUTO: 3.25 M/UL (ref 4.7–6.1)
SODIUM SERPL-SCNC: 136 MMOL/L (ref 135–145)
WBC # BLD AUTO: 9.2 K/UL (ref 4.8–10.8)

## 2018-07-03 PROCEDURE — 93005 ELECTROCARDIOGRAM TRACING: CPT | Performed by: NURSE PRACTITIONER

## 2018-07-03 PROCEDURE — 83880 ASSAY OF NATRIURETIC PEPTIDE: CPT

## 2018-07-03 PROCEDURE — G8978 MOBILITY CURRENT STATUS: HCPCS | Mod: CI

## 2018-07-03 PROCEDURE — A9270 NON-COVERED ITEM OR SERVICE: HCPCS | Performed by: NURSE PRACTITIONER

## 2018-07-03 PROCEDURE — G8980 MOBILITY D/C STATUS: HCPCS | Mod: CI

## 2018-07-03 PROCEDURE — 93010 ELECTROCARDIOGRAM REPORT: CPT | Performed by: INTERNAL MEDICINE

## 2018-07-03 PROCEDURE — 700111 HCHG RX REV CODE 636 W/ 250 OVERRIDE (IP): Performed by: NURSE PRACTITIONER

## 2018-07-03 PROCEDURE — 71045 X-RAY EXAM CHEST 1 VIEW: CPT

## 2018-07-03 PROCEDURE — 82962 GLUCOSE BLOOD TEST: CPT | Mod: 91

## 2018-07-03 PROCEDURE — 0W993ZZ DRAINAGE OF RIGHT PLEURAL CAVITY, PERCUTANEOUS APPROACH: ICD-10-PCS | Performed by: RADIOLOGY

## 2018-07-03 PROCEDURE — 4410569 US-THORACENTESIS PUNCTURE

## 2018-07-03 PROCEDURE — 32555 ASPIRATE PLEURA W/ IMAGING: CPT | Mod: RT

## 2018-07-03 PROCEDURE — 97161 PT EVAL LOW COMPLEX 20 MIN: CPT

## 2018-07-03 PROCEDURE — 93306 TTE W/DOPPLER COMPLETE: CPT | Mod: 26 | Performed by: INTERNAL MEDICINE

## 2018-07-03 PROCEDURE — 80053 COMPREHEN METABOLIC PANEL: CPT

## 2018-07-03 PROCEDURE — 85027 COMPLETE CBC AUTOMATED: CPT

## 2018-07-03 PROCEDURE — 93306 TTE W/DOPPLER COMPLETE: CPT

## 2018-07-03 PROCEDURE — 770020 HCHG ROOM/CARE - TELE (206)

## 2018-07-03 PROCEDURE — 36415 COLL VENOUS BLD VENIPUNCTURE: CPT

## 2018-07-03 PROCEDURE — G8979 MOBILITY GOAL STATUS: HCPCS | Mod: CI

## 2018-07-03 PROCEDURE — 700102 HCHG RX REV CODE 250 W/ 637 OVERRIDE(OP): Performed by: NURSE PRACTITIONER

## 2018-07-03 RX ADMIN — CARVEDILOL 25 MG: 25 TABLET, FILM COATED ORAL at 08:52

## 2018-07-03 RX ADMIN — FUROSEMIDE 40 MG: 10 INJECTION, SOLUTION INTRAMUSCULAR; INTRAVENOUS at 17:39

## 2018-07-03 RX ADMIN — ATORVASTATIN CALCIUM 40 MG: 40 TABLET, FILM COATED ORAL at 17:39

## 2018-07-03 RX ADMIN — INSULIN GLARGINE 16 UNITS: 100 INJECTION, SOLUTION SUBCUTANEOUS at 22:33

## 2018-07-03 RX ADMIN — CLOPIDOGREL 75 MG: 75 TABLET, FILM COATED ORAL at 12:36

## 2018-07-03 RX ADMIN — FUROSEMIDE 40 MG: 10 INJECTION, SOLUTION INTRAMUSCULAR; INTRAVENOUS at 06:01

## 2018-07-03 RX ADMIN — LISINOPRIL 20 MG: 20 TABLET ORAL at 06:01

## 2018-07-03 RX ADMIN — ASPIRIN 81 MG: 81 TABLET, COATED ORAL at 12:36

## 2018-07-03 RX ADMIN — INSULIN LISPRO 3 UNITS: 100 INJECTION, SOLUTION INTRAVENOUS; SUBCUTANEOUS at 22:33

## 2018-07-03 RX ADMIN — INSULIN LISPRO 2 UNITS: 100 INJECTION, SOLUTION INTRAVENOUS; SUBCUTANEOUS at 12:37

## 2018-07-03 RX ADMIN — VITAMIN D, TAB 1000IU (100/BT) 4000 UNITS: 25 TAB at 06:02

## 2018-07-03 RX ADMIN — CARVEDILOL 25 MG: 25 TABLET, FILM COATED ORAL at 17:39

## 2018-07-03 RX ADMIN — POTASSIUM CHLORIDE 20 MEQ: 1500 TABLET, EXTENDED RELEASE ORAL at 06:01

## 2018-07-03 RX ADMIN — INSULIN LISPRO 2 UNITS: 100 INJECTION, SOLUTION INTRAVENOUS; SUBCUTANEOUS at 08:52

## 2018-07-03 ASSESSMENT — ENCOUNTER SYMPTOMS
MUSCULOSKELETAL NEGATIVE: 1
DIZZINESS: 0
BLURRED VISION: 0
EYES NEGATIVE: 1
PALPITATIONS: 0
MYALGIAS: 0
FOCAL WEAKNESS: 0
CHILLS: 0
CLAUDICATION: 0
WEIGHT LOSS: 0
HEADACHES: 0
PSYCHIATRIC NEGATIVE: 1
COUGH: 0
FEVER: 0
NERVOUS/ANXIOUS: 0
GASTROINTESTINAL NEGATIVE: 1
ABDOMINAL PAIN: 0
WEAKNESS: 0
DOUBLE VISION: 0
NEUROLOGICAL NEGATIVE: 1
DEPRESSION: 0
NAUSEA: 0
VOMITING: 0
SHORTNESS OF BREATH: 1
BRUISES/BLEEDS EASILY: 0
CONSTITUTIONAL NEGATIVE: 1

## 2018-07-03 ASSESSMENT — GAIT ASSESSMENTS
DISTANCE (FEET): 200
GAIT LEVEL OF ASSIST: MODIFIED INDEPENDENT

## 2018-07-03 ASSESSMENT — COGNITIVE AND FUNCTIONAL STATUS - GENERAL
MOBILITY SCORE: 23
CLIMB 3 TO 5 STEPS WITH RAILING: A LITTLE
SUGGESTED CMS G CODE MODIFIER MOBILITY: CI

## 2018-07-03 ASSESSMENT — PAIN SCALES - GENERAL
PAINLEVEL_OUTOF10: 0
PAINLEVEL_OUTOF10: 3

## 2018-07-03 NOTE — PROGRESS NOTES
Shift report received and assessment completed. Wife at bedside. Pt indicates no distress. Call light placed within reach, bed in lowest position, and pt educated to call for assistance. Will continue to monitor patient.

## 2018-07-03 NOTE — PROGRESS NOTES
Cardiology Progress Note TAVR               Author: Markus LISA Alina Date & Time created: 7/3/2018  6:40 AM     Interval History/Chief Complaint:  68 year old male status post TAVR.     Review of Systems   Constitutional: Negative.  Negative for chills, fever, malaise/fatigue and weight loss.   HENT: Negative.  Negative for hearing loss.    Eyes: Negative.  Negative for blurred vision and double vision.   Respiratory: Positive for shortness of breath. Negative for cough.    Cardiovascular: Positive for leg swelling. Negative for chest pain, palpitations and claudication.   Gastrointestinal: Negative.  Negative for abdominal pain, nausea and vomiting.   Genitourinary: Negative.  Negative for dysuria and urgency.   Musculoskeletal: Negative.  Negative for joint pain and myalgias.   Skin: Negative.  Negative for itching and rash.   Neurological: Negative.  Negative for dizziness, focal weakness, weakness and headaches.   Endo/Heme/Allergies: Negative.  Does not bruise/bleed easily.   Psychiatric/Behavioral: Negative.  Negative for depression. The patient is not nervous/anxious.    (ROS essentially unchanged from 18)      Physical Exam   Constitutional: He appears well-developed and well-nourished.   HENT:   Head: Normocephalic and atraumatic.   Eyes: Conjunctivae are normal. Pupils are equal, round, and reactive to light.   Neck: Normal range of motion. Neck supple.   Cardiovascular: Normal rate and regular rhythm.    No murmur heard.  Pulmonary/Chest: Effort normal and breath sounds normal.   Decreased sounds of right base.   Abdominal: Soft. Bowel sounds are normal.   Musculoskeletal: Normal range of motion. He exhibits edema.   Skin: Skin is warm and dry.   Psychiatric: He has a normal mood and affect.   (Physical examination essentially unchanged from 18)    Hemodynamics:  Temp (24hrs), Av.3 °C (97.4 °F), Min:36.2 °C (97.1 °F), Max:36.6 °C (97.8 °F)  Temperature: 36.6 °C (97.8 °F)  Pulse  Av  Min:  78  Max: 99Heart Rate (Monitored): 84  Blood Pressure : 112/64, Arterial BP: 145/76, NIBP: 145/81     Respiratory:    Respiration: 16, Pulse Oximetry: 95 %     Work Of Breathing / Effort: Shallow;Mild  RUL Breath Sounds: Diminished, RML Breath Sounds: Diminished, RLL Breath Sounds: Diminished, DANDY Breath Sounds: Clear, LLL Breath Sounds: Diminished  Fluids:        GI/Nutrition:  Orders Placed This Encounter   Procedures   • Diet Order Cardiac     Standing Status:   Standing     Number of Occurrences:   1     Order Specific Question:   Diet:     Answer:   Cardiac [6]     Lab Results:  Recent Labs      07/02/18   1041  07/03/18   0335   WBC  4.5*  9.2   RBC  3.23*  3.25*   HEMOGLOBIN  9.7*  9.6*   HEMATOCRIT  30.2*  30.5*   MCV  93.5  93.8   MCH  30.0  29.5   MCHC  32.1*  31.5*   RDW  48.7  49.1   PLATELETCT  221  246   MPV  9.7  10.2     Recent Labs      07/02/18   1041  07/03/18   0335   SODIUM  136  136   POTASSIUM  4.9  5.1   CHLORIDE  110  105   CO2  21  25   GLUCOSE  116*  156*   BUN  37*  42*   CREATININE  1.81*  2.18*   CALCIUM  8.4*  9.2         Recent Labs      07/02/18   0620  07/02/18   1041  07/03/18   0335   BNPBTYPENAT  2554*  3326*  2208*     Recent Labs      07/02/18   0620  07/02/18   1041  07/03/18   0335   BNPBTYPENAT  2554*  3326*  2208*         Medications reviewed.      Current Facility-Administered Medications:   •  aspirin EC (ECOTRIN) tablet 81 mg, 81 mg, Oral, DAILY, Desi Solano, A.P.R.N., Stopped at 07/02/18 1341  •  atorvastatin (LIPITOR) tablet 40 mg, 40 mg, Oral, Nightly, Desi Solano, A.P.R.N., 40 mg at 07/02/18 1758  •  vitamin D (cholecalciferol) tablet 4,000 Units, 4,000 Units, Oral, DAILY, Desi Solano, A.P.R.N., 4,000 Units at 07/03/18 0602  •  clopidogrel (PLAVIX) tablet 75 mg, 75 mg, Oral, DAILY, Desi Solano, A.P.R.N., Stopped at 07/02/18 1340  •  insulin glargine (LANTUS) injection 16 Units, 16 Units, Subcutaneous, Nightly, Desi Solano,  A.P.R.N., 16 Units at 07/02/18 2036  •  Respiratory Care per Protocol, , Nebulization, Continuous RT, GUCCI Ellington.P.R.N.  •  acetaminophen (TYLENOL) tablet 650 mg, 650 mg, Oral, Q6HRS PRN, GUCCI Ellington.P.R.N.  •  diphenhydrAMINE (BENADRYL) tablet/capsule 25 mg, 25 mg, Oral, HS PRN, GUCCI Ellington.P.R.N.  •  ondansetron (ZOFRAN) syringe/vial injection 4 mg, 4 mg, Intravenous, Q4HRS PRN, GUCCI Ellington.P.R.N.  •  diphenhydrAMINE (BENADRYL) injection 25 mg, 25 mg, Intravenous, Q6HRS PRN, GUCCI Ellington.P.R.N.  •  insulin lispro (HUMALOG) injection 2-9 Units, 2-9 Units, Subcutaneous, 4X/DAY ACHS, Desi Solano A.P.R.N., 3 Units at 07/02/18 2037  •  furosemide (LASIX) injection 40 mg, 40 mg, Intravenous, BID DIURETIC, Desi Solano, A.P.R.N., 40 mg at 07/03/18 0601  •  potassium chloride SA (Kdur) tablet 20 mEq, 20 mEq, Oral, DAILY, Desi Solano A.P.R.N., 20 mEq at 07/03/18 0601  •  carvedilol (COREG) tablet 25 mg, 25 mg, Oral, BID WITH MEALS, Desi Solano, A.P.R.N., 25 mg at 07/02/18 1757  •  lisinopril (PRINIVIL) tablet 20 mg, 20 mg, Oral, DAILY, Desi Solano, A.P.R.N., 20 mg at 07/03/18 0601  •  enalaprilat (VASOTEC) injection 2.5 mg, 2.5 mg, Intravenous, Q6HRS PRN, Desi Solano A.P.R.N.    CARDIAC STUDIES/PROCEDURES:     CARDIAC CATHETERIZATION CONCLUSIONS by Dr. Stanley (06/28/18)  Atherectomy and stenting of the left anterior descending artery with placement orSynergy 2.5x16 mm into the more proximal segment      CARDIAC CATHETERIZATION CONCLUSIONS (06/26/18)  Percutaneous transluminal coronary angioplasty of the mid left   anterior descending artery stenosis with 1.5x15 mm  balloon; however,   unsuccessful attempts to balloon with any additional larger size equipment.     CARDIAC CATHETERIZATION CONCLUSIONS (06/02/18)  1.  Multivessel severe coronary artery disease with long, high-grade mid left   anterior  descending artery stenosis, high-grade ostial first and second   diagonal branch stenosis, nonobstructive ostial circumflex artery stenosis and   nonobstructive right coronary artery stenosis.  2.  Elevated right heart pressures with PA systolic pressure of 75 mmHg.     CAROTID ULTRASOUND (06/22/18)  Mild (<50%) bilateral internal carotid disease  Normal vertebral and subclavianartery flow bilaterally.     CTA OF CHEST AND ABDOMEN (06/27/18)  1.  Very limited exam for evaluation of aortic structures as no intravenous contrast could be administered.  2.  Aortic annulus area of approximately 534 sq mm.  3.  Coronary ostia appear to be greater than 10 mm from the annulus.  4.  Bilateral pleural fluid collections, moderate on the RIGHT and small on the LEFT, with associated atelectasis.  5.  Mildly prominent pulmonary interstitium concerning for edema.  6.  Minimal ascites.  7.  No abdominal aortic aneurysm.  8.  Mild atherosclerotic calcification of the iliac arterial system bilaterally.  Evaluation is limited due to lack of IV contrast.  9.  Increased colonic stool.     DOBUTAMINE STRESS ECHOCARDIOGRAM (06/22/18)  Abnormal resting echo with mild dyskinesis of the LV apex and EF 30%. Resting aortic valve peak and mean gradients 36 / 20.  Stress images reveal increased contractility of basal to mid anterior and inferior walls, with more pronounced apical dyskinesis.  Peak / mean aortic valve gradients on 15 mcg dobutamine are 72 /43  Abnormal dobutamine stress echo demonstrating significant increase in aortic valve gradients with dobutamine infusion.  LV dysfunction at rest and stress. Worsening apical dyskinesis with stress suggesting ischemia.     ECHOCARDIOGRAM CONCLUSIONS (06/01/18)  Moderately reduced left ventricular systolic function.  Left ventricular ejection fraction is visually estimated to be 35%.  Global hypokinesis with regional variation with severe hypokinesis of the anteroapical and septal  walls.  Moderate aortic stenosis; severely of aortic stenosis   Aortic valve area calculated from the continuity equation is 0.82.   Vmax is 2.8 m/s. Transvalvular gradients are - Peak: 32 mmHg, Mean: 21 mmHg.  Unable to estimate pulmonary artery pressure due to an inadequate tricuspid regurgitant jet.  Moderate mitral regurgitation.  probably underestimated due to low ejection fraction.No prior study is available for comparison.      EKG performed on (07/03/18) was reviewed: EKG shows sinus rhythm with low voltage of frontal leads and non-specific T wave abnormalities.  EKG performed on (07/02/18) EKG shows sinus rhythm with low voltage of frontal leads and non-specific T wave abnormalities.  EKG performed on (06/28/18) EKG shows sinus rhythm with low voltage of frontal leads and non-specific T wave abnormalities.  EKG performed on (06/26/18) EKG shows sinus rhythm with anterior Q waves with non-specific T wave abnormalities.  EKG performed on (06/25/18) EKG shows sinus rhythm with anterior Q waves with lateral ST segment depressions.  EKG performed on (06/21/18) EKG shows sinus rhythm.     Laboratory results of (07/03/18) were reviewed. Bun of 42 mg/dl, creatinine levels of 2.18 mg/dl noted.  Laboratory results of (07/02/18) Bun of 37 mg/dl, creatinine levels of 1.81 mg/dl noted.  Laboratory results of (06/29/18) Bun of 35 mg/dl, creatinine levels of 1.75 mg/dl noted.  Laboratory results of (06/28/18) Bun of 30 mg/dl, creatinine levels of 1.34 mg/dl noted.  Laboratory results of (06/27/18) Bun of 34 mg/dl, creatinine levels of 1.84 mg/dl noted.  Laboratory results of (06/26/18) Bun of 31 mg/dl, creatinine levels of 1.63 mg/dl noted.  Laboratory results of (06/25/18) Bun of 30 mg/dl, creatinine levels of 1.8 mg/dl noted.  Laboratory results of (06/18/18) Bun of 25 mg/dl, creatinine levels of 1.34 mg/dl noted.     TRANSESOPHAGEAL ECHOCARDIOGRAM CONCLUSIONS by Dr. Godwin (07/02/18)  Results pending.    Medical  Decision Making, by Problem:  Active Hospital Problems    Diagnosis   • *S/P TAVR (transcatheter aortic valve replacement) [Z95.2]   • Acute exacerbation of CHF (congestive heart failure) ([facility]) [I50.9]   • Stented coronary artery [Z95.5]   • CAD (coronary artery disease) [I25.10]   • Stage 3 chronic kidney disease [N18.3]       Plan:    1. Successful transcatheter aortic valve replacement (TAVR) with # 29 Monroy Prashanth S3 valve, transfemoral approach, under general anesthesia (07/02/18). He is clinically doing well. We will repeat an echocardiogram.  2. Acute on chronic systolic congestive heart failure with cardiomyopathy: The overall volume status is improving on diuresis.  3. Coronary artery disease with stent placement: He is clinically doing well without recurrence of his angina. W e will continue with current medical care.  4. Pleural effusion: We will order thoracentesis for today.  5. Chronic kidney disease stage III (managed by Dr. Kirkland): We will continue to follow labs.  6. Additional information: his wife works in IT department at [employer].    CC Dr. Nixon Claros MD      Quality-Core Measures

## 2018-07-03 NOTE — PROGRESS NOTES
Bedside report received, assumed care of patient. Assessment performed. Patient has significant bruising to groin and scrotum from previous procedures. Current groin sites CDI and soft.  Patient up to chair for dinner. Discussed plan of care with pt.  Call light within reach, pt educated to call for assistance.

## 2018-07-03 NOTE — THERAPY
"Physical Therapy Cardiac Evaluation completed.   Bed Mobility:  Supine to Sit: Independent  Transfers: Sit to Stand: Independent  Gait: Level Of Assist: Modified Independent with No Equipment Needed       Plan of Care: Patient with no further skilled PT needs in the acute care setting at this time  Discharge Recommendations: Equipment: No Equipment Needed.     See \"Rehab Therapy-Acute\" Patient Summary Report for complete documentation.     Pt presented to PT s/p TAVR and thorcentesis. Pt was able to demonstrate Mod I for all functional mobility at this time. Pt demonstrate with baseline gait mechanics on flat level surfaces. However, after climb up/down 15 steps pt reported of lightheadedness/dizziness and demonstrated 1 adolfo LOB and reqiured to sit down for a rest break. Pt demonstrated with a decline in BP and HR after climb up/down stairs; baseline HR 89, /60 after stairs: HR 80, /59. After a 5 mins rest break pt reported of no lightheadedness/dizziness and was able to ambulate back to the room w/SPV  and no adolfo LOB. RN aware of concerns and current physical activity limitations. Pt educated on current activity limitations and understood the RPE scale use for current phase 1 cardiac rehab walking program at this time. Pt is in no acute skilled PT needs at this time, however, will strongly recommend outpatient cardiac rehabiliation given current objective findings.  "

## 2018-07-03 NOTE — PROGRESS NOTES
Pt returned from US. Site is [facility]. Pt is calm and without noted pain. MR makenzie.

## 2018-07-03 NOTE — CARE PLAN
Problem: Safety  Goal: Will remain free from injury  Fall risk assessed, fall precautions in place, pt educated to call for assistance, pt verbalizes understanding of fall risk.    Problem: Knowledge Deficit  Goal: Knowledge of disease process/condition, treatment plan, diagnostic tests, and medications will improve  Pt and family educated on POC, all questions answered in regards to disease process, treatment and diet. Pt and family verbalize understanding and voice no further questions at this time.

## 2018-07-03 NOTE — PROGRESS NOTES
Brought patient down and Dr Morley consented the patient.  She performed the right thoracentesis.  Vitals were monitored during exam.  Removed 1000 ml and sent 0 ml to lab.  Patient tolerated procedure well and left in stable condition.

## 2018-07-03 NOTE — PROGRESS NOTES
2 RN admission skin check done. Left groin site with dermabond DANILO, CDI, soft with no hematoma. Right groin site oozing small amount of blood. Significant bruising to bilateral groin to scrotum. Small dry scab to left and right 2nd toes.

## 2018-07-03 NOTE — HEART FAILURE PROGRAM
Cardiovascular Nurse Navigator () Advanced Heart Failure Program Inpatient Progress Note:    Jul 06, 2018  1:20 PM PDT  Valve Established Patient with ANG Ellington  [facility] (--) [address], [address]  [place] [id number] [phone]       Thank you and please call with questions, Deanna

## 2018-07-03 NOTE — CARE PLAN
Problem: Knowledge Deficit  Goal: Maintain or improve baseline circulation, motion and sensation    Intervention: Explain tests, procedures, equipment  Patient educated on assessment needs following procedure.      Problem: Oxygenation/Respiratory Function  Goal: Patient will Achieve/Maintain Optimum Respiratory Rate/Effort    Intervention: Administer/Titrate Oxygen as Ordered  Oxygen titrated as necessary for patient.

## 2018-07-04 VITALS
HEART RATE: 67 BPM | OXYGEN SATURATION: 96 % | DIASTOLIC BLOOD PRESSURE: 71 MMHG | SYSTOLIC BLOOD PRESSURE: 119 MMHG | BODY MASS INDEX: 30.71 KG/M2 | WEIGHT: 202.6 LBS | RESPIRATION RATE: 18 BRPM | HEIGHT: 68 IN | TEMPERATURE: 97.8 F

## 2018-07-04 DIAGNOSIS — I35.0 NONRHEUMATIC AORTIC VALVE STENOSIS: ICD-10-CM

## 2018-07-04 DIAGNOSIS — I50.33 ACUTE ON CHRONIC DIASTOLIC HEART FAILURE (HCC): ICD-10-CM

## 2018-07-04 LAB
ALBUMIN SERPL BCP-MCNC: 2.8 G/DL (ref 3.2–4.9)
ALBUMIN/GLOB SERPL: 1.4 G/DL
ALP SERPL-CCNC: 81 U/L (ref 30–99)
ALT SERPL-CCNC: 14 U/L (ref 2–50)
ANION GAP SERPL CALC-SCNC: 7 MMOL/L (ref 0–11.9)
AST SERPL-CCNC: 11 U/L (ref 12–45)
BILIRUB SERPL-MCNC: 0.5 MG/DL (ref 0.1–1.5)
BNP SERPL-MCNC: 908 PG/ML (ref 0–100)
BUN SERPL-MCNC: 47 MG/DL (ref 8–22)
CALCIUM SERPL-MCNC: 8.4 MG/DL (ref 8.5–10.5)
CHLORIDE SERPL-SCNC: 105 MMOL/L (ref 96–112)
CO2 SERPL-SCNC: 24 MMOL/L (ref 20–33)
CREAT SERPL-MCNC: 2.4 MG/DL (ref 0.5–1.4)
EKG IMPRESSION: NORMAL
ERYTHROCYTE [DISTWIDTH] IN BLOOD BY AUTOMATED COUNT: 50.1 FL (ref 35.9–50)
GLOBULIN SER CALC-MCNC: 2 G/DL (ref 1.9–3.5)
GLUCOSE BLD-MCNC: 129 MG/DL (ref 65–99)
GLUCOSE BLD-MCNC: 244 MG/DL (ref 65–99)
GLUCOSE SERPL-MCNC: 134 MG/DL (ref 65–99)
HCT VFR BLD AUTO: 27.4 % (ref 42–52)
HGB BLD-MCNC: 8.6 G/DL (ref 14–18)
MCH RBC QN AUTO: 29.3 PG (ref 27–33)
MCHC RBC AUTO-ENTMCNC: 31.4 G/DL (ref 33.7–35.3)
MCV RBC AUTO: 93.2 FL (ref 81.4–97.8)
PLATELET # BLD AUTO: 229 K/UL (ref 164–446)
PMV BLD AUTO: 10.1 FL (ref 9–12.9)
POTASSIUM SERPL-SCNC: 4.5 MMOL/L (ref 3.6–5.5)
PROT SERPL-MCNC: 4.8 G/DL (ref 6–8.2)
RBC # BLD AUTO: 2.94 M/UL (ref 4.7–6.1)
SODIUM SERPL-SCNC: 136 MMOL/L (ref 135–145)
WBC # BLD AUTO: 8.9 K/UL (ref 4.8–10.8)

## 2018-07-04 PROCEDURE — 85027 COMPLETE CBC AUTOMATED: CPT

## 2018-07-04 PROCEDURE — 700102 HCHG RX REV CODE 250 W/ 637 OVERRIDE(OP): Performed by: NURSE PRACTITIONER

## 2018-07-04 PROCEDURE — A9270 NON-COVERED ITEM OR SERVICE: HCPCS | Performed by: NURSE PRACTITIONER

## 2018-07-04 PROCEDURE — 93005 ELECTROCARDIOGRAM TRACING: CPT | Performed by: NURSE PRACTITIONER

## 2018-07-04 PROCEDURE — 83880 ASSAY OF NATRIURETIC PEPTIDE: CPT

## 2018-07-04 PROCEDURE — 80053 COMPREHEN METABOLIC PANEL: CPT

## 2018-07-04 PROCEDURE — 36415 COLL VENOUS BLD VENIPUNCTURE: CPT

## 2018-07-04 PROCEDURE — 82962 GLUCOSE BLOOD TEST: CPT

## 2018-07-04 PROCEDURE — 93010 ELECTROCARDIOGRAM REPORT: CPT | Performed by: INTERNAL MEDICINE

## 2018-07-04 PROCEDURE — 700111 HCHG RX REV CODE 636 W/ 250 OVERRIDE (IP): Performed by: NURSE PRACTITIONER

## 2018-07-04 RX ORDER — POTASSIUM CHLORIDE 20 MEQ/1
20 TABLET, EXTENDED RELEASE ORAL DAILY
Qty: 60 TAB | Refills: 11 | Status: SHIPPED | OUTPATIENT
Start: 2018-07-05 | End: 2018-07-23

## 2018-07-04 RX ORDER — CARVEDILOL 25 MG/1
25 TABLET ORAL 2 TIMES DAILY WITH MEALS
Qty: 60 TAB | Refills: 11 | Status: SHIPPED | OUTPATIENT
Start: 2018-07-04 | End: 2019-08-02 | Stop reason: SDUPTHER

## 2018-07-04 RX ORDER — FUROSEMIDE 20 MG/1
40 TABLET ORAL 2 TIMES DAILY
Qty: 60 TAB | Refills: 1 | Status: SHIPPED | OUTPATIENT
Start: 2018-07-04 | End: 2018-07-13 | Stop reason: SDUPTHER

## 2018-07-04 RX ORDER — LISINOPRIL 20 MG/1
20 TABLET ORAL DAILY
Qty: 30 TAB | Refills: 11 | Status: SHIPPED | OUTPATIENT
Start: 2018-07-05 | End: 2018-12-24

## 2018-07-04 RX ADMIN — FUROSEMIDE 40 MG: 10 INJECTION, SOLUTION INTRAMUSCULAR; INTRAVENOUS at 04:44

## 2018-07-04 RX ADMIN — ASPIRIN 81 MG: 81 TABLET, COATED ORAL at 04:44

## 2018-07-04 RX ADMIN — VITAMIN D, TAB 1000IU (100/BT) 4000 UNITS: 25 TAB at 04:44

## 2018-07-04 RX ADMIN — POTASSIUM CHLORIDE 20 MEQ: 1500 TABLET, EXTENDED RELEASE ORAL at 04:45

## 2018-07-04 RX ADMIN — LISINOPRIL 20 MG: 20 TABLET ORAL at 04:44

## 2018-07-04 RX ADMIN — CARVEDILOL 25 MG: 25 TABLET, FILM COATED ORAL at 09:05

## 2018-07-04 RX ADMIN — CLOPIDOGREL 75 MG: 75 TABLET, FILM COATED ORAL at 04:44

## 2018-07-04 ASSESSMENT — PATIENT HEALTH QUESTIONNAIRE - PHQ9
2. FEELING DOWN, DEPRESSED, IRRITABLE, OR HOPELESS: NOT AT ALL
SUM OF ALL RESPONSES TO PHQ9 QUESTIONS 1 AND 2: 0
1. LITTLE INTEREST OR PLEASURE IN DOING THINGS: NOT AT ALL

## 2018-07-04 ASSESSMENT — PAIN SCALES - GENERAL
PAINLEVEL_OUTOF10: 2
PAINLEVEL_OUTOF10: 0

## 2018-07-04 NOTE — PROGRESS NOTES
Shift report received and assessment completed. No family at bedside. Pt indicates no distress. Call light placed within reach, bed in lowest position, and pt educated to call for assistance. Will continue to monitor patient.

## 2018-07-04 NOTE — PROGRESS NOTES
Pt is alert and oriented. Pt off floor with RN to d/c to home via  with family.  Addressed all concerns and questions.

## 2018-07-04 NOTE — DISCHARGE INSTRUCTIONS
Discharge Instructions    Discharged to home by car with relative. Discharged via walking, hospital escort: Yes.  Special equipment needed: Not Applicable    Be sure to schedule a follow-up appointment with your primary care doctor or any specialists as instructed.     Discharge Plan:   Diet Plan: Discussed  Activity Level: Discussed  Confirmed Follow up Appointment: Appointment Scheduled  Confirmed Symptoms Management: Discussed  Medication Reconciliation Updated: Yes  Influenza Vaccine Indication: Not indicated: Previously immunized this influenza season and > 8 years of age    I understand that a diet low in cholesterol, fat, and sodium is recommended for good health. Unless I have been given specific instructions below for another diet, I accept this instruction as my diet prescription.         Other diet: Consistent Carb, Cardiac Heart Healthy          Special Instructions:   Transcatheter Aortic Valve Replacement (TAVR)    General Instructions:  1. Take Medication as directed.  You will likely need to take aspirin and another blood thinner (antiplatelet medication) for a period.  You'll need to take the aspirin for the rest of your life.  Be sure your doctor knows about all other medicines you take, including over-the-counter medicines and dietary supplements.    Incision Care Instructions:  1. Look and feel the site(s) of your TAVR TODAY so you can recognize changes that should be called to your doctor (see below).  2. It's normal for your incision to be bruised, itchy, or sore while it's healing.  Your incision may take a week or more to heal.  3. Bruising may occur.  Some of the discoloration may travel down the leg.  As it resolves, the color should change from blue to green to yellow-brown.  4. A small, round lump under the TAVR site may remain for up to 6 weeks.  5. Wash your incision site every day with warm water and soap.  Gently pat it dry.  Don't put powder, lotion, or ointment on the incision until  it's healed.    Activity:  1. No driving for one week  2. If you must take a long car ride (not as a ), stop every hour and walk around the car.  3. No lifting or pulling objects over 5 pounds for 4-5 days.  4. Warm showers or baths are permitted after the bandage is removed.  5. Walk regularly.  One of the best ways to get stronger is to walk.  Walk a little more each day.  Take someone with you until you feel OK to walk alone.    When to call your health care provider:  1. You develop a fever.  2. Redness, swelling, bleeding, warmth, or fluid draining at the incision site.  3. Bruising appears to be new or does not look like it is getting better.  4. The small, round lump in the groin in the area of the TAVR site increases in size.    Seek immediate medical help if you have any of the following symptoms:  1. You experience any leg numbness, aching, or discomfort  2. Chest pain or trouble breathing (call 911)  3. Sudden numbness or weakness in your face, arms, or legs (call 911)  4. Bowel movement that is bright red  5. Dizziness or fainting  6. Weight gain of more than 2 pounds in 24 hours or more than 5 pounds in 1 week  7. Swelling in your hands, feet, or ankles  8. Shortness of breath that doesn't get better when you rest  9. Pain that gets worse or doesn't go away  10. Fast or irregular pulse       · Is patient discharged on Warfarin / Coumadin?   No     Depression / Suicide Risk    As you are discharged from this [facility] facility, it is important to learn how to keep safe from harming yourself.    Recognize the warning signs:  · Abrupt changes in personality, positive or negative- including increase in energy   · Giving away possessions  · Change in eating patterns- significant weight changes-  positive or negative  · Change in sleeping patterns- unable to sleep or sleeping all the time   · Unwillingness or inability to communicate  · Depression  · Unusual sadness, discouragement and  loneliness  · Talk of wanting to die  · Neglect of personal appearance   · Rebelliousness- reckless behavior  · Withdrawal from people/activities they love  · Confusion- inability to concentrate     If you or a loved one observes any of these behaviors or has concerns about self-harm, here's what you can do:  · Talk about it- your feelings and reasons for harming yourself  · Remove any means that you might use to hurt yourself (examples: pills, rope, extension cords, firearm)  · Get professional help from the community (Mental Health, Substance Abuse, psychological counseling)  · Do not be alone:Call your Safe Contact- someone whom you trust who will be there for you.  · Call your local CRISIS HOTLINE 451-7054 or 525-471-8601  · Call your local Children's Mobile Crisis Response Team [place] (299) 570-4466 or www.Oculeve  · Call the toll free National Suicide Prevention Hotlines   · National Suicide Prevention Lifeline 526-893-KXEU (0904)  · [employer] 800-SUICIDE (552-1007)        Heart Failure  Heart failure means your heart has trouble pumping blood. This makes it hard for your body to work well. Heart failure is usually a long-term (chronic) condition. You must take good care of yourself and follow your doctor's treatment plan.  HOME CARE  · Take your heart medicine as told by your doctor.  ¨ Do not stop taking medicine unless your doctor tells you to.  ¨ Do not skip any dose of medicine.  ¨ Refill your medicines before they run out.  ¨ Take other medicines only as told by your doctor or pharmacist.  · Stay active if told by your doctor. The elderly and people with severe heart failure should talk with a doctor about physical activity.  · Eat heart-healthy foods. Choose foods that are without trans fat and are low in saturated fat, cholesterol, and salt (sodium). This includes fresh or frozen fruits and vegetables, fish, lean meats, fat-free or low-fat dairy foods, whole grains, and  high-fiber foods. Lentils and dried peas and beans (legumes) are also good choices.  · Limit salt if told by your doctor.  · Cook in a healthy way. Roast, grill, broil, bake, poach, steam, or stir-christianson foods.  · Limit fluids as told by your doctor.  · Weigh yourself every morning. Do this after you pee (urinate) and before you eat breakfast. Write down your weight to give to your doctor.  · Take your blood pressure and write it down if your doctor tells you to.  · Ask your doctor how to check your pulse. Check your pulse as told.  · Lose weight if told by your doctor.  · Stop smoking or chewing tobacco. Do not use gum or patches that help you quit without your doctor's approval.  · Schedule and go to doctor visits as told.  · Nonpregnant women should have no more than 1 drink a day. Men should have no more than 2 drinks a day. Talk to your doctor about drinking alcohol.  · Stop illegal drug use.  · Stay current with shots (immunizations).  · Manage your health conditions as told by your doctor.  · Learn to manage your stress.  · Rest when you are tired.  · If it is really hot outside:  ¨ Avoid intense activities.  ¨ Use air conditioning or fans, or get in a cooler place.  ¨ Avoid caffeine and alcohol.  ¨ Wear loose-fitting, lightweight, and light-colored clothing.  · If it is really cold outside:  ¨ Avoid intense activities.  ¨ Layer your clothing.  ¨ Wear mittens or gloves, a hat, and a scarf when going outside.  ¨ Avoid alcohol.  · Learn about heart failure and get support as needed.  · Get help to maintain or improve your quality of life and your ability to care for yourself as needed.  GET HELP IF:   · You gain weight quickly.  · You are more short of breath than usual.  · You cannot do your normal activities.  · You tire easily.  · You cough more than normal, especially with activity.  · You have any or more puffiness (swelling) in areas such as your hands, feet, ankles, or belly (abdomen).  · You cannot sleep  because it is hard to breathe.  · You feel like your heart is beating fast (palpitations).  · You get dizzy or light-headed when you stand up.  GET HELP RIGHT AWAY IF:   · You have trouble breathing.  · There is a change in mental status, such as becoming less alert or not being able to focus.  · You have chest pain or discomfort.  · You faint.  MAKE SURE YOU:   · Understand these instructions.  · Will watch your condition.  · Will get help right away if you are not doing well or get worse.  This information is not intended to replace advice given to you by your health care provider. Make sure you discuss any questions you have with your health care provider.  Document Released: 09/26/2009 Document Revised: 01/08/2016 Document Reviewed: 02/03/2014  Elsevier Interactive Patient Education © 2017 [employer]

## 2018-07-04 NOTE — DISCHARGE SUMMARY
Discharge Summary    PRIMARY DISCHARGE DIAGNOSIS: Status post transcatheter aortic valve replacement.    PROCEDURES:    1. Successful transcatheter aortic valve replacement (TAVR) with #29 Edward Prashanth 3 valve, transfemoral approach under general anesthesia on 7/2/18.  2. Intraoperative transesophageal echocardiogram on 7/2/18 confirmed a size 29 aortic valve.  3. Echocardiogram on 7/3/18 showing LVEF of 35%, restrictive diastolic function, moderate mitral regurgitation and normal function of known TAVR with normal transvalvular gradients.  4. CXR on 7/3/18 showing improvement of the right pleural fluid collection, persistent right lung base consolidation likely to be atelectasis and no pneumothorax.  5. EKG on 7/4/18 showing sinus rhythm with non-specific T abnormalities.     HOSPITAL COURSE: The patient is a pleasant 68 year old male with severe symptomatic aortic stenosis, acute systolic congestive heart failure, cardiomyopathy, coronary artery disease with percutaneous intervention with stent placement and chronic kidney disease. Due to the patient's symptoms, the patient underwent successful TAVR on 7/2/18 described as above. Post-procedure, the patient did develop a right pleural effusion requiring thoracentesis. Patient did require IV diuresis during their stay and will continue on oral diuretics post-operatively. He is now able to ambulate without difficulty. No further events were noted during his stay. Patient is now off oxygen with O2 saturation on room air of 94% and are to be discharged to home.    DISCHARGE INSTRUCTIONS: They are given discharge instructions on potential post-operative complications and symptoms to watch out for. Bilateral groin sites were checked and were clean, dry, and intact with ecchymosis noted. Patient and family instructed to notify us for any complications noted on the discharge instructions. They will follow up with Desi Solano on Friday in our cardiology office. They  will have a CMP and BNP drawn tomorrow before their follow up appointment. They will then follow up with Dr. Fuller with a repeat echocardiogram in one month for post TAVR assessment.        FOLLOW UP  Future Appointments  Date Time Provider Department Center   7/6/2018 1:20 PM ANG Ellington [facility] None   9/10/2018 8:20 AM Venus Claros M.D. [facility]       MEDICATIONS ON DISCHARGE     Medication List      START taking these medications      Instructions   furosemide 20 MG Tabs  Commonly known as:  LASIX   Take 2 Tabs by mouth 2 times a day.  Dose:  40 mg     potassium chloride SA 20 MEQ Tbcr  Start taking on:  7/5/2018  Commonly known as:  Kdur   Take 1 Tab by mouth every day.  Dose:  20 mEq        CHANGE how you take these medications      Instructions   carvedilol 25 MG Tabs  What changed:  · medication strength  · how much to take  Commonly known as:  COREG   Take 1 Tab by mouth 2 times a day, with meals.  Dose:  25 mg     lisinopril 20 MG Tabs  Start taking on:  7/5/2018  What changed:  · medication strength  · how much to take  Commonly known as:  PRINIVIL   Take 1 Tab by mouth every day.  Dose:  20 mg        CONTINUE taking these medications      Instructions   aspirin EC 81 MG Tbec  Commonly known as:  ECOTRIN   Take 81 mg by mouth every day.  Dose:  81 mg     atorvastatin 40 MG Tabs  Commonly known as:  LIPITOR   Take 40 mg by mouth every evening.  Dose:  40 mg     clopidogrel 75 MG Tabs  Commonly known as:  PLAVIX   Take 1 Tab by mouth every day.  Dose:  75 mg     Exenatide ER 2 MG Pen   Inject 2 mg as instructed every 7 days.  Dose:  2 mg     HM VITAMIN D3 4000 units Caps  Generic drug:  Cholecalciferol   Take 1 Cap by mouth every day.  Dose:  1 Cap     insulin glargine 100 UNIT/ML Soln  Commonly known as:  LANTUS   Inject 16 Units as instructed every evening. Pt had 8 units night prior to surgery  Dose:  16 Units            Allergies  Allergies   Allergen Reactions   • Sulfa Drugs  Unspecified     Kidney Stones

## 2018-07-04 NOTE — PROGRESS NOTES
Report received. Patient oriented x4. Pain level 0 out of 10. Denies SOB. bilat groin sites, extensive bruising per pt from prior cath. Fall risk interventions in place, bed in low position, pt up self. Assessment completed.

## 2018-07-05 ENCOUNTER — HOSPITAL ENCOUNTER (OUTPATIENT)
Dept: LAB | Facility: MEDICAL CENTER | Age: 68
End: 2018-07-05
Attending: NURSE PRACTITIONER
Payer: COMMERCIAL

## 2018-07-05 DIAGNOSIS — I50.33 ACUTE ON CHRONIC DIASTOLIC HEART FAILURE (HCC): ICD-10-CM

## 2018-07-05 DIAGNOSIS — I35.0 NONRHEUMATIC AORTIC VALVE STENOSIS: ICD-10-CM

## 2018-07-05 LAB
ALBUMIN SERPL BCP-MCNC: 3.1 G/DL (ref 3.2–4.9)
ALBUMIN/GLOB SERPL: 1.1 G/DL
ALP SERPL-CCNC: 81 U/L (ref 30–99)
ALT SERPL-CCNC: 19 U/L (ref 2–50)
ANION GAP SERPL CALC-SCNC: 9 MMOL/L (ref 0–11.9)
AST SERPL-CCNC: 15 U/L (ref 12–45)
BILIRUB SERPL-MCNC: 0.5 MG/DL (ref 0.1–1.5)
BNP SERPL-MCNC: 1386 PG/ML (ref 0–100)
BUN SERPL-MCNC: 54 MG/DL (ref 8–22)
CALCIUM SERPL-MCNC: 8.5 MG/DL (ref 8.5–10.5)
CHLORIDE SERPL-SCNC: 104 MMOL/L (ref 96–112)
CO2 SERPL-SCNC: 22 MMOL/L (ref 20–33)
CREAT SERPL-MCNC: 2.26 MG/DL (ref 0.5–1.4)
GLOBULIN SER CALC-MCNC: 2.7 G/DL (ref 1.9–3.5)
GLUCOSE SERPL-MCNC: 178 MG/DL (ref 65–99)
POTASSIUM SERPL-SCNC: 4.9 MMOL/L (ref 3.6–5.5)
PROT SERPL-MCNC: 5.8 G/DL (ref 6–8.2)
SODIUM SERPL-SCNC: 135 MMOL/L (ref 135–145)

## 2018-07-05 PROCEDURE — 36415 COLL VENOUS BLD VENIPUNCTURE: CPT

## 2018-07-05 PROCEDURE — 80053 COMPREHEN METABOLIC PANEL: CPT

## 2018-07-05 PROCEDURE — 83880 ASSAY OF NATRIURETIC PEPTIDE: CPT

## 2018-07-06 ENCOUNTER — OFFICE VISIT (OUTPATIENT)
Dept: CARDIOLOGY | Facility: MEDICAL CENTER | Age: 68
End: 2018-07-06
Payer: COMMERCIAL

## 2018-07-06 VITALS
HEIGHT: 68 IN | OXYGEN SATURATION: 92 % | DIASTOLIC BLOOD PRESSURE: 70 MMHG | HEART RATE: 94 BPM | SYSTOLIC BLOOD PRESSURE: 122 MMHG

## 2018-07-06 DIAGNOSIS — Z79.4 TYPE 2 DIABETES MELLITUS WITH MICROALBUMINURIA, WITH LONG-TERM CURRENT USE OF INSULIN (HCC): ICD-10-CM

## 2018-07-06 DIAGNOSIS — Z95.2 S/P TAVR (TRANSCATHETER AORTIC VALVE REPLACEMENT): ICD-10-CM

## 2018-07-06 DIAGNOSIS — I10 ESSENTIAL HYPERTENSION: ICD-10-CM

## 2018-07-06 DIAGNOSIS — N18.30 STAGE 3 CHRONIC KIDNEY DISEASE (HCC): ICD-10-CM

## 2018-07-06 DIAGNOSIS — I50.23 ACUTE ON CHRONIC SYSTOLIC (CONGESTIVE) HEART FAILURE (HCC): ICD-10-CM

## 2018-07-06 DIAGNOSIS — E11.69 DYSLIPIDEMIA ASSOCIATED WITH TYPE 2 DIABETES MELLITUS (HCC): ICD-10-CM

## 2018-07-06 DIAGNOSIS — R80.9 TYPE 2 DIABETES MELLITUS WITH MICROALBUMINURIA, WITH LONG-TERM CURRENT USE OF INSULIN (HCC): ICD-10-CM

## 2018-07-06 DIAGNOSIS — Z95.5 STENTED CORONARY ARTERY: ICD-10-CM

## 2018-07-06 DIAGNOSIS — I25.10 CORONARY ARTERY DISEASE INVOLVING NATIVE HEART WITHOUT ANGINA PECTORIS, UNSPECIFIED VESSEL OR LESION TYPE: ICD-10-CM

## 2018-07-06 DIAGNOSIS — I42.0 DILATED CARDIOMYOPATHY (HCC): ICD-10-CM

## 2018-07-06 DIAGNOSIS — R60.0 BILATERAL LEG EDEMA: ICD-10-CM

## 2018-07-06 DIAGNOSIS — E11.29 TYPE 2 DIABETES MELLITUS WITH MICROALBUMINURIA, WITH LONG-TERM CURRENT USE OF INSULIN (HCC): ICD-10-CM

## 2018-07-06 DIAGNOSIS — E78.5 DYSLIPIDEMIA ASSOCIATED WITH TYPE 2 DIABETES MELLITUS (HCC): ICD-10-CM

## 2018-07-06 PROBLEM — R06.01 ORTHOPNEA: Status: RESOLVED | Noted: 2018-05-23 | Resolved: 2018-07-06

## 2018-07-06 PROBLEM — I50.22 CHRONIC SYSTOLIC CHF (CONGESTIVE HEART FAILURE) (HCC): Status: RESOLVED | Noted: 2018-06-21 | Resolved: 2018-07-06

## 2018-07-06 PROBLEM — R06.02 SHORTNESS OF BREATH ON EXERTION: Status: RESOLVED | Noted: 2018-05-23 | Resolved: 2018-07-06

## 2018-07-06 LAB
ACT BLD: 241 SEC (ref 74–137)
ACTION RANGE TRIGGERED IACRT: NO
BASE EXCESS BLDA CALC-SCNC: -2 MMOL/L (ref -4–3)
BODY TEMPERATURE: ABNORMAL DEGREES
CA-I BLD ISE-SCNC: 1.26 MMOL/L (ref 1.1–1.3)
CO2 BLDA-SCNC: 24 MMOL/L (ref 20–33)
GLUCOSE BLD-MCNC: 113 MG/DL (ref 65–99)
HCO3 BLDA-SCNC: 23.2 MMOL/L (ref 17–25)
HCT VFR BLD CALC: 23 % (ref 42–52)
HGB BLD-MCNC: 7.8 G/DL (ref 14–18)
INST. QUALIFIED PATIENT IIQPT: YES
PCO2 BLDA: 41.8 MMHG (ref 26–37)
PH BLDA: 7.35 [PH] (ref 7.4–7.5)
PO2 BLDA: 304 MMHG (ref 64–87)
POTASSIUM BLD-SCNC: 4.7 MMOL/L (ref 3.6–5.5)
SAO2 % BLDA: 100 % (ref 93–99)
SODIUM BLD-SCNC: 138 MMOL/L (ref 135–145)
SPECIMEN DRAWN FROM PATIENT: ABNORMAL

## 2018-07-06 PROCEDURE — 99214 OFFICE O/P EST MOD 30 MIN: CPT | Performed by: NURSE PRACTITIONER

## 2018-07-06 RX ORDER — BLOOD PRESSURE TEST KIT
1 KIT MISCELLANEOUS ONCE
Qty: 1 KIT | Refills: 0 | Status: SHIPPED | OUTPATIENT
Start: 2018-07-06 | End: 2018-07-06

## 2018-07-06 ASSESSMENT — ENCOUNTER SYMPTOMS
FEVER: 0
ORTHOPNEA: 1
PND: 0
CLAUDICATION: 0
MYALGIAS: 0
PALPITATIONS: 0
ABDOMINAL PAIN: 0
SHORTNESS OF BREATH: 1
COUGH: 0

## 2018-07-06 NOTE — LETTER
[facility]   [address]  [place] [address]  Phone: [phone]  Fax: [phone]              Ashish Wiley  1950    Encounter Date: 7/6/2018    ANG Ellington          PROGRESS NOTE:  Chief Complaint   Patient presents with   • Aortic Stenosis     Subjective:   Ashish Wiley is a 68 y.o. male who presents today for hospital follow up S/P TAVR, acute heart failure exacerbation, and S/P PCI with  arthrectomy to the LAD.    He is a patient of Dr. Fuller, but we will get him set up with Dr. Martinez for his heart failure.    Hx of rEF of 35% with acute heart failure exacerbation, severe AS now S/P TAVR, HTN, HLD, DM on insulin therapy, and CKD.    He is overall doing okay. He has his ups/downs with recovery. He was rushed through the TAVR program due to worsening clinical status with heart failure.    He did have post-operative R pleural effusion that was drained 1,000 ml. He tolerated this well.    He still has lower extremity     Past Medical History:   Diagnosis Date   • Acute exacerbation of CHF (congestive heart failure) ([facility]) 6/25/2018   • Aortic stenosis     S/P TAVR    • CAD (coronary artery disease)    • CHF (congestive heart failure) ([facility])    • Diabetes ([facility])    • Dilated cardiomyopathy ([facility])    • Hyperlipidemia    • Hypertension    • NYHA class 3 acute on chronic systolic heart failure ([facility]) 6/25/2018   • Severe aortic stenosis 6/25/2018     Past Surgical History:   Procedure Laterality Date   • TRANSCATHETER AORTIC VALVE REPLACEMENT  7/2/2018    Procedure: TRANSCATHETER AORTIC VALVE REPLACEMENT;  Surgeon: Markus Fuller M.D.;  Location: SURGERY [facility];  Service: Cardiac   • DAYANNA  7/2/2018    Procedure: DAYANNA;  Surgeon: Markus Fuller M.D.;  Location: SURGERY [facility];  Service: Cardiac   • AORTIC VALVE REPLACEMENT      S/P TAVR    • CARDIAC CATH     • TONSILLECTOMY       Family History   Problem  Relation Age of Onset   • Lung Disease Mother      COPD   • Heart Disease Father    • Heart Failure Father    • Hypertension Father    • Hyperlipidemia Father    • Cancer Maternal Grandmother      BRAIN TUMOR   • Stroke Maternal Grandfather    • Other Paternal Grandmother      INTESTINAL PROBLEM   • Stroke Paternal Grandfather      Social History     Social History   • Marital status:      Spouse name: N/A   • Number of children: N/A   • Years of education: N/A     Occupational History   • Not on file.     Social History Main Topics   • Smoking status: Former Smoker     Packs/day: 1.00     Years: 22.00     Quit date: 1/1/1989   • Smokeless tobacco: Never Used      Comment: stop cigar in 2015   • Alcohol use 0.6 oz/week     1 Glasses of wine per week      Comment: OCCASIONALLY   • Drug use: No   • Sexual activity: Yes     Partners: Female     Other Topics Concern   • Not on file     Social History Narrative   • No narrative on file     Allergies   Allergen Reactions   • Sulfa Drugs Unspecified     Kidney Stones     Outpatient Encounter Prescriptions as of 7/6/2018   Medication Sig Dispense Refill   • Blood Pressure Kit 1 Each by Does not apply route Once for 1 dose. 1 Kit 0   • lisinopril (PRINIVIL) 20 MG Tab Take 1 Tab by mouth every day. 30 Tab 11   • carvedilol (COREG) 25 MG Tab Take 1 Tab by mouth 2 times a day, with meals. 60 Tab 11   • potassium chloride SA (KDUR) 20 MEQ Tab CR Take 1 Tab by mouth every day. 60 Tab 11   • furosemide (LASIX) 20 MG Tab Take 2 Tabs by mouth 2 times a day. 60 Tab 1   • Cholecalciferol (HM VITAMIN D3) 4000 units Cap Take 1 Cap by mouth every day.     • clopidogrel (PLAVIX) 75 MG Tab Take 1 Tab by mouth every day. 30 Tab 11   • Exenatide ER 2 MG Pen-injector Inject 2 mg as instructed every 7 days. 12 Each 0   • insulin glargine (LANTUS) 100 UNIT/ML Solution Inject 16 Units as instructed every evening. Pt had 8 units night prior to surgery     • atorvastatin (LIPITOR) 40 MG Tab  "Take 40 mg by mouth every evening.     • aspirin EC (ECOTRIN) 81 MG Tablet Delayed Response Take 81 mg by mouth every day.     • [DISCONTINUED] Blood Glucose Monitoring Suppl Device Meter: Dispense Device of Insurance Preference. Use as directed for blood sugar monitoring. 1 Device 0     No facility-administered encounter medications on file as of 7/6/2018.      Review of Systems   Constitutional: Positive for malaise/fatigue. Negative for fever.   Respiratory: Positive for shortness of breath. Negative for cough.    Cardiovascular: Positive for orthopnea and leg swelling. Negative for chest pain, palpitations, claudication and PND.   Gastrointestinal: Negative for abdominal pain.   Musculoskeletal: Negative for myalgias.        Objective:   /70   Pulse 94   Ht 1.727 m (5' 8\")   SpO2 92%     Physical Exam   Constitutional: He appears well-developed and well-nourished.   HENT:   Head: Normocephalic and atraumatic.   Eyes: EOM are normal.   Neck: No JVD present.   Cardiovascular: Normal rate, regular rhythm, normal heart sounds and intact distal pulses.    Pulmonary/Chest: Effort normal. He has decreased breath sounds in the right lower field.   Musculoskeletal: Normal range of motion. He exhibits edema.   Bilateral LE edema for 2+ pitting edema;compression stockings in place   Skin: Skin is warm and dry.   Psychiatric: He has a normal mood and affect.   Nursing note and vitals reviewed.      Assessment:     1. Dilated cardiomyopathy (HCC)     2. Acute on chronic systolic (congestive) heart failure (HCC)     3. S/P TAVR (transcatheter aortic valve replacement)     4. Coronary artery disease involving native heart without angina pectoris, unspecified vessel or lesion type     5. Stented coronary artery     6. Bilateral leg edema     7. Dyslipidemia associated with type 2 diabetes mellitus (HCC)     8. Essential hypertension     9. Stage 3 chronic kidney disease     10. Type 2 diabetes mellitus with " microalbuminuria, with long-term current use of insulin (HCC)       Medical Decision Making:  Today's Assessment / Status / Plan:     1. S/P TAVR  -doing well  -groin sites bruised but healing well, small bumps present at sites but not enlarging per patient  -echo 1 month with JI follow up  -SBE card given and understands  -cont asa lifelong  -cardiac rehab apt made    2. Acute on chronic systolic heart failure with rEF of 35%  -orthopnea and edema present  -diminished bases  -consider repeat CXR if symptoms worsen with prior pleural effusion with 1L removed during last hospital stay  -cont bb, ace, and diuretic therapy increase lasix to 40 mg BID  -watch Cr and GFR    3. CAD with recent arthrectomy of LAD   -no angina, [place] with heart failure present  -cont asa lifelong, plavix (consider lifelong), statin, and bb  -follow clinically    4. HLD  -statin  -LDL goal <70 with CAD  -check cmp and lipid yearly    5. HTN  -good control on bb, ace, and lasix    6. DM with CKD  -managed by PCP  -watch Cr with furosemide and diuresis    FU in clinic in 1 week with HF clinic and month with JI with echo    Patient verbalizes understanding and agrees with the plan of care.     Collaborating MD: Alina REHMAN        No Recipients

## 2018-07-06 NOTE — PROGRESS NOTES
Chief Complaint   Patient presents with   • Aortic Stenosis     Subjective:   Ashish Wiley is a 68 y.o. male who presents today for hospital follow up S/P TAVR, acute heart failure exacerbation, and S/P PCI with  arthrectomy to the LAD.    He is a patient of Dr. Fuller, but we will get him set up with Dr. Martinez for his heart failure.    Hx of rEF of 35% with acute heart failure exacerbation, severe AS now S/P TAVR, HTN, HLD, DM on insulin therapy, and CKD.    He is overall doing okay. He has his ups/downs with recovery. He was rushed through the TAVR program due to worsening clinical status with heart failure.    He did have post-operative R pleural effusion that was drained 1,000 ml. He tolerated this well.    He still has lower extremity     Past Medical History:   Diagnosis Date   • Acute exacerbation of CHF (congestive heart failure) ([facility]) 6/25/2018   • Aortic stenosis     S/P TAVR    • CAD (coronary artery disease)    • CHF (congestive heart failure) ([facility])    • Diabetes ([facility])    • Dilated cardiomyopathy ([facility])    • Hyperlipidemia    • Hypertension    • NYHA class 3 acute on chronic systolic heart failure ([facility]) 6/25/2018   • Severe aortic stenosis 6/25/2018     Past Surgical History:   Procedure Laterality Date   • TRANSCATHETER AORTIC VALVE REPLACEMENT  7/2/2018    Procedure: TRANSCATHETER AORTIC VALVE REPLACEMENT;  Surgeon: Markus Fuller M.D.;  Location: SURGERY [facility];  Service: Cardiac   • DAYANNA  7/2/2018    Procedure: DAYANNA;  Surgeon: Markus Fuller M.D.;  Location: SURGERY [facility];  Service: Cardiac   • AORTIC VALVE REPLACEMENT      S/P TAVR    • CARDIAC CATH     • TONSILLECTOMY       Family History   Problem Relation Age of Onset   • Lung Disease Mother      COPD   • Heart Disease Father    • Heart Failure Father    • Hypertension Father    • Hyperlipidemia Father    • Cancer Maternal Grandmother      BRAIN TUMOR   • Stroke Maternal Grandfather    • Other Paternal Grandmother       INTESTINAL PROBLEM   • Stroke Paternal Grandfather      Social History     Social History   • Marital status:      Spouse name: N/A   • Number of children: N/A   • Years of education: N/A     Occupational History   • Not on file.     Social History Main Topics   • Smoking status: Former Smoker     Packs/day: 1.00     Years: 22.00     Quit date: 1/1/1989   • Smokeless tobacco: Never Used      Comment: stop cigar in 2015   • Alcohol use 0.6 oz/week     1 Glasses of wine per week      Comment: OCCASIONALLY   • Drug use: No   • Sexual activity: Yes     Partners: Female     Other Topics Concern   • Not on file     Social History Narrative   • No narrative on file     Allergies   Allergen Reactions   • Sulfa Drugs Unspecified     Kidney Stones     Outpatient Encounter Prescriptions as of 7/6/2018   Medication Sig Dispense Refill   • Blood Pressure Kit 1 Each by Does not apply route Once for 1 dose. 1 Kit 0   • lisinopril (PRINIVIL) 20 MG Tab Take 1 Tab by mouth every day. 30 Tab 11   • carvedilol (COREG) 25 MG Tab Take 1 Tab by mouth 2 times a day, with meals. 60 Tab 11   • potassium chloride SA (KDUR) 20 MEQ Tab CR Take 1 Tab by mouth every day. 60 Tab 11   • furosemide (LASIX) 20 MG Tab Take 2 Tabs by mouth 2 times a day. 60 Tab 1   • Cholecalciferol (HM VITAMIN D3) 4000 units Cap Take 1 Cap by mouth every day.     • clopidogrel (PLAVIX) 75 MG Tab Take 1 Tab by mouth every day. 30 Tab 11   • Exenatide ER 2 MG Pen-injector Inject 2 mg as instructed every 7 days. 12 Each 0   • insulin glargine (LANTUS) 100 UNIT/ML Solution Inject 16 Units as instructed every evening. Pt had 8 units night prior to surgery     • atorvastatin (LIPITOR) 40 MG Tab Take 40 mg by mouth every evening.     • aspirin EC (ECOTRIN) 81 MG Tablet Delayed Response Take 81 mg by mouth every day.     • [DISCONTINUED] Blood Glucose Monitoring Suppl Device Meter: Dispense Device of Insurance Preference. Use as directed for blood sugar monitoring. 1  "Device 0     No facility-administered encounter medications on file as of 7/6/2018.      Review of Systems   Constitutional: Positive for malaise/fatigue. Negative for fever.   Respiratory: Positive for shortness of breath. Negative for cough.    Cardiovascular: Positive for orthopnea and leg swelling. Negative for chest pain, palpitations, claudication and PND.   Gastrointestinal: Negative for abdominal pain.   Musculoskeletal: Negative for myalgias.        Objective:   /70   Pulse 94   Ht 1.727 m (5' 8\")   SpO2 92%     Physical Exam   Constitutional: He appears well-developed and well-nourished.   HENT:   Head: Normocephalic and atraumatic.   Eyes: EOM are normal.   Neck: No JVD present.   Cardiovascular: Normal rate, regular rhythm, normal heart sounds and intact distal pulses.    Pulmonary/Chest: Effort normal. He has decreased breath sounds in the right lower field.   Musculoskeletal: Normal range of motion. He exhibits edema.   Bilateral LE edema for 2+ pitting edema;compression stockings in place   Skin: Skin is warm and dry.   Psychiatric: He has a normal mood and affect.   Nursing note and vitals reviewed.      Assessment:     1. Dilated cardiomyopathy (HCC)     2. Acute on chronic systolic (congestive) heart failure (HCC)     3. S/P TAVR (transcatheter aortic valve replacement)     4. Coronary artery disease involving native heart without angina pectoris, unspecified vessel or lesion type     5. Stented coronary artery     6. Bilateral leg edema     7. Dyslipidemia associated with type 2 diabetes mellitus ([facility])     8. Essential hypertension     9. Stage 3 chronic kidney disease     10. Type 2 diabetes mellitus with microalbuminuria, with long-term current use of insulin ([facility])       Medical Decision Making:  Today's Assessment / Status / Plan:     1. S/P TAVR  -doing well  -groin sites bruised but healing well, small bumps present at sites but not enlarging per patient  -echo 1 month with JI follow " up  -SBE card given and understands  -cont asa lifelong  -cardiac rehab apt made    2. Acute on chronic systolic heart failure with rEF of 35%  -orthopnea and edema present  -diminished bases  -consider repeat CXR if symptoms worsen with prior pleural effusion with 1L removed during last hospital stay  -cont bb, ace, and diuretic therapy increase lasix to 40 mg BID  -watch Cr and GFR    3. CAD with recent arthrectomy of LAD   -no angina, [place] with heart failure present  -cont asa lifelong, plavix (consider lifelong), statin, and bb  -follow clinically    4. HLD  -statin  -LDL goal <70 with CAD  -check cmp and lipid yearly    5. HTN  -good control on bb, ace, and lasix    6. DM with CKD  -managed by PCP  -watch Cr with furosemide and diuresis    FU in clinic in 1 week with HF clinic and month with JI with echo    Patient verbalizes understanding and agrees with the plan of care.     Collaborating MD: Alina REHMAN

## 2018-07-11 ENCOUNTER — PATIENT MESSAGE (OUTPATIENT)
Dept: HEALTH INFORMATION MANAGEMENT | Facility: OTHER | Age: 68
End: 2018-07-11

## 2018-07-13 ENCOUNTER — OFFICE VISIT (OUTPATIENT)
Dept: CARDIOLOGY | Facility: MEDICAL CENTER | Age: 68
End: 2018-07-13
Payer: COMMERCIAL

## 2018-07-13 VITALS
DIASTOLIC BLOOD PRESSURE: 60 MMHG | WEIGHT: 186.8 LBS | HEART RATE: 78 BPM | OXYGEN SATURATION: 93 % | HEIGHT: 68 IN | SYSTOLIC BLOOD PRESSURE: 124 MMHG | BODY MASS INDEX: 28.31 KG/M2

## 2018-07-13 DIAGNOSIS — I25.10 CORONARY ARTERY DISEASE INVOLVING NATIVE HEART WITHOUT ANGINA PECTORIS, UNSPECIFIED VESSEL OR LESION TYPE: ICD-10-CM

## 2018-07-13 DIAGNOSIS — R06.09 DYSPNEA ON EXERTION: ICD-10-CM

## 2018-07-13 DIAGNOSIS — I42.0 DILATED CARDIOMYOPATHY (HCC): ICD-10-CM

## 2018-07-13 DIAGNOSIS — I35.0 SEVERE AORTIC STENOSIS: ICD-10-CM

## 2018-07-13 DIAGNOSIS — E78.5 DYSLIPIDEMIA ASSOCIATED WITH TYPE 2 DIABETES MELLITUS (HCC): ICD-10-CM

## 2018-07-13 DIAGNOSIS — I50.9 HEART FAILURE, NYHA CLASS 2 (HCC): ICD-10-CM

## 2018-07-13 DIAGNOSIS — N18.30 STAGE 3 CHRONIC KIDNEY DISEASE (HCC): ICD-10-CM

## 2018-07-13 DIAGNOSIS — Z95.2 S/P TAVR (TRANSCATHETER AORTIC VALVE REPLACEMENT): ICD-10-CM

## 2018-07-13 DIAGNOSIS — I50.20 ACC/AHA STAGE C SYSTOLIC HEART FAILURE (HCC): ICD-10-CM

## 2018-07-13 DIAGNOSIS — Z95.5 STENTED CORONARY ARTERY: ICD-10-CM

## 2018-07-13 DIAGNOSIS — E11.69 DYSLIPIDEMIA ASSOCIATED WITH TYPE 2 DIABETES MELLITUS (HCC): ICD-10-CM

## 2018-07-13 DIAGNOSIS — I10 ESSENTIAL HYPERTENSION: ICD-10-CM

## 2018-07-13 PROCEDURE — 99214 OFFICE O/P EST MOD 30 MIN: CPT | Mod: 25 | Performed by: NURSE PRACTITIONER

## 2018-07-13 PROCEDURE — 94618 PULMONARY STRESS TESTING: CPT | Performed by: NURSE PRACTITIONER

## 2018-07-13 RX ORDER — FUROSEMIDE 20 MG/1
20 TABLET ORAL 2 TIMES DAILY
Qty: 60 TAB | Refills: 11 | Status: SHIPPED | OUTPATIENT
Start: 2018-07-13 | End: 2018-08-06 | Stop reason: SDUPTHER

## 2018-07-13 ASSESSMENT — MINNESOTA LIVING WITH HEART FAILURE QUESTIONNAIRE (MLHF)
DIFFICULTY SLEEPING WELL AT NIGHT: 4
MAKING YOU FEEL DEPRESSED: 3
TIRED, FATIGUED OR LOW ON ENERGY: 5
GIVING YOU SIDE EFFECTS FROM TREATMENTS: 3
SWELLING IN ANKLES OR LEGS: 4
MAKING YOU WORRY: 3
COSTING YOU MONEY FOR MEDICAL CARE: 5
WORKING AROUND THE HOUSE OR YARD DIFFICULT: 5
HAVING TO SIT OR LIE DOWN DURING THE DAY: 4
DIFFICULTY TO CONCENTRATE OR REMEMBERING THINGS: 2
DIFFICULTY WITH SEXUAL ACTIVITIES: 3
DIFFICULTY GOING AWAY FROM HOME: 3
DIFFICULTY WORKING TO EARN A LIVING: 0
FEELING LIKE A BURDEN TO FAMILY AND FRIENDS: 4
TOTAL_SCORE: 79
MAKING YOU STAY IN A HOSPITAL: 5
DIFFICULTY SOCIALIZING WITH FAMILY OR FRIENDS: 5
WALKING ABOUT OR CLIMBING STAIRS DIFFICULT: 4
LOSS OF SELF CONTROL IN YOUR LIFE: 4
MAKING YOU SHORT OF BREATH: 5
DIFFICULTY WITH RECREATIONAL PASTIMES, SPORTS, HOBBIES: 4
EATING LESS FOODS YOU LIKE: 4

## 2018-07-13 ASSESSMENT — ENCOUNTER SYMPTOMS
SHORTNESS OF BREATH: 1
PND: 0
PALPITATIONS: 0
DIZZINESS: 0
FEVER: 0
MYALGIAS: 0
ORTHOPNEA: 0
ABDOMINAL PAIN: 0
COUGH: 0
CLAUDICATION: 0

## 2018-07-13 ASSESSMENT — 6 MINUTE WALK TEST (6MWT): TOTAL DISTANCE WALKED (METERS): 405.3

## 2018-07-13 NOTE — PROGRESS NOTES
Chief Complaint   Patient presents with   • Congestive Heart Failure     HF NP       Subjective:   Ashish Wiley is a 68 y.o. male who presents today for follow-up on his heart failure with his wife, Geneva.    Patient of Dr. Fuller.  Patient was last seen in clinic on 7/6/2018 with SPENCER Weeks.  Patient was referred over here to the heart failure clinic for his dilated cardiomyopathy.  Patient has a history of aortic stenosis, s/p TAVR on 7/2/2018, CAD with  arthrectomy and PCI.    For his symptoms, patient reports he has been feeling much better with his furosemide increased to 40 mg twice a day.  Patient reports when he left the hospital last week, he was weighing 204 pounds and has weight has dropped down to 186 pounds at home.    Patient reports his lower extremity edema and abdominal bloating has basically resolved.  Patient does continue to report being winded with exertion.  Patient states he has not been pushing himself much.  He denies chest pain, fatigue, palpitations, orthopnea, PND or dizziness/lightheadedness.    Initial 6 minute walk test, patient was able to complete 405 m during his 6 minute walk test. His O2 saturation at baseline was 93% and at the end of the test, the O2 saturation was 93%. He reported level 2 of dyspnea on Vi scale.    MLWHF score 79    Additonally, patient has the following medical problems:    -Severe Aortic stenosis, s/p TAVR on 7/2/2018    -CAD, s/p PCI with  arthrectomy to the LAD.    -CKD: Followed by nephrology, Dr. Aguillon    -Hypertension    -Hyperlipidemia    -Diabetes on insulin therapy      Past Medical History:   Diagnosis Date   • Acute exacerbation of CHF (congestive heart failure) (HCC) 6/25/2018   • Aortic stenosis     S/P TAVR    • CAD (coronary artery disease)    • CHF (congestive heart failure) (HCC)    • Diabetes (HCC)    • Dilated cardiomyopathy (HCC)    • Hyperlipidemia    • Hypertension    • NYHA class 3 acute on chronic systolic  heart failure (HCC) 6/25/2018   • Severe aortic stenosis 6/25/2018     Past Surgical History:   Procedure Laterality Date   • TRANSCATHETER AORTIC VALVE REPLACEMENT  7/2/2018    Procedure: TRANSCATHETER AORTIC VALVE REPLACEMENT;  Surgeon: Markus Fuller M.D.;  Location: SURGERY [facility];  Service: Cardiac   • DAYANNA  7/2/2018    Procedure: DAYANNA;  Surgeon: Markus Fuller M.D.;  Location: SURGERY [facility];  Service: Cardiac   • AORTIC VALVE REPLACEMENT      S/P TAVR    • CARDIAC CATH     • TONSILLECTOMY       Family History   Problem Relation Age of Onset   • Lung Disease Mother      COPD   • Heart Disease Father    • Heart Failure Father    • Hypertension Father    • Hyperlipidemia Father    • Cancer Maternal Grandmother      BRAIN TUMOR   • Stroke Maternal Grandfather    • Other Paternal Grandmother      INTESTINAL PROBLEM   • Stroke Paternal Grandfather      Social History     Social History   • Marital status:      Spouse name: N/A   • Number of children: N/A   • Years of education: N/A     Occupational History   • Not on file.     Social History Main Topics   • Smoking status: Former Smoker     Packs/day: 1.00     Years: 22.00     Quit date: 1/1/1989   • Smokeless tobacco: Never Used      Comment: stop cigar in 2015   • Alcohol use 0.6 oz/week     1 Glasses of wine per week      Comment: OCCASIONALLY   • Drug use: No   • Sexual activity: Yes     Partners: Female     Other Topics Concern   • Not on file     Social History Narrative   • No narrative on file     Allergies   Allergen Reactions   • Sulfa Drugs Unspecified     Kidney Stones     Outpatient Encounter Prescriptions as of 7/13/2018   Medication Sig Dispense Refill   • lisinopril (PRINIVIL) 20 MG Tab Take 1 Tab by mouth every day. 30 Tab 11   • carvedilol (COREG) 25 MG Tab Take 1 Tab by mouth 2 times a day, with meals. 60 Tab 11   • potassium chloride SA (KDUR) 20 MEQ Tab CR Take 1 Tab by mouth every day. 60 Tab 11   • furosemide (LASIX) 20  "MG Tab Take 2 Tabs by mouth 2 times a day. 60 Tab 1   • Cholecalciferol (HM VITAMIN D3) 4000 units Cap Take 1 Cap by mouth every day.     • clopidogrel (PLAVIX) 75 MG Tab Take 1 Tab by mouth every day. 30 Tab 11   • Exenatide ER 2 MG Pen-injector Inject 2 mg as instructed every 7 days. 12 Each 0   • insulin glargine (LANTUS) 100 UNIT/ML Solution Inject 16 Units as instructed every evening. Pt had 8 units night prior to surgery     • atorvastatin (LIPITOR) 40 MG Tab Take 40 mg by mouth every evening.     • aspirin EC (ECOTRIN) 81 MG Tablet Delayed Response Take 81 mg by mouth every day.       No facility-administered encounter medications on file as of 7/13/2018.      Review of Systems   Constitutional: Negative for fever and malaise/fatigue.   Respiratory: Positive for shortness of breath. Negative for cough.    Cardiovascular: Positive for leg swelling (Improved). Negative for chest pain, palpitations, orthopnea, claudication and PND.   Gastrointestinal: Negative for abdominal pain.        Abdominal bloating-improved   Musculoskeletal: Negative for myalgias.   Neurological: Negative for dizziness.   All other systems reviewed and are negative.       Objective:   /60   Pulse 78   Ht 1.727 m (5' 8\")   Wt 84.7 kg (186 lb 12.8 oz)   SpO2 93%   BMI 28.40 kg/m²     Physical Exam   Constitutional: He is oriented to person, place, and time. He appears well-developed and well-nourished.   HENT:   Head: Normocephalic and atraumatic.   Eyes: EOM are normal. Pupils are equal, round, and reactive to light.   Neck: Normal range of motion. Neck supple. No JVD present.   Cardiovascular: Normal rate, regular rhythm and normal heart sounds.    Pulmonary/Chest: Effort normal and breath sounds normal. No respiratory distress. He has no wheezes. He has no rales.   Abdominal: Soft. Bowel sounds are normal.   Musculoskeletal: He exhibits edema.   Neurological: He is alert and oriented to person, place, and time.   Skin: Skin " is warm and dry.   Psychiatric: He has a normal mood and affect. His behavior is normal.   Vitals reviewed.    Lab Results   Component Value Date/Time    CHOLSTRLTOT 108 05/10/2018 06:41 AM    LDL 45 05/10/2018 06:41 AM    HDL 41 05/10/2018 06:41 AM    TRIGLYCERIDE 109 05/10/2018 06:41 AM       Lab Results   Component Value Date/Time    SODIUM 135 07/05/2018 06:39 AM    POTASSIUM 4.9 07/05/2018 06:39 AM    CHLORIDE 104 07/05/2018 06:39 AM    CO2 22 07/05/2018 06:39 AM    GLUCOSE 178 (H) 07/05/2018 06:39 AM    BUN 54 (H) 07/05/2018 06:39 AM    CREATININE 2.26 (H) 07/05/2018 06:39 AM     Lab Results   Component Value Date/Time    ALKPHOSPHAT 81 07/05/2018 06:39 AM    ASTSGOT 15 07/05/2018 06:39 AM    ALTSGPT 19 07/05/2018 06:39 AM    TBILIRUBIN 0.5 07/05/2018 06:39 AM      Lab Results   Component Value Date/Time    BNPBTYPENAT 1386 (H) 07/05/2018 06:39 AM          Stress test 10/30/2008  IMPRESSION:     1. DIPYRIDAMOLE TOLERATED WELL.    2. MYOCARDIAL PERFUSION IMAGES REVEAL MILD REVERSIBLE ISCHEMIA IN THE   ANTEROSEPTAL WALL APEX TO LATE MID SLICES.    3. EJECTION FRACTION AND WALL MOTION AS ABOVE.     4. RAW DATA AS NOTED ABOVE.     Transthoracic Echo Report 6/1/2018  Moderately reduced left ventricular systolic function.  Left ventricular ejection fraction is visually estimated to be 35%.  Global hypokinesis with regional variation with severe hypokinesis of the anteroapical and septal walls.  Moderate aortic stenosis; severely of aortic stenosis Unable to estimate pulmonary artery pressure due to an inadequate tricuspid regurgitant jet.  Moderate mitral regurgitation.  probably underestimated due to low ejection fraction.  No prior study is available for comparison.     Dobutamine Stress Echo Report 6/22/2018  Abnormal resting echo with mild dyskinesis of the LV apex and EF 30%. Resting aortic valve peak and mean gradients 36 / 20.    Stress images reveal increased contractility of basal to mid anterior and  inferior walls, with more   pronounced apical dyskinesis.  Peak / mean aortic valve gradients on 15 mcg dobutamine are 72 /43    Abnormal dobutamine stress echo demonstrating significant increase in aortic valve gradients with dobutamine infusion. LV dysfunction at rest and stress. Worsening apical dyskinesis with stress   suggesting ischemia.    Transesophageal Echo Report 7/2/2018  Calcific aortic stenosis. PG= 24 peak and 15 mean. VTI 55 but EF=20%   Annular area 5.75 via Q lab.  Post .   #29 TAVR . Trace small velocity jet along AML. peak 4 and 3 mean   gradiente. Well functioning valve.   EF low 20% no change after TAVR.   Mild MR.      Transthoracic Echo Report 7/3/2018  Left ventricular ejection fraction is visually estimated to be 35%.  Restrictive diastolic function.  Moderate mitral regurgitation.  Known TAVR aortic valve that is functioning normally with normal transvalvular gradients    Heart Cath 6/28/2018  FINDINGS:  Pre-intervention the left anterior descending artery had severely calcified, eccentric 80% stenosis in the mid portion right around the first diagonal branch followed by another focal 99% calcified stenosis.    Antegrade flow was slightly sluggish with MACK-2.  Beyond that, there is another calcified moderate stenosis in the range of 50%.       After intervention, there was about 10-20% residual stenosis in the stented segments in the mid left anterior descending artery with brisk MACK-3 flow.     PLAN:  Bed rest for 6 hours.  Hydration.  Follow renal function closely.    Dual antiplatelet therapy.  Aggressive risk factor modification.    Assessment:     1. ACC/AHA stage C systolic heart failure (HCC)  BASIC METABOLIC PANEL    furosemide (LASIX) 20 MG Tab    BTYPE NATRIURETIC PEPTIDE   2. Heart failure, NYHA class 2 (HCC)  BASIC METABOLIC PANEL    furosemide (LASIX) 20 MG Tab    BTYPE NATRIURETIC PEPTIDE   3. Dilated cardiomyopathy (HCC)     4. Coronary artery disease involving native  heart without angina pectoris, unspecified vessel or lesion type     5. Stented coronary artery     6. Dyslipidemia associated with type 2 diabetes mellitus (HCC)     7. S/P TAVR (transcatheter aortic valve replacement)     8. Severe aortic stenosis     9. Essential hypertension     10. Stage 3 chronic kidney disease     11. Dyspnea on exertion         Medical Decision Making:  Today's Assessment / Status / Plan:   1. HFrEF, Stage C, class 2: pt has trace ankle edema.  -Decrease furosemide to 20 mg twice a day  -Repeat BNP and BMP in 1 week  -Continue carvedilol 25 mg twice a day  -Continue lisinopril 20 mg daily  -No spironolactone due to CKD  -Reinforced s/sx of worsening heart failure with patient and weight monitoring. Pt verbalizes understanding. Pt to call office or RTC if present.     2. CAD, s/p PCI and  Arthrectomy/HLD: Last LDL 45 on 5/10/2018  -Continue aspirin 81 mg daily  -Continue atorvastatin 40 mg daily  -Continue clopidogrel 75 mg daily    3.  Aortic stenosis, s/p TAVR: Continue to follow-up with the valve clinic.  Normal transvalvular gradients with his postop echo.  -Continue aspirin 81 mg daily  -Patient going to cardiac rehab    4.  Hypertension: Stable  -Recommendations per above    5. CKD: Continue follow-up with nephrology  -We will follow with labs in 1 week    FU in clinic in 1-2 weeks with HF MD. Sooner if needed.    Patient verbalizes understanding and agrees with the plan of care.     Collaborating MD: Jewel Silva MD

## 2018-07-20 ENCOUNTER — HOSPITAL ENCOUNTER (OUTPATIENT)
Dept: LAB | Facility: MEDICAL CENTER | Age: 68
End: 2018-07-20
Attending: NURSE PRACTITIONER
Payer: COMMERCIAL

## 2018-07-20 DIAGNOSIS — I50.20 ACC/AHA STAGE C SYSTOLIC HEART FAILURE (HCC): ICD-10-CM

## 2018-07-20 DIAGNOSIS — I50.9 HEART FAILURE, NYHA CLASS 2 (HCC): ICD-10-CM

## 2018-07-20 LAB
ANION GAP SERPL CALC-SCNC: 6 MMOL/L (ref 0–11.9)
BNP SERPL-MCNC: 1064 PG/ML (ref 0–100)
BUN SERPL-MCNC: 45 MG/DL (ref 8–22)
CALCIUM SERPL-MCNC: 9.5 MG/DL (ref 8.5–10.5)
CHLORIDE SERPL-SCNC: 107 MMOL/L (ref 96–112)
CO2 SERPL-SCNC: 23 MMOL/L (ref 20–33)
CREAT SERPL-MCNC: 1.93 MG/DL (ref 0.5–1.4)
GLUCOSE SERPL-MCNC: 219 MG/DL (ref 65–99)
POTASSIUM SERPL-SCNC: 5.7 MMOL/L (ref 3.6–5.5)
SODIUM SERPL-SCNC: 136 MMOL/L (ref 135–145)

## 2018-07-20 PROCEDURE — 36415 COLL VENOUS BLD VENIPUNCTURE: CPT

## 2018-07-20 PROCEDURE — 83880 ASSAY OF NATRIURETIC PEPTIDE: CPT

## 2018-07-20 PROCEDURE — 80048 BASIC METABOLIC PNL TOTAL CA: CPT

## 2018-07-23 ENCOUNTER — OFFICE VISIT (OUTPATIENT)
Dept: CARDIOLOGY | Facility: MEDICAL CENTER | Age: 68
End: 2018-07-23
Payer: COMMERCIAL

## 2018-07-23 VITALS
HEIGHT: 68 IN | SYSTOLIC BLOOD PRESSURE: 130 MMHG | DIASTOLIC BLOOD PRESSURE: 70 MMHG | BODY MASS INDEX: 27.89 KG/M2 | OXYGEN SATURATION: 97 % | WEIGHT: 184 LBS | HEART RATE: 80 BPM

## 2018-07-23 DIAGNOSIS — I25.10 CORONARY ARTERY DISEASE DUE TO CALCIFIED CORONARY LESION: ICD-10-CM

## 2018-07-23 DIAGNOSIS — I25.84 CORONARY ARTERY DISEASE DUE TO CALCIFIED CORONARY LESION: ICD-10-CM

## 2018-07-23 DIAGNOSIS — I42.0 DILATED CARDIOMYOPATHY (HCC): ICD-10-CM

## 2018-07-23 DIAGNOSIS — Z95.2 S/P TAVR (TRANSCATHETER AORTIC VALVE REPLACEMENT): ICD-10-CM

## 2018-07-23 DIAGNOSIS — I10 ESSENTIAL HYPERTENSION: ICD-10-CM

## 2018-07-23 DIAGNOSIS — I50.20 ACC/AHA STAGE C SYSTOLIC HEART FAILURE (HCC): ICD-10-CM

## 2018-07-23 PROCEDURE — 99214 OFFICE O/P EST MOD 30 MIN: CPT | Performed by: INTERNAL MEDICINE

## 2018-07-23 NOTE — PROGRESS NOTES
Chief Complaint   Patient presents with   • CHF (Chronic)       Subjective:   Ashish Wiley is a 68 y.o. male who presents today for follow-up of his congestive heart failure with a reduced ejection fraction.  He also has coronary artery disease, chronic kidney disease, hypertension and dyslipidemia.    He is walking for about 20 minutes daily without difficulty.  He denies any chest discomfort, dyspnea on exertion, PND, orthopnea or edema.  He is noted no palpitations or lightheadedness.    Past Medical History:   Diagnosis Date   • Acute exacerbation of CHF (congestive heart failure) ([facility]) 6/25/2018   • Aortic stenosis     S/P TAVR    • CAD (coronary artery disease)    • CHF (congestive heart failure) ([facility])    • Diabetes ([facility])    • Dilated cardiomyopathy ([facility])    • Hyperlipidemia    • Hypertension    • NYHA class 3 acute on chronic systolic heart failure ([facility]) 6/25/2018   • Severe aortic stenosis 6/25/2018     Past Surgical History:   Procedure Laterality Date   • TRANSCATHETER AORTIC VALVE REPLACEMENT  7/2/2018    Procedure: TRANSCATHETER AORTIC VALVE REPLACEMENT;  Surgeon: Markus Fuller M.D.;  Location: SURGERY [facility];  Service: Cardiac   • DAYANNA  7/2/2018    Procedure: DAYANNA;  Surgeon: Markus Fuller M.D.;  Location: SURGERY [facility];  Service: Cardiac   • AORTIC VALVE REPLACEMENT      S/P TAVR    • CARDIAC CATH     • TONSILLECTOMY       Family History   Problem Relation Age of Onset   • Lung Disease Mother      COPD   • Heart Disease Father      valve disease   • Heart Failure Father    • Hypertension Father    • Hyperlipidemia Father    • Cancer Maternal Grandmother      BRAIN TUMOR   • Stroke Maternal Grandfather    • Other Paternal Grandmother      INTESTINAL PROBLEM   • Stroke Paternal Grandfather    • Prostate cancer Brother    • Other Brother      ortho     Social History     Social History   • Marital status:      Spouse name: N/A   • Number of children: N/A   • Years of  "education: N/A     Occupational History   • Not on file.     Social History Main Topics   • Smoking status: Former Smoker     Packs/day: 1.00     Years: 22.00     Quit date: 1/1/1989   • Smokeless tobacco: Never Used      Comment: stop cigar in 2015   • Alcohol use 0.6 oz/week     1 Glasses of wine per week      Comment: OCCASIONALLY   • Drug use: No   • Sexual activity: Yes     Partners: Female     Other Topics Concern   • Not on file     Social History Narrative   • No narrative on file     Allergies   Allergen Reactions   • Sulfa Drugs Unspecified     Kidney Stones     Outpatient Encounter Prescriptions as of 7/23/2018   Medication Sig Dispense Refill   • furosemide (LASIX) 20 MG Tab Take 1 Tab by mouth 2 times a day. 60 Tab 11   • lisinopril (PRINIVIL) 20 MG Tab Take 1 Tab by mouth every day. 30 Tab 11   • carvedilol (COREG) 25 MG Tab Take 1 Tab by mouth 2 times a day, with meals. 60 Tab 11   • potassium chloride SA (KDUR) 20 MEQ Tab CR Take 1 Tab by mouth every day. 60 Tab 11   • Cholecalciferol (HM VITAMIN D3) 4000 units Cap Take 1 Cap by mouth every day.     • clopidogrel (PLAVIX) 75 MG Tab Take 1 Tab by mouth every day. 30 Tab 11   • Exenatide ER 2 MG Pen-injector Inject 2 mg as instructed every 7 days. 12 Each 0   • insulin glargine (LANTUS) 100 UNIT/ML Solution Inject 16 Units as instructed every evening. Pt had 8 units night prior to surgery     • atorvastatin (LIPITOR) 40 MG Tab Take 40 mg by mouth every evening.     • aspirin EC (ECOTRIN) 81 MG Tablet Delayed Response Take 81 mg by mouth every day.       No facility-administered encounter medications on file as of 7/23/2018.      ROS     Objective:   /70   Pulse 80   Ht 1.727 m (5' 8\")   Wt 83.5 kg (184 lb)   SpO2 97%   BMI 27.98 kg/m²     Physical Exam   Constitutional: He appears well-developed and well-nourished.   Neck: No JVD present.   Cardiovascular: Normal rate and regular rhythm.    No murmur heard.  Pulmonary/Chest: Effort normal " and breath sounds normal. He has no rales.   Abdominal: Soft. There is no tenderness.   Musculoskeletal: He exhibits edema (Trace left pretibial).     Lab Results   Component Value Date/Time    CHOLSTRLTOT 108 05/10/2018 06:41 AM    LDL 45 05/10/2018 06:41 AM    HDL 41 05/10/2018 06:41 AM    TRIGLYCERIDE 109 05/10/2018 06:41 AM       Lab Results   Component Value Date/Time    SODIUM 136 07/20/2018 08:37 AM    POTASSIUM 5.7 (H) 07/20/2018 08:37 AM    CHLORIDE 107 07/20/2018 08:37 AM    CO2 23 07/20/2018 08:37 AM    GLUCOSE 219 (H) 07/20/2018 08:37 AM    BUN 45 (H) 07/20/2018 08:37 AM    CREATININE 1.93 (H) 07/20/2018 08:37 AM     Lab Results   Component Value Date/Time    ALKPHOSPHAT 81 07/05/2018 06:39 AM    ASTSGOT 15 07/05/2018 06:39 AM    ALTSGPT 19 07/05/2018 06:39 AM    TBILIRUBIN 0.5 07/05/2018 06:39 AM      Lab Results   Component Value Date/Time    BNPBTYPENAT 1064 (H) 07/20/2018 08:37 AM             Assessment:     1. Dilated cardiomyopathy (HCC)     2. ACC/AHA stage C systolic heart failure (HCC)     3. Coronary artery disease due to calcified coronary lesion: Status post atherectomy and stenting of the LAD in June 2018.  Nonobstructive disease in the RCA and circumflex.     4. Essential hypertension     5. S/P TAVR (transcatheter aortic valve replacement)           Medical Decision Making:  Today's Assessment / Status / Plan:     Mr. Wiley is clinically stable.  He is has improved significantly since his LAD stent and TAVR.  We will continue him on his current medications for now except to discontinue his potassium supplementation since his potassium is mildly elevated.  We might ultimately increase his lisinopril or add amlodipine depending on his blood pressure.  We could also consider hydralazine/nitrate therapy.  However this is 3 times a day and I would defer this for now.  Will check a BMP and BNP in a couple of weeks and he will follow-up post.  We might consider a repeat limited echocardiogram  prior to more aggressive therapy.  He has had significant improvement in his functional status.

## 2018-07-23 NOTE — LETTER
[facility]   [address]  [place] [address]  Phone: [phone]  Fax: [phone]              Ashish Wiley  1950    Encounter Date: 7/23/2018    Denis Estrada M.D.          PROGRESS NOTE:  Chief Complaint   Patient presents with   • CHF (Chronic)       Subjective:   Ashish Wiley is a 68 y.o. male who presents today for follow-up of his congestive heart failure with a reduced ejection fraction.  He also has coronary artery disease, chronic kidney disease, hypertension and dyslipidemia.    He is walking for about 20 minutes daily without difficulty.  He denies any chest discomfort, dyspnea on exertion, PND, orthopnea or edema.  He is noted no palpitations or lightheadedness.    Past Medical History:   Diagnosis Date   • Acute exacerbation of CHF (congestive heart failure) ([facility]) 6/25/2018   • Aortic stenosis     S/P TAVR    • CAD (coronary artery disease)    • CHF (congestive heart failure) ([facility])    • Diabetes ([facility])    • Dilated cardiomyopathy ([facility])    • Hyperlipidemia    • Hypertension    • NYHA class 3 acute on chronic systolic heart failure ([facility]) 6/25/2018   • Severe aortic stenosis 6/25/2018     Past Surgical History:   Procedure Laterality Date   • TRANSCATHETER AORTIC VALVE REPLACEMENT  7/2/2018    Procedure: TRANSCATHETER AORTIC VALVE REPLACEMENT;  Surgeon: Markus Fuller M.D.;  Location: [facility];  Service: Cardiac   • DAYANNA  7/2/2018    Procedure: DAYANNA;  Surgeon: Markus Fuller M.D.;  Location: SURGERY [facility];  Service: Cardiac   • AORTIC VALVE REPLACEMENT      S/P TAVR    • CARDIAC CATH     • TONSILLECTOMY       Family History   Problem Relation Age of Onset   • Lung Disease Mother      COPD   • Heart Disease Father      valve disease   • Heart Failure Father    • Hypertension Father    • Hyperlipidemia Father    • Cancer Maternal Grandmother      BRAIN TUMOR   • Stroke Maternal Grandfather    • Other  "Paternal Grandmother      INTESTINAL PROBLEM   • Stroke Paternal Grandfather    • Prostate cancer Brother    • Other Brother      ortho     Social History     Social History   • Marital status:      Spouse name: N/A   • Number of children: N/A   • Years of education: N/A     Occupational History   • Not on file.     Social History Main Topics   • Smoking status: Former Smoker     Packs/day: 1.00     Years: 22.00     Quit date: 1/1/1989   • Smokeless tobacco: Never Used      Comment: stop cigar in 2015   • Alcohol use 0.6 oz/week     1 Glasses of wine per week      Comment: OCCASIONALLY   • Drug use: No   • Sexual activity: Yes     Partners: Female     Other Topics Concern   • Not on file     Social History Narrative   • No narrative on file     Allergies   Allergen Reactions   • Sulfa Drugs Unspecified     Kidney Stones     Outpatient Encounter Prescriptions as of 7/23/2018   Medication Sig Dispense Refill   • furosemide (LASIX) 20 MG Tab Take 1 Tab by mouth 2 times a day. 60 Tab 11   • lisinopril (PRINIVIL) 20 MG Tab Take 1 Tab by mouth every day. 30 Tab 11   • carvedilol (COREG) 25 MG Tab Take 1 Tab by mouth 2 times a day, with meals. 60 Tab 11   • potassium chloride SA (KDUR) 20 MEQ Tab CR Take 1 Tab by mouth every day. 60 Tab 11   • Cholecalciferol (HM VITAMIN D3) 4000 units Cap Take 1 Cap by mouth every day.     • clopidogrel (PLAVIX) 75 MG Tab Take 1 Tab by mouth every day. 30 Tab 11   • Exenatide ER 2 MG Pen-injector Inject 2 mg as instructed every 7 days. 12 Each 0   • insulin glargine (LANTUS) 100 UNIT/ML Solution Inject 16 Units as instructed every evening. Pt had 8 units night prior to surgery     • atorvastatin (LIPITOR) 40 MG Tab Take 40 mg by mouth every evening.     • aspirin EC (ECOTRIN) 81 MG Tablet Delayed Response Take 81 mg by mouth every day.       No facility-administered encounter medications on file as of 7/23/2018.      ROS     Objective:   /70   Pulse 80   Ht 1.727 m (5' 8\")  "  Wt 83.5 kg (184 lb)   SpO2 97%   BMI 27.98 kg/m²      Physical Exam   Constitutional: He appears well-developed and well-nourished.   Neck: No JVD present.   Cardiovascular: Normal rate and regular rhythm.    No murmur heard.  Pulmonary/Chest: Effort normal and breath sounds normal. He has no rales.   Abdominal: Soft. There is no tenderness.   Musculoskeletal: He exhibits edema (Trace left pretibial).     Lab Results   Component Value Date/Time    CHOLSTRLTOT 108 05/10/2018 06:41 AM    LDL 45 05/10/2018 06:41 AM    HDL 41 05/10/2018 06:41 AM    TRIGLYCERIDE 109 05/10/2018 06:41 AM       Lab Results   Component Value Date/Time    SODIUM 136 07/20/2018 08:37 AM    POTASSIUM 5.7 (H) 07/20/2018 08:37 AM    CHLORIDE 107 07/20/2018 08:37 AM    CO2 23 07/20/2018 08:37 AM    GLUCOSE 219 (H) 07/20/2018 08:37 AM    BUN 45 (H) 07/20/2018 08:37 AM    CREATININE 1.93 (H) 07/20/2018 08:37 AM     Lab Results   Component Value Date/Time    ALKPHOSPHAT 81 07/05/2018 06:39 AM    ASTSGOT 15 07/05/2018 06:39 AM    ALTSGPT 19 07/05/2018 06:39 AM    TBILIRUBIN 0.5 07/05/2018 06:39 AM      Lab Results   Component Value Date/Time    BNPBTYPENAT 1064 (H) 07/20/2018 08:37 AM             Assessment:     1. Dilated cardiomyopathy (HCC)     2. ACC/AHA stage C systolic heart failure (HCC)     3. Coronary artery disease due to calcified coronary lesion: Status post atherectomy and stenting of the LAD in June 2018.  Nonobstructive disease in the RCA and circumflex.     4. Essential hypertension     5. S/P TAVR (transcatheter aortic valve replacement)           Medical Decision Making:  Today's Assessment / Status / Plan:     Mr. Wiley is clinically stable.  He is has improved significantly since his LAD stent and [place]ie.  We will continue him on his current medications for now except to discontinue his potassium supplementation since his potassium is mildly elevated.  We might ultimately increase his lisinopril or add amlodipine depending on  his blood pressure.  We could also consider hydralazine/nitrate therapy.  However this is 3 times a day and I would defer this for now.  Will check a BMP and BNP in a couple of weeks and follow-up post.  We might consider a repeat limited echocardiogram prior to more aggressive therapy.  He has had significant improvement in his functional status.      Venus Claros M.D.  [address] [place] [address]  VIA In Basket

## 2018-07-26 ENCOUNTER — NON-PROVIDER VISIT (OUTPATIENT)
Dept: CARDIOLOGY | Facility: MEDICAL CENTER | Age: 68
End: 2018-07-26
Payer: COMMERCIAL

## 2018-07-26 DIAGNOSIS — Z95.2 S/P TAVR (TRANSCATHETER AORTIC VALVE REPLACEMENT): ICD-10-CM

## 2018-07-26 LAB — EKG IMPRESSION: NORMAL

## 2018-07-26 PROCEDURE — G0423 INTENS CARDIAC REHAB NO EXER: HCPCS | Mod: 59 | Performed by: FAMILY MEDICINE

## 2018-07-26 PROCEDURE — G0422 INTENS CARDIAC REHAB W/EXERC: HCPCS | Performed by: FAMILY MEDICINE

## 2018-07-26 ASSESSMENT — PATIENT HEALTH QUESTIONNAIRE - PHQ9: SUM OF ALL RESPONSES TO PHQ QUESTIONS 1-9: 2

## 2018-07-26 NOTE — PROGRESS NOTES
Video watched: 02 Overview of the Pritikin Eating Plan. Length: 34.10 minutes.    Cardiac Rehab Individual Treatment Plan Assessment: Initial 18 Session #     1   MRN: [id number]   Allergies: Sulfa drugs   Patient Name: Ashish Wiley : 1950 Risk Stratification: High    Diagnoses:   1. S/P TAVR (transcatheter aortic valve replacement)     Age: 68 y.o. Physician: Venus Claros M.D.    Date of Event: 18 Specialist: Alina   Risk Factors:  Hypertension, Hyperlipidemia, Diabetes, Family History, Age, Male > 45   Exercise Nutrition Education Psychosocial   Stages of change Stages of change Stages of change Stages of change   Preparation Preparation Preparation Preparation   Fitness Test Lipids Learning Barriers Outcome Survey Tools   DIST: 1232  Available: Yes Learning Barriers: Vision, Ready to Learn (Readers only) FP QOL Overall Score: 23.63   Max HR: 101 Date: 05/10/18 Family Support PHQ-9: 2   RPE: 12 Total: 108 mg/dL Yes Nutrition Screen: 46 %   SPO2: 97 % Tri mg/dL Lives: Spouse Intervention   MET: 2.59 HDL: 41 mg/dL Tobacco Use Behavioral Health Consult: Yes   EF= 35 LDL: 45 mg/dL History   Smoking Status   • Former Smoker   • Packs/day: 1.00   • Years: 22.00   • Quit date: 1989   Smokeless Tobacco   • Never Used     Comment: stop cigar in       Physician Referral: No   Ambulatory Status Diabetes Smoking Intervention Identifies Stressors: Yes   Fall Risk Assessed: Yes Diabetes: Yes Smoking Cessation Referral: N/A Drug Intervention: N/A   Exercise Ambulatory Status Assist Devices: None HbA1C: 7.4 % Date: 05/10/18 Ind. Education / Counseling: N/A Education   Exercise Prescription Monitors BS at Home: No Tobacco Adjunct: N/A Psychosocial Education: Coping Techniques, Positive Support System, S/S Depression   Mode: Treadmill, NuStep, UBE, Airdyne   Frequency: n/a (Pt. is on insulin and is not currently checking blood sugars) Random BS: 107 (2018) Education Intervention Target  "Goal   Frequency:  3 days/week Weight Management Healthy Heart Education: Class Schedule Given, Medications Reviewed, Patient Education Binder Provided, Risk Factors Discussed, Videos Viewed per Donita, Workshops Attended Assess presence or absence of depression using a valid screening: Yes   Duration:  15 to 20 mins Weight: 82.6 kg (182 lb) Target Goal Use Stress Management: Yes   Intensity:  11-13 RPE Height: 172.7 cm (5' 7.99\") Complete Tobacco Cessation: Complete Tobacco Cessation: N/A Adverse Events: Yes   METS:  1.0-3.0 BMI (Calculated): 27.68 Educate / Review and have understanding of cardiac disease prevention: Educate / Review and have understanding of cardiac disease prevention: Yes Unexpected Events: Yes   Progression:  ^ increments of 1-3 to THR/RPE as tolerated Goal weight: 170 Medication Compliance: Yes    THR: THR: 20-30 BPM ^Resting HR History   Alcohol Use   • 0.6 oz/week   • 1 Glasses of wine per week     Comment: OCCASIONALLY         Angina with Exercise?  Angina with Exercise: No      Resistance Training?  Resistance Training: Yes      Hypertension      Diagnosed with HTN: Yes Intervention      Resting BP: 103/65 Dietician Consult/Class: Pending      Peak Ex BP: 102/53 Nurse/Patient Discussion: Yes     Intervention Diabetes Ed Referral: Yes (Pt. has been referred to [facility])     Home Exercise:  Yes Discuss Maintenance /Wt Loss: Yes     Mode: Walk Attend Cooking School: Pending     Duration: 20 to 30 mins Dietary Goal: >=58 Rate Your Plate     Frequency: 7 days active Education     Education Nutrition Education: S&S Hypo/Hyper glycemia, Relate Diabetes to CAD, Healthy Eating, Sodium Reduction     Equipment Orientation, Exercise Safety, S/S to Report, RPE Scale, Warm Up / Cool Down, Physically Active Target Goal     Target Goal LDL-C < 100 if trig. > 200:  N/A       Start Individual Exercise Rx Yes LDL-C < 70 for high risk patient:  Yes       BP < 140/90 or < 130/80 if DM or CKD " "Yes Non HDL-C Should be < 130:  Yes       Aerobic activity 30 + min / day 5 days / wk Yes HbA1C < 7%: Yes         BMI < 25: Yes       Notes: Ashish presents today to [facility] in [facility] with an inverted T-wave and no ectopy noted.  Initial weight routine with no restrictions.  Type 2 diabetes on Insulin but not checking blood sugars at home.  We will monitor blood sugars pre and post exercise.  Initial Assessment:  Heart Sounds: S1S2 audible and regular.       Lung Sounds: Clear bilateral       Edema: None       Right Peripheral Pulses:  Carotid: 2+  Radial: 2+  Dorsalis Pedis: 2+  Posterior Tibialis: 2+   Left Peripheral Pulses:  Carotid: 2+  Radial: 2+  Dorsalis Pedis: 2+  Posterior Tibialis: 2+    Incision: None      Color: Skin is pink and dry       Frame Size: Large        Cognitive Assessment: Memory is good with a pleasant affect      Fall Assessment:    Gait: Steady  Balance: Bilateral Weakness  Upper Body: Full ROM  Lower Body: Full ROM   Recent Falls: Ashish states he fell in his house in November after getting light headed after vomiting from food poisoning  Current Medications and Vaccinations: Reviewed  Patient Stated Goals: \"I want to get down to 170# and learn new diet for diabetes control and get some strength back in my legs.\"  Other: Exercise Current:  Walking 20 minutes daily.  Exercise Goal:  Walking 30 minutes daily.  Progress:  Active 7 days a week.  Nutrition Current:  \"I try to have a low sodium diet but I am a meat and potato kind of mara.\"  Nutrition Goal:  Refer to [facility],  Introduce Pritikin Program and focus on a low sodium, low cholesterol, low fat diet.   Progress:  Ashish's personal goal is to learn a heart healthy diabetic diet.  Stress Current:  \"I don't have any stress, I am retired.\"   Stress Goal:  Healthy mindset lecture and video.  Progress:  Ashish has a healthy attitude toward stress.\"         "

## 2018-07-27 NOTE — PROGRESS NOTES
Date: 7/26/2018    Initial Individual Treatment Plan review for the [facility] Intensive Cardiac Rehabilitation (ICR) Program.    Ashish Wiley, 68 y.o. male, with qualifying diagnosis/diagnoses of S/P Heart Valve Replacement.  Co-morbid conditions include Hypertension, Hyperlipidemia, Diabetes, Family History, Age, Male > 45.    Primary Care Provider: Venus Claros M.D.    Heart Specialist (s): Dr. Fuller    Patient has successfully completed 1 Exercise Sessions, and an appropriate number of corresponding Education Sessions.  Patient is an excellent candidate for the [facility] Intensive Cardiac Rehabilitation (ICR) Program.    I will review the Individual Treatment Plan again at 30 day post-initiation of ICR.    Warren Terrell MD, [facility]  , [facility] Intensive Cardiac Rehabilitation (ICR) Program

## 2018-07-30 ENCOUNTER — HOSPITAL ENCOUNTER (OUTPATIENT)
Dept: CARDIOLOGY | Facility: MEDICAL CENTER | Age: 68
End: 2018-07-30
Attending: INTERNAL MEDICINE
Payer: COMMERCIAL

## 2018-07-30 ENCOUNTER — TELEPHONE (OUTPATIENT)
Dept: CARDIOLOGY | Facility: MEDICAL CENTER | Age: 68
End: 2018-07-30

## 2018-07-30 ENCOUNTER — NON-PROVIDER VISIT (OUTPATIENT)
Dept: CARDIOLOGY | Facility: MEDICAL CENTER | Age: 68
End: 2018-07-30
Payer: COMMERCIAL

## 2018-07-30 DIAGNOSIS — Z95.2 S/P TAVR (TRANSCATHETER AORTIC VALVE REPLACEMENT): ICD-10-CM

## 2018-07-30 LAB
EKG IMPRESSION: NORMAL
LV EJECT FRACT  99904: 45
LV EJECT FRACT MOD 2C 99903: 53.8
LV EJECT FRACT MOD 4C 99902: 49.95
LV EJECT FRACT MOD BP 99901: 51.66

## 2018-07-30 PROCEDURE — G0423 INTENS CARDIAC REHAB NO EXER: HCPCS | Mod: 59 | Performed by: FAMILY MEDICINE

## 2018-07-30 PROCEDURE — 93306 TTE W/DOPPLER COMPLETE: CPT

## 2018-07-30 PROCEDURE — G0422 INTENS CARDIAC REHAB W/EXERC: HCPCS | Performed by: FAMILY MEDICINE

## 2018-07-30 NOTE — TELEPHONE ENCOUNTER
----- Message from Geneva Enciso R.N. sent at 7/30/2018  2:30 PM PDT -----      ----- Message -----  From: ANG Ellington  Sent: 7/30/2018   1:18 PM  To: Geneva Enciso R.N.    Ef improved to 45%. TAVR looks good. FU as planned SC

## 2018-07-30 NOTE — PROGRESS NOTES
Ashish Wiley attended: Exercise Workshop from 3:30-4:30pm.      The topic was: Improved Performance     Patient received handouts regarding the specific exercise information

## 2018-08-01 ENCOUNTER — HOSPITAL ENCOUNTER (OUTPATIENT)
Dept: LAB | Facility: MEDICAL CENTER | Age: 68
End: 2018-08-01
Attending: INTERNAL MEDICINE
Payer: COMMERCIAL

## 2018-08-01 ENCOUNTER — NON-PROVIDER VISIT (OUTPATIENT)
Dept: CARDIOLOGY | Facility: MEDICAL CENTER | Age: 68
End: 2018-08-01
Payer: COMMERCIAL

## 2018-08-01 DIAGNOSIS — Z95.2 S/P TAVR (TRANSCATHETER AORTIC VALVE REPLACEMENT): ICD-10-CM

## 2018-08-01 DIAGNOSIS — I50.20 ACC/AHA STAGE C SYSTOLIC HEART FAILURE (HCC): ICD-10-CM

## 2018-08-01 LAB
ANION GAP SERPL CALC-SCNC: 8 MMOL/L (ref 0–11.9)
BNP SERPL-MCNC: 722 PG/ML (ref 0–100)
BUN SERPL-MCNC: 50 MG/DL (ref 8–22)
CALCIUM SERPL-MCNC: 9.4 MG/DL (ref 8.5–10.5)
CHLORIDE SERPL-SCNC: 107 MMOL/L (ref 96–112)
CO2 SERPL-SCNC: 22 MMOL/L (ref 20–33)
CREAT SERPL-MCNC: 1.8 MG/DL (ref 0.5–1.4)
EKG IMPRESSION: NORMAL
GLUCOSE SERPL-MCNC: 124 MG/DL (ref 65–99)
POTASSIUM SERPL-SCNC: 4.6 MMOL/L (ref 3.6–5.5)
SODIUM SERPL-SCNC: 137 MMOL/L (ref 135–145)

## 2018-08-01 PROCEDURE — 36415 COLL VENOUS BLD VENIPUNCTURE: CPT

## 2018-08-01 PROCEDURE — G0422 INTENS CARDIAC REHAB W/EXERC: HCPCS | Performed by: FAMILY MEDICINE

## 2018-08-01 PROCEDURE — 80048 BASIC METABOLIC PNL TOTAL CA: CPT

## 2018-08-01 PROCEDURE — G0423 INTENS CARDIAC REHAB NO EXER: HCPCS | Mod: 59 | Performed by: FAMILY MEDICINE

## 2018-08-01 PROCEDURE — 83880 ASSAY OF NATRIURETIC PEPTIDE: CPT

## 2018-08-03 ENCOUNTER — NON-PROVIDER VISIT (OUTPATIENT)
Dept: CARDIOLOGY | Facility: MEDICAL CENTER | Age: 68
End: 2018-08-03
Payer: COMMERCIAL

## 2018-08-03 DIAGNOSIS — Z95.2 S/P TAVR (TRANSCATHETER AORTIC VALVE REPLACEMENT): ICD-10-CM

## 2018-08-03 LAB — EKG IMPRESSION: NORMAL

## 2018-08-03 PROCEDURE — G0422 INTENS CARDIAC REHAB W/EXERC: HCPCS | Performed by: FAMILY MEDICINE

## 2018-08-03 PROCEDURE — G0423 INTENS CARDIAC REHAB NO EXER: HCPCS | Mod: 59 | Performed by: FAMILY MEDICINE

## 2018-08-03 NOTE — PROGRESS NOTES
Ashish Wiley attending cooking school from  3:30-4:30pm.    Today  prepared Borscht.     Patient received handouts and nutrition information regarding the specific recipes.

## 2018-08-06 ENCOUNTER — OFFICE VISIT (OUTPATIENT)
Dept: CARDIOLOGY | Facility: MEDICAL CENTER | Age: 68
End: 2018-08-06
Payer: COMMERCIAL

## 2018-08-06 ENCOUNTER — NON-PROVIDER VISIT (OUTPATIENT)
Dept: CARDIOLOGY | Facility: MEDICAL CENTER | Age: 68
End: 2018-08-06
Payer: COMMERCIAL

## 2018-08-06 VITALS
WEIGHT: 182 LBS | HEART RATE: 72 BPM | SYSTOLIC BLOOD PRESSURE: 138 MMHG | BODY MASS INDEX: 27.58 KG/M2 | OXYGEN SATURATION: 99 % | DIASTOLIC BLOOD PRESSURE: 70 MMHG | HEIGHT: 68 IN

## 2018-08-06 DIAGNOSIS — I25.10 CORONARY ARTERY DISEASE DUE TO CALCIFIED CORONARY LESION: ICD-10-CM

## 2018-08-06 DIAGNOSIS — Z95.2 S/P TAVR (TRANSCATHETER AORTIC VALVE REPLACEMENT): ICD-10-CM

## 2018-08-06 DIAGNOSIS — I50.20 ACC/AHA STAGE C SYSTOLIC HEART FAILURE (HCC): ICD-10-CM

## 2018-08-06 DIAGNOSIS — Z95.5 STENTED CORONARY ARTERY: ICD-10-CM

## 2018-08-06 DIAGNOSIS — I42.0 DILATED CARDIOMYOPATHY (HCC): ICD-10-CM

## 2018-08-06 DIAGNOSIS — I35.0 SEVERE AORTIC STENOSIS: ICD-10-CM

## 2018-08-06 DIAGNOSIS — I25.84 CORONARY ARTERY DISEASE DUE TO CALCIFIED CORONARY LESION: ICD-10-CM

## 2018-08-06 DIAGNOSIS — I50.9 HEART FAILURE, NYHA CLASS 2 (HCC): ICD-10-CM

## 2018-08-06 DIAGNOSIS — N18.30 STAGE 3 CHRONIC KIDNEY DISEASE (HCC): ICD-10-CM

## 2018-08-06 DIAGNOSIS — I10 ESSENTIAL HYPERTENSION: ICD-10-CM

## 2018-08-06 LAB — EKG IMPRESSION: NORMAL

## 2018-08-06 PROCEDURE — 99214 OFFICE O/P EST MOD 30 MIN: CPT | Performed by: NURSE PRACTITIONER

## 2018-08-06 RX ORDER — FUROSEMIDE 20 MG/1
20-40 TABLET ORAL DAILY
Qty: 60 TAB | Refills: 11 | Status: SHIPPED | OUTPATIENT
Start: 2018-08-06 | End: 2018-12-24 | Stop reason: SDUPTHER

## 2018-08-06 ASSESSMENT — ENCOUNTER SYMPTOMS
DIZZINESS: 1
ABDOMINAL PAIN: 0
ORTHOPNEA: 0
CLAUDICATION: 0
SHORTNESS OF BREATH: 0
PALPITATIONS: 0
PND: 0
MYALGIAS: 0
COUGH: 0
FEVER: 0

## 2018-08-06 NOTE — PROGRESS NOTES
Chief Complaint   Patient presents with   • CHF (Systolic)     F/V: 2 WEEK       Subjective:   Ashish Wiley is a 68 y.o. male who presents today for follow-up on his heart failure with his wife, [place].    Patient of Dr. Fuller (TAVR). Pt is currently being followed in the Heart Failure clinic.  Patient was last seen in clinic on 7/23/2018.  During that visit, patient's potassium was stopped.    Over the past couple of weeks, patient reports he has been feeling fairly well.  Patient states his fatigue has improved and he is building more endurance especially with cardiac rehab.  Patient does report 2 episodes of feelings of lightheadedness when he was golfing.  Patient states it resolved after a few seconds.    He denies chest pain, palpitations, orthopnea, PND, edema or shortness of breath at rest with ADLs and exertion.  No more abdominal bloating.    Patient reports his home weights have stabilized at 170 pounds.    Patient continues going to cardiac rehab 3 days a week and walks daily for 30 minutes at home.    Additonally, patient has the following medical problems:     -Severe Aortic stenosis, s/p TAVR on 7/2/2018     -CAD, s/p PCI with  arthrectomy to the LAD.     -CKD: Followed by nephrology, Dr. Aguillon     -Hypertension     -Hyperlipidemia     -Diabetes on insulin therapy    Past Medical History:   Diagnosis Date   • Acute exacerbation of CHF (congestive heart failure) ([facility]) 6/25/2018   • Aortic stenosis     S/P TAVR    • CAD (coronary artery disease)    • CHF (congestive heart failure) ([facility])    • Diabetes ([facility])    • Dilated cardiomyopathy ([facility])    • Hyperlipidemia    • Hypertension    • NYHA class 3 acute on chronic systolic heart failure ([facility]) 6/25/2018   • Severe aortic stenosis 6/25/2018     Past Surgical History:   Procedure Laterality Date   • TRANSCATHETER AORTIC VALVE REPLACEMENT  7/2/2018    Procedure: TRANSCATHETER AORTIC VALVE REPLACEMENT;  Surgeon: Markus Fuller M.D.;  Location:  SURGERY [facility];  Service: Cardiac   • DAYANNA  7/2/2018    Procedure: DAYANNA;  Surgeon: Markus Fuller M.D.;  Location: SURGERY [facility];  Service: Cardiac   • AORTIC VALVE REPLACEMENT      S/P TAVR    • CARDIAC CATH     • TONSILLECTOMY       Family History   Problem Relation Age of Onset   • Lung Disease Mother         COPD   • Heart Disease Father         valve disease   • Heart Failure Father    • Hypertension Father    • Hyperlipidemia Father    • Cancer Maternal Grandmother         BRAIN TUMOR   • Stroke Maternal Grandfather    • Other Paternal Grandmother         INTESTINAL PROBLEM   • Stroke Paternal Grandfather    • Prostate cancer Brother    • Other Brother         ortho     Social History     Social History   • Marital status:      Spouse name: N/A   • Number of children: N/A   • Years of education: N/A     Occupational History   • Not on file.     Social History Main Topics   • Smoking status: Former Smoker     Packs/day: 1.00     Years: 22.00     Quit date: 1/1/1989   • Smokeless tobacco: Never Used      Comment: stop cigar in 2015   • Alcohol use 0.6 oz/week     1 Glasses of wine per week      Comment: OCCASIONALLY   • Drug use: No   • Sexual activity: Yes     Partners: Female     Other Topics Concern   • Not on file     Social History Narrative   • No narrative on file     Allergies   Allergen Reactions   • Sulfa Drugs Unspecified     Kidney Stones     Outpatient Encounter Prescriptions as of 8/6/2018   Medication Sig Dispense Refill   • furosemide (LASIX) 20 MG Tab Take 1 Tab by mouth 2 times a day. 60 Tab 11   • lisinopril (PRINIVIL) 20 MG Tab Take 1 Tab by mouth every day. 30 Tab 11   • carvedilol (COREG) 25 MG Tab Take 1 Tab by mouth 2 times a day, with meals. 60 Tab 11   • Cholecalciferol (HM VITAMIN D3) 4000 units Cap Take 1 Cap by mouth every day.     • clopidogrel (PLAVIX) 75 MG Tab Take 1 Tab by mouth every day. 30 Tab 11   • Exenatide ER 2 MG Pen-injector Inject 2 mg as  "instructed every 7 days. 12 Each 0   • insulin glargine (LANTUS) 100 UNIT/ML Solution Inject 16 Units as instructed every evening. Pt had 8 units night prior to surgery     • atorvastatin (LIPITOR) 40 MG Tab Take 40 mg by mouth every evening.     • aspirin EC (ECOTRIN) 81 MG Tablet Delayed Response Take 81 mg by mouth every day.       No facility-administered encounter medications on file as of 8/6/2018.      Review of Systems   Constitutional: Positive for malaise/fatigue. Negative for fever.   Respiratory: Negative for cough and shortness of breath.    Cardiovascular: Negative for chest pain, palpitations, orthopnea, claudication, leg swelling and PND.   Gastrointestinal: Negative for abdominal pain.   Musculoskeletal: Negative for myalgias.   Neurological: Positive for dizziness.   All other systems reviewed and are negative.       Objective:   /70   Pulse 72   Ht 1.727 m (5' 8\")   Wt 82.6 kg (182 lb)   SpO2 99%   BMI 27.67 kg/m²     Physical Exam   Constitutional: He is oriented to person, place, and time. He appears well-developed and well-nourished.   HENT:   Head: Normocephalic and atraumatic.   Eyes: Pupils are equal, round, and reactive to light. EOM are normal.   Neck: Normal range of motion. Neck supple. No JVD present.   Cardiovascular: Normal rate, regular rhythm and normal heart sounds.    Pulmonary/Chest: Effort normal and breath sounds normal. No respiratory distress. He has no wheezes. He has no rales.   Abdominal: Soft. Bowel sounds are normal.   Musculoskeletal: He exhibits no edema.   Neurological: He is alert and oriented to person, place, and time.   Skin: Skin is warm and dry.   Psychiatric: He has a normal mood and affect. His behavior is normal.   Vitals reviewed.    Lab Results   Component Value Date/Time    CHOLSTRLTOT 108 05/10/2018 06:41 AM    LDL 45 05/10/2018 06:41 AM    HDL 41 05/10/2018 06:41 AM    TRIGLYCERIDE 109 05/10/2018 06:41 AM       Lab Results   Component Value " Date/Time    SODIUM 137 08/01/2018 09:32 AM    POTASSIUM 4.6 08/01/2018 09:32 AM    CHLORIDE 107 08/01/2018 09:32 AM    CO2 22 08/01/2018 09:32 AM    GLUCOSE 124 (H) 08/01/2018 09:32 AM    BUN 50 (H) 08/01/2018 09:32 AM    CREATININE 1.80 (H) 08/01/2018 09:32 AM     Lab Results   Component Value Date/Time    ALKPHOSPHAT 81 07/05/2018 06:39 AM    ASTSGOT 15 07/05/2018 06:39 AM    ALTSGPT 19 07/05/2018 06:39 AM    TBILIRUBIN 0.5 07/05/2018 06:39 AM      Stress test 10/30/2008  IMPRESSION:     1. DIPYRIDAMOLE TOLERATED WELL.    2. MYOCARDIAL PERFUSION IMAGES REVEAL MILD REVERSIBLE ISCHEMIA IN THE   ANTEROSEPTAL WALL APEX TO LATE MID SLICES.    3. EJECTION FRACTION AND WALL MOTION AS ABOVE.     4. RAW DATA AS NOTED ABOVE.      Transthoracic Echo Report 6/1/2018  Moderately reduced left ventricular systolic function.  Left ventricular ejection fraction is visually estimated to be 35%.  Global hypokinesis with regional variation with severe hypokinesis of the anteroapical and septal walls.  Moderate aortic stenosis; severely of aortic stenosis Unable to estimate pulmonary artery pressure due to an inadequate tricuspid regurgitant jet.  Moderate mitral regurgitation.  probably underestimated due to low ejection fraction.  No prior study is available for comparison.     Dobutamine Stress Echo Report 6/22/2018  Abnormal resting echo with mild dyskinesis of the LV apex and EF 30%. Resting aortic valve peak and mean gradients 36 / 20.    Stress images reveal increased contractility of basal to mid anterior and inferior walls, with more   pronounced apical dyskinesis.  Peak / mean aortic valve gradients on 15 mcg dobutamine are 72 /43    Abnormal dobutamine stress echo demonstrating significant increase in aortic valve gradients with dobutamine infusion. LV dysfunction at rest and stress. Worsening apical dyskinesis with stress   suggesting ischemia.     Transesophageal Echo Report 7/2/2018  Calcific aortic stenosis. PG= 24  peak and 15 mean. VTI 55 but EF=20%   Annular area 5.75 via Q lab.  Post .   #29 TAVR . Trace small velocity jet along AML. peak 4 and 3 mean   gradiente. Well functioning valve.   EF low 20% no change after TAVR.   Mild MR.       Transthoracic Echo Report 7/3/2018  Left ventricular ejection fraction is visually estimated to be 35%.  Restrictive diastolic function.  Moderate mitral regurgitation.  Known TAVR aortic valve that is functioning normally with normal transvalvular gradients     Heart Cath 6/28/2018  FINDINGS:  Pre-intervention the left anterior descending artery had severely calcified, eccentric 80% stenosis in the mid portion right around the first diagonal branch followed by another focal 99% calcified stenosis.    Antegrade flow was slightly sluggish with MACK-2.  Beyond that, there is another calcified moderate stenosis in the range of 50%.       After intervention, there was about 10-20% residual stenosis in the stented segments in the mid left anterior descending artery with brisk MACK-3 flow.     PLAN:  Bed rest for 6 hours.  Hydration.  Follow renal function closely.    Dual antiplatelet therapy.  Aggressive risk factor modification.    Transthoracic Echo Report 7/30/2018-results reviewed with patient  Prior echo done 07/03/18. Compared to the images of the study done - there has been mild improvement of LVSF.   Mildly reduced left ventricular systolic function.  Left ventricular ejection fraction is visually estimated to be 45%.  Grade I diastolic dysfunction.  Known TAVR aortic valve that is functioning normally with normal transvalvular gradients.  Vmax is 1.9  m/s. Transvalvular gradients are - Peak: 15 mmHg,  Mean: 8 mmHg.   Mild paravalvular leak is noted.  Unable to estimate pulmonary artery pressure due to an inadequate tricuspid regurgitant jet.    Assessment:     1. ACC/AHA stage C systolic heart failure (HCC)  furosemide (LASIX) 20 MG Tab    BASIC METABOLIC PANEL   2. Heart failure,  NYHA class 2 (HCC)  furosemide (LASIX) 20 MG Tab    BASIC METABOLIC PANEL   3. Dilated cardiomyopathy (HCC)     4. Coronary artery disease due to calcified coronary lesion: Status post atherectomy and stenting of the LAD in June 2018.  Nonobstructive disease in the RCA and circumflex.     5. Stented coronary artery     6. Severe aortic stenosis     7. S/P TAVR (transcatheter aortic valve replacement)     8. Essential hypertension     9. Stage 3 chronic kidney disease         Medical Decision Making:  Today's Assessment / Status / Plan:   1. HFrEF, Stage C, Class 1-2, LVEF 45% improved from 35%: Based on physical examination findings, patient is euvolemic. No JVD, lungs are clear to auscultation, no pitting edema in bilateral lower extremities, no ascites.    -Decrease furosemide to 20 mg daily and if needed take an additional 20 mg in the afternoon for increased weight gain, swelling or shortness of breath.  -Continue carvedilol 25 mg twice a day  -Continue lisinopril 20 mg daily  -No spironolactone due to CKD  -BMP in 2 months  -Reinforced s/sx of worsening heart failure with patient and weight monitoring. Pt verbalizes understanding. Pt to call office or RTC if present.     2.  CAD, s/p PCI and  Arthrectomy/HLD: Last LDL 45 and 5/10/2018  -Continue aspirin 81 mg daily  -Continue clopidogrel 75 mg daily (DAPT x 1 year at least)  -Continue atorvastatin 40 mg daily    3.  Aortic stenosis, s/p TAVR:  -Continue follow-up with the valve clinic and Dr. Fuller  -Continue aspirin 81 mg daily  -Continue cardiac rehab    4.  Hypertension: Stable  -Recommendations per above    5.  CKD: Creatinine and eGFR improved  -BMP in 2 months  -Continue follow-up with nephrology    FU in clinic in 2 months with Dr. Estrada. Sooner if needed.    Patient verbalizes understanding and agrees with the plan of care.     Collaborating MD: DIXON Alvarez MD

## 2018-08-07 NOTE — NON-PROVIDER
Ashish Wiley attended: Healthy Mindset Workshop from 3:30-4:30 pm  New Thoughts/New Behaviors-Goal Setting  The topic was:   Patient received handouts regarding the specific information

## 2018-08-08 ENCOUNTER — NON-PROVIDER VISIT (OUTPATIENT)
Dept: CARDIOLOGY | Facility: MEDICAL CENTER | Age: 68
End: 2018-08-08
Payer: COMMERCIAL

## 2018-08-08 DIAGNOSIS — Z95.2 S/P TAVR (TRANSCATHETER AORTIC VALVE REPLACEMENT): ICD-10-CM

## 2018-08-08 LAB — EKG IMPRESSION: NORMAL

## 2018-08-08 PROCEDURE — G0423 INTENS CARDIAC REHAB NO EXER: HCPCS | Mod: 59 | Performed by: FAMILY MEDICINE

## 2018-08-08 PROCEDURE — G0422 INTENS CARDIAC REHAB W/EXERC: HCPCS | Performed by: FAMILY MEDICINE

## 2018-08-09 ENCOUNTER — OFFICE VISIT (OUTPATIENT)
Dept: CARDIOLOGY | Facility: MEDICAL CENTER | Age: 68
End: 2018-08-09
Payer: COMMERCIAL

## 2018-08-09 VITALS
BODY MASS INDEX: 27.89 KG/M2 | SYSTOLIC BLOOD PRESSURE: 148 MMHG | WEIGHT: 184 LBS | DIASTOLIC BLOOD PRESSURE: 82 MMHG | HEIGHT: 68 IN | OXYGEN SATURATION: 96 % | HEART RATE: 82 BPM

## 2018-08-09 DIAGNOSIS — Z95.2 S/P TAVR (TRANSCATHETER AORTIC VALVE REPLACEMENT): ICD-10-CM

## 2018-08-09 DIAGNOSIS — I50.9 CHF DUE TO VALVULAR DISEASE (HCC): ICD-10-CM

## 2018-08-09 DIAGNOSIS — I38 CHF DUE TO VALVULAR DISEASE (HCC): ICD-10-CM

## 2018-08-09 DIAGNOSIS — N18.30 STAGE 3 CHRONIC KIDNEY DISEASE (HCC): ICD-10-CM

## 2018-08-09 DIAGNOSIS — I42.0 DILATED CARDIOMYOPATHY (HCC): ICD-10-CM

## 2018-08-09 PROBLEM — I50.23 ACUTE ON CHRONIC SYSTOLIC (CONGESTIVE) HEART FAILURE (HCC): Status: RESOLVED | Noted: 2018-06-25 | Resolved: 2018-08-09

## 2018-08-09 PROCEDURE — 99214 OFFICE O/P EST MOD 30 MIN: CPT | Performed by: INTERNAL MEDICINE

## 2018-08-09 ASSESSMENT — ENCOUNTER SYMPTOMS
SHORTNESS OF BREATH: 0
ABDOMINAL PAIN: 0
WEAKNESS: 0
WEIGHT LOSS: 0
EYES NEGATIVE: 1
RESPIRATORY NEGATIVE: 1
COUGH: 0
CLAUDICATION: 0
CHILLS: 0
HEADACHES: 0
MUSCULOSKELETAL NEGATIVE: 1
VOMITING: 0
CARDIOVASCULAR NEGATIVE: 1
DOUBLE VISION: 0
DEPRESSION: 0
DIZZINESS: 0
BLURRED VISION: 0
NEUROLOGICAL NEGATIVE: 1
NERVOUS/ANXIOUS: 0
GASTROINTESTINAL NEGATIVE: 1
FEVER: 0
FOCAL WEAKNESS: 0
BRUISES/BLEEDS EASILY: 0
MYALGIAS: 0
NAUSEA: 0
PALPITATIONS: 0
PSYCHIATRIC NEGATIVE: 1
CONSTITUTIONAL NEGATIVE: 1

## 2018-08-09 NOTE — PROGRESS NOTES
No chief complaint on file.      Subjective:   Ashish Wiley is a 68 y.o. male who presents today for hospital follow up.    Since the discharge from [facility] on 07/04/18 status post TAVR, he has been doing well.  He denies fatigue, shortness of breath, dyspnea on exertion, chest pain, dizziness or syncope.    Past Medical History:   Diagnosis Date   • Acute exacerbation of CHF (congestive heart failure) (Formerly [facility]) 6/25/2018   • CAD (coronary artery disease)    • CHF (congestive heart failure) (Formerly [facility])    • Diabetes (Formerly [facility])    • Dilated cardiomyopathy (Formerly [facility])    • Hyperlipidemia    • Hypertension    • NYHA class 3 acute on chronic systolic heart failure (Formerly [facility]) 6/25/2018   • Severe aortic stenosis 6/25/2018     Past Surgical History:   Procedure Laterality Date   • TRANSCATHETER AORTIC VALVE REPLACEMENT  7/2/2018    Procedure: TRANSCATHETER AORTIC VALVE REPLACEMENT;  Surgeon: Markus Fuller M.D.;  Location: SURGERY [facility];  Service: Cardiac   • DAYANNA  7/2/2018    Procedure: DAYANNA;  Surgeon: Markus Fuller M.D.;  Location: SURGERY [facility];  Service: Cardiac   • AORTIC VALVE REPLACEMENT      S/P TAVR    • CARDIAC CATH     • TONSILLECTOMY       Family History   Problem Relation Age of Onset   • Lung Disease Mother         COPD   • Heart Disease Father         valve disease   • Heart Failure Father    • Hypertension Father    • Hyperlipidemia Father    • Cancer Maternal Grandmother         BRAIN TUMOR   • Stroke Maternal Grandfather    • Other Paternal Grandmother         INTESTINAL PROBLEM   • Stroke Paternal Grandfather    • Prostate cancer Brother    • Other Brother         ortho     Social History     Social History   • Marital status:      Spouse name: N/A   • Number of children: N/A   • Years of education: N/A     Occupational History   • Not on file.     Social History Main Topics   • Smoking status: Former Smoker     Packs/day: 1.00     Years: 22.00     Quit date: 1/1/1989   • Smokeless  tobacco: Never Used      Comment: stop cigar in 2015   • Alcohol use 0.6 oz/week     1 Glasses of wine per week      Comment: OCCASIONALLY   • Drug use: No   • Sexual activity: Yes     Partners: Female     Other Topics Concern   • Not on file     Social History Narrative   • No narrative on file     Allergies   Allergen Reactions   • Sulfa Drugs Unspecified     Kidney Stones     Medications reviewed.    Outpatient Encounter Prescriptions as of 8/9/2018   Medication Sig Dispense Refill   • furosemide (LASIX) 20 MG Tab Take 1-2 Tabs by mouth every day. 60 Tab 11   • lisinopril (PRINIVIL) 20 MG Tab Take 1 Tab by mouth every day. 30 Tab 11   • carvedilol (COREG) 25 MG Tab Take 1 Tab by mouth 2 times a day, with meals. 60 Tab 11   • Cholecalciferol (HM VITAMIN D3) 4000 units Cap Take 1 Cap by mouth every day.     • clopidogrel (PLAVIX) 75 MG Tab Take 1 Tab by mouth every day. 30 Tab 11   • Exenatide ER 2 MG Pen-injector Inject 2 mg as instructed every 7 days. 12 Each 0   • insulin glargine (LANTUS) 100 UNIT/ML Solution Inject 16 Units as instructed every evening. Pt had 8 units night prior to surgery     • atorvastatin (LIPITOR) 40 MG Tab Take 40 mg by mouth every evening.     • aspirin EC (ECOTRIN) 81 MG Tablet Delayed Response Take 81 mg by mouth every day.       No facility-administered encounter medications on file as of 8/9/2018.      Review of Systems   Constitutional: Negative.  Negative for chills, fever, malaise/fatigue and weight loss.   HENT: Negative.  Negative for hearing loss.    Eyes: Negative.  Negative for blurred vision and double vision.   Respiratory: Negative.  Negative for cough and shortness of breath.         Chest wall pain.   Cardiovascular: Negative.  Negative for chest pain, palpitations, claudication and leg swelling.   Gastrointestinal: Negative.  Negative for abdominal pain, nausea and vomiting.   Genitourinary: Negative.  Negative for dysuria and urgency.   Musculoskeletal: Negative.   "Negative for joint pain and myalgias.   Skin: Negative.  Negative for itching and rash.   Neurological: Negative.  Negative for dizziness, focal weakness, weakness and headaches.   Endo/Heme/Allergies: Negative.  Does not bruise/bleed easily.   Psychiatric/Behavioral: Negative.  Negative for depression. The patient is not nervous/anxious.         Objective:   /82   Pulse 82   Ht 1.727 m (5' 8\")   Wt 83.5 kg (184 lb)   SpO2 96%   BMI 27.98 kg/m²     Physical Exam   Constitutional: He is oriented to person, place, and time. He appears well-developed and well-nourished.   HENT:   Head: Normocephalic and atraumatic.   Eyes: Pupils are equal, round, and reactive to light. Conjunctivae are normal.   Neck: Normal range of motion. Neck supple.   Cardiovascular: Normal rate and regular rhythm.    Pulmonary/Chest: Effort normal and breath sounds normal.   Abdominal: Soft. Bowel sounds are normal.   Musculoskeletal: Normal range of motion.   Neurological: He is alert and oriented to person, place, and time.   Skin: Skin is warm and dry.   Psychiatric: He has a normal mood and affect.     CARDIAC STUDIES/PROCEDURES:     CARDIAC CATHETERIZATION CONCLUSIONS by Dr. Stanley (06/28/18)  Atherectomy and stenting of the left anterior descending artery with placement of Synergy 2.5x16 mm into the more proximal segment      CARDIAC CATHETERIZATION CONCLUSIONS (06/26/18)  Percutaneous transluminal coronary angioplasty of the mid left   anterior descending artery stenosis with 1.5x15 mm  balloon; however,   unsuccessful attempts to balloon with any additional larger size equipment.     CARDIAC CATHETERIZATION CONCLUSIONS (06/02/18)  1.  Multivessel severe coronary artery disease with long, high-grade mid left   anterior descending artery stenosis, high-grade ostial first and second   diagonal branch stenosis, nonobstructive ostial circumflex artery stenosis and   nonobstructive right coronary artery stenosis.  2.  " Elevated right heart pressures with PA systolic pressure of 75 mmHg.     CAROTID ULTRASOUND (06/22/18)  Mild (<50%) bilateral internal carotid disease  Normal vertebral and subclavianartery flow bilaterally.     CTA OF CHEST AND ABDOMEN (06/27/18)  1.  Very limited exam for evaluation of aortic structures as no intravenous contrast could be administered.  2.  Aortic annulus area of approximately 534 sq mm.  3.  Coronary ostia appear to be greater than 10 mm from the annulus.  4.  Bilateral pleural fluid collections, moderate on the RIGHT and small on the LEFT, with associated atelectasis.  5.  Mildly prominent pulmonary interstitium concerning for edema.  6.  Minimal ascites.  7.  No abdominal aortic aneurysm.  8.  Mild atherosclerotic calcification of the iliac arterial system bilaterally.  Evaluation is limited due to lack of IV contrast.  9.  Increased colonic stool.     DOBUTAMINE STRESS ECHOCARDIOGRAM (06/22/18)  Abnormal resting echo with mild dyskinesis of the LV apex and EF 30%. Resting aortic valve peak and mean gradients 36 / 20.  Stress images reveal increased contractility of basal to mid anterior and inferior walls, with more pronounced apical dyskinesis.  Peak / mean aortic valve gradients on 15 mcg dobutamine are 72 /43  Abnormal dobutamine stress echo demonstrating significant increase in aortic valve gradients with dobutamine infusion.  LV dysfunction at rest and stress. Worsening apical dyskinesis with stress suggesting ischemia.     ECHOCARDIOGRAM CONCLUSIONS (07/30/18)  Prior echo done 07/03/18. Compared to the images of the study done -   there has been mild improvement of LVSF.   Mildly reduced left ventricular systolic function.  Left ventricular ejection fraction is visually estimated to be 45%.  Grade I diastolic dysfunction.  Known TAVR aortic valve that is functioning normally with normal   transvalvular gradients.  Vmax is 1.9  m/s. Transvalvular gradients are - Peak: 15 mmHg,  Mean: 8  mmHg.   Mild paravalvular leak is noted.  Unable to estimate pulmonary artery pressure due to an inadequate   tricuspid regurgitant jet.  (study result reviewed)    ECHOCARDIOGRAM CONCLUSIONS (07/03/18)  Left ventricular ejection fraction is visually estimated to be 35%.  Restrictive diastolic function.  Moderate mitral regurgitation.  Known TAVR aortic valve that is functioning normally with normal transvalvular gradients.  AV Peak Velocity 1.6 m/s                 AV Peak Gradient 10.4 mmHg               AV Mean Gradient 5.5 mmHg   (study result reviewed)    ECHOCARDIOGRAM CONCLUSIONS (06/01/18)  Moderately reduced left ventricular systolic function.  Left ventricular ejection fraction is visually estimated to be 35%.  Global hypokinesis with regional variation with severe hypokinesis of the anteroapical and septal walls.  Moderate aortic stenosis; severely of aortic stenosis   Aortic valve area calculated from the continuity equation is 0.82.   Vmax is 2.8 m/s. Transvalvular gradients are - Peak: 32 mmHg, Mean: 21 mmHg.  Unable to estimate pulmonary artery pressure due to an inadequate tricuspid regurgitant jet.  Moderate mitral regurgitation.  probably underestimated due to low ejection fraction.No prior study is available for comparison.      EKG performed on (07/03/18) was reviewed: EKG shows sinus rhythm with low voltage of frontal leads and non-specific T wave abnormalities.  EKG performed on (07/02/18) EKG shows sinus rhythm with low voltage of frontal leads and non-specific T wave abnormalities.  EKG performed on (06/28/18) EKG shows sinus rhythm with low voltage of frontal leads and non-specific T wave abnormalities.  EKG performed on (06/26/18) EKG shows sinus rhythm with anterior Q waves with non-specific T wave abnormalities.  EKG performed on (06/25/18) EKG shows sinus rhythm with anterior Q waves with lateral ST segment depressions.  EKG performed on (06/21/18) EKG shows sinus rhythm.     Laboratory  results of (08/01/18) were reviewed. Bun of 50 mg/dl, creatinine levels of 1.8 mg/dl noted.  Laboratory results of (07/20/18) were reviewed. Bun of 45 mg/dl, creatinine levels of 1.93 mg/dl noted.  Laboratory results of (07/03/18) Bun of 42 mg/dl, creatinine levels of 2.18 mg/dl noted.  Laboratory results of (07/02/18) Bun of 37 mg/dl, creatinine levels of 1.81 mg/dl noted.  Laboratory results of (06/29/18) Bun of 35 mg/dl, creatinine levels of 1.75 mg/dl noted.  Laboratory results of (06/28/18) Bun of 30 mg/dl, creatinine levels of 1.34 mg/dl noted.  Laboratory results of (06/27/18) Bun of 34 mg/dl, creatinine levels of 1.84 mg/dl noted.  Laboratory results of (06/26/18) Bun of 31 mg/dl, creatinine levels of 1.63 mg/dl noted.  Laboratory results of (06/25/18) Bun of 30 mg/dl, creatinine levels of 1.8 mg/dl noted.  Laboratory results of (06/18/18) Bun of 25 mg/dl, creatinine levels of 1.34 mg/dl noted.     TRANSESOPHAGEAL ECHOCARDIOGRAM CONCLUSIONS by Dr. Godwin (07/02/18)  Calcific aortic stenosis. PG= 24 peak and 15 mean. VTI 55 but EF=20%   Annular area 5.75 via Q lab.  Post .   #29 TAVR . Trace small velocity jet along AML. peak 4 and 3 mean   gradiente. Well functioning valve.   EF low 20% no change after TAVR.   Mild MR.    Assessment:     1. S/P TAVR (transcatheter aortic valve replacement)     2. CHF due to valvular disease (HCC)     3. Dilated cardiomyopathy (HCC)     4. Stage 3 chronic kidney disease       Medical Decision Making:  Today's Assessment / Status / Plan:     1. Successful transcatheter aortic valve replacement (TAVR) with # 29 Monroy Prashanth S3 valve, transfemoral approach, under general anesthesia (07/02/18). He is clinically doing well, NYHA class I. We will repeat an echocardiogram in one year.  2. Chronic systolic and valvular congestive heart failure with cardiomyopathy (managed by [facility]): The overall volume status is adequate.  3. Coronary artery disease  with stent placement: He is clinically doing well without recurrence of his angina. W e will continue with current medical care. We will discontinue Plavix and continue with aspirin therapy only 06/28/19.  4. Pleural effusion with thoracentesis (07/03/18)  5. Chronic kidney disease stage III (managed by Dr. Kirkland): We will continue to follow labs.  6. Additional information: his wife works in IT department at [employer].    We will follow up with him PRN from TAVR standpoint and have him continue follow up with his primary cardiologist, Avel Stokes.     CC Dr. Nixon Claros MD

## 2018-08-09 NOTE — LETTER
[facility]   [address], [facility] [[email or web address]] [address]  [place] [address]  Phone: [phone]  Fax: [phone]              Ashish Wiley  1950    Encounter Date: 8/9/2018    Markus Fuller M.D.          PROGRESS NOTE:  No chief complaint on file.      Subjective:   Ashish Wiley is a 68 y.o. male who presents today for hospital follow up.    Since the discharge from [facility] on 07/04/18 status post TAVR, he has been doing well. He admits to . He denies fatigue, shortness of breath, dyspnea on exertion, chest pain, dizziness or syncope.    Past Medical History:   Diagnosis Date   • Acute exacerbation of CHF (congestive heart failure) ([facility]) 6/25/2018   • CAD (coronary artery disease)    • CHF (congestive heart failure) ([facility])    • Diabetes ([facility])    • Dilated cardiomyopathy ([facility])    • Hyperlipidemia    • Hypertension    • NYHA class 3 acute on chronic systolic heart failure ([facility]) 6/25/2018   • Severe aortic stenosis 6/25/2018     Past Surgical History:   Procedure Laterality Date   • TRANSCATHETER AORTIC VALVE REPLACEMENT  7/2/2018    Procedure: TRANSCATHETER AORTIC VALVE REPLACEMENT;  Surgeon: Markus Fuller M.D.;  Location: SURGERY [facility];  Service: Cardiac   • DAYANNA  7/2/2018    Procedure: DAYANNA;  Surgeon: Markus Fuller M.D.;  Location: SURGERY [facility];  Service: Cardiac   • AORTIC VALVE REPLACEMENT      S/P TAVR    • CARDIAC CATH     • TONSILLECTOMY       Family History   Problem Relation Age of Onset   • Lung Disease Mother         COPD   • Heart Disease Father         valve disease   • Heart Failure Father    • Hypertension Father    • Hyperlipidemia Father    • Cancer Maternal Grandmother         BRAIN TUMOR   • Stroke Maternal Grandfather    • Other Paternal Grandmother         INTESTINAL PROBLEM   • Stroke Paternal Grandfather    • Prostate cancer Brother    • Other Brother         ortho     Social History     Social History   •  Marital status:      Spouse name: N/A   • Number of children: N/A   • Years of education: N/A     Occupational History   • Not on file.     Social History Main Topics   • Smoking status: Former Smoker     Packs/day: 1.00     Years: 22.00     Quit date: 1/1/1989   • Smokeless tobacco: Never Used      Comment: stop cigar in 2015   • Alcohol use 0.6 oz/week     1 Glasses of wine per week      Comment: OCCASIONALLY   • Drug use: No   • Sexual activity: Yes     Partners: Female     Other Topics Concern   • Not on file     Social History Narrative   • No narrative on file     Allergies   Allergen Reactions   • Sulfa Drugs Unspecified     Kidney Stones     Medications reviewed.    Outpatient Encounter Prescriptions as of 8/9/2018   Medication Sig Dispense Refill   • furosemide (LASIX) 20 MG Tab Take 1-2 Tabs by mouth every day. 60 Tab 11   • lisinopril (PRINIVIL) 20 MG Tab Take 1 Tab by mouth every day. 30 Tab 11   • carvedilol (COREG) 25 MG Tab Take 1 Tab by mouth 2 times a day, with meals. 60 Tab 11   • Cholecalciferol (HM VITAMIN D3) 4000 units Cap Take 1 Cap by mouth every day.     • clopidogrel (PLAVIX) 75 MG Tab Take 1 Tab by mouth every day. 30 Tab 11   • Exenatide ER 2 MG Pen-injector Inject 2 mg as instructed every 7 days. 12 Each 0   • insulin glargine (LANTUS) 100 UNIT/ML Solution Inject 16 Units as instructed every evening. Pt had 8 units night prior to surgery     • atorvastatin (LIPITOR) 40 MG Tab Take 40 mg by mouth every evening.     • aspirin EC (ECOTRIN) 81 MG Tablet Delayed Response Take 81 mg by mouth every day.       No facility-administered encounter medications on file as of 8/9/2018.      Review of Systems   Constitutional: Negative.  Negative for chills, fever, malaise/fatigue and weight loss.   HENT: Negative.  Negative for hearing loss.    Eyes: Negative.  Negative for blurred vision and double vision.   Respiratory: Negative.  Negative for cough and shortness of breath.    Cardiovascular:  "Negative.  Negative for chest pain, palpitations, claudication and leg swelling.   Gastrointestinal: Negative.  Negative for abdominal pain, nausea and vomiting.   Genitourinary: Negative.  Negative for dysuria and urgency.   Musculoskeletal: Negative.  Negative for joint pain and myalgias.   Skin: Negative.  Negative for itching and rash.   Neurological: Negative.  Negative for dizziness, focal weakness, weakness and headaches.   Endo/Heme/Allergies: Negative.  Does not bruise/bleed easily.   Psychiatric/Behavioral: Negative.  Negative for depression. The patient is not nervous/anxious.         Objective:   /82   Pulse 82   Ht 1.727 m (5' 8\")   Wt 83.5 kg (184 lb)   SpO2 96%   BMI 27.98 kg/m²      Physical Exam   Constitutional: He is oriented to person, place, and time. He appears well-developed and well-nourished.   HENT:   Head: Normocephalic and atraumatic.   Eyes: Pupils are equal, round, and reactive to light. Conjunctivae are normal.   Neck: Normal range of motion. Neck supple.   Cardiovascular: Normal rate and regular rhythm.    Pulmonary/Chest: Effort normal and breath sounds normal.   Abdominal: Soft. Bowel sounds are normal.   Musculoskeletal: Normal range of motion.   Neurological: He is alert and oriented to person, place, and time.   Skin: Skin is warm and dry.   Psychiatric: He has a normal mood and affect.     CARDIAC STUDIES/PROCEDURES:     CARDIAC CATHETERIZATION CONCLUSIONS by Dr. Stanley (06/28/18)  Atherectomy and stenting of the left anterior descending artery with placement orSynergy 2.5x16 mm into the more proximal segment      CARDIAC CATHETERIZATION CONCLUSIONS (06/26/18)  Percutaneous transluminal coronary angioplasty of the mid left   anterior descending artery stenosis with 1.5x15 mm  balloon; however,   unsuccessful attempts to balloon with any additional larger size equipment.     CARDIAC CATHETERIZATION CONCLUSIONS (06/02/18)  1.  Multivessel severe coronary " artery disease with long, high-grade mid left   anterior descending artery stenosis, high-grade ostial first and second   diagonal branch stenosis, nonobstructive ostial circumflex artery stenosis and   nonobstructive right coronary artery stenosis.  2.  Elevated right heart pressures with PA systolic pressure of 75 mmHg.     CAROTID ULTRASOUND (06/22/18)  Mild (<50%) bilateral internal carotid disease  Normal vertebral and subclavianartery flow bilaterally.     CTA OF CHEST AND ABDOMEN (06/27/18)  1.  Very limited exam for evaluation of aortic structures as no intravenous contrast could be administered.  2.  Aortic annulus area of approximately 534 sq mm.  3.  Coronary ostia appear to be greater than 10 mm from the annulus.  4.  Bilateral pleural fluid collections, moderate on the RIGHT and small on the LEFT, with associated atelectasis.  5.  Mildly prominent pulmonary interstitium concerning for edema.  6.  Minimal ascites.  7.  No abdominal aortic aneurysm.  8.  Mild atherosclerotic calcification of the iliac arterial system bilaterally.  Evaluation is limited due to lack of IV contrast.  9.  Increased colonic stool.     DOBUTAMINE STRESS ECHOCARDIOGRAM (06/22/18)  Abnormal resting echo with mild dyskinesis of the LV apex and EF 30%. Resting aortic valve peak and mean gradients 36 / 20.  Stress images reveal increased contractility of basal to mid anterior and inferior walls, with more pronounced apical dyskinesis.  Peak / mean aortic valve gradients on 15 mcg dobutamine are 72 /43  Abnormal dobutamine stress echo demonstrating significant increase in aortic valve gradients with dobutamine infusion.  LV dysfunction at rest and stress. Worsening apical dyskinesis with stress suggesting ischemia.     ECHOCARDIOGRAM CONCLUSIONS (07/30/18)  Prior echo done 07/03/18. Compared to the images of the study done -   there has been mild improvement of LVSF.   Mildly reduced left ventricular systolic function.  Left  ventricular ejection fraction is visually estimated to be 45%.  Grade I diastolic dysfunction.  Known TAVR aortic valve that is functioning normally with normal   transvalvular gradients.  Vmax is 1.9  m/s. Transvalvular gradients are - Peak: 15 mmHg,  Mean: 8 mmHg.   Mild paravalvular leak is noted.  Unable to estimate pulmonary artery pressure due to an inadequate   tricuspid regurgitant jet.  (study result reviewed)    ECHOCARDIOGRAM CONCLUSIONS (07/03/18)  Left ventricular ejection fraction is visually estimated to be 35%.  Restrictive diastolic function.  Moderate mitral regurgitation.  Known TAVR aortic valve that is functioning normally with normal transvalvular gradients.  AV Peak Velocity 1.6 m/s                 AV Peak Gradient 10.4 mmHg               AV Mean Gradient 5.5 mmHg   (study result reviewed)    ECHOCARDIOGRAM CONCLUSIONS (06/01/18)  Moderately reduced left ventricular systolic function.  Left ventricular ejection fraction is visually estimated to be 35%.  Global hypokinesis with regional variation with severe hypokinesis of the anteroapical and septal walls.  Moderate aortic stenosis; severely of aortic stenosis   Aortic valve area calculated from the continuity equation is 0.82.   Vmax is 2.8 m/s. Transvalvular gradients are - Peak: 32 mmHg, Mean: 21 mmHg.  Unable to estimate pulmonary artery pressure due to an inadequate tricuspid regurgitant jet.  Moderate mitral regurgitation.  probably underestimated due to low ejection fraction.No prior study is available for comparison.      EKG performed on (07/03/18) was reviewed: EKG shows sinus rhythm with low voltage of frontal leads and non-specific T wave abnormalities.  EKG performed on (07/02/18) EKG shows sinus rhythm with low voltage of frontal leads and non-specific T wave abnormalities.  EKG performed on (06/28/18) EKG shows sinus rhythm with low voltage of frontal leads and non-specific T wave abnormalities.  EKG performed on (06/26/18) EKG  shows sinus rhythm with anterior Q waves with non-specific T wave abnormalities.  EKG performed on (06/25/18) EKG shows sinus rhythm with anterior Q waves with lateral ST segment depressions.  EKG performed on (06/21/18) EKG shows sinus rhythm.     Laboratory results of (08/01/18) were reviewed. Bun of 50 mg/dl, creatinine levels of 1.8 mg/dl noted.  Laboratory results of (07/20/18) were reviewed. Bun of 45 mg/dl, creatinine levels of 1.93 mg/dl noted.  Laboratory results of (07/03/18) Bun of 42 mg/dl, creatinine levels of 2.18 mg/dl noted.  Laboratory results of (07/02/18) Bun of 37 mg/dl, creatinine levels of 1.81 mg/dl noted.  Laboratory results of (06/29/18) Bun of 35 mg/dl, creatinine levels of 1.75 mg/dl noted.  Laboratory results of (06/28/18) Bun of 30 mg/dl, creatinine levels of 1.34 mg/dl noted.  Laboratory results of (06/27/18) Bun of 34 mg/dl, creatinine levels of 1.84 mg/dl noted.  Laboratory results of (06/26/18) Bun of 31 mg/dl, creatinine levels of 1.63 mg/dl noted.  Laboratory results of (06/25/18) Bun of 30 mg/dl, creatinine levels of 1.8 mg/dl noted.  Laboratory results of (06/18/18) Bun of 25 mg/dl, creatinine levels of 1.34 mg/dl noted.     TRANSESOPHAGEAL ECHOCARDIOGRAM CONCLUSIONS by Dr. Godwin (07/02/18)  Calcific aortic stenosis. PG= 24 peak and 15 mean. VTI 55 but EF=20%   Annular area 5.75 via Q lab.  Post .   #29 TAVR . Trace small velocity jet along AML. peak 4 and 3 mean   gradiente. Well functioning valve.   EF low 20% no change after TAVR.   Mild MR.    Assessment:     1. S/P TAVR (transcatheter aortic valve replacement)     2. CHF due to valvular disease (HCC)     3. Dilated cardiomyopathy (HCC)     4. Stage 3 chronic kidney disease       Medical Decision Making:  Today's Assessment / Status / Plan:     1. Successful transcatheter aortic valve replacement (TAVR) with # 29 Monroy Prashanth S3 valve, transfemoral approach, under general anesthesia (07/02/18). He is clinically doing well.  We will repeat an echocardiogram.  2. Chronic systolic and valvular congestive heart failure with cardiomyopathy (managed by [facility]): The overall volume status is adequate.  3. Coronary artery disease with stent placement: He is clinically doing well without recurrence of his angina. W e will continue with current medical care.  4. Pleural effusion with thoracentesis (07/03/18)  5. Chronic kidney disease stage III (managed by Dr. Kirkland): We will continue to follow labs.  6. Additional information: his wife works in IT department at [employer][note 3].     CC Dr. Nixon Claros MD           No Recipients

## 2018-08-10 ENCOUNTER — NON-PROVIDER VISIT (OUTPATIENT)
Dept: CARDIOLOGY | Facility: MEDICAL CENTER | Age: 68
End: 2018-08-10
Payer: COMMERCIAL

## 2018-08-10 DIAGNOSIS — Z95.2 S/P TAVR (TRANSCATHETER AORTIC VALVE REPLACEMENT): ICD-10-CM

## 2018-08-10 LAB — EKG IMPRESSION: NORMAL

## 2018-08-10 PROCEDURE — G0422 INTENS CARDIAC REHAB W/EXERC: HCPCS | Performed by: FAMILY MEDICINE

## 2018-08-10 PROCEDURE — G0423 INTENS CARDIAC REHAB NO EXER: HCPCS | Mod: 59 | Performed by: FAMILY MEDICINE

## 2018-08-10 NOTE — NON-PROVIDER
Ashish Wiley attending cooking school from  3:30-4:30.  Today  prepared Minestrone .    Patient received handouts and nutrition information regarding the specific recipes.

## 2018-08-13 ENCOUNTER — NON-PROVIDER VISIT (OUTPATIENT)
Dept: CARDIOLOGY | Facility: MEDICAL CENTER | Age: 68
End: 2018-08-13
Payer: COMMERCIAL

## 2018-08-13 ENCOUNTER — TELEPHONE (OUTPATIENT)
Dept: CARDIOLOGY | Facility: MEDICAL CENTER | Age: 68
End: 2018-08-13

## 2018-08-13 DIAGNOSIS — Z95.2 S/P TAVR (TRANSCATHETER AORTIC VALVE REPLACEMENT): ICD-10-CM

## 2018-08-13 LAB — EKG IMPRESSION: NORMAL

## 2018-08-13 PROCEDURE — G0422 INTENS CARDIAC REHAB W/EXERC: HCPCS | Performed by: FAMILY MEDICINE

## 2018-08-13 PROCEDURE — G0423 INTENS CARDIAC REHAB NO EXER: HCPCS | Mod: 59 | Performed by: FAMILY MEDICINE

## 2018-08-13 NOTE — TELEPHONE ENCOUNTER
Clearance for dental work   Received: Today   Message Contents   CHAO Butler/Devin     Please call Alaina at Dr Jewel Davis's office (dentist) at [phone]. Patient is scheduled for dental work on 8/20 and recently had a TAVR on 7/2. She needs to make sure that the patient is cleared.      Notified Alaina that it is preferable that he wait 90 days post valve replacement for dental work. Also, pre-med of life. Alaina appreciated.

## 2018-08-14 NOTE — PROGRESS NOTES
Ashish Wiley attended the following workshop from 3:30-4:30pm.   Workshop Title: Label Reading.       Lecture was attended and patient questions addressed. The patient will continue workshops and nutrition education.

## 2018-08-15 ENCOUNTER — NON-PROVIDER VISIT (OUTPATIENT)
Dept: CARDIOLOGY | Facility: MEDICAL CENTER | Age: 68
End: 2018-08-15
Payer: COMMERCIAL

## 2018-08-15 DIAGNOSIS — Z95.2 S/P TAVR (TRANSCATHETER AORTIC VALVE REPLACEMENT): ICD-10-CM

## 2018-08-15 LAB — EKG IMPRESSION: NORMAL

## 2018-08-15 PROCEDURE — G0422 INTENS CARDIAC REHAB W/EXERC: HCPCS | Performed by: FAMILY MEDICINE

## 2018-08-15 PROCEDURE — G0423 INTENS CARDIAC REHAB NO EXER: HCPCS | Mod: 59 | Performed by: FAMILY MEDICINE

## 2018-08-17 ENCOUNTER — NON-PROVIDER VISIT (OUTPATIENT)
Dept: CARDIOLOGY | Facility: MEDICAL CENTER | Age: 68
End: 2018-08-17
Payer: COMMERCIAL

## 2018-08-17 ENCOUNTER — PATIENT OUTREACH (OUTPATIENT)
Dept: HEALTH INFORMATION MANAGEMENT | Facility: OTHER | Age: 68
End: 2018-08-17

## 2018-08-17 DIAGNOSIS — Z95.2 S/P TAVR (TRANSCATHETER AORTIC VALVE REPLACEMENT): ICD-10-CM

## 2018-08-17 LAB — EKG IMPRESSION: NORMAL

## 2018-08-17 PROCEDURE — G0422 INTENS CARDIAC REHAB W/EXERC: HCPCS | Performed by: FAMILY MEDICINE

## 2018-08-17 PROCEDURE — G0423 INTENS CARDIAC REHAB NO EXER: HCPCS | Mod: 59 | Performed by: FAMILY MEDICINE

## 2018-08-17 NOTE — PROGRESS NOTES
Requested call back    Please transfer to Patient Outreach Team at [phone] when patient returns call.    WebIZ Checked & Epic Updated:  yes  Td (adult), adsorbed   Influenza w/preserv.   Zoster Recombinant     HealthConnect Verified: no    Attempt # 1

## 2018-08-17 NOTE — PROGRESS NOTES
Cardiac Rehab Individual Treatment Plan Assessment: 30 day 08/17/18 Session #     10   MRN: [id number]   Allergies: Sulfa drugs   Patient Name: Ashish Wiley : 1950 Risk Stratification: High    Diagnoses:   1. S/P TAVR (transcatheter aortic valve replacement)     Age: 68 y.o. Physician: Venus Claros M.D.    Date of Event: 18 Specialist: Alina   Risk Factors:  Hypertension, Hyperlipidemia, Diabetes, Family History, Age, Male > 45   Exercise Nutrition Education Psychosocial   Stages of change Stages of change Stages of change Stages of change   Preparation Preparation Preparation Preparation   Fitness Test Lipids Learning Barriers Outcome Survey Tools   DIST:  (assessed pre- and post-exercise)  Available: No new Learning Barriers: Vision, Ready to Learn (Readers only) FP QOL Overall Score:  (assessed pre- and post-exercise)   Max HR:  (assessed pre- and post-exercise) Date: 05/10/18 Family Support PHQ-9:  (assessed pre- and post-exercise)   RPE:  (assessed pre- and post-exercise) Total: 108 mg/dL Yes Nutrition Screen:  (assessed pre- and post-exercise)   SPO2:  (assessed pre- and post-exercise) Tri mg/dL Lives: Spouse Intervention   MET:  (assessed pre- and post-exercise) HDL: 41 mg/dL Tobacco Use Behavioral Health Consult: Yes   EF= 35 LDL: 45 mg/dL History   Smoking Status   • Former Smoker   • Packs/day: 1.00   • Years: 22.00   • Quit date: 1989   Smokeless Tobacco   • Never Used     Comment: stop cigar in       Physician Referral: No   Ambulatory Status Diabetes Smoking Intervention Identifies Stressors: Yes   Fall Risk Assessed: Yes Diabetes: Yes Smoking Cessation Referral: N/A Drug Intervention: N/A   Exercise Ambulatory Status Assist Devices: None HbA1C: 7.4 % Date: 05/10/18 Ind. Education / Counseling: N/A Education   Exercise Prescription Monitors BS at Home: No Tobacco Adjunct: N/A Psychosocial Education: Coping Techniques, Positive Support System, S/S Depression   Mode:  "Treadmill, NuStep, UBE, Airdyne   Frequency: n/a (Pt. is on insulin and is not currently checking blood sugars) Random BS: 112 (8/17/18) Education Intervention Target Goal   Frequency:  3 days/week Weight Management Healthy Heart Education: Cooking School Attended, Medications Reviewed, Patient Education Binder Provided, Risk Factors Discussed, Videos Viewed per Donita, Workshops Attended Assess presence or absence of depression using a valid screening: Yes   Duration:  30 minutes Weight: 82.6 kg (182 lb) Target Goal Use Stress Management: Yes   Intensity:  11-13 RPE Height: 172.7 cm (5' 7.99\") Complete Tobacco Cessation: Complete Tobacco Cessation: N/A Adverse Events: No   METS:  3.0-5.0 BMI (Calculated): 27.68 Educate / Review and have understanding of cardiac disease prevention: Educate / Review and have understanding of cardiac disease prevention: Yes Unexpected Events: No   Progression:  ^ increments of 1-3 to THR/RPE as tolerated Goal weight: 170 Medication Compliance: Yes    THR: THR: 20-30 BPM ^Resting HR History   Alcohol Use   • 0.6 oz/week   • 1 Glasses of wine per week     Comment: OCCASIONALLY         Angina with Exercise?  Angina with Exercise: No      Resistance Training?  Resistance Training: Yes      Hypertension      Diagnosed with HTN: Yes Intervention      Resting BP: 127/58 Dietician Consult/Class: Pending (9/26/18)      Peak Ex BP:  (assessed pre- and post-exercise) Nurse/Patient Discussion: Yes     Intervention Diabetes Ed Referral: Yes (Pt. has been referred to [facility])     Home Exercise:  Yes Discuss Maintenance /Wt Loss: Yes     Mode: Walk, Other (golf) Attend Cooking School: Yes     Duration: 45-90 minutes Dietary Goal: >=58 Rate Your Plate     Frequency: 7 days active Education     Education Nutrition Education: S&S Hypo/Hyper glycemia, Relate Diabetes to CAD, Healthy Eating, Sodium Reduction     Exercise Safety, S/S to Report, RPE Scale, Warm Up / Cool Down, Physically " "Active Target Goal     Target Goal LDL-C < 100 if trig. > 200:  N/A       Start Individual Exercise Rx Yes LDL-C < 70 for high risk patient:  Yes       BP < 140/90 or < 130/80 if DM or CKD Yes Non HDL-C Should be < 130:  Yes       Aerobic activity 30 + min / day 5 days / wk Yes HbA1C < 7%: Yes         BMI < 25: Yes       Notes: Exercise: Current: Ashish has progressed to 3 aerobic exercises for 10 minutes each and to an Intermediate weight program using 7lb dumbbells. On the Treadmill he is walking at 3.5mph at a 2.5% grade; 4.9 METS. At home, Ashish walks for 45 every morning and golfs twice a week. Goal: To increase his daily walks to a 3 mile loop which currently takes 90 minutes. He will work to decrease the time it takes to complete that loop. Progress: Ashish has increased his aerobic routine in ICR form 3x5 minutes to 3x10 minutes and increased his weight routine. He states he is noticing an increase in his stamina. Nutrition: Current: Ashish states that he is cooking the recipes from [facility] which is a big deal because he does not generally cook. He has switched from white rice to brown rice, decreased processed foods, red meat, sodium and fat consumption. Goal: To continue to learn about heart-healthy eating and cooking. Progress: Ashish stated he used to eat red meat 3 times per week. Stress: Current: \"My life right now is not at all stressful. 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 I do not feel stressed now.\" Ashish monitors his stress by staying active and playing golf. Goal: \"To be as healthy as I can be for my age. I have two grandkids and my children are healthy.\" Progress: Ashish is attending Healthy Mindset lectures.                                "

## 2018-08-18 NOTE — PROGRESS NOTES
Ashish Wiley attending cooking school from  3:30-4:30pm.  Today  prepared Pumpkin Pancakes with Fresh Balsamic Peach Syrup.    Patient received handouts and nutrition information regarding the specific recipes.

## 2018-08-20 ENCOUNTER — NON-PROVIDER VISIT (OUTPATIENT)
Dept: CARDIOLOGY | Facility: MEDICAL CENTER | Age: 68
End: 2018-08-20
Payer: COMMERCIAL

## 2018-08-20 DIAGNOSIS — Z95.2 S/P TAVR (TRANSCATHETER AORTIC VALVE REPLACEMENT): ICD-10-CM

## 2018-08-20 LAB — EKG IMPRESSION: NORMAL

## 2018-08-20 PROCEDURE — G0422 INTENS CARDIAC REHAB W/EXERC: HCPCS | Performed by: FAMILY MEDICINE

## 2018-08-20 PROCEDURE — G0423 INTENS CARDIAC REHAB NO EXER: HCPCS | Mod: 59 | Performed by: FAMILY MEDICINE

## 2018-08-20 NOTE — PROGRESS NOTES
Ashish Wiley attended: Exercise Workshop from 3:30-4:30pm.        The topic was: Balance.     Patient received handouts regarding the specific exercise information

## 2018-08-21 NOTE — PROGRESS NOTES
Intensive Cardiac Rehabilitation (ICR) Individual Treatment Plan (ITP) reviewed and signed.  Sincerely,  Warren Terrell MD

## 2018-08-22 ENCOUNTER — NON-PROVIDER VISIT (OUTPATIENT)
Dept: CARDIOLOGY | Facility: MEDICAL CENTER | Age: 68
End: 2018-08-22
Payer: COMMERCIAL

## 2018-08-22 DIAGNOSIS — Z95.2 S/P TAVR (TRANSCATHETER AORTIC VALVE REPLACEMENT): ICD-10-CM

## 2018-08-22 LAB — EKG IMPRESSION: NORMAL

## 2018-08-22 PROCEDURE — G0423 INTENS CARDIAC REHAB NO EXER: HCPCS | Mod: 59 | Performed by: FAMILY MEDICINE

## 2018-08-22 PROCEDURE — G0422 INTENS CARDIAC REHAB W/EXERC: HCPCS | Performed by: FAMILY MEDICINE

## 2018-08-31 ENCOUNTER — NON-PROVIDER VISIT (OUTPATIENT)
Dept: CARDIOLOGY | Facility: MEDICAL CENTER | Age: 68
End: 2018-08-31
Payer: COMMERCIAL

## 2018-08-31 DIAGNOSIS — Z95.2 S/P TAVR (TRANSCATHETER AORTIC VALVE REPLACEMENT): ICD-10-CM

## 2018-08-31 LAB — EKG IMPRESSION: NORMAL

## 2018-08-31 PROCEDURE — G0422 INTENS CARDIAC REHAB W/EXERC: HCPCS | Performed by: FAMILY MEDICINE

## 2018-08-31 PROCEDURE — G0423 INTENS CARDIAC REHAB NO EXER: HCPCS | Mod: 59 | Performed by: FAMILY MEDICINE

## 2018-09-01 NOTE — PROGRESS NOTES
Ashish Wiley attended: cooking school from  3:30-4:30pm.   Today  prepared Bread Pudding with Pear Syrup.    Patient received handouts and nutrition information regarding the specific recipes.

## 2018-09-05 ENCOUNTER — NON-PROVIDER VISIT (OUTPATIENT)
Dept: CARDIOLOGY | Facility: MEDICAL CENTER | Age: 68
End: 2018-09-05
Payer: COMMERCIAL

## 2018-09-05 DIAGNOSIS — Z95.2 S/P TAVR (TRANSCATHETER AORTIC VALVE REPLACEMENT): ICD-10-CM

## 2018-09-05 LAB — EKG IMPRESSION: NORMAL

## 2018-09-05 PROCEDURE — G0423 INTENS CARDIAC REHAB NO EXER: HCPCS | Mod: 59 | Performed by: FAMILY MEDICINE

## 2018-09-05 PROCEDURE — G0422 INTENS CARDIAC REHAB W/EXERC: HCPCS | Performed by: FAMILY MEDICINE

## 2018-09-06 ENCOUNTER — TELEPHONE (OUTPATIENT)
Dept: HEALTH INFORMATION MANAGEMENT | Facility: OTHER | Age: 68
End: 2018-09-06

## 2018-09-06 DIAGNOSIS — Z12.12 SCREENING FOR COLORECTAL CANCER: ICD-10-CM

## 2018-09-06 DIAGNOSIS — Z12.11 SCREENING FOR COLORECTAL CANCER: ICD-10-CM

## 2018-09-06 NOTE — TELEPHONE ENCOUNTER
Good morning Dr. Claros    I spoke with patient and they are requesting a referral to complete their colonoscopy.     This patient is overdue for health maintenance topics that need orders so he can complete them.     Please review the pended orders in  to sign or make adjustments as appropriate.    Close the encounter when complete.  If declining these orders, please document a reason and route to the Outreach MA pool (p AMB HP Outreach).  I will call the patient to cancel the appointment.    Thank you

## 2018-09-06 NOTE — TELEPHONE ENCOUNTER
Referral to GI consultant was placed for surveillance colonoscopy as patient has history of polyp.    Venus Claros M.D.

## 2018-09-06 NOTE — PROGRESS NOTES
1. Attempt #:2patient would like his immunizations to his upcoming appointment and requested a refferal for a colonoscopy.    2. WebIZ Checked & Epic Updated: no  3. HealthConnect Verified: no  4. Verify PCP: yes    5. Communication Preference Obtained: yes    6. Diabetes Visit Scheduling  Scheduling Status:Scheduled      7. Care Gap Scheduling (Attempt to Schedule EACH Overdue Care Gap!)    Health Maintenance Due   Topic Date Due   • RETINAL SCREENING  01/21/1967   • IMM HEP B VACCINE (1 of 3 - Risk 3-dose series) 01/21/1968   • IMM ZOSTER VACCINES (2 of 3) 09/29/2016   • COLONOSCOPY  06/14/2018   • IMM INFLUENZA (1) 09/01/2018        8. Patient was directed to Health and Wellness Website: yes     - Patient plans to schedule appointment for Colonoscopy/FIT and Retinal Eye Exam.  - Patient already has appointment scheduled for Immunizations: FLU, HEPATITIS B and SHINGRIX (Shingles).    9. Screened for Food Pantry Prescription? yes  10. THERAVECTYS Activation: already active  11. THERAVECTYS Leif: no  12. Virtual Visits: no  13. Opt In to Text Messages: yes

## 2018-09-07 ENCOUNTER — NON-PROVIDER VISIT (OUTPATIENT)
Dept: CARDIOLOGY | Facility: MEDICAL CENTER | Age: 68
End: 2018-09-07
Payer: COMMERCIAL

## 2018-09-07 ENCOUNTER — HOSPITAL ENCOUNTER (OUTPATIENT)
Dept: LAB | Facility: MEDICAL CENTER | Age: 68
End: 2018-09-07
Attending: NURSE PRACTITIONER
Payer: COMMERCIAL

## 2018-09-07 DIAGNOSIS — I50.9 HEART FAILURE, NYHA CLASS 2 (HCC): ICD-10-CM

## 2018-09-07 DIAGNOSIS — I50.20 ACC/AHA STAGE C SYSTOLIC HEART FAILURE (HCC): ICD-10-CM

## 2018-09-07 DIAGNOSIS — Z95.2 S/P TAVR (TRANSCATHETER AORTIC VALVE REPLACEMENT): ICD-10-CM

## 2018-09-07 LAB — EKG IMPRESSION: NORMAL

## 2018-09-07 PROCEDURE — 36415 COLL VENOUS BLD VENIPUNCTURE: CPT

## 2018-09-07 PROCEDURE — G0422 INTENS CARDIAC REHAB W/EXERC: HCPCS | Performed by: FAMILY MEDICINE

## 2018-09-07 PROCEDURE — G0423 INTENS CARDIAC REHAB NO EXER: HCPCS | Mod: 59 | Performed by: FAMILY MEDICINE

## 2018-09-07 PROCEDURE — 80048 BASIC METABOLIC PNL TOTAL CA: CPT

## 2018-09-07 NOTE — PROGRESS NOTES
Cardiac Rehab Individual Treatment Plan Assessment: 60 day 18 Session #     15   MRN: [id number]   Allergies: Sulfa drugs   Patient Name: Ashish Wiley : 1950 Risk Stratification: High    Diagnoses:   1. S/P TAVR (transcatheter aortic valve replacement)     Age: 68 y.o. Physician: Venus Claros M.D.    Date of Event: 18 Specialist: Alina   Risk Factors:  Hypertension, Hyperlipidemia, Diabetes, Family History, Age, Male > 45   Exercise Nutrition Education Psychosocial   Stages of change Stages of change Stages of change Stages of change   Preparation Preparation Preparation Preparation   Fitness Test Lipids Learning Barriers Outcome Survey Tools   DIST:  (assessed pre- and post-exercise)  Available: No new Learning Barriers: Vision, Ready to Learn (Readers only) FP QOL Overall Score:  (assessed pre- and post-exercise)   Max HR:  (assessed pre- and post-exercise) Date: 05/10/18 Family Support PHQ-9:  (assessed pre- and post-exercise)   RPE:  (assessed pre- and post-exercise) Total: 108 mg/dL Yes Nutrition Screen:  (assessed pre- and post-exercise)   SPO2:  (assessed pre- and post-exercise) Tri mg/dL Lives: Spouse Intervention   MET:  (assessed pre- and post-exercise) HDL: 41 mg/dL Tobacco Use Behavioral Health Consult: Yes   EF= 35 LDL: 45 mg/dL History   Smoking Status   • Former Smoker   • Packs/day: 1.00   • Years: 22.00   • Quit date: 1989   Smokeless Tobacco   • Never Used     Comment: stop cigar in       Physician Referral: No   Ambulatory Status Diabetes Smoking Intervention Identifies Stressors: Yes   Fall Risk Assessed: Yes Diabetes: Yes Smoking Cessation Referral: N/A Drug Intervention: N/A   Exercise Ambulatory Status Assist Devices: None HbA1C: 7.4 % Date: 05/10/18 Ind. Education / Counseling: N/A Education   Exercise Prescription Monitors BS at Home: No Tobacco Adjunct: N/A Psychosocial Education: Coping Techniques, Positive Support System, S/S Depression   Mode:  "Treadmill, NuStep, UBE, Airdyne   Frequency: n/a (Pt. is on insulin and is not currently checking blood sugars) Random BS: 139 (9/7/18 AccuCheck) Education Intervention Target Goal   Frequency:  3 days/week Weight Management Healthy Heart Education: Cooking School Attended, Medications Reviewed, Patient Education Binder Provided, Risk Factors Discussed, Videos Viewed per Donita, Workshops Attended Assess presence or absence of depression using a valid screening: Yes   Duration:  30 minutes Weight: 83.5 kg (184 lb) Target Goal Use Stress Management: Yes   Intensity:  11-13 RPE Height: 172.7 cm (5' 7.99\") Complete Tobacco Cessation: Complete Tobacco Cessation: N/A Adverse Events: No   METS:  3.0-5.0 BMI (Calculated): 27.98 Educate / Review and have understanding of cardiac disease prevention: Educate / Review and have understanding of cardiac disease prevention: Yes Unexpected Events: No   Progression:  ^ increments of 1-3 to THR/RPE as tolerated Goal weight: 170 Medication Compliance: Yes    THR: THR: 20-30 BPM ^Resting HR History   Alcohol Use   • 0.6 oz/week   • 1 Glasses of wine per week     Comment: OCCASIONALLY         Angina with Exercise?  Angina with Exercise: No      Resistance Training?  Resistance Training: Yes      Hypertension      Diagnosed with HTN: Yes Intervention      Resting BP: 129/71 Dietician Consult/Class: Pending (9/26/18)      Peak Ex BP:  (assessed pre- and post-exercise) Nurse/Patient Discussion: Yes     Intervention Diabetes Ed Referral: Yes (Pt. has been referred to [facility])     Home Exercise:  Yes Discuss Maintenance /Wt Loss: Yes     Mode: Walk, Other (golf 3-4 days per week) Attend Cooking School: Yes     Duration: 45-90 minutes Dietary Goal: >=58 Rate Your Plate     Frequency: 7 days active Education     Education Nutrition Education: S&S Hypo/Hyper glycemia, Relate Diabetes to CAD, Healthy Eating, Sodium Reduction     Exercise Safety, S/S to Report, RPE Scale, Warm " "Up / Cool Down, Physically Active Target Goal     Target Goal LDL-C < 100 if trig. > 200:  N/A       Start Individual Exercise Rx Yes LDL-C < 70 for high risk patient:  Yes       BP < 140/90 or < 130/80 if DM or CKD Yes Non HDL-C Should be < 130:  Yes       Aerobic activity 30 + min / day 5 days / wk Yes HbA1C < 7%: Yes         BMI < 25: Yes       Notes: Exercise: Current: Ashish is completing two aerobic exercises for 15 minutes each, plus 5 minutes on the Aervufinde machine, plus and Intermediate weight routine in [facility]. He increased the Load on the NuStep from 4 to 5 today and is about to increase the weight of the dumbbells he using. At home he walks a 3-mile loop in 55 minutes each morning. He plays golf 3-4 days per week. Goal: To be using 10lb dumbbells within 2 weeks. To complete his morning walk in a shorter amount of time gradually. Progress: Ashish is making great progress in strength and endurance. Nutrition: Current: Ashish states that he is preparing many of the [facility] recipes at home-the stir christianson is his favorite. He is concerned about getting enough protein now that he is not eating as much red meat and animal protein. He states that he is \"eating a lot of non-fat yogurt.\" He states he is not a fan of tofu but he does eat edamame. RN reiterated that beans, lentils, tempeh are other protein-rich, heart-healthy options to experiment with. He states he is eating many fruits and vegetables daily. Goal: To continue to experiment with heart-healthy options even if they do not agree with his pallet at first. Progress: Ashish is working hard to improve his diet. Stress: Current: Ashish states that he does not have any stress-\"I'm retired-every day is a vacation.\" Goal: Ashish does not state any particular goals for stress-reduction. Progress: Ashish appears to be more at ease and he states he is feeling very encouraged by his progress.                          "

## 2018-09-07 NOTE — PROGRESS NOTES
Ashish Wiley attended: cooking school from  3:30-4:30pm. Today  prepared Gorman Tostadas.     Patient received handouts and nutrition information regarding the specific recipes.

## 2018-09-08 ENCOUNTER — PATIENT MESSAGE (OUTPATIENT)
Dept: HEALTH INFORMATION MANAGEMENT | Facility: OTHER | Age: 68
End: 2018-09-08

## 2018-09-08 LAB
ANION GAP SERPL CALC-SCNC: 4 MMOL/L (ref 0–11.9)
BUN SERPL-MCNC: 52 MG/DL (ref 8–22)
CALCIUM SERPL-MCNC: 9.1 MG/DL (ref 8.5–10.5)
CHLORIDE SERPL-SCNC: 108 MMOL/L (ref 96–112)
CO2 SERPL-SCNC: 23 MMOL/L (ref 20–33)
CREAT SERPL-MCNC: 1.76 MG/DL (ref 0.5–1.4)
GLUCOSE SERPL-MCNC: 140 MG/DL (ref 65–99)
POTASSIUM SERPL-SCNC: 5 MMOL/L (ref 3.6–5.5)
SODIUM SERPL-SCNC: 135 MMOL/L (ref 135–145)

## 2018-09-10 ENCOUNTER — NON-PROVIDER VISIT (OUTPATIENT)
Dept: CARDIOLOGY | Facility: MEDICAL CENTER | Age: 68
End: 2018-09-10
Payer: COMMERCIAL

## 2018-09-10 ENCOUNTER — OFFICE VISIT (OUTPATIENT)
Dept: MEDICAL GROUP | Age: 68
End: 2018-09-10
Payer: COMMERCIAL

## 2018-09-10 VITALS
WEIGHT: 190.4 LBS | DIASTOLIC BLOOD PRESSURE: 80 MMHG | SYSTOLIC BLOOD PRESSURE: 142 MMHG | HEART RATE: 72 BPM | OXYGEN SATURATION: 98 % | BODY MASS INDEX: 28.85 KG/M2 | HEIGHT: 68 IN | TEMPERATURE: 97.3 F

## 2018-09-10 DIAGNOSIS — I10 ESSENTIAL HYPERTENSION: ICD-10-CM

## 2018-09-10 DIAGNOSIS — E11.69 DYSLIPIDEMIA ASSOCIATED WITH TYPE 2 DIABETES MELLITUS (HCC): ICD-10-CM

## 2018-09-10 DIAGNOSIS — Z95.2 S/P TAVR (TRANSCATHETER AORTIC VALVE REPLACEMENT): ICD-10-CM

## 2018-09-10 DIAGNOSIS — E11.29 TYPE 2 DIABETES MELLITUS WITH MICROALBUMINURIA, WITH LONG-TERM CURRENT USE OF INSULIN (HCC): ICD-10-CM

## 2018-09-10 DIAGNOSIS — Z11.59 NEED FOR HEPATITIS B SCREENING TEST: ICD-10-CM

## 2018-09-10 DIAGNOSIS — Z79.4 TYPE 2 DIABETES MELLITUS WITH MICROALBUMINURIA, WITH LONG-TERM CURRENT USE OF INSULIN (HCC): ICD-10-CM

## 2018-09-10 DIAGNOSIS — R80.9 TYPE 2 DIABETES MELLITUS WITH MICROALBUMINURIA, WITH LONG-TERM CURRENT USE OF INSULIN (HCC): ICD-10-CM

## 2018-09-10 DIAGNOSIS — Z11.59 NEED FOR HEPATITIS C SCREENING TEST: ICD-10-CM

## 2018-09-10 DIAGNOSIS — I50.22 CHRONIC SYSTOLIC CONGESTIVE HEART FAILURE, NYHA CLASS 1 (HCC): ICD-10-CM

## 2018-09-10 DIAGNOSIS — E55.9 VITAMIN D INSUFFICIENCY: ICD-10-CM

## 2018-09-10 DIAGNOSIS — Z23 NEEDS FLU SHOT: ICD-10-CM

## 2018-09-10 DIAGNOSIS — E78.5 DYSLIPIDEMIA ASSOCIATED WITH TYPE 2 DIABETES MELLITUS (HCC): ICD-10-CM

## 2018-09-10 LAB — EKG IMPRESSION: NORMAL

## 2018-09-10 PROCEDURE — 90662 IIV NO PRSV INCREASED AG IM: CPT | Performed by: INTERNAL MEDICINE

## 2018-09-10 PROCEDURE — G0422 INTENS CARDIAC REHAB W/EXERC: HCPCS | Performed by: FAMILY MEDICINE

## 2018-09-10 PROCEDURE — 90471 IMMUNIZATION ADMIN: CPT | Performed by: INTERNAL MEDICINE

## 2018-09-10 PROCEDURE — G0423 INTENS CARDIAC REHAB NO EXER: HCPCS | Mod: 59 | Performed by: FAMILY MEDICINE

## 2018-09-10 PROCEDURE — 99214 OFFICE O/P EST MOD 30 MIN: CPT | Mod: 25 | Performed by: INTERNAL MEDICINE

## 2018-09-10 NOTE — ASSESSMENT & PLAN NOTE
Patient clinically improved on his systolic heart failure.  His cardiologist decreased the dose of Lasix.  He denied chest pain or shortness of breath or leg swelling.  Patient is able to participate in cardiac rehab twice a week and he plays golf 3 times a week.  He is taking carvedilol 25 mg twice daily, lisinopril 20 mg daily and Lasix 20 mg quarter tablet daily as instructed by cardiologist.  He has follow-up appointment with Dr. Estrada, cardiologist next month.

## 2018-09-10 NOTE — PROGRESS NOTES
Ashish Wiley attended the following workshop from 3:30-4:30pm.   Workshop Title: Menu Workshop.      Lecture was attended and patient questions addressed. The patient will continue workshops and nutrition education.

## 2018-09-10 NOTE — PROGRESS NOTES
Subjective:   Ashish Claire is a 68 y.o. male here today for evaluation and management of:      Vitamin D insufficiency  Patient is taking vitamin D 4000 units daily.  He has low vitamin D at 24 on 9/28/17.  Vitamin D level improved to 49 on 6/18/18 by taking vitamin D supplements daily.    Type 2 diabetes mellitus with microalbuminuria, with long-term current use of insulin ([facility])  Patient follows with endocrinologist nurse practitioner in [facility].  He is taking Lantus insulin 12 units daily and Bydureon (Exenatide) 2 mg once a week.  His A1c was done and last week in endocrinologist office and A1c was 7.0.  Patient stated that he has eye exam last year in October with ophthalmologist.  He denies side effects from using current medications for diabetes.  He denied hypoglycemia.    Essential hypertension  He is taking carvedilol 25 mg twice daily and lisinopril 20 mg daily.  He also takes a quarter of Lasix 20 mg daily.  His blood pressure is stable with current regimens.  He denied side effects from taking carvedilol, Lasix and lisinopril.    Dyslipidemia associated with type 2 diabetes mellitus ([facility])  He is taking atorvastatin 40 mg every evening.  His cholesterol is stable and well controlled.  His liver enzymes are within normal on 7/5/18.  Patient denied side effects from taking atorvastatin.  Patient states that he is exercising regularly and he has cardiac rehab twice a week and plays golf 3 times a week.    Results for ASHISH CLAIRE (MRN [id number]) as of 9/10/2018 09:01   Ref. Range 5/10/2018 06:41   Cholesterol,Tot Latest Ref Range: 100 - 199 mg/dL 108   Triglycerides Latest Ref Range: 0 - 149 mg/dL 109   HDL Latest Ref Range: >=40 mg/dL 41   LDL Latest Ref Range: <100 mg/dL 45       Chronic systolic congestive heart failure, NYHA class 1 ([facility])  Patient clinically improved on his systolic heart failure.  His cardiologist decreased the dose of Lasix.  He denied chest pain or shortness of breath or leg  "swelling.  Patient is able to participate in cardiac rehab twice a week and he plays golf 3 times a week.  He is taking carvedilol 25 mg twice daily, lisinopril 20 mg daily and Lasix 20 mg quarter tablet daily as instructed by cardiologist.  He has follow-up appointment with Dr. Estrada, cardiologist next month.         Current medicines (including changes today)  Current Outpatient Prescriptions   Medication Sig Dispense Refill   • furosemide (LASIX) 20 MG Tab Take 1-2 Tabs by mouth every day. 60 Tab 11   • lisinopril (PRINIVIL) 20 MG Tab Take 1 Tab by mouth every day. 30 Tab 11   • carvedilol (COREG) 25 MG Tab Take 1 Tab by mouth 2 times a day, with meals. 60 Tab 11   • Cholecalciferol (HM VITAMIN D3) 4000 units Cap Take 1 Cap by mouth every day.     • clopidogrel (PLAVIX) 75 MG Tab Take 1 Tab by mouth every day. 30 Tab 11   • Exenatide ER 2 MG Pen-injector Inject 2 mg as instructed every 7 days. 12 Each 0   • insulin glargine (LANTUS) 100 UNIT/ML Solution Inject 12 Units as instructed every evening. Pt had 8 units night prior to surgery     • atorvastatin (LIPITOR) 40 MG Tab Take 40 mg by mouth every evening.     • aspirin EC (ECOTRIN) 81 MG Tablet Delayed Response Take 81 mg by mouth every day.       No current facility-administered medications for this visit.      He  has a past medical history of Acute exacerbation of CHF (congestive heart failure) ([facility]) (6/25/2018); CAD (coronary artery disease); CHF (congestive heart failure) ([facility]); Diabetes ([facility]); Dilated cardiomyopathy ([facility]); Hyperlipidemia; Hypertension; NYHA class 3 acute on chronic systolic heart failure ([facility]) (6/25/2018); and Severe aortic stenosis (6/25/2018).    ROS   No chest pain, no shortness of breath, no abdominal pain       Objective:     Blood pressure 142/80, pulse 72, temperature 36.3 °C (97.3 °F), height 1.727 m (5' 8\"), weight 86.4 kg (190 lb 6.4 oz), SpO2 98 %. Body mass index is 28.95 kg/m².   Physical Exam:  General: Alert, oriented and " no acute distress.  Eye contact is good, speech goal directed, affect calm  HEENT: conjunctiva non-injected, sclera non-icteric.  Oral mucous membranes pink and moist with no lesions.  Pinna normal.   Lungs: Normal respiratory effort, clear to auscultation bilaterally with good excursion.  CV: regular rate and rhythm. No murmurs.  Abdomen: soft, non distended, nontender, Bowel sound normal.  Ext: no edema, color normal, vascularity normal, temperature normal        Assessment and Plan:   The following treatment plan was discussed     1. Vitamin D insufficiency  - Improved.  Continue vitamin D thousand units daily.  Recheck vitamin D in 3 months.  - VITAMIN D,25 HYDROXY; Future    2. Type 2 diabetes mellitus with microalbuminuria, with long-term current use of insulin (HCC)  - Well-controlled. Continue current regimens, Lantus insulin 20 units daily and Exenatide 2 mg once a week. Recheck lab 1-2 weeks before next follow up visit.  - Counseled to comply with medication and diet.   - Counseled signs and symptoms of hypoglycemia and management of hypoglycemia.   - Recommend to have retinal eye exam once a year.  - Advised to check both feet daily.  - COMP METABOLIC PANEL; Future  - MICROALBUMIN CREAT RATIO URINE; Future    3. Essential hypertension  - Well-controlled. Continue current regimens, carvedilol 25 mg twice daily and lisinopril 20 mg daily. Recheck lab 1-2 weeks before next follow up visit.  - Reviewed the risks and benefits as well as potential side effects of medications with patient.  - Discussed to eat low-sodium diet and encouraged to do regular physical exercise.  - Recommend to monitor blood pressure and heart rate at home.  - CBC WITH DIFFERENTIAL; Future  - COMP METABOLIC PANEL; Future    4. Dyslipidemia associated with type 2 diabetes mellitus (HCC)  - Well-controlled. Continue current regimens, atorvastatin 40 mg every evening. Recheck lab 1-2 weeks before next follow up visit.  - Reviewed the  risks and benefits of treatment and potential side effects of medication.  - Advised to eat low fat, low carbohydrate and high fiber diet as well as do cardio physical exercise regularly.  - COMP METABOLIC PANEL; Future  - LIPID PROFILE; Future    5. Chronic systolic congestive heart failure, NYHA class 1 (HCC)  - Improved.  Continue Lasix as instructed by cardiologist.  Cardiologist already cut down the dose of Lasix.  Continue lisinopril 20 mg daily and carvedilol 25 mg twice daily.  Patient has follow-up appointment with Dr. Estrada, cardiologist on 10/2/18.    6. Needs flu shot  - Influenza vaccine was given today after reviewing risks and benefits as well as side effects of vaccine.  - INFLUENZA VACCINE, HIGH DOSE (65+ ONLY)    7. Need for hepatitis B screening test  - Patient has never been tested for hepatitis B or C for screening.  He wanted to screen for hepatitis B before receiving hepatitis B vaccine.  Ordered blood tests to test for hepatitis B and C.  - HEPATITIS B SURFACE ANTIGEN; Future    8. Need for hepatitis C screening test  - Patient was born in 1950.  Patient wanted to screen for hepatitis C.  - HEP C VIRUS ANTIBODY; Future      Followup: Return in about 3 months (around 12/10/2018), or if symptoms worsen or fail to improve, for Hypertension, Hyperlipidemia, Diabetes, CKD, Vitamin D insufficiency, Lab review.      Please note that this dictation was created using voice recognition software. I have made every reasonable attempt to correct obvious errors, but I expect that there may have unintended errors in text, spelling, punctuation, or grammar that I did not discover.

## 2018-09-10 NOTE — ASSESSMENT & PLAN NOTE
Patient is taking vitamin D 4000 units daily.  He has low vitamin D at 24 on 9/28/17.  Vitamin D level improved to 49 on 6/18/18 by taking vitamin D supplements daily.

## 2018-09-10 NOTE — ASSESSMENT & PLAN NOTE
Patient follows with endocrinologist nurse practitioner in [facility].  He is taking Lantus insulin 12 units daily and Bydureon (Exenatide) 2 mg once a week.  His A1c was done and last week in endocrinologist office and A1c was 7.0.  Patient stated that he has eye exam last year in October with ophthalmologist.  He denies side effects from using current medications for diabetes.  He denied hypoglycemia.

## 2018-09-10 NOTE — ASSESSMENT & PLAN NOTE
He is taking atorvastatin 40 mg every evening.  His cholesterol is stable and well controlled.  His liver enzymes are within normal on 7/5/18.  Patient denied side effects from taking atorvastatin.  Patient states that he is exercising regularly and he has cardiac rehab twice a week and plays golf 3 times a week.    Results for ALETHEA AMAYA MATT (MRN [id number]) as of 9/10/2018 09:01   Ref. Range 5/10/2018 06:41   Cholesterol,Tot Latest Ref Range: 100 - 199 mg/dL 108   Triglycerides Latest Ref Range: 0 - 149 mg/dL 109   HDL Latest Ref Range: >=40 mg/dL 41   LDL Latest Ref Range: <100 mg/dL 45

## 2018-09-10 NOTE — ASSESSMENT & PLAN NOTE
He is taking carvedilol 25 mg twice daily and lisinopril 20 mg daily.  He also takes a quarter of Lasix 20 mg daily.  His blood pressure is stable with current regimens.  He denied side effects from taking carvedilol, Lasix and lisinopril.

## 2018-09-12 ENCOUNTER — NON-PROVIDER VISIT (OUTPATIENT)
Dept: CARDIOLOGY | Facility: MEDICAL CENTER | Age: 68
End: 2018-09-12
Payer: COMMERCIAL

## 2018-09-12 DIAGNOSIS — Z95.2 S/P TAVR (TRANSCATHETER AORTIC VALVE REPLACEMENT): ICD-10-CM

## 2018-09-12 LAB — EKG IMPRESSION: NORMAL

## 2018-09-12 PROCEDURE — G0422 INTENS CARDIAC REHAB W/EXERC: HCPCS | Performed by: FAMILY MEDICINE

## 2018-09-12 PROCEDURE — G0423 INTENS CARDIAC REHAB NO EXER: HCPCS | Mod: 59 | Performed by: FAMILY MEDICINE

## 2018-09-14 ENCOUNTER — NON-PROVIDER VISIT (OUTPATIENT)
Dept: CARDIOLOGY | Facility: MEDICAL CENTER | Age: 68
End: 2018-09-14
Payer: COMMERCIAL

## 2018-09-14 DIAGNOSIS — Z95.2 S/P TAVR (TRANSCATHETER AORTIC VALVE REPLACEMENT): ICD-10-CM

## 2018-09-14 LAB — EKG IMPRESSION: NORMAL

## 2018-09-14 PROCEDURE — G0423 INTENS CARDIAC REHAB NO EXER: HCPCS | Mod: 59 | Performed by: FAMILY MEDICINE

## 2018-09-14 PROCEDURE — G0422 INTENS CARDIAC REHAB W/EXERC: HCPCS | Performed by: FAMILY MEDICINE

## 2018-09-14 NOTE — PROGRESS NOTES
Ashish Wiley attended: cooking school from  3:30-4:30pm.   Today  prepared Marinara Sauce.     Patient received handouts and nutrition information regarding the specific recipes.

## 2018-09-17 ENCOUNTER — NON-PROVIDER VISIT (OUTPATIENT)
Dept: CARDIOLOGY | Facility: MEDICAL CENTER | Age: 68
End: 2018-09-17
Payer: COMMERCIAL

## 2018-09-17 DIAGNOSIS — Z95.2 S/P TAVR (TRANSCATHETER AORTIC VALVE REPLACEMENT): ICD-10-CM

## 2018-09-17 LAB — EKG IMPRESSION: NORMAL

## 2018-09-17 PROCEDURE — G0423 INTENS CARDIAC REHAB NO EXER: HCPCS | Mod: 59 | Performed by: FAMILY MEDICINE

## 2018-09-17 PROCEDURE — G0422 INTENS CARDIAC REHAB W/EXERC: HCPCS | Performed by: FAMILY MEDICINE

## 2018-09-18 NOTE — PROGRESS NOTES
Ashish Wiley attended: Exercise Workshop from 3:30-4:30pm.      The topic was: Improving Performance.     Patient received handouts regarding the specific exercise information.

## 2018-09-19 ENCOUNTER — NON-PROVIDER VISIT (OUTPATIENT)
Dept: CARDIOLOGY | Facility: MEDICAL CENTER | Age: 68
End: 2018-09-19
Payer: COMMERCIAL

## 2018-09-19 DIAGNOSIS — Z95.2 S/P TAVR (TRANSCATHETER AORTIC VALVE REPLACEMENT): ICD-10-CM

## 2018-09-19 LAB — EKG IMPRESSION: NORMAL

## 2018-09-19 PROCEDURE — G0423 INTENS CARDIAC REHAB NO EXER: HCPCS | Mod: 59 | Performed by: FAMILY MEDICINE

## 2018-09-19 PROCEDURE — G0422 INTENS CARDIAC REHAB W/EXERC: HCPCS | Performed by: FAMILY MEDICINE

## 2018-09-21 ENCOUNTER — NON-PROVIDER VISIT (OUTPATIENT)
Dept: CARDIOLOGY | Facility: MEDICAL CENTER | Age: 68
End: 2018-09-21
Payer: COMMERCIAL

## 2018-09-21 DIAGNOSIS — Z95.2 S/P TAVR (TRANSCATHETER AORTIC VALVE REPLACEMENT): ICD-10-CM

## 2018-09-21 LAB — EKG IMPRESSION: NORMAL

## 2018-09-21 PROCEDURE — G0423 INTENS CARDIAC REHAB NO EXER: HCPCS | Mod: 59 | Performed by: FAMILY MEDICINE

## 2018-09-21 PROCEDURE — G0422 INTENS CARDIAC REHAB W/EXERC: HCPCS | Performed by: FAMILY MEDICINE

## 2018-09-21 NOTE — PROGRESS NOTES
Ashish Wiley attended: cooking school from  3:30-4:30pm.   Today  prepared African Power Bowls.     Patient received handouts and nutrition information regarding the specific recipes.

## 2018-09-24 ENCOUNTER — NON-PROVIDER VISIT (OUTPATIENT)
Dept: CARDIOLOGY | Facility: MEDICAL CENTER | Age: 68
End: 2018-09-24
Payer: COMMERCIAL

## 2018-09-24 DIAGNOSIS — Z95.2 S/P TAVR (TRANSCATHETER AORTIC VALVE REPLACEMENT): ICD-10-CM

## 2018-09-24 LAB — EKG IMPRESSION: NORMAL

## 2018-09-24 PROCEDURE — G0423 INTENS CARDIAC REHAB NO EXER: HCPCS | Mod: 59 | Performed by: FAMILY MEDICINE

## 2018-09-24 PROCEDURE — G0422 INTENS CARDIAC REHAB W/EXERC: HCPCS | Performed by: FAMILY MEDICINE

## 2018-09-24 NOTE — PROGRESS NOTES
Pre-charting    Nutrition Consult Note:    Deanna Garay  Date & Time note created:    9/24/2018   2:52 PM     Referring MD:   No ref. provider found  Time: 3:30-4:30pm    Patient ID:   Name:             Ashish Wiley   YOB: 1950  Age:                 68 y.o.  male   MRN:               [id number]      Ashish Wiley is here today for Intensive Cardiac Rehab.  This program includes Nutrition education and counseling following the Pritikin guidelines, which promote whole foods-fruits, vegetables, beans/legumes, whole grains, lean animal protein and not fat dairy-while avoiding added salt, refined/salty/sugary/fatty processed foods, trans/saturated fat, added sugar, tropical oils and heavy use of added oils.     Past Medical History:   Past Medical History:   Diagnosis Date   • Acute exacerbation of CHF (congestive heart failure) ([facility]) 6/25/2018   • CAD (coronary artery disease)    • CHF (congestive heart failure) ([facility])    • Diabetes ([facility])    • Dilated cardiomyopathy ([facility])    • Hyperlipidemia    • Hypertension    • NYHA class 3 acute on chronic systolic heart failure ([facility]) 6/25/2018   • Severe aortic stenosis 6/25/2018       Past Surgical History:  Past Surgical History:   Procedure Laterality Date   • TRANSCATHETER AORTIC VALVE REPLACEMENT  7/2/2018    Procedure: TRANSCATHETER AORTIC VALVE REPLACEMENT;  Surgeon: Markus Fuller M.D.;  Location: [facility];  Service: Cardiac   • DAYANNA  7/2/2018    Procedure: DAYANNA;  Surgeon: Markus Fuller M.D.;  Location: SUR[facility];  Service: Cardiac   • AORTIC VALVE REPLACEMENT      S/P TAVR    • CARDIAC CATH     • TONSILLECTOMY         Current Outpatient Medications:  Current Outpatient Prescriptions   Medication Sig Dispense Refill   • furosemide (LASIX) 20 MG Tab Take 1-2 Tabs by mouth every day. 60 Tab 11   • lisinopril (PRINIVIL) 20 MG Tab Take 1 Tab by mouth every day. 30 Tab 11   • carvedilol (COREG) 25 MG Tab Take 1 Tab by  mouth 2 times a day, with meals. 60 Tab 11   • Cholecalciferol (HM VITAMIN D3) 4000 units Cap Take 1 Cap by mouth every day.     • clopidogrel (PLAVIX) 75 MG Tab Take 1 Tab by mouth every day. 30 Tab 11   • Exenatide ER 2 MG Pen-injector Inject 2 mg as instructed every 7 days. 12 Each 0   • insulin glargine (LANTUS) 100 UNIT/ML Solution Inject 12 Units as instructed every evening. Pt had 8 units night prior to surgery     • atorvastatin (LIPITOR) 40 MG Tab Take 40 mg by mouth every evening.     • aspirin EC (ECOTRIN) 81 MG Tablet Delayed Response Take 81 mg by mouth every day.       No current facility-administered medications for this visit.          Allergies:  Allergies   Allergen Reactions   • Sulfa Drugs Unspecified     Kidney Stones       Family History:  Family History   Problem Relation Age of Onset   • Lung Disease Mother         COPD   • Heart Disease Father         valve disease   • Heart Failure Father    • Hypertension Father    • Hyperlipidemia Father    • Cancer Maternal Grandmother         BRAIN TUMOR   • Stroke Maternal Grandfather    • Other Paternal Grandmother         INTESTINAL PROBLEM   • Stroke Paternal Grandfather    • Prostate cancer Brother    • Other Brother         ortho       Social History:  Social History     Social History   • Marital status:      Spouse name: N/A   • Number of children: N/A   • Years of education: N/A     Occupational History   • Not on file.     Social History Main Topics   • Smoking status: Former Smoker     Packs/day: 1.00     Years: 22.00     Quit date: 1/1/1989   • Smokeless tobacco: Never Used      Comment: stop cigar in 2015   • Alcohol use 0.6 oz/week     1 Glasses of wine per week      Comment: OCCASIONALLY   • Drug use: No   • Sexual activity: Yes     Partners: Female     Other Topics Concern   • Not on file     Social History Narrative   • No narrative on file         24 hour recall     Breakfast: oatmeal, coffee (2 cups) cremora powder and sweet  "and low  Snack: Oikos yogurt   Lunch: Pritikin pizza recipe, [place]   Snack: Oikos yogurt   Dinner: Chicken and squash (yellow and green)     Home accucheck average: 110    Anthropometrics:    Height: 68\"     Initial Program Weight: 182 lbs     Current Weight: 182 lbs     Ideal Body Weight Range: 154 lbs +/- 10%     5% weight loss: 172.9 lbs     10% Weight loss 154.7 lbs     Current Exercise Minutes:    Monday: 60 ICR   Tuesday: Golf   Wednesday: 60 ICR   Thursday: Golf   Friday: 60 ICR   Saturday:  Sunday:     + 3 mile walk daily = 65 minutes each    Goal: 300 minutes/week       Current BP: 106/60    Current Lipids:    Lab Results   Component Value Date/Time    CHOLSTRLTOT 108 05/10/2018 06:41 AM    LDL 45 05/10/2018 06:41 AM    HDL 41 05/10/2018 06:41 AM    TRIGLYCERIDE 109 05/10/2018 06:41 AM       Lab Results   Component Value Date/Time    SODIUM 135 09/07/2018 01:34 PM    POTASSIUM 5.0 09/07/2018 01:34 PM    CHLORIDE 108 09/07/2018 01:34 PM    CO2 23 09/07/2018 01:34 PM    GLUCOSE 140 (H) 09/07/2018 01:34 PM    BUN 52 (H) 09/07/2018 01:34 PM    CREATININE 1.76 (H) 09/07/2018 01:34 PM     Lab Results   Component Value Date/Time    ALKPHOSPHAT 81 07/05/2018 06:39 AM    ASTSGOT 15 07/05/2018 06:39 AM    ALTSGPT 19 07/05/2018 06:39 AM    TBILIRUBIN 0.5 07/05/2018 06:39 AM        Goal:     Lipids  Goal   Total <200   Triglycerides <150   HDL 40 Men/50 Women   LDL <100       Positive Changes Since Beginning the Program:     1. Patient has significantly cut down on beef  2. Patient has been cooking many of the recipes he learned in class       Nutrition Goals:    1.  Weight reduction to improve outcomes   2. Decrease portion sizes at restaurants   3. Reducing meat portions from 8oz to 6oz initially     Exercise Goals:    1. Continue home exercise routine  2. Consider increasing walking pace/adding weights, etc.    Blood Pressure Goals:    1. Continue to read labels   2. Ask for no added salt at restaurants " "      Lipid Goals:    N/A    Barriers/Biggest Struggles:    1.  Patient feels it is \"motivation\"     Other Interventions:    N/A    Educational Handouts:  Alternatives List  Protein content of foods  Cholesterol content of foods   14 day meal plan example  Diabetes           "

## 2018-09-24 NOTE — PROGRESS NOTES
Ashish Wiley attended: Healthy Mindset Workshop from 3:30-4:30pm.       The topic was: Stress Management.      Patient received handouts regarding the specific exercise information.

## 2018-09-25 NOTE — PROGRESS NOTES
Cardiac Rehab Individual Treatment Plan Assessment: 90 day 09/24/18 Session #     22   MRN: [id number]   Allergies: Sulfa drugs   Patient Name: Ashish Wiley : 1950 Risk Stratification: High    Diagnoses:   1. S/P TAVR (transcatheter aortic valve replacement)     Age: 68 y.o. Physician: Venus Claros M.D.    Date of Event: 18 Specialist: Alina   Risk Factors:  Hypertension, Hyperlipidemia, Diabetes, Family History, Age, Male > 45   Exercise Nutrition Education Psychosocial   Stages of change Stages of change Stages of change Stages of change   Preparation Preparation Preparation Preparation   Fitness Test Lipids Learning Barriers Outcome Survey Tools   DIST:  (assessed pre- and post-exercise)  Available: No new Learning Barriers: Vision, Ready to Learn (Readers only) FP QOL Overall Score:  (assessed pre- and post-exercise)   Max HR:  (assessed pre- and post-exercise) Date: 05/10/18 Family Support PHQ-9:  (assessed pre- and post-exercise)   RPE:  (assessed pre- and post-exercise) Total: 108 mg/dL Yes Nutrition Screen:  (assessed pre- and post-exercise)   SPO2:  (assessed pre- and post-exercise) Tri mg/dL Lives: Spouse Intervention   MET:  (assessed pre- and post-exercise) HDL: 41 mg/dL Tobacco Use Behavioral Health Consult: Yes   EF= 35 LDL: 45 mg/dL History   Smoking Status   • Former Smoker   • Packs/day: 1.00   • Years: 22.00   • Quit date: 1989   Smokeless Tobacco   • Never Used     Comment: stop cigar in       Physician Referral: No   Ambulatory Status Diabetes Smoking Intervention Identifies Stressors: Yes   Fall Risk Assessed: Yes Diabetes: Yes Smoking Cessation Referral: N/A Drug Intervention: N/A   Exercise Ambulatory Status Assist Devices: None HbA1C: 7.4 % Date: 05/10/18 Ind. Education / Counseling: N/A Education   Exercise Prescription Monitors BS at Home: No Tobacco Adjunct: N/A Psychosocial Education: Coping Techniques, Positive Support System, S/S Depression   Mode:  "Treadmill, NuStep, UBE, Airdyne   Frequency: n/a (Pt. is on insulin and is not currently checking blood sugars) Random BS: 140 (9/7/18 AccuCheck) Education Intervention Target Goal   Frequency:  3 days/week Weight Management Healthy Heart Education: Class Schedule Given, Cooking School Attended, Medications Reviewed, Patient Education Binder Provided, Risk Factors Discussed, Videos Viewed per Dontia, Workshops Attended Assess presence or absence of depression using a valid screening: Yes   Duration:  30 minutes Weight: 82.6 kg (182 lb) Target Goal Use Stress Management: Yes   Intensity:  11-13 RPE Height: 172.7 cm (5' 7.99\") Complete Tobacco Cessation: Complete Tobacco Cessation: N/A Adverse Events: No   METS:  3.0-5.0 BMI (Calculated): 27.68 Educate / Review and have understanding of cardiac disease prevention: Educate / Review and have understanding of cardiac disease prevention: Yes Unexpected Events: No   Progression:  ^ increments of 1-3 to THR/RPE as tolerated Goal weight: 170 Medication Compliance: Yes    THR: THR: 20-30 BPM ^Resting HR History   Alcohol Use   • 0.6 oz/week   • 1 Glasses of wine per week     Comment: OCCASIONALLY         Angina with Exercise?  Angina with Exercise: No      Resistance Training?  Resistance Training: Yes      Hypertension      Diagnosed with HTN: Yes Intervention      Resting BP: 139/62 Dietician Consult/Class: Pending (9/26/18)      Peak Ex BP:  (assessed pre- and post-exercise) Nurse/Patient Discussion: Yes     Intervention Diabetes Ed Referral: Yes (Pt. has been referred to [facility])     Home Exercise:  Yes Discuss Maintenance /Wt Loss: Yes     Mode: Walk, Other (golf 2-4 days per week) Attend Cooking School: Yes     Duration: 45-90 minutes Dietary Goal: >=58 Rate Your Plate     Frequency: 7 days active Education     Education Nutrition Education: S&S Hypo/Hyper glycemia, Relate Diabetes to CAD, Healthy Eating, Sodium Reduction     Exercise Safety, S/S to " "Report, RPE Scale, Warm Up / Cool Down, Physically Active Target Goal     Target Goal LDL-C < 100 if trig. > 200:  N/A       Start Individual Exercise Rx Yes LDL-C < 70 for high risk patient:  Yes       BP < 140/90 or < 130/80 if DM or CKD Yes Non HDL-C Should be < 130:  Yes       Aerobic activity 30 + min / day 5 days / wk Yes HbA1C < 7%: Yes         BMI < 25: Yes       Notes: Exercise: Current: Ashish is completing two 15 minute aerobic sessions focusing on increasing the speed and load of each exercise as well as completing an Intermediate weight routine using 9lb. dumbbells for 2 sets of 12 reps in [facility]. On the treadmill he is walking at a speed of 3.5mph, 3.5% grade, 5.4 METS and on the NuStep he is exercising at a 70 yuan, 2.8 METS. At home he walks a 3 mile loop every morning which takes him 55-60 minutes. He also plays golf 2-4 days per week. Goal: To increase stamina and strength. Progress: Ashish is progressing well in his exercise. Nutrition: Current: \"I am doing well with the Pritikin eating but in cooking school I just wish we were told what grocery store supplies the majority of the no-salt/no-fat products that are being used in the recipes.\" Ashish does the cooking in the house-hold and cooks many of the recipes from cooking school. He will have his one-on-one with [facility] RD on 9/26/18. He states breakfast is oatmeal, lunch is usually a salad but he will often have unhealthy salad dressing. Tonight he is cooking vegetable pizza using 100% whole grain pizza crust. He drinks a bottle of wine over the course of the week. He states \"I've been a diabetic for a long time so I know how much I can cheat.\" RN reinforced eating some carbohydrates, protein or fat when he consumes alcohol to prevent hypoglycemic episodes. He states he is compliant with his glucose checks and medications. RN provided information where he can buy many of the products featured in cooking school and food lines that generally fall within " "Pritikin guidelines. Goal: To continue to cook heart healthy meals. Meet with ILEANA. Progress: Ashish has been open to learning and adopting Pritikin for improved health and is doing well with it the majority of the time. Stress: Current: Ashish states he does not have many stressors in his life. He enjoys his 5AM walks daily, \"it's quite and dark-it's very contemplative.\" Goal: To attend Healthy Mindset lectures.                         "

## 2018-09-26 ENCOUNTER — NON-PROVIDER VISIT (OUTPATIENT)
Dept: CARDIOLOGY | Facility: MEDICAL CENTER | Age: 68
End: 2018-09-26

## 2018-09-26 ENCOUNTER — TELEPHONE (OUTPATIENT)
Dept: CARDIOLOGY | Facility: MEDICAL CENTER | Age: 68
End: 2018-09-26

## 2018-09-26 ENCOUNTER — NON-PROVIDER VISIT (OUTPATIENT)
Dept: CARDIOLOGY | Facility: MEDICAL CENTER | Age: 68
End: 2018-09-26
Payer: COMMERCIAL

## 2018-09-26 DIAGNOSIS — Z95.2 S/P TAVR (TRANSCATHETER AORTIC VALVE REPLACEMENT): ICD-10-CM

## 2018-09-26 LAB — EKG IMPRESSION: NORMAL

## 2018-09-26 PROCEDURE — G0422 INTENS CARDIAC REHAB W/EXERC: HCPCS | Performed by: FAMILY MEDICINE

## 2018-09-26 PROCEDURE — G0423 INTENS CARDIAC REHAB NO EXER: HCPCS | Mod: 59 | Performed by: FAMILY MEDICINE

## 2018-09-27 NOTE — TELEPHONE ENCOUNTER
[facility] is requesting clearance for pt to proceed with colonoscopy and hold plavix for 5 days prior to procedure scheduled 12/10/18.     Hx: TAVR 7/02/18, coronary stent placed 06/28/18, CHF, CKD,     Pt should not interrupt DAPT for 1 year post stent placement for non-urgent procedures, correct?     To JI/SC to verify

## 2018-09-27 NOTE — TELEPHONE ENCOUNTER
Completed surgery clearance form per Desi Cook stating that pt should not hold plavix until after 6/28/19 unless emergency  and faxed back to [phone].

## 2018-10-01 ENCOUNTER — NON-PROVIDER VISIT (OUTPATIENT)
Dept: CARDIOLOGY | Facility: MEDICAL CENTER | Age: 68
End: 2018-10-01
Payer: COMMERCIAL

## 2018-10-01 DIAGNOSIS — Z95.2 S/P TAVR (TRANSCATHETER AORTIC VALVE REPLACEMENT): ICD-10-CM

## 2018-10-01 LAB — EKG IMPRESSION: NORMAL

## 2018-10-01 PROCEDURE — G0422 INTENS CARDIAC REHAB W/EXERC: HCPCS | Performed by: FAMILY MEDICINE

## 2018-10-01 PROCEDURE — G0423 INTENS CARDIAC REHAB NO EXER: HCPCS | Mod: 59 | Performed by: FAMILY MEDICINE

## 2018-10-01 NOTE — PROGRESS NOTES
Ashish Wiley attended the following workshop from 3:30-4:30pm.   Workshop Title: Targeting Your Nutrition Priorities.       Lecture was attended and patient questions addressed. The patient will continue workshops and nutrition education.

## 2018-10-02 ENCOUNTER — OFFICE VISIT (OUTPATIENT)
Dept: CARDIOLOGY | Facility: MEDICAL CENTER | Age: 68
End: 2018-10-02
Payer: COMMERCIAL

## 2018-10-02 VITALS
OXYGEN SATURATION: 97 % | DIASTOLIC BLOOD PRESSURE: 80 MMHG | HEIGHT: 68 IN | WEIGHT: 190.6 LBS | SYSTOLIC BLOOD PRESSURE: 160 MMHG | BODY MASS INDEX: 28.89 KG/M2 | HEART RATE: 72 BPM

## 2018-10-02 DIAGNOSIS — I50.20 ACC/AHA STAGE C SYSTOLIC HEART FAILURE (HCC): ICD-10-CM

## 2018-10-02 DIAGNOSIS — I50.9 HEART FAILURE, NYHA CLASS 1 (HCC): ICD-10-CM

## 2018-10-02 DIAGNOSIS — I25.10 CORONARY ARTERY DISEASE DUE TO CALCIFIED CORONARY LESION: ICD-10-CM

## 2018-10-02 DIAGNOSIS — I38 CHF DUE TO VALVULAR DISEASE (HCC): ICD-10-CM

## 2018-10-02 DIAGNOSIS — I10 ESSENTIAL HYPERTENSION: ICD-10-CM

## 2018-10-02 DIAGNOSIS — N18.30 STAGE 3 CHRONIC KIDNEY DISEASE (HCC): ICD-10-CM

## 2018-10-02 DIAGNOSIS — Z95.5 STENTED CORONARY ARTERY: ICD-10-CM

## 2018-10-02 DIAGNOSIS — I50.9 CHF DUE TO VALVULAR DISEASE (HCC): ICD-10-CM

## 2018-10-02 DIAGNOSIS — I35.0 SEVERE AORTIC STENOSIS: ICD-10-CM

## 2018-10-02 DIAGNOSIS — I25.84 CORONARY ARTERY DISEASE DUE TO CALCIFIED CORONARY LESION: ICD-10-CM

## 2018-10-02 DIAGNOSIS — Z95.2 S/P TAVR (TRANSCATHETER AORTIC VALVE REPLACEMENT): ICD-10-CM

## 2018-10-02 DIAGNOSIS — I42.0 DILATED CARDIOMYOPATHY (HCC): ICD-10-CM

## 2018-10-02 DIAGNOSIS — R06.02 SOB (SHORTNESS OF BREATH): ICD-10-CM

## 2018-10-02 PROCEDURE — 94618 PULMONARY STRESS TESTING: CPT | Performed by: NURSE PRACTITIONER

## 2018-10-02 PROCEDURE — 99214 OFFICE O/P EST MOD 30 MIN: CPT | Mod: 25 | Performed by: NURSE PRACTITIONER

## 2018-10-02 ASSESSMENT — MINNESOTA LIVING WITH HEART FAILURE QUESTIONNAIRE (MLHF)
FEELING LIKE A BURDEN TO FAMILY AND FRIENDS: 0
DIFFICULTY TO CONCENTRATE OR REMEMBERING THINGS: 0
GIVING YOU SIDE EFFECTS FROM TREATMENTS: 0
DIFFICULTY SOCIALIZING WITH FAMILY OR FRIENDS: 0
DIFFICULTY WITH SEXUAL ACTIVITIES: 0
EATING LESS FOODS YOU LIKE: 3
WALKING ABOUT OR CLIMBING STAIRS DIFFICULT: 0
MAKING YOU SHORT OF BREATH: 0
MAKING YOU STAY IN A HOSPITAL: 0
DIFFICULTY GOING AWAY FROM HOME: 0
SWELLING IN ANKLES OR LEGS: 0
MAKING YOU FEEL DEPRESSED: 0
HAVING TO SIT OR LIE DOWN DURING THE DAY: 0
TOTAL_SCORE: 3
MAKING YOU WORRY: 0
DIFFICULTY SLEEPING WELL AT NIGHT: 0
LOSS OF SELF CONTROL IN YOUR LIFE: 0
TIRED, FATIGUED OR LOW ON ENERGY: 0
COSTING YOU MONEY FOR MEDICAL CARE: 0
DIFFICULTY WORKING TO EARN A LIVING: 0
DIFFICULTY WITH RECREATIONAL PASTIMES, SPORTS, HOBBIES: 0
WORKING AROUND THE HOUSE OR YARD DIFFICULT: 0

## 2018-10-02 ASSESSMENT — ENCOUNTER SYMPTOMS
ABDOMINAL PAIN: 0
COUGH: 0
ORTHOPNEA: 0
MYALGIAS: 0
SHORTNESS OF BREATH: 0
DIZZINESS: 0
CLAUDICATION: 0
PND: 0
FEVER: 0
PALPITATIONS: 0

## 2018-10-02 ASSESSMENT — 6 MINUTE WALK TEST (6MWT): TOTAL DISTANCE WALKED (METERS): 527.3

## 2018-10-02 NOTE — PROGRESS NOTES
Chief Complaint   Patient presents with   • Congestive Heart Failure     HF est.       Subjective:   Ashish Wiley is a 68 y.o. male who presents today for follow-up on his heart failure.    Patient of Dr. Fuller for his TAVR and patient of the heart failure clinic.  Patient was last seen in the heart failure clinic on 8/6/2018.  During that visit, patient's furosemide was decreased to 20 mg daily and second dose if needed in the afternoon.  Patient reports she is been pretty stable on the 20 mg daily.    For his symptoms, Patient feels well, denies chest pain, shortness of breath, palpitations, dizziness/lightheadedness, orthopnea, PND or Edema.     His home weights have been stable between 183-184 pounds.    Patient states he was taking lisinopril 10 mg daily because his pharmacy gave him the wrong dose of the medication.  Patient states he will increase his lisinopril back up to 20 mg daily.    Patient reports his blood pressures at cardiac rehab are between 120s and 130s, systolic.    Patient continues to walk 3 miles every day in the morning and at times coughs in the afternoon and he continues to go to cardiac rehab.    At 6 minute walk test re-evaluation, patient was able to complete 527 m during his 6 minute walk test. His O2 saturation at baseline was 97% and at the end of the test, the O2 saturation was 97%. He reported level 0 of dyspnea on Vi scale. MLWHF 3    Additonally, patient has the following medical problems:     -Severe Aortic stenosis, s/p TAVR on 7/2/2018     -CAD, s/p PCI with  arthrectomy to the LAD.     -CKD: Followed by nephrology, Dr. Aguillon     -Hypertension     -Hyperlipidemia     -Diabetes on insulin therapy    Past Medical History:   Diagnosis Date   • Acute exacerbation of CHF (congestive heart failure) (HCC) 6/25/2018   • CAD (coronary artery disease)    • CHF (congestive heart failure) (HCC)    • Diabetes (HCC)    • Dilated cardiomyopathy (HCC)    • Hyperlipidemia    •  Hypertension    • NYHA class 3 acute on chronic systolic heart failure (HCC) 6/25/2018   • Severe aortic stenosis 6/25/2018     Past Surgical History:   Procedure Laterality Date   • TRANSCATHETER AORTIC VALVE REPLACEMENT  7/2/2018    Procedure: TRANSCATHETER AORTIC VALVE REPLACEMENT;  Surgeon: Markus Fuller M.D.;  Location: SURGERY [facility];  Service: Cardiac   • DAYANNA  7/2/2018    Procedure: DAYANNA;  Surgeon: Markus Fuller M.D.;  Location: SURGERY [facility];  Service: Cardiac   • AORTIC VALVE REPLACEMENT      S/P TAVR    • CARDIAC CATH     • TONSILLECTOMY       Family History   Problem Relation Age of Onset   • Lung Disease Mother         COPD   • Heart Disease Father         valve disease   • Heart Failure Father    • Hypertension Father    • Hyperlipidemia Father    • Cancer Maternal Grandmother         BRAIN TUMOR   • Stroke Maternal Grandfather    • Other Paternal Grandmother         INTESTINAL PROBLEM   • Stroke Paternal Grandfather    • Prostate cancer Brother    • Other Brother         ortho     Social History     Social History   • Marital status:      Spouse name: N/A   • Number of children: N/A   • Years of education: N/A     Occupational History   • Not on file.     Social History Main Topics   • Smoking status: Former Smoker     Packs/day: 1.00     Years: 22.00     Quit date: 1/1/1989   • Smokeless tobacco: Never Used      Comment: stop cigar in 2015   • Alcohol use 0.6 oz/week     1 Glasses of wine per week      Comment: OCCASIONALLY   • Drug use: No   • Sexual activity: Yes     Partners: Female     Other Topics Concern   • Not on file     Social History Narrative   • No narrative on file     Allergies   Allergen Reactions   • Sulfa Drugs Unspecified     Kidney Stones     Outpatient Encounter Prescriptions as of 10/2/2018   Medication Sig Dispense Refill   • furosemide (LASIX) 20 MG Tab Take 1-2 Tabs by mouth every day. (Patient taking differently: Take 20 mg by mouth every day.) 60  "Tab 11   • lisinopril (PRINIVIL) 20 MG Tab Take 1 Tab by mouth every day. (Patient taking differently: Take 10 mg by mouth every day.) 30 Tab 11   • carvedilol (COREG) 25 MG Tab Take 1 Tab by mouth 2 times a day, with meals. 60 Tab 11   • Cholecalciferol (HM VITAMIN D3) 4000 units Cap Take 1 Cap by mouth every day.     • clopidogrel (PLAVIX) 75 MG Tab Take 1 Tab by mouth every day. 30 Tab 11   • Exenatide ER 2 MG Pen-injector Inject 2 mg as instructed every 7 days. 12 Each 0   • insulin glargine (LANTUS) 100 UNIT/ML Solution Inject 12 Units as instructed every evening. Pt had 8 units night prior to surgery     • atorvastatin (LIPITOR) 40 MG Tab Take 40 mg by mouth every evening.     • aspirin EC (ECOTRIN) 81 MG Tablet Delayed Response Take 81 mg by mouth every day.       No facility-administered encounter medications on file as of 10/2/2018.      Review of Systems   Constitutional: Positive for malaise/fatigue (at times). Negative for fever.   Respiratory: Negative for cough and shortness of breath.    Cardiovascular: Negative for chest pain, palpitations, orthopnea, claudication, leg swelling and PND.   Gastrointestinal: Negative for abdominal pain.   Musculoskeletal: Negative for myalgias.   Neurological: Negative for dizziness.   All other systems reviewed and are negative.       Objective:   /80 (BP Location: Right arm, Patient Position: Sitting, BP Cuff Size: Adult)   Pulse 72   Ht 1.727 m (5' 8\")   Wt 86.5 kg (190 lb 9.6 oz)   SpO2 97%   BMI 28.98 kg/m²     Physical Exam   Constitutional: He is oriented to person, place, and time. He appears well-developed and well-nourished.   HENT:   Head: Normocephalic and atraumatic.   Eyes: Pupils are equal, round, and reactive to light. EOM are normal.   Neck: Normal range of motion. Neck supple. No JVD present.   Cardiovascular: Normal rate, regular rhythm and normal heart sounds.    Pulmonary/Chest: Effort normal and breath sounds normal. No respiratory " distress. He has no wheezes. He has no rales.   Abdominal: Soft. Bowel sounds are normal.   Musculoskeletal: He exhibits no edema.   Neurological: He is alert and oriented to person, place, and time.   Skin: Skin is warm and dry.   Psychiatric: He has a normal mood and affect. His behavior is normal.   Vitals reviewed.    Lab Results   Component Value Date/Time    CHOLSTRLTOT 108 05/10/2018 06:41 AM    LDL 45 05/10/2018 06:41 AM    HDL 41 05/10/2018 06:41 AM    TRIGLYCERIDE 109 05/10/2018 06:41 AM       Lab Results   Component Value Date/Time    SODIUM 135 09/07/2018 01:34 PM    POTASSIUM 5.0 09/07/2018 01:34 PM    CHLORIDE 108 09/07/2018 01:34 PM    CO2 23 09/07/2018 01:34 PM    GLUCOSE 140 (H) 09/07/2018 01:34 PM    BUN 52 (H) 09/07/2018 01:34 PM    CREATININE 1.76 (H) 09/07/2018 01:34 PM     Lab Results   Component Value Date/Time    ALKPHOSPHAT 81 07/05/2018 06:39 AM    ASTSGOT 15 07/05/2018 06:39 AM    ALTSGPT 19 07/05/2018 06:39 AM    TBILIRUBIN 0.5 07/05/2018 06:39 AM      Stress test 10/30/2008  IMPRESSION:     1. DIPYRIDAMOLE TOLERATED WELL.    2. MYOCARDIAL PERFUSION IMAGES REVEAL MILD REVERSIBLE ISCHEMIA IN THE   ANTEROSEPTAL WALL APEX TO LATE MID SLICES.    3. EJECTION FRACTION AND WALL MOTION AS ABOVE.     4. RAW DATA AS NOTED ABOVE.      Transthoracic Echo Report 6/1/2018  Moderately reduced left ventricular systolic function.  Left ventricular ejection fraction is visually estimated to be 35%.  Global hypokinesis with regional variation with severe hypokinesis of the anteroapical and septal walls.  Moderate aortic stenosis; severely of aortic stenosis Unable to estimate pulmonary artery pressure due to an inadequate tricuspid regurgitant jet.  Moderate mitral regurgitation.  probably underestimated due to low ejection fraction.  No prior study is available for comparison.     Dobutamine Stress Echo Report 6/22/2018  Abnormal resting echo with mild dyskinesis of the LV apex and EF 30%. Resting aortic  valve peak and mean gradients 36 / 20.    Stress images reveal increased contractility of basal to mid anterior and inferior walls, with more   pronounced apical dyskinesis.  Peak / mean aortic valve gradients on 15 mcg dobutamine are 72 /43    Abnormal dobutamine stress echo demonstrating significant increase in aortic valve gradients with dobutamine infusion. LV dysfunction at rest and stress. Worsening apical dyskinesis with stress   suggesting ischemia.     Transesophageal Echo Report 7/2/2018  Calcific aortic stenosis. PG= 24 peak and 15 mean. VTI 55 but EF=20%   Annular area 5.75 via Q lab.  Post .   #29 TAVR . Trace small velocity jet along AML. peak 4 and 3 mean   gradiente. Well functioning valve.   EF low 20% no change after TAVR.   Mild MR.       Transthoracic Echo Report 7/3/2018  Left ventricular ejection fraction is visually estimated to be 35%.  Restrictive diastolic function.  Moderate mitral regurgitation.  Known TAVR aortic valve that is functioning normally with normal transvalvular gradients     Heart Cath 6/28/2018  FINDINGS:  Pre-intervention the left anterior descending artery had severely calcified, eccentric 80% stenosis in the mid portion right around the first diagonal branch followed by another focal 99% calcified stenosis.    Antegrade flow was slightly sluggish with MACK-2.  Beyond that, there is another calcified moderate stenosis in the range of 50%.       After intervention, there was about 10-20% residual stenosis in the stented segments in the mid left anterior descending artery with brisk MACK-3 flow.     PLAN:  Bed rest for 6 hours.  Hydration.  Follow renal function closely.    Dual antiplatelet therapy.  Aggressive risk factor modification.     Transthoracic Echo Report 7/30/2018  Prior echo done 07/03/18. Compared to the images of the study done - there has been mild improvement of LVSF.   Mildly reduced left ventricular systolic function.  Left ventricular ejection fraction  is visually estimated to be 45%.  Grade I diastolic dysfunction.  Known TAVR aortic valve that is functioning normally with normal transvalvular gradients.  Vmax is 1.9  m/s. Transvalvular gradients are - Peak: 15 mmHg,  Mean: 8 mmHg.   Mild paravalvular leak is noted.  Unable to estimate pulmonary artery pressure due to an inadequate tricuspid regurgitant jet.     Assessment:     1. ACC/AHA stage C systolic heart failure (HCC)  BASIC METABOLIC PANEL   2. Heart failure, NYHA class 1 ([facility])  BASIC METABOLIC PANEL   3. Dilated cardiomyopathy ([facility])     4. Coronary artery disease due to calcified coronary lesion: Status post atherectomy and stenting of the LAD in June 2018.  Nonobstructive disease in the RCA and circumflex.     5. Stented coronary artery     6. Severe aortic stenosis     7. S/P TAVR (transcatheter aortic valve replacement)     8. CHF due to valvular disease ([facility])     9. Essential hypertension     10. Stage 3 chronic kidney disease ([facility])     11. SOB (shortness of breath)         Medical Decision Making:  Today's Assessment / Status / Plan:   1. HFrEF, Stage C, Class 1, LVEF 45% improved from 35%: Based on physical examination findings, patient is euvolemic. No JVD, lungs are clear to auscultation, no pitting edema in bilateral lower extremities, no ascites.  -Increase lisinopril back up to 20 mg daily  -Try taking furosemide 20 mg daily PRN  -Continue carvedilol 25 mg twice a day  -No spironolactone due to CKD  -Repeat BMP in 3 months  -Reinforced s/sx of worsening heart failure with patient and weight monitoring. Pt verbalizes understanding. Pt to call office or RTC if present.     2.  CAD, s/p PCI and  arthrectomy/HLD: Last LDL was 45 on 5/10/2018  -Continue aspirin 81 mg daily  -Continue clopidogrel 75 mg daily (DAPT x 1 year)  -Continue atorvastatin 40 mg daily    3.  Aortic stenosis, status post TAVR:  -Continue follow-up with valve clinic  -Continue aspirin 81 mg daily  -Continue cardiac  rehab    4.  Hypertension: Slightly elevated in office today, patient does report BP has been 120s-130s in cardiac rehab  -Recommendations per above    5.  CKD: Kidney function stable  -Continue follow-up with nephrology  -Repeat BMP in 3 months    FU in clinic in 3-4 months with Dr. Estrada. Sooner if needed.    Patient verbalizes understanding and agrees with the plan of care.     Collaborating MD: Kristopher Baker MD

## 2018-10-03 ENCOUNTER — NON-PROVIDER VISIT (OUTPATIENT)
Dept: CARDIOLOGY | Facility: MEDICAL CENTER | Age: 68
End: 2018-10-03
Payer: COMMERCIAL

## 2018-10-03 DIAGNOSIS — Z95.2 S/P TAVR (TRANSCATHETER AORTIC VALVE REPLACEMENT): ICD-10-CM

## 2018-10-03 LAB — EKG IMPRESSION: NORMAL

## 2018-10-03 PROCEDURE — G0423 INTENS CARDIAC REHAB NO EXER: HCPCS | Mod: 59 | Performed by: FAMILY MEDICINE

## 2018-10-03 PROCEDURE — G0422 INTENS CARDIAC REHAB W/EXERC: HCPCS | Performed by: FAMILY MEDICINE

## 2018-10-05 ENCOUNTER — NON-PROVIDER VISIT (OUTPATIENT)
Dept: CARDIOLOGY | Facility: MEDICAL CENTER | Age: 68
End: 2018-10-05
Payer: COMMERCIAL

## 2018-10-05 DIAGNOSIS — Z95.2 S/P TAVR (TRANSCATHETER AORTIC VALVE REPLACEMENT): ICD-10-CM

## 2018-10-05 LAB — EKG IMPRESSION: NORMAL

## 2018-10-05 PROCEDURE — G0422 INTENS CARDIAC REHAB W/EXERC: HCPCS | Performed by: FAMILY MEDICINE

## 2018-10-05 PROCEDURE — G0423 INTENS CARDIAC REHAB NO EXER: HCPCS | Mod: 59 | Performed by: FAMILY MEDICINE

## 2018-10-05 NOTE — PROGRESS NOTES
Ashish Wiley attended: cooking school from  3:30-4:30pm.   Today  prepared Pumpkin Ice Cream and Apple Crisp.    Patient received handouts and nutrition information regarding the specific recipes.

## 2018-10-08 ENCOUNTER — NON-PROVIDER VISIT (OUTPATIENT)
Dept: CARDIOLOGY | Facility: MEDICAL CENTER | Age: 68
End: 2018-10-08
Payer: COMMERCIAL

## 2018-10-08 DIAGNOSIS — Z95.2 S/P TAVR (TRANSCATHETER AORTIC VALVE REPLACEMENT): ICD-10-CM

## 2018-10-08 LAB — EKG IMPRESSION: NORMAL

## 2018-10-08 PROCEDURE — G0423 INTENS CARDIAC REHAB NO EXER: HCPCS | Mod: 59 | Performed by: FAMILY MEDICINE

## 2018-10-08 PROCEDURE — G0422 INTENS CARDIAC REHAB W/EXERC: HCPCS | Performed by: FAMILY MEDICINE

## 2018-10-10 ENCOUNTER — NON-PROVIDER VISIT (OUTPATIENT)
Dept: CARDIOLOGY | Facility: MEDICAL CENTER | Age: 68
End: 2018-10-10
Payer: COMMERCIAL

## 2018-10-10 DIAGNOSIS — Z95.2 S/P TAVR (TRANSCATHETER AORTIC VALVE REPLACEMENT): ICD-10-CM

## 2018-10-10 LAB — EKG IMPRESSION: NORMAL

## 2018-10-10 PROCEDURE — G0423 INTENS CARDIAC REHAB NO EXER: HCPCS | Mod: 59 | Performed by: FAMILY MEDICINE

## 2018-10-10 PROCEDURE — G0422 INTENS CARDIAC REHAB W/EXERC: HCPCS | Performed by: FAMILY MEDICINE

## 2018-10-11 NOTE — PROGRESS NOTES
Ashish Wiley attended: Exercise Workshop from 2:30-3:30 pm.       The topic was: Biomechanics.     Patient received handouts regarding the specific exercise information.

## 2018-10-12 ENCOUNTER — NON-PROVIDER VISIT (OUTPATIENT)
Dept: CARDIOLOGY | Facility: MEDICAL CENTER | Age: 68
End: 2018-10-12
Payer: COMMERCIAL

## 2018-10-12 DIAGNOSIS — Z95.2 S/P TAVR (TRANSCATHETER AORTIC VALVE REPLACEMENT): ICD-10-CM

## 2018-10-12 LAB — EKG IMPRESSION: NORMAL

## 2018-10-12 PROCEDURE — G0422 INTENS CARDIAC REHAB W/EXERC: HCPCS | Performed by: FAMILY MEDICINE

## 2018-10-12 PROCEDURE — G0423 INTENS CARDIAC REHAB NO EXER: HCPCS | Mod: 59 | Performed by: FAMILY MEDICINE

## 2018-10-12 NOTE — PROGRESS NOTES
Ashish Wiley attended: cooking school from  3:30-4:30pm.   Today  prepared Vegetarian Chili.    Patient received handouts and nutrition information regarding the specific recipes.

## 2018-10-19 ENCOUNTER — NON-PROVIDER VISIT (OUTPATIENT)
Dept: CARDIOLOGY | Facility: MEDICAL CENTER | Age: 68
End: 2018-10-19

## 2018-10-19 NOTE — PROGRESS NOTES
Patient not present for ITP today, so ITP review will be postponed until ITP performed.  Sincerely,  Warren Terrell MD

## 2018-10-22 ENCOUNTER — NON-PROVIDER VISIT (OUTPATIENT)
Dept: CARDIOLOGY | Facility: MEDICAL CENTER | Age: 68
End: 2018-10-22
Payer: COMMERCIAL

## 2018-10-22 DIAGNOSIS — Z95.2 S/P TAVR (TRANSCATHETER AORTIC VALVE REPLACEMENT): ICD-10-CM

## 2018-10-22 LAB — EKG IMPRESSION: NORMAL

## 2018-10-22 PROCEDURE — G0422 INTENS CARDIAC REHAB W/EXERC: HCPCS | Performed by: FAMILY MEDICINE

## 2018-10-22 PROCEDURE — G0423 INTENS CARDIAC REHAB NO EXER: HCPCS | Mod: 59 | Performed by: FAMILY MEDICINE

## 2018-10-22 NOTE — PROGRESS NOTES
Ashish Wiley attended: Exercise Workshop from 3:30-4:30pm.       The topic was: Resistance.     Patient received handouts regarding the specific exercise information.

## 2018-10-22 NOTE — PROGRESS NOTES
Cardiac Rehab Individual Treatment Plan Assessment: Other (see comment) (120 Day) 10/22/18 Session #     29   MRN: [id number]   Allergies: Sulfa drugs   Patient Name: Ashish Wiley : 1950 Risk Stratification: High    Diagnoses:   1. S/P TAVR (transcatheter aortic valve replacement)     Age: 68 y.o. Physician: Venus Claros M.D.    Date of Event: 18 Specialist: Alian   Risk Factors:  Hypertension, Hyperlipidemia, Diabetes, Family History, Age, Male > 45   Exercise Nutrition Education Psychosocial   Stages of change Stages of change Stages of change Stages of change   Preparation Preparation Preparation Preparation   Fitness Test Lipids Learning Barriers Outcome Survey Tools   DIST:  (assessed pre- and post-exercise)  Available: No new Learning Barriers: Vision, Ready to Learn (Readers only) FP QOL Overall Score:  (assessed pre- and post-exercise)   Max HR:  (assessed pre- and post-exercise) Date: 05/10/18 Family Support PHQ-9:  (assessed pre- and post-exercise)   RPE:  (assessed pre- and post-exercise) Total: 108 mg/dL Yes Nutrition Screen:  (assessed pre- and post-exercise)   SPO2:  (assessed pre- and post-exercise) Tri mg/dL Lives: Spouse Intervention   MET:  (assessed pre- and post-exercise) HDL: 41 mg/dL Tobacco Use Behavioral Health Consult: Yes   EF= 35 LDL: 45 mg/dL History   Smoking Status   • Former Smoker   • Packs/day: 1.00   • Years: 22.00   • Quit date: 1989   Smokeless Tobacco   • Never Used     Comment: stop cigar in       Physician Referral: No   Ambulatory Status Diabetes Smoking Intervention Identifies Stressors: Yes   Fall Risk Assessed: Yes Diabetes: Yes Smoking Cessation Referral: N/A Drug Intervention: N/A   Exercise Ambulatory Status Assist Devices: None HbA1C: 7.4 % Date: 05/10/18 Ind. Education / Counseling: N/A Education   Exercise Prescription Monitors BS at Home: No Tobacco Adjunct: N/A Psychosocial Education: Coping Techniques, Positive Support System,  "S/S Depression   Mode: Treadmill, NuStep, UBE, Airdyne   Frequency: n/a (Pt. is on insulin and is not currently checking blood sugars) Random BS: 177 (10/22/18 AccuCheck) Education Intervention Target Goal   Frequency:  3 days/week Weight Management Healthy Heart Education: Class Schedule Given, Cooking School Attended, Dietician One-on-One Meeting Attended, Medications Reviewed, Patient Education Binder Provided, Risk Factors Discussed, Videos Viewed per Donita, Workshops Attended Assess presence or absence of depression using a valid screening: Yes   Duration:  30 minutes Weight: 85.3 kg (188 lb) Target Goal Use Stress Management: Yes   Intensity:  11-13 RPE Height: 172.7 cm (5' 7.99\") Complete Tobacco Cessation: Complete Tobacco Cessation: N/A Adverse Events: No   METS:  Other (3.0-5.5) BMI (Calculated): 28.59 Educate / Review and have understanding of cardiac disease prevention: Educate / Review and have understanding of cardiac disease prevention: Yes Unexpected Events: No   Progression:  ^ increments of 1-3 to THR/RPE as tolerated Goal weight: 170 Medication Compliance: Yes    THR: THR: 20-30 BPM ^Resting HR History   Alcohol Use   • 0.6 oz/week   • 1 Glasses of wine per week     Comment: OCCASIONALLY         Angina with Exercise?  Angina with Exercise: No      Resistance Training?  Resistance Training: Yes      Hypertension      Diagnosed with HTN: Yes Intervention      Resting BP: 141/60 Dietician Consult/Class: Yes      Peak Ex BP:  (assessed pre- and post-exercise) Nurse/Patient Discussion: Yes     Intervention Diabetes Ed Referral: Yes (Pt. has been referred to [facility] Diabetes [facility])     Home Exercise:  Yes Discuss Maintenance /Wt Loss: Yes     Mode: Walk, Other (golf 2-4 days per week) Attend Cooking School: Yes     Duration: 45-90 minutes Dietary Goal: >=58 Rate Your Plate     Frequency: 7 days active Education     Education Nutrition Education: S&S Hypo/Hyper glycemia, Relate Diabetes to CAD, " "Healthy Eating, Sodium Reduction     Equipment Orientation, Exercise Safety, S/S to Report, RPE Scale, Warm Up / Cool Down, Physically Active Target Goal     Target Goal LDL-C < 100 if trig. > 200:  N/A       Start Individual Exercise Rx Yes LDL-C < 70 for high risk patient:  Yes       BP < 140/90 or < 130/80 if DM or CKD Yes Non HDL-C Should be < 130:  Yes       Aerobic activity 30 + min / day 5 days / wk Yes HbA1C < 7%: Yes         BMI < 25: Yes       Notes: Exercise: Current: Ashish has been on a trip for 9 days. While he was away he walked daily and golfed three days. In [facility] he is completing two 15-minute aerobic sessions and an intermediate weight routine using 9lb dumbbells for 2 sets of 12 repetitions. His METS on the treadmill are 5.2 keeping a THR 20-30 beats above rest. Goal: To lose the 3lbs he gained on his trip. Progress: Ashish has progressed well with his exercise and is motivated in this discipline. Nutrition: Current: Ashish stated, \"I ate too much and drank too much\" while on vacation. He stated many of the foods were fried and high in sodium. He has 1+ pitting edema in ankles bilaterally today. He still tried to incorporate many vegetables and salads but he didn't feel he had as much control over the selection and preparation. His consumption of wine was greater. He reports that now that he is home he is eating his oatmeal for breakfast, salad for lunch, and vegetables and protein for dinner. Goal: Ashish will watch his portions of food and alcohol to lose the weight he gained. Progress: Ashish is conscious of heart-healthy choices. Stress: Current: Ashish did not feel stressed traveling and does not feel it is a concerning factor in his life. Goal: Ashish does not state any specific goals around stress. Progress: He attends the Healthy Mindset classes.                             "

## 2018-10-24 ENCOUNTER — NON-PROVIDER VISIT (OUTPATIENT)
Dept: CARDIOLOGY | Facility: MEDICAL CENTER | Age: 68
End: 2018-10-24
Payer: COMMERCIAL

## 2018-10-24 DIAGNOSIS — Z95.2 S/P TAVR (TRANSCATHETER AORTIC VALVE REPLACEMENT): ICD-10-CM

## 2018-10-24 LAB — EKG IMPRESSION: NORMAL

## 2018-10-24 PROCEDURE — G0422 INTENS CARDIAC REHAB W/EXERC: HCPCS | Performed by: FAMILY MEDICINE

## 2018-10-24 PROCEDURE — G0423 INTENS CARDIAC REHAB NO EXER: HCPCS | Mod: 59 | Performed by: FAMILY MEDICINE

## 2018-10-29 ENCOUNTER — NON-PROVIDER VISIT (OUTPATIENT)
Dept: CARDIOLOGY | Facility: MEDICAL CENTER | Age: 68
End: 2018-10-29
Payer: COMMERCIAL

## 2018-10-29 DIAGNOSIS — Z95.2 S/P TAVR (TRANSCATHETER AORTIC VALVE REPLACEMENT): ICD-10-CM

## 2018-10-29 LAB — EKG IMPRESSION: NORMAL

## 2018-10-29 PROCEDURE — G0423 INTENS CARDIAC REHAB NO EXER: HCPCS | Mod: 59 | Performed by: FAMILY MEDICINE

## 2018-10-29 PROCEDURE — G0422 INTENS CARDIAC REHAB W/EXERC: HCPCS | Performed by: FAMILY MEDICINE

## 2018-10-29 NOTE — PROGRESS NOTES
Ashish Wiley attended the following workshop from 2:30-3:30pm.   Workshop Title: Fueling a Heathy Body.       Lecture was attended and patient questions addressed. The patient will continue workshops and nutrition education.

## 2018-10-30 ENCOUNTER — NON-PROVIDER VISIT (OUTPATIENT)
Dept: CARDIOLOGY | Facility: MEDICAL CENTER | Age: 68
End: 2018-10-30
Payer: COMMERCIAL

## 2018-10-30 DIAGNOSIS — Z95.2 S/P TAVR (TRANSCATHETER AORTIC VALVE REPLACEMENT): ICD-10-CM

## 2018-10-30 LAB — EKG IMPRESSION: NORMAL

## 2018-10-30 PROCEDURE — G0423 INTENS CARDIAC REHAB NO EXER: HCPCS | Mod: 59 | Performed by: FAMILY MEDICINE

## 2018-10-30 PROCEDURE — G0422 INTENS CARDIAC REHAB W/EXERC: HCPCS | Performed by: FAMILY MEDICINE

## 2018-10-31 ENCOUNTER — NON-PROVIDER VISIT (OUTPATIENT)
Dept: CARDIOLOGY | Facility: MEDICAL CENTER | Age: 68
End: 2018-10-31
Payer: COMMERCIAL

## 2018-10-31 DIAGNOSIS — Z95.2 S/P TAVR (TRANSCATHETER AORTIC VALVE REPLACEMENT): ICD-10-CM

## 2018-10-31 LAB — EKG IMPRESSION: NORMAL

## 2018-10-31 PROCEDURE — G0422 INTENS CARDIAC REHAB W/EXERC: HCPCS | Performed by: FAMILY MEDICINE

## 2018-10-31 PROCEDURE — G0423 INTENS CARDIAC REHAB NO EXER: HCPCS | Mod: 59 | Performed by: FAMILY MEDICINE

## 2018-11-02 ENCOUNTER — NON-PROVIDER VISIT (OUTPATIENT)
Dept: CARDIOLOGY | Facility: MEDICAL CENTER | Age: 68
End: 2018-11-02
Payer: COMMERCIAL

## 2018-11-02 DIAGNOSIS — Z95.2 S/P TAVR (TRANSCATHETER AORTIC VALVE REPLACEMENT): ICD-10-CM

## 2018-11-02 LAB — EKG IMPRESSION: NORMAL

## 2018-11-02 PROCEDURE — G0422 INTENS CARDIAC REHAB W/EXERC: HCPCS | Performed by: FAMILY MEDICINE

## 2018-11-02 PROCEDURE — G0423 INTENS CARDIAC REHAB NO EXER: HCPCS | Mod: 59 | Performed by: FAMILY MEDICINE

## 2018-11-02 NOTE — PROGRESS NOTES
Ashish Wiley attended: cooking school from  3:30-4:30pm.   Today  prepared Borscht.    Patient received handouts and nutrition information regarding the specific recipes.

## 2018-11-05 ENCOUNTER — NON-PROVIDER VISIT (OUTPATIENT)
Dept: CARDIOLOGY | Facility: MEDICAL CENTER | Age: 68
End: 2018-11-05
Payer: COMMERCIAL

## 2018-11-05 DIAGNOSIS — Z95.2 S/P TAVR (TRANSCATHETER AORTIC VALVE REPLACEMENT): ICD-10-CM

## 2018-11-05 LAB — EKG IMPRESSION: NORMAL

## 2018-11-05 PROCEDURE — G0423 INTENS CARDIAC REHAB NO EXER: HCPCS | Mod: 59 | Performed by: FAMILY MEDICINE

## 2018-11-05 PROCEDURE — G0422 INTENS CARDIAC REHAB W/EXERC: HCPCS | Performed by: FAMILY MEDICINE

## 2018-11-06 NOTE — PROGRESS NOTES
Ashish Wiley attended: Healthy Mindset Workshop from 2:30-3:30pm.       The topic was: Managing Moods and Relationships.     Patient received handouts regarding the specific exercise information.

## 2018-11-07 ENCOUNTER — NON-PROVIDER VISIT (OUTPATIENT)
Dept: CARDIOLOGY | Facility: MEDICAL CENTER | Age: 68
End: 2018-11-07
Payer: COMMERCIAL

## 2018-11-07 DIAGNOSIS — Z95.2 S/P TAVR (TRANSCATHETER AORTIC VALVE REPLACEMENT): ICD-10-CM

## 2018-11-07 LAB — EKG IMPRESSION: NORMAL

## 2018-11-07 PROCEDURE — G0423 INTENS CARDIAC REHAB NO EXER: HCPCS | Mod: 59 | Performed by: FAMILY MEDICINE

## 2018-11-07 PROCEDURE — G0422 INTENS CARDIAC REHAB W/EXERC: HCPCS | Performed by: FAMILY MEDICINE

## 2018-11-07 ASSESSMENT — PATIENT HEALTH QUESTIONNAIRE - PHQ9
4. FEELING TIRED OR HAVING LITTLE ENERGY: 0
8. MOVING OR SPEAKING SO SLOWLY THAT OTHER PEOPLE COULD HAVE NOTICED. OR THE OPPOSITE, BEING SO FIGETY OR RESTLESS THAT YOU HAVE BEEN MOVING AROUND A LOT MORE THAN USUAL: 0
3. TROUBLE FALLING OR STAYING ASLEEP OR SLEEPING TOO MUCH: 0
SUM OF ALL RESPONSES TO PHQ QUESTIONS 1-9: 1
1. LITTLE INTEREST OR PLEASURE IN DOING THINGS: 0
5. POOR APPETITE OR OVEREATING: 1
7. TROUBLE CONCENTRATING ON THINGS, SUCH AS READING THE NEWSPAPER OR WATCHING TELEVISION: 0
6. FEELING BAD ABOUT YOURSELF - OR THAT YOU ARE A FAILURE OR HAVE LET YOURSELF OR YOUR FAMILY DOWN: 0
9. THOUGHTS THAT YOU WOULD BE BETTER OFF DEAD, OR OF HURTING YOURSELF: 0
SUM OF ALL RESPONSES TO PHQ9 QUESTIONS 1 AND 2: 0
2. FEELING DOWN, DEPRESSED, IRRITABLE, OR HOPELESS: 0
SUM OF ALL RESPONSES TO PHQ QUESTIONS 1-9: 1

## 2018-11-07 NOTE — PROGRESS NOTES
Cardiac Rehab Individual Treatment Plan Assessment: Discharge 18 Session #     36   MRN: [id number]   Allergies: Sulfa drugs   Patient Name: Ashish Wiley : 1950 Risk Stratification: High    Diagnoses:   1. S/P TAVR (transcatheter aortic valve replacement)     Age: 68 y.o. Physician: Venus Claros M.D.    Date of Event: 18 Specialist: Alina   Risk Factors:  Hypertension, Hyperlipidemia, Diabetes, Family History, Age, Male > 45   Exercise Nutrition Education Psychosocial   Stages of change Stages of change Stages of change Stages of change   Action Action Action Action   Fitness Test Lipids Learning Barriers Outcome Survey Tools   DIST: 1840  Available: No new Learning Barriers: Vision, Ready to Learn (Readers only) FP QOL Overall Score: 24.82   Max HR: 112 Date: 05/10/18 Family Support PHQ-9: 1   RPE: 12 Total: 108 mg/dL Yes Nutrition Screen: 61 %   SPO2: 91 % Tri mg/dL Lives: Spouse Intervention   MET: 4.05 HDL: 41 mg/dL Tobacco Use Behavioral Health Consult: Yes   EF= 35 LDL: 45 mg/dL History   Smoking Status   • Former Smoker   • Packs/day: 1.00   • Years: 22.00   • Quit date: 1989   Smokeless Tobacco   • Never Used     Comment: stop cigar in       Physician Referral: No   Ambulatory Status Diabetes Smoking Intervention Identifies Stressors: Yes   Fall Risk Assessed: Yes Diabetes: Yes Smoking Cessation Referral: N/A Drug Intervention: N/A   Exercise Ambulatory Status Assist Devices: None HbA1C: 7.4 % Date: 05/10/18 Ind. Education / Counseling: N/A Education   Exercise Prescription Monitors BS at Home: No Tobacco Adjunct: N/A Psychosocial Education: Coping Techniques, Positive Support System, S/S Depression   Mode:  (Graduate to home exercise. )   Frequency: n/a (Pt. is on insulin and is not currently checking blood sugars) Random BS: 177 (10/22/18 AccuCheck) Education Intervention Target Goal   Frequency:    Weight Management Healthy Heart Education: Class Schedule Given,  "Cooking School Attended, Dietician One-on-One Meeting Attended, Medications Reviewed, Patient Education Binder Provided, Risk Factors Discussed, Videos Viewed per Donita, Workshops Attended Assess presence or absence of depression using a valid screening: Yes   Duration:    Weight: 83 kg (183 lb) Target Goal Use Stress Management: Yes   Intensity:    Height: 172.7 cm (5' 7.99\") Complete Tobacco Cessation: Complete Tobacco Cessation: N/A Adverse Events: No   METS:  5.0-7.0 BMI (Calculated): 27.83 Educate / Review and have understanding of cardiac disease prevention: Educate / Review and have understanding of cardiac disease prevention: Yes Unexpected Events: No   Progression:    Goal weight: 170 Medication Compliance: Yes    THR: THR: Other (see comment) (103-113) History   Alcohol Use   • 0.6 oz/week   • 1 Glasses of wine per week     Comment: OCCASIONALLY         Angina with Exercise?  Angina with Exercise: No      Resistance Training?  Resistance Training: Yes      Hypertension      Diagnosed with HTN: Yes Intervention      Resting BP: 122/61 Dietician Consult/Class: Yes      Peak Ex BP: 147/98 Nurse/Patient Discussion: Yes     Intervention Diabetes Ed Referral: Yes (Pt. has been referred to [facility] Diabetes Program)     Home Exercise:  Yes Discuss Maintenance /Wt Loss: Yes     Mode: Walk, Other (golf 2-4 days per week) Attend Cooking School: Yes     Duration: 45-90 minutes Dietary Goal: >=58 Rate Your Plate     Frequency: 7 days active Education     Education Nutrition Education: S&S Hypo/Hyper glycemia, Relate Diabetes to CAD, Healthy Eating, Sodium Reduction     Equipment Orientation, Exercise Safety, S/S to Report, RPE Scale, Warm Up / Cool Down, Physically Active Target Goal     Target Goal LDL-C < 100 if trig. > 200:  N/A       Start Individual Exercise Rx Yes LDL-C < 70 for high risk patient:  Yes       BP < 140/90 or < 130/80 if DM or CKD Yes Non HDL-C Should be < 130:  Yes       Aerobic activity 30 + min " / day 5 days / wk Yes HbA1C < 7%: Yes         BMI < 25: Yes       Notes: Ashish is graduating today.  Exercise current: Ashish met his goal of 30 minutes of aerobic exercise in [facility] and is going 2 sets of 12 in his weighs routine.  He hikes, walks about 3 miles a day and also golfs.  Goals: he feels he has met his goals in [facility] and is active 7 days a week.  His plan is to join phase 3 on Tuesday and Thursday and will remain active 7 days a week post-graduation.   Nutrition current: He is eating oatmeal daily, salads at lunch, a lean protein and vegetables for dinner.  He has also cut down on drinking wine and has about 1 bottle a week.  Goals: to continue to develop his culinary skills and use recipes that are low fat and low sodium.  Stress current: he says that he does not feel stress because golfing and other exercise helps him to have a happy positive lifestyle.  Goals: to enjoy his activities and keep spending time with his grandkids that keep him happy.

## 2018-12-12 ENCOUNTER — OFFICE VISIT (OUTPATIENT)
Dept: URGENT CARE | Facility: CLINIC | Age: 68
End: 2018-12-12
Payer: COMMERCIAL

## 2018-12-12 ENCOUNTER — TELEPHONE (OUTPATIENT)
Dept: MEDICAL GROUP | Age: 68
End: 2018-12-12

## 2018-12-12 ENCOUNTER — APPOINTMENT (OUTPATIENT)
Dept: RADIOLOGY | Facility: IMAGING CENTER | Age: 68
End: 2018-12-12
Attending: PHYSICIAN ASSISTANT
Payer: COMMERCIAL

## 2018-12-12 VITALS
TEMPERATURE: 98.1 F | HEIGHT: 68 IN | RESPIRATION RATE: 20 BRPM | DIASTOLIC BLOOD PRESSURE: 80 MMHG | HEART RATE: 80 BPM | OXYGEN SATURATION: 97 % | WEIGHT: 190 LBS | SYSTOLIC BLOOD PRESSURE: 122 MMHG | BODY MASS INDEX: 28.79 KG/M2

## 2018-12-12 DIAGNOSIS — R07.81 RIB PAIN ON RIGHT SIDE: ICD-10-CM

## 2018-12-12 DIAGNOSIS — S20.211A CONTUSION OF RIB ON RIGHT SIDE, INITIAL ENCOUNTER: ICD-10-CM

## 2018-12-12 PROCEDURE — 99213 OFFICE O/P EST LOW 20 MIN: CPT | Performed by: PHYSICIAN ASSISTANT

## 2018-12-12 PROCEDURE — 71101 X-RAY EXAM UNILAT RIBS/CHEST: CPT | Mod: 26,RT | Performed by: PHYSICIAN ASSISTANT

## 2018-12-12 ASSESSMENT — ENCOUNTER SYMPTOMS
FEVER: 0
COUGH: 0
SENSORY CHANGE: 0
ABDOMINAL PAIN: 0
HEADACHES: 0
TINGLING: 0
SHORTNESS OF BREATH: 0
CHILLS: 0
SPUTUM PRODUCTION: 0
FALLS: 1
MYALGIAS: 0
VOMITING: 0
NAUSEA: 0
WHEEZING: 0
PALPITATIONS: 0

## 2018-12-12 NOTE — PROGRESS NOTES
Subjective:      Ashish Wiley is a 68 y.o. male who presents with Breathing Problem (X 10 days trouble breathing at night time .) and Fall (Fell down anhit right side of rib cage .)            Patient is here with right-sided rib pain that started 10 days ago after falling. Patient landed with fist onto right chest wall. He reports pain at night while sleeping and inability to sleep. He has not been taking anything for the pain because he does not want to take anything that will interact with medication he is on. He denies any shortness of breath, hemoptysis, cough, fever, or productive cough. He has had no chest pain at rest or lower extremity swelling. His pain is worse with movement or certain position changes at night.       Past Medical History:   Diagnosis Date   • Acute exacerbation of CHF (congestive heart failure) ([facility]) 6/25/2018   • CAD (coronary artery disease)    • CHF (congestive heart failure) ([facility])    • Diabetes ([facility])    • Dilated cardiomyopathy ([facility])    • Hyperlipidemia    • Hypertension    • NYHA class 3 acute on chronic systolic heart failure ([facility]) 6/25/2018   • Severe aortic stenosis 6/25/2018       Past Surgical History:   Procedure Laterality Date   • TRANSCATHETER AORTIC VALVE REPLACEMENT  7/2/2018    Procedure: TRANSCATHETER AORTIC VALVE REPLACEMENT;  Surgeon: Markus Fuller M.D.;  Location: SURGERY [facility];  Service: Cardiac   • DAYANNA  7/2/2018    Procedure: DAYANNA;  Surgeon: Markus Fuller M.D.;  Location: SURGERY [facility];  Service: Cardiac   • AORTIC VALVE REPLACEMENT      S/P TAVR    • CARDIAC CATH     • TONSILLECTOMY         Family History   Problem Relation Age of Onset   • Lung Disease Mother         COPD   • Heart Disease Father         valve disease   • Heart Failure Father    • Hypertension Father    • Hyperlipidemia Father    • Cancer Maternal Grandmother         BRAIN TUMOR   • Stroke Maternal Grandfather    • Other Paternal Grandmother         INTESTINAL PROBLEM  "  • Stroke Paternal Grandfather    • Prostate cancer Brother    • Other Brother         ortho       Allergies   Allergen Reactions   • Sulfa Drugs Unspecified     Kidney Stones       Medications, Allergies, and current problem list reviewed today in Epic    Review of Systems   Constitutional: Negative for chills, fever and malaise/fatigue.   Respiratory: Negative for cough, sputum production, shortness of breath and wheezing.    Cardiovascular: Negative for chest pain, palpitations and leg swelling.   Gastrointestinal: Negative for abdominal pain, nausea and vomiting.   Musculoskeletal: Positive for falls. Negative for myalgias.        Right anterior chest wall pain and right rib pain    Skin: Negative for rash.   Neurological: Negative for tingling, sensory change and headaches.     All other systems reviewed and are negative.        Objective:     /80 (BP Location: Left arm, Patient Position: Sitting, BP Cuff Size: Adult)   Pulse 80   Temp 36.7 °C (98.1 °F) (Temporal)   Resp 20   Ht 1.727 m (5' 8\")   Wt 86.2 kg (190 lb)   SpO2 97%   BMI 28.89 kg/m²      Physical Exam   Constitutional: He is oriented to person, place, and time. He appears well-developed and well-nourished. No distress.   HENT:   Head: Normocephalic and atraumatic.   Eyes: Conjunctivae are normal.   Neck: No JVD present.   Cardiovascular: Normal rate, regular rhythm and normal heart sounds.  Exam reveals no gallop and no friction rub.    No murmur heard.  Pulmonary/Chest: Effort normal and breath sounds normal. No respiratory distress. He has no decreased breath sounds. He has no wheezes. He has no rhonchi. He has no rales. He exhibits tenderness and bony tenderness. He exhibits no crepitus, no edema, no deformity, no swelling and no retraction.       Musculoskeletal: He exhibits no edema or tenderness.   Neurological: He is alert and oriented to person, place, and time.   Skin: Skin is warm and dry. No rash noted.   Psychiatric: He has " a normal mood and affect. His behavior is normal. Judgment and thought content normal.           12/12/2018 8:48 AM    HISTORY/REASON FOR EXAM:  Pain Following Trauma.      TECHNIQUE/EXAM DESCRIPTION AND NUMBER OF VIEWS:  7 images of the BILATERAL ribs and chest.    COMPARISON: Chest radiograph 7/3/2018    FINDINGS:  No fractures or acute bony changes are noted.  There is no evidence of a hemo or pneumothorax.  THere is TAVR hardware. There is atherosclerotic calcification in the aortic arch. Cardiac mediastinal silhouette is unchanged.  Lungs are clear.   Impression       Unremarkable rib series.            Assessment/Plan:     1. Contusion of rib on right side, initial encounter  [id number] (WITH 1-VIEW CXR) RIGHT       - [id number] (WITH 1-VIEW CXR) RIGHT; Future      - encouraged OTC Tylenol and low dose benadryl prn for sleeping.  Recommend deep breathing exercises.      Differential diagnoses, Supportive care, and indications for immediate follow-up discussed with patient.   Instructed to return to clinic or nearest emergency department for any change in condition, further concerns, or worsening of symptoms.    The patient demonstrated a good understanding and agreed with the treatment plan.    Deanna Garrido P.A.-C.

## 2018-12-13 NOTE — TELEPHONE ENCOUNTER
Patient walk into clinic this morning and reported that he has chest pain.  Patient did not have office visit appointment with me today.  Patient has coronary artery disease status post stent in LAD, systolic heart failure and recent aortic valve replacement.  I advised patient to go to ER for further evaluation given his extensive heart disease.    I called patient this afternoon for follow-up, but he did not answer the phone.  I also tried to contact his wife who is the emergency , but she did not answer the phone as well.    When I reviewed his chart, I saw that he was seen in urgent care today and was diagnosed as contusion of right side of the ribs.  He has follow-up appointment with me on 12/24/18.    Please call patient to ask if he is doing better or not.  Please advise him to do fasting blood test 5 days before his next follow-up appointment on 12/24/18.  So I can review his blood test results with him in office visit and adjust the dose of medications if we need to do so.    Thanks!  Venus Claros M.D.

## 2018-12-14 NOTE — TELEPHONE ENCOUNTER
Phone Number Called: [phone] (home)       Message: .I left a voicemail for patient to call back to inform them of message below.      Left Message for patient to call back: yes

## 2018-12-18 ENCOUNTER — HOSPITAL ENCOUNTER (OUTPATIENT)
Dept: LAB | Facility: MEDICAL CENTER | Age: 68
End: 2018-12-18
Attending: NURSE PRACTITIONER
Payer: COMMERCIAL

## 2018-12-18 DIAGNOSIS — I50.20 ACC/AHA STAGE C SYSTOLIC HEART FAILURE (HCC): ICD-10-CM

## 2018-12-18 DIAGNOSIS — I50.9 HEART FAILURE, NYHA CLASS 1 (HCC): ICD-10-CM

## 2018-12-18 LAB
ANION GAP SERPL CALC-SCNC: 6 MMOL/L (ref 0–11.9)
BUN SERPL-MCNC: 43 MG/DL (ref 8–22)
CALCIUM SERPL-MCNC: 9.8 MG/DL (ref 8.5–10.5)
CHLORIDE SERPL-SCNC: 105 MMOL/L (ref 96–112)
CO2 SERPL-SCNC: 24 MMOL/L (ref 20–33)
CREAT SERPL-MCNC: 1.62 MG/DL (ref 0.5–1.4)
GLUCOSE SERPL-MCNC: 262 MG/DL (ref 65–99)
POTASSIUM SERPL-SCNC: 4.6 MMOL/L (ref 3.6–5.5)
SODIUM SERPL-SCNC: 135 MMOL/L (ref 135–145)

## 2018-12-18 PROCEDURE — 80048 BASIC METABOLIC PNL TOTAL CA: CPT

## 2018-12-18 PROCEDURE — 36415 COLL VENOUS BLD VENIPUNCTURE: CPT

## 2018-12-24 ENCOUNTER — OFFICE VISIT (OUTPATIENT)
Dept: MEDICAL GROUP | Age: 68
End: 2018-12-24
Payer: COMMERCIAL

## 2018-12-24 VITALS
TEMPERATURE: 97.9 F | HEIGHT: 68 IN | SYSTOLIC BLOOD PRESSURE: 140 MMHG | WEIGHT: 192.6 LBS | BODY MASS INDEX: 29.19 KG/M2 | HEART RATE: 80 BPM | DIASTOLIC BLOOD PRESSURE: 78 MMHG | OXYGEN SATURATION: 96 %

## 2018-12-24 DIAGNOSIS — E11.69 DYSLIPIDEMIA ASSOCIATED WITH TYPE 2 DIABETES MELLITUS (HCC): ICD-10-CM

## 2018-12-24 DIAGNOSIS — E11.29 TYPE 2 DIABETES MELLITUS WITH MICROALBUMINURIA, WITH LONG-TERM CURRENT USE OF INSULIN (HCC): ICD-10-CM

## 2018-12-24 DIAGNOSIS — I50.20 ACC/AHA STAGE C SYSTOLIC HEART FAILURE (HCC): ICD-10-CM

## 2018-12-24 DIAGNOSIS — I10 ESSENTIAL HYPERTENSION: ICD-10-CM

## 2018-12-24 DIAGNOSIS — Z79.4 TYPE 2 DIABETES MELLITUS WITH MICROALBUMINURIA, WITH LONG-TERM CURRENT USE OF INSULIN (HCC): ICD-10-CM

## 2018-12-24 DIAGNOSIS — Z23 NEED FOR SHINGLES VACCINE: ICD-10-CM

## 2018-12-24 DIAGNOSIS — E78.5 DYSLIPIDEMIA ASSOCIATED WITH TYPE 2 DIABETES MELLITUS (HCC): ICD-10-CM

## 2018-12-24 DIAGNOSIS — R80.9 TYPE 2 DIABETES MELLITUS WITH MICROALBUMINURIA, WITH LONG-TERM CURRENT USE OF INSULIN (HCC): ICD-10-CM

## 2018-12-24 PROCEDURE — 99214 OFFICE O/P EST MOD 30 MIN: CPT | Performed by: INTERNAL MEDICINE

## 2018-12-24 RX ORDER — FUROSEMIDE 20 MG/1
20 TABLET ORAL DAILY
Qty: 90 TAB | Refills: 3 | Status: SHIPPED | OUTPATIENT
Start: 2018-12-24 | End: 2019-03-07

## 2018-12-24 RX ORDER — LISINOPRIL 20 MG/1
20 TABLET ORAL DAILY
Qty: 90 TAB | Refills: 3 | Status: SHIPPED | OUTPATIENT
Start: 2018-12-24 | End: 2019-03-07

## 2018-12-24 NOTE — ASSESSMENT & PLAN NOTE
Patient is taking carvedilol 25 mg twice daily and lisinopril 20 mg daily.  He is taking Lasix 20 mg twice a day and he stopped taking Lasix last Wednesday.  He wants to refill his Lasix.  His blood pressure is stable with carvedilol and lisinopril.  He does not have signs of fluid overload currently.  I discussed with patient to continue carvedilol and lisinopril with the same dose. He will take Lasix 20 mg 3 times a week only and he may increase the dose of Lasix if he has water retaining.  Patient agreed with the plan.

## 2018-12-24 NOTE — ASSESSMENT & PLAN NOTE
Patient stated that his shortness of breath completely resolved and he did not have any leg swelling anymore.  He used to take Lasix 40 mg twice a day and he gradually cut down Lasix.  He stopped taking Lasix last Wednesday.  According to his cardiologist's office visit note on 10/2/18, he was advised to take Lasix 20 mg daily as needed for water retaining.  He requested to refill Lasix today.  I refilled Lasix with 20 mg daily and advised patient to take Lasix 20 mg 1 tablet 3 times a week only.  He may increase the dose of Lasix to daily if he has any water retaining or worsening shortness of breath.  He understands and agrees with the plan.

## 2018-12-24 NOTE — ASSESSMENT & PLAN NOTE
Patient is taking atorvastatin 40 mg every evening.  He denies side effects from taking it.  I ordered lipid panel with other blood tests on 9/10/18 but he has not done it yet.  Patient will have blood tests and follow-up in clinic in 4-6-week to review his labs and adjust medication as needed.

## 2018-12-24 NOTE — ASSESSMENT & PLAN NOTE
Patient stated that his A1c at endocrinologist clinic in [facility] 3 weeks ago was 7.0.  He is using exenatide 2 mg once a week and Lantus insulin 8 unit once a day.  He stated that he cut down insulin Lantus to 8 units once a day as he is exercising regularly and losing weight.  He denies hypoglycemia at home.  He was due for eye exam and he requested to refer to [facility] eye associate.

## 2018-12-24 NOTE — PROGRESS NOTES
Subjective:   Ashish Wiley is a 68 y.o. male here today for evaluation and management of:      Type 2 diabetes mellitus with microalbuminuria, with long-term current use of insulin ([facility])  Patient stated that his A1c at endocrinologist clinic in [facility] 3 weeks ago was 7.0.  He is using exenatide 2 mg once a week and Lantus insulin 8 unit once a day.  He stated that he cut down insulin Lantus to 8 units once a day as he is exercising regularly and losing weight.  He denies hypoglycemia at home.  He was due for eye exam and he requested to refer to [facility] eye associate.    Essential hypertension  Patient is taking carvedilol 25 mg twice daily and lisinopril 20 mg daily.  He is taking Lasix 20 mg twice a day and he stopped taking Lasix last Wednesday.  He wants to refill his Lasix.  His blood pressure is stable with carvedilol and lisinopril.  He does not have signs of fluid overload currently.  I discussed with patient to continue carvedilol and lisinopril with the same dose. He will take Lasix 20 mg 3 times a week only and he may increase the dose of Lasix if he has water retaining.  Patient agreed with the plan.    Dyslipidemia associated with type 2 diabetes mellitus ([facility])  Patient is taking atorvastatin 40 mg every evening.  He denies side effects from taking it.  I ordered lipid panel with other blood tests on 9/10/18 but he has not done it yet.  Patient will have blood tests and follow-up in clinic in 4-6-week to review his labs and adjust medication as needed.    ACC/AHA stage C systolic heart failure (HCC)  Patient stated that his shortness of breath completely resolved and he did not have any leg swelling anymore.  He used to take Lasix 40 mg twice a day and he gradually cut down Lasix.  He stopped taking Lasix last Wednesday.  According to his cardiologist's office visit note on 10/2/18, he was advised to take Lasix 20 mg daily as needed for water retaining.  He requested to refill Lasix today.  I  refilled Lasix with 20 mg daily and advised patient to take Lasix 20 mg 1 tablet 3 times a week only.  He may increase the dose of Lasix to daily if he has any water retaining or worsening shortness of breath.  He understands and agrees with the plan.         Current medicines (including changes today)  Current Outpatient Prescriptions   Medication Sig Dispense Refill   • Multiple Vitamins-Minerals (MULTIVITAMIN PO) Take  by mouth.     • lisinopril (PRINIVIL) 20 MG Tab Take 1 Tab by mouth every day. 90 Tab 3   • furosemide (LASIX) 20 MG Tab Take 1 Tab by mouth every day. 90 Tab 3   • Zoster Vac Recomb Adjuvanted (SHINGRIX) 50 MCG Recon Susp 0.5 mL by Intramuscular route Once for 1 dose. 0.5 mL 0   • carvedilol (COREG) 25 MG Tab Take 1 Tab by mouth 2 times a day, with meals. 60 Tab 11   • Cholecalciferol (HM VITAMIN D3) 4000 units Cap Take 1 Cap by mouth every day.     • clopidogrel (PLAVIX) 75 MG Tab Take 1 Tab by mouth every day. 30 Tab 11   • Exenatide ER 2 MG Pen-injector Inject 2 mg as instructed every 7 days. 12 Each 0   • insulin glargine (LANTUS) 100 UNIT/ML Solution Inject 8 Units as instructed every evening. Pt had 8 units night prior to surgery     • atorvastatin (LIPITOR) 40 MG Tab Take 40 mg by mouth every evening.     • aspirin EC (ECOTRIN) 81 MG Tablet Delayed Response Take 81 mg by mouth every day.       No current facility-administered medications for this visit.      He  has a past medical history of Acute exacerbation of CHF (congestive heart failure) ([facility]) (6/25/2018); CAD (coronary artery disease); CHF (congestive heart failure) ([facility]); Diabetes ([facility]); Dilated cardiomyopathy ([facility]); Hyperlipidemia; Hypertension; NYHA class 3 acute on chronic systolic heart failure ([facility]) (6/25/2018); and Severe aortic stenosis (6/25/2018).    ROS   No chest pain, no shortness of breath, no abdominal pain       Objective:     Blood pressure 140/78, pulse 80, temperature 36.6 °C (97.9 °F), temperature source Temporal,  "height 1.727 m (5' 8\"), weight 87.4 kg (192 lb 9.6 oz), SpO2 96 %. Body mass index is 29.28 kg/m².   Physical Exam:  General: Alert, oriented and no acute distress.  Eye contact is good, speech goal directed, affect calm  HEENT: conjunctiva non-injected, sclera non-icteric.  Oral mucous membranes pink and moist with no lesions.  Pinna normal.   Lungs: Normal respiratory effort, clear to auscultation bilaterally with good excursion.  CV: regular rate and rhythm. No murmurs.   Abdomen: soft, non distended, nontender, Bowel sound normal.  Ext: no edema, color normal, vascularity normal, temperature normal    Diabetes foot exam:  Monofilament testing with a 10 gram force: sensation: decreased bilaterally  Visual Inspection: Feet without maceration, ulcers, or fissures.  Pedal pulses: intact bilaterally        Assessment and Plan:   The following treatment plan was discussed     1. ACC/AHA stage C systolic heart failure (HCC)  - Ejection fraction improved to 45%.  Patient does not have evidence of fluid retaining on exam today.  Patient will cut down Lasix to 20 mg daily as needed.  We discussed to try Lasix 20 mg 1 tablet 3 times a week only.  He can increase the dose of Lasix to 20 mg daily if he has water retaining or worsening shortness of breath.  Patient is advised to closely monitor his blood pressure and pulse at home.  Discussed to follow low-sodium and heart healthy diet.  - furosemide (LASIX) 20 MG Tab; Take 1 Tab by mouth every day.  Dispense: 90 Tab; Refill: 3    2. Type 2 diabetes mellitus with microalbuminuria, with long-term current use of insulin ([facility])  - Well-controlled.  Continue current regimens as managed by endocrinologist.  Patient reported that his A1c was 7 and endocrinologist office 3 weeks ago.  - Counseled to comply with medication and diet.   - Counseled signs and symptoms of hypoglycemia and management of hypoglycemia.   - Recommend to have retinal eye exam once a year.  - Advised to check " both feet daily.  - Diabetic Monofilament Lower Extremity Exam  - REFERRAL TO OPHTHALMOLOGY    3. Essential hypertension  - Well-controlled. Continue current regimens, carvedilol 25 mg twice daily and lisinopril 20 mg daily. Recheck lab 1-2 weeks before next follow up visit.  - Reviewed the risks and benefits as well as potential side effects of medications with patient.  - Discussed to eat low-sodium diet and encouraged to do regular physical exercise.  - Recommend to monitor blood pressure and heart rate at home.    4. Dyslipidemia associated with type 2 diabetes mellitus (HCC)  - Well-controlled. Continue current regimens, atorvastatin 40 mg every evening. Recheck lab 1-2 weeks before next follow up visit.  - Reviewed the risks and benefits of treatment and potential side effects of medication.  - Advised to eat low fat, low carbohydrate and high fiber diet as well as do cardio physical exercise regularly.    5. Need for shingles vaccine  - Shingrix vaccine was given today after reviewing risks and benefits as well as side effects of vaccine.  - Zoster Vac Recomb Adjuvanted (SHINGRIX) 50 MCG Recon Susp; 0.5 mL by Intramuscular route Once for 1 dose.  Dispense: 0.5 mL; Refill: 0      Followup: Return in about 3 months (around 3/24/2019), or if symptoms worsen or fail to improve, for Hypertension, Hyperlipidemia, Diabetes, CKD, Lab review.      Please note that this dictation was created using voice recognition software. I have made every reasonable attempt to correct obvious errors, but I expect that there may have unintended errors in text, spelling, punctuation, or grammar that I did not discover.

## 2019-01-02 ENCOUNTER — PATIENT MESSAGE (OUTPATIENT)
Dept: HEALTH INFORMATION MANAGEMENT | Facility: OTHER | Age: 69
End: 2019-01-02

## 2019-01-09 ENCOUNTER — HOSPITAL ENCOUNTER (OUTPATIENT)
Dept: LAB | Facility: MEDICAL CENTER | Age: 69
End: 2019-01-09
Attending: INTERNAL MEDICINE
Payer: COMMERCIAL

## 2019-01-09 DIAGNOSIS — Z79.4 TYPE 2 DIABETES MELLITUS WITH MICROALBUMINURIA, WITH LONG-TERM CURRENT USE OF INSULIN (HCC): ICD-10-CM

## 2019-01-09 DIAGNOSIS — E55.9 VITAMIN D INSUFFICIENCY: ICD-10-CM

## 2019-01-09 DIAGNOSIS — Z11.59 NEED FOR HEPATITIS C SCREENING TEST: ICD-10-CM

## 2019-01-09 DIAGNOSIS — R80.9 TYPE 2 DIABETES MELLITUS WITH MICROALBUMINURIA, WITH LONG-TERM CURRENT USE OF INSULIN (HCC): ICD-10-CM

## 2019-01-09 DIAGNOSIS — Z11.59 NEED FOR HEPATITIS B SCREENING TEST: ICD-10-CM

## 2019-01-09 DIAGNOSIS — E78.5 DYSLIPIDEMIA ASSOCIATED WITH TYPE 2 DIABETES MELLITUS (HCC): ICD-10-CM

## 2019-01-09 DIAGNOSIS — E11.69 DYSLIPIDEMIA ASSOCIATED WITH TYPE 2 DIABETES MELLITUS (HCC): ICD-10-CM

## 2019-01-09 DIAGNOSIS — E11.29 TYPE 2 DIABETES MELLITUS WITH MICROALBUMINURIA, WITH LONG-TERM CURRENT USE OF INSULIN (HCC): ICD-10-CM

## 2019-01-09 DIAGNOSIS — I10 ESSENTIAL HYPERTENSION: ICD-10-CM

## 2019-01-09 LAB
25(OH)D3 SERPL-MCNC: 25 NG/ML (ref 30–100)
ALBUMIN SERPL BCP-MCNC: 3.5 G/DL (ref 3.2–4.9)
ALBUMIN/GLOB SERPL: 1.2 G/DL
ALP SERPL-CCNC: 102 U/L (ref 30–99)
ALT SERPL-CCNC: 19 U/L (ref 2–50)
ANION GAP SERPL CALC-SCNC: 7 MMOL/L (ref 0–11.9)
AST SERPL-CCNC: 22 U/L (ref 12–45)
BASOPHILS # BLD AUTO: 0.9 % (ref 0–1.8)
BASOPHILS # BLD: 0.05 K/UL (ref 0–0.12)
BILIRUB SERPL-MCNC: 0.4 MG/DL (ref 0.1–1.5)
BUN SERPL-MCNC: 40 MG/DL (ref 8–22)
CALCIUM SERPL-MCNC: 9.3 MG/DL (ref 8.5–10.5)
CHLORIDE SERPL-SCNC: 107 MMOL/L (ref 96–112)
CHOLEST SERPL-MCNC: 158 MG/DL (ref 100–199)
CO2 SERPL-SCNC: 24 MMOL/L (ref 20–33)
CREAT SERPL-MCNC: 1.77 MG/DL (ref 0.5–1.4)
CREAT UR-MCNC: 109.5 MG/DL
EOSINOPHIL # BLD AUTO: 0.1 K/UL (ref 0–0.51)
EOSINOPHIL NFR BLD: 1.7 % (ref 0–6.9)
ERYTHROCYTE [DISTWIDTH] IN BLOOD BY AUTOMATED COUNT: 44.5 FL (ref 35.9–50)
FASTING STATUS PATIENT QL REPORTED: NORMAL
GLOBULIN SER CALC-MCNC: 3 G/DL (ref 1.9–3.5)
GLUCOSE SERPL-MCNC: 167 MG/DL (ref 65–99)
HBV SURFACE AG SER QL: NEGATIVE
HCT VFR BLD AUTO: 35.7 % (ref 42–52)
HCV AB SER QL: NEGATIVE
HDLC SERPL-MCNC: 46 MG/DL
HGB BLD-MCNC: 12 G/DL (ref 14–18)
IMM GRANULOCYTES # BLD AUTO: 0.01 K/UL (ref 0–0.11)
IMM GRANULOCYTES NFR BLD AUTO: 0.2 % (ref 0–0.9)
LDLC SERPL CALC-MCNC: 63 MG/DL
LYMPHOCYTES # BLD AUTO: 0.87 K/UL (ref 1–4.8)
LYMPHOCYTES NFR BLD: 14.9 % (ref 22–41)
MCH RBC QN AUTO: 32.5 PG (ref 27–33)
MCHC RBC AUTO-ENTMCNC: 33.6 G/DL (ref 33.7–35.3)
MCV RBC AUTO: 96.7 FL (ref 81.4–97.8)
MICROALBUMIN UR-MCNC: 155.7 MG/DL
MICROALBUMIN/CREAT UR: 1422 MG/G (ref 0–30)
MONOCYTES # BLD AUTO: 0.67 K/UL (ref 0–0.85)
MONOCYTES NFR BLD AUTO: 11.5 % (ref 0–13.4)
NEUTROPHILS # BLD AUTO: 4.13 K/UL (ref 1.82–7.42)
NEUTROPHILS NFR BLD: 70.8 % (ref 44–72)
NRBC # BLD AUTO: 0 K/UL
NRBC BLD-RTO: 0 /100 WBC
PLATELET # BLD AUTO: 206 K/UL (ref 164–446)
PMV BLD AUTO: 10.7 FL (ref 9–12.9)
POTASSIUM SERPL-SCNC: 4.9 MMOL/L (ref 3.6–5.5)
PROT SERPL-MCNC: 6.5 G/DL (ref 6–8.2)
RBC # BLD AUTO: 3.69 M/UL (ref 4.7–6.1)
SODIUM SERPL-SCNC: 138 MMOL/L (ref 135–145)
TRIGL SERPL-MCNC: 246 MG/DL (ref 0–149)
WBC # BLD AUTO: 5.8 K/UL (ref 4.8–10.8)

## 2019-01-09 PROCEDURE — 86803 HEPATITIS C AB TEST: CPT

## 2019-01-09 PROCEDURE — 87340 HEPATITIS B SURFACE AG IA: CPT

## 2019-01-09 PROCEDURE — 82043 UR ALBUMIN QUANTITATIVE: CPT

## 2019-01-09 PROCEDURE — 82570 ASSAY OF URINE CREATININE: CPT

## 2019-01-09 PROCEDURE — 82306 VITAMIN D 25 HYDROXY: CPT

## 2019-01-09 PROCEDURE — 85025 COMPLETE CBC W/AUTO DIFF WBC: CPT

## 2019-01-09 PROCEDURE — 36415 COLL VENOUS BLD VENIPUNCTURE: CPT

## 2019-01-09 PROCEDURE — 80061 LIPID PANEL: CPT

## 2019-01-09 PROCEDURE — 80053 COMPREHEN METABOLIC PANEL: CPT

## 2019-01-14 ENCOUNTER — OFFICE VISIT (OUTPATIENT)
Dept: MEDICAL GROUP | Age: 69
End: 2019-01-14
Payer: COMMERCIAL

## 2019-01-14 VITALS
WEIGHT: 190.2 LBS | HEART RATE: 85 BPM | HEIGHT: 68 IN | SYSTOLIC BLOOD PRESSURE: 118 MMHG | DIASTOLIC BLOOD PRESSURE: 66 MMHG | BODY MASS INDEX: 28.82 KG/M2 | TEMPERATURE: 97.2 F | OXYGEN SATURATION: 97 %

## 2019-01-14 DIAGNOSIS — Z23 NEED FOR HEPATITIS B VACCINATION: ICD-10-CM

## 2019-01-14 DIAGNOSIS — N18.30 STAGE 3 CHRONIC KIDNEY DISEASE (HCC): ICD-10-CM

## 2019-01-14 DIAGNOSIS — E78.5 DYSLIPIDEMIA ASSOCIATED WITH TYPE 2 DIABETES MELLITUS (HCC): ICD-10-CM

## 2019-01-14 DIAGNOSIS — I10 ESSENTIAL HYPERTENSION: ICD-10-CM

## 2019-01-14 DIAGNOSIS — E11.69 DYSLIPIDEMIA ASSOCIATED WITH TYPE 2 DIABETES MELLITUS (HCC): ICD-10-CM

## 2019-01-14 DIAGNOSIS — E55.9 VITAMIN D INSUFFICIENCY: ICD-10-CM

## 2019-01-14 DIAGNOSIS — E11.21 MACROALBUMINURIC DIABETIC NEPHROPATHY (HCC): ICD-10-CM

## 2019-01-14 DIAGNOSIS — Z79.4 TYPE 2 DIABETES MELLITUS WITH MICROALBUMINURIA, WITH LONG-TERM CURRENT USE OF INSULIN (HCC): ICD-10-CM

## 2019-01-14 DIAGNOSIS — E11.29 TYPE 2 DIABETES MELLITUS WITH MICROALBUMINURIA, WITH LONG-TERM CURRENT USE OF INSULIN (HCC): ICD-10-CM

## 2019-01-14 DIAGNOSIS — R80.9 TYPE 2 DIABETES MELLITUS WITH MICROALBUMINURIA, WITH LONG-TERM CURRENT USE OF INSULIN (HCC): ICD-10-CM

## 2019-01-14 PROBLEM — R60.0 BILATERAL LEG EDEMA: Status: RESOLVED | Noted: 2018-05-23 | Resolved: 2019-01-14

## 2019-01-14 PROCEDURE — 90471 IMMUNIZATION ADMIN: CPT | Performed by: INTERNAL MEDICINE

## 2019-01-14 PROCEDURE — 90746 HEPB VACCINE 3 DOSE ADULT IM: CPT | Performed by: INTERNAL MEDICINE

## 2019-01-14 PROCEDURE — 99214 OFFICE O/P EST MOD 30 MIN: CPT | Mod: 25 | Performed by: INTERNAL MEDICINE

## 2019-01-14 ASSESSMENT — PATIENT HEALTH QUESTIONNAIRE - PHQ9: CLINICAL INTERPRETATION OF PHQ2 SCORE: 0

## 2019-01-14 NOTE — ASSESSMENT & PLAN NOTE
Patient still has macroalbuminuria.  He is taking lisinopril 20 mg once a day only.  I discussed with patient to try to increase the dose of lisinopril to 40 mg, but he wanted to continue with low-dose lisinopril currently.  He will try to control his blood sugar and blood pressure first.

## 2019-01-14 NOTE — ASSESSMENT & PLAN NOTE
He is taking atorvastatin 40 mg every evening.  His LDL at goal but triglycerides is not well controlled yet.  He has normal liver enzymes.  I discussed blood test result with him in clinic today.    Results for AMAYA CLAIRE (MRN [id number]) as of 1/14/2019 09:09   Ref. Range 1/9/2019 06:34   Cholesterol,Tot Latest Ref Range: 100 - 199 mg/dL 158   Triglycerides Latest Ref Range: 0 - 149 mg/dL 246 (H)   HDL Latest Ref Range: >=40 mg/dL 46   LDL Latest Ref Range: <100 mg/dL 63

## 2019-01-14 NOTE — ASSESSMENT & PLAN NOTE
Patient is noncompliance with his diet.  He stated that he drinks a lot of whiskey during the holiday and stated that it has a lot of sweets and sugar inside alcohol.  His fasting blood sugar increased to 167 on 1/9/19.  His A1c was done in endocrinologist clinic in [facility] in November 2018 was 7.0.  Patient wants to follow with endocrinologist for his A1c check.  He is currently taking Lantus insulin 8 units nightly and exenatide 2 mg once a week.  Patient stated that he already cut down sweets and alcohol and he believes his blood sugar will improve.  He denied hypoglycemia at home.  He is due for retinal exam.  Patient has appointment with ophthalmologist this coming Friday.

## 2019-01-14 NOTE — PROGRESS NOTES
Subjective:   Ashish Wiley is a 68 y.o. male here today for evaluation and management of:      Vitamin D insufficiency  Patient reported that he only takes vitamin D 2000 units daily.  His repeated vitamin D level was 25 on 1/9/19.  He is advised to increase the dose of vitamin D to 4000 units daily.    Type 2 diabetes mellitus with microalbuminuria, with long-term current use of insulin (HCC)  Patient is noncompliance with his diet.  He stated that he drinks a lot of whiskey during the holiday and stated that it has a lot of sweets and sugar inside alcohol.  His fasting blood sugar increased to 167 on 1/9/19.  His A1c was done in endocrinologist clinic in [facility] in November 2018 was 7.0.  Patient wants to follow with endocrinologist for his A1c check.  He is currently taking Lantus insulin 8 units nightly and exenatide 2 mg once a week.  Patient stated that he already cut down sweets and alcohol and he believes his blood sugar will improve.  He denied hypoglycemia at home.  He is due for retinal exam.  Patient has appointment with ophthalmologist this coming Friday.    Macroalbuminuric diabetic nephropathy (HCC)  Patient still has macroalbuminuria.  He is taking lisinopril 20 mg once a day only.  I discussed with patient to try to increase the dose of lisinopril to 40 mg, but he wanted to continue with low-dose lisinopril currently.  He will try to control his blood sugar and blood pressure first.    Essential hypertension  He is taking carvedilol 25 mg twice daily, Lasix 20 mg daily and lisinopril 20 mg daily.  His blood pressure is stable and well controlled with current regimens.  He does not have any signs of fluid overload on exam.    Dyslipidemia associated with type 2 diabetes mellitus (HCC)  He is taking atorvastatin 40 mg every evening.  His LDL at goal but triglycerides is not well controlled yet.  He has normal liver enzymes.  I discussed blood test result with him in clinic today.    Results for  ALETHEA AMAYA MATT (MRN [id number]) as of 1/14/2019 09:09   Ref. Range 1/9/2019 06:34   Cholesterol,Tot Latest Ref Range: 100 - 199 mg/dL 158   Triglycerides Latest Ref Range: 0 - 149 mg/dL 246 (H)   HDL Latest Ref Range: >=40 mg/dL 46   LDL Latest Ref Range: <100 mg/dL 63       Stage 3 chronic kidney disease  His kidney function showed slightly improvement on creatinine.  He still has chronic kidney disease stage III.  He follows with Dr. Kirkland, nephrologist.  I discussed with him to control his diabetes and blood pressure to prevent progression of kidney disease.  He declined to increase the dose of lisinopril to 40 mg daily.  He wanted to keep current regimens and will try to control his blood sugar first.  He stated that he has appointment with endocrinologist in a few weeks.  He will repeat A1c in endocrinologist clinic.         Current medicines (including changes today)  Current Outpatient Prescriptions   Medication Sig Dispense Refill   • Multiple Vitamins-Minerals (MULTIVITAMIN PO) Take  by mouth.     • lisinopril (PRINIVIL) 20 MG Tab Take 1 Tab by mouth every day. 90 Tab 3   • furosemide (LASIX) 20 MG Tab Take 1 Tab by mouth every day. 90 Tab 3   • carvedilol (COREG) 25 MG Tab Take 1 Tab by mouth 2 times a day, with meals. 60 Tab 11   • Cholecalciferol (HM VITAMIN D3) 4000 units Cap Take 1 Cap by mouth every day.     • clopidogrel (PLAVIX) 75 MG Tab Take 1 Tab by mouth every day. 30 Tab 11   • Exenatide ER 2 MG Pen-injector Inject 2 mg as instructed every 7 days. 12 Each 0   • insulin glargine (LANTUS) 100 UNIT/ML Solution Inject 8 Units as instructed every evening. Pt had 8 units night prior to surgery     • atorvastatin (LIPITOR) 40 MG Tab Take 40 mg by mouth every evening.     • aspirin EC (ECOTRIN) 81 MG Tablet Delayed Response Take 81 mg by mouth every day.       No current facility-administered medications for this visit.      He  has a past medical history of Acute exacerbation of CHF (congestive  "heart failure) ([facility]) (6/25/2018); CAD (coronary artery disease); CHF (congestive heart failure) ([facility]); Diabetes ([facility]); Dilated cardiomyopathy ([facility]); Hyperlipidemia; Hypertension; NYHA class 3 acute on chronic systolic heart failure ([facility]) (6/25/2018); and Severe aortic stenosis (6/25/2018).    ROS   No chest pain, no shortness of breath, no abdominal pain       Objective:     Blood pressure 118/66, pulse 85, temperature 36.2 °C (97.2 °F), temperature source Temporal, height 1.727 m (5' 8\"), weight 86.3 kg (190 lb 3.2 oz), SpO2 97 %. Body mass index is 28.92 kg/m².   Physical Exam:  General: Alert, oriented and no acute distress.  Eye contact is good, speech goal directed, affect calm  HEENT: conjunctiva non-injected, sclera non-icteric.  Oral mucous membranes pink and moist with no lesions.  Pinna normal.   Lungs: Normal respiratory effort, clear to auscultation bilaterally with good excursion.  CV: regular rate and rhythm. No murmurs.  Abdomen: soft, non distended, nontender, Bowel sound normal.  Ext: no edema, color normal, vascularity normal, temperature normal      Assessment and Plan:   The following treatment plan was discussed     1. Type 2 diabetes mellitus with microalbuminuria, with long-term current use of insulin ([facility])  - Not well controlled due to noncompliance with diet.  Patient states that he has follow-up appointment with endocrinologist and he will have A1c in endocrinologist clinic.  - He will continue Lantus insulin 8 units every night and exenatide 2 mg once a week as instructed by endocrinologist in [place].  - Counseled to comply with medication and diet.   - Counseled signs and symptoms of hypoglycemia and management of hypoglycemia.   - Recommend to have retinal eye exam once a year.  - Advised to check both feet daily.  - CBC WITH DIFFERENTIAL; Future  - COMP METABOLIC PANEL; Future    2. Macroalbuminuric diabetic nephropathy (HCC)  - Not well controlled yet.  Patient will continue lisinopril 20 " mg daily.  He does not want to increase the dose of lisinopril yet.  Patient is advised to control well on diabetes and blood pressure.  - MICROALBUMIN CREAT RATIO URINE; Future    3. Essential hypertension  - Well-controlled. Continue current regimens as discussed in HPI. Recheck lab 1-2 weeks before next follow up visit.  - Reviewed the risks and benefits as well as potential side effects of medications with patient.  - Discussed to eat low-sodium diet and encouraged to do regular physical exercise.  - Recommend to monitor blood pressure and heart rate at home.  - CBC WITH DIFFERENTIAL; Future  - COMP METABOLIC PANEL; Future    4. Dyslipidemia associated with type 2 diabetes mellitus (HCC)  - LDL is well-controlled. Continue current regimens, atorvastatin 40 mg every evening.  Recheck lab 1-2 weeks before next follow up visit.  - Reviewed the risks and benefits of treatment and potential side effects of medication.  - Advised to eat low fat, low carbohydrate and high fiber diet as well as do cardio physical exercise regularly.  - COMP METABOLIC PANEL; Future  - Lipid Profile; Future    5. Vitamin D insufficiency  - Not well controlled.  Increased the dose of vitamin D from 2000 units daily to 4000 units daily.  Recheck lab in 3 months.  - VITAMIN D,25 HYDROXY; Future    6. Stage 3 chronic kidney disease (HCC)  - Creatinine improved.  Continue lisinopril 20 mg daily.  Advised to control diabetes and blood pressure very well.  Recommend to avoid NSAIDs and continue offending agent.  Advised to follow-up with nephrologist as scheduled.    7. Need for hepatitis B vaccination  - Hepatitis B vaccine was given today after reviewing risks and benefits as well as side effects of vaccine.  - Hepatitis B Vaccine Adult IM      Followup: Return in about 3 months (around 4/14/2019), or if symptoms worsen or fail to improve, for Hypertension, Hyperlipidemia, Diabetes, CKD, Lab review.      Please note that this dictation was  created using voice recognition software. I have made every reasonable attempt to correct obvious errors, but I expect that there may have unintended errors in text, spelling, punctuation, or grammar that I did not discover.

## 2019-01-14 NOTE — ASSESSMENT & PLAN NOTE
His kidney function showed slightly improvement on creatinine.  He still has chronic kidney disease stage III.  He follows with Dr. Kirkland, nephrologist.  I discussed with him to control his diabetes and blood pressure to prevent progression of kidney disease.  He declined to increase the dose of lisinopril to 40 mg daily.  He wanted to keep current regimens and will try to control his blood sugar first.  He stated that he has appointment with endocrinologist in a few weeks.  He will repeat A1c in endocrinologist clinic.

## 2019-01-14 NOTE — ASSESSMENT & PLAN NOTE
Patient reported that he only takes vitamin D 2000 units daily.  His repeated vitamin D level was 25 on 1/9/19.  He is advised to increase the dose of vitamin D to 4000 units daily.

## 2019-01-14 NOTE — ASSESSMENT & PLAN NOTE
He is taking carvedilol 25 mg twice daily, Lasix 20 mg daily and lisinopril 20 mg daily.  His blood pressure is stable and well controlled with current regimens.  He does not have any signs of fluid overload on exam.

## 2019-01-30 ENCOUNTER — OFFICE VISIT (OUTPATIENT)
Dept: CARDIOLOGY | Facility: MEDICAL CENTER | Age: 69
End: 2019-01-30
Payer: COMMERCIAL

## 2019-01-30 VITALS
BODY MASS INDEX: 28.64 KG/M2 | DIASTOLIC BLOOD PRESSURE: 60 MMHG | WEIGHT: 189 LBS | SYSTOLIC BLOOD PRESSURE: 132 MMHG | HEIGHT: 68 IN | HEART RATE: 82 BPM

## 2019-01-30 DIAGNOSIS — I50.9 CHF DUE TO VALVULAR DISEASE (HCC): ICD-10-CM

## 2019-01-30 DIAGNOSIS — I10 ESSENTIAL HYPERTENSION: ICD-10-CM

## 2019-01-30 DIAGNOSIS — Z95.2 S/P TAVR (TRANSCATHETER AORTIC VALVE REPLACEMENT): ICD-10-CM

## 2019-01-30 DIAGNOSIS — E78.5 DYSLIPIDEMIA ASSOCIATED WITH TYPE 2 DIABETES MELLITUS (HCC): ICD-10-CM

## 2019-01-30 DIAGNOSIS — I25.84 CORONARY ARTERY DISEASE DUE TO CALCIFIED CORONARY LESION: ICD-10-CM

## 2019-01-30 DIAGNOSIS — I50.20 ACC/AHA STAGE C SYSTOLIC HEART FAILURE (HCC): ICD-10-CM

## 2019-01-30 DIAGNOSIS — I25.10 CORONARY ARTERY DISEASE DUE TO CALCIFIED CORONARY LESION: ICD-10-CM

## 2019-01-30 DIAGNOSIS — E11.69 DYSLIPIDEMIA ASSOCIATED WITH TYPE 2 DIABETES MELLITUS (HCC): ICD-10-CM

## 2019-01-30 DIAGNOSIS — I50.22 CHRONIC SYSTOLIC CONGESTIVE HEART FAILURE, NYHA CLASS 1 (HCC): ICD-10-CM

## 2019-01-30 DIAGNOSIS — I38 CHF DUE TO VALVULAR DISEASE (HCC): ICD-10-CM

## 2019-01-30 PROCEDURE — 99214 OFFICE O/P EST MOD 30 MIN: CPT | Performed by: INTERNAL MEDICINE

## 2019-01-30 NOTE — PROGRESS NOTES
Chief Complaint   Patient presents with   • Cardiomyopathy (Ischemic)     Follow up       Subjective:   Ashish Wiley is a 69 y.o. male who presents today for follow-up of his congestive heart failure secondary to valvular heart disease.  He underwent a TA VR in July 2018.  After that procedure his ejection fraction improved to approximately 45%.    He is exercising regularly without difficulty.  He walks 2-3 miles daily without difficulty.    He occasionally notes some lightheadedness.  This seems to be somewhat related to dietary and fluid intake.  He also notes that he has a 30-40mmHg drop in his blood pressure after working out at cardiac rehab.  He is not had any syncopal episodes.  He said no chest discomfort, edema or palpitations.    Past Medical History:   Diagnosis Date   • Acute exacerbation of CHF (congestive heart failure) ([facility]) 6/25/2018   • CAD (coronary artery disease)    • CHF (congestive heart failure) ([facility])    • Diabetes ([facility])    • Dilated cardiomyopathy ([facility])    • Hyperlipidemia    • Hypertension    • NYHA class 3 acute on chronic systolic heart failure ([facility]) 6/25/2018   • Severe aortic stenosis 6/25/2018     Past Surgical History:   Procedure Laterality Date   • TRANSCATHETER AORTIC VALVE REPLACEMENT  7/2/2018    Procedure: TRANSCATHETER AORTIC VALVE REPLACEMENT;  Surgeon: Markus Fuller M.D.;  [place] [facility] [place]  Service: Cardiac   • DAYANNA  7/2/2018    Procedure: DAYANNA;  Surgeon: Markus Fuller M.D.;  [place] [facility] [place]  Service: Cardiac   • AORTIC VALVE REPLACEMENT      S/P TAVR    • CARDIAC CATH     • TONSILLECTOMY       Family History   Problem Relation Age of Onset   • Lung Disease Mother         COPD   • Heart Disease Father         valve disease   • Heart Failure Father    • Hypertension Father    • Hyperlipidemia Father    • Cancer Maternal Grandmother         BRAIN TUMOR   • Stroke Maternal Grandfather    • Other Paternal Grandmother         INTESTINAL  PROBLEM   • Stroke Paternal Grandfather    • Prostate cancer Brother    • Other Brother         ortho     Social History     Social History   • Marital status:      Spouse name: N/A   • Number of children: N/A   • Years of education: N/A     Occupational History   • Not on file.     Social History Main Topics   • Smoking status: Former Smoker     Packs/day: 1.00     Years: 22.00     Quit date: 1/1/1989   • Smokeless tobacco: Never Used      Comment: stop cigar in 2015   • Alcohol use 0.6 oz/week     1 Glasses of wine per week      Comment: OCCASIONALLY   • Drug use: No   • Sexual activity: Yes     Partners: Female     Other Topics Concern   • Not on file     Social History Narrative   • No narrative on file     Allergies   Allergen Reactions   • Sulfa Drugs Unspecified     Kidney Stones     Outpatient Encounter Prescriptions as of 1/30/2019   Medication Sig Dispense Refill   • Multiple Vitamins-Minerals (MULTIVITAMIN PO) Take  by mouth.     • lisinopril (PRINIVIL) 20 MG Tab Take 1 Tab by mouth every day. 90 Tab 3   • furosemide (LASIX) 20 MG Tab Take 1 Tab by mouth every day. 90 Tab 3   • carvedilol (COREG) 25 MG Tab Take 1 Tab by mouth 2 times a day, with meals. 60 Tab 11   • Cholecalciferol (HM VITAMIN D3) 4000 units Cap Take 1 Cap by mouth every day.     • clopidogrel (PLAVIX) 75 MG Tab Take 1 Tab by mouth every day. 30 Tab 11   • Exenatide ER 2 MG Pen-injector Inject 2 mg as instructed every 7 days. 12 Each 0   • insulin glargine (LANTUS) 100 UNIT/ML Solution Inject 8 Units as instructed every evening. Pt had 8 units night prior to surgery     • atorvastatin (LIPITOR) 40 MG Tab Take 40 mg by mouth every evening.     • aspirin EC (ECOTRIN) 81 MG Tablet Delayed Response Take 81 mg by mouth every day.       No facility-administered encounter medications on file as of 1/30/2019.      ROS     Objective:   /60 (BP Location: Left arm, Patient Position: Sitting, BP Cuff Size: Adult)   Pulse 82   Ht 1.727  "m (5' 8\")   Wt 85.7 kg (189 lb)   BMI 28.74 kg/m²     Physical Exam   Constitutional: He appears well-developed and well-nourished.   Neck: No JVD present.   Cardiovascular: Normal rate and regular rhythm.    Murmur (1/6 to 2/6 systolic at the base.) heard.  Pulmonary/Chest: Effort normal and breath sounds normal. He has no rales.   Abdominal: Soft. There is no tenderness.   Musculoskeletal: He exhibits no edema.     Lab Results   Component Value Date/Time    CHOLSTRLTOT 158 01/09/2019 06:34 AM    LDL 63 01/09/2019 06:34 AM    HDL 46 01/09/2019 06:34 AM    TRIGLYCERIDE 246 (H) 01/09/2019 06:34 AM       Lab Results   Component Value Date/Time    SODIUM 138 01/09/2019 06:34 AM    POTASSIUM 4.9 01/09/2019 06:34 AM    CHLORIDE 107 01/09/2019 06:34 AM    CO2 24 01/09/2019 06:34 AM    GLUCOSE 167 (H) 01/09/2019 06:34 AM    BUN 40 (H) 01/09/2019 06:34 AM    CREATININE 1.77 (H) 01/09/2019 06:34 AM     Lab Results   Component Value Date/Time    ALKPHOSPHAT 102 (H) 01/09/2019 06:34 AM    ASTSGOT 22 01/09/2019 06:34 AM    ALTSGPT 19 01/09/2019 06:34 AM    TBILIRUBIN 0.4 01/09/2019 06:34 AM      Lab Results   Component Value Date/Time    BNPBTYPENAT 722 (H) 08/01/2018 09:32 AM      Cardiac catheterization from June 26, 2018:  PROCEDURE:  1.  Coronary angiography.  2.  PTCA of the mid left anterior descending artery.  3.  Unsuccessful further percutaneous intervention or stent placement.    Cardiac catheterization from June 28, 2018:  Immediate Post-Operative Note        PreOp Diagnosis: Sequential calcified 80 and 99% mid LAD stenoses     PostOp Diagnosis: s/p Laser and rotational atherectomy and 2.5x16 mm and 2.25x12 mm Synergy TENISHA to mid LAD no sig residual, MACK III    DATE OF PROCEDURE: 07/02/18     TAVR:     PROCEDURES:  1. Transcatheter aortic valve replacement.  2. Preclose closure.  3. Angio-Seal closure.  4. Ultrasound guided femoral artery and femoral vein access.     PRE PROCEDURAL DIAGNOSIS:  1. Severe symptomatic " aortic stenosis, NYHA IIIB/IV.     POST PROCEDURAL DIAGNOSIS:  1. Successful transcatheter aortic valve replacement (TAVR) with # 29 Monroy Prashanth S3 valve, transfemoral approach, under general anesthesia.  2. Successful Preclose closure.  3. Successful Angio-Seal closure.       Assessment:     1. Coronary artery disease due to calcified coronary lesion: Status post atherectomy and stenting of the LAD in June 2018.  Nonobstructive disease in the RCA and circumflex.     2. S/P TAVR (transcatheter aortic valve replacement)     3. ACC/AHA stage C systolic heart failure (HCC)     4. Chronic systolic congestive heart failure, NYHA class 1 (HCC)     5. CHF due to valvular disease ([facility])     6. Essential hypertension     7. Dyslipidemia associated with type 2 diabetes mellitus ([facility])         Medical Decision Making:  Today's Assessment / Status / Plan:     Mr. Wiley is clinically stable.  However, he continues to have a significant elevated creatinine with a low estimated GFR.  He is asymptomatic except for some occasional lightheadedness.  At this time, we will try discontinuing furosemide therapy.  He will follow-up in about a month with a repeat BMP.  When we will see him in follow-up, we may consider more aggressive therapy for his lipid status.  He will give us a call if he becomes significantly hypertensive with blood pressures greater than about 140/90.

## 2019-01-30 NOTE — LETTER
[facility]   [address], [facility] [address]  [place] [address]  Phone: [phone]  Fax: [phone]              Ashish Wiley  1950    Encounter Date: 1/30/2019    Denis Estrada M.D.          PROGRESS NOTE:  Chief Complaint   Patient presents with   • Cardiomyopathy (Ischemic)     Follow up       Subjective:   Ashish Wiley is a 69 y.o. male who presents today for follow-up of his congestive heart failure secondary to valvular heart disease.  He underwent a TA VR in July 2018.  After that procedure his ejection fraction improved to approximately 45%.    He is exercising regularly without difficulty.  He walks 2-3 miles daily without difficulty.    He occasionally notes some lightheadedness.  This seems to be somewhat related to dietary and fluid intake.  He also notes that he has a 30-40mmHg drop in his blood pressure after working out at cardiac rehab.  He is not had any syncopal episodes.  He said no chest discomfort, edema or palpitations.    Past Medical History:   Diagnosis Date   • Acute exacerbation of CHF (congestive heart failure) ([facility]) 6/25/2018   • CAD (coronary artery disease)    • CHF (congestive heart failure) ([facility])    • Diabetes ([facility])    • Dilated cardiomyopathy ([facility])    • Hyperlipidemia    • Hypertension    • NYHA class 3 acute on chronic systolic heart failure ([facility]) 6/25/2018   • Severe aortic stenosis 6/25/2018     Past Surgical History:   Procedure Laterality Date   • TRANSCATHETER AORTIC VALVE REPLACEMENT  7/2/2018    Procedure: TRANSCATHETER AORTIC VALVE REPLACEMENT;  Surgeon: Markus Fuller M.D.;  Location: SURGERY [facility];  Service: Cardiac   • DAYANNA  7/2/2018    Procedure: DAYANNA;  Surgeon: Markus Fuller M.D.;  Location: [facility];  Service: Cardiac   • AORTIC VALVE REPLACEMENT      S/P TAVR    • CARDIAC CATH     • TONSILLECTOMY       Family History   Problem Relation Age of Onset   • Lung Disease Mother       COPD   • Heart Disease Father         valve disease   • Heart Failure Father    • Hypertension Father    • Hyperlipidemia Father    • Cancer Maternal Grandmother         BRAIN TUMOR   • Stroke Maternal Grandfather    • Other Paternal Grandmother         INTESTINAL PROBLEM   • Stroke Paternal Grandfather    • Prostate cancer Brother    • Other Brother         ortho     Social History     Social History   • Marital status:      Spouse name: N/A   • Number of children: N/A   • Years of education: N/A     Occupational History   • Not on file.     Social History Main Topics   • Smoking status: Former Smoker     Packs/day: 1.00     Years: 22.00     Quit date: 1/1/1989   • Smokeless tobacco: Never Used      Comment: stop cigar in 2015   • Alcohol use 0.6 oz/week     1 Glasses of wine per week      Comment: OCCASIONALLY   • Drug use: No   • Sexual activity: Yes     Partners: Female     Other Topics Concern   • Not on file     Social History Narrative   • No narrative on file     Allergies   Allergen Reactions   • Sulfa Drugs Unspecified     Kidney Stones     Outpatient Encounter Prescriptions as of 1/30/2019   Medication Sig Dispense Refill   • Multiple Vitamins-Minerals (MULTIVITAMIN PO) Take  by mouth.     • lisinopril (PRINIVIL) 20 MG Tab Take 1 Tab by mouth every day. 90 Tab 3   • furosemide (LASIX) 20 MG Tab Take 1 Tab by mouth every day. 90 Tab 3   • carvedilol (COREG) 25 MG Tab Take 1 Tab by mouth 2 times a day, with meals. 60 Tab 11   • Cholecalciferol (HM VITAMIN D3) 4000 units Cap Take 1 Cap by mouth every day.     • clopidogrel (PLAVIX) 75 MG Tab Take 1 Tab by mouth every day. 30 Tab 11   • Exenatide ER 2 MG Pen-injector Inject 2 mg as instructed every 7 days. 12 Each 0   • insulin glargine (LANTUS) 100 UNIT/ML Solution Inject 8 Units as instructed every evening. Pt had 8 units night prior to surgery     • atorvastatin (LIPITOR) 40 MG Tab Take 40 mg by mouth every evening.     • aspirin EC (ECOTRIN) 81  "MG Tablet Delayed Response Take 81 mg by mouth every day.       No facility-administered encounter medications on file as of 1/30/2019.      ROS     Objective:   /60 (BP Location: Left arm, Patient Position: Sitting, BP Cuff Size: Adult)   Pulse 82   Ht 1.727 m (5' 8\")   Wt 85.7 kg (189 lb)   BMI 28.74 kg/m²      Physical Exam   Constitutional: He appears well-developed and well-nourished.   Neck: No JVD present.   Cardiovascular: Normal rate and regular rhythm.    Murmur (1/6 to 2/6 systolic at the base.) heard.  Pulmonary/Chest: Effort normal and breath sounds normal. He has no rales.   Abdominal: Soft. There is no tenderness.   Musculoskeletal: He exhibits no edema.     Lab Results   Component Value Date/Time    CHOLSTRLTOT 158 01/09/2019 06:34 AM    LDL 63 01/09/2019 06:34 AM    HDL 46 01/09/2019 06:34 AM    TRIGLYCERIDE 246 (H) 01/09/2019 06:34 AM       Lab Results   Component Value Date/Time    SODIUM 138 01/09/2019 06:34 AM    POTASSIUM 4.9 01/09/2019 06:34 AM    CHLORIDE 107 01/09/2019 06:34 AM    CO2 24 01/09/2019 06:34 AM    GLUCOSE 167 (H) 01/09/2019 06:34 AM    BUN 40 (H) 01/09/2019 06:34 AM    CREATININE 1.77 (H) 01/09/2019 06:34 AM     Lab Results   Component Value Date/Time    ALKPHOSPHAT 102 (H) 01/09/2019 06:34 AM    ASTSGOT 22 01/09/2019 06:34 AM    ALTSGPT 19 01/09/2019 06:34 AM    TBILIRUBIN 0.4 01/09/2019 06:34 AM      Lab Results   Component Value Date/Time    BNPBTYPENAT 722 (H) 08/01/2018 09:32 AM      Cardiac catheterization from June 26, 2018:  PROCEDURE:  1.  Coronary angiography.  2.  PTCA of the mid left anterior descending artery.  3.  Unsuccessful further percutaneous intervention or stent placement.    Cardiac catheterization from June 28, 2018:  Immediate Post-Operative Note        PreOp Diagnosis: Sequential calcified 80 and 99% mid LAD stenoses     PostOp Diagnosis: s/p Laser and rotational atherectomy and 2.5x16 mm and 2.25x12 mm Synergy TENISHA to mid LAD no sig residual, " MACK III    DATE OF PROCEDURE: 07/02/18     TAVR:     PROCEDURES:  1. Transcatheter aortic valve replacement.  2. Preclose closure.  3. Angio-Seal closure.  4. Ultrasound guided femoral artery and femoral vein access.     PRE PROCEDURAL DIAGNOSIS:  1. Severe symptomatic aortic stenosis, NYHA IIIB/IV.     POST PROCEDURAL DIAGNOSIS:  1. Successful transcatheter aortic valve replacement (TAVR) with # 29 Monroy Prashanth S3 valve, transfemoral approach, under general anesthesia.  2. Successful Preclose closure.  3. Successful Angio-Seal closure.       Assessment:     1. Coronary artery disease due to calcified coronary lesion: Status post atherectomy and stenting of the LAD in June 2018.  Nonobstructive disease in the RCA and circumflex.     2. S/P TAVR (transcatheter aortic valve replacement)     3. ACC/AHA stage C systolic heart failure (HCC)     4. Chronic systolic congestive heart failure, NYHA class 1 (HCC)     5. CHF due to valvular disease (HCC)     6. Essential hypertension     7. Dyslipidemia associated with type 2 diabetes mellitus (HCC)         Medical Decision Making:  Today's Assessment / Status / Plan:     Mr. Wiley is clinically stable.  However, he continues to have a significant elevated creatinine with a low estimated GFR.  He is asymptomatic except for some occasional lightheadedness.  At this time, we will try discontinuing furosemide therapy.  He will follow-up in about a month with a repeat BMP.  We will see him in follow-up, we may consider more aggressive therapy for his lipid status.  He will give us a call if he becomes to give him a hypertensive with blood pressures greater than about 140/90.      Venus Claros M.D.  [address] [place] [id number]  VIA In Basket

## 2019-02-21 ENCOUNTER — HOSPITAL ENCOUNTER (OUTPATIENT)
Dept: LAB | Facility: MEDICAL CENTER | Age: 69
End: 2019-02-21
Attending: INTERNAL MEDICINE
Payer: COMMERCIAL

## 2019-02-21 DIAGNOSIS — E55.9 VITAMIN D INSUFFICIENCY: ICD-10-CM

## 2019-02-21 DIAGNOSIS — E11.69 DYSLIPIDEMIA ASSOCIATED WITH TYPE 2 DIABETES MELLITUS (HCC): ICD-10-CM

## 2019-02-21 DIAGNOSIS — E11.29 TYPE 2 DIABETES MELLITUS WITH MICROALBUMINURIA, WITH LONG-TERM CURRENT USE OF INSULIN (HCC): ICD-10-CM

## 2019-02-21 DIAGNOSIS — I10 ESSENTIAL HYPERTENSION: ICD-10-CM

## 2019-02-21 DIAGNOSIS — Z79.4 TYPE 2 DIABETES MELLITUS WITH MICROALBUMINURIA, WITH LONG-TERM CURRENT USE OF INSULIN (HCC): ICD-10-CM

## 2019-02-21 DIAGNOSIS — R80.9 TYPE 2 DIABETES MELLITUS WITH MICROALBUMINURIA, WITH LONG-TERM CURRENT USE OF INSULIN (HCC): ICD-10-CM

## 2019-02-21 DIAGNOSIS — E11.21 MACROALBUMINURIC DIABETIC NEPHROPATHY (HCC): ICD-10-CM

## 2019-02-21 DIAGNOSIS — E78.5 DYSLIPIDEMIA ASSOCIATED WITH TYPE 2 DIABETES MELLITUS (HCC): ICD-10-CM

## 2019-02-21 LAB
25(OH)D3 SERPL-MCNC: 17 NG/ML (ref 30–100)
ALBUMIN SERPL BCP-MCNC: 3.8 G/DL (ref 3.2–4.9)
ALBUMIN/GLOB SERPL: 1.5 G/DL
ALP SERPL-CCNC: 96 U/L (ref 30–99)
ALT SERPL-CCNC: 23 U/L (ref 2–50)
ANION GAP SERPL CALC-SCNC: 5 MMOL/L (ref 0–11.9)
AST SERPL-CCNC: 20 U/L (ref 12–45)
BASOPHILS # BLD AUTO: 0.3 % (ref 0–1.8)
BASOPHILS # BLD: 0.02 K/UL (ref 0–0.12)
BILIRUB SERPL-MCNC: 0.4 MG/DL (ref 0.1–1.5)
BUN SERPL-MCNC: 33 MG/DL (ref 8–22)
CALCIUM SERPL-MCNC: 9.7 MG/DL (ref 8.5–10.5)
CHLORIDE SERPL-SCNC: 109 MMOL/L (ref 96–112)
CHOLEST SERPL-MCNC: 166 MG/DL (ref 100–199)
CO2 SERPL-SCNC: 25 MMOL/L (ref 20–33)
CREAT SERPL-MCNC: 1.47 MG/DL (ref 0.5–1.4)
CREAT UR-MCNC: 75 MG/DL
EOSINOPHIL # BLD AUTO: 0.11 K/UL (ref 0–0.51)
EOSINOPHIL NFR BLD: 1.8 % (ref 0–6.9)
ERYTHROCYTE [DISTWIDTH] IN BLOOD BY AUTOMATED COUNT: 45.4 FL (ref 35.9–50)
FASTING STATUS PATIENT QL REPORTED: NORMAL
GLOBULIN SER CALC-MCNC: 2.5 G/DL (ref 1.9–3.5)
GLUCOSE SERPL-MCNC: 193 MG/DL (ref 65–99)
HCT VFR BLD AUTO: 35.7 % (ref 42–52)
HDLC SERPL-MCNC: 51 MG/DL
HGB BLD-MCNC: 11.8 G/DL (ref 14–18)
IMM GRANULOCYTES # BLD AUTO: 0.03 K/UL (ref 0–0.11)
IMM GRANULOCYTES NFR BLD AUTO: 0.5 % (ref 0–0.9)
LDLC SERPL CALC-MCNC: 57 MG/DL
LYMPHOCYTES # BLD AUTO: 0.7 K/UL (ref 1–4.8)
LYMPHOCYTES NFR BLD: 11.4 % (ref 22–41)
MCH RBC QN AUTO: 32.3 PG (ref 27–33)
MCHC RBC AUTO-ENTMCNC: 33.1 G/DL (ref 33.7–35.3)
MCV RBC AUTO: 97.8 FL (ref 81.4–97.8)
MICROALBUMIN UR-MCNC: 193.2 MG/DL
MICROALBUMIN/CREAT UR: 2576 MG/G (ref 0–30)
MONOCYTES # BLD AUTO: 0.52 K/UL (ref 0–0.85)
MONOCYTES NFR BLD AUTO: 8.5 % (ref 0–13.4)
NEUTROPHILS # BLD AUTO: 4.76 K/UL (ref 1.82–7.42)
NEUTROPHILS NFR BLD: 77.5 % (ref 44–72)
NRBC # BLD AUTO: 0 K/UL
NRBC BLD-RTO: 0 /100 WBC
PLATELET # BLD AUTO: 184 K/UL (ref 164–446)
PMV BLD AUTO: 10.7 FL (ref 9–12.9)
POTASSIUM SERPL-SCNC: 4.9 MMOL/L (ref 3.6–5.5)
PROT SERPL-MCNC: 6.3 G/DL (ref 6–8.2)
RBC # BLD AUTO: 3.65 M/UL (ref 4.7–6.1)
SODIUM SERPL-SCNC: 139 MMOL/L (ref 135–145)
TRIGL SERPL-MCNC: 288 MG/DL (ref 0–149)
WBC # BLD AUTO: 6.1 K/UL (ref 4.8–10.8)

## 2019-02-21 PROCEDURE — 36415 COLL VENOUS BLD VENIPUNCTURE: CPT

## 2019-02-21 PROCEDURE — 82306 VITAMIN D 25 HYDROXY: CPT

## 2019-02-21 PROCEDURE — 85025 COMPLETE CBC W/AUTO DIFF WBC: CPT

## 2019-02-21 PROCEDURE — 82043 UR ALBUMIN QUANTITATIVE: CPT

## 2019-02-21 PROCEDURE — 80053 COMPREHEN METABOLIC PANEL: CPT

## 2019-02-21 PROCEDURE — 82570 ASSAY OF URINE CREATININE: CPT

## 2019-02-21 PROCEDURE — 80061 LIPID PANEL: CPT

## 2019-02-22 ENCOUNTER — TELEPHONE (OUTPATIENT)
Dept: MEDICAL GROUP | Age: 69
End: 2019-02-22

## 2019-02-22 ENCOUNTER — NON-PROVIDER VISIT (OUTPATIENT)
Dept: MEDICAL GROUP | Age: 69
End: 2019-02-22
Payer: COMMERCIAL

## 2019-02-22 DIAGNOSIS — Z23 NEED FOR VACCINATION: ICD-10-CM

## 2019-02-22 PROCEDURE — 90746 HEPB VACCINE 3 DOSE ADULT IM: CPT | Performed by: FAMILY MEDICINE

## 2019-02-22 PROCEDURE — 90471 IMMUNIZATION ADMIN: CPT | Performed by: FAMILY MEDICINE

## 2019-02-22 NOTE — TELEPHONE ENCOUNTER
Patient is on the MA Schedule today for Hep B vaccine/injection.    SPECIFIC Action To Be Taken: Orders pending, please sign.

## 2019-02-22 NOTE — PROGRESS NOTES
"Ashish Wiley is a 69 y.o. male here for a non-provider visit for:   HEPATITIS B 2 of 3    Reason for immunization: continue or complete series started at the office  Immunization records indicate need for vaccine: Yes, confirmed with Epic  Minimum interval has been met for this vaccine: Yes  ABN completed: Not Indicated    Order and dose verified by: ANDREA  VIS Dated  8/2014 was given to patient: Yes  All IAC Questionnaire questions were answered \"No.\"    Patient tolerated injection and no adverse effects were observed or reported: Yes    Pt scheduled for next dose in series: Yes    "

## 2019-02-23 DIAGNOSIS — E55.9 VITAMIN D DEFICIENCY: ICD-10-CM

## 2019-02-23 RX ORDER — ERGOCALCIFEROL 1.25 MG/1
50000 CAPSULE ORAL
Qty: 12 CAP | Refills: 3 | Status: SHIPPED | OUTPATIENT
Start: 2019-02-23 | End: 2019-05-02 | Stop reason: SDUPTHER

## 2019-02-24 NOTE — PROGRESS NOTES
Please inform patient that he has vitamin D deficiency and current vitamin level was low at 17. I prescribed ergocalciferol 50,000 units to postal prescription and it is advised to take 1 capsule once a week. I will discuss the result in upcoming appointment.     Venus Claros MD

## 2019-03-07 ENCOUNTER — OFFICE VISIT (OUTPATIENT)
Dept: CARDIOLOGY | Facility: MEDICAL CENTER | Age: 69
End: 2019-03-07
Payer: COMMERCIAL

## 2019-03-07 VITALS
BODY MASS INDEX: 28.79 KG/M2 | WEIGHT: 190 LBS | SYSTOLIC BLOOD PRESSURE: 144 MMHG | HEART RATE: 82 BPM | OXYGEN SATURATION: 97 % | DIASTOLIC BLOOD PRESSURE: 80 MMHG | HEIGHT: 68 IN

## 2019-03-07 DIAGNOSIS — I50.20 ACC/AHA STAGE C SYSTOLIC HEART FAILURE (HCC): ICD-10-CM

## 2019-03-07 DIAGNOSIS — I50.22 CHRONIC SYSTOLIC CONGESTIVE HEART FAILURE, NYHA CLASS 1 (HCC): ICD-10-CM

## 2019-03-07 DIAGNOSIS — Z95.2 S/P TAVR (TRANSCATHETER AORTIC VALVE REPLACEMENT): ICD-10-CM

## 2019-03-07 DIAGNOSIS — I50.9 CHF DUE TO VALVULAR DISEASE (HCC): ICD-10-CM

## 2019-03-07 DIAGNOSIS — I25.84 CORONARY ARTERY DISEASE DUE TO CALCIFIED CORONARY LESION: ICD-10-CM

## 2019-03-07 DIAGNOSIS — I25.10 CORONARY ARTERY DISEASE DUE TO CALCIFIED CORONARY LESION: ICD-10-CM

## 2019-03-07 DIAGNOSIS — I38 CHF DUE TO VALVULAR DISEASE (HCC): ICD-10-CM

## 2019-03-07 PROCEDURE — 99214 OFFICE O/P EST MOD 30 MIN: CPT | Performed by: INTERNAL MEDICINE

## 2019-03-07 RX ORDER — AMLODIPINE BESYLATE 5 MG/1
5 TABLET ORAL DAILY
Qty: 30 TAB | Refills: 11 | Status: SHIPPED | OUTPATIENT
Start: 2019-03-07 | End: 2019-05-09

## 2019-03-07 RX ORDER — LISINOPRIL 10 MG/1
10 TABLET ORAL DAILY
Qty: 30 TAB | Refills: 11 | Status: SHIPPED | OUTPATIENT
Start: 2019-03-07 | End: 2019-12-18 | Stop reason: SDUPTHER

## 2019-03-07 NOTE — LETTER
[facility] [facility]   [address]  [place] [address]  Phone: [phone]  Fax: [phone]              Ashish Wiley  1950    Encounter Date: 3/7/2019    Denis Estrada M.D.          PROGRESS NOTE:  Chief Complaint   Patient presents with   • Coronary Artery Disease       Subjective:   Ashish Wiley is a 69 y.o. male who presents today for follow-up of his congestive heart failure secondary to valvular heart disease.  He underwent a TAVR in July 2018.  After that procedure his ejection fraction improved to approximately 45%.  At the time of his last visit, we discontinued furosemide because of renal insufficiency.    His blood pressures at home been running for approximately 130-135/60.    He is exercising regularly without difficulty.  He denies any chest discomfort, dyspnea, edema, palpitations or lightheadedness.        Past Medical History:   Diagnosis Date   • Acute exacerbation of CHF (congestive heart failure) ([facility]) 6/25/2018   • CAD (coronary artery disease)    • CHF (congestive heart failure) ([facility])    • Diabetes ([facility])    • Dilated cardiomyopathy ([facility])    • Hyperlipidemia    • Hypertension    • NYHA class 3 acute on chronic systolic heart failure ([facility]) 6/25/2018   • Severe aortic stenosis 6/25/2018     Past Surgical History:   Procedure Laterality Date   • TRANSCATHETER AORTIC VALVE REPLACEMENT  7/2/2018    Procedure: TRANSCATHETER AORTIC VALVE REPLACEMENT;  Surgeon: Markus Fuller M.D.;  Location: [facility];  Service: Cardiac   • DAYANNA  7/2/2018    Procedure: DAYANNA;  Surgeon: Markus Fuller M.D.;  Location: [facility];  Service: Cardiac   • AORTIC VALVE REPLACEMENT      S/P TAVR    • CARDIAC CATH     • TONSILLECTOMY       Family History   Problem Relation Age of Onset   • Lung Disease Mother         COPD   • Heart Disease Father         valve disease   • Heart Failure Father    • Hypertension Father    •  Hyperlipidemia Father    • Cancer Maternal Grandmother         BRAIN TUMOR   • Stroke Maternal Grandfather    • Other Paternal Grandmother         INTESTINAL PROBLEM   • Stroke Paternal Grandfather    • Prostate cancer Brother    • Other Brother         ortho     Social History     Social History   • Marital status:      Spouse name: N/A   • Number of children: N/A   • Years of education: N/A     Occupational History   • Not on file.     Social History Main Topics   • Smoking status: Former Smoker     Packs/day: 1.00     Years: 22.00     Quit date: 1/1/1989   • Smokeless tobacco: Never Used      Comment: stop cigar in 2015   • Alcohol use 0.6 oz/week     1 Glasses of wine per week      Comment: OCCASIONALLY   • Drug use: No   • Sexual activity: Yes     Partners: Female     Other Topics Concern   • Not on file     Social History Narrative   • No narrative on file     Allergies   Allergen Reactions   • Sulfa Drugs Unspecified     Kidney Stones     Outpatient Encounter Prescriptions as of 3/7/2019   Medication Sig Dispense Refill   • ergocalciferol (DRISDOL) 81439 UNIT capsule Take 1 Cap by mouth every 7 days. 12 Cap 3   • Multiple Vitamins-Minerals (MULTIVITAMIN PO) Take  by mouth.     • lisinopril (PRINIVIL) 20 MG Tab Take 1 Tab by mouth every day. 90 Tab 3   • carvedilol (COREG) 25 MG Tab Take 1 Tab by mouth 2 times a day, with meals. 60 Tab 11   • Cholecalciferol (HM VITAMIN D3) 4000 units Cap Take 1 Cap by mouth every day.     • clopidogrel (PLAVIX) 75 MG Tab Take 1 Tab by mouth every day. 30 Tab 11   • Exenatide ER 2 MG Pen-injector Inject 2 mg as instructed every 7 days. 12 Each 0   • insulin glargine (LANTUS) 100 UNIT/ML Solution Inject 8 Units as instructed every evening. Pt had 8 units night prior to surgery     • atorvastatin (LIPITOR) 40 MG Tab Take 40 mg by mouth every evening.     • aspirin EC (ECOTRIN) 81 MG Tablet Delayed Response Take 81 mg by mouth every day.     • furosemide (LASIX) 20 MG Tab  "Take 1 Tab by mouth every day. (Patient not taking: Reported on 3/7/2019) 90 Tab 3     No facility-administered encounter medications on file as of 3/7/2019.      ROS     Objective:   /80 (BP Location: Left arm, Patient Position: Sitting, BP Cuff Size: Adult)   Pulse 82   Ht 1.727 m (5' 8\")   Wt 86.2 kg (190 lb)   SpO2 97%   BMI 28.89 kg/m²      Physical Exam   Constitutional: He appears well-developed and well-nourished.   Neck: No JVD present.   Cardiovascular: Normal rate and regular rhythm.    Murmur (1/6 to 2/6 systolic at the base) heard.  Pulmonary/Chest: Effort normal and breath sounds normal. He has no rales.   Abdominal: Soft. There is no tenderness.   Musculoskeletal: He exhibits no edema.     Lab Results   Component Value Date/Time    CHOLSTRLTOT 166 02/21/2019 06:39 AM    LDL 57 02/21/2019 06:39 AM    HDL 51 02/21/2019 06:39 AM    TRIGLYCERIDE 288 (H) 02/21/2019 06:39 AM       Lab Results   Component Value Date/Time    SODIUM 139 02/21/2019 06:39 AM    POTASSIUM 4.9 02/21/2019 06:39 AM    CHLORIDE 109 02/21/2019 06:39 AM    CO2 25 02/21/2019 06:39 AM    GLUCOSE 193 (H) 02/21/2019 06:39 AM    BUN 33 (H) 02/21/2019 06:39 AM    CREATININE 1.47 (H) 02/21/2019 06:39 AM     Lab Results   Component Value Date/Time    ALKPHOSPHAT 96 02/21/2019 06:39 AM    ASTSGOT 20 02/21/2019 06:39 AM    ALTSGPT 23 02/21/2019 06:39 AM    TBILIRUBIN 0.4 02/21/2019 06:39 AM      Lab Results   Component Value Date/Time    BNPBTYPENAT 722 (H) 08/01/2018 09:32 AM          Assessment:     1. Coronary artery disease due to calcified coronary lesion: Status post atherectomy and stenting of the LAD in June 2018.  Nonobstructive disease in the RCA and circumflex.     2. S/P TAVR (transcatheter aortic valve replacement)     3. ACC/AHA stage C systolic heart failure (HCC)     4. Chronic systolic congestive heart failure, NYHA class 1 ([facility])     5. CHF due to valvular disease (HCC)         Medical Decision Making:  Today's " Assessment / Status / Plan:     Mr. Wiley is clinically stable.  He is asymptomatic from a cardiovascular standpoint and exercising regularly without difficulty.  His blood pressure is probably not under optimal control and he continues to have difficulty with renal insufficiency.  At this time, we will reduce lisinopril to 10 mg daily.  Will add amlodipine 5 mg daily.  He will keep a record of his blood pressures a couple times a week for us at various times a day and follow-up in a couple of months with a repeat BMP.      Venus Claros M.D.  [address] [place] [id number]  VIA In Basket

## 2019-03-07 NOTE — PROGRESS NOTES
Chief Complaint   Patient presents with   • Coronary Artery Disease       Subjective:   Ashish Wiley is a 69 y.o. male who presents today for follow-up of his congestive heart failure secondary to valvular heart disease.  He underwent a TAVR in July 2018.  After that procedure his ejection fraction improved to approximately 45%.  At the time of his last visit, we discontinued furosemide because of renal insufficiency.    His blood pressures at home been running for approximately 130-135/60.    He is exercising regularly without difficulty.  He denies any chest discomfort, dyspnea, edema, palpitations or lightheadedness.        Past Medical History:   Diagnosis Date   • Acute exacerbation of CHF (congestive heart failure) ([facility]) 6/25/2018   • CAD (coronary artery disease)    • CHF (congestive heart failure) ([facility])    • Diabetes ([facility])    • Dilated cardiomyopathy ([facility])    • Hyperlipidemia    • Hypertension    • NYHA class 3 acute on chronic systolic heart failure ([facility]) 6/25/2018   • Severe aortic stenosis 6/25/2018     Past Surgical History:   Procedure Laterality Date   • TRANSCATHETER AORTIC VALVE REPLACEMENT  7/2/2018    Procedure: TRANSCATHETER AORTIC VALVE REPLACEMENT;  Surgeon: Markus Fuller M.D.;  Location: SURGERY [facility];  Service: Cardiac   • DAYANNA  7/2/2018    Procedure: DAYANNA;  Surgeon: Markus Fuller M.D.;  Location: SURGERY [facility];  Service: Cardiac   • AORTIC VALVE REPLACEMENT      S/P TAVR    • CARDIAC CATH     • TONSILLECTOMY       Family History   Problem Relation Age of Onset   • Lung Disease Mother         COPD   • Heart Disease Father         valve disease   • Heart Failure Father    • Hypertension Father    • Hyperlipidemia Father    • Cancer Maternal Grandmother         BRAIN TUMOR   • Stroke Maternal Grandfather    • Other Paternal Grandmother         INTESTINAL PROBLEM   • Stroke Paternal Grandfather    • Prostate cancer Brother    • Other Brother         ortho     Social  History     Social History   • Marital status:      Spouse name: N/A   • Number of children: N/A   • Years of education: N/A     Occupational History   • Not on file.     Social History Main Topics   • Smoking status: Former Smoker     Packs/day: 1.00     Years: 22.00     Quit date: 1/1/1989   • Smokeless tobacco: Never Used      Comment: stop cigar in 2015   • Alcohol use 0.6 oz/week     1 Glasses of wine per week      Comment: OCCASIONALLY   • Drug use: No   • Sexual activity: Yes     Partners: Female     Other Topics Concern   • Not on file     Social History Narrative   • No narrative on file     Allergies   Allergen Reactions   • Sulfa Drugs Unspecified     Kidney Stones     Outpatient Encounter Prescriptions as of 3/7/2019   Medication Sig Dispense Refill   • ergocalciferol (DRISDOL) 69986 UNIT capsule Take 1 Cap by mouth every 7 days. 12 Cap 3   • Multiple Vitamins-Minerals (MULTIVITAMIN PO) Take  by mouth.     • lisinopril (PRINIVIL) 20 MG Tab Take 1 Tab by mouth every day. 90 Tab 3   • carvedilol (COREG) 25 MG Tab Take 1 Tab by mouth 2 times a day, with meals. 60 Tab 11   • Cholecalciferol (HM VITAMIN D3) 4000 units Cap Take 1 Cap by mouth every day.     • clopidogrel (PLAVIX) 75 MG Tab Take 1 Tab by mouth every day. 30 Tab 11   • Exenatide ER 2 MG Pen-injector Inject 2 mg as instructed every 7 days. 12 Each 0   • insulin glargine (LANTUS) 100 UNIT/ML Solution Inject 8 Units as instructed every evening. Pt had 8 units night prior to surgery     • atorvastatin (LIPITOR) 40 MG Tab Take 40 mg by mouth every evening.     • aspirin EC (ECOTRIN) 81 MG Tablet Delayed Response Take 81 mg by mouth every day.     • furosemide (LASIX) 20 MG Tab Take 1 Tab by mouth every day. (Patient not taking: Reported on 3/7/2019) 90 Tab 3     No facility-administered encounter medications on file as of 3/7/2019.      ROS     Objective:   /80 (BP Location: Left arm, Patient Position: Sitting, BP Cuff Size: Adult)    "Pulse 82   Ht 1.727 m (5' 8\")   Wt 86.2 kg (190 lb)   SpO2 97%   BMI 28.89 kg/m²     Physical Exam   Constitutional: He appears well-developed and well-nourished.   Neck: No JVD present.   Cardiovascular: Normal rate and regular rhythm.    Murmur (1/6 to 2/6 systolic at the base) heard.  Pulmonary/Chest: Effort normal and breath sounds normal. He has no rales.   Abdominal: Soft. There is no tenderness.   Musculoskeletal: He exhibits no edema.     Lab Results   Component Value Date/Time    CHOLSTRLTOT 166 02/21/2019 06:39 AM    LDL 57 02/21/2019 06:39 AM    HDL 51 02/21/2019 06:39 AM    TRIGLYCERIDE 288 (H) 02/21/2019 06:39 AM       Lab Results   Component Value Date/Time    SODIUM 139 02/21/2019 06:39 AM    POTASSIUM 4.9 02/21/2019 06:39 AM    CHLORIDE 109 02/21/2019 06:39 AM    CO2 25 02/21/2019 06:39 AM    GLUCOSE 193 (H) 02/21/2019 06:39 AM    BUN 33 (H) 02/21/2019 06:39 AM    CREATININE 1.47 (H) 02/21/2019 06:39 AM     Lab Results   Component Value Date/Time    ALKPHOSPHAT 96 02/21/2019 06:39 AM    ASTSGOT 20 02/21/2019 06:39 AM    ALTSGPT 23 02/21/2019 06:39 AM    TBILIRUBIN 0.4 02/21/2019 06:39 AM      Lab Results   Component Value Date/Time    BNPBTYPENAT 722 (H) 08/01/2018 09:32 AM          Assessment:     1. Coronary artery disease due to calcified coronary lesion: Status post atherectomy and stenting of the LAD in June 2018.  Nonobstructive disease in the RCA and circumflex.     2. S/P TAVR (transcatheter aortic valve replacement)     3. ACC/AHA stage C systolic heart failure (HCC)     4. Chronic systolic congestive heart failure, NYHA class 1 (HCC)     5. CHF due to valvular disease (HCC)         Medical Decision Making:  Today's Assessment / Status / Plan:     Mr. Wiley is clinically stable.  He is asymptomatic from a cardiovascular standpoint and exercising regularly without difficulty.  His blood pressure is probably not under optimal control and he continues to have difficulty with renal " insufficiency.  At this time, we will reduce lisinopril to 10 mg daily.  Will add amlodipine 5 mg daily.  He will keep a record of his blood pressures a couple times a week for us at various times a day and follow-up in a couple of months with a repeat BMP.

## 2019-04-25 DIAGNOSIS — I25.10 CORONARY ARTERY DISEASE DUE TO CALCIFIED CORONARY LESION: Primary | ICD-10-CM

## 2019-04-25 DIAGNOSIS — I25.84 CORONARY ARTERY DISEASE DUE TO CALCIFIED CORONARY LESION: Primary | ICD-10-CM

## 2019-04-25 RX ORDER — CLOPIDOGREL BISULFATE 75 MG/1
75 TABLET ORAL DAILY
Qty: 90 TAB | Refills: 3 | Status: SHIPPED | OUTPATIENT
Start: 2019-04-25 | End: 2019-08-13

## 2019-04-30 ENCOUNTER — HOSPITAL ENCOUNTER (OUTPATIENT)
Dept: LAB | Facility: MEDICAL CENTER | Age: 69
End: 2019-04-30
Attending: INTERNAL MEDICINE
Payer: COMMERCIAL

## 2019-04-30 DIAGNOSIS — I50.20 ACC/AHA STAGE C SYSTOLIC HEART FAILURE (HCC): ICD-10-CM

## 2019-04-30 DIAGNOSIS — I25.84 CORONARY ARTERY DISEASE DUE TO CALCIFIED CORONARY LESION: ICD-10-CM

## 2019-04-30 DIAGNOSIS — I25.10 CORONARY ARTERY DISEASE DUE TO CALCIFIED CORONARY LESION: ICD-10-CM

## 2019-04-30 DIAGNOSIS — Z95.2 S/P TAVR (TRANSCATHETER AORTIC VALVE REPLACEMENT): ICD-10-CM

## 2019-04-30 LAB
ANION GAP SERPL CALC-SCNC: 5 MMOL/L (ref 0–11.9)
BUN SERPL-MCNC: 38 MG/DL (ref 8–22)
CALCIUM SERPL-MCNC: 8.9 MG/DL (ref 8.5–10.5)
CHLORIDE SERPL-SCNC: 107 MMOL/L (ref 96–112)
CO2 SERPL-SCNC: 23 MMOL/L (ref 20–33)
CREAT SERPL-MCNC: 1.7 MG/DL (ref 0.5–1.4)
FASTING STATUS PATIENT QL REPORTED: NORMAL
GLUCOSE SERPL-MCNC: 198 MG/DL (ref 65–99)
POTASSIUM SERPL-SCNC: 4.5 MMOL/L (ref 3.6–5.5)
SODIUM SERPL-SCNC: 135 MMOL/L (ref 135–145)

## 2019-04-30 PROCEDURE — 36415 COLL VENOUS BLD VENIPUNCTURE: CPT

## 2019-04-30 PROCEDURE — 80048 BASIC METABOLIC PNL TOTAL CA: CPT

## 2019-05-02 ENCOUNTER — OFFICE VISIT (OUTPATIENT)
Dept: MEDICAL GROUP | Age: 69
End: 2019-05-02
Payer: COMMERCIAL

## 2019-05-02 VITALS
BODY MASS INDEX: 29.89 KG/M2 | OXYGEN SATURATION: 100 % | DIASTOLIC BLOOD PRESSURE: 68 MMHG | TEMPERATURE: 97 F | SYSTOLIC BLOOD PRESSURE: 138 MMHG | WEIGHT: 197.2 LBS | HEIGHT: 68 IN | HEART RATE: 70 BPM

## 2019-05-02 DIAGNOSIS — E11.29 TYPE 2 DIABETES MELLITUS WITH MICROALBUMINURIA, WITH LONG-TERM CURRENT USE OF INSULIN (HCC): ICD-10-CM

## 2019-05-02 DIAGNOSIS — E55.9 VITAMIN D DEFICIENCY: ICD-10-CM

## 2019-05-02 DIAGNOSIS — E11.69 DYSLIPIDEMIA ASSOCIATED WITH TYPE 2 DIABETES MELLITUS (HCC): ICD-10-CM

## 2019-05-02 DIAGNOSIS — E78.5 DYSLIPIDEMIA ASSOCIATED WITH TYPE 2 DIABETES MELLITUS (HCC): ICD-10-CM

## 2019-05-02 DIAGNOSIS — I10 ESSENTIAL HYPERTENSION: ICD-10-CM

## 2019-05-02 DIAGNOSIS — R80.9 TYPE 2 DIABETES MELLITUS WITH MICROALBUMINURIA, WITH LONG-TERM CURRENT USE OF INSULIN (HCC): ICD-10-CM

## 2019-05-02 DIAGNOSIS — Z79.4 TYPE 2 DIABETES MELLITUS WITH MICROALBUMINURIA, WITH LONG-TERM CURRENT USE OF INSULIN (HCC): ICD-10-CM

## 2019-05-02 DIAGNOSIS — N18.30 STAGE 3 CHRONIC KIDNEY DISEASE (HCC): ICD-10-CM

## 2019-05-02 PROCEDURE — 99214 OFFICE O/P EST MOD 30 MIN: CPT | Performed by: INTERNAL MEDICINE

## 2019-05-02 RX ORDER — ERGOCALCIFEROL 1.25 MG/1
50000 CAPSULE ORAL
Qty: 12 CAP | Refills: 3 | Status: SHIPPED | OUTPATIENT
Start: 2019-05-02 | End: 2020-01-22 | Stop reason: SDUPTHER

## 2019-05-02 ASSESSMENT — PAIN SCALES - GENERAL: PAINLEVEL: NO PAIN

## 2019-05-02 NOTE — LETTER
[employer] [facility] [place]  Venus Claros M.D.  [address]  [place] [address]  Fax: [phone]   Authorization for Release/Disclosure of   Protected Health Information   Name: AMAYA CLAIRE : 1950 SSN: [id number]   Address: [address] [place]  [place] [address] Phone:    [phone] (home)    I authorize the entity listed below to release/disclose the PHI below to:   Gnzo/Venus Claros M.D. and Venus Claros M.D.   Provider or Entity Name:  [facility]   Address   [facility], [address]  [address] [facility] [place] [address]   Phone:  [phone]    Fax:     Reason for request: continuity of care   Information to be released:    [XXX] LAST COLONOSCOPY,  including any PATH REPORT and follow-up  [  ] LAST FIT/COLOGUARD RESULT [  ] LAST DEXA  [  ] LAST MAMMOGRAM  [  ] LAST PAP  [  ] LAST LABS [  ] RETINA EXAM REPORT  [  ] IMMUNIZATION RECORDS  [  ] Release all info      [  ] Check here and initial the line next to each item to release ALL health information INCLUDING  _____ Care and treatment for drug and / or alcohol abuse  _____ HIV testing, infection status, or AIDS  _____ Genetic Testing    DATES OF SERVICE OR TIME PERIOD TO BE DISCLOSED: _____________  I understand and acknowledge that:  * This Authorization may be revoked at any time by you in writing, except if your health information has already been used or disclosed.  * Your health information that will be used or disclosed as a result of you signing this authorization could be re-disclosed by the recipient. If this occurs, your re-disclosed health information may no longer be protected by State or Federal laws.  * You may refuse to sign this Authorization. Your refusal will not affect your ability to obtain treatment.  * This Authorization becomes effective upon signing and will  on (date) __________.      If no date is indicated, this Authorization will  one (1) year from the signature date.    Name: Amaya Her  Inez    Signature:   Date:     5/2/2019       PLEASE FAX REQUESTED RECORDS BACK TO: [phone]

## 2019-05-02 NOTE — PROGRESS NOTES
Subjective:   Ashish Claire is a 69 y.o. male here today for evaluation and management of:    Type 2 diabetes mellitus with microalbuminuria, with long-term current use of insulin ([facility])  Patient states his A1c was 7.0 on 2/12/19, done by his endocrinologist at [facility].   He is still taking Exenatide ER 2 mg once a week injection and 8 units of Lantus Inuslin every enening. He is following a no sodium, no fat diet and is exercising daily. Patient has lost 7 pounds in 3 weeks.     Vitamin D deficiency  He is taking Vitamin D 60595 once a week and needs a refill.  He has not recheck vitamin D levels yet.    Essential hypertension  Stable. Patient takes Carvedilol 25 mg twice daily, Amlodipine 5 mg, and Lisinopril 10 mg daily. He denies side effects from these medications.  His cardiologist decreased her dose of lisinopril from 20 mg to 10 mg daily recently due to worsening kidney function.  Cardiologist also recommend him to stop taking Lasix.  Patient reported that he took Lasix 2 dosage last week for leg swelling and then he stopped taking Lasix again.  He denies chest pain or shortness of breath or orthopnea.  He still has mild ankle swelling today.    Dyslipidemia associated with type 2 diabetes mellitus (HCC)  He is taking Atorvastatin 40 mg every evening.  He denies side effects from taking atorvastatin.  His LDL is at goal but triglycerides remain high.  I reviewed his previous blood test result with him in clinic today.    Results for ASHISH CLAIRE (MRN [id number]) as of 5/2/2019 08:58   Ref. Range 1/9/2019 06:34 2/21/2019 06:39   Cholesterol,Tot Latest Ref Range: 100 - 199 mg/dL 158 166   Triglycerides Latest Ref Range: 0 - 149 mg/dL 246 (H) 288 (H)   HDL Latest Ref Range: >=40 mg/dL 46 51   LDL Latest Ref Range: <100 mg/dL 63 57       Stage 3 chronic kidney disease (HCC)  Chronic. Discussed labs in clinic with the patient today showing that his creatinine continues to increase. Patient will follow up  with Dr. Kirkland, nephrologist.  Patient has followed low-sodium diet.  Cardiologist decreased her dose of lisinopril from 20 mg to 10 mg daily and discontinue Lasix 20 mg daily on 3/7/2019.  Patient denies taking NSAIDs.  He reported that his A1c was at goal and it was checked in February 2019 by [facility] endocrinologist.    Results for AMAYA CLAIRE (MRN [id number]) as of 5/2/2019 08:45   Ref. Range 12/18/2018 09:44 1/9/2019 06:34 2/21/2019 06:39 4/30/2019 07:10   Creatinine Latest Ref Range: 0.50 - 1.40 mg/dL 1.62 (H) 1.77 (H) 1.47 (H) 1.70 (H)       Current medicines (including changes today)  Current Outpatient Prescriptions   Medication Sig Dispense Refill   • ergocalciferol (DRISDOL) 66079 UNIT capsule Take 1 Cap by mouth every 7 days. 12 Cap 3   • clopidogrel (PLAVIX) 75 MG Tab Take 1 Tab by mouth every day. 90 Tab 3   • lisinopril (PRINIVIL) 10 MG Tab Take 1 Tab by mouth every day. 30 Tab 11   • amLODIPine (NORVASC) 5 MG Tab Take 1 Tab by mouth every day. 30 Tab 11   • Multiple Vitamins-Minerals (MULTIVITAMIN PO) Take  by mouth.     • carvedilol (COREG) 25 MG Tab Take 1 Tab by mouth 2 times a day, with meals. 60 Tab 11   • Cholecalciferol (HM VITAMIN D3) 4000 units Cap Take 1 Cap by mouth every day.     • Exenatide ER 2 MG Pen-injector Inject 2 mg as instructed every 7 days. 12 Each 0   • insulin glargine (LANTUS) 100 UNIT/ML Solution Inject 8 Units as instructed every evening. Pt had 8 units night prior to surgery     • atorvastatin (LIPITOR) 40 MG Tab Take 40 mg by mouth every evening.     • aspirin EC (ECOTRIN) 81 MG Tablet Delayed Response Take 81 mg by mouth every day.       No current facility-administered medications for this visit.      He  has a past medical history of Acute exacerbation of CHF (congestive heart failure) ([facility]) (6/25/2018); CAD (coronary artery disease); CHF (congestive heart failure) ([facility]); Diabetes ([facility]); Dilated cardiomyopathy (HCC); Hyperlipidemia; Hypertension; NYHA class 3  "acute on chronic systolic heart failure (HCC) (6/25/2018); and Severe aortic stenosis (6/25/2018).    ROS   No chest pain, no shortness of breath, no abdominal pain       Objective:     /68 (BP Location: Right arm, Patient Position: Sitting, BP Cuff Size: Adult)   Pulse 70   Temp 36.1 °C (97 °F) (Temporal)   Ht 1.727 m (5' 8\")   Wt 89.4 kg (197 lb 3.2 oz)   SpO2 100%  Body mass index is 29.98 kg/m².     Physical Exam:  General: Alert, oriented and no acute distress.  Eye contact is good, speech goal directed, affect calm  HEENT: conjunctiva non-injected, sclera non-icteric.  Oral mucous membranes pink and moist with no lesions.  Pinna normal.   Lungs: Normal respiratory effort, clear to auscultation bilaterally with good excursion.  CV: regular rate and rhythm. No murmurs.   Abdomen: soft, non distended, nontender, Bowel sound normal.  Ext: mild bilateral ankles edema, color normal, vascularity normal, temperature normal      Assessment and Plan:   The following treatment plan was discussed; Return in 3 months for follow-up.      1. Type 2 diabetes mellitus with microalbuminuria, with long-term current use of insulin ([facility])  - Stable. Per patient, his last A1c on 2/19 was 7.0.  Continue current regimens as prescribed by endocrinologist at [facility].  - Counseled to comply with medication and diet.   - Counseled signs and symptoms of hypoglycemia and management of hypoglycemia.   - Recommend to have retinal eye exam once a year.  - Advised to check both feet daily.  - Comp Metabolic Panel; Future  - HEMOGLOBIN A1C; Future  - MICROALBUMIN CREAT RATIO URINE; Future    2. Vitamin D deficiency  - Well-controlled. Continue current regimens, Vitamin D 50,000 Units once a week. Will refill. Recheck lab 1-2 weeks before next follow up visit.  - ergocalciferol (DRISDOL) 41635 UNIT capsule; Take 1 Cap by mouth every 7 days.  Dispense: 12 Cap; Refill: 3  - VITAMIN D,25 HYDROXY; Future    3. Essential hypertension  - " Well-controlled. Continue current regimens of Lisinopril 10 mg daily, Amlodipine 5 mg daily, and Carvedilol 25 mg twice daily. Recheck lab 1-2 weeks before next follow up visit.  - Recommend to monitor blood pressure and heart rate at home.  - He will continue his diet of no sodium and no fat and continue regular exercise.  Patient has mild bilateral ankle edema, but he does not have any chest pain or shortness of breath or orthopnea. Both lungs are clear on exam today.  Patient is advised to elevate legs at rest and wear compression stockings daily.  Patient is advised to eat low-sodium diet and monitor body weight.  - Patient has follow-up appointment with Dr. Estrada, cardiologist on 5/9/2019.  - CBC WITH DIFFERENTIAL; Future  - Comp Metabolic Panel; Future    4. Dyslipidemia associated with type 2 diabetes mellitus (HCC)  - LDL is well-controlled.  Triglyceride remains high.  Continue current regimens, Atorvastatin 40 mg every evening. Recheck lab 1-2 weeks before next follow up visit.  - Reviewed the risks and benefits of treatment and potential side effects of medication.   - Advised to eat low fat, low carbohydrate and high fiber diet as well as do cardio physical exercise regularly.   - Lipid Profile; Future    5. Stage 3 chronic kidney disease (HCC)  - Creatinine continues to increase. Patient is advised to follow up with Dr. Kirkland, nephrologist.   - Patient advised to control diabetes and blood pressure.   - Advised to avoid NSAIDs and nephrotoxic agents.    6. Health Maintenance   - Patient states that he has appointment with GI consultant in July 2019 for colonoscopy.  He will be due for the third dose of hepatitis B vaccine on 6/22/19.  We will schedule MA visits for the third dose of hepatitis B vaccine.    Followup: Return in about 3 months (around 8/2/2019), or if symptoms worsen or fail to improve, for Hypertension, Hyperlipidemia, Diabetes, CKD, Lab review.      Please note that this dictation  was created using voice recognition software. I have made every reasonable attempt to correct obvious errors, but I expect that there may have unintended errors in text, spelling, punctuation, or grammar that I did not discover.    I, Doreen Sears (Ricky), am scribing for, and in the presence of, Venus Claros M.D..    Electronically signed by: Doreen Sears (Ricky), 5/2/2019    I, Venus Claros M.D., personally performed the services described in this documentation, as scribed by Doreen Sears in my presence, and it is both accurate and complete.

## 2019-05-02 NOTE — PROGRESS NOTES
Pt states has colonoscopy scheduled with [facility] July of this year. Obtained signed consent

## 2019-05-09 ENCOUNTER — OFFICE VISIT (OUTPATIENT)
Dept: CARDIOLOGY | Facility: MEDICAL CENTER | Age: 69
End: 2019-05-09
Payer: COMMERCIAL

## 2019-05-09 VITALS
SYSTOLIC BLOOD PRESSURE: 128 MMHG | DIASTOLIC BLOOD PRESSURE: 68 MMHG | HEIGHT: 68 IN | OXYGEN SATURATION: 95 % | HEART RATE: 78 BPM | WEIGHT: 197 LBS | BODY MASS INDEX: 29.86 KG/M2

## 2019-05-09 DIAGNOSIS — I50.22 CHRONIC SYSTOLIC CONGESTIVE HEART FAILURE, NYHA CLASS 1 (HCC): ICD-10-CM

## 2019-05-09 DIAGNOSIS — I25.84 CORONARY ARTERY DISEASE DUE TO CALCIFIED CORONARY LESION: ICD-10-CM

## 2019-05-09 DIAGNOSIS — I25.10 CORONARY ARTERY DISEASE DUE TO CALCIFIED CORONARY LESION: ICD-10-CM

## 2019-05-09 DIAGNOSIS — Z95.2 S/P TAVR (TRANSCATHETER AORTIC VALVE REPLACEMENT): ICD-10-CM

## 2019-05-09 DIAGNOSIS — E78.5 DYSLIPIDEMIA ASSOCIATED WITH TYPE 2 DIABETES MELLITUS (HCC): ICD-10-CM

## 2019-05-09 DIAGNOSIS — I10 ESSENTIAL HYPERTENSION: ICD-10-CM

## 2019-05-09 DIAGNOSIS — I50.20 ACC/AHA STAGE C SYSTOLIC HEART FAILURE (HCC): ICD-10-CM

## 2019-05-09 DIAGNOSIS — E11.69 DYSLIPIDEMIA ASSOCIATED WITH TYPE 2 DIABETES MELLITUS (HCC): ICD-10-CM

## 2019-05-09 PROCEDURE — 99214 OFFICE O/P EST MOD 30 MIN: CPT | Performed by: INTERNAL MEDICINE

## 2019-05-09 RX ORDER — DOXAZOSIN 2 MG/1
2 TABLET ORAL DAILY
Qty: 90 TAB | Refills: 3 | Status: SHIPPED | OUTPATIENT
Start: 2019-05-09 | End: 2020-01-22 | Stop reason: SDUPTHER

## 2019-05-09 RX ORDER — LISINOPRIL 20 MG/1
TABLET ORAL
COMMUNITY
Start: 2019-05-04 | End: 2019-05-31

## 2019-05-09 NOTE — PROGRESS NOTES
Chief Complaint   Patient presents with   • Coronary Artery Disease       Subjective:   Ashish Wiley is a 69 y.o. male who presents today for follow-up of his congestive heart failure secondary to valvular heart disease.  He underwent a TAVR in July 2018.  After that procedure his ejection fraction improved to approximately 45%.    At the time of his last visit, we reduced ACE inhibitor therapy and added amlodipine 5 mg daily.  His blood pressures been running about 135/70.  He has had some intermittent edema which seems to be dependent in nature.  It occurs predominantly in his left lower extremity.    He is walking 3 to 5 miles daily without difficulty.  He denies any chest discomfort, dyspnea, palpitations or lightheadedness.    Past Medical History:   Diagnosis Date   • Acute exacerbation of CHF (congestive heart failure) ([facility]) 6/25/2018   • CAD (coronary artery disease)    • CHF (congestive heart failure) ([facility])    • Diabetes ([facility])    • Dilated cardiomyopathy ([facility])    • Hyperlipidemia    • Hypertension    • NYHA class 3 acute on chronic systolic heart failure ([facility]) 6/25/2018   • Severe aortic stenosis 6/25/2018     Past Surgical History:   Procedure Laterality Date   • TRANSCATHETER AORTIC VALVE REPLACEMENT  7/2/2018    Procedure: TRANSCATHETER AORTIC VALVE REPLACEMENT;  Surgeon: Markus Fuller M.D.;  Location: SURGERY [facility];  Service: Cardiac   • DAYANNA  7/2/2018    Procedure: DAYANNA;  Surgeon: Makrus Fuller M.D.;  Location: SURGERY [facility];  Service: Cardiac   • AORTIC VALVE REPLACEMENT      S/P TAVR    • TONSILLECTOMY     • ZZZ CARDIAC CATH       Family History   Problem Relation Age of Onset   • Lung Disease Mother         COPD   • Heart Disease Father         valve disease   • Heart Failure Father    • Hypertension Father    • Hyperlipidemia Father    • Cancer Maternal Grandmother         BRAIN TUMOR   • Stroke Maternal Grandfather    • Other Paternal Grandmother         INTESTINAL  PROBLEM   • Stroke Paternal Grandfather    • Prostate cancer Brother    • Other Brother         ortho     Social History     Social History   • Marital status:      Spouse name: N/A   • Number of children: N/A   • Years of education: N/A     Occupational History   • Not on file.     Social History Main Topics   • Smoking status: Former Smoker     Packs/day: 1.00     Years: 22.00     Quit date: 1/1/1989   • Smokeless tobacco: Never Used      Comment: stop cigar in 2015   • Alcohol use 0.6 oz/week     1 Glasses of wine per week      Comment: OCCASIONALLY   • Drug use: No   • Sexual activity: Yes     Partners: Female     Other Topics Concern   • Not on file     Social History Narrative   • No narrative on file     Allergies   Allergen Reactions   • Sulfa Drugs Unspecified     Kidney Stones     Outpatient Encounter Prescriptions as of 5/9/2019   Medication Sig Dispense Refill   • FUROSEMIDE PO Take  by mouth.     • ergocalciferol (DRISDOL) 33769 UNIT capsule Take 1 Cap by mouth every 7 days. 12 Cap 3   • clopidogrel (PLAVIX) 75 MG Tab Take 1 Tab by mouth every day. 90 Tab 3   • lisinopril (PRINIVIL) 10 MG Tab Take 1 Tab by mouth every day. 30 Tab 11   • amLODIPine (NORVASC) 5 MG Tab Take 1 Tab by mouth every day. 30 Tab 11   • Multiple Vitamins-Minerals (MULTIVITAMIN PO) Take  by mouth.     • carvedilol (COREG) 25 MG Tab Take 1 Tab by mouth 2 times a day, with meals. 60 Tab 11   • Exenatide ER 2 MG Pen-injector Inject 2 mg as instructed every 7 days. 12 Each 0   • insulin glargine (LANTUS) 100 UNIT/ML Solution Inject 8 Units as instructed every evening. Pt had 8 units night prior to surgery     • atorvastatin (LIPITOR) 40 MG Tab Take 40 mg by mouth every evening.     • aspirin EC (ECOTRIN) 81 MG Tablet Delayed Response Take 81 mg by mouth every day.     • lisinopril (PRINIVIL) 20 MG Tab      • Cholecalciferol (HM VITAMIN D3) 4000 units Cap Take 1 Cap by mouth every day.       No facility-administered encounter  "medications on file as of 5/9/2019.      ROS     Objective:   /68 (BP Location: Left arm, Patient Position: Sitting, BP Cuff Size: Adult)   Pulse 78   Ht 1.727 m (5' 8\")   Wt 89.4 kg (197 lb)   SpO2 95%   BMI 29.95 kg/m²     Physical Exam   Constitutional: He appears well-developed and well-nourished.   Neck: No JVD present.   Cardiovascular: Normal rate and regular rhythm.    No murmur heard.  Pulmonary/Chest: Effort normal and breath sounds normal. He has no rales.   Abdominal: Soft. There is no tenderness.   Musculoskeletal: He exhibits edema (1+ left and 1-2+ right pretibial).     Lab Results   Component Value Date/Time    CHOLSTRLTOT 166 02/21/2019 06:39 AM    LDL 57 02/21/2019 06:39 AM    HDL 51 02/21/2019 06:39 AM    TRIGLYCERIDE 288 (H) 02/21/2019 06:39 AM       Lab Results   Component Value Date/Time    SODIUM 135 04/30/2019 07:10 AM    POTASSIUM 4.5 04/30/2019 07:10 AM    CHLORIDE 107 04/30/2019 07:10 AM    CO2 23 04/30/2019 07:10 AM    GLUCOSE 198 (H) 04/30/2019 07:10 AM    BUN 38 (H) 04/30/2019 07:10 AM    CREATININE 1.70 (H) 04/30/2019 07:10 AM     Lab Results   Component Value Date/Time    ALKPHOSPHAT 96 02/21/2019 06:39 AM    ASTSGOT 20 02/21/2019 06:39 AM    ALTSGPT 23 02/21/2019 06:39 AM    TBILIRUBIN 0.4 02/21/2019 06:39 AM      Lab Results   Component Value Date/Time    BNPBTYPENAT 722 (H) 08/01/2018 09:32 AM      Echocardiography Laboratory    CONCLUSIONS  Prior echo done 07/03/18. Compared to the images of the study done -   there has been mild improvement of LVSF.   Mildly reduced left ventricular systolic function.  Left ventricular ejection fraction is visually estimated to be 45%.  Grade I diastolic dysfunction.  Known TAVR aortic valve that is functioning normally with normal   transvalvular gradients.  Vmax is 1.9  m/s. Transvalvular gradients are - Peak: 15 mmHg,  Mean: 8   mmHg.   Mild paravalvular leak is noted.  Unable to estimate pulmonary artery pressure due to an " inadequate   tricuspid regurgitant jet.    AMAYA LCAIRE  Exam Date:         07/30/2018    Assessment:     1. Essential hypertension     2. Dyslipidemia associated with type 2 diabetes mellitus (HCC)     3. Coronary artery disease due to calcified coronary lesion: Status post atherectomy and stenting of the LAD in June 2018.  Nonobstructive disease in the RCA and circumflex.     4. S/P TAVR (transcatheter aortic valve replacement)     5. ACC/AHA stage C systolic heart failure ([facility])     6. Chronic systolic congestive heart failure, NYHA class 1 ([facility])         Medical Decision Making:  Today's Assessment / Status / Plan:     Mr. Claire is clinically stable.  However, his blood pressure is not under optimal control and he has had increasing edema since we started amlodipine.  He is followed by nephrology.  At this time, we will discontinue amlodipine and start him on doxazosin 2 mg daily.  He will follow-up in a couple of months.  His lipid status is been under good control.  He is going to follow-up in valve clinic with respect to his TAVR.  I anticipate he will have another echocardiogram ordered.

## 2019-05-09 NOTE — LETTER
[facility]   [address], [address]  [place] [address]  Phone: [phone]  Fax: [phone]              Ashish Wiley  1950    Encounter Date: 5/9/2019    Denis Estrada M.D.          PROGRESS NOTE:  Chief Complaint   Patient presents with   • Coronary Artery Disease       Subjective:   Ashish Wiley is a 69 y.o. male who presents today for follow-up of his congestive heart failure secondary to valvular heart disease.  He underwent a TAVR in July 2018.  After that procedure his ejection fraction improved to approximately 45%.    At the time of his last visit, we reduced ACE inhibitor therapy and added amlodipine 5 mg daily.  His blood pressures been running about 135/70.  He has had some intermittent edema which seems to be dependent in nature.  It occurs predominantly in his left lower extremity.    He is walking 3 to 5 miles daily without difficulty.  He denies any chest discomfort, dyspnea, palpitations or lightheadedness.    Past Medical History:   Diagnosis Date   • Acute exacerbation of CHF (congestive heart failure) ([facility]) 6/25/2018   • CAD (coronary artery disease)    • CHF (congestive heart failure) ([facility])    • Diabetes ([facility])    • Dilated cardiomyopathy ([facility])    • Hyperlipidemia    • Hypertension    • NYHA class 3 acute on chronic systolic heart failure ([facility]) 6/25/2018   • Severe aortic stenosis 6/25/2018     Past Surgical History:   Procedure Laterality Date   • TRANSCATHETER AORTIC VALVE REPLACEMENT  7/2/2018    Procedure: TRANSCATHETER AORTIC VALVE REPLACEMENT;  Surgeon: Markus Fuller M.D.;  Location: SURGERY [facility];  Service: Cardiac   • DAYANNA  7/2/2018    Procedure: DAYANNA;  Surgeon: Markus Fuller M.D.;  Location: SURGERY [facility];  Service: Cardiac   • AORTIC VALVE REPLACEMENT      S/P TAVR    • TONSILLECTOMY     • ZZZ CARDIAC CATH       Family History   Problem Relation Age of Onset   • Lung Disease Mother     COPD   • Heart Disease Father         valve disease   • Heart Failure Father    • Hypertension Father    • Hyperlipidemia Father    • Cancer Maternal Grandmother         BRAIN TUMOR   • Stroke Maternal Grandfather    • Other Paternal Grandmother         INTESTINAL PROBLEM   • Stroke Paternal Grandfather    • Prostate cancer Brother    • Other Brother         ortho     Social History     Social History   • Marital status:      Spouse name: N/A   • Number of children: N/A   • Years of education: N/A     Occupational History   • Not on file.     Social History Main Topics   • Smoking status: Former Smoker     Packs/day: 1.00     Years: 22.00     Quit date: 1/1/1989   • Smokeless tobacco: Never Used      Comment: stop cigar in 2015   • Alcohol use 0.6 oz/week     1 Glasses of wine per week      Comment: OCCASIONALLY   • Drug use: No   • Sexual activity: Yes     Partners: Female     Other Topics Concern   • Not on file     Social History Narrative   • No narrative on file     Allergies   Allergen Reactions   • Sulfa Drugs Unspecified     Kidney Stones     Outpatient Encounter Prescriptions as of 5/9/2019   Medication Sig Dispense Refill   • FUROSEMIDE PO Take  by mouth.     • ergocalciferol (DRISDOL) 03738 UNIT capsule Take 1 Cap by mouth every 7 days. 12 Cap 3   • clopidogrel (PLAVIX) 75 MG Tab Take 1 Tab by mouth every day. 90 Tab 3   • lisinopril (PRINIVIL) 10 MG Tab Take 1 Tab by mouth every day. 30 Tab 11   • amLODIPine (NORVASC) 5 MG Tab Take 1 Tab by mouth every day. 30 Tab 11   • Multiple Vitamins-Minerals (MULTIVITAMIN PO) Take  by mouth.     • carvedilol (COREG) 25 MG Tab Take 1 Tab by mouth 2 times a day, with meals. 60 Tab 11   • Exenatide ER 2 MG Pen-injector Inject 2 mg as instructed every 7 days. 12 Each 0   • insulin glargine (LANTUS) 100 UNIT/ML Solution Inject 8 Units as instructed every evening. Pt had 8 units night prior to surgery     • atorvastatin (LIPITOR) 40 MG Tab Take 40 mg by mouth  "every evening.     • aspirin EC (ECOTRIN) 81 MG Tablet Delayed Response Take 81 mg by mouth every day.     • lisinopril (PRINIVIL) 20 MG Tab      • Cholecalciferol (HM VITAMIN D3) 4000 units Cap Take 1 Cap by mouth every day.       No facility-administered encounter medications on file as of 5/9/2019.      ROS     Objective:   /68 (BP Location: Left arm, Patient Position: Sitting, BP Cuff Size: Adult)   Pulse 78   Ht 1.727 m (5' 8\")   Wt 89.4 kg (197 lb)   SpO2 95%   BMI 29.95 kg/m²      Physical Exam   Constitutional: He appears well-developed and well-nourished.   Neck: No JVD present.   Cardiovascular: Normal rate and regular rhythm.    No murmur heard.  Pulmonary/Chest: Effort normal and breath sounds normal. He has no rales.   Abdominal: Soft. There is no tenderness.   Musculoskeletal: He exhibits edema (1+ left and 1-2+ right pretibial).     Lab Results   Component Value Date/Time    CHOLSTRLTOT 166 02/21/2019 06:39 AM    LDL 57 02/21/2019 06:39 AM    HDL 51 02/21/2019 06:39 AM    TRIGLYCERIDE 288 (H) 02/21/2019 06:39 AM       Lab Results   Component Value Date/Time    SODIUM 135 04/30/2019 07:10 AM    POTASSIUM 4.5 04/30/2019 07:10 AM    CHLORIDE 107 04/30/2019 07:10 AM    CO2 23 04/30/2019 07:10 AM    GLUCOSE 198 (H) 04/30/2019 07:10 AM    BUN 38 (H) 04/30/2019 07:10 AM    CREATININE 1.70 (H) 04/30/2019 07:10 AM     Lab Results   Component Value Date/Time    ALKPHOSPHAT 96 02/21/2019 06:39 AM    ASTSGOT 20 02/21/2019 06:39 AM    ALTSGPT 23 02/21/2019 06:39 AM    TBILIRUBIN 0.4 02/21/2019 06:39 AM      Lab Results   Component Value Date/Time    BNPBTYPENAT 722 (H) 08/01/2018 09:32 AM      Echocardiography Laboratory    CONCLUSIONS  Prior echo done 07/03/18. Compared to the images of the study done -   there has been mild improvement of LVSF.   Mildly reduced left ventricular systolic function.  Left ventricular ejection fraction is visually estimated to be 45%.  Grade I diastolic " dysfunction.  Known TAVR aortic valve that is functioning normally with normal   transvalvular gradients.  Vmax is 1.9  m/s. Transvalvular gradients are - Peak: 15 mmHg,  Mean: 8   mmHg.   Mild paravalvular leak is noted.  Unable to estimate pulmonary artery pressure due to an inadequate   tricuspid regurgitant jet.    AMAYA CLAIRE  Exam Date:         07/30/2018    Assessment:     1. Essential hypertension     2. Dyslipidemia associated with type 2 diabetes mellitus (HCC)     3. Coronary artery disease due to calcified coronary lesion: Status post atherectomy and stenting of the LAD in June 2018.  Nonobstructive disease in the RCA and circumflex.     4. S/P TAVR (transcatheter aortic valve replacement)     5. ACC/AHA stage C systolic heart failure (HCC)     6. Chronic systolic congestive heart failure, NYHA class 1 (HCC)         Medical Decision Making:  Today's Assessment / Status / Plan:     Mr. Claire is clinically stable.  However, his blood pressure is not under optimal control and he has had increasing edema since we started amlodipine.  He is followed by nephrology.  At this time, we will discontinue amlodipine and start him on doxazosin 2 mg daily.  He will follow-up in a couple of months.  His lipid status is been under good control.  He is going to follow-up in valve clinic with respect to his TAVR.  I anticipate he will have another echocardiogram ordered.      Venus Claros M.D.  [place] [id number]  VIA In Basket

## 2019-05-31 ENCOUNTER — OFFICE VISIT (OUTPATIENT)
Dept: CARDIOLOGY | Facility: MEDICAL CENTER | Age: 69
End: 2019-05-31
Payer: COMMERCIAL

## 2019-05-31 VITALS
OXYGEN SATURATION: 94 % | HEIGHT: 68 IN | SYSTOLIC BLOOD PRESSURE: 168 MMHG | BODY MASS INDEX: 29.9 KG/M2 | DIASTOLIC BLOOD PRESSURE: 90 MMHG | HEART RATE: 87 BPM | WEIGHT: 197.31 LBS

## 2019-05-31 DIAGNOSIS — E11.69 DYSLIPIDEMIA ASSOCIATED WITH TYPE 2 DIABETES MELLITUS (HCC): ICD-10-CM

## 2019-05-31 DIAGNOSIS — E78.5 DYSLIPIDEMIA ASSOCIATED WITH TYPE 2 DIABETES MELLITUS (HCC): ICD-10-CM

## 2019-05-31 DIAGNOSIS — I10 ESSENTIAL HYPERTENSION: ICD-10-CM

## 2019-05-31 DIAGNOSIS — N18.30 STAGE 3 CHRONIC KIDNEY DISEASE (HCC): ICD-10-CM

## 2019-05-31 DIAGNOSIS — I42.0 DILATED CARDIOMYOPATHY (HCC): ICD-10-CM

## 2019-05-31 DIAGNOSIS — I50.22 CHRONIC SYSTOLIC CONGESTIVE HEART FAILURE, NYHA CLASS 1 (HCC): ICD-10-CM

## 2019-05-31 DIAGNOSIS — Z95.2 S/P TAVR (TRANSCATHETER AORTIC VALVE REPLACEMENT): ICD-10-CM

## 2019-05-31 PROCEDURE — 99214 OFFICE O/P EST MOD 30 MIN: CPT | Performed by: INTERNAL MEDICINE

## 2019-05-31 ASSESSMENT — ENCOUNTER SYMPTOMS
COUGH: 0
ABDOMINAL PAIN: 0
FEVER: 0
DEPRESSION: 0
BLURRED VISION: 0
BRUISES/BLEEDS EASILY: 0
VOMITING: 0
NERVOUS/ANXIOUS: 0
GASTROINTESTINAL NEGATIVE: 1
CLAUDICATION: 0
RESPIRATORY NEGATIVE: 1
DIZZINESS: 0
MYALGIAS: 0
DOUBLE VISION: 0
NEUROLOGICAL NEGATIVE: 1
SHORTNESS OF BREATH: 0
CONSTITUTIONAL NEGATIVE: 1
PSYCHIATRIC NEGATIVE: 1
CHILLS: 0
NAUSEA: 0
EYES NEGATIVE: 1
PALPITATIONS: 0
HEADACHES: 0
WEAKNESS: 0
MUSCULOSKELETAL NEGATIVE: 1
FOCAL WEAKNESS: 0
WEIGHT LOSS: 0

## 2019-05-31 NOTE — LETTER
[facility]   [address], [address]  [place] [address]  Phone: [phone]  Fax: [phone]              Ashish Wiley  1950    Encounter Date: 5/31/2019    Markus Fuller M.D.          PROGRESS NOTE:  Chief Complaint   Patient presents with   • Aortic Stenosis     s/p TAVR       Subjective:   Ashish Wiley is a 69 y.o. male who presents today for one year TAVR follow up.    Since the patient's last visit on 08/09/18, he has been doing well clinically. He denies fatigue, shortness of breath, dyspnea on exertion, chest pain, dizziness or syncope.    Past Medical History:   Diagnosis Date   • Acute exacerbation of CHF (congestive heart failure) ([facility]) 6/25/2018   • CAD (coronary artery disease)    • CHF (congestive heart failure) ([facility])    • Diabetes ([facility])    • Dilated cardiomyopathy ([facility])    • Hyperlipidemia    • Hypertension    • NYHA class 3 acute on chronic systolic heart failure ([facility]) 6/25/2018   • Severe aortic stenosis 6/25/2018     Past Surgical History:   Procedure Laterality Date   • TRANSCATHETER AORTIC VALVE REPLACEMENT  7/2/2018    Procedure: TRANSCATHETER AORTIC VALVE REPLACEMENT;  Surgeon: Markus Fuller M.D.;  Location: SURGERY [facility];  Service: Cardiac   • DAYANNA  7/2/2018    Procedure: DAYANNA;  Surgeon: Markus Fuller M.D.;  Location: SURGERY [facility];  Service: Cardiac   • AORTIC VALVE REPLACEMENT      S/P TAVR    • TONSILLECTOMY     • ZZZ CARDIAC CATH       Family History   Problem Relation Age of Onset   • Lung Disease Mother         COPD   • Heart Disease Father         valve disease   • Heart Failure Father    • Hypertension Father    • Hyperlipidemia Father    • Cancer Maternal Grandmother         BRAIN TUMOR   • Stroke Maternal Grandfather    • Other Paternal Grandmother         INTESTINAL PROBLEM   • Stroke Paternal Grandfather    • Prostate cancer Brother    • Other Brother         ortho     Social History          Social History   • Marital status:      Spouse name: N/A   • Number of children: N/A   • Years of education: N/A     Occupational History   • Not on file.     Social History Main Topics   • Smoking status: Former Smoker     Packs/day: 1.00     Years: 22.00     Quit date: 1/1/1989   • Smokeless tobacco: Never Used      Comment: stop cigar in 2015   • Alcohol use 0.6 oz/week     1 Glasses of wine per week      Comment: OCCASIONALLY   • Drug use: No   • Sexual activity: Yes     Partners: Female     Other Topics Concern   • Not on file     Social History Narrative   • No narrative on file     Allergies   Allergen Reactions   • Sulfa Drugs Unspecified     Kidney Stones     Medications reviewed.    Outpatient Encounter Prescriptions as of 5/31/2019   Medication Sig Dispense Refill   • FUROSEMIDE PO Take  by mouth.     • doxazosin (CARDURA) 2 MG Tab Take 1 Tab by mouth every day. 90 Tab 3   • ergocalciferol (DRISDOL) 54737 UNIT capsule Take 1 Cap by mouth every 7 days. 12 Cap 3   • clopidogrel (PLAVIX) 75 MG Tab Take 1 Tab by mouth every day. 90 Tab 3   • lisinopril (PRINIVIL) 10 MG Tab Take 1 Tab by mouth every day. 30 Tab 11   • Multiple Vitamins-Minerals (MULTIVITAMIN PO) Take  by mouth.     • carvedilol (COREG) 25 MG Tab Take 1 Tab by mouth 2 times a day, with meals. 60 Tab 11   • Cholecalciferol (HM VITAMIN D3) 4000 units Cap Take 1 Cap by mouth every day.     • Exenatide ER 2 MG Pen-injector Inject 2 mg as instructed every 7 days. 12 Each 0   • insulin glargine (LANTUS) 100 UNIT/ML Solution Inject 8 Units as instructed every evening. Pt had 8 units night prior to surgery     • atorvastatin (LIPITOR) 40 MG Tab Take 40 mg by mouth every evening.     • aspirin EC (ECOTRIN) 81 MG Tablet Delayed Response Take 81 mg by mouth every day.     • lisinopril (PRINIVIL) 20 MG Tab        No facility-administered encounter medications on file as of 5/31/2019.      Review of Systems   Constitutional: Negative.  Negative  "for chills, fever, malaise/fatigue and weight loss.   HENT: Negative.  Negative for hearing loss.    Eyes: Negative.  Negative for blurred vision and double vision.   Respiratory: Negative.  Negative for cough and shortness of breath.    Cardiovascular: Negative.  Negative for chest pain, palpitations, claudication and leg swelling.   Gastrointestinal: Negative.  Negative for abdominal pain, nausea and vomiting.   Genitourinary: Negative.  Negative for dysuria and urgency.   Musculoskeletal: Negative.  Negative for joint pain and myalgias.   Skin: Negative.  Negative for itching and rash.   Neurological: Negative.  Negative for dizziness, focal weakness, weakness and headaches.   Endo/Heme/Allergies: Negative.  Does not bruise/bleed easily.   Psychiatric/Behavioral: Negative.  Negative for depression. The patient is not nervous/anxious.         Objective:   BP (!) 168/90 (BP Location: Right arm, Patient Position: Sitting, BP Cuff Size: Adult)   Pulse 87   Ht 1.727 m (5' 8\")   Wt 89.5 kg (197 lb 5 oz)   SpO2 94%   BMI 30.00 kg/m²      Physical Exam   Constitutional: He is oriented to person, place, and time. He appears well-developed and well-nourished.   HENT:   Head: Normocephalic and atraumatic.   Eyes: Pupils are equal, round, and reactive to light. Conjunctivae are normal.   Neck: Normal range of motion. Neck supple.   Cardiovascular: Normal rate and regular rhythm.    Pulmonary/Chest: Effort normal and breath sounds normal.   Abdominal: Soft. Bowel sounds are normal.   Musculoskeletal: Normal range of motion.   Neurological: He is alert and oriented to person, place, and time.   Skin: Skin is warm and dry.   Psychiatric: He has a normal mood and affect.     CARDIAC STUDIES/PROCEDURES:     CARDIAC CATHETERIZATION CONCLUSIONS by Dr. Stanley (06/28/18)  Atherectomy and stenting of the left anterior descending artery with placement of Synergy 2.5x16 mm into the more proximal segment      CARDIAC " CATHETERIZATION CONCLUSIONS (06/26/18)  Percutaneous transluminal coronary angioplasty of the mid left   anterior descending artery stenosis with 1.5x15 mm  balloon; however,   unsuccessful attempts to balloon with any additional larger size equipment.     CARDIAC CATHETERIZATION CONCLUSIONS (06/02/18)  1.  Multivessel severe coronary artery disease with long, high-grade mid left   anterior descending artery stenosis, high-grade ostial first and second   diagonal branch stenosis, nonobstructive ostial circumflex artery stenosis and   nonobstructive right coronary artery stenosis.  2.  Elevated right heart pressures with PA systolic pressure of 75 mmHg.     CAROTID ULTRASOUND (06/22/18)  Mild (<50%) bilateral internal carotid disease  Normal vertebral and subclavianartery flow bilaterally.     CTA OF CHEST AND ABDOMEN (06/27/18)  1.  Very limited exam for evaluation of aortic structures as no intravenous contrast could be administered.  2.  Aortic annulus area of approximately 534 sq mm.  3.  Coronary ostia appear to be greater than 10 mm from the annulus.  4.  Bilateral pleural fluid collections, moderate on the RIGHT and small on the LEFT, with associated atelectasis.  5.  Mildly prominent pulmonary interstitium concerning for edema.  6.  Minimal ascites.  7.  No abdominal aortic aneurysm.  8.  Mild atherosclerotic calcification of the iliac arterial system bilaterally.  Evaluation is limited due to lack of IV contrast.  9.  Increased colonic stool.     DOBUTAMINE STRESS ECHOCARDIOGRAM (06/22/18)  Abnormal resting echo with mild dyskinesis of the LV apex and EF 30%. Resting aortic valve peak and mean gradients 36 / 20.  Stress images reveal increased contractility of basal to mid anterior and inferior walls, with more pronounced apical dyskinesis.  Peak / mean aortic valve gradients on 15 mcg dobutamine are 72 /43  Abnormal dobutamine stress echo demonstrating significant increase in aortic valve gradients  with dobutamine infusion.  LV dysfunction at rest and stress. Worsening apical dyskinesis with stress suggesting ischemia.     ECHOCARDIOGRAM CONCLUSIONS (07/30/18)  Prior echo done 07/03/18. Compared to the images of the study done -   there has been mild improvement of LVSF.   Mildly reduced left ventricular systolic function.  Left ventricular ejection fraction is visually estimated to be 45%.  Grade I diastolic dysfunction.  Known TAVR aortic valve that is functioning normally with normal   transvalvular gradients.  Vmax is 1.9  m/s. Transvalvular gradients are - Peak: 15 mmHg,  Mean: 8 mmHg.   Mild paravalvular leak is noted.  Unable to estimate pulmonary artery pressure due to an inadequate   tricuspid regurgitant jet.     ECHOCARDIOGRAM CONCLUSIONS (07/03/18)  Left ventricular ejection fraction is visually estimated to be 35%.  Restrictive diastolic function.  Moderate mitral regurgitation.  Known TAVR aortic valve that is functioning normally with normal transvalvular gradients.  AV Peak Velocity 1.6 m/s                 AV Peak Gradient 10.4 mmHg               AV Mean Gradient 5.5 mmHg      ECHOCARDIOGRAM CONCLUSIONS (06/01/18)  Moderately reduced left ventricular systolic function.  Left ventricular ejection fraction is visually estimated to be 35%.  Global hypokinesis with regional variation with severe hypokinesis of the anteroapical and septal walls.  Moderate aortic stenosis; severely of aortic stenosis   Aortic valve area calculated from the continuity equation is 0.82.   Vmax is 2.8 m/s. Transvalvular gradients are - Peak: 32 mmHg, Mean: 21 mmHg.  Unable to estimate pulmonary artery pressure due to an inadequate tricuspid regurgitant jet.  Moderate mitral regurgitation.  probably underestimated due to low ejection fraction.No prior study is available for comparison.      EKG performed on (07/03/18) EKG shows sinus rhythm with low voltage of frontal leads and non-specific T wave abnormalities.  EKG  performed on (07/02/18) EKG shows sinus rhythm with low voltage of frontal leads and non-specific T wave abnormalities.  EKG performed on (06/28/18) EKG shows sinus rhythm with low voltage of frontal leads and non-specific T wave abnormalities.  EKG performed on (06/26/18) EKG shows sinus rhythm with anterior Q waves with non-specific T wave abnormalities.  EKG performed on (06/25/18) EKG shows sinus rhythm with anterior Q waves with lateral ST segment depressions.  EKG performed on (06/21/18) EKG shows sinus rhythm.     Laboratory results of (04/39/19) were reviewed. Bun of 38 mg/dl, creatinine levels of 1.7 mg/dl noted.  Laboratory results of (08/01/18) Bun of 50 mg/dl, creatinine levels of 1.8 mg/dl noted.  Laboratory results of (07/20/18) Bun of 45 mg/dl, creatinine levels of 1.93 mg/dl noted.  Laboratory results of (07/03/18) Bun of 42 mg/dl, creatinine levels of 2.18 mg/dl noted.  Laboratory results of (07/02/18) Bun of 37 mg/dl, creatinine levels of 1.81 mg/dl noted.  Laboratory results of (06/29/18) Bun of 35 mg/dl, creatinine levels of 1.75 mg/dl noted.  Laboratory results of (06/28/18) Bun of 30 mg/dl, creatinine levels of 1.34 mg/dl noted.  Laboratory results of (06/27/18) Bun of 34 mg/dl, creatinine levels of 1.84 mg/dl noted.  Laboratory results of (06/26/18) Bun of 31 mg/dl, creatinine levels of 1.63 mg/dl noted.  Laboratory results of (06/25/18) Bun of 30 mg/dl, creatinine levels of 1.8 mg/dl noted.  Laboratory results of (06/18/18) Bun of 25 mg/dl, creatinine levels of 1.34 mg/dl noted.     TRANSESOPHAGEAL ECHOCARDIOGRAM CONCLUSIONS by Dr. Godwin (07/02/18)  Calcific aortic stenosis. PG= 24 peak and 15 mean. VTI 55 but EF=20%   Annular area 5.75 via Q lab.  Post .   #29 TAVR . Trace small velocity jet along AML. peak 4 and 3 mean   gradiente. Well functioning valve.   EF low 20% no change after TAVR.   Mild MR.    Assessment:   No diagnosis found.    Medical Decision Making:  Today's Assessment /  Status / Plan:     1. Successful transcatheter aortic valve replacement (TAVR) with # 29 Monroy Prashanth S3 valve, transfemoral approach, under general anesthesia (07/02/18): He is clinically doing well.  2. Chronic systolic congestive heart failure with cardiomyopathy: The overall volume status is adequate.  3. Coronary artery disease with stent placement: He is clinically doing well without recurrence of his angina.  We will continue with current medical care including ***.  4. Pleural effusion with thoracentesis (07/03/18)  5. Chronic kidney disease stage III (managed by Dr. Kirkland): We will continue to follow labs.  6. 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     We will follow up with him PRN from TAVR standpoint and have him continue follow up with his primary cardiologist, Avel Stokes.     CC [facility] [employer]

## 2019-05-31 NOTE — LETTER
[facility]   [address], [facility] [address]  [place] [address]  Phone: [phone]  Fax: [phone]              Ashish Wiley  1950    Encounter Date: 5/31/2019    Markus Fuller M.D.          PROGRESS NOTE:  Chief Complaint   Patient presents with   • Aortic Stenosis     s/p TAVR       Subjective:   Ashish Wiley is a 69 y.o. male who presents today for one year TAVR follow up.    Since the patient's last visit on 08/09/18, he has been doing well clinically. He denies fatigue, shortness of breath, dyspnea on exertion, chest pain, dizziness or syncope.    Past Medical History:   Diagnosis Date   • Acute exacerbation of CHF (congestive heart failure) ([facility]) 6/25/2018   • CAD (coronary artery disease)    • CHF (congestive heart failure) ([facility])    • Diabetes ([facility])    • Dilated cardiomyopathy ([facility])    • Hyperlipidemia    • Hypertension    • NYHA class 3 acute on chronic systolic heart failure ([facility]) 6/25/2018   • Severe aortic stenosis 6/25/2018     Past Surgical History:   Procedure Laterality Date   • TRANSCATHETER AORTIC VALVE REPLACEMENT  7/2/2018    Procedure: TRANSCATHETER AORTIC VALVE REPLACEMENT;  Surgeon: Markus Fuller M.D.;  Location: SURGERY [facility];  Service: Cardiac   • DAYANNA  7/2/2018    Procedure: DAYANNA;  Surgeon: Markus Fuller M.D.;  Location: SURGERY [facility];  Service: Cardiac   • AORTIC VALVE REPLACEMENT      S/P TAVR    • TONSILLECTOMY     • ZZZ CARDIAC CATH       Family History   Problem Relation Age of Onset   • Lung Disease Mother         COPD   • Heart Disease Father         valve disease   • Heart Failure Father    • Hypertension Father    • Hyperlipidemia Father    • Cancer Maternal Grandmother         BRAIN TUMOR   • Stroke Maternal Grandfather    • Other Paternal Grandmother         INTESTINAL PROBLEM   • Stroke Paternal Grandfather    • Prostate cancer Brother    • Other Brother         ortho     Social History          Social History   • Marital status:      Spouse name: N/A   • Number of children: N/A   • Years of education: N/A     Occupational History   • Not on file.     Social History Main Topics   • Smoking status: Former Smoker     Packs/day: 1.00     Years: 22.00     Quit date: 1/1/1989   • Smokeless tobacco: Never Used      Comment: stop cigar in 2015   • Alcohol use 0.6 oz/week     1 Glasses of wine per week      Comment: OCCASIONALLY   • Drug use: No   • Sexual activity: Yes     Partners: Female     Other Topics Concern   • Not on file     Social History Narrative   • No narrative on file     Allergies   Allergen Reactions   • Sulfa Drugs Unspecified     Kidney Stones     Medications reviewed.    Outpatient Encounter Prescriptions as of 5/31/2019   Medication Sig Dispense Refill   • FUROSEMIDE PO Take  by mouth.     • doxazosin (CARDURA) 2 MG Tab Take 1 Tab by mouth every day. 90 Tab 3   • ergocalciferol (DRISDOL) 94082 UNIT capsule Take 1 Cap by mouth every 7 days. 12 Cap 3   • clopidogrel (PLAVIX) 75 MG Tab Take 1 Tab by mouth every day. 90 Tab 3   • lisinopril (PRINIVIL) 10 MG Tab Take 1 Tab by mouth every day. 30 Tab 11   • Multiple Vitamins-Minerals (MULTIVITAMIN PO) Take  by mouth.     • carvedilol (COREG) 25 MG Tab Take 1 Tab by mouth 2 times a day, with meals. 60 Tab 11   • Cholecalciferol (HM VITAMIN D3) 4000 units Cap Take 1 Cap by mouth every day.     • Exenatide ER 2 MG Pen-injector Inject 2 mg as instructed every 7 days. 12 Each 0   • insulin glargine (LANTUS) 100 UNIT/ML Solution Inject 8 Units as instructed every evening. Pt had 8 units night prior to surgery     • atorvastatin (LIPITOR) 40 MG Tab Take 40 mg by mouth every evening.     • aspirin EC (ECOTRIN) 81 MG Tablet Delayed Response Take 81 mg by mouth every day.     • lisinopril (PRINIVIL) 20 MG Tab        No facility-administered encounter medications on file as of 5/31/2019.      Review of Systems   Constitutional: Negative.  Negative  "for chills, fever, malaise/fatigue and weight loss.   HENT: Negative.  Negative for hearing loss.    Eyes: Negative.  Negative for blurred vision and double vision.   Respiratory: Negative.  Negative for cough and shortness of breath.    Cardiovascular: Negative.  Negative for chest pain, palpitations, claudication and leg swelling.   Gastrointestinal: Negative.  Negative for abdominal pain, nausea and vomiting.   Genitourinary: Negative.  Negative for dysuria and urgency.   Musculoskeletal: Negative.  Negative for joint pain and myalgias.   Skin: Negative.  Negative for itching and rash.   Neurological: Negative.  Negative for dizziness, focal weakness, weakness and headaches.   Endo/Heme/Allergies: Negative.  Does not bruise/bleed easily.   Psychiatric/Behavioral: Negative.  Negative for depression. The patient is not nervous/anxious.         Objective:   BP (!) 168/90 (BP Location: Right arm, Patient Position: Sitting, BP Cuff Size: Adult)   Pulse 87   Ht 1.727 m (5' 8\")   Wt 89.5 kg (197 lb 5 oz)   SpO2 94%   BMI 30.00 kg/m²      Physical Exam   Constitutional: He is oriented to person, place, and time. He appears well-developed and well-nourished.   HENT:   Head: Normocephalic and atraumatic.   Eyes: Pupils are equal, round, and reactive to light. Conjunctivae are normal.   Neck: Normal range of motion. Neck supple.   Cardiovascular: Normal rate and regular rhythm.    Pulmonary/Chest: Effort normal and breath sounds normal.   Abdominal: Soft. Bowel sounds are normal.   Musculoskeletal: Normal range of motion.   Neurological: He is alert and oriented to person, place, and time.   Skin: Skin is warm and dry.   Psychiatric: He has a normal mood and affect.     CARDIAC STUDIES/PROCEDURES:     CARDIAC CATHETERIZATION CONCLUSIONS by Dr. Stanley (06/28/18)  Atherectomy and stenting of the left anterior descending artery with placement of Synergy 2.5x16 mm into the more proximal segment      CARDIAC " CATHETERIZATION CONCLUSIONS (06/26/18)  Percutaneous transluminal coronary angioplasty of the mid left   anterior descending artery stenosis with 1.5x15 mm  balloon; however,   unsuccessful attempts to balloon with any additional larger size equipment.     CARDIAC CATHETERIZATION CONCLUSIONS (06/02/18)  1.  Multivessel severe coronary artery disease with long, high-grade mid left   anterior descending artery stenosis, high-grade ostial first and second   diagonal branch stenosis, nonobstructive ostial circumflex artery stenosis and   nonobstructive right coronary artery stenosis.  2.  Elevated right heart pressures with PA systolic pressure of 75 mmHg.     CAROTID ULTRASOUND (06/22/18)  Mild (<50%) bilateral internal carotid disease  Normal vertebral and subclavianartery flow bilaterally.     CTA OF CHEST AND ABDOMEN (06/27/18)  1.  Very limited exam for evaluation of aortic structures as no intravenous contrast could be administered.  2.  Aortic annulus area of approximately 534 sq mm.  3.  Coronary ostia appear to be greater than 10 mm from the annulus.  4.  Bilateral pleural fluid collections, moderate on the RIGHT and small on the LEFT, with associated atelectasis.  5.  Mildly prominent pulmonary interstitium concerning for edema.  6.  Minimal ascites.  7.  No abdominal aortic aneurysm.  8.  Mild atherosclerotic calcification of the iliac arterial system bilaterally.  Evaluation is limited due to lack of IV contrast.  9.  Increased colonic stool.     DOBUTAMINE STRESS ECHOCARDIOGRAM (06/22/18)  Abnormal resting echo with mild dyskinesis of the LV apex and EF 30%. Resting aortic valve peak and mean gradients 36 / 20.  Stress images reveal increased contractility of basal to mid anterior and inferior walls, with more pronounced apical dyskinesis.  Peak / mean aortic valve gradients on 15 mcg dobutamine are 72 /43  Abnormal dobutamine stress echo demonstrating significant increase in aortic valve gradients  with dobutamine infusion.  LV dysfunction at rest and stress. Worsening apical dyskinesis with stress suggesting ischemia.     ECHOCARDIOGRAM CONCLUSIONS (07/30/18)  Prior echo done 07/03/18. Compared to the images of the study done -   there has been mild improvement of LVSF.   Mildly reduced left ventricular systolic function.  Left ventricular ejection fraction is visually estimated to be 45%.  Grade I diastolic dysfunction.  Known TAVR aortic valve that is functioning normally with normal   transvalvular gradients.  Vmax is 1.9  m/s. Transvalvular gradients are - Peak: 15 mmHg,  Mean: 8 mmHg.   Mild paravalvular leak is noted.  Unable to estimate pulmonary artery pressure due to an inadequate   tricuspid regurgitant jet.     ECHOCARDIOGRAM CONCLUSIONS (07/03/18)  Left ventricular ejection fraction is visually estimated to be 35%.  Restrictive diastolic function.  Moderate mitral regurgitation.  Known TAVR aortic valve that is functioning normally with normal transvalvular gradients.  AV Peak Velocity 1.6 m/s                 AV Peak Gradient 10.4 mmHg               AV Mean Gradient 5.5 mmHg      ECHOCARDIOGRAM CONCLUSIONS (06/01/18)  Moderately reduced left ventricular systolic function.  Left ventricular ejection fraction is visually estimated to be 35%.  Global hypokinesis with regional variation with severe hypokinesis of the anteroapical and septal walls.  Moderate aortic stenosis; severely of aortic stenosis   Aortic valve area calculated from the continuity equation is 0.82.   Vmax is 2.8 m/s. Transvalvular gradients are - Peak: 32 mmHg, Mean: 21 mmHg.  Unable to estimate pulmonary artery pressure due to an inadequate tricuspid regurgitant jet.  Moderate mitral regurgitation.  probably underestimated due to low ejection fraction.No prior study is available for comparison.      EKG performed on (07/03/18) EKG shows sinus rhythm with low voltage of frontal leads and non-specific T wave abnormalities.  EKG  performed on (07/02/18) EKG shows sinus rhythm with low voltage of frontal leads and non-specific T wave abnormalities.  EKG performed on (06/28/18) EKG shows sinus rhythm with low voltage of frontal leads and non-specific T wave abnormalities.  EKG performed on (06/26/18) EKG shows sinus rhythm with anterior Q waves with non-specific T wave abnormalities.  EKG performed on (06/25/18) EKG shows sinus rhythm with anterior Q waves with lateral ST segment depressions.  EKG performed on (06/21/18) EKG shows sinus rhythm.     Laboratory results of (04/39/19) were reviewed. Bun of 38 mg/dl, creatinine levels of 1.7 mg/dl noted.  Laboratory results of (08/01/18) Bun of 50 mg/dl, creatinine levels of 1.8 mg/dl noted.  Laboratory results of (07/20/18) Bun of 45 mg/dl, creatinine levels of 1.93 mg/dl noted.  Laboratory results of (07/03/18) Bun of 42 mg/dl, creatinine levels of 2.18 mg/dl noted.  Laboratory results of (07/02/18) Bun of 37 mg/dl, creatinine levels of 1.81 mg/dl noted.  Laboratory results of (06/29/18) Bun of 35 mg/dl, creatinine levels of 1.75 mg/dl noted.  Laboratory results of (06/28/18) Bun of 30 mg/dl, creatinine levels of 1.34 mg/dl noted.  Laboratory results of (06/27/18) Bun of 34 mg/dl, creatinine levels of 1.84 mg/dl noted.  Laboratory results of (06/26/18) Bun of 31 mg/dl, creatinine levels of 1.63 mg/dl noted.  Laboratory results of (06/25/18) Bun of 30 mg/dl, creatinine levels of 1.8 mg/dl noted.  Laboratory results of (06/18/18) Bun of 25 mg/dl, creatinine levels of 1.34 mg/dl noted.     TRANSESOPHAGEAL ECHOCARDIOGRAM CONCLUSIONS by Dr. Godwin (07/02/18)  Calcific aortic stenosis. PG= 24 peak and 15 mean. VTI 55 but EF=20%   Annular area 5.75 via Q lab.  Post .   #29 TAVR . Trace small velocity jet along AML. peak 4 and 3 mean   gradiente. Well functioning valve.   EF low 20% no change after TAVR.   Mild MR.    Assessment:   No diagnosis found.    Medical Decision Making:  Today's Assessment /  Status / Plan:     1. Successful transcatheter aortic valve replacement (TAVR) with # 29 Monroy Prashanth S3 valve, transfemoral approach, under general anesthesia (07/02/18): He is clinically doing well.  2. Chronic systolic congestive heart failure with cardiomyopathy: The overall volume status is adequate.  3. Coronary artery disease with stent placement: He is clinically doing well without recurrence of his angina.  We will continue with current medical care including ***.  4. Pleural effusion with thoracentesis (07/03/18)  5. Chronic kidney disease stage III (managed by Dr. Kirkland): We will continue to follow labs.  6. 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     We will follow up with him PRN from TAVR standpoint and have him continue follow up with his primary cardiologist, Avel Stokes.     CC [facility] [employer]

## 2019-05-31 NOTE — PROGRESS NOTES
Chief Complaint   Patient presents with   • Aortic Stenosis     s/p TAVR       Subjective:   Ashish Wiley is a 69 y.o. male who presents today for one year TAVR follow up.    Since the patient's last visit on 08/09/18, he has been doing well clinically. He admits to lower extremity edema. He denies fatigue, shortness of breath, dyspnea on exertion, chest pain, dizziness or syncope. He is able to play golf.    Past Medical History:   Diagnosis Date   • Acute exacerbation of CHF (congestive heart failure) (Formerly [facility]) 6/25/2018   • CAD (coronary artery disease)    • CHF (congestive heart failure) (Formerly [facility])    • Diabetes ([facility])    • Dilated cardiomyopathy ([facility]ly [facility])    • Hyperlipidemia    • Hypertension    • NYHA class 3 acute on chronic systolic heart failure ([facility]) 6/25/2018   • Severe aortic stenosis 6/25/2018     Past Surgical History:   Procedure Laterality Date   • TRANSCATHETER AORTIC VALVE REPLACEMENT  7/2/2018    Procedure: TRANSCATHETER AORTIC VALVE REPLACEMENT;  Surgeon: Markus Fuller M.D.;  Location: SURGERY [facility];  Service: Cardiac   • DAYANNA  7/2/2018    Procedure: DAYANNA;  Surgeon: Markus Fuller M.D.;  Location: SURGERY [facility];  Service: Cardiac   • AORTIC VALVE REPLACEMENT      S/P TAVR    • TONSILLECTOMY     • ZZZ CARDIAC CATH       Family History   Problem Relation Age of Onset   • Lung Disease Mother         COPD   • Heart Disease Father         valve disease   • Heart Failure Father    • Hypertension Father    • Hyperlipidemia Father    • Cancer Maternal Grandmother         BRAIN TUMOR   • Stroke Maternal Grandfather    • Other Paternal Grandmother         INTESTINAL PROBLEM   • Stroke Paternal Grandfather    • Prostate cancer Brother    • Other Brother         ortho     Social History     Social History   • Marital status:      Spouse name: N/A   • Number of children: N/A   • Years of education: N/A     Occupational History   • Not on file.     Social History Main Topics   • Smoking  status: Former Smoker     Packs/day: 1.00     Years: 22.00     Quit date: 1/1/1989   • Smokeless tobacco: Never Used      Comment: stop cigar in 2015   • Alcohol use 0.6 oz/week     1 Glasses of wine per week      Comment: OCCASIONALLY   • Drug use: No   • Sexual activity: Yes     Partners: Female     Other Topics Concern   • Not on file     Social History Narrative   • No narrative on file     Allergies   Allergen Reactions   • Sulfa Drugs Unspecified     Kidney Stones     Medications reviewed.    Outpatient Encounter Prescriptions as of 5/31/2019   Medication Sig Dispense Refill   • FUROSEMIDE PO Take  by mouth.     • doxazosin (CARDURA) 2 MG Tab Take 1 Tab by mouth every day. 90 Tab 3   • ergocalciferol (DRISDOL) 56226 UNIT capsule Take 1 Cap by mouth every 7 days. 12 Cap 3   • clopidogrel (PLAVIX) 75 MG Tab Take 1 Tab by mouth every day. 90 Tab 3   • lisinopril (PRINIVIL) 10 MG Tab Take 1 Tab by mouth every day. 30 Tab 11   • Multiple Vitamins-Minerals (MULTIVITAMIN PO) Take  by mouth.     • carvedilol (COREG) 25 MG Tab Take 1 Tab by mouth 2 times a day, with meals. 60 Tab 11   • Cholecalciferol (HM VITAMIN D3) 4000 units Cap Take 1 Cap by mouth every day.     • Exenatide ER 2 MG Pen-injector Inject 2 mg as instructed every 7 days. 12 Each 0   • insulin glargine (LANTUS) 100 UNIT/ML Solution Inject 8 Units as instructed every evening. Pt had 8 units night prior to surgery     • atorvastatin (LIPITOR) 40 MG Tab Take 40 mg by mouth every evening.     • aspirin EC (ECOTRIN) 81 MG Tablet Delayed Response Take 81 mg by mouth every day.     • [DISCONTINUED] lisinopril (PRINIVIL) 20 MG Tab        No facility-administered encounter medications on file as of 5/31/2019.      Review of Systems   Constitutional: Negative.  Negative for chills, fever, malaise/fatigue and weight loss.        Weight gain due to change to plant based diet.   HENT: Negative.  Negative for hearing loss.    Eyes: Negative.  Negative for blurred  "vision and double vision.   Respiratory: Negative.  Negative for cough and shortness of breath.    Cardiovascular: Positive for leg swelling. Negative for chest pain, palpitations and claudication.   Gastrointestinal: Negative.  Negative for abdominal pain, nausea and vomiting.   Genitourinary: Negative.  Negative for dysuria and urgency.   Musculoskeletal: Negative.  Negative for joint pain and myalgias.   Skin: Negative.  Negative for itching and rash.   Neurological: Negative.  Negative for dizziness, focal weakness, weakness and headaches.   Endo/Heme/Allergies: Negative.  Does not bruise/bleed easily.   Psychiatric/Behavioral: Negative.  Negative for depression. The patient is not nervous/anxious.         Objective:   BP (!) 168/90 (BP Location: Right arm, Patient Position: Sitting, BP Cuff Size: Adult)   Pulse 87   Ht 1.727 m (5' 8\")   Wt 89.5 kg (197 lb 5 oz)   SpO2 94%   BMI 30.00 kg/m²     Physical Exam   Constitutional: He is oriented to person, place, and time. He appears well-developed and well-nourished.   HENT:   Head: Normocephalic and atraumatic.   Eyes: Pupils are equal, round, and reactive to light. Conjunctivae are normal.   Neck: Normal range of motion. Neck supple.   Cardiovascular: Normal rate and regular rhythm.    Pulmonary/Chest: Effort normal and breath sounds normal.   Abdominal: Soft. Bowel sounds are normal.   Musculoskeletal: Normal range of motion. He exhibits edema.   Neurological: He is alert and oriented to person, place, and time.   Skin: Skin is warm and dry.   Psychiatric: He has a normal mood and affect.     CARDIAC STUDIES/PROCEDURES:     CARDIAC CATHETERIZATION CONCLUSIONS by Dr. Stanley (06/28/18)  Atherectomy and stenting of the left anterior descending artery with placement of Synergy 2.5x16 mm into the more proximal segment      CARDIAC CATHETERIZATION CONCLUSIONS (06/26/18)  Percutaneous transluminal coronary angioplasty of the mid left   anterior descending " artery stenosis with 1.5x15 mm  balloon; however,   unsuccessful attempts to balloon with any additional larger size equipment.     CARDIAC CATHETERIZATION CONCLUSIONS (06/02/18)  1.  Multivessel severe coronary artery disease with long, high-grade mid left   anterior descending artery stenosis, high-grade ostial first and second   diagonal branch stenosis, nonobstructive ostial circumflex artery stenosis and   nonobstructive right coronary artery stenosis.  2.  Elevated right heart pressures with PA systolic pressure of 75 mmHg.     CAROTID ULTRASOUND (06/22/18)  Mild (<50%) bilateral internal carotid disease  Normal vertebral and subclavianartery flow bilaterally.     CTA OF CHEST AND ABDOMEN (06/27/18)  1.  Very limited exam for evaluation of aortic structures as no intravenous contrast could be administered.  2.  Aortic annulus area of approximately 534 sq mm.  3.  Coronary ostia appear to be greater than 10 mm from the annulus.  4.  Bilateral pleural fluid collections, moderate on the RIGHT and small on the LEFT, with associated atelectasis.  5.  Mildly prominent pulmonary interstitium concerning for edema.  6.  Minimal ascites.  7.  No abdominal aortic aneurysm.  8.  Mild atherosclerotic calcification of the iliac arterial system bilaterally.  Evaluation is limited due to lack of IV contrast.  9.  Increased colonic stool.     DOBUTAMINE STRESS ECHOCARDIOGRAM (06/22/18)  Abnormal resting echo with mild dyskinesis of the LV apex and EF 30%. Resting aortic valve peak and mean gradients 36 / 20.  Stress images reveal increased contractility of basal to mid anterior and inferior walls, with more pronounced apical dyskinesis.  Peak / mean aortic valve gradients on 15 mcg dobutamine are 72 /43  Abnormal dobutamine stress echo demonstrating significant increase in aortic valve gradients with dobutamine infusion.  LV dysfunction at rest and stress. Worsening apical dyskinesis with stress suggesting ischemia.      ECHOCARDIOGRAM CONCLUSIONS (07/30/18)  Prior echo done 07/03/18. Compared to the images of the study done -   there has been mild improvement of LVSF.   Mildly reduced left ventricular systolic function.  Left ventricular ejection fraction is visually estimated to be 45%.  Grade I diastolic dysfunction.  Known TAVR aortic valve that is functioning normally with normal   transvalvular gradients.  Vmax is 1.9  m/s. Transvalvular gradients are - Peak: 15 mmHg,  Mean: 8 mmHg.   Mild paravalvular leak is noted.  Unable to estimate pulmonary artery pressure due to an inadequate   tricuspid regurgitant jet.     ECHOCARDIOGRAM CONCLUSIONS (07/03/18)  Left ventricular ejection fraction is visually estimated to be 35%.  Restrictive diastolic function.  Moderate mitral regurgitation.  Known TAVR aortic valve that is functioning normally with normal transvalvular gradients.  AV Peak Velocity 1.6 m/s                 AV Peak Gradient 10.4 mmHg               AV Mean Gradient 5.5 mmHg      ECHOCARDIOGRAM CONCLUSIONS (06/01/18)  Moderately reduced left ventricular systolic function.  Left ventricular ejection fraction is visually estimated to be 35%.  Global hypokinesis with regional variation with severe hypokinesis of the anteroapical and septal walls.  Moderate aortic stenosis; severely of aortic stenosis   Aortic valve area calculated from the continuity equation is 0.82.   Vmax is 2.8 m/s. Transvalvular gradients are - Peak: 32 mmHg, Mean: 21 mmHg.  Unable to estimate pulmonary artery pressure due to an inadequate tricuspid regurgitant jet.  Moderate mitral regurgitation.  probably underestimated due to low ejection fraction.No prior study is available for comparison.      EKG performed on (07/03/18) EKG shows sinus rhythm with low voltage of frontal leads and non-specific T wave abnormalities.  EKG performed on (07/02/18) EKG shows sinus rhythm with low voltage of frontal leads and non-specific T wave abnormalities.  EKG  performed on (06/28/18) EKG shows sinus rhythm with low voltage of frontal leads and non-specific T wave abnormalities.  EKG performed on (06/26/18) EKG shows sinus rhythm with anterior Q waves with non-specific T wave abnormalities.  EKG performed on (06/25/18) EKG shows sinus rhythm with anterior Q waves with lateral ST segment depressions.  EKG performed on (06/21/18) EKG shows sinus rhythm.     Laboratory results of (04/39/19) were reviewed. Bun of 38 mg/dl, creatinine levels of 1.7 mg/dl noted.  Laboratory results of (08/01/18) Bun of 50 mg/dl, creatinine levels of 1.8 mg/dl noted.  Laboratory results of (07/20/18) Bun of 45 mg/dl, creatinine levels of 1.93 mg/dl noted.  Laboratory results of (07/03/18) Bun of 42 mg/dl, creatinine levels of 2.18 mg/dl noted.  Laboratory results of (07/02/18) Bun of 37 mg/dl, creatinine levels of 1.81 mg/dl noted.  Laboratory results of (06/29/18) Bun of 35 mg/dl, creatinine levels of 1.75 mg/dl noted.  Laboratory results of (06/28/18) Bun of 30 mg/dl, creatinine levels of 1.34 mg/dl noted.  Laboratory results of (06/27/18) Bun of 34 mg/dl, creatinine levels of 1.84 mg/dl noted.  Laboratory results of (06/26/18) Bun of 31 mg/dl, creatinine levels of 1.63 mg/dl noted.  Laboratory results of (06/25/18) Bun of 30 mg/dl, creatinine levels of 1.8 mg/dl noted.  Laboratory results of (06/18/18) Bun of 25 mg/dl, creatinine levels of 1.34 mg/dl noted.     TRANSESOPHAGEAL ECHOCARDIOGRAM CONCLUSIONS by Dr. Godwin (07/02/18)  Calcific aortic stenosis. PG= 24 peak and 15 mean. VTI 55 but EF=20%   Annular area 5.75 via Q lab.  Post .   #29 TAVR . Trace small velocity jet along AML. peak 4 and 3 mean   gradiente. Well functioning valve.   EF low 20% no change after TAVR.   Mild MR.    Assessment:     1. S/P TAVR (transcatheter aortic valve replacement)     2. Chronic systolic congestive heart failure, NYHA class 1 (HCC)     3. Dilated cardiomyopathy (HCC)     4. Essential hypertension     5.  Dyslipidemia associated with type 2 diabetes mellitus (HCC)     6. Stage 3 chronic kidney disease (HCC)         Medical Decision Making:  Today's Assessment / Status / Plan:     1. Successful transcatheter aortic valve replacement (TAVR) with # 29 Monroy Prashanth S3 valve, transfemoral approach, under general anesthesia (07/02/18): He is clinically doing well, NYHA class I. We will repeat an echocardiogram.  2. Chronic systolic congestive heart failure with cardiomyopathy (managed by [facility]): The overall volume status is adequate.  3. Coronary artery disease with stent placement: He is clinically doing well without recurrence of his angina.  We will continue with current medical care including carvedilol, lisinopril and atorvastatin. We will discontinue aspirin and continue with Plavix therapy only one month after there procedure on 06/28/19.  4. Pleural effusion with thoracentesis (07/03/18)  5. Chronic kidney disease stage III (managed by Dr. Kirkland): We will continue to follow labs.  6. 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     We will follow up with him PRN from TAVR standpoint and have him continue follow up with his primary cardiologist, Avel Stokes.     CC Avel Stokes and Venus Collins

## 2019-06-06 ENCOUNTER — TELEPHONE (OUTPATIENT)
Dept: CARDIOLOGY | Facility: MEDICAL CENTER | Age: 69
End: 2019-06-06

## 2019-06-06 NOTE — TELEPHONE ENCOUNTER
Valve Program Functional Assessment: 19 1 year post-op     KCCQ12   1a) Showering/bathin  1b) Walking 1 block on ground: 5  1c) Hurrying or joggin  2) Swellin  3) Fatigue: 7  4) Shortness of breath: 7  5) Sleep sitting up: 5  6) Limited enjoyment of life: 5  7) Spend the rest of your life with HF: 4  8a) Hobbies, recreational activities:5  8b) Working or doing household chores:5  8c) Visiting family or friends: 5    5 meter walk test  1) _2.92_____ s/5m  2) _3.97_____ s/5m  3) _5.78_____ s/5m     Strength   1) _35_____ kg  2) _35_____ kg  3) _32_____ kg    RASHID ADLs  Patient independently preforms...   - Bathing: Yes   - Dressing: Yes   - Toileting: Yes   - Transferring: Yes   - Continence: Yes   - Feeding: Yes     Living Situation  Patient lives:  with spouse    Mobility Aids   Patient uses:  none

## 2019-06-14 ENCOUNTER — TELEPHONE (OUTPATIENT)
Dept: MEDICAL GROUP | Age: 69
End: 2019-06-14

## 2019-06-14 DIAGNOSIS — Z23 NEED FOR VACCINATION: ICD-10-CM

## 2019-06-14 NOTE — TELEPHONE ENCOUNTER
Patient is on the MA Schedule 06/24/2019 for Hep B vaccine/injection.    SPECIFIC Action To Be Taken: Orders pending, please sign.

## 2019-06-17 ENCOUNTER — OFFICE VISIT (OUTPATIENT)
Dept: MEDICAL GROUP | Age: 69
End: 2019-06-17
Payer: COMMERCIAL

## 2019-06-17 VITALS
HEART RATE: 89 BPM | HEIGHT: 68 IN | TEMPERATURE: 98.6 F | SYSTOLIC BLOOD PRESSURE: 110 MMHG | OXYGEN SATURATION: 95 % | WEIGHT: 193.4 LBS | DIASTOLIC BLOOD PRESSURE: 54 MMHG | BODY MASS INDEX: 29.31 KG/M2

## 2019-06-17 DIAGNOSIS — Z79.4 TYPE 2 DIABETES MELLITUS WITH MICROALBUMINURIA, WITH LONG-TERM CURRENT USE OF INSULIN (HCC): ICD-10-CM

## 2019-06-17 DIAGNOSIS — R80.9 TYPE 2 DIABETES MELLITUS WITH MICROALBUMINURIA, WITH LONG-TERM CURRENT USE OF INSULIN (HCC): ICD-10-CM

## 2019-06-17 DIAGNOSIS — E11.29 TYPE 2 DIABETES MELLITUS WITH MICROALBUMINURIA, WITH LONG-TERM CURRENT USE OF INSULIN (HCC): ICD-10-CM

## 2019-06-17 DIAGNOSIS — M70.21 OLECRANON BURSITIS OF RIGHT ELBOW: ICD-10-CM

## 2019-06-17 DIAGNOSIS — R22.40 LOCALIZED SWELLING OF LOWER LEG: ICD-10-CM

## 2019-06-17 PROCEDURE — 99214 OFFICE O/P EST MOD 30 MIN: CPT | Performed by: INTERNAL MEDICINE

## 2019-06-17 ASSESSMENT — PAIN SCALES - GENERAL: PAINLEVEL: NO PAIN

## 2019-06-18 NOTE — PROGRESS NOTES
Subjective:   Ashish Wiley is a 69 y.o. male here today for evaluation and management of:      1. Olecranon bursitis of right elbow  2. Localized swelling of lower leg  Patient complains of an onset of right ankle and right leg swelling for the last 7 weeks.   He reports his right ankle swelling has been constant since onset. Patient reports he developed similar swelling to his right elbow in the last 2 weeks. Patient denies any alleviating factors of his symptoms. He has treated his symptoms with Furosemide 20 mg daily without relief.  He denies any right ankle pain or trauma to right ankle or right elbow. Patient saw Dr. Estrada, Cardiology, who recommended patient wear compression socks to treat right leg swelling. Patient has been taking Plavix 75 mg daily since July 2018 s/p LAD stent placement and aortic valve replacement.   Patient denies any calf pain, shortness of breath, chest pain, nausea, vomiting, abdominal pain, or back pain. Patient denies any history of pulmonary embolism or DVT.  He just went back from [place] for his vacation.    3. Type 2 diabetes mellitus with microalbuminuria, with long-term current use of insulin ([facility])  Patient recently lost follow up with Endocrinologist. He is taking Lantus 8 units injections every night with Exenatide 2 mg injections every 7 days as prescribed by [facility] endocrinologist.  His last A1c was around 7 in February 2019.  He denies any side effects or episodes of hypoglycemia.  Patient requested to refer to endocrinologist in [facility].      Current medicines (including changes today)  Current Outpatient Prescriptions   Medication Sig Dispense Refill   • FUROSEMIDE PO Take 20 mg by mouth 1 time daily as needed.     • doxazosin (CARDURA) 2 MG Tab Take 1 Tab by mouth every day. 90 Tab 3   • ergocalciferol (DRISDOL) 26627 UNIT capsule Take 1 Cap by mouth every 7 days. 12 Cap 3   • clopidogrel (PLAVIX) 75 MG Tab Take 1 Tab by mouth every day. 90 Tab 3   • lisinopril  "(PRINIVIL) 10 MG Tab Take 1 Tab by mouth every day. 30 Tab 11   • Multiple Vitamins-Minerals (MULTIVITAMIN PO) Take  by mouth.     • carvedilol (COREG) 25 MG Tab Take 1 Tab by mouth 2 times a day, with meals. 60 Tab 11   • Cholecalciferol (HM VITAMIN D3) 4000 units Cap Take 1 Cap by mouth every day.     • Exenatide ER 2 MG Pen-injector Inject 2 mg as instructed every 7 days. 12 Each 0   • insulin glargine (LANTUS) 100 UNIT/ML Solution Inject 8 Units as instructed every evening. Pt had 8 units night prior to surgery     • atorvastatin (LIPITOR) 40 MG Tab Take 40 mg by mouth every evening.     • aspirin EC (ECOTRIN) 81 MG Tablet Delayed Response Take 81 mg by mouth every day.       No current facility-administered medications for this visit.      He  has a past medical history of Acute exacerbation of CHF (congestive heart failure) (Former[facility]) (6/25/2018); CAD (coronary artery disease); CHF (congestive heart failure) (Former[facility]); Diabetes ([facility]); Dilated cardiomyopathy ([facility]); Hyperlipidemia; Hypertension; NYHA class 3 acute on chronic systolic heart failure ([facility]) (6/25/2018); and Severe aortic stenosis (6/25/2018).    ROS   No chest pain, no shortness of breath, no abdominal pain       Objective:     /54 (BP Location: Left arm, Patient Position: Sitting, BP Cuff Size: Adult)   Pulse 89   Temp 37 °C (98.6 °F) (Temporal)   Ht 1.727 m (5' 8\")   Wt 87.7 kg (193 lb 6.4 oz)   SpO2 95%  Body mass index is 29.41 kg/m².   Physical Exam:  General: Alert, oriented and no acute distress.  Eye contact is good, speech goal directed, affect calm  HEENT: conjunctiva non-injected, sclera non-icteric.  Oral mucous membranes pink and moist with no lesions.  Pinna normal.   Lungs: Normal respiratory effort, clear to auscultation bilaterally with good excursion.  CV: regular rate and rhythm. No murmurs. No carotid bruits.   Abdomen: soft, non distended, nontender, No CVAT, Bowel sound normal.  Ext: no pitting edema on left leg, edema on " right ankle and right leg.  Color normal, temperature normal.  Musculoskeletal exam: Swelling on the right lower extremity and right ankle.  Palpable swelling on olecranon of right elbow but nontender on palpation of the swelling and right elbow joint.    Assessment and Plan:   The following treatment plan was discussed     1. Olecranon bursitis of right elbow  - This is a new problem. Discussed possible causes with patient.  This could be secondary to all cranial bursitis.  Patient was referred to [facility] for further evaluation.  Advised to avoid repetitive movement and wear elbow brace.  He does not have any pain currently. - Advised to take Tylenol 500 mg 3 times daily as needed if he has pain.  Advised to avoid NSAIDs due to cardiovascular disease and taking Plavix currently.  Advised to apply ice twice a day as needed.  - REFERRAL TO ORTHOPEDICS    2. Localized swelling of lower leg  - This is a new problem.  Lung exams are clear and no signs of pulmonary edema on exam. Discussed possible causes with patient. Symptoms unlikely secondary to gout attack as there is no redness or tenderness on exam. Lower Ext Ultrasound was ordered for urgent evaluation as concerning of possible DVT given his unilateral leg swelling in 7 weeks.    - Patient denied prolonged immobilization, but he just went back from [place] for vacation.  - Patient was advised to present to the ER or seek urgent medical care if he develops any sudden shortness of breath or crushing exertional chest pain or palpitation.   - Patient is advised to elevate legs at rest, wear compression stocking daily, and take furosemide 20 mg twice daily.  Advised to monitor blood pressure at home and skip the dose of furosemide if blood pressure is less than 110/60.  - Advised to eat low-sodium diet and keep well-hydrated with water.  - US-EXTREMITY VENOUS LOWER UNILAT RIGHT; Future    3. Type 2 diabetes mellitus with microalbuminuria, with long-term current  use of insulin (HCC)  - Chronic, stable. Currently taking Lantus 8 units injections every night with Exenatide 2 mg injections every 7 days. Continue with current regimen. Patient is referred to Endocrinologist as he lost follow up with previous endocrinologist.   - Counseled to comply with medication and diet.   - Counseled signs and symptoms of hypoglycemia and management of hypoglycemia.   - Recommend to have retinal eye exam once a year.  - Advised to check both feet daily.  - REFERRAL TO ENDOCRINOLOGY      Followup: Return in about 8 weeks (around 8/13/2019), or if symptoms worsen or fail to improve, for Diabetes, Hypertension, Hyperlipidemia, CKD, Lab review.      Please note that this dictation was created using voice recognition software. I have made every reasonable attempt to correct obvious errors, but I expect that there may have unintended errors in text, spelling, punctuation, or grammar that I did not discover.    IJulio (Sebleibe), am scribing for, and in the presence of, Venus Claros M.D..    Electronically signed by: Julio Sullivan (Ricky), 6/17/2019    I, Venus Claros M.D., personally performed the services described in this documentation, as scribed by Julio Sullivan in my presence, and it is both accurate and complete.

## 2019-06-21 ENCOUNTER — HOSPITAL ENCOUNTER (OUTPATIENT)
Dept: RADIOLOGY | Facility: MEDICAL CENTER | Age: 69
End: 2019-06-21
Attending: INTERNAL MEDICINE
Payer: COMMERCIAL

## 2019-06-21 DIAGNOSIS — R22.40 LOCALIZED SWELLING OF LOWER LEG: ICD-10-CM

## 2019-06-21 PROCEDURE — 93971 EXTREMITY STUDY: CPT | Mod: RT

## 2019-06-24 ENCOUNTER — NON-PROVIDER VISIT (OUTPATIENT)
Dept: MEDICAL GROUP | Age: 69
End: 2019-06-24
Payer: COMMERCIAL

## 2019-06-24 DIAGNOSIS — Z23 NEED FOR VACCINATION: ICD-10-CM

## 2019-06-24 PROCEDURE — 90471 IMMUNIZATION ADMIN: CPT | Performed by: INTERNAL MEDICINE

## 2019-06-24 PROCEDURE — 90746 HEPB VACCINE 3 DOSE ADULT IM: CPT | Performed by: INTERNAL MEDICINE

## 2019-06-24 NOTE — PROGRESS NOTES
"Ashish Wiley is a 69 y.o. male here for a non-provider visit for:   HEPATITIS B 3 of 3    Reason for immunization: continue or complete series started at the office  Immunization records indicate need for vaccine: Yes, confirmed with Epic and confirmed with [place] [employer]  Minimum interval has been met for this vaccine: Yes  ABN completed: Not Indicated    Order and dose verified by: PT  VIS Dated  8/2016 was given to patient: Yes  All IAC Questionnaire questions were answered \"No.\"    Patient tolerated injection and no adverse effects were observed or reported: Yes    Pt scheduled for next dose in series: Not Indicated      "

## 2019-07-02 ENCOUNTER — PATIENT MESSAGE (OUTPATIENT)
Dept: HEALTH INFORMATION MANAGEMENT | Facility: OTHER | Age: 69
End: 2019-07-02

## 2019-07-09 ENCOUNTER — TELEPHONE (OUTPATIENT)
Dept: CARDIOLOGY | Facility: MEDICAL CENTER | Age: 69
End: 2019-07-09

## 2019-07-09 ENCOUNTER — HOSPITAL ENCOUNTER (OUTPATIENT)
Dept: CARDIOLOGY | Facility: MEDICAL CENTER | Age: 69
End: 2019-07-09
Attending: INTERNAL MEDICINE
Payer: COMMERCIAL

## 2019-07-09 DIAGNOSIS — Z95.2 S/P TAVR (TRANSCATHETER AORTIC VALVE REPLACEMENT): ICD-10-CM

## 2019-07-09 LAB
LV EJECT FRACT  99904: 55
LV EJECT FRACT MOD 2C 99903: 63.53
LV EJECT FRACT MOD 4C 99902: 50.12
LV EJECT FRACT MOD BP 99901: 55.78

## 2019-07-09 PROCEDURE — 93306 TTE W/DOPPLER COMPLETE: CPT

## 2019-07-09 PROCEDURE — 93306 TTE W/DOPPLER COMPLETE: CPT | Mod: 26 | Performed by: INTERNAL MEDICINE

## 2019-07-09 NOTE — TELEPHONE ENCOUNTER
Markus Fuller M.D.  Lyn Mendosa RISABEL.             Please call patient with unremarkable echocardiogram results showing normally functioning TAVR valve.     Thanks.  YOLI.      Result letter mailed to pt.

## 2019-07-09 NOTE — LETTER
July 9, 2019        Ashish Wiley  [address] [place]  [place] [address]          Dear Ashish,    We have received the results of your recent:    Echocardiogram.     Your test came back unremarkable showing normally functioning TAVR valve. Good news! Please follow up as previously discussed with your physician.      Feel free to call us with any questions.        Sincerely,    Felicia NORWOOD / Dr. Fuller

## 2019-07-18 ENCOUNTER — OFFICE VISIT (OUTPATIENT)
Dept: CARDIOLOGY | Facility: MEDICAL CENTER | Age: 69
End: 2019-07-18
Payer: COMMERCIAL

## 2019-07-18 VITALS
SYSTOLIC BLOOD PRESSURE: 126 MMHG | HEIGHT: 68 IN | BODY MASS INDEX: 28.95 KG/M2 | WEIGHT: 191 LBS | OXYGEN SATURATION: 97 % | HEART RATE: 80 BPM | DIASTOLIC BLOOD PRESSURE: 60 MMHG

## 2019-07-18 DIAGNOSIS — I50.22 CHRONIC SYSTOLIC CONGESTIVE HEART FAILURE, NYHA CLASS 1 (HCC): ICD-10-CM

## 2019-07-18 DIAGNOSIS — E11.69 DYSLIPIDEMIA ASSOCIATED WITH TYPE 2 DIABETES MELLITUS (HCC): ICD-10-CM

## 2019-07-18 DIAGNOSIS — Z95.2 S/P TAVR (TRANSCATHETER AORTIC VALVE REPLACEMENT): ICD-10-CM

## 2019-07-18 DIAGNOSIS — I50.20 ACC/AHA STAGE C SYSTOLIC HEART FAILURE (HCC): ICD-10-CM

## 2019-07-18 DIAGNOSIS — I25.10 CORONARY ARTERY DISEASE DUE TO CALCIFIED CORONARY LESION: ICD-10-CM

## 2019-07-18 DIAGNOSIS — I25.84 CORONARY ARTERY DISEASE DUE TO CALCIFIED CORONARY LESION: ICD-10-CM

## 2019-07-18 DIAGNOSIS — I10 ESSENTIAL HYPERTENSION: ICD-10-CM

## 2019-07-18 DIAGNOSIS — E78.5 DYSLIPIDEMIA ASSOCIATED WITH TYPE 2 DIABETES MELLITUS (HCC): ICD-10-CM

## 2019-07-18 PROCEDURE — 99214 OFFICE O/P EST MOD 30 MIN: CPT | Performed by: INTERNAL MEDICINE

## 2019-07-18 RX ORDER — FUROSEMIDE 20 MG/1
20 TABLET ORAL 2 TIMES DAILY
COMMUNITY
End: 2019-08-13

## 2019-07-18 RX ORDER — ATORVASTATIN CALCIUM 10 MG/1
10 TABLET, FILM COATED ORAL NIGHTLY
COMMUNITY
End: 2019-08-13

## 2019-07-18 NOTE — LETTER
[facility]   [address], [facility] [address]  [place] [address]  Phone: [phone]  Fax: [phone]              Ashish Wiley  1950    Encounter Date: 7/18/2019    Denis Estrada M.D.          PROGRESS NOTE:  Chief Complaint   Patient presents with   • Edema       Subjective:   Ashish Wiley is a 69 y.o. male who presents today for follow-up of his congestive heart failure secondary to valvular heart disease.  He underwent a TAVR in July 2018.  He also has coronary artery disease with a prior stent, hypertension and dyslipidemia.    At the time of his last visit, he was having significant edema and we discontinued amlodipine.  We placed him on low-dose doxazosin.  He has also had his atorvastatin reduced to 10 mg.  The exact reason is unclear.  His blood pressures at home been under good control with systolics running about 125.    He denies any chest discomfort, dyspnea, palpitations or lightheadedness.  He is exercising regularly without difficulty.  He does have some residual right lower extremity edema.        Past Medical History:   Diagnosis Date   • Acute exacerbation of CHF (congestive heart failure) ([facility]) 6/25/2018   • CAD (coronary artery disease)    • CHF (congestive heart failure) ([facility])    • Diabetes ([facility])    • Dilated cardiomyopathy ([facility])    • Hyperlipidemia    • Hypertension    • NYHA class 3 acute on chronic systolic heart failure ([facility]) 6/25/2018   • Severe aortic stenosis 6/25/2018     Past Surgical History:   Procedure Laterality Date   • TRANSCATHETER AORTIC VALVE REPLACEMENT  7/2/2018    Procedure: TRANSCATHETER AORTIC VALVE REPLACEMENT;  Surgeon: Markus Fuller M.D.;  Location: SURGERY [facility];  Service: Cardiac   • DAYANNA  7/2/2018    Procedure: DAYANNA;  Surgeon: Markus Fuller M.D.;  Location: SURGERY [facility];  Service: Cardiac   • AORTIC VALVE REPLACEMENT      S/P TAVR    • TONSILLECTOMY     • ZZZ CARDIAC CATH            Family History   Problem Relation Age of Onset   • Lung Disease Mother         COPD   • Heart Disease Father         valve disease   • Heart Failure Father    • Hypertension Father    • Hyperlipidemia Father    • Cancer Maternal Grandmother         BRAIN TUMOR   • Stroke Maternal Grandfather    • Other Paternal Grandmother         INTESTINAL PROBLEM   • Stroke Paternal Grandfather    • Prostate cancer Brother    • Other Brother         ortho     Social History     Social History   • Marital status:      Spouse name: N/A   • Number of children: N/A   • Years of education: N/A     Occupational History   • Not on file.     Social History Main Topics   • Smoking status: Former Smoker     Packs/day: 1.00     Years: 22.00     Quit date: 1/1/1989   • Smokeless tobacco: Never Used      Comment: stop cigar in 2015   • Alcohol use 0.6 oz/week     1 Glasses of wine per week      Comment: OCCASIONALLY   • Drug use: No   • Sexual activity: Yes     Partners: Female     Other Topics Concern   • Not on file     Social History Narrative   • No narrative on file     Allergies   Allergen Reactions   • Sulfa Drugs Unspecified     Kidney Stones     Outpatient Encounter Prescriptions as of 7/18/2019   Medication Sig Dispense Refill   • furosemide (LASIX) 20 MG Tab Take 20 mg by mouth 2 times a day.     • atorvastatin (LIPITOR) 10 MG Tab Take 10 mg by mouth every evening.     • doxazosin (CARDURA) 2 MG Tab Take 1 Tab by mouth every day. 90 Tab 3   • ergocalciferol (DRISDOL) 77058 UNIT capsule Take 1 Cap by mouth every 7 days. 12 Cap 3   • clopidogrel (PLAVIX) 75 MG Tab Take 1 Tab by mouth every day. 90 Tab 3   • lisinopril (PRINIVIL) 10 MG Tab Take 1 Tab by mouth every day. 30 Tab 11   • Multiple Vitamins-Minerals (MULTIVITAMIN PO) Take  by mouth.     • carvedilol (COREG) 25 MG Tab Take 1 Tab by mouth 2 times a day, with meals. 60 Tab 11   • Cholecalciferol (HM VITAMIN D3) 4000 units Cap Take 1 Cap by mouth every day.     •  "Exenatide ER 2 MG Pen-injector Inject 2 mg as instructed every 7 days. 12 Each 0   • insulin glargine (LANTUS) 100 UNIT/ML Solution Inject 8 Units as instructed every evening. Pt had 8 units night prior to surgery     • aspirin EC (ECOTRIN) 81 MG Tablet Delayed Response Take 81 mg by mouth every day.     • FUROSEMIDE PO Take 20 mg by mouth 1 time daily as needed.     • atorvastatin (LIPITOR) 40 MG Tab Take 40 mg by mouth every evening.       No facility-administered encounter medications on file as of 7/18/2019.      ROS     Objective:   /60 (BP Location: Left arm, Patient Position: Sitting, BP Cuff Size: Adult)   Pulse 80   Ht 1.727 m (5' 8\")   Wt 86.6 kg (191 lb)   SpO2 97%   BMI 29.04 kg/m²      Physical Exam   Constitutional: He appears well-developed and well-nourished.   Neck: No JVD present.   Cardiovascular: Normal rate and regular rhythm.    No murmur heard.  Pulmonary/Chest: Effort normal and breath sounds normal. He has no rales.   Abdominal: Soft. There is no tenderness.   Musculoskeletal: He exhibits edema (1+ right pretibial).       Lab Results   Component Value Date/Time    CHOLSTRLTOT 166 02/21/2019 06:39 AM    LDL 57 02/21/2019 06:39 AM    HDL 51 02/21/2019 06:39 AM    TRIGLYCERIDE 288 (H) 02/21/2019 06:39 AM       Lab Results   Component Value Date/Time    SODIUM 135 04/30/2019 07:10 AM    POTASSIUM 4.5 04/30/2019 07:10 AM    CHLORIDE 107 04/30/2019 07:10 AM    CO2 23 04/30/2019 07:10 AM    GLUCOSE 198 (H) 04/30/2019 07:10 AM    BUN 38 (H) 04/30/2019 07:10 AM    CREATININE 1.70 (H) 04/30/2019 07:10 AM     Lab Results   Component Value Date/Time    ALKPHOSPHAT 96 02/21/2019 06:39 AM    ASTSGOT 20 02/21/2019 06:39 AM    ALTSGPT 23 02/21/2019 06:39 AM    TBILIRUBIN 0.4 02/21/2019 06:39 AM      Lab Results   Component Value Date/Time    BNPBTYPENAT 722 (H) 08/01/2018 09:32 AM      Transthoracic  Echo Report      Echocardiography Laboratory    CONCLUSIONS  Prior echo 7/30/18; compared to the " report of the study done - there   has been improvement of LVSF.   Normal left ventricular systolic function.  Left ventricular ejection fraction is visually estimated to be 55%.  Known TAVR aortic valve that is functioning normally with normal   transvalvular gradients.  Vmax is 1.8  m/s. Transvalvular gradients are - Peak: 13 mmHg, Mean: 7   mmHg.   There is mild paravalvular leakage.    AMAYA CLAIRE  Exam Date:         07/09/2019     Assessment:     1. S/P TAVR (transcatheter aortic valve replacement)     2. Coronary artery disease due to calcified coronary lesion: Status post atherectomy and stenting of the LAD in June 2018.  Nonobstructive disease in the RCA and circumflex.     3. Essential hypertension     4. Dyslipidemia associated with type 2 diabetes mellitus ([facility])     5. ACC/AHA stage C systolic heart failure ([facility])     6. Chronic systolic congestive heart failure, NYHA class 1 ([facility])         Medical Decision Making:  Today's Assessment / Status / Plan:     Mr. Claire is clinically stable.  His left lower extremity edema resolves when we discontinued amlodipine.  He really has noted no improvement with diuretic therapy on his right lower extremity edema.  He appears to be euvolemic today and we will discontinue furosemide.  He does use a compression stocking on the right lower extremity and we did discuss elevation.  If he develops significant left lower extremity edema he will give us a call.  We will consider low-dose hydrochlorothiazide or possibly increasing his other antihypertensives of his blood pressure goes up significantly.  Obviously, if he develops significant edema he may need to go back on a loop diuretic.  He is going to follow-up with nephrology in about a month and have lab work prior.  He will follow-up with cardiology in a couple of months.      Venus Claros M.D.  [address]  [place] [id number]  VIA In Basket

## 2019-07-18 NOTE — PROGRESS NOTES
Chief Complaint   Patient presents with   • Edema       Subjective:   Ashish Wiley is a 69 y.o. male who presents today for follow-up of his congestive heart failure secondary to valvular heart disease.  He underwent a TAVR in July 2018.  He also has coronary artery disease with a prior stent, hypertension and dyslipidemia.    At the time of his last visit, he was having significant edema and we discontinued amlodipine.  We placed him on low-dose doxazosin.  He has also had his atorvastatin reduced to 10 mg.  The exact reason is unclear.  His blood pressures at home been under good control with systolics running about 125.    He denies any chest discomfort, dyspnea, palpitations or lightheadedness.  He is exercising regularly without difficulty.  He does have some residual right lower extremity edema.        Past Medical History:   Diagnosis Date   • Acute exacerbation of CHF (congestive heart failure) ([facility]) 6/25/2018   • CAD (coronary artery disease)    • CHF (congestive heart failure) ([facility])    • Diabetes ([facility])    • Dilated cardiomyopathy ([facility])    • Hyperlipidemia    • Hypertension    • NYHA class 3 acute on chronic systolic heart failure ([facility]) 6/25/2018   • Severe aortic stenosis 6/25/2018     Past Surgical History:   Procedure Laterality Date   • TRANSCATHETER AORTIC VALVE REPLACEMENT  7/2/2018    Procedure: TRANSCATHETER AORTIC VALVE REPLACEMENT;  Surgeon: Markus Fuller M.D.;  Location: SURGERY [facility];  Service: Cardiac   • DAYANNA  7/2/2018    Procedure: DAYANNA;  Surgeon: Markus Fuller M.D.;  Location: SURGERY [facility];  Service: Cardiac   • AORTIC VALVE REPLACEMENT      S/P TAVR    • TONSILLECTOMY     • ZZZ CARDIAC CATH       Family History   Problem Relation Age of Onset   • Lung Disease Mother         COPD   • Heart Disease Father         valve disease   • Heart Failure Father    • Hypertension Father    • Hyperlipidemia Father    • Cancer Maternal Grandmother         BRAIN TUMOR   • Stroke  Maternal Grandfather    • Other Paternal Grandmother         INTESTINAL PROBLEM   • Stroke Paternal Grandfather    • Prostate cancer Brother    • Other Brother         ortho     Social History     Social History   • Marital status:      Spouse name: N/A   • Number of children: N/A   • Years of education: N/A     Occupational History   • Not on file.     Social History Main Topics   • Smoking status: Former Smoker     Packs/day: 1.00     Years: 22.00     Quit date: 1/1/1989   • Smokeless tobacco: Never Used      Comment: stop cigar in 2015   • Alcohol use 0.6 oz/week     1 Glasses of wine per week      Comment: OCCASIONALLY   • Drug use: No   • Sexual activity: Yes     Partners: Female     Other Topics Concern   • Not on file     Social History Narrative   • No narrative on file     Allergies   Allergen Reactions   • Sulfa Drugs Unspecified     Kidney Stones     Outpatient Encounter Prescriptions as of 7/18/2019   Medication Sig Dispense Refill   • furosemide (LASIX) 20 MG Tab Take 20 mg by mouth 2 times a day.     • atorvastatin (LIPITOR) 10 MG Tab Take 10 mg by mouth every evening.     • doxazosin (CARDURA) 2 MG Tab Take 1 Tab by mouth every day. 90 Tab 3   • ergocalciferol (DRISDOL) 93650 UNIT capsule Take 1 Cap by mouth every 7 days. 12 Cap 3   • clopidogrel (PLAVIX) 75 MG Tab Take 1 Tab by mouth every day. 90 Tab 3   • lisinopril (PRINIVIL) 10 MG Tab Take 1 Tab by mouth every day. 30 Tab 11   • Multiple Vitamins-Minerals (MULTIVITAMIN PO) Take  by mouth.     • carvedilol (COREG) 25 MG Tab Take 1 Tab by mouth 2 times a day, with meals. 60 Tab 11   • Cholecalciferol (HM VITAMIN D3) 4000 units Cap Take 1 Cap by mouth every day.     • Exenatide ER 2 MG Pen-injector Inject 2 mg as instructed every 7 days. 12 Each 0   • insulin glargine (LANTUS) 100 UNIT/ML Solution Inject 8 Units as instructed every evening. Pt had 8 units night prior to surgery     • aspirin EC (ECOTRIN) 81 MG Tablet Delayed Response Take 81  "mg by mouth every day.     • FUROSEMIDE PO Take 20 mg by mouth 1 time daily as needed.     • atorvastatin (LIPITOR) 40 MG Tab Take 40 mg by mouth every evening.       No facility-administered encounter medications on file as of 7/18/2019.      ROS     Objective:   /60 (BP Location: Left arm, Patient Position: Sitting, BP Cuff Size: Adult)   Pulse 80   Ht 1.727 m (5' 8\")   Wt 86.6 kg (191 lb)   SpO2 97%   BMI 29.04 kg/m²     Physical Exam   Constitutional: He appears well-developed and well-nourished.   Neck: No JVD present.   Cardiovascular: Normal rate and regular rhythm.    No murmur heard.  Pulmonary/Chest: Effort normal and breath sounds normal. He has no rales.   Abdominal: Soft. There is no tenderness.   Musculoskeletal: He exhibits edema (1+ right pretibial).       Lab Results   Component Value Date/Time    CHOLSTRLTOT 166 02/21/2019 06:39 AM    LDL 57 02/21/2019 06:39 AM    HDL 51 02/21/2019 06:39 AM    TRIGLYCERIDE 288 (H) 02/21/2019 06:39 AM       Lab Results   Component Value Date/Time    SODIUM 135 04/30/2019 07:10 AM    POTASSIUM 4.5 04/30/2019 07:10 AM    CHLORIDE 107 04/30/2019 07:10 AM    CO2 23 04/30/2019 07:10 AM    GLUCOSE 198 (H) 04/30/2019 07:10 AM    BUN 38 (H) 04/30/2019 07:10 AM    CREATININE 1.70 (H) 04/30/2019 07:10 AM     Lab Results   Component Value Date/Time    ALKPHOSPHAT 96 02/21/2019 06:39 AM    ASTSGOT 20 02/21/2019 06:39 AM    ALTSGPT 23 02/21/2019 06:39 AM    TBILIRUBIN 0.4 02/21/2019 06:39 AM      Lab Results   Component Value Date/Time    BNPBTYPENAT 722 (H) 08/01/2018 09:32 AM      Transthoracic  Echo Report      Echocardiography Laboratory    CONCLUSIONS  Prior echo 7/30/18; compared to the report of the study done - there   has been improvement of LVSF.   Normal left ventricular systolic function.  Left ventricular ejection fraction is visually estimated to be 55%.  Known TAVR aortic valve that is functioning normally with normal   transvalvular gradients.  Vmax is " 1.8  m/s. Transvalvular gradients are - Peak: 13 mmHg, Mean: 7   mmHg.   There is mild paravalvular leakage.    AMAYA CLAIRE  Exam Date:         07/09/2019     Assessment:     1. S/P TAVR (transcatheter aortic valve replacement)     2. Coronary artery disease due to calcified coronary lesion: Status post atherectomy and stenting of the LAD in June 2018.  Nonobstructive disease in the RCA and circumflex.     3. Essential hypertension     4. Dyslipidemia associated with type 2 diabetes mellitus ([facility])     5. ACC/AHA stage C systolic heart failure ([facility])     6. Chronic systolic congestive heart failure, NYHA class 1 ([facility])         Medical Decision Making:  Today's Assessment / Status / Plan:     Mr. Claire is clinically stable.  His left lower extremity edema resolved when we discontinued amlodipine.  He really has noted no improvement with diuretic therapy of his right lower extremity edema.  He appears to be euvolemic today and we will discontinue furosemide.  He does use a compression stocking on the right lower extremity and we did discuss elevation.  If he develops significant left lower extremity edema he will give us a call.  We will consider low-dose hydrochlorothiazide or possibly increasing his other antihypertensives if his blood pressure goes up significantly.  Obviously, if he develops significant edema he may need to go back on a loop diuretic.  He is going to follow-up with nephrology in about a month and have lab work prior.  He will follow-up with cardiology in a couple of months.

## 2019-08-02 RX ORDER — CARVEDILOL 25 MG/1
25 TABLET ORAL 2 TIMES DAILY WITH MEALS
Qty: 180 TAB | Refills: 3 | Status: SHIPPED | OUTPATIENT
Start: 2019-08-02 | End: 2020-01-22 | Stop reason: SDUPTHER

## 2019-08-10 NOTE — ASSESSMENT & PLAN NOTE
Patient is taking lisinopril 40 mg daily. He still has microalbuminuria. His kidney function is slightly declining from previous blood test. His potassium is normal. Patient and wife stated that he is not good at hydration.  Patient is advised to follow-up with nephrologist again.    Results for ALETHEA AMAYA MATT (MRN [id number]) as of 5/23/2018 10:32   Ref. Range 5/10/2018 06:41   Micro Alb Creat Ratio Latest Ref Range: 0 - 30 mg/g 997 (H)   Creatinine, Urine Latest Units: mg/dL 119.60   Microalbumin, Urine Random Latest Units: mg/dL 119.3      [facility]

## 2019-08-13 ENCOUNTER — OFFICE VISIT (OUTPATIENT)
Dept: MEDICAL GROUP | Age: 69
End: 2019-08-13
Payer: COMMERCIAL

## 2019-08-13 VITALS
HEART RATE: 72 BPM | BODY MASS INDEX: 28.97 KG/M2 | HEIGHT: 69 IN | DIASTOLIC BLOOD PRESSURE: 64 MMHG | WEIGHT: 195.6 LBS | OXYGEN SATURATION: 100 % | SYSTOLIC BLOOD PRESSURE: 148 MMHG | TEMPERATURE: 97.1 F

## 2019-08-13 DIAGNOSIS — I10 ESSENTIAL HYPERTENSION: ICD-10-CM

## 2019-08-13 DIAGNOSIS — E78.5 DYSLIPIDEMIA ASSOCIATED WITH TYPE 2 DIABETES MELLITUS (HCC): ICD-10-CM

## 2019-08-13 DIAGNOSIS — E11.69 DYSLIPIDEMIA ASSOCIATED WITH TYPE 2 DIABETES MELLITUS (HCC): ICD-10-CM

## 2019-08-13 DIAGNOSIS — R80.9 TYPE 2 DIABETES MELLITUS WITH MICROALBUMINURIA, WITH LONG-TERM CURRENT USE OF INSULIN (HCC): ICD-10-CM

## 2019-08-13 DIAGNOSIS — E11.29 TYPE 2 DIABETES MELLITUS WITH MICROALBUMINURIA, WITH LONG-TERM CURRENT USE OF INSULIN (HCC): ICD-10-CM

## 2019-08-13 DIAGNOSIS — N18.30 STAGE 3 CHRONIC KIDNEY DISEASE (HCC): ICD-10-CM

## 2019-08-13 DIAGNOSIS — Z79.4 TYPE 2 DIABETES MELLITUS WITH MICROALBUMINURIA, WITH LONG-TERM CURRENT USE OF INSULIN (HCC): ICD-10-CM

## 2019-08-13 PROCEDURE — 99214 OFFICE O/P EST MOD 30 MIN: CPT | Performed by: INTERNAL MEDICINE

## 2019-08-13 SDOH — HEALTH STABILITY: MENTAL HEALTH: HOW OFTEN DO YOU HAVE A DRINK CONTAINING ALCOHOL?: 2-3 TIMES A WEEK

## 2019-08-13 SDOH — HEALTH STABILITY: MENTAL HEALTH: HOW MANY STANDARD DRINKS CONTAINING ALCOHOL DO YOU HAVE ON A TYPICAL DAY?: 1 OR 2

## 2019-08-13 SDOH — HEALTH STABILITY: MENTAL HEALTH: HOW OFTEN DO YOU HAVE 6 OR MORE DRINKS ON ONE OCCASION?: NEVER

## 2019-08-13 ASSESSMENT — PAIN SCALES - GENERAL: PAINLEVEL: NO PAIN

## 2019-08-13 NOTE — LETTER
[facility]  Venus Claros M.D.  [address]  [place] [address]  Fax: [phone]   Authorization for Release/Disclosure of   Protected Health Information   Name: AMAYA CLAIRE : 1950 SSN: [id number]   Address: [address] [place]  [place] [address] Phone:    [phone] (home)    I authorize the entity listed below to release/disclose the PHI below to:   [facility]/Venus Claros M.D. and Venus Claros M.D.   Provider or Entity Name:  [facility]   Address   City, [facility], [place], [place] Phone:      Fax:     Reason for request: continuity of care   Information to be released:    [  ] LAST COLONOSCOPY,  including any PATH REPORT and follow-up  [  ] LAST FIT/COLOGUARD RESULT [  ] LAST DEXA  [  ] LAST MAMMOGRAM  [  ] LAST PAP  [  ] LAST LABS [  ] RETINA EXAM REPORT  [  ] IMMUNIZATION RECORDS  [XXX] Release all info      [  ] Check here and initial the line next to each item to release ALL health information INCLUDING  _____ Care and treatment for drug and / or alcohol abuse  _____ HIV testing, infection status, or AIDS  _____ Genetic Testing    DATES OF SERVICE OR TIME PERIOD TO BE DISCLOSED: _____________  I understand and acknowledge that:  * This Authorization may be revoked at any time by you in writing, except if your health information has already been used or disclosed.  * Your health information that will be used or disclosed as a result of you signing this authorization could be re-disclosed by the recipient. If this occurs, your re-disclosed health information may no longer be protected by State or Federal laws.  * You may refuse to sign this Authorization. Your refusal will not affect your ability to obtain treatment.  * This Authorization becomes effective upon signing and will  on (date) __________.      If no date is indicated, this Authorization will  one (1) year from the signature date.    Name: Amaya Claire    Signature:   Date:     8/13/2019       PLEASE FAX REQUESTED RECORDS BACK TO: [phone]

## 2019-08-13 NOTE — PROGRESS NOTES
Subjective:   Ashish Claire is a 69 y.o. male here today for evaluation and management of:    1. Type 2 diabetes mellitus with microalbuminuria, with long-term current use of insulin ([facility])  Patient's diabetes remain chronic and stable. He is due for an updated A1C at this time. Previous A1C in February was around 7%. He is taking Lantus 8 units injection every night with Exenatide 2 mg injections every 7 days as prescribed by endocrinology at [facility].  He denies any medication side effects. Patient reports that his fasting sugars have been in the 120's in the morning. He denies any recent hypoglycemic or hyperglycemic episodes.  Patient reports that he does walking exercise 5 miles every day.  He believes that his blood sugar improves by doing exercise regularly.  He denies hypoglycemia or side effects from taking medication.    2. Dyslipidemia associated with type 2 diabetes mellitus (HCC)  Chronic and stable. Patient states that Dr. Estrada recently increased his Atorvastatin dosage from 10 mg to 40 mg once daily. He denies any myalgias.  Last lipid panel on 2/21/2019 showed LDL 57.    Results for ASHISH CLAIRE (MRN [id number]) as of 8/13/2019 08:36   Ref. Range 2/21/2019 06:39   Cholesterol,Tot Latest Ref Range: 100 - 199 mg/dL 166   Triglycerides Latest Ref Range: 0 - 149 mg/dL 288 (H)   HDL Latest Ref Range: >=40 mg/dL 51   LDL Latest Ref Range: <100 mg/dL 57       3. Essential hypertension  Stable. Monitoring BP at home. States his blood pressure has been in the 120's at home. Currently taking Doxazosin 2mg daily, Carvedilol 25 mg twice daily, and Lisinopril 10 mg daily as directed. Also taking baby aspirin 81 mg daily. Denies lightheadedness, vision changes, headache, or palpitations. Of note, patient recently met with Dr. Estrada who discontinued Amlodipine secondary to chronic lower extremity edema. His swelling has improved and he is no longer taking Furosemide as well.  He does not have any signs or  symptoms of volume overload.  Patient reported that he wears compression stocking most of the time.    4. Stage 3 chronic kidney disease (HCC)  Chronic. Patient's creatinine remains high today at 1.7. He is closely followed by Dr. Kirkland, nephrologist. He continues to follow a low sodium diet and actively avoids nephrotoxic agents.     I discussed the following labs with the patient today:  Results for AMAYA CLAIRE (MRN [id number]) as of 8/13/2019 08:36   Ref. Range 1/9/2019 06:34 2/21/2019 06:39 4/30/2019 07:10   Bun Latest Ref Range: 8 - 22 mg/dL 40 (H) 33 (H) 38 (H)   Creatinine Latest Ref Range: 0.50 - 1.40 mg/dL 1.77 (H) 1.47 (H) 1.70 (H)   GFR If  Latest Ref Range: >60 mL/min/1.73 m 2 46 (A) 57 (A) 48 (A)   GFR If Non  Latest Ref Range: >60 mL/min/1.73 m 2 38 (A) 47 (A) 40 (A)         Current medicines (including changes today)  Current Outpatient Medications   Medication Sig Dispense Refill   • carvedilol (COREG) 25 MG Tab Take 1 Tab by mouth 2 times a day, with meals. 180 Tab 3   • doxazosin (CARDURA) 2 MG Tab Take 1 Tab by mouth every day. 90 Tab 3   • ergocalciferol (DRISDOL) 33193 UNIT capsule Take 1 Cap by mouth every 7 days. 12 Cap 3   • lisinopril (PRINIVIL) 10 MG Tab Take 1 Tab by mouth every day. 30 Tab 11   • Multiple Vitamins-Minerals (MULTIVITAMIN PO) Take  by mouth.     • Cholecalciferol (HM VITAMIN D3) 4000 units Cap Take 1 Cap by mouth every day.     • Exenatide ER 2 MG Pen-injector Inject 2 mg as instructed every 7 days. 12 Each 0   • insulin glargine (LANTUS) 100 UNIT/ML Solution Inject 8 Units as instructed every evening. Pt had 8 units night prior to surgery     • atorvastatin (LIPITOR) 40 MG Tab Take 40 mg by mouth every evening.     • aspirin EC (ECOTRIN) 81 MG Tablet Delayed Response Take 81 mg by mouth every day.       No current facility-administered medications for this visit.      He  has a past medical history of Acute exacerbation of CHF  "(congestive heart failure) ([facility]) (6/25/2018), CAD (coronary artery disease), CHF (congestive heart failure) ([facility]), Diabetes ([facility]), Dilated cardiomyopathy (HCC), Hyperlipidemia, Hypertension, NYHA class 3 acute on chronic systolic heart failure (HCC) (6/25/2018), and Severe aortic stenosis (6/25/2018).    ROS   No chest pain, no shortness of breath, no abdominal pain       Objective:     /64 (BP Location: Right arm, Patient Position: Sitting, BP Cuff Size: Adult)   Pulse 72   Temp 36.2 °C (97.1 °F) (Temporal)   Ht 1.741 m (5' 8.54\")   Wt 88.7 kg (195 lb 9.6 oz)   SpO2 100%  Body mass index is 29.27 kg/m².   Physical Exam:  General: Alert, oriented and no acute distress.  Eye contact is good, speech goal directed, affect calm  HEENT: conjunctiva non-injected, sclera non-icteric.  Oral mucous membranes pink and moist with no lesions.  Pinna normal.   Lungs: Normal respiratory effort, clear to auscultation bilaterally with good excursion.  CV: regular rate and rhythm. No murmurs.    Abdomen: soft, non distended, nontender, bowel sound normal.  Ext: no edema, color normal, vascularity normal, temperature normal        Assessment and Plan:   The following treatment plan was discussed     1. Type 2 diabetes mellitus with microalbuminuria, with long-term current use of insulin ([facility])  - Chronic and stable. Patient is due for an updated A1C at this time. Continue current medication regimen until next A1C check.  Patient states that he will do blood tests in the first week of September before follow-up with his cardiologist.  - Counseled to comply with medication and diet.   - Counseled signs and symptoms of hypoglycemia and management of hypoglycemia.   - Recommend to have retinal eye exam once a year.  - Advised to check both feet daily.    2. Dyslipidemia associated with type 2 diabetes mellitus (HCC)  - Chronic and stable. Continue current medication regimen, atorvastatin 40 mg every evening.   - Reviewed the " risks and benefits of treatment and potential side effects of medication.   - Advised to eat low fat, low carbohydrate and high fiber diet as well as do cardio physical exercise regularly.     3. Essential hypertension  - Blood pressure is slightly elevated in office today. His blood pressure has remained well-controlled at home. Continue Doxazosin 2mg daily, Carvedilol 25 mg twice daily, and Lisinopril 10 mg daily as directed. Patient will discuss his medications with Dr. Estrada, Cardiology, to assess for any necessary adjustments.   - Encouraged patient to wear compression stockings and elevate his legs daily as needed for lower leg swelling.  - Reviewed the risks and benefits as well as potential side effects of medications with patient.  - Discussed to eat low-sodium diet and encouraged to do regular physical exercise.  - Recommend to monitor blood pressure and heart rate at home.    4. Stage 3 chronic kidney disease (HCC)  - Creatinine remains elevated at 1.7. Patient will continue close follow up with Dr. Kirkland, nephrologist.   - Patient advised to control diabetes and blood pressure.   - Advised to avoid NSAIDs and nephrotoxic agents.    5. Health Maintenance   - Encouraged patient to contact [facility] consultants in order to schedule his colonoscopy as he is overdue at this time.   - Patient was already evaluated by Dr. Joseph, [facility] orthopedic surgeon for his olecranon bursitis of right elbow.  Patient elected not to do any intervention.  His swelling and pain on right elbow resolved.  Patient will continue to wear elbow splint when he exercises.  He will follow-up with orthopedist as needed.    Followup: Return in about 5 weeks (around 9/17/2019), or if symptoms worsen or fail to improve, for Diabetes, Hyperlipidemia, Hypertension, CKD, Vitamin D insufficiency, Lab review.      Please note that this dictation was created using voice recognition software. I have made every reasonable attempt to correct  obvious errors, but I expect that there may have unintended errors in text, spelling, punctuation, or grammar that I did not discover.    I, Francis Quiroga (Ricky), am scribing for, and in the presence of, Venus Claros M.D..    Electronically signed by: Francis Quiroga (Ricky), 8/13/2019    I, Venus Claros M.D., personally performed the services described in this documentation, as scribed by Francis Quiroga in my presence, and it is both accurate and complete.

## 2019-08-13 NOTE — PROGRESS NOTES
Pt states is scheduled for Colonoscopy Sept. 2019.-[facility]. Prior pt signed consent for release of last Colonoscopy  & obtained additional pt signed consent for release of all info from [facility]. Scanned to pt's media.

## 2019-08-16 ENCOUNTER — TELEPHONE (OUTPATIENT)
Dept: NEPHROLOGY | Facility: MEDICAL CENTER | Age: 69
End: 2019-08-16

## 2019-08-16 ENCOUNTER — APPOINTMENT (OUTPATIENT)
Dept: NEPHROLOGY | Facility: MEDICAL CENTER | Age: 69
End: 2019-08-16
Payer: COMMERCIAL

## 2019-08-26 DIAGNOSIS — N18.30 STAGE 3 CHRONIC KIDNEY DISEASE (HCC): ICD-10-CM

## 2019-08-29 ENCOUNTER — HOSPITAL ENCOUNTER (OUTPATIENT)
Dept: LAB | Facility: MEDICAL CENTER | Age: 69
End: 2019-08-29
Attending: INTERNAL MEDICINE
Payer: COMMERCIAL

## 2019-08-29 DIAGNOSIS — Z79.4 TYPE 2 DIABETES MELLITUS WITH MICROALBUMINURIA, WITH LONG-TERM CURRENT USE OF INSULIN (HCC): ICD-10-CM

## 2019-08-29 DIAGNOSIS — E11.69 DYSLIPIDEMIA ASSOCIATED WITH TYPE 2 DIABETES MELLITUS (HCC): ICD-10-CM

## 2019-08-29 DIAGNOSIS — R80.9 TYPE 2 DIABETES MELLITUS WITH MICROALBUMINURIA, WITH LONG-TERM CURRENT USE OF INSULIN (HCC): ICD-10-CM

## 2019-08-29 DIAGNOSIS — E78.5 DYSLIPIDEMIA ASSOCIATED WITH TYPE 2 DIABETES MELLITUS (HCC): ICD-10-CM

## 2019-08-29 DIAGNOSIS — E55.9 VITAMIN D DEFICIENCY: ICD-10-CM

## 2019-08-29 DIAGNOSIS — I10 ESSENTIAL HYPERTENSION: ICD-10-CM

## 2019-08-29 DIAGNOSIS — E11.29 TYPE 2 DIABETES MELLITUS WITH MICROALBUMINURIA, WITH LONG-TERM CURRENT USE OF INSULIN (HCC): ICD-10-CM

## 2019-08-29 LAB
25(OH)D3 SERPL-MCNC: 29 NG/ML (ref 30–100)
ALBUMIN SERPL BCP-MCNC: 3.5 G/DL (ref 3.2–4.9)
ALBUMIN/GLOB SERPL: 1.3 G/DL
ALP SERPL-CCNC: 90 U/L (ref 30–99)
ALT SERPL-CCNC: 16 U/L (ref 2–50)
ANION GAP SERPL CALC-SCNC: 8 MMOL/L (ref 0–11.9)
AST SERPL-CCNC: 15 U/L (ref 12–45)
BASOPHILS # BLD AUTO: 0.7 % (ref 0–1.8)
BASOPHILS # BLD: 0.04 K/UL (ref 0–0.12)
BILIRUB SERPL-MCNC: 0.3 MG/DL (ref 0.1–1.5)
BUN SERPL-MCNC: 32 MG/DL (ref 8–22)
CALCIUM SERPL-MCNC: 8.9 MG/DL (ref 8.5–10.5)
CHLORIDE SERPL-SCNC: 111 MMOL/L (ref 96–112)
CHOLEST SERPL-MCNC: 137 MG/DL (ref 100–199)
CO2 SERPL-SCNC: 20 MMOL/L (ref 20–33)
CREAT SERPL-MCNC: 1.61 MG/DL (ref 0.5–1.4)
CREAT UR-MCNC: 89.1 MG/DL
EOSINOPHIL # BLD AUTO: 0.09 K/UL (ref 0–0.51)
EOSINOPHIL NFR BLD: 1.5 % (ref 0–6.9)
ERYTHROCYTE [DISTWIDTH] IN BLOOD BY AUTOMATED COUNT: 47.1 FL (ref 35.9–50)
EST. AVERAGE GLUCOSE BLD GHB EST-MCNC: 200 MG/DL
FASTING STATUS PATIENT QL REPORTED: NORMAL
GLOBULIN SER CALC-MCNC: 2.8 G/DL (ref 1.9–3.5)
GLUCOSE SERPL-MCNC: 155 MG/DL (ref 65–99)
HBA1C MFR BLD: 8.6 % (ref 0–5.6)
HCT VFR BLD AUTO: 32.5 % (ref 42–52)
HDLC SERPL-MCNC: 39 MG/DL
HGB BLD-MCNC: 10.6 G/DL (ref 14–18)
IMM GRANULOCYTES # BLD AUTO: 0.02 K/UL (ref 0–0.11)
IMM GRANULOCYTES NFR BLD AUTO: 0.3 % (ref 0–0.9)
LDLC SERPL CALC-MCNC: 53 MG/DL
LYMPHOCYTES # BLD AUTO: 0.73 K/UL (ref 1–4.8)
LYMPHOCYTES NFR BLD: 12.5 % (ref 22–41)
MCH RBC QN AUTO: 31.6 PG (ref 27–33)
MCHC RBC AUTO-ENTMCNC: 32.6 G/DL (ref 33.7–35.3)
MCV RBC AUTO: 97 FL (ref 81.4–97.8)
MICROALBUMIN UR-MCNC: 128.9 MG/DL
MICROALBUMIN/CREAT UR: 1447 MG/G (ref 0–30)
MONOCYTES # BLD AUTO: 0.56 K/UL (ref 0–0.85)
MONOCYTES NFR BLD AUTO: 9.6 % (ref 0–13.4)
NEUTROPHILS # BLD AUTO: 4.38 K/UL (ref 1.82–7.42)
NEUTROPHILS NFR BLD: 75.4 % (ref 44–72)
NRBC # BLD AUTO: 0 K/UL
NRBC BLD-RTO: 0 /100 WBC
PLATELET # BLD AUTO: 207 K/UL (ref 164–446)
PMV BLD AUTO: 10.7 FL (ref 9–12.9)
POTASSIUM SERPL-SCNC: 4.5 MMOL/L (ref 3.6–5.5)
PROT SERPL-MCNC: 6.3 G/DL (ref 6–8.2)
RBC # BLD AUTO: 3.35 M/UL (ref 4.7–6.1)
SODIUM SERPL-SCNC: 139 MMOL/L (ref 135–145)
TRIGL SERPL-MCNC: 227 MG/DL (ref 0–149)
WBC # BLD AUTO: 5.8 K/UL (ref 4.8–10.8)

## 2019-08-29 PROCEDURE — 82570 ASSAY OF URINE CREATININE: CPT

## 2019-08-29 PROCEDURE — 80061 LIPID PANEL: CPT

## 2019-08-29 PROCEDURE — 83036 HEMOGLOBIN GLYCOSYLATED A1C: CPT

## 2019-08-29 PROCEDURE — 36415 COLL VENOUS BLD VENIPUNCTURE: CPT

## 2019-08-29 PROCEDURE — 85025 COMPLETE CBC W/AUTO DIFF WBC: CPT

## 2019-08-29 PROCEDURE — 82043 UR ALBUMIN QUANTITATIVE: CPT

## 2019-08-29 PROCEDURE — 80053 COMPREHEN METABOLIC PANEL: CPT

## 2019-08-29 PROCEDURE — 82306 VITAMIN D 25 HYDROXY: CPT

## 2019-09-10 ENCOUNTER — OFFICE VISIT (OUTPATIENT)
Dept: CARDIOLOGY | Facility: MEDICAL CENTER | Age: 69
End: 2019-09-10
Payer: COMMERCIAL

## 2019-09-10 VITALS
HEART RATE: 88 BPM | HEIGHT: 69 IN | WEIGHT: 190 LBS | OXYGEN SATURATION: 96 % | BODY MASS INDEX: 28.14 KG/M2 | DIASTOLIC BLOOD PRESSURE: 70 MMHG | SYSTOLIC BLOOD PRESSURE: 130 MMHG

## 2019-09-10 DIAGNOSIS — I25.10 CORONARY ARTERY DISEASE DUE TO CALCIFIED CORONARY LESION: ICD-10-CM

## 2019-09-10 DIAGNOSIS — Z95.2 S/P TAVR (TRANSCATHETER AORTIC VALVE REPLACEMENT): ICD-10-CM

## 2019-09-10 DIAGNOSIS — E78.5 DYSLIPIDEMIA ASSOCIATED WITH TYPE 2 DIABETES MELLITUS (HCC): ICD-10-CM

## 2019-09-10 DIAGNOSIS — I10 ESSENTIAL HYPERTENSION: ICD-10-CM

## 2019-09-10 DIAGNOSIS — I50.20 ACC/AHA STAGE C SYSTOLIC HEART FAILURE (HCC): ICD-10-CM

## 2019-09-10 DIAGNOSIS — E11.69 DYSLIPIDEMIA ASSOCIATED WITH TYPE 2 DIABETES MELLITUS (HCC): ICD-10-CM

## 2019-09-10 DIAGNOSIS — I50.22 CHRONIC SYSTOLIC CONGESTIVE HEART FAILURE, NYHA CLASS 1 (HCC): ICD-10-CM

## 2019-09-10 DIAGNOSIS — I25.84 CORONARY ARTERY DISEASE DUE TO CALCIFIED CORONARY LESION: ICD-10-CM

## 2019-09-10 PROCEDURE — 99214 OFFICE O/P EST MOD 30 MIN: CPT | Performed by: INTERNAL MEDICINE

## 2019-09-10 NOTE — PROGRESS NOTES
Chief Complaint   Patient presents with   • Coronary Artery Disease       Subjective:   Ashish Wiley is a 69 y.o. male who presents today for follow-up of his congestive heart failure secondary to valvular heart disease.  He underwent a TAVR in July 2018.  He also has coronary artery disease with a prior stent, hypertension and dyslipidemia.    His blood pressures at home been running about 125-130/65-70.    He does have some chronic dependent edema in his right ankle.  His left lower extremity edema resolves when he discontinued amlodipine.  He states his edema really is improved if he does elevate his legs.  He also wears support stockings on that leg.    He denies any chest discomfort, dyspnea on exertion, PND, orthopnea, palpitations or lightheadedness.        Past Medical History:   Diagnosis Date   • Acute exacerbation of CHF (congestive heart failure) ([facility]) 6/25/2018   • CAD (coronary artery disease)    • CHF (congestive heart failure) ([facility])    • Diabetes ([facility])    • Dilated cardiomyopathy ([facility])    • Hyperlipidemia    • Hypertension    • NYHA class 3 acute on chronic systolic heart failure ([facility]) 6/25/2018   • Severe aortic stenosis 6/25/2018     Past Surgical History:   Procedure Laterality Date   • TRANSCATHETER AORTIC VALVE REPLACEMENT  7/2/2018    Procedure: TRANSCATHETER AORTIC VALVE REPLACEMENT;  Surgeon: Markus Fuller M.D.;  Location: SURGERY [facility];  Service: Cardiac   • DAYANNA  7/2/2018    Procedure: DAYANNA;  Surgeon: Markus Fuller M.D.;  Location: SURGERY [facility];  Service: Cardiac   • AORTIC VALVE REPLACEMENT      S/P TAVR    • TONSILLECTOMY     • ZZZ CARDIAC CATH       Family History   Problem Relation Age of Onset   • Lung Disease Mother         COPD   • Heart Disease Father         valve disease   • Heart Failure Father    • Hypertension Father    • Hyperlipidemia Father    • Cancer Maternal Grandmother         BRAIN TUMOR   • Stroke Maternal Grandfather    • Other Paternal  Grandmother         INTESTINAL PROBLEM   • Stroke Paternal Grandfather    • Prostate cancer Brother    • Other Brother         ortho     Social History     Socioeconomic History   • Marital status:      Spouse name: Not on file   • Number of children: Not on file   • Years of education: Not on file   • Highest education level: Not on file   Occupational History   • Not on file   Social Needs   • Financial resource strain: Not on file   • Food insecurity:     Worry: Not on file     Inability: Not on file   • Transportation needs:     Medical: Not on file     Non-medical: Not on file   Tobacco Use   • Smoking status: Former Smoker     Packs/day: 1.00     Years: 22.00     Pack years: 22.00     Last attempt to quit: 1989     Years since quittin.7   • Smokeless tobacco: Never Used   • Tobacco comment: stop cigar in    Substance and Sexual Activity   • Alcohol use: Yes     Alcohol/week: 0.6 oz     Types: 1 Glasses of wine per week     Frequency: 2-3 times a week     Drinks per session: 1 or 2     Binge frequency: Never     Comment: OCCASIONALLY   • Drug use: No   • Sexual activity: Yes     Partners: Female   Lifestyle   • Physical activity:     Days per week: Not on file     Minutes per session: Not on file   • Stress: Not on file   Relationships   • Social connections:     Talks on phone: Not on file     Gets together: Not on file     Attends [employer] service: Not on file     Active member of club or organization: Not on file     Attends meetings of clubs or organizations: Not on file     Relationship status: Not on file   • Intimate partner violence:     Fear of current or ex partner: Not on file     Emotionally abused: Not on file     Physically abused: Not on file     Forced sexual activity: Not on file   Other Topics Concern   • Not on file   Social History Narrative   • Not on file     Allergies   Allergen Reactions   • Sulfa Drugs Unspecified     Kidney Stones     Outpatient Encounter  "Medications as of 9/10/2019   Medication Sig Dispense Refill   • carvedilol (COREG) 25 MG Tab Take 1 Tab by mouth 2 times a day, with meals. 180 Tab 3   • doxazosin (CARDURA) 2 MG Tab Take 1 Tab by mouth every day. 90 Tab 3   • ergocalciferol (DRISDOL) 20461 UNIT capsule Take 1 Cap by mouth every 7 days. 12 Cap 3   • lisinopril (PRINIVIL) 10 MG Tab Take 1 Tab by mouth every day. 30 Tab 11   • Multiple Vitamins-Minerals (MULTIVITAMIN PO) Take  by mouth.     • Cholecalciferol (HM VITAMIN D3) 4000 units Cap Take 1 Cap by mouth every day.     • Exenatide ER 2 MG Pen-injector Inject 2 mg as instructed every 7 days. 12 Each 0   • insulin glargine (LANTUS) 100 UNIT/ML Solution Inject 8 Units as instructed every evening. Pt had 8 units night prior to surgery     • atorvastatin (LIPITOR) 40 MG Tab Take 40 mg by mouth every evening.     • aspirin EC (ECOTRIN) 81 MG Tablet Delayed Response Take 81 mg by mouth every day.       No facility-administered encounter medications on file as of 9/10/2019.      ROS       Objective:   /70 (BP Location: Left arm, Patient Position: Sitting, BP Cuff Size: Adult)   Pulse 88   Ht 1.753 m (5' 9\")   Wt 86.2 kg (190 lb)   SpO2 96%   BMI 28.06 kg/m²     Physical Exam   Constitutional: He appears well-developed and well-nourished.   Neck: No JVD present.   Cardiovascular: Normal rate and regular rhythm.   No murmur heard.  Pulmonary/Chest: Effort normal and breath sounds normal. He has no rales.   Abdominal: Soft. There is no tenderness.   Musculoskeletal: He exhibits edema (1+ right pretibial).       Lab Results   Component Value Date/Time    CHOLSTRLTOT 137 08/29/2019 06:32 AM    LDL 53 08/29/2019 06:32 AM    HDL 39 (A) 08/29/2019 06:32 AM    TRIGLYCERIDE 227 (H) 08/29/2019 06:32 AM       Lab Results   Component Value Date/Time    SODIUM 139 08/29/2019 06:32 AM    POTASSIUM 4.5 08/29/2019 06:32 AM    CHLORIDE 111 08/29/2019 06:32 AM    CO2 20 08/29/2019 06:32 AM    GLUCOSE 155 (H) " 08/29/2019 06:32 AM    BUN 32 (H) 08/29/2019 06:32 AM    CREATININE 1.61 (H) 08/29/2019 06:32 AM     Lab Results   Component Value Date/Time    ALKPHOSPHAT 90 08/29/2019 06:32 AM    ASTSGOT 15 08/29/2019 06:32 AM    ALTSGPT 16 08/29/2019 06:32 AM    TBILIRUBIN 0.3 08/29/2019 06:32 AM      Lab Results   Component Value Date/Time    BNPBTYPENAT 722 (H) 08/01/2018 09:32 AM      Transthoracic  Echo Report      Echocardiography Laboratory    CONCLUSIONS  Prior echo 7/30/18; compared to the report of the study done - there   has been improvement of LVSF.   Normal left ventricular systolic function.  Left ventricular ejection fraction is visually estimated to be 55%.  Known TAVR aortic valve that is functioning normally with normal   transvalvular gradients.  Vmax is 1.8  m/s. Transvalvular gradients are - Peak: 13 mmHg, Mean: 7   mmHg.   There is mild paravalvular leakage.    AMAYA CLAIRE  Exam Date:         07/09/2019     Assessment:     1. S/P TAVR (transcatheter aortic valve replacement)     2. ACC/AHA stage C systolic heart failure (HCC)     3. Chronic systolic congestive heart failure, NYHA class 1 (HCC)     4. Coronary artery disease due to calcified coronary lesion: Status post atherectomy and stenting of the LAD in June 2018.  Nonobstructive disease in the RCA and circumflex.     5. Dyslipidemia associated with type 2 diabetes mellitus (HCC)     6. Essential hypertension         Medical Decision Making:  Today's Assessment / Status / Plan:     Mr. Claire is clinically stable.  He has a history of congestive heart failure but is euvolemic and off diuretic therapy.  His ejection fraction has normalized.  I feel this issue has resolved.  His blood pressure and lipid status appear to be under good control.  He will follow-up in about a year in valve clinic.

## 2019-09-17 ENCOUNTER — OFFICE VISIT (OUTPATIENT)
Dept: MEDICAL GROUP | Age: 69
End: 2019-09-17
Payer: COMMERCIAL

## 2019-09-17 VITALS
OXYGEN SATURATION: 99 % | HEART RATE: 77 BPM | HEIGHT: 69 IN | BODY MASS INDEX: 29.03 KG/M2 | WEIGHT: 196 LBS | TEMPERATURE: 97.8 F | SYSTOLIC BLOOD PRESSURE: 130 MMHG | DIASTOLIC BLOOD PRESSURE: 70 MMHG

## 2019-09-17 DIAGNOSIS — E11.69 DYSLIPIDEMIA ASSOCIATED WITH TYPE 2 DIABETES MELLITUS (HCC): ICD-10-CM

## 2019-09-17 DIAGNOSIS — Z12.11 SCREEN FOR COLON CANCER: ICD-10-CM

## 2019-09-17 DIAGNOSIS — E55.9 VITAMIN D DEFICIENCY: ICD-10-CM

## 2019-09-17 DIAGNOSIS — Z23 NEED FOR VACCINATION: ICD-10-CM

## 2019-09-17 DIAGNOSIS — E11.29 TYPE 2 DIABETES MELLITUS WITH MICROALBUMINURIA, WITH LONG-TERM CURRENT USE OF INSULIN (HCC): ICD-10-CM

## 2019-09-17 DIAGNOSIS — E78.5 DYSLIPIDEMIA ASSOCIATED WITH TYPE 2 DIABETES MELLITUS (HCC): ICD-10-CM

## 2019-09-17 DIAGNOSIS — Z79.4 TYPE 2 DIABETES MELLITUS WITH MICROALBUMINURIA, WITH LONG-TERM CURRENT USE OF INSULIN (HCC): ICD-10-CM

## 2019-09-17 DIAGNOSIS — I10 ESSENTIAL HYPERTENSION: ICD-10-CM

## 2019-09-17 DIAGNOSIS — R80.9 TYPE 2 DIABETES MELLITUS WITH MICROALBUMINURIA, WITH LONG-TERM CURRENT USE OF INSULIN (HCC): ICD-10-CM

## 2019-09-17 PROCEDURE — 90471 IMMUNIZATION ADMIN: CPT | Performed by: INTERNAL MEDICINE

## 2019-09-17 PROCEDURE — 90662 IIV NO PRSV INCREASED AG IM: CPT | Performed by: INTERNAL MEDICINE

## 2019-09-17 PROCEDURE — 99214 OFFICE O/P EST MOD 30 MIN: CPT | Mod: 25 | Performed by: INTERNAL MEDICINE

## 2019-09-17 SDOH — HEALTH STABILITY: MENTAL HEALTH: HOW OFTEN DO YOU HAVE A DRINK CONTAINING ALCOHOL?: MONTHLY OR LESS

## 2019-09-17 ASSESSMENT — PAIN SCALES - GENERAL: PAINLEVEL: NO PAIN

## 2019-09-17 NOTE — PROGRESS NOTES
Subjective:   Ashish Claire is a 69 y.o. male here today for evaluation and management of:    Type 2 diabetes mellitus with microalbuminuria, with long-term current use of insulin ([facility])  A1c is not under control. Patient is currently taking Exenatide ER 2 mg and insulin glargine 8 units every evening. He states that he cut his glargine dose from 8 units to 6 units but recently went back to glargine 10 units last week after having blood work done and knowing elevation of A1c to 8.6. He prefers to keep his medication the same as he states that he is exercising more and does not feel there is a need to increase his medication doses. He denies side effects from the above medication. Patient does not currently have an endocrinologist as his endocrinologist he used to see at [facility] moved back to [place]. Discussed trying a new medication to better control his diabetes. I have reviewed the risks and benefits as well as medication side effects of Jardiance.  Although  usually not recommend to start Jardiance if eGFR is less than 45, in the setting of prevalence of kidney disease, SGLT2 inhibitors have established to renal and cardiovascular benefits when eGFR is more than 30.  I advised patient the potential side effects of Jardiance including urinary tract infection, decreased GFR and increase creatinine and possible acute renal failure.  I advised him to closely monitor kidney function after starting Jardiance.  I also advised patient to discuss with nephrologist first before he takes Jardiance.  Patient is agreeable with this plan and will follow up with his Nephrologist, Dr. Kirkland, when he sees him next month on 10/1/19. His kidney function is currently stable. I discussed the blood test with the patient in clinic today.     Results for ASHISH CLAIRE (MRN [id number]) as of 9/17/2019 08:11   Ref. Range 8/29/2019 06:32   Glycohemoglobin Latest Ref Range: 0.0 - 5.6 % 8.6 (H)       Dyslipidemia  associated with type 2 diabetes mellitus (HCC)  LDL is stable however triglycerides remain high. He is currently taking Atorvastatin 40 mg every evening without side effects. Patient states that he exercises frequently. I discussed the blood test with the patient in clinic today.    Results for AMAYA CLAIRE (MRN [id number]) as of 9/17/2019 08:11   Ref. Range 2/21/2019 06:39 8/29/2019 06:32   Cholesterol,Tot Latest Ref Range: 100 - 199 mg/dL 166 137   Triglycerides Latest Ref Range: 0 - 149 mg/dL 288 (H) 227 (H)   HDL Latest Ref Range: >=40 mg/dL 51 39 (A)   LDL Latest Ref Range: <100 mg/dL 57 53       Essential hypertension  Blood pressure today is 130/70. He is currently taking Lisinopril 10 mg once a day and Carvedilol 25 mg twice a day, both without side effects. His kidney function is stable compared to previous. His creatinine is 1.6 compared to 1.7 in April 2018. GFR is 43. Proteinuria is very high at 1447. Patient will follow-up with Dr. Kirkland about increasing his Lisinopril dose next month.     Results for AMAYA CLAIRE (MRN [id number]) as of 9/17/2019 08:11   Ref. Range 4/30/2019 07:10 8/29/2019 06:32   Creatinine Latest Ref Range: 0.50 - 1.40 mg/dL 1.70 (H) 1.61 (H)   GFR If Non  Latest Ref Range: >60 mL/min/1.73 m 2 40 (A) 43 (A)     Results for AMAYA CLAIRE (MRN [id number]) as of 9/17/2019 08:11   Ref. Range 2/21/2019 06:39 8/29/2019 06:32   Micro Alb Creat Ratio Latest Ref Range: 0 - 30 mg/g 2576 (H) 1447 (H)       Vitamin D deficiency  Patient is taking Cholecalciferol 50,000 units once a week. Due to his kidney disease, he typically has low vitamin D. I discussed the blood test with the patient in clinic today indicating that his Vitamin D level is 29.     Results for AMAYA CLAIRE (MRN [id number]) as of 9/17/2019 08:11   Ref. Range 2/21/2019 06:39 8/29/2019 06:32   25-Hydroxy   Vitamin D 25 Latest Ref Range: 30 - 100 ng/mL 17 (L) 29 (L)         Current medicines  "(including changes today)  Current Outpatient Medications   Medication Sig Dispense Refill   • Empagliflozin 10 MG Tab Take 10 mg by mouth every day. 90 Tab 3   • carvedilol (COREG) 25 MG Tab Take 1 Tab by mouth 2 times a day, with meals. 180 Tab 3   • doxazosin (CARDURA) 2 MG Tab Take 1 Tab by mouth every day. 90 Tab 3   • ergocalciferol (DRISDOL) 07765 UNIT capsule Take 1 Cap by mouth every 7 days. 12 Cap 3   • lisinopril (PRINIVIL) 10 MG Tab Take 1 Tab by mouth every day. 30 Tab 11   • Multiple Vitamins-Minerals (MULTIVITAMIN PO) Take  by mouth.     • Cholecalciferol (HM VITAMIN D3) 4000 units Cap Take 1 Cap by mouth every day.     • Exenatide ER 2 MG Pen-injector Inject 2 mg as instructed every 7 days. 12 Each 0   • insulin glargine (LANTUS) 100 UNIT/ML Solution Inject 10 Units as instructed every evening. Pt had 8 units night prior to surgery     • atorvastatin (LIPITOR) 40 MG Tab Take 40 mg by mouth every evening.     • aspirin EC (ECOTRIN) 81 MG Tablet Delayed Response Take 81 mg by mouth every day.       No current facility-administered medications for this visit.      He  has a past medical history of Acute exacerbation of CHF (congestive heart failure) ([facility]) (6/25/2018), CAD (coronary artery disease), CHF (congestive heart failure) ([facility]), Diabetes ([facility]), Dilated cardiomyopathy ([facility]), Hyperlipidemia, Hypertension, NYHA class 3 acute on chronic systolic heart failure ([facility]) (6/25/2018), and Severe aortic stenosis (6/25/2018).    ROS   No chest pain, no shortness of breath, no abdominal pain       Objective:     /70 (BP Location: Right arm, Patient Position: Sitting, BP Cuff Size: Adult)   Pulse 77   Temp 36.6 °C (97.8 °F) (Temporal)   Ht 1.753 m (5' 9\")   Wt 88.9 kg (196 lb)   SpO2 99%  Body mass index is 28.94 kg/m².     Physical Exam:  General: Alert, oriented and no acute distress.  Eye contact is good, speech goal directed, affect calm  HEENT: conjunctiva non-injected, sclera non-icteric.  Oral " mucous membranes pink and moist with no lesions.  Pinna normal.   Lungs: Normal respiratory effort, clear to auscultation bilaterally with good excursion.  CV: regular rate and rhythm. No murmurs.  Abdomen: soft, non distended, nontender, Bowel sound normal.  Ext: no edema, color normal, vascularity normal, temperature normal    Assessment and Plan:   The following treatment plan was discussed     1. Type 2 diabetes mellitus with microalbuminuria, with long-term current use of insulin (HCC)  - Chronic. A1c is not at goal. Discussed trying new medication to better control his diabetes and he is agreeable with this plan.   - Reviewed the risks and benefits of medication as well as the side effects of Jardiance with the patient. He is advised to take Jardiance 10 mg once daily. He is advised to take this with his Exenatide 2 mg and insulin glargine 10 units.   - Patient instructed to drink plenty of water while taking Jardiance and to not to hold his urine.  Advised to discuss with nephrologist before starting Jardiance to see if he is safe enough to start with Jardiance because of underlying chronic kidney disease.  Please see detailed discussion in HPI.  - Continue follow-up appointment with Dr. Kirkland (Nephrologist) next month.   - Counseled to comply with medication and diet.   - Counseled signs and symptoms of hypoglycemia and management of hypoglycemia.   - Recommend to have retinal eye exam once a year.  - Advised to check both feet daily.   - Comp Metabolic Panel; Future  - MICROALBUMIN CREAT RATIO URINE; Future  - Empagliflozin 10 MG Tab; Take 10 mg by mouth every day.  Dispense: 90 Tab; Refill: 3  - REFERRAL TO ENDOCRINOLOGY    2. Dyslipidemia associated with type 2 diabetes mellitus (HCC)  - Chronic. Triglycerides remain elevated. He will continue current regimens, Atorvastatin 40 mg every evening. Recheck lab 1-2 weeks before next follow up visit.  - Reviewed the risks and benefits of treatment and  potential side effects of medication.  - Advised to eat low fat, low carbohydrate and high fiber diet as well as do cardio physical exercise regularly.   - Comp Metabolic Panel; Future  - Lipid Profile; Future    3. Essential hypertension  - Blood pressure is slightly elevated in office today. Continue current regimens, Lisinopril 10 mg once a day and Carvedilol 25 mg twice daily as directed. Recheck lab 1-2 weeks before next follow up visit.  - Patient advised to avoid kidney offending agents and keep appointment with his nephrologist as scheduled.   - Reviewed the risks and benefits as well as potential side effects of medications with patient.  - Discussed to eat low-sodium diet and encouraged to do regular physical exercise.  - Recommend to monitor blood pressure and heart rate at home.  - CBC WITH DIFFERENTIAL; Future  - Comp Metabolic Panel; Future    4. Vitamin D deficiency  - Now at goal. Patient will continue Cholecalciferol 50,000 units once a week and is advised to follow-up with his nephrologist, Dr. Kirkland, to see if he advises extra OTC vitamin D supplements.   - Discussed goal with patient to keep vitamin D level 30 and above. Will recheck Vitamin D level in 3 months.   - VITAMIN D,25 HYDROXY; Future    5. Need for vaccination  - Patient due for influenza vaccine. He is agreeable to get this in clinic today. High-dose influenza vaccine was given today after reviewing risks and benefits as well as side effects of vaccine.  - INFLUENZA VACCINE, HIGH DOSE (65+ ONLY)    6. Screen for colon cancer  - Patient is due for a colonoscopy. He is given a referral to see GI for a colonoscopy to screen for colon cancer.   - REFERRAL TO GASTROENTEROLOGY    7. Health Maintenance   - Patient due for shingles vaccine. He will follow-up with his local pharmacy for Shingrix as he understands that we do not have this available in clinic today due to shortage.       Followup: Return in about 6 months (around 3/17/2020),  or if symptoms worsen or fail to improve, for Diabetes, Hypertension, Hyperlipidemia, CKD, Lab review.      Please note that this dictation was created using voice recognition software. I have made every reasonable attempt to correct obvious errors, but I expect that there may have unintended errors in text, spelling, punctuation, or grammar that I did not discover.    I, Doreen Sears (Scribe), am scribing for, and in the presence of, Venus Claros M.D..    Electronically signed by: Doreen Sears (Ricky), 9/17/2019    I, Venus Claros M.D., personally performed the services described in this documentation, as scribed by Doreen Sears in my presence, and it is both accurate and complete.

## 2019-10-18 ENCOUNTER — OFFICE VISIT (OUTPATIENT)
Dept: NEPHROLOGY | Facility: MEDICAL CENTER | Age: 69
End: 2019-10-18
Payer: COMMERCIAL

## 2019-10-18 VITALS
BODY MASS INDEX: 28.58 KG/M2 | TEMPERATURE: 97.9 F | WEIGHT: 193 LBS | RESPIRATION RATE: 14 BRPM | HEART RATE: 84 BPM | DIASTOLIC BLOOD PRESSURE: 60 MMHG | HEIGHT: 69 IN | OXYGEN SATURATION: 97 % | SYSTOLIC BLOOD PRESSURE: 116 MMHG

## 2019-10-18 DIAGNOSIS — N18.30 STAGE 3 CHRONIC KIDNEY DISEASE (HCC): ICD-10-CM

## 2019-10-18 DIAGNOSIS — Z79.4 TYPE 2 DIABETES MELLITUS WITH MICROALBUMINURIA, WITH LONG-TERM CURRENT USE OF INSULIN (HCC): ICD-10-CM

## 2019-10-18 DIAGNOSIS — R80.9 TYPE 2 DIABETES MELLITUS WITH MICROALBUMINURIA, WITH LONG-TERM CURRENT USE OF INSULIN (HCC): ICD-10-CM

## 2019-10-18 DIAGNOSIS — E11.21 MACROALBUMINURIC DIABETIC NEPHROPATHY (HCC): ICD-10-CM

## 2019-10-18 DIAGNOSIS — E11.29 TYPE 2 DIABETES MELLITUS WITH MICROALBUMINURIA, WITH LONG-TERM CURRENT USE OF INSULIN (HCC): ICD-10-CM

## 2019-10-18 PROCEDURE — 99214 OFFICE O/P EST MOD 30 MIN: CPT | Performed by: INTERNAL MEDICINE

## 2019-10-18 ASSESSMENT — ENCOUNTER SYMPTOMS
CHILLS: 0
PALPITATIONS: 0
FEVER: 0

## 2019-10-18 NOTE — PROGRESS NOTES
"Subjective:      Ashish Wiley is a 69 y.o. male who presents with Follow-Up and Chronic Kidney Disease            HPI  67 year old with a history of DM since 1997, has had good control over that period of time. He saw an endocrinologist since 1997.  In 1966 had a ruptured kidney playing football, had hematuria for a week after.  Has had a kidney stone 20 years ago. Serum Cr has gone up from 1.28 in 2013 to 1.29 in 2016 to 2.02 in May 2017 which prompted the initial visit. He has a history of taking ibuprofen which he took occasionally, though he took at 800mg TID for several days at a time. No family history of kidney disease. History of smoking until the age of 39.  No problems urinating or emptying his bladder. Wakes up once per night to urinate. No herbal medications or supplements.       Has had some medication changes, last labs noted slightly decreased Cr.     Review of Systems   Constitutional: Negative for chills and fever.   Cardiovascular: Negative for chest pain and palpitations.   All other systems reviewed and are negative.         Objective:     /60 (BP Location: Right arm, Patient Position: Sitting, BP Cuff Size: Adult)   Pulse 84   Temp 36.6 °C (97.9 °F) (Temporal)   Resp 14   Ht 1.753 m (5' 9\")   Wt 87.5 kg (193 lb)   SpO2 97%   BMI 28.50 kg/m²      Physical Exam   Constitutional: He is oriented to person, place, and time. He appears well-developed and well-nourished.   HENT:   Head: Normocephalic and atraumatic.   Cardiovascular: Normal rate and regular rhythm.   Pulmonary/Chest: Effort normal and breath sounds normal.   Abdominal: Soft. Bowel sounds are normal.   Musculoskeletal: He exhibits no edema or deformity.   Neurological: He is alert and oriented to person, place, and time.   Skin: Skin is warm and dry.   Psychiatric: He has a normal mood and affect. His behavior is normal.               Assessment/Plan:     1. Stage 3 chronic kidney disease (HCC)  Cr is stable, Cr " 1.61. Recheck labs    2. Type 2 diabetes mellitus with microalbuminuria, with long-term current use of insulin (HCC)  Blood sugars noted, A2c in Aug 8.6    3. Macroalbuminuric diabetic nephropathy (HCC)  Proteinuria 1.4 gm, from DN, continue ACEi

## 2019-12-10 ENCOUNTER — HOSPITAL ENCOUNTER (OUTPATIENT)
Dept: LAB | Facility: MEDICAL CENTER | Age: 69
End: 2019-12-10
Attending: INTERNAL MEDICINE
Payer: COMMERCIAL

## 2019-12-10 DIAGNOSIS — R80.9 TYPE 2 DIABETES MELLITUS WITH MICROALBUMINURIA, WITH LONG-TERM CURRENT USE OF INSULIN (HCC): ICD-10-CM

## 2019-12-10 DIAGNOSIS — I10 ESSENTIAL HYPERTENSION: ICD-10-CM

## 2019-12-10 DIAGNOSIS — E11.29 TYPE 2 DIABETES MELLITUS WITH MICROALBUMINURIA, WITH LONG-TERM CURRENT USE OF INSULIN (HCC): ICD-10-CM

## 2019-12-10 DIAGNOSIS — E11.69 DYSLIPIDEMIA ASSOCIATED WITH TYPE 2 DIABETES MELLITUS (HCC): ICD-10-CM

## 2019-12-10 DIAGNOSIS — E55.9 VITAMIN D DEFICIENCY: ICD-10-CM

## 2019-12-10 DIAGNOSIS — E78.5 DYSLIPIDEMIA ASSOCIATED WITH TYPE 2 DIABETES MELLITUS (HCC): ICD-10-CM

## 2019-12-10 DIAGNOSIS — N18.30 STAGE 3 CHRONIC KIDNEY DISEASE (HCC): ICD-10-CM

## 2019-12-10 DIAGNOSIS — Z79.4 TYPE 2 DIABETES MELLITUS WITH MICROALBUMINURIA, WITH LONG-TERM CURRENT USE OF INSULIN (HCC): ICD-10-CM

## 2019-12-10 LAB
ALBUMIN SERPL BCP-MCNC: 3.7 G/DL (ref 3.2–4.9)
ALBUMIN/GLOB SERPL: 1.4 G/DL
ALP SERPL-CCNC: 95 U/L (ref 30–99)
ALT SERPL-CCNC: 17 U/L (ref 2–50)
ANION GAP SERPL CALC-SCNC: 4 MMOL/L (ref 0–11.9)
AST SERPL-CCNC: 13 U/L (ref 12–45)
BASOPHILS # BLD AUTO: 0.9 % (ref 0–1.8)
BASOPHILS # BLD: 0.05 K/UL (ref 0–0.12)
BILIRUB SERPL-MCNC: 0.3 MG/DL (ref 0.1–1.5)
BUN SERPL-MCNC: 31 MG/DL (ref 8–22)
CALCIUM SERPL-MCNC: 9.3 MG/DL (ref 8.5–10.5)
CHLORIDE SERPL-SCNC: 108 MMOL/L (ref 96–112)
CHOLEST SERPL-MCNC: 159 MG/DL (ref 100–199)
CO2 SERPL-SCNC: 26 MMOL/L (ref 20–33)
CREAT SERPL-MCNC: 1.83 MG/DL (ref 0.5–1.4)
CREAT UR-MCNC: 61.5 MG/DL
EOSINOPHIL # BLD AUTO: 0.15 K/UL (ref 0–0.51)
EOSINOPHIL NFR BLD: 2.8 % (ref 0–6.9)
ERYTHROCYTE [DISTWIDTH] IN BLOOD BY AUTOMATED COUNT: 45.5 FL (ref 35.9–50)
FASTING STATUS PATIENT QL REPORTED: NORMAL
GLOBULIN SER CALC-MCNC: 2.7 G/DL (ref 1.9–3.5)
GLUCOSE SERPL-MCNC: 233 MG/DL (ref 65–99)
HCT VFR BLD AUTO: 38.6 % (ref 42–52)
HDLC SERPL-MCNC: 47 MG/DL
HGB BLD-MCNC: 12.6 G/DL (ref 14–18)
IMM GRANULOCYTES # BLD AUTO: 0.02 K/UL (ref 0–0.11)
IMM GRANULOCYTES NFR BLD AUTO: 0.4 % (ref 0–0.9)
LDLC SERPL CALC-MCNC: 65 MG/DL
LYMPHOCYTES # BLD AUTO: 0.78 K/UL (ref 1–4.8)
LYMPHOCYTES NFR BLD: 14.8 % (ref 22–41)
MCH RBC QN AUTO: 31.7 PG (ref 27–33)
MCHC RBC AUTO-ENTMCNC: 32.6 G/DL (ref 33.7–35.3)
MCV RBC AUTO: 97 FL (ref 81.4–97.8)
MICROALBUMIN UR-MCNC: 96.3 MG/DL
MICROALBUMIN/CREAT UR: 1566 MG/G (ref 0–30)
MONOCYTES # BLD AUTO: 0.55 K/UL (ref 0–0.85)
MONOCYTES NFR BLD AUTO: 10.4 % (ref 0–13.4)
NEUTROPHILS # BLD AUTO: 3.72 K/UL (ref 1.82–7.42)
NEUTROPHILS NFR BLD: 70.7 % (ref 44–72)
NRBC # BLD AUTO: 0 K/UL
NRBC BLD-RTO: 0 /100 WBC
PLATELET # BLD AUTO: 173 K/UL (ref 164–446)
PMV BLD AUTO: 10.5 FL (ref 9–12.9)
POTASSIUM SERPL-SCNC: 4.8 MMOL/L (ref 3.6–5.5)
PROT SERPL-MCNC: 6.4 G/DL (ref 6–8.2)
RBC # BLD AUTO: 3.98 M/UL (ref 4.7–6.1)
SODIUM SERPL-SCNC: 138 MMOL/L (ref 135–145)
TRIGL SERPL-MCNC: 237 MG/DL (ref 0–149)
WBC # BLD AUTO: 5.3 K/UL (ref 4.8–10.8)

## 2019-12-10 PROCEDURE — 82306 VITAMIN D 25 HYDROXY: CPT

## 2019-12-10 PROCEDURE — 80061 LIPID PANEL: CPT

## 2019-12-10 PROCEDURE — 36415 COLL VENOUS BLD VENIPUNCTURE: CPT

## 2019-12-10 PROCEDURE — 85025 COMPLETE CBC W/AUTO DIFF WBC: CPT

## 2019-12-10 PROCEDURE — 83036 HEMOGLOBIN GLYCOSYLATED A1C: CPT

## 2019-12-10 PROCEDURE — 80053 COMPREHEN METABOLIC PANEL: CPT

## 2019-12-10 PROCEDURE — 82570 ASSAY OF URINE CREATININE: CPT

## 2019-12-10 PROCEDURE — 82043 UR ALBUMIN QUANTITATIVE: CPT

## 2019-12-11 LAB
25(OH)D3 SERPL-MCNC: 48 NG/ML (ref 30–100)
EST. AVERAGE GLUCOSE BLD GHB EST-MCNC: 243 MG/DL
HBA1C MFR BLD: 10.1 % (ref 0–5.6)

## 2019-12-18 ENCOUNTER — OFFICE VISIT (OUTPATIENT)
Dept: MEDICAL GROUP | Age: 69
End: 2019-12-18
Payer: COMMERCIAL

## 2019-12-18 VITALS
SYSTOLIC BLOOD PRESSURE: 152 MMHG | DIASTOLIC BLOOD PRESSURE: 64 MMHG | WEIGHT: 191.4 LBS | TEMPERATURE: 97 F | OXYGEN SATURATION: 97 % | HEART RATE: 76 BPM | BODY MASS INDEX: 29.01 KG/M2 | HEIGHT: 68 IN

## 2019-12-18 DIAGNOSIS — Z79.4 TYPE 2 DIABETES MELLITUS WITH MICROALBUMINURIA, WITH LONG-TERM CURRENT USE OF INSULIN (HCC): ICD-10-CM

## 2019-12-18 DIAGNOSIS — I10 ESSENTIAL HYPERTENSION: ICD-10-CM

## 2019-12-18 DIAGNOSIS — R80.9 TYPE 2 DIABETES MELLITUS WITH MICROALBUMINURIA, WITH LONG-TERM CURRENT USE OF INSULIN (HCC): ICD-10-CM

## 2019-12-18 DIAGNOSIS — E11.69 DYSLIPIDEMIA ASSOCIATED WITH TYPE 2 DIABETES MELLITUS (HCC): ICD-10-CM

## 2019-12-18 DIAGNOSIS — E78.5 DYSLIPIDEMIA ASSOCIATED WITH TYPE 2 DIABETES MELLITUS (HCC): ICD-10-CM

## 2019-12-18 DIAGNOSIS — E11.29 TYPE 2 DIABETES MELLITUS WITH MICROALBUMINURIA, WITH LONG-TERM CURRENT USE OF INSULIN (HCC): ICD-10-CM

## 2019-12-18 PROCEDURE — 99214 OFFICE O/P EST MOD 30 MIN: CPT | Performed by: INTERNAL MEDICINE

## 2019-12-18 RX ORDER — LISINOPRIL 20 MG/1
20 TABLET ORAL DAILY
Qty: 100 TAB | Refills: 3 | Status: SHIPPED | OUTPATIENT
Start: 2019-12-18 | End: 2020-01-22 | Stop reason: DRUGHIGH

## 2019-12-18 ASSESSMENT — PAIN SCALES - GENERAL: PAINLEVEL: NO PAIN

## 2019-12-18 NOTE — PROGRESS NOTES
Subjective:   Ashish Claire is a 69 y.o. male here today for evaluation and management of:    1. Type 2 diabetes mellitus with microalbuminuria, with long-term current use of insulin (HCC)  Chronic, currently taking Jardiance 10 mg once daily, Exenatide 2 mg weekly injection, Insulin glargine 10 units daily. No side effects reported. Most recent A1c on 12/10/19 was 10.1, elevated from previous A1c of 8.6. in 8/29/19. Patient reports a poor diet in the last few weeks due to the holiday season.   Patient denies worsening numbness or tingling in feet. Patient is not following consistent carb diet or counting carbs. Adheres to regular exercise routine. Patient has an appointment to establish care with [facility] on 12/27/19.      8/29/2019  12/10/2019   Glycohemoglobin 8.6 (H) 10.1 (H)   Estim. Avg Glu 200 243       2. Dyslipidemia associated with type 2 diabetes mellitus (HCC)  Stable. Currently taking Atorvastatin 40 mg daily as directed.  LDL is at goal, but triglyceride remains elevated.  Denies side effects--no myalgias or abdominal pain.  Reports diet has been very poor lately due to the holiday season. Patient has not been closely monitoring carbohydrate intake.   He is exercising regularly.  He denies dizziness, claudication, or chest pain.    Results for ASHISH CLAIRE (MRN [id number]) as of 12/18/2019 09:25   Ref. Range 8/29/2019 06:32 12/10/2019 06:32   Cholesterol,Tot Latest Ref Range: 100 - 199 mg/dL 137 159   Triglycerides Latest Ref Range: 0 - 149 mg/dL 227 (H) 237 (H)   HDL Latest Ref Range: >=40 mg/dL 39 (A) 47   LDL Latest Ref Range: <100 mg/dL 53 65       3. Essential hypertension  Blood pressure elevated at 152/64 today. Monitoring BP at home. Currently taking Lisinopril 10 mg daily, Doxazosin 2 mg daily, Carvedilol 25 mg BID as directed. Microalbumin-creatinine ratio is increased at 1556 (12/10/19) compared with previous level of 1447 (8/29/19). Denies lightheadedness, vision  "changes, headache, palpitations or leg swelling.      8/29/2019  12/10/2019    Micro Alb Creat Ratio 1447 (H) 1566 (H)         I discussed the blood test with the patient in clinic today.            Current medicines (including changes today)  Current Outpatient Medications   Medication Sig Dispense Refill   • lisinopril (PRINIVIL) 20 MG Tab Take 1 Tab by mouth every day. 100 Tab 3   • Empagliflozin 10 MG Tab Take 10 mg by mouth every day. 90 Tab 3   • carvedilol (COREG) 25 MG Tab Take 1 Tab by mouth 2 times a day, with meals. 180 Tab 3   • doxazosin (CARDURA) 2 MG Tab Take 1 Tab by mouth every day. 90 Tab 3   • ergocalciferol (DRISDOL) 97500 UNIT capsule Take 1 Cap by mouth every 7 days. 12 Cap 3   • Multiple Vitamins-Minerals (MULTIVITAMIN PO) Take  by mouth.     • Cholecalciferol (HM VITAMIN D3) 4000 units Cap Take 1 Cap by mouth every day.     • Exenatide ER 2 MG Pen-injector Inject 2 mg as instructed every 7 days. 12 Each 0   • insulin glargine (LANTUS) 100 UNIT/ML Solution Inject 12 Units as instructed every evening. Pt had 8 units night prior to surgery     • atorvastatin (LIPITOR) 40 MG Tab Take 40 mg by mouth every evening.     • aspirin EC (ECOTRIN) 81 MG Tablet Delayed Response Take 81 mg by mouth every day.       No current facility-administered medications for this visit.      He  has a past medical history of Acute exacerbation of CHF (congestive heart failure) ([facility]) (6/25/2018), CAD (coronary artery disease), CHF (congestive heart failure) ([facility]), Diabetes ([facility]), Dilated cardiomyopathy ([facility]), Hyperlipidemia, Hypertension, NYHA class 3 acute on chronic systolic heart failure ([facility]) (6/25/2018), and Severe aortic stenosis (6/25/2018).    ROS   No chest pain, no shortness of breath, no abdominal pain       Objective:     /64 (BP Location: Left arm, Patient Position: Sitting, BP Cuff Size: Adult)   Pulse 76   Temp 36.1 °C (97 °F) (Temporal)   Ht 1.727 m (5' 8\")   Wt 86.8 kg (191 lb 6.4 oz)   SpO2 " 97%  Body mass index is 29.1 kg/m².   Physical Exam:  General: Alert, oriented and no acute distress.  Eye contact is good, speech goal directed, affect calm  HEENT: conjunctiva non-injected, sclera non-icteric.  Oral mucous membranes pink and moist with no lesions.  Pinna normal.   Lungs: Normal respiratory effort, clear to auscultation bilaterally with good excursion.  CV: regular rate and rhythm. No murmurs.   Abdomen: soft, non distended, nontender, Bowel sound normal.  Ext: no edema, color normal, vascularity normal, temperature normal      Assessment and Plan:   The following treatment plan was discussed     1. Type 2 diabetes mellitus with microalbuminuria, with long-term current use of insulin (HCC)  - Chronic history of Type II Diabetes, not currently well controlled with Jardiance 10 mg once daily, Exenatide 2 mg weekly injection, Insulin glargine 10 units daily. On 12/101/19 Hemoglobin A1c 10.1, increased from previous A1c of 8.6 (8/29/19).   - Continue scheduled appointment to establish with Endocrinology on 12/27/19.   - In the meantime, discussed increasing Insulin Glargine from 10 units to 12 units for better control.  - Continue with Jardiance 10 mg once daily, Exenatide 2 mg weekly injection.   - Patient interested in referral to diabetes education class and medical nutrition therapy.  - Counseled to comply with medication and diet.   - Counseled signs and symptoms of hypoglycemia and management of hypoglycemia.   - Recommend to have retinal eye exam once a year.  - Advised to check both feet daily.  - REFERRAL TO DIABETIC EDUCATION Diabetes Self Management Education / Training (DSME/T) and Medical Nutrition Therapy (MNT): Initial Group DSME/MNT as authorized by payor; DSME/T Content: Nutritional Management, Goal Setting, Problem Solving, Preven...    2. Dyslipidemia associated with type 2 diabetes mellitus (HCC)  - LDL is well-controlled.  Triglyceride remains high.  Continue current regimen of  Atorvastatin 40 mg daily.   - Reviewed the risks and benefits of treatment and potential side effects of medication.  - Advised to eat low fat, low carbohydrate and high fiber diet as well as do cardio physical exercise regularly.    3. Essential hypertension  - Chronic, not well controlled. BP elevated at 152/64 today. Currently taking Lisinopril 10 mg daily, Doxazosin 2 mg daily, Carvedilol 25 mg twice daily. No side effects reported.   - Discussed increasing dose of Lisinopril to 20 mg daily to obtain better blood pressure control, improve microalbumin/creatinine ratio.  - Continue with Doxazosin 2 mg daily, Carvedilol 25 mg twice daily in addition to new dose of Lisinopril 20 mg.   - Reviewed the risks and benefits as well as potential side effects of medications with patient.  - Discussed to eat low-sodium diet and encouraged to do regular physical exercise.  - Recommend to monitor blood pressure and heart rate at home.   - Advised to recheck blood test in 3 months with his new primary care provider.  - lisinopril (PRINIVIL) 20 MG Tab; Take 1 Tab by mouth every day.  Dispense: 100 Tab; Refill: 3    4. Health Maintenance   - Patient is up-to-date in pneumonia vaccine and influenza vaccine.  Patient is advised to receive Shingrix vaccine at local pharmacy due to clinic shortage.  I discussed the risks and benefits of shingles vaccine with patient in clinic.    Followup: Return in about 8 weeks (around 2/12/2020), or if symptoms worsen or fail to improve, for Diabetes, Hypertension, Hyperlipidemia, CKD.      Please note that this dictation was created using voice recognition software. I have made every reasonable attempt to correct obvious errors, but I expect that there may have unintended errors in text, spelling, punctuation, or grammar that I did not discover.    I, Julio Sullivan (Scribe), am scribing for, and in the presence of, Venus Claros M.D..    Electronically signed by: Julio Sullivan (Scribe),  12/18/2019    I, Venus Claros M.D., personally performed the services described in this documentation, as scribed by Julio Sullivan in my presence, and it is both accurate and complete.

## 2019-12-19 ENCOUNTER — OFFICE VISIT (OUTPATIENT)
Dept: MEDICAL GROUP | Facility: MEDICAL CENTER | Age: 69
End: 2019-12-19
Payer: COMMERCIAL

## 2019-12-19 VITALS
WEIGHT: 189.6 LBS | SYSTOLIC BLOOD PRESSURE: 160 MMHG | BODY MASS INDEX: 28.73 KG/M2 | HEIGHT: 68 IN | HEART RATE: 86 BPM | OXYGEN SATURATION: 97 % | TEMPERATURE: 98.6 F | DIASTOLIC BLOOD PRESSURE: 80 MMHG

## 2019-12-19 DIAGNOSIS — N18.30 STAGE 3 CHRONIC KIDNEY DISEASE (HCC): ICD-10-CM

## 2019-12-19 DIAGNOSIS — N18.30 ANEMIA IN STAGE 3 CHRONIC KIDNEY DISEASE (HCC): ICD-10-CM

## 2019-12-19 DIAGNOSIS — I42.0 DILATED CARDIOMYOPATHY (HCC): ICD-10-CM

## 2019-12-19 DIAGNOSIS — E11.21 MACROALBUMINURIC DIABETIC NEPHROPATHY (HCC): ICD-10-CM

## 2019-12-19 DIAGNOSIS — E11.42 DIABETIC PERIPHERAL NEUROPATHY ASSOCIATED WITH TYPE 2 DIABETES MELLITUS (HCC): ICD-10-CM

## 2019-12-19 DIAGNOSIS — R80.9 TYPE 2 DIABETES MELLITUS WITH MICROALBUMINURIA, WITH LONG-TERM CURRENT USE OF INSULIN (HCC): ICD-10-CM

## 2019-12-19 DIAGNOSIS — E11.29 TYPE 2 DIABETES MELLITUS WITH MICROALBUMINURIA, WITH LONG-TERM CURRENT USE OF INSULIN (HCC): ICD-10-CM

## 2019-12-19 DIAGNOSIS — Z79.4 TYPE 2 DIABETES MELLITUS WITH MICROALBUMINURIA, WITH LONG-TERM CURRENT USE OF INSULIN (HCC): ICD-10-CM

## 2019-12-19 DIAGNOSIS — D63.1 ANEMIA IN STAGE 3 CHRONIC KIDNEY DISEASE (HCC): ICD-10-CM

## 2019-12-19 DIAGNOSIS — I10 ESSENTIAL HYPERTENSION: ICD-10-CM

## 2019-12-19 DIAGNOSIS — Z95.2 S/P TAVR (TRANSCATHETER AORTIC VALVE REPLACEMENT): ICD-10-CM

## 2019-12-19 DIAGNOSIS — Z79.4 LONG TERM (CURRENT) USE OF INSULIN (HCC): ICD-10-CM

## 2019-12-19 DIAGNOSIS — E78.5 DYSLIPIDEMIA ASSOCIATED WITH TYPE 2 DIABETES MELLITUS (HCC): ICD-10-CM

## 2019-12-19 DIAGNOSIS — E11.69 DYSLIPIDEMIA ASSOCIATED WITH TYPE 2 DIABETES MELLITUS (HCC): ICD-10-CM

## 2019-12-19 PROBLEM — M70.21 OLECRANON BURSITIS OF RIGHT ELBOW: Status: RESOLVED | Noted: 2019-06-17 | Resolved: 2019-12-19

## 2019-12-19 PROCEDURE — 99204 OFFICE O/P NEW MOD 45 MIN: CPT | Performed by: INTERNAL MEDICINE

## 2019-12-19 SDOH — HEALTH STABILITY: MENTAL HEALTH: HOW OFTEN DO YOU HAVE A DRINK CONTAINING ALCOHOL?: 2-4 TIMES A MONTH

## 2019-12-19 NOTE — ASSESSMENT & PLAN NOTE
Previous problem, currently improved.  He was followed with cardiology but notes that his cardiologist retired and thus he is only following with his previous cardiac interventionalists.  Blood pressure has recently decompensated which is been treated with increase of medications. Continue with annual echo.

## 2019-12-19 NOTE — ASSESSMENT & PLAN NOTE
Chronic and stable problem.  Appropriate to continue on atorvastatin 40 mg daily.  Recent lipid panel nearly at goal excluding triglycerides.  Next cholesterol check will be due in December 2020.

## 2019-12-19 NOTE — ASSESSMENT & PLAN NOTE
Longstanding problem, recently decompensated over the past year since he was lost to follow-up with endocrinology and had worsening compliance with his diet.  He admits to consuming more alcohol than usual and admits to challenges with following both a low-sodium/heart failure diet as well as a diabetic diet and has been referred to meet with the diabetic nurse for follow-up on things to eat and things to avoid.  He will see ophthalmology later this week and endocrinology next week to establish care.  We will need to recheck his creatinine next month due to recent decompensation and increase of his lisinopril.

## 2019-12-19 NOTE — PROGRESS NOTES
Subjective:     CC:  Diagnoses of Type 2 diabetes mellitus with microalbuminuria, with long-term current use of insulin (HCC), Essential hypertension, Stage 3 chronic kidney disease (HCC), Dyslipidemia associated with type 2 diabetes mellitus (HCC), Dilated cardiomyopathy (HCC), Diabetic peripheral neuropathy associated with type 2 diabetes mellitus (HCC), Long term (current) use of insulin (HCC), Macroalbuminuric diabetic nephropathy (HCC), Anemia in stage 3 chronic kidney disease (HCC), and S/P TAVR (transcatheter aortic valve replacement) were pertinent to this visit.    HISTORY OF THE PRESENT ILLNESS: Patient is a 69 y.o. male. This pleasant patient is here today to establish care and discuss the below stated chronic medical conditions.  He is unaccompanied for today's visit.    Problem   S/P Tavr (Transcatheter Aortic Valve Replacement)    Completed in July 2018, he notes significant dyspnea on exertion and fatigue and was found to have a blockage in the LAD as well as aortic stenosis.  He notes that he tolerated the TAVR well and had near resolution of symptoms.  He did complete about a year of cardiac rehab.  Last echo completed in July 2019 was stable.     Dilated Cardiomyopathy (Hcc)    Echo (6/2018): Moderately reduced left ventricular systolic function. LVEF 35%. Global hypokinesis with regional variation with severe hypokinesis of the anteroapical and septal walls.    Echo (7/2019): Normal left ventricular systolic function. LVEF 55%. Normal regional wall motion.    In 2018 he was treated with TAVR as well as stent placement to the LAD due to significant blockage.  He had symptoms of dyspnea and fatigue at that time which have essentially resolved.  He completed nearly a year of cardiac rehab.  He feels like he is doing well from a cardiac standpoint overall.     Stage 3 chronic kidney disease (HCC)    Results for AMAYA CLAIRE (MRN [id number]) as of 12/19/2019 07:59   Ref. Range 12/10/2019 06:32    Sodium Latest Ref Range: 135 - 145 mmol/L 138   Potassium Latest Ref Range: 3.6 - 5.5 mmol/L 4.8   Chloride Latest Ref Range: 96 - 112 mmol/L 108   Co2 Latest Ref Range: 20 - 33 mmol/L 26   Anion Gap Latest Ref Range: 0.0 - 11.9  4.0   Glucose Latest Ref Range: 65 - 99 mg/dL 233 (H)   Bun Latest Ref Range: 8 - 22 mg/dL 31 (H)   Creatinine Latest Ref Range: 0.50 - 1.40 mg/dL 1.83 (H)   GFR If  Latest Ref Range: >60 mL/min/1.73 m 2 44 (A)   GFR If Non  Latest Ref Range: >60 mL/min/1.73 m 2 37 (A)     He has had progressive kidney disease for the past several years.  He had acute worsening in May 2017 which prompted referral to nephrology, Dr. Kirkland, who continues to follow him annually.  He is on an ACE inhibitor due to proteinuria.  Currently has uncontrolled diabetes with an A1c around 10.  He denies dehydration or recent nephrolithiasis.     Type 2 diabetes mellitus with microalbuminuria, with long-term current use of insulin ([facility])       Ref. Range 8/29/2019 06:32 12/10/2019 06:31   Glycohemoglobin Latest Ref Range: 0.0 - 5.6 % 8.6 (H) 10.1 (H)       He was diagnosed with type 2 diabetes in 1997.  He was previously working with endocrinology at [facility] but after his endocrinologist left he had no one to follow-up with.  He notes usually his A1c is run in the high 6 to low 7 range.  Since his endocrinology NP (Samaria Anderson) left he has been upset and eating things that he knows he should not.  He is actually scheduled to meet with a new endocrinologist on December 27, 2019.    Latest A1c 10.1, however of note he was anemic at that time so this would not be completely accurate and he should probably have a fructosamine level drawn in place of it.    He reports longstanding proteinuria and has followed with nephrology on an annual or biannual basis.  He previously had retinopathy treated with laser therapy and continues to follow with ophthalmology on an annual basis.  He has  longstanding neuropathy in the bilateral legs from the shins down worse at the toes and ball of the feet compared to the heels.  He denies any prior diabetic foot ulcers.  He does appear to have mild Charcot deformities of the lateral aspects of both feet, he states these changes have started about 2 years ago.    Current regimen includes: Lantus 12U (increased from 10U on 12/18/19) as well as Exenatide 2 mg weekly, and empagliflozin 10 mg daily. His Lisinopril was increased to 20 mg daily due to rising BP and ongoing proteinuria.      Dyslipidemia associated with type 2 diabetes mellitus (HCC)    12/19/2019 07:59   Ref. Range 12/10/2019 06:32   Cholesterol,Tot Latest Ref Range: 100 - 199 mg/dL 159   Triglycerides Latest Ref Range: 0 - 149 mg/dL 237 (H)   HDL Latest Ref Range: >=40 mg/dL 47   LDL Latest Ref Range: <100 mg/dL 65     He has longstanding hyperlipidemia as well as history of CAD with 2 stents placed in July 2018.  He has been maintained on atorvastatin 40 mg daily.  He tolerates this medication without difficulty.  He denies myalgias or GI upset.  He does have elevated triglycerides secondary to his recently decompensated diabetes.     Essential Hypertension    He has longstanding history of hypertension.  It has recently elevated on the last 2 checks in clinic.  He has a machine at home but he does not check normally.  His current regimen includes: Carvedilol 25 mg twice daily, doxazosin 2 mg daily, and lisinopril 20 mg daily.  Of note, his lisinopril was increased to 20 mg daily on December 18 of 2019.  No signs or symptoms of orthostasis at this time.  He denies any chest pain, palpitations, or dyspnea on exertion.     Anemia in stage 3 chronic kidney disease (HCC)    Lab Results   Component Value Date/Time    WBC 5.3 12/10/2019 06:32 AM    RBC 3.98 (L) 12/10/2019 06:32 AM    HEMOGLOBIN 12.6 (L) 12/10/2019 06:32 AM    HEMATOCRIT 38.6 (L) 12/10/2019 06:32 AM    MCV 97.0 12/10/2019 06:32 AM    MCH 31.7  12/10/2019 06:32 AM    MCHC 32.6 (L) 12/10/2019 06:32 AM    MPV 10.5 12/10/2019 06:32 AM    NEUTSPOLYS 70.70 12/10/2019 06:32 AM    LYMPHOCYTES 14.80 (L) 12/10/2019 06:32 AM    MONOCYTES 10.40 12/10/2019 06:32 AM    EOSINOPHILS 2.80 12/10/2019 06:32 AM    BASOPHILS 0.90 12/10/2019 06:32 AM      Chronic and stable problem.  Consistent with Ermias of chronic disease from longstanding diabetes and progressive kidney disease.     Macroalbuminuric Diabetic Nephropathy (Hcc)    12/19/2019 08:17   Ref. Range 8/29/2019 06:32 12/10/2019 06:32   Micro Alb Creat Ratio Latest Ref Range: 0 - 30 mg/g 1447 (H) 1566 (H)   Creatinine, Urine Latest Units: mg/dL 89.10 61.50   Microalbumin, Urine Random Latest Units: mg/dL 128.9 96.3     He has had some level of macroalbuminuria since about 2016.  He does follow with nephrology and this is remained stable.  He recently had increase of his lisinopril to 20 mg.     Diabetic peripheral neuropathy associated with type 2 diabetes mellitus (HCC)    On diabetic foot exam today I know what appears to be Charcot deformity of bilateral feet at the lateral aspect of the bilateral feet.  He notes this has been progressive over the past 2 to 3 years.  There is no discomfort with this deformity.  He is not wearing dedicated diabetic shoes at this time.  He denies any prior diabetic foot wounds.  He does not have discomfort with his neuropathy and does not take any medications.  He notes the neuropathy is most severe at the toes and gets better moving towards the heels.     Long Term (Current) Use of Insulin (Hcc)    Lantus daily, previously 8U and now up to 12U. Denies hypoglycemic events.       Allergies: Sulfa drugs    Current Outpatient Medications Ordered in Epic   Medication Sig Dispense Refill   • lisinopril (PRINIVIL) 20 MG Tab Take 1 Tab by mouth every day. 100 Tab 3   • Empagliflozin 10 MG Tab Take 10 mg by mouth every day. 90 Tab 3   • carvedilol (COREG) 25 MG Tab Take 1 Tab by mouth 2  times a day, with meals. 180 Tab 3   • doxazosin (CARDURA) 2 MG Tab Take 1 Tab by mouth every day. 90 Tab 3   • ergocalciferol (DRISDOL) 56278 UNIT capsule Take 1 Cap by mouth every 7 days. 12 Cap 3   • Multiple Vitamins-Minerals (MULTIVITAMIN PO) Take  by mouth.     • Cholecalciferol (HM VITAMIN D3) 4000 units Cap Take 1 Cap by mouth every day.     • atorvastatin (LIPITOR) 40 MG Tab Take 40 mg by mouth every evening.     • aspirin EC (ECOTRIN) 81 MG Tablet Delayed Response Take 81 mg by mouth every day.     • insulin glargine (LANTUS SOLOSTAR) 100 UNIT/ML Solution Pen-injector injection Inject 12 Units as instructed every evening. 3 PEN 3   • Exenatide ER 2 MG Pen-injector Inject 2 mg as instructed every 7 days. 12 Each 3     No current Epic-ordered facility-administered medications on file.        Past Medical History:   Diagnosis Date   • Acute exacerbation of CHF (congestive heart failure) ([facility]) 2018   • CAD (coronary artery disease)    • CHF (congestive heart failure) ([facility])    • Diabetes ([facility])    • Dilated cardiomyopathy ([facility])    • Hyperlipidemia    • Hypertension    • NYHA class 3 acute on chronic systolic heart failure ([facility]) 2018   • Olecranon bursitis of right elbow 2019   • Severe aortic stenosis 2018       Past Surgical History:   Procedure Laterality Date   • TRANSCATHETER AORTIC VALVE REPLACEMENT  2018    Procedure: TRANSCATHETER AORTIC VALVE REPLACEMENT;  Surgeon: Markus Fuller M.D.;  Location: SURGERY [facility];  Service: Cardiac   • DAYANNA  2018    Procedure: DAYANNA;  Surgeon: Markus Fuller M.D.;  Location: SURGERY [facility];  Service: Cardiac   • AORTIC VALVE REPLACEMENT      S/P TAVR    • TONSILLECTOMY     • ZZZ CARDIAC CATH         Social History     Tobacco Use   • Smoking status: Former Smoker     Packs/day: 1.00     Years: 22.00     Pack years: 22.00     Last attempt to quit: 1989     Years since quittin.9   • Smokeless tobacco: Never Used   • Tobacco  "comment: stop cigar in 2015   Substance Use Topics   • Alcohol use: Yes     Alcohol/week: 0.6 oz     Types: 1 Glasses of wine per week     Frequency: 2-4 times a month     Drinks per session: 1 or 2     Binge frequency: Never     Comment: OCCASIONALLY   • Drug use: No       Social History     Patient does not qualify to have social determinant information on file (likely too young).   Social History Narrative    Ashish was born in [place], raised in [place] and [place]. He moved to [place] in 1992, he was worked for [employer]. He retired in 2016. He is  to [place] for the past 40 years. He has 2 kids- Neno (39, [place]) and Radha (36, [employer]). He enjoys golfing and they also try to travel when they can.       Family History   Problem Relation Age of Onset   • Lung Disease Mother         COPD   • Heart Disease Father         valve disease   • Heart Failure Father    • Hypertension Father    • Hyperlipidemia Father    • Cancer Maternal Grandmother         BRAIN TUMOR   • Stroke Maternal Grandfather    • Other Paternal Grandmother         INTESTINAL PROBLEM   • Stroke Paternal Grandfather    • Prostate cancer Brother    • Other Brother         ortho       Health Maintenance: Completed    ROS:   As above in the HPI All Others Reviewed and Negative  Objective:     Exam: /80   Pulse 86   Temp 37 °C (98.6 °F) (Temporal)   Ht 1.727 m (5' 8\")   Wt 86 kg (189 lb 9.5 oz)   SpO2 97%  Body mass index is 28.83 kg/m².    General: Normal appearing. No distress. Appears stated age.  EENT: Eyes conjunctiva clear lids without ptosis, extraocular movements intact, ears normal shape and contour, canals are clear bilaterally, tympanic membranes are benign, oropharynx is without erythema, edema or exudates. Fair dentition.   Neck: Supple without JVD. Thyroid is not enlarged.  Pulmonary: Clear to ausculation.  Normal effort. No rales, ronchi, or wheezing.  Occasional dry cough.  Cardiovascular: Regular rate and rhythm without " murmur. No lower extremity edema bilaterally.  Abdomen: Soft, nontender, nondistended. Normal bowel sounds.   Neurologic: No resting tremor, no increased tone or rigidity.  Lymph: No cervical or supraclavicular lymph nodes are palpable  Skin: Warm and dry.  No obvious lesions.  He has blackening of the toenail on the right foot great toe, this is been longstanding.  He also has a blue nevus on the anterior lower left leg.  Musculoskeletal: Normal gait. No extremity cyanosis, clubbing, or edema. Patient ambulates dependently without a gait aid.  Psych: Normal mood and affect. Alert and oriented x3. Judgment and insight is normal.  Diabetic Foot Exam: No ulcers/laceration/blisters present on bilateral feet, he has mild Charcot deformities of bilateral feet at the lateral aspects, no toe deformities, no toenail thickness, no ingrown toenails, no increase in skin temperature bilaterally, no skin erythema to bilateral feet, bilateral dorsalis pedis and posterior tibial pulses 2+ and equal, Refill less than 2 seconds bilaterally, [place] 10 g monofilament testing decreased in bilateral great toes, bilateral balls of feet at medial/lateral/mid ball of foot    Assessment & Plan:   69 y.o. male with the following -    Problem List Items Addressed This Visit     Type 2 diabetes mellitus with microalbuminuria, with long-term current use of insulin (HCC)     Longstanding problem, recently decompensated over the past year since he was lost to follow-up with endocrinology and had worsening compliance with his diet.  He admits to consuming more alcohol than usual and admits to challenges with following both a low-sodium/heart failure diet as well as a diabetic diet and has been referred to meet with the diabetic nurse for follow-up on things to eat and things to avoid.  He will see ophthalmology later this week and endocrinology next week to establish care.  We will need to recheck his creatinine next month due to recent  decompensation and increase of his lisinopril.         Relevant Orders    Comp Metabolic Panel    HEMOGLOBIN A1C    Diabetic Monofilament Lower Extremity Exam (Completed)    Dyslipidemia associated with type 2 diabetes mellitus (HCC)     Chronic and stable problem.  Appropriate to continue on atorvastatin 40 mg daily.  Recent lipid panel nearly at goal excluding triglycerides.  Next cholesterol check will be due in December 2020.         Essential hypertension     Chronic and ongoing problem.  His lisinopril was increased yesterday and thus we will give that a bit of time to work before further altering his blood pressure regiment.  We also need to recheck his kidney function in about a month to ensure improvement/stability due to recent worsening and increase of his ACEI.         Relevant Orders    CBC WITH DIFFERENTIAL    Stage 3 chronic kidney disease (HCC)     Chronic and ongoing issue.  I will send a message to his nephrologist to let them know about the slight worsening his creatinine and we will plan to recheck again in January 2020 to ensure stability/improvement.  His lisinopril was recently increased to 20 mg due to ongoing proteinuria and rising blood pressure.         Relevant Orders    Comp Metabolic Panel    Dilated cardiomyopathy (HCC)     Previous problem, currently improved.  He was followed with cardiology but notes that his cardiologist retired and thus he is only following with his previous cardiac interventionalists.  Blood pressure has recently decompensated which is been treated with increase of medications. Continue with annual echo.         S/P TAVR (transcatheter aortic valve replacement)     Chronic and stable problem.  Continue with annual echocardiograms to ensure stability of the TAVR.         Long term (current) use of insulin (HCC)     Chronic and stable problem.  Appropriate to continue Lantus as prescribed.         Diabetic peripheral neuropathy associated with type 2 diabetes  mellitus (HCC)     Chronic and ongoing problem.  He does not have any wounds or pain with his feet.  I encouraged him to consider diabetic footwear to protect his feet moving forward for microtrauma is related to his neuropathy.         Macroalbuminuric diabetic nephropathy (HCC)     Chronic and ongoing issue.  Continue follow-up with nephrology.  ACE inhibitor recently increased.  We will recheck kidney function in January 2020.         Anemia in stage 3 chronic kidney disease (HCC)     Chronic ongoing issue.  His anemia is secondary to longstanding CKD and diabetes.  Continue to monitor with periodic blood counts to ensure stability.              Return in about 3 months (around 3/19/2020).    Please note that this dictation was created using voice recognition software. I have made every reasonable attempt to correct obvious errors, but I expect that there are errors of grammar and possibly content that I did not discover before finalizing the note.

## 2019-12-19 NOTE — ASSESSMENT & PLAN NOTE
Chronic and ongoing problem.  His lisinopril was increased yesterday and thus we will give that a bit of time to work before further altering his blood pressure regiment.  We also need to recheck his kidney function in about a month to ensure improvement/stability due to recent worsening and increase of his ACEI.

## 2019-12-19 NOTE — ASSESSMENT & PLAN NOTE
Chronic and ongoing issue.  I will send a message to his nephrologist to let them know about the slight worsening his creatinine and we will plan to recheck again in January 2020 to ensure stability/improvement.  His lisinopril was recently increased to 20 mg due to ongoing proteinuria and rising blood pressure.

## 2019-12-20 NOTE — ASSESSMENT & PLAN NOTE
Chronic and stable problem.  Continue with annual echocardiograms to ensure stability of the TAVR.

## 2019-12-20 NOTE — ASSESSMENT & PLAN NOTE
Chronic and ongoing issue.  Continue follow-up with nephrology.  ACE inhibitor recently increased.  We will recheck kidney function in January 2020.

## 2019-12-20 NOTE — ASSESSMENT & PLAN NOTE
Chronic and ongoing problem.  He does not have any wounds or pain with his feet.  I encouraged him to consider diabetic footwear to protect his feet moving forward for microtrauma is related to his neuropathy.

## 2019-12-20 NOTE — ASSESSMENT & PLAN NOTE
Chronic ongoing issue.  His anemia is secondary to longstanding CKD and diabetes.  Continue to monitor with periodic blood counts to ensure stability.

## 2019-12-27 ENCOUNTER — OFFICE VISIT (OUTPATIENT)
Dept: ENDOCRINOLOGY | Facility: MEDICAL CENTER | Age: 69
End: 2019-12-27
Payer: COMMERCIAL

## 2019-12-27 VITALS
OXYGEN SATURATION: 98 % | HEIGHT: 68 IN | HEART RATE: 88 BPM | SYSTOLIC BLOOD PRESSURE: 140 MMHG | BODY MASS INDEX: 28.49 KG/M2 | WEIGHT: 188 LBS | DIASTOLIC BLOOD PRESSURE: 70 MMHG

## 2019-12-27 DIAGNOSIS — E78.5 DYSLIPIDEMIA: ICD-10-CM

## 2019-12-27 DIAGNOSIS — I10 ESSENTIAL HYPERTENSION: ICD-10-CM

## 2019-12-27 DIAGNOSIS — Z79.4 LONG-TERM INSULIN USE (HCC): ICD-10-CM

## 2019-12-27 DIAGNOSIS — Z79.4 TYPE 2 DIABETES MELLITUS WITH HYPERGLYCEMIA, WITH LONG-TERM CURRENT USE OF INSULIN (HCC): ICD-10-CM

## 2019-12-27 DIAGNOSIS — E11.65 TYPE 2 DIABETES MELLITUS WITH HYPERGLYCEMIA, WITH LONG-TERM CURRENT USE OF INSULIN (HCC): ICD-10-CM

## 2019-12-27 PROCEDURE — 99204 OFFICE O/P NEW MOD 45 MIN: CPT | Mod: 25 | Performed by: INTERNAL MEDICINE

## 2019-12-27 PROCEDURE — 95249 CONT GLUC MNTR PT PROV EQP: CPT | Performed by: INTERNAL MEDICINE

## 2019-12-27 RX ORDER — FLASH GLUCOSE SENSOR
1 KIT MISCELLANEOUS
Qty: 2 EACH | Refills: 11 | Status: SHIPPED | OUTPATIENT
Start: 2019-12-27 | End: 2020-01-10 | Stop reason: SDUPTHER

## 2019-12-27 NOTE — PROGRESS NOTES
Monofilament testing with a 10 gram force: sensation: decreased bilaterally  Visual Inspection: Feet without maceration, ulcers, or fissures.  Pedal pulses: intact bilaterally

## 2019-12-27 NOTE — PROGRESS NOTES
"Chief Complaint:  Consult requested by Almaz Pond D.O. for initial evaluation of Type 2 Diabetes Mellitus    HPI:   Ashish Wiley is a 69 y.o. male with Type 2 Diabetes Mellitus diagnosed in 1997.  He denies hospitalizations for DKA in the past.    His last A1c was elevated at 10% on December 10, 2019.  He is taking Lantus 12 units once a day, Bydureon 2 mg once a week and Jardiance 10 mg daily.  He reports progressive hyperglycemia.  He has coronary artery disease and finished cardiac rehab recently and has been eating a lot of carbs which explains his hyperglycemia.    He has a history of chronic kidney disease stage III which is the reason why Metformin has been discontinued by his primary care physician    He just bought a 3-month supply of his GLP-1 thus it cannot be replaced    He monitors blood glucose  4 times per day for the past 90 days. Blood glucose values range:Breakfast: 200, 240           He reports hypoglycemic episodes occurring 1 times per month and are mild  He  denies hypoglycemic unawareness. He denies episodes of severe hypoglycemia requiring third party assistance.  He  is not wearing a medical alert bracelet or necklace.  He does not a glucagon emergency kit.    He reports attending diabetes education classes.  Diet: \"unhealthy\" diet in general.    Diabetes Complications   He  denies history of retinopathy.  He denies laser eye surgery. Last eye exam: December 2019 at [facility].  He reports history of peripheral sensory neuropathy.  He reports numbness, tingling in both feet.  He denies history of foot sores.   He reports history of kidney damage.  He is on ACE inhibitor or ARB.   He reports history of coronary artery disease.  He  denies history of stroke and denies TIA.  He denies history of PAD.  He reports history of hyperlipidemia.  He is taking atorvastatin 40 mg daily, his last LDL cholesterol was well controlled 65      ROS:     CONS:     No fever, no " chills, no weight loss, reports fatigue   EYES:      No diplopia, reports blurry vision, no redness of eyes, no swelling of eyelids   ENT:    No hearing loss, No ear pain, No sore throat, no dysphagia, no neck swelling   CV:     No chest pain, no palpitations, no claudication, no orthopnea, no PND   PULM:    No SOB, no cough, no hemoptysis, no wheezing    GI:   No nausea, no vomiting, no diarrhea, no constipation, no bloody stools   :  Passing urine well, no dysuria, no hematuria   ENDO:    Reports polyuria, reports polydipsia, no heat intolerance, no cold intolerance   NEURO: No headaches, no dizziness, no convulsions, no tremors   MUSC:  No joint swellings, no arthralgias, no myalgias, no weakness   SKIN:   No rash, no ulcers, no dry skin   PSYCH:   No depression, no anxiety, no difficulty sleeping       Past Medical History:  Patient Active Problem List    Diagnosis Date Noted   • S/P TAVR (transcatheter aortic valve replacement) 07/02/2018     Priority: Medium   • Stented coronary artery 06/29/2018     Priority: Medium   • Coronary artery disease due to calcified coronary lesion: Status post atherectomy and stenting of the LAD in June 2018.  Nonobstructive disease in the RCA and circumflex. 06/25/2018     Priority: Medium   • Dilated cardiomyopathy ([facility]) 06/21/2018     Priority: Medium   • Stage 3 chronic kidney disease ([facility]) 11/10/2017     Priority: Medium   • Type 2 diabetes mellitus with microalbuminuria, with long-term current use of insulin ([facility]) 06/16/2016     Priority: Medium   • Dyslipidemia associated with type 2 diabetes mellitus ([facility]) 06/16/2016     Priority: Medium   • Essential hypertension 06/16/2016     Priority: Medium   • Vitamin D deficiency 10/24/2017     Priority: Low   • Anemia in stage 3 chronic kidney disease ([facility]) 09/12/2017     Priority: Low   • Macroalbuminuric diabetic nephropathy ([facility]) 10/18/2016     Priority: Low   • Diabetic peripheral neuropathy associated with type 2 diabetes  mellitus ([facility]) 06/16/2016     Priority: Low   • Localized swelling of lower leg 06/17/2019   • CHF due to valvular disease ([facility]) 08/09/2018   • Chronic systolic congestive heart failure, NYHA class 1 ([facility]) 07/23/2018   • ACC/AHA stage C systolic heart failure ([facility]) 06/21/2018   • Long term (current) use of insulin ([facility]) 10/11/2017       Past Surgical History:  Past Surgical History:   Procedure Laterality Date   • TRANSCATHETER AORTIC VALVE REPLACEMENT  7/2/2018    Procedure: TRANSCATHETER AORTIC VALVE REPLACEMENT;  Surgeon: Markus Fuller M.D.;  Location: SURGERY [facility];  Service: Cardiac   • DAYANNA  7/2/2018    Procedure: DAYANNA;  Surgeon: Markus Fuller M.D.;  Location: SURGERY [facility];  Service: Cardiac   • AORTIC VALVE REPLACEMENT      S/P TAVR    • TONSILLECTOMY     • ZZZ CARDIAC CATH          Allergies:  Sulfa drugs     Current Medications:    Current Outpatient Medications:   •  insulin glargine (LANTUS SOLOSTAR) 100 UNIT/ML Solution Pen-injector injection, Inject 12 Units as instructed every evening., Disp: 3 PEN, Rfl: 3  •  Exenatide ER 2 MG Pen-injector, Inject 2 mg as instructed every 7 days., Disp: 12 Each, Rfl: 3  •  lisinopril (PRINIVIL) 20 MG Tab, Take 1 Tab by mouth every day., Disp: 100 Tab, Rfl: 3  •  Empagliflozin 10 MG Tab, Take 10 mg by mouth every day., Disp: 90 Tab, Rfl: 3  •  carvedilol (COREG) 25 MG Tab, Take 1 Tab by mouth 2 times a day, with meals., Disp: 180 Tab, Rfl: 3  •  doxazosin (CARDURA) 2 MG Tab, Take 1 Tab by mouth every day., Disp: 90 Tab, Rfl: 3  •  ergocalciferol (DRISDOL) 95609 UNIT capsule, Take 1 Cap by mouth every 7 days., Disp: 12 Cap, Rfl: 3  •  Multiple Vitamins-Minerals (MULTIVITAMIN PO), Take  by mouth., Disp: , Rfl:   •  atorvastatin (LIPITOR) 40 MG Tab, Take 40 mg by mouth every evening., Disp: , Rfl:   •  aspirin EC (ECOTRIN) 81 MG Tablet Delayed Response, Take 81 mg by mouth every day., Disp: , Rfl:     Social History:  Social History     Socioeconomic  History   • Marital status:      Spouse name: Not on file   • Number of children: Not on file   • Years of education: Not on file   • Highest education level: Not on file   Occupational History   • Not on file   Social Needs   • Financial resource strain: Not on file   • Food insecurity:     Worry: Not on file     Inability: Not on file   • Transportation needs:     Medical: Not on file     Non-medical: Not on file   Tobacco Use   • Smoking status: Former Smoker     Packs/day: 1.00     Years: 22.00     Pack years: 22.00     Last attempt to quit: 1989     Years since quittin.0   • Smokeless tobacco: Never Used   • Tobacco comment: stop cigar in    Substance and Sexual Activity   • Alcohol use: Yes     Alcohol/week: 0.6 oz     Types: 1 Glasses of wine per week     Frequency: 2-4 times a month     Drinks per session: 1 or 2     Binge frequency: Never     Comment: OCCASIONALLY   • Drug use: No   • Sexual activity: Yes     Partners: Female   Lifestyle   • Physical activity:     Days per week: Not on file     Minutes per session: Not on file   • Stress: Not on file   Relationships   • Social connections:     Talks on phone: Not on file     Gets together: Not on file     Attends [employer] service: Not on file     Active member of club or organization: Not on file     Attends meetings of clubs or organizations: Not on file     Relationship status: Not on file   • Intimate partner violence:     Fear of current or ex partner: Not on file     Emotionally abused: Not on file     Physically abused: Not on file     Forced sexual activity: Not on file   Other Topics Concern   • Not on file   Social History Narrative    Ashish was born in [place], raised in [place]. He moved to [place] in , he was worked for [employer]. He retired in 2016. He is  to [place] for the past 40 years. He has 2 kids- Neno (39, [place]) and Radha (36, [place]). He enjoys golfing and they also try to travel when they can.     "    Family History:   Family History   Problem Relation Age of Onset   • Lung Disease Mother         COPD   • Heart Disease Father         valve disease   • Heart Failure Father    • Hypertension Father    • Hyperlipidemia Father    • Cancer Maternal Grandmother         BRAIN TUMOR   • Stroke Maternal Grandfather    • Other Paternal Grandmother         INTESTINAL PROBLEM   • Stroke Paternal Grandfather    • Prostate cancer Brother    • Other Brother         ortho       PHYSICAL EXAM:   Vital signs: /70   Pulse 88   Ht 1.727 m (5' 8\")   Wt 85.3 kg (188 lb)   SpO2 98%   BMI 28.59 kg/m²   GENERAL: Well-developed, well-nourished  in no apparent distress.   EYE: No ocular and eyelid asymmetry, Anicteric sclerae,  PERRL, No exophthalmos or lidlag  HENT: Hearing grossly intact, Normocephalic, atraumatic. Pink, moist mucous membranes, No exudate  NECK: Supple. Trachea midline. thyroid is normal in size without nodules or tenderness  CARDIOVASCULAR: Regular rate and rhythm. No murmurs, rubs, or gallops.   LUNGS: Clear to auscultation bilaterally   ABDOMEN: Soft, nontender with positive bowel sounds.   EXTREMITIES: No clubbing, cyanosis, or edema.   NEUROLOGICAL: Cranial nerves II-XII are grossly intact   Symmetric reflexes at the patella no proximal muscle weakness, No visible tremor with both outstretched hands  LYMPH: No cervical, supraclavicular,  adenopathy palpated.   SKIN: No rashes, lesions. Turgor is normal.  FOOT: Normal sensation to monofilament testing, normal pulses, no ulcers.  Normal Vibration quantitative sensation test.    Labs:  Lab Results   Component Value Date/Time    HBA1C 10.1 (H) 12/10/2019 0631    AVGLUC 243 12/10/2019 0631       Lab Results   Component Value Date/Time    WBC 5.3 12/10/2019 06:32 AM    RBC 3.98 (L) 12/10/2019 06:32 AM    HEMOGLOBIN 12.6 (L) 12/10/2019 06:32 AM    MCV 97.0 12/10/2019 06:32 AM    MCH 31.7 12/10/2019 06:32 AM    MCHC 32.6 (L) 12/10/2019 06:32 AM    RDW 45.5 " 12/10/2019 06:32 AM    MPV 10.5 12/10/2019 06:32 AM       Lab Results   Component Value Date/Time    SODIUM 138 12/10/2019 06:32 AM    POTASSIUM 4.8 12/10/2019 06:32 AM    CHLORIDE 108 12/10/2019 06:32 AM    CO2 26 12/10/2019 06:32 AM    ANION 4.0 12/10/2019 06:32 AM    GLUCOSE 233 (H) 12/10/2019 06:32 AM    BUN 31 (H) 12/10/2019 06:32 AM    CREATININE 1.83 (H) 12/10/2019 06:32 AM    CALCIUM 9.3 12/10/2019 06:32 AM    ASTSGOT 13 12/10/2019 06:32 AM    ALTSGPT 17 12/10/2019 06:32 AM    TBILIRUBIN 0.3 12/10/2019 06:32 AM    ALBUMIN 3.7 12/10/2019 06:32 AM    TOTPROTEIN 6.4 12/10/2019 06:32 AM    GLOBULIN 2.7 12/10/2019 06:32 AM    AGRATIO 1.4 12/10/2019 06:32 AM       Lab Results   Component Value Date/Time    CHOLSTRLTOT 159 12/10/2019 0632    TRIGLYCERIDE 237 (H) 12/10/2019 0632    HDL 47 12/10/2019 0632    LDL 65 12/10/2019 0632       Lab Results   Component Value Date/Time    MALBCRT 1566 (H) 12/10/2019 06:32 AM    MICROALBUR 96.3 12/10/2019 06:32 AM        No results found for: TSHULTRASEN  No results found for: FREEDIR  No results found for: FREET3  No results found for: THYSTIMIG      ASSESSMENT/PLAN:     1. Type 2 diabetes mellitus with hyperglycemia, with long-term current use of insulin (HCC)  Uncontrolled secondary to hyperglycemia, reviewed pathogenesis of type 2 diabetes and the importance of good glucose control.  He has established coronary artery disease and because of this are targets for glycemic control are different and A1c of less than 6.5% to 7% is not adamantly required.    Recommend increasing his basal insulin to 22 units  Continue bydureon 2 mg once a week - eventually once his supply runs out we plan to replace this GLP-1 analog  I am increasing his Jardiance to 25 mg daily.  He should remain well-hydrated.  I recommend that he start NovoLog sliding scale and take 1 unit for every 40 points above a target sugar of 120 three  times a day with breakfast, with lunch and with supper    I am  placing a personal kp CGM today  I instructed him on CGM use.  He is advised to check his sugars 4 times a day due to fluctuating glucose control    We will plan for follow-up in 2 weeks to download his glucose sensor and review his blood sugars    2. Dyslipidemia  Well-controlled continue atorvastatin   follow low-fat diet    3. Essential hypertension  Well-controlled  Continue lisinopril    4. Long-term insulin use (HCC)  Patient is on long-term basal insulin therapy with a GLP-1 and other oral agents for type 2 diabetes management      Return in about 2 weeks (around 1/10/2020).       This patient during there office visit was started on new medication.  Side effects of new medications were discussed with the patient today in the office. The patient was supplied paperwork on this new medication.    Thank you kindly for allowing me to participate in the diabetes care plan for this patient.    Timothy Botello MD, [facility], [facility]  12/27/19    CC:   Almaz Pond D.O.

## 2019-12-30 ENCOUNTER — OFFICE VISIT (OUTPATIENT)
Dept: URGENT CARE | Facility: CLINIC | Age: 69
End: 2019-12-30
Payer: COMMERCIAL

## 2019-12-30 ENCOUNTER — APPOINTMENT (OUTPATIENT)
Dept: RADIOLOGY | Facility: IMAGING CENTER | Age: 69
End: 2019-12-30
Attending: PHYSICIAN ASSISTANT
Payer: COMMERCIAL

## 2019-12-30 VITALS
TEMPERATURE: 98.1 F | BODY MASS INDEX: 29.04 KG/M2 | OXYGEN SATURATION: 94 % | WEIGHT: 191 LBS | HEART RATE: 72 BPM | RESPIRATION RATE: 20 BRPM | DIASTOLIC BLOOD PRESSURE: 80 MMHG | SYSTOLIC BLOOD PRESSURE: 122 MMHG

## 2019-12-30 DIAGNOSIS — I10 ESSENTIAL HYPERTENSION: ICD-10-CM

## 2019-12-30 DIAGNOSIS — N18.30 STAGE 3 CHRONIC KIDNEY DISEASE (HCC): ICD-10-CM

## 2019-12-30 DIAGNOSIS — J18.9 COMMUNITY ACQUIRED PNEUMONIA OF LEFT LOWER LOBE OF LUNG: ICD-10-CM

## 2019-12-30 DIAGNOSIS — R05.9 COUGH: ICD-10-CM

## 2019-12-30 DIAGNOSIS — E11.29 TYPE 2 DIABETES MELLITUS WITH MICROALBUMINURIA, WITH LONG-TERM CURRENT USE OF INSULIN (HCC): ICD-10-CM

## 2019-12-30 DIAGNOSIS — R80.9 TYPE 2 DIABETES MELLITUS WITH MICROALBUMINURIA, WITH LONG-TERM CURRENT USE OF INSULIN (HCC): ICD-10-CM

## 2019-12-30 DIAGNOSIS — Z79.4 TYPE 2 DIABETES MELLITUS WITH MICROALBUMINURIA, WITH LONG-TERM CURRENT USE OF INSULIN (HCC): ICD-10-CM

## 2019-12-30 PROCEDURE — 99215 OFFICE O/P EST HI 40 MIN: CPT | Performed by: PHYSICIAN ASSISTANT

## 2019-12-30 PROCEDURE — 71046 X-RAY EXAM CHEST 2 VIEWS: CPT | Mod: TC | Performed by: PHYSICIAN ASSISTANT

## 2019-12-30 RX ORDER — DOXYCYCLINE HYCLATE 100 MG
100 TABLET ORAL 2 TIMES DAILY
Qty: 14 TAB | Refills: 0 | Status: SHIPPED | OUTPATIENT
Start: 2019-12-30 | End: 2020-01-06

## 2019-12-30 RX ORDER — AMOXICILLIN AND CLAVULANATE POTASSIUM 875; 125 MG/1; MG/1
1 TABLET, FILM COATED ORAL 2 TIMES DAILY
Qty: 14 TAB | Refills: 0 | Status: SHIPPED | OUTPATIENT
Start: 2019-12-30 | End: 2020-01-06

## 2019-12-30 RX ORDER — BENZONATATE 100 MG/1
200 CAPSULE ORAL 3 TIMES DAILY PRN
Qty: 60 CAP | Refills: 0 | Status: SHIPPED | OUTPATIENT
Start: 2019-12-30 | End: 2020-01-10

## 2019-12-30 ASSESSMENT — ENCOUNTER SYMPTOMS
SINUS PAIN: 1
COUGH: 1
SPUTUM PRODUCTION: 0
WHEEZING: 0
FEVER: 0
SHORTNESS OF BREATH: 1
HEADACHES: 1
HEMOPTYSIS: 0
CHILLS: 0
SORE THROAT: 0

## 2019-12-30 NOTE — PROGRESS NOTES
Subjective:   Ashish Wiley  is a 69 y.o. male who presents for Cough (X 3 wks stuffy nose , dry cough , 2 wks chest congestion .)    This is a new problem.  Patient presents to urgent care with 3-week history of nasal congestion, postnasal drip and cough.  Cough has progressively been worsening.  Cough is mostly nonproductive.  He does report feeling somewhat short of breath at times.  Patient denies any recent fever or chills.    Patient is uncontrolled type II diabetic treated with insulin with history of chronic kidney disease with most recent GFR of 37.  Patient also with history of heart disease and status post TAVR.    .    Cough   Associated symptoms include headaches and shortness of breath. Pertinent negatives include no chest pain, chills, ear pain, fever, hemoptysis, sore throat or wheezing.     Review of Systems   Constitutional: Positive for malaise/fatigue. Negative for chills and fever.   HENT: Positive for congestion and sinus pain. Negative for ear pain and sore throat.    Respiratory: Positive for cough and shortness of breath. Negative for hemoptysis, sputum production and wheezing.    Cardiovascular: Negative for chest pain and leg swelling.   Neurological: Positive for headaches.   All other systems reviewed and are negative.    Allergies   Allergen Reactions   • Sulfa Drugs Unspecified     Kidney Stones     Reviewed past medical, surgical , social and family history.  Reviewed prescription and over-the-counter medications with patient and electronic health record today.     Objective:   /80   Pulse 72   Temp 36.7 °C (98.1 °F) (Temporal)   Resp 20   Wt 86.6 kg (191 lb)   SpO2 94%   BMI 29.04 kg/m²   Physical Exam  Vitals signs reviewed.   Constitutional:       Appearance: He is well-developed. He is not ill-appearing or toxic-appearing.   HENT:      Head: Normocephalic and atraumatic.      Right Ear: Tympanic membrane, ear canal and external ear normal.      Left Ear: Tympanic  membrane, ear canal and external ear normal.      Nose: Mucosal edema and congestion present. No rhinorrhea.      Right Turbinates: Swollen.      Left Turbinates: Swollen.      Right Sinus: No maxillary sinus tenderness or frontal sinus tenderness.      Left Sinus: No maxillary sinus tenderness or frontal sinus tenderness.      Mouth/Throat:      Lips: Pink.      Mouth: Mucous membranes are moist.      Pharynx: Uvula midline. No oropharyngeal exudate.      Tonsils: Swellin on the right. 0 on the left.   Eyes:      Conjunctiva/sclera: Conjunctivae normal.      Pupils: Pupils are equal, round, and reactive to light.   Neck:      Musculoskeletal: Normal range of motion and neck supple.   Cardiovascular:      Rate and Rhythm: Normal rate and regular rhythm.      Heart sounds: Normal heart sounds. No murmur. No friction rub.   Pulmonary:      Effort: Pulmonary effort is normal. No respiratory distress.      Breath sounds: Examination of the left-lower field reveals decreased breath sounds and rales. Decreased breath sounds, rhonchi and rales present. No wheezing.      Comments: Course breath sounds with scattered rhonchi throughout.  Questionable rales left lower lobe with slightly decreased breath sounds  Abdominal:      General: Bowel sounds are normal.      Palpations: Abdomen is soft.      Tenderness: There is no tenderness.   Musculoskeletal: Normal range of motion.   Lymphadenopathy:      Head:      Right side of head: No submental, submandibular or tonsillar adenopathy.      Left side of head: No submental, submandibular or tonsillar adenopathy.      Cervical: No cervical adenopathy.      Upper Body:      Right upper body: No supraclavicular adenopathy.      Left upper body: No supraclavicular adenopathy.   Skin:     General: Skin is warm and dry.      Findings: No rash.   Neurological:      Mental Status: He is alert and oriented to person, place, and time.      Cranial Nerves: No cranial nerve deficit.       Sensory: No sensory deficit.      Motor: Motor function is intact.      Coordination: Coordination normal.   Psychiatric:         Attention and Perception: Attention normal.         Mood and Affect: Mood normal.         Speech: Speech normal.         Behavior: Behavior normal.         Thought Content: Thought content normal.         Judgment: Judgment normal.           Assessment/Plan:   1. Community acquired pneumonia of left lower lobe of lung (HCC)  - amoxicillin-clavulanate (AUGMENTIN) 875-125 MG Tab; Take 1 Tab by mouth 2 times a day for 7 days.  Dispense: 14 Tab; Refill: 0  - doxycycline (VIBRAMYCIN) 100 MG Tab; Take 1 Tab by mouth 2 times a day for 7 days.  Dispense: 14 Tab; Refill: 0  - benzonatate (TESSALON) 100 MG Cap; Take 2 Caps by mouth 3 times a day as needed.  Dispense: 60 Cap; Refill: 0    2. Cough  - DX-CHEST-2 VIEWS; Future  - benzonatate (TESSALON) 100 MG Cap; Take 2 Caps by mouth 3 times a day as needed.  Dispense: 60 Cap; Refill: 0    3. Type 2 diabetes mellitus with microalbuminuria, with long-term current use of insulin ([facility])    4. Essential hypertension    5. Stage 3 chronic kidney disease ([facility])    Chest x-rays obtained, read by radiologist and reviewed by myself with findings as follows:  IMPRESSION:     1.  LEFT lower lobe pneumonia.  2.  Stable pleural thickening at the lateral RIGHT mid chest.    Patient is afebrile, not tachycardic and nontoxic-appearing.  Therefore, he will be treated as an outpatient with Augmentin and doxycycline per up-to-date recommendations.  Creatinine clearance calculated using most recent laboratory values.  Creatinine clearance is 40 mL/min modified for overweight patient.  No renal dosing required for either medication.  Counseled patient on the importance of glucose control.  Recommend addition of Mucinex to regimen.  Follow-up with primary care (patient has an appointment scheduled in January which she is encouraged to keep).  Differential diagnosis, natural  history, supportive care, and indications for immediate follow-up discussed.     Red flag warning symptoms and strict ER/follow-up precautions given.  The patient demonstrated a good understanding and agreed with the treatment plan.  Please note that this note was created using voice recognition speech to text software. Every effort has been made to correct obvious errors.  However, I expect there are errors of grammar and possibly context that were not discovered prior to finalizing the note  ALVAREZ Jackson PA-C

## 2019-12-31 RX ORDER — ATORVASTATIN CALCIUM 40 MG/1
40 TABLET, FILM COATED ORAL DAILY
Qty: 90 TAB | Refills: 3 | Status: SHIPPED | OUTPATIENT
Start: 2019-12-31 | End: 2020-01-10 | Stop reason: SDUPTHER

## 2020-01-07 ENCOUNTER — PATIENT MESSAGE (OUTPATIENT)
Dept: HEALTH INFORMATION MANAGEMENT | Facility: OTHER | Age: 70
End: 2020-01-07

## 2020-01-10 ENCOUNTER — OFFICE VISIT (OUTPATIENT)
Dept: ENDOCRINOLOGY | Facility: MEDICAL CENTER | Age: 70
End: 2020-01-10
Payer: COMMERCIAL

## 2020-01-10 VITALS
HEIGHT: 68 IN | BODY MASS INDEX: 28.79 KG/M2 | DIASTOLIC BLOOD PRESSURE: 70 MMHG | SYSTOLIC BLOOD PRESSURE: 124 MMHG | WEIGHT: 190 LBS | HEART RATE: 76 BPM | OXYGEN SATURATION: 98 %

## 2020-01-10 DIAGNOSIS — E78.5 DYSLIPIDEMIA: ICD-10-CM

## 2020-01-10 DIAGNOSIS — Z79.4 LONG-TERM INSULIN USE (HCC): ICD-10-CM

## 2020-01-10 DIAGNOSIS — Z79.4 TYPE 2 DIABETES MELLITUS WITH HYPERGLYCEMIA, WITH LONG-TERM CURRENT USE OF INSULIN (HCC): ICD-10-CM

## 2020-01-10 DIAGNOSIS — I10 ESSENTIAL HYPERTENSION: ICD-10-CM

## 2020-01-10 DIAGNOSIS — E11.65 TYPE 2 DIABETES MELLITUS WITH HYPERGLYCEMIA, WITH LONG-TERM CURRENT USE OF INSULIN (HCC): ICD-10-CM

## 2020-01-10 PROCEDURE — 95251 CONT GLUC MNTR ANALYSIS I&R: CPT | Performed by: INTERNAL MEDICINE

## 2020-01-10 PROCEDURE — 99214 OFFICE O/P EST MOD 30 MIN: CPT | Performed by: INTERNAL MEDICINE

## 2020-01-10 RX ORDER — FLASH GLUCOSE SENSOR
1 KIT MISCELLANEOUS
Qty: 6 EACH | Refills: 3 | Status: SHIPPED | OUTPATIENT
Start: 2020-01-10 | End: 2020-01-29 | Stop reason: SDUPTHER

## 2020-01-10 RX ORDER — ATORVASTATIN CALCIUM 40 MG/1
40 TABLET, FILM COATED ORAL DAILY
Qty: 100 TAB | Refills: 3 | Status: SHIPPED | OUTPATIENT
Start: 2020-01-10 | End: 2021-01-15 | Stop reason: SDUPTHER

## 2020-01-10 NOTE — PROGRESS NOTES
CHIEF COMPLAINT: Patient is here for follow up of Type 2 Diabetes Mellitus    HPI:     Ashish Wiley is a 69 y.o. male with Type 2 Diabetes Mellitus here for follow up.    Baseline labs from December 10, 2019 showed an elevated A1c of 10% while taking Lantus Bydureon and Jardiance.  After his initial consultation visit I recommended optimization of his basal insulin and I increased his SGLT2 inhibitor dose.      On follow-up he reports that he is doing much better, currently he is taking Lantus 22 units daily, Jardiance 25 mg daily and Bydureon 2 mg once a week.  Because of insurance related reasons we have to replace his Bydureon with Trulicity.    He reports less hyperglycemia and denies hypoglycemia he has a freestyle kp sensor but he forgot to bring the reader device today.  He has been testing his sugars 4 times a day for the past 90 days    Aside from diabetes he has coronary artery disease and his LDL cholesterol is well controlled at 65 with slightly elevated triglycerides but he is taking Lipitor 40 mg daily and he is tolerating this medication fairly well    Blood pressure is controlled with lisinopril 20 mg daily and he denies problems with his medication    He does have chronic kidney disease stage III but his last serum creatinine was better at 1.8 with an improved GFR.      BG Diary:  Patient did not bring reader for CGM    ADDENDUM:    Patient drop off his kp reader device which was downloaded and reviewed and interpreted.  His average blood sugar is down to 180 with a standard deviation of 60.  His blood sugars are at goal 53% of the time.  There is a pattern of postprandial hyperglycemia especially in the afternoon.  There is no pattern of hypoglycemia.    Weight has been stable    Diabetes Complications   Retinopathy: No known retinopathy.  Last eye exam: January 2019 at [facility] by Dr. Valdovinos  Neuropathy: Denies paresthesias or numbness in hands or feet. Denies any foot  wounds.  Exercise: Minimal.  Diet: Fair.  Patient's medications, allergies, and social histories were reviewed and updated as appropriate.    ROS:     CONS:     No fever, no chills   EYES:     No diplopia, no blurry vision   CV:           No chest pain, no palpitations   PULM:     No SOB, no cough, no hemoptysis.   GI:            No nausea, no vomiting, no diarrhea, no constipation   ENDO:     No polyuria, no polydipsia, no heat intolerance, no cold intolerance       Past Medical History:  Problem List:  2019-12: Dyslipidemia  2019-06: Olecranon bursitis of right elbow  2019-06: Localized swelling of lower leg  2018-08: CHF due to valvular disease ([facility])  2018-07: Chronic systolic congestive heart failure, NYHA class 1 ([facility])  2018-07: S/P TAVR (transcatheter aortic valve replacement)  2018-06: Stented coronary artery  2018-06: Acute on chronic systolic (congestive) heart failure ([facility])  2018-06: NYHA class 3 acute on chronic systolic heart failure ([facility])  2018-06: Severe aortic stenosis  2018-06: Coronary artery disease due to calcified coronary lesion:   Status post atherectomy and stenting of the LAD in June 2018.    Nonobstructive disease in the RCA and circumflex.  2018-06: Aortic stenosis  2018-06: ACC/AHA stage C systolic heart failure ([facility])  2018-06: Dilated cardiomyopathy ([facility])  2018-06: Acute systolic heart failure ([facility])  2018-05: Orthopnea  2018-05: Bilateral leg edema  2018-05: Shortness of breath on exertion  2017-11: Stage 3 chronic kidney disease ([facility])  2017-10: Vitamin D deficiency  2017-10: Long-term insulin use ([facility])  2017-09: Anemia in stage 3 chronic kidney disease ([facility])  2017-09: CKD (chronic kidney disease) stage 3, GFR 30-59 ml/min ([facility])  2016-10: Macroalbuminuric diabetic nephropathy ([facility])  2016-06: Type 2 diabetes mellitus with microalbuminuria, with long-  term current use of insulin ([facility])  2016-06: Type 2 diabetes mellitus with hyperglycemia, with long-term   current use of insulin  "([facility])  2016-: Essential hypertension  2016-: Diabetic peripheral neuropathy associated with type 2   diabetes mellitus ([facility])      Past Surgical History:  Past Surgical History:   Procedure Laterality Date   • TRANSCATHETER AORTIC VALVE REPLACEMENT  2018    Procedure: TRANSCATHETER AORTIC VALVE REPLACEMENT;  Surgeon: Markus Fuller M.D.;  Location: SURGERY [facility];  Service: Cardiac   • DAYANNA  2018    Procedure: DAYANNA;  Surgeon: Markus Fuller M.D.;  Location: SURGERY [facility];  Service: Cardiac   • AORTIC VALVE REPLACEMENT      S/P TAVR    • TONSILLECTOMY     • ZZZ CARDIAC CATH          Allergies:  Sulfa drugs     Social History:  Social History     Tobacco Use   • Smoking status: Former Smoker     Packs/day: 1.00     Years: 22.00     Pack years: 22.00     Last attempt to quit: 1989     Years since quittin.0   • Smokeless tobacco: Never Used   • Tobacco comment: stop cigar in    Substance Use Topics   • Alcohol use: Yes     Alcohol/week: 0.6 oz     Types: 1 Glasses of wine per week     Frequency: 2-4 times a month     Drinks per session: 1 or 2     Binge frequency: Never     Comment: OCCASIONALLY   • Drug use: No        Family History:   family history includes Cancer in his maternal grandmother; Heart Disease in his father; Heart Failure in his father; Hyperlipidemia in his father; Hypertension in his father; Lung Disease in his mother; Other in his brother and paternal grandmother; Prostate cancer in his brother; Stroke in his maternal grandfather and paternal grandfather.      PHYSICAL EXAM:   OBJECTIVE:  Vital signs: /70   Pulse 76   Ht 1.727 m (5' 8\")   Wt 86.2 kg (190 lb)   SpO2 98%   BMI 28.89 kg/m²   GENERAL: Well-developed, well-nourished in no apparent distress.   EYE:  No ocular asymmetry, PERRLA  HENT: Pink, moist mucous membranes.    NECK: No thyromegaly.   CARDIOVASCULAR:  No murmurs  LUNGS: Clear breath sounds  ABDOMEN: Soft, nontender   EXTREMITIES: No " clubbing, cyanosis, or edema.   NEUROLOGICAL: No gross focal motor abnormalities   LYMPH: No cervical adenopathy palpated.   SKIN: No rashes, lesions.     Labs:  Lab Results   Component Value Date/Time    HBA1C 10.1 (H) 12/10/2019 06:31 AM        Lab Results   Component Value Date/Time    WBC 5.3 12/10/2019 06:32 AM    RBC 3.98 (L) 12/10/2019 06:32 AM    HEMOGLOBIN 12.6 (L) 12/10/2019 06:32 AM    MCV 97.0 12/10/2019 06:32 AM    MCH 31.7 12/10/2019 06:32 AM    MCHC 32.6 (L) 12/10/2019 06:32 AM    RDW 45.5 12/10/2019 06:32 AM    MPV 10.5 12/10/2019 06:32 AM       Lab Results   Component Value Date/Time    SODIUM 138 12/10/2019 06:32 AM    POTASSIUM 4.8 12/10/2019 06:32 AM    CHLORIDE 108 12/10/2019 06:32 AM    CO2 26 12/10/2019 06:32 AM    ANION 4.0 12/10/2019 06:32 AM    GLUCOSE 233 (H) 12/10/2019 06:32 AM    BUN 31 (H) 12/10/2019 06:32 AM    CREATININE 1.83 (H) 12/10/2019 06:32 AM    CALCIUM 9.3 12/10/2019 06:32 AM    ASTSGOT 13 12/10/2019 06:32 AM    ALTSGPT 17 12/10/2019 06:32 AM    TBILIRUBIN 0.3 12/10/2019 06:32 AM    ALBUMIN 3.7 12/10/2019 06:32 AM    TOTPROTEIN 6.4 12/10/2019 06:32 AM    GLOBULIN 2.7 12/10/2019 06:32 AM    AGRATIO 1.4 12/10/2019 06:32 AM       Lab Results   Component Value Date/Time    CHOLSTRLTOT 159 12/10/2019 0632    TRIGLYCERIDE 237 (H) 12/10/2019 0632    HDL 47 12/10/2019 0632    LDL 65 12/10/2019 0632       Lab Results   Component Value Date/Time    MALBCRT 1566 (H) 12/10/2019 06:32 AM    MICROALBUR 96.3 12/10/2019 06:32 AM        No results found for: TSHULTRASEN  No results found for: FREEDIR  No results found for: FREET3  No results found for: THYSTIMIG        ASSESSMENT/PLAN:     1. Type 2 diabetes mellitus with hyperglycemia, with long-term current use of insulin (HCC)  Uncontrolled at baseline with self-reported improvement in blood sugars  Requested patient to drop off [email or web address] so we can download and review his CGM  Recommend that he increase his Lantus 30 units daily,  Jardiance 25 mg daily   I am replacing his Bydureon with Trulicity 1.5 mg weekly due to insurance related reasons  Recommend diet and exercise and lifestyle changes  Recommend watching carb intake  We will plan for follow-up in 1 month to review blood sugars    2. Dyslipidemia  Well-controlled  LDL cholesterol is at goal  Triglycerides are minimally elevated  I expect his triglycerides to improve with better glycemic control  Continue atorvastatin follow low-fat diet  We will repeat fasting lipids after 3 months    3. Essential hypertension  Well-controlled  Continue current medications  He does have microalbuminuria  We will repeat urine microalbumin creatinine ratio in 3 months    4. Long-term insulin use (HCC)  Patient is on long-term basal insulin therapy with a GLP-1 analog and other medications for type 2 diabetes management      Return in about 1 month (around 2/10/2020).      Thank you kindly for allowing me to participate in the diabetes care plan for this patient.    Timothy Botello MD, LUCITA, ECNU  01/10/20    CC:   Almaz Pond D.O.

## 2020-01-16 ENCOUNTER — HOSPITAL ENCOUNTER (OUTPATIENT)
Dept: LAB | Facility: MEDICAL CENTER | Age: 70
End: 2020-01-16
Attending: INTERNAL MEDICINE
Payer: COMMERCIAL

## 2020-01-16 DIAGNOSIS — N18.30 STAGE 3 CHRONIC KIDNEY DISEASE: ICD-10-CM

## 2020-01-16 LAB
ALBUMIN SERPL BCP-MCNC: 3.7 G/DL (ref 3.2–4.9)
ALBUMIN/GLOB SERPL: 1.5 G/DL
ALP SERPL-CCNC: 78 U/L (ref 30–99)
ALT SERPL-CCNC: 23 U/L (ref 2–50)
ANION GAP SERPL CALC-SCNC: 7 MMOL/L (ref 0–11.9)
AST SERPL-CCNC: 17 U/L (ref 12–45)
BILIRUB SERPL-MCNC: 0.3 MG/DL (ref 0.1–1.5)
BUN SERPL-MCNC: 36 MG/DL (ref 8–22)
CALCIUM SERPL-MCNC: 9.1 MG/DL (ref 8.5–10.5)
CHLORIDE SERPL-SCNC: 107 MMOL/L (ref 96–112)
CO2 SERPL-SCNC: 23 MMOL/L (ref 20–33)
CREAT SERPL-MCNC: 1.71 MG/DL (ref 0.5–1.4)
GLOBULIN SER CALC-MCNC: 2.5 G/DL (ref 1.9–3.5)
GLUCOSE SERPL-MCNC: 149 MG/DL (ref 65–99)
POTASSIUM SERPL-SCNC: 4.1 MMOL/L (ref 3.6–5.5)
PROT SERPL-MCNC: 6.2 G/DL (ref 6–8.2)
SODIUM SERPL-SCNC: 137 MMOL/L (ref 135–145)

## 2020-01-16 PROCEDURE — 36415 COLL VENOUS BLD VENIPUNCTURE: CPT

## 2020-01-16 PROCEDURE — 80053 COMPREHEN METABOLIC PANEL: CPT

## 2020-01-20 NOTE — RESULT ENCOUNTER NOTE
Rickey Hinojosa,    Kidney results and metabolic panel are stable. When is your next follow up with your kidney doctor? Should I forward these results to them or will you share them? Hope all is well, looks like you and Dr. Botello are working hard to get the diabetes under control. Let me know if there is anything I can do to help.    Thanks,  Dr. Pond

## 2020-01-22 DIAGNOSIS — I10 ESSENTIAL HYPERTENSION: ICD-10-CM

## 2020-01-22 DIAGNOSIS — Z79.4 TYPE 2 DIABETES MELLITUS WITH HYPERGLYCEMIA, WITH LONG-TERM CURRENT USE OF INSULIN (HCC): ICD-10-CM

## 2020-01-22 DIAGNOSIS — E11.65 TYPE 2 DIABETES MELLITUS WITH HYPERGLYCEMIA, WITH LONG-TERM CURRENT USE OF INSULIN (HCC): ICD-10-CM

## 2020-01-22 DIAGNOSIS — E55.9 VITAMIN D DEFICIENCY: ICD-10-CM

## 2020-01-22 RX ORDER — DOXAZOSIN 2 MG/1
2 TABLET ORAL DAILY
Qty: 100 TAB | Refills: 3 | Status: SHIPPED | OUTPATIENT
Start: 2020-01-22 | End: 2021-02-18 | Stop reason: SDUPTHER

## 2020-01-22 RX ORDER — ERGOCALCIFEROL 1.25 MG/1
50000 CAPSULE ORAL
Qty: 12 CAP | Refills: 3 | Status: SHIPPED | OUTPATIENT
Start: 2020-01-22 | End: 2020-05-04 | Stop reason: SDUPTHER

## 2020-01-22 RX ORDER — CARVEDILOL 25 MG/1
25 TABLET ORAL 2 TIMES DAILY WITH MEALS
Qty: 200 TAB | Refills: 3 | Status: SHIPPED | OUTPATIENT
Start: 2020-01-22 | End: 2021-03-09 | Stop reason: SDUPTHER

## 2020-01-22 RX ORDER — LISINOPRIL 20 MG/1
20 TABLET ORAL DAILY
Qty: 100 TAB | Refills: 3 | Status: SHIPPED | OUTPATIENT
Start: 2020-01-22 | End: 2020-04-11 | Stop reason: SDUPTHER

## 2020-01-29 DIAGNOSIS — Z79.4 TYPE 2 DIABETES MELLITUS WITH HYPERGLYCEMIA, WITH LONG-TERM CURRENT USE OF INSULIN (HCC): ICD-10-CM

## 2020-01-29 DIAGNOSIS — E11.65 TYPE 2 DIABETES MELLITUS WITH HYPERGLYCEMIA, WITH LONG-TERM CURRENT USE OF INSULIN (HCC): ICD-10-CM

## 2020-01-30 RX ORDER — FLASH GLUCOSE SENSOR
1 KIT MISCELLANEOUS
Qty: 6 EACH | Refills: 3 | Status: SHIPPED | OUTPATIENT
Start: 2020-01-30 | End: 2020-04-11 | Stop reason: SDUPTHER

## 2020-01-30 NOTE — TELEPHONE ENCOUNTER
Received request via: Patient    Was the patient seen in the last year in this department? Yes    Does the patient have an active prescription (recently filled or refills available) for medication(s) requested? No     Continuous Blood Gluc Sensor ([employer] WAQAS SENSOR SYSTEM) Misc            Si Applicator by Does not apply route every 14 days. Please give 14 day sensor and reader

## 2020-02-14 ENCOUNTER — OFFICE VISIT (OUTPATIENT)
Dept: ENDOCRINOLOGY | Facility: MEDICAL CENTER | Age: 70
End: 2020-02-14
Payer: COMMERCIAL

## 2020-02-14 VITALS
WEIGHT: 192 LBS | HEIGHT: 68 IN | OXYGEN SATURATION: 95 % | HEART RATE: 80 BPM | SYSTOLIC BLOOD PRESSURE: 130 MMHG | BODY MASS INDEX: 29.1 KG/M2 | DIASTOLIC BLOOD PRESSURE: 70 MMHG

## 2020-02-14 DIAGNOSIS — Z79.4 LONG-TERM INSULIN USE (HCC): ICD-10-CM

## 2020-02-14 DIAGNOSIS — E78.5 DYSLIPIDEMIA: ICD-10-CM

## 2020-02-14 DIAGNOSIS — I10 ESSENTIAL HYPERTENSION: ICD-10-CM

## 2020-02-14 DIAGNOSIS — Z79.4 TYPE 2 DIABETES MELLITUS WITH HYPERGLYCEMIA, WITH LONG-TERM CURRENT USE OF INSULIN (HCC): ICD-10-CM

## 2020-02-14 DIAGNOSIS — E11.65 TYPE 2 DIABETES MELLITUS WITH HYPERGLYCEMIA, WITH LONG-TERM CURRENT USE OF INSULIN (HCC): ICD-10-CM

## 2020-02-14 PROCEDURE — 99214 OFFICE O/P EST MOD 30 MIN: CPT | Performed by: INTERNAL MEDICINE

## 2020-02-14 PROCEDURE — 95251 CONT GLUC MNTR ANALYSIS I&R: CPT | Performed by: INTERNAL MEDICINE

## 2020-02-14 NOTE — PROGRESS NOTES
CHIEF COMPLAINT: Patient is here for follow up of Type 2 Diabetes Mellitus    HPI:     Ashish Wiley is a 69 y.o. male with Type 2 Diabetes Mellitus here for follow up.    Baseline labs from December 10, 2019 showed an elevated A1c of 10% while taking Lantus Bydureon and Jardiance.  After his initial consultation visit I recommended optimization of his basal insulin and I increased his SGLT2 inhibitor dose.      On follow-up he reports that he is doing much better, currently he is taking Lantus 27 units daily, Jardiance 25 mg daily and Trulicity 1.5 mg weekly      He reports less hyperglycemia and denies hypoglycemia he has a freestyle kp.   He has been testing his sugars 4 times a day for the past 6 months.  Average BG is down to 140 and he is at goal 70% of the time.  There is a pattern of postprandial hyperglycemia especially after supper.  He admits to eating a lot of carbs during this time.      Aside from diabetes he has coronary artery disease and his LDL cholesterol is well controlled at 65 on December 2019 with slightly elevated triglycerides but he is taking Lipitor 40 mg daily and he is tolerating this medication fairly well      Blood pressure is controlled with lisinopril 20 mg daily and he denies problems with his medication.  He does have diabetic kidney disease with significant proteinuria      He does have chronic kidney disease stage III but his last serum creatinine was better at 1.7 with an improved GFR of 40 on recent labs from January 2020.      BG Diary:  Patient did no    Weight has been stable    Diabetes Complications   Retinopathy: Known PDR retinopathy.  Last eye exam: January 2019 at [facility] by Dr. Hunter  Neuropathy: Denies paresthesias or numbness in hands or feet. Denies any foot wounds.  Exercise: Minimal.  Diet: Fair.  Patient's medications, allergies, and social histories were reviewed and updated as appropriate.    ROS:     CONS:     No fever, no chills    EYES:     No diplopia, no blurry vision   CV:           No chest pain, no palpitations   PULM:     No SOB, no cough, no hemoptysis.   GI:            No nausea, no vomiting, no diarrhea, no constipation   ENDO:     No polyuria, no polydipsia, no heat intolerance, no cold intolerance       Past Medical History:  Problem List:  2019-12: Dyslipidemia  2019-06: Olecranon bursitis of right elbow  2019-06: Localized swelling of lower leg  2018-08: CHF due to valvular disease ([facility])  2018-07: Chronic systolic congestive heart failure, NYHA class 1 ([facility])  2018-07: S/P TAVR (transcatheter aortic valve replacement)  2018-06: Stented coronary artery  2018-06: Acute on chronic systolic (congestive) heart failure ([facility])  2018-06: NYHA class 3 acute on chronic systolic heart failure ([facility])  2018-06: Severe aortic stenosis  2018-06: Coronary artery disease due to calcified coronary lesion:   Status post atherectomy and stenting of the LAD in June 2018.    Nonobstructive disease in the RCA and circumflex.  2018-06: Aortic stenosis  2018-06: ACC/AHA stage C systolic heart failure ([facility])  2018-06: Dilated cardiomyopathy ([facility])  2018-06: Acute systolic heart failure ([facility])  2018-05: Orthopnea  2018-05: Bilateral leg edema  2018-05: Shortness of breath on exertion  2017-11: Stage 3 chronic kidney disease ([facility])  2017-10: Vitamin D deficiency  2017-10: Long-term insulin use ([facility])  2017-09: Anemia in stage 3 chronic kidney disease ([facility])  2017-09: CKD (chronic kidney disease) stage 3, GFR 30-59 ml/min ([facility])  2016-10: Macroalbuminuric diabetic nephropathy ([facility])  2016-06: Type 2 diabetes mellitus with microalbuminuria, with long-  term current use of insulin ([facility])  2016-06: Type 2 diabetes mellitus with hyperglycemia, with long-term   current use of insulin ([facility])  2016-06: Essential hypertension  2016-06: Diabetic peripheral neuropathy associated with type 2   diabetes mellitus ([facility])      Past Surgical History:  Past Surgical History:   Procedure  "Laterality Date   • TRANSCATHETER AORTIC VALVE REPLACEMENT  2018    Procedure: TRANSCATHETER AORTIC VALVE REPLACEMENT;  Surgeon: Markus Fuller M.D.;  Location: SURGERY [facility];  Service: Cardiac   • DAYANNA  2018    Procedure: DAYANNA;  Surgeon: Markus Fuller M.D.;  Location: SURGERY [facility];  Service: Cardiac   • AORTIC VALVE REPLACEMENT      S/P TAVR    • TONSILLECTOMY     • ZZZ CARDIAC CATH          Allergies:  Sulfa drugs     Social History:  Social History     Tobacco Use   • Smoking status: Former Smoker     Packs/day: 1.00     Years: 22.00     Pack years: 22.00     Last attempt to quit: 1989     Years since quittin.1   • Smokeless tobacco: Never Used   • Tobacco comment: stop cigar in    Substance Use Topics   • Alcohol use: Yes     Alcohol/week: 0.6 oz     Types: 1 Glasses of wine per week     Frequency: 2-4 times a month     Drinks per session: 1 or 2     Binge frequency: Never     Comment: OCCASIONALLY   • Drug use: No        Family History:   family history includes Cancer in his maternal grandmother; Heart Disease in his father; Heart Failure in his father; Hyperlipidemia in his father; Hypertension in his father; Lung Disease in his mother; Other in his brother and paternal grandmother; Prostate cancer in his brother; Stroke in his maternal grandfather and paternal grandfather.      PHYSICAL EXAM:   OBJECTIVE:  Vital signs: /70   Pulse 80   Ht 1.727 m (5' 8\")   Wt 87.1 kg (192 lb)   SpO2 95%   BMI 29.19 kg/m²   GENERAL: Well-developed, well-nourished in no apparent distress.   EYE:  No ocular asymmetry, PERRLA  HENT: Pink, moist mucous membranes.    NECK: No thyromegaly.   CARDIOVASCULAR:  No murmurs  LUNGS: Clear breath sounds  ABDOMEN: Soft, nontender   EXTREMITIES: No clubbing, cyanosis, or edema.   NEUROLOGICAL: No gross focal motor abnormalities   LYMPH: No cervical adenopathy palpated.   SKIN: No rashes, lesions.     Labs:  Lab Results   Component Value " Date/Time    HBA1C 10.1 (H) 12/10/2019 06:31 AM        Lab Results   Component Value Date/Time    WBC 5.3 12/10/2019 06:32 AM    RBC 3.98 (L) 12/10/2019 06:32 AM    HEMOGLOBIN 12.6 (L) 12/10/2019 06:32 AM    MCV 97.0 12/10/2019 06:32 AM    MCH 31.7 12/10/2019 06:32 AM    MCHC 32.6 (L) 12/10/2019 06:32 AM    RDW 45.5 12/10/2019 06:32 AM    MPV 10.5 12/10/2019 06:32 AM       Lab Results   Component Value Date/Time    SODIUM 137 01/16/2020 06:08 AM    POTASSIUM 4.1 01/16/2020 06:08 AM    CHLORIDE 107 01/16/2020 06:08 AM    CO2 23 01/16/2020 06:08 AM    ANION 7.0 01/16/2020 06:08 AM    GLUCOSE 149 (H) 01/16/2020 06:08 AM    BUN 36 (H) 01/16/2020 06:08 AM    CREATININE 1.71 (H) 01/16/2020 06:08 AM    CALCIUM 9.1 01/16/2020 06:08 AM    ASTSGOT 17 01/16/2020 06:08 AM    ALTSGPT 23 01/16/2020 06:08 AM    TBILIRUBIN 0.3 01/16/2020 06:08 AM    ALBUMIN 3.7 01/16/2020 06:08 AM    TOTPROTEIN 6.2 01/16/2020 06:08 AM    GLOBULIN 2.5 01/16/2020 06:08 AM    AGRATIO 1.5 01/16/2020 06:08 AM       Lab Results   Component Value Date/Time    CHOLSTRLTOT 159 12/10/2019 0632    TRIGLYCERIDE 237 (H) 12/10/2019 0632    HDL 47 12/10/2019 0632    LDL 65 12/10/2019 0632       Lab Results   Component Value Date/Time    MALBCRT 1566 (H) 12/10/2019 06:32 AM    MICROALBUR 96.3 12/10/2019 06:32 AM        No results found for: TSHULTRASEN  No results found for: FREEDIR  No results found for: FREET3  No results found for: THYSTIMIG        ASSESSMENT/PLAN:     1. Type 2 diabetes mellitus with hyperglycemia, with long-term current use of insulin (HCC)  Uncontrolled at baseline with self-reported improvement in blood sugars  I recommend increasing his Lantus 30 units daily,   Continue Jardiance 25 mg daily   Continue Trulicity 1.5 mg weekly   Patient will check if ozempic is covered  Advised cutting back on carbs   Recommend diet and exercise and lifestyle changes  Recommend watching carb intake  We will plan for follow-up in 1 month with labs    2.  Dyslipidemia  Well-controlled  LDL cholesterol is at goal  Triglycerides are minimally elevated  I expect his triglycerides to improve with better glycemic control  Continue atorvastatin follow low-fat diet  We will repeat fasting lipids after 3-6 months    3. Essential hypertension  Well-controlled  Continue current medications  He does have macroalbuminuria  We will repeat urine microalbumin creatinine ratio in 1 month    4. Long-term insulin use (HCC)  Patient is on long-term basal insulin therapy with a GLP-1 analog and other medications for type 2 diabetes management      Return in about 1 month (around 3/14/2020).      Thank you kindly for allowing me to participate in the diabetes care plan for this patient.    Timothy Botello MD, FACE, ECNU  01/10/20    CC:   Almaz Pond D.O.

## 2020-02-27 ENCOUNTER — NON-PROVIDER VISIT (OUTPATIENT)
Dept: HEALTH INFORMATION MANAGEMENT | Facility: MEDICAL CENTER | Age: 70
End: 2020-02-27
Payer: COMMERCIAL

## 2020-02-27 VITALS — HEIGHT: 65 IN | BODY MASS INDEX: 31.95 KG/M2

## 2020-02-27 DIAGNOSIS — Z79.4 TYPE 2 DIABETES MELLITUS WITH HYPERGLYCEMIA, WITH LONG-TERM CURRENT USE OF INSULIN (HCC): ICD-10-CM

## 2020-02-27 DIAGNOSIS — E11.65 TYPE 2 DIABETES MELLITUS WITH HYPERGLYCEMIA, WITH LONG-TERM CURRENT USE OF INSULIN (HCC): ICD-10-CM

## 2020-02-27 PROCEDURE — 97802 MEDICAL NUTRITION INDIV IN: CPT | Performed by: DIETITIAN, REGISTERED

## 2020-02-27 NOTE — PROGRESS NOTES
"2/27/2020    Almaz Pond D.O.  70 y.o.   Time in/out: 2:07- 3:07pm    Anthropometrics/Objective  Vitals:    02/27/20 1531   Height: 1.651 m (5' 5\")       Body mass index is 31.95 kg/m².      Stated Goal Weight: 180 lbs  Estimated Caloric needs 1574 Kcal ([place])   See comprehensive patient history form for further information     Subjective:  · Pt referred for T2DM - reports diagnosed since 1987 - has received nutrition education multiple times in past  · Wears Freestyle Doris CGM - reports checking BS throughout day   · Hx of heart valve replacement - completed [facility] program (Pritikin diet)   · Feels the only foods he is allowed to eat have no flavor even with salt-free spices   · Reports his portions are large, feels hungry all day  · Reports his understanding is that he can only have 24g carbs per day  · Reports walking 6 miles/day 5-6 days/wk  · On jardiance, trulicity, and lantus    Nutrition Diagnosis (PES Statement)    Altered nutrition-related lab values related to endocrine dysfunction as evidenced by A1C = 10.1% (12/10/19).    Client history:  · Condition(s) associated with a diagnosis or treatment (specify) T2DM w/microalbuminuria w/long-term current use of insulin, CKD3, CAD, stented coronary artery, CHF, etc.       Biochemical data, medical test and procedures  Lab Results   Component Value Date/Time    HBA1C 10.1 (H) 12/10/2019 06:31 AM   @  Lab Results   Component Value Date/Time    POCGLUCOSE 129 (H) 07/04/2018 09:04 AM     Lab Results   Component Value Date/Time    CHOLSTRLTOT 159 12/10/2019 06:32 AM    LDL 65 12/10/2019 06:32 AM    HDL 47 12/10/2019 06:32 AM    TRIGLYCERIDE 237 (H) 12/10/2019 06:32 AM         Nutrition Intervention  Nutrition Prescription  Carb grams: 45-60g per meal (breakfast, lunch, dinner) *starting at 45g to test tolerance     Meal and Snack  Recommend a general/healthful diet  Combine lean protein with carbs    Comprehensive " Nutrition education Instruction or training leading to in-depth nutrition related knowledge about:  Combine carb, protein and fat at each meal, Eating out, Meal timing and spacing, Physical activity/exercise, Portion control and Handouts provided regarding topics discussed    Handouts provided: My Plate Planner, Healthy Snack Ideas    Monitoring & Evaluation Plan    Behavioral-Environmental:  Physical activity: Continue current physical activity (walking 6mi/day 5-6 days/wk)    Food / Nutrient Intake:  Follow plate method: 1/2 plate non-starchy vegetables, 3-4 ounces lean protein, 1-2 heart healthy fats, and <1 cup of complex carbohydrates    Physical Signs / Symptoms:  HbA1c trends toward ADA guidelines     Assessment Notes: Per dietary recall, pt appears to be consuming only high carb foods majority of the day with little protein if any - recommended including lean protein with each meal/snack. Discussed benefit of protein for blood sugar management and to promote satiety. Advised including protein with breakfast and snacks to prevent overeating at meals. Suspect daily carb intake is higher than pt-reported intake. Pt would benefit from further nutrition education for diabetes and overall health. Recommended pt follow-up in 2-4 weeks for further education and individualized guidance on general, healthful eating for improved blood sugar/weight management. However, pt felt he needed time to see results from recommendations discussed at today's visit - wanted to follow-up in May/June. Next visit, recommend another dietary recall and additional education on carb counting.     Goals:   1. Include lean protein at each meal/snack   2. Have lean protein + non-starchy veggies for snack    Follow-Up: 2-4 weeks.

## 2020-03-19 ENCOUNTER — HOSPITAL ENCOUNTER (OUTPATIENT)
Dept: LAB | Facility: MEDICAL CENTER | Age: 70
End: 2020-03-19
Attending: INTERNAL MEDICINE
Payer: COMMERCIAL

## 2020-03-19 DIAGNOSIS — Z79.4 LONG-TERM INSULIN USE (HCC): ICD-10-CM

## 2020-03-19 DIAGNOSIS — E78.5 DYSLIPIDEMIA: ICD-10-CM

## 2020-03-19 DIAGNOSIS — I10 ESSENTIAL HYPERTENSION: ICD-10-CM

## 2020-03-19 DIAGNOSIS — E11.65 TYPE 2 DIABETES MELLITUS WITH HYPERGLYCEMIA, WITH LONG-TERM CURRENT USE OF INSULIN (HCC): ICD-10-CM

## 2020-03-19 DIAGNOSIS — Z79.4 TYPE 2 DIABETES MELLITUS WITH HYPERGLYCEMIA, WITH LONG-TERM CURRENT USE OF INSULIN (HCC): ICD-10-CM

## 2020-03-19 LAB
ALBUMIN SERPL BCP-MCNC: 3.8 G/DL (ref 3.2–4.9)
ALBUMIN/GLOB SERPL: 1.4 G/DL
ALP SERPL-CCNC: 80 U/L (ref 30–99)
ALT SERPL-CCNC: 21 U/L (ref 2–50)
ANION GAP SERPL CALC-SCNC: 9 MMOL/L (ref 7–16)
AST SERPL-CCNC: 22 U/L (ref 12–45)
BILIRUB SERPL-MCNC: 0.2 MG/DL (ref 0.1–1.5)
BUN SERPL-MCNC: 49 MG/DL (ref 8–22)
CALCIUM SERPL-MCNC: 9 MG/DL (ref 8.5–10.5)
CHLORIDE SERPL-SCNC: 110 MMOL/L (ref 96–112)
CHOLEST SERPL-MCNC: 147 MG/DL (ref 100–199)
CO2 SERPL-SCNC: 22 MMOL/L (ref 20–33)
CREAT SERPL-MCNC: 1.92 MG/DL (ref 0.5–1.4)
CREAT UR-MCNC: 76.3 MG/DL
EST. AVERAGE GLUCOSE BLD GHB EST-MCNC: 171 MG/DL
FASTING STATUS PATIENT QL REPORTED: NORMAL
GLOBULIN SER CALC-MCNC: 2.8 G/DL (ref 1.9–3.5)
GLUCOSE SERPL-MCNC: 129 MG/DL (ref 65–99)
HBA1C MFR BLD: 7.6 % (ref 0–5.6)
HDLC SERPL-MCNC: 42 MG/DL
LDLC SERPL CALC-MCNC: 55 MG/DL
MICROALBUMIN UR-MCNC: 65.3 MG/DL
MICROALBUMIN/CREAT UR: 856 MG/G (ref 0–30)
POTASSIUM SERPL-SCNC: 4.6 MMOL/L (ref 3.6–5.5)
PROT SERPL-MCNC: 6.6 G/DL (ref 6–8.2)
SODIUM SERPL-SCNC: 141 MMOL/L (ref 135–145)
TRIGL SERPL-MCNC: 250 MG/DL (ref 0–149)

## 2020-03-19 PROCEDURE — 80053 COMPREHEN METABOLIC PANEL: CPT

## 2020-03-19 PROCEDURE — 36415 COLL VENOUS BLD VENIPUNCTURE: CPT

## 2020-03-19 PROCEDURE — 82570 ASSAY OF URINE CREATININE: CPT

## 2020-03-19 PROCEDURE — 83036 HEMOGLOBIN GLYCOSYLATED A1C: CPT | Mod: GA

## 2020-03-19 PROCEDURE — 80061 LIPID PANEL: CPT

## 2020-03-19 PROCEDURE — 82043 UR ALBUMIN QUANTITATIVE: CPT

## 2020-03-20 ENCOUNTER — OFFICE VISIT (OUTPATIENT)
Dept: ENDOCRINOLOGY | Facility: MEDICAL CENTER | Age: 70
End: 2020-03-20
Payer: COMMERCIAL

## 2020-03-20 ENCOUNTER — APPOINTMENT (OUTPATIENT)
Dept: ENDOCRINOLOGY | Facility: MEDICAL CENTER | Age: 70
End: 2020-03-20
Payer: COMMERCIAL

## 2020-03-20 ENCOUNTER — TELEPHONE (OUTPATIENT)
Dept: ENDOCRINOLOGY | Facility: MEDICAL CENTER | Age: 70
End: 2020-03-20

## 2020-03-20 DIAGNOSIS — Z79.4 TYPE 2 DIABETES MELLITUS WITH HYPERGLYCEMIA, WITH LONG-TERM CURRENT USE OF INSULIN (HCC): ICD-10-CM

## 2020-03-20 DIAGNOSIS — E11.65 TYPE 2 DIABETES MELLITUS WITH HYPERGLYCEMIA, WITH LONG-TERM CURRENT USE OF INSULIN (HCC): ICD-10-CM

## 2020-03-20 DIAGNOSIS — I10 ESSENTIAL HYPERTENSION: ICD-10-CM

## 2020-03-20 DIAGNOSIS — E78.5 DYSLIPIDEMIA: ICD-10-CM

## 2020-03-20 DIAGNOSIS — Z79.4 LONG-TERM INSULIN USE (HCC): ICD-10-CM

## 2020-03-20 PROCEDURE — 99213 OFFICE O/P EST LOW 20 MIN: CPT | Performed by: INTERNAL MEDICINE

## 2020-03-20 RX ORDER — SEMAGLUTIDE 1.34 MG/ML
1 INJECTION, SOLUTION SUBCUTANEOUS
Qty: 6 PEN | Refills: 2 | Status: SHIPPED | OUTPATIENT
Start: 2020-03-20 | End: 2020-12-10 | Stop reason: SDUPTHER

## 2020-03-21 NOTE — PROGRESS NOTES
CHIEF COMPLAINT: Patient is here for follow up of Type 2 Diabetes Mellitus.  We conducted a virtual visit with audio and video via [email or web address] today because of concerns for the coronavirus. Patient is not positive for COVID 19    HPI:     Ashish Wiley is a 69 y.o. male with Type 2 Diabetes Mellitus here for follow up.    On follow-up his A1c has improved to 7.5% on March 19, 2020.    Baseline labs from December 10, 2019 showed an elevated A1c of 10% while taking Lantus Bydureon and Jardiance.  After his initial consultation visit I recommended optimization of his basal insulin and I increased his SGLT2 inhibitor dose.      On follow-up he reports that he is doing much better, currently he is taking Lantus 12 units daily, Jardiance 25 mg daily and Trulicity 1.5 mg weekly    He reports that he is tolerating his medications well and denies any side effects.      He reports less hyperglycemia and denies hypoglycemia.   He has a freestyle kp CGM.   He has been testing his sugars 4 times a day for the past 6 months.  He reports that his average blood sugar is better.      Aside from diabetes he has coronary artery disease and hyperlipidemia.  He is taking Lipitor 40 mg daily.   He is tolerating this medication fairly well.  His last LDL cholesterol was well controlled at 83 with triglycerides of 96 on February 2020      Blood pressure is controlled with lisinopril 20 mg daily and he denies problems with his medication.  He does have diabetic kidney disease with significant proteinuria      He does have chronic kidney disease stage III .  His last serum creatinine is elevated 1.92 with slight deterioration of his GFR to 35.  His BUN is elevated at 49.  Unfortunately he appears to be poorly hydrated      BG Diary:  Breakfast 120, 140, 130    Weight has been stable    Diabetes Complications   Retinopathy: Known PDR retinopathy.  Last eye exam: January 2019 at [facility] by Dr. Hunter  Neuropathy: Denies  paresthesias or numbness in hands or feet. Denies any foot wounds.  Exercise: Minimal.  Diet: Fair.  Patient's medications, allergies, and social histories were reviewed and updated as appropriate.    ROS:     CONS:     No fever, no chills   EYES:     No diplopia, no blurry vision   CV:           No chest pain, no palpitations   PULM:     No SOB, no cough, no hemoptysis.   GI:            No nausea, no vomiting, no diarrhea, no constipation   ENDO:     No polyuria, no polydipsia, no heat intolerance, no cold intolerance       Past Medical History:  Problem List:  2019-12: Dyslipidemia  2019-06: Olecranon bursitis of right elbow  2019-06: Localized swelling of lower leg  2018-08: CHF due to valvular disease ([facility])  2018-07: Chronic systolic congestive heart failure, NYHA class 1 ([facility])  2018-07: S/P TAVR (transcatheter aortic valve replacement)  2018-06: Stented coronary artery  2018-06: Acute on chronic systolic (congestive) heart failure ([facility])  2018-06: NYHA class 3 acute on chronic systolic heart failure ([facility])  2018-06: Severe aortic stenosis  2018-06: Coronary artery disease due to calcified coronary lesion:   Status post atherectomy and stenting of the LAD in June 2018.    Nonobstructive disease in the RCA and circumflex.  2018-06: Aortic stenosis  2018-06: ACC/AHA stage C systolic heart failure ([facility])  2018-06: Dilated cardiomyopathy ([facility])  2018-06: Acute systolic heart failure ([facility])  2018-05: Orthopnea  2018-05: Bilateral leg edema  2018-05: Shortness of breath on exertion  2017-11: Stage 3 chronic kidney disease ([facility])  2017-10: Vitamin D deficiency  2017-10: Long-term insulin use ([facility])  2017-09: Anemia in stage 3 chronic kidney disease ([facility])  2017-09: CKD (chronic kidney disease) stage 3, GFR 30-59 ml/min ([facility])  2016-10: Macroalbuminuric diabetic nephropathy ([facility])  2016-06: Type 2 diabetes mellitus with microalbuminuria, with long-  term current use of insulin ([facility])  2016-06: Type 2 diabetes mellitus with  hyperglycemia, with long-term   current use of insulin (HCC)  2016: Essential hypertension  -: Diabetic peripheral neuropathy associated with type 2   diabetes mellitus (HCC)      Past Surgical History:  Past Surgical History:   Procedure Laterality Date   • TRANSCATHETER AORTIC VALVE REPLACEMENT  2018    Procedure: TRANSCATHETER AORTIC VALVE REPLACEMENT;  Surgeon: Markus Fuller M.D.;  Location: SURGERY [facility];  Service: Cardiac   • DAYANNA  2018    Procedure: DAYANNA;  Surgeon: Markus Fuller M.D.;  Location: SURGERY [facility];  Service: Cardiac   • AORTIC VALVE REPLACEMENT      S/P TAVR    • TONSILLECTOMY     • ZZZ CARDIAC CATH          Allergies:  Sulfa drugs     Social History:  Social History     Tobacco Use   • Smoking status: Former Smoker     Packs/day: 1.00     Years: 22.00     Pack years: 22.00     Last attempt to quit: 1989     Years since quittin.2   • Smokeless tobacco: Never Used   • Tobacco comment: stop cigar in    Substance Use Topics   • Alcohol use: Yes     Alcohol/week: 0.6 oz     Types: 1 Glasses of wine per week     Frequency: 2-4 times a month     Drinks per session: 1 or 2     Binge frequency: Never     Comment: OCCASIONALLY   • Drug use: No        Family History:   family history includes Cancer in his maternal grandmother; Heart Disease in his father; Heart Failure in his father; Hyperlipidemia in his father; Hypertension in his father; Lung Disease in his mother; Other in his brother and paternal grandmother; Prostate cancer in his brother; Stroke in his maternal grandfather and paternal grandfather.      PHYSICAL EXAM:   OBJECTIVE:  Vital signs: There were no vitals taken for this visit.  GENERAL: Well-developed, well-nourished in no apparent distress.   EYE:  No ocular asymmetry, PERRLA  HENT: Pink, moist mucous membranes.    NECK: No thyromegaly.   CARDIOVASCULAR: Normal precordial impulse seen  LUNGS: Symmetrical chest expansion  ABDOMEN: Soft, no  masses seen  EXTREMITIES: No clubbing, cyanosis, or edema.   NEUROLOGICAL: No gross focal motor abnormalities   LYMPH: No cervical adenopathy seen   SKIN: No rashes, lesions.     Labs:  Lab Results   Component Value Date/Time    HBA1C 7.6 (H) 03/19/2020 06:02 AM        Lab Results   Component Value Date/Time    WBC 5.3 12/10/2019 06:32 AM    RBC 3.98 (L) 12/10/2019 06:32 AM    HEMOGLOBIN 12.6 (L) 12/10/2019 06:32 AM    MCV 97.0 12/10/2019 06:32 AM    MCH 31.7 12/10/2019 06:32 AM    MCHC 32.6 (L) 12/10/2019 06:32 AM    RDW 45.5 12/10/2019 06:32 AM    MPV 10.5 12/10/2019 06:32 AM       Lab Results   Component Value Date/Time    SODIUM 141 03/19/2020 06:02 AM    POTASSIUM 4.6 03/19/2020 06:02 AM    CHLORIDE 110 03/19/2020 06:02 AM    CO2 22 03/19/2020 06:02 AM    ANION 9.0 03/19/2020 06:02 AM    GLUCOSE 129 (H) 03/19/2020 06:02 AM    BUN 49 (H) 03/19/2020 06:02 AM    CREATININE 1.92 (H) 03/19/2020 06:02 AM    CALCIUM 9.0 03/19/2020 06:02 AM    ASTSGOT 22 03/19/2020 06:02 AM    ALTSGPT 21 03/19/2020 06:02 AM    TBILIRUBIN 0.2 03/19/2020 06:02 AM    ALBUMIN 3.8 03/19/2020 06:02 AM    TOTPROTEIN 6.6 03/19/2020 06:02 AM    GLOBULIN 2.8 03/19/2020 06:02 AM    AGRATIO 1.4 03/19/2020 06:02 AM       Lab Results   Component Value Date/Time    CHOLSTRLTOT 159 12/10/2019 0632    TRIGLYCERIDE 237 (H) 12/10/2019 0632    HDL 47 12/10/2019 0632    LDL 65 12/10/2019 0632       Lab Results   Component Value Date/Time    MALBCRT 856 (H) 03/19/2020 06:02 AM    MICROALBUR 65.3 03/19/2020 06:02 AM        No results found for: TSHULTRASEN  No results found for: FREEDIR  No results found for: FREET3  No results found for: THYSTIMIG        ASSESSMENT/PLAN:     1. Type 2 diabetes mellitus with hyperglycemia, with long-term current use of insulin (HCC)  Uncontrolled at baseline   His A1c is better at 7.5%  Recommend that he continue Lantus 12 units for now.  He should titrate his basal insulin based upon his fasting sugars and not bedtime  sugars  If his fasting sugars are elevated I recommend that he increase his basal insulin or Lantus by 2 units every 2 days until fasting sugar is at goal  Continue Jardiance 25 mg daily for now if his GFR deteriorates we may need to lower his Jardiance.  I recommend that he remain well-hydrated  I am replacing his Trulicity with Ozempic 1 mg weekly for better sugar control  Advised cutting back on carbs   Recommend diet and exercise and lifestyle changes  Recommend watching carb intake  We will plan for follow-up in 3 months with labs    2. Dyslipidemia  Well-controlled  LDL cholesterol is at goal  Triglycerides are minimally elevated  I expect his triglycerides to improve with better glycemic control  Continue atorvastatin follow low-fat diet  We will repeat fasting lipids after 12 months    3. Essential hypertension  Well-controlled  Continue current medications  He does have macroalbuminuria which is better on serial follow up  We will repeat urine microalbumin creatinine ratio in 3 month    4. Long-term insulin use (HCC)  Patient is on long-term basal insulin therapy with a GLP-1 analog and other medications for type 2 diabetes management      Return in about 3 months (around 6/20/2020).    Patient was presented for a telehealth consultation via secure and encrypted videoconferencing technology.   Patient consented for this visit    Thank you kindly for allowing me to participate in the diabetes care plan for this patient.    Timothy Botello MD, FACE, ECNU  01/10/20    CC:   Almaz Pond D.O.

## 2020-03-24 ENCOUNTER — APPOINTMENT (OUTPATIENT)
Dept: MEDICAL GROUP | Facility: MEDICAL CENTER | Age: 70
End: 2020-03-24
Payer: COMMERCIAL

## 2020-04-11 DIAGNOSIS — Z79.4 TYPE 2 DIABETES MELLITUS WITH HYPERGLYCEMIA, WITH LONG-TERM CURRENT USE OF INSULIN (HCC): ICD-10-CM

## 2020-04-11 DIAGNOSIS — E11.65 TYPE 2 DIABETES MELLITUS WITH HYPERGLYCEMIA, WITH LONG-TERM CURRENT USE OF INSULIN (HCC): ICD-10-CM

## 2020-04-13 RX ORDER — FLASH GLUCOSE SENSOR
1 KIT MISCELLANEOUS
Qty: 6 EACH | Refills: 3 | Status: SHIPPED | OUTPATIENT
Start: 2020-04-13 | End: 2021-01-04 | Stop reason: SDUPTHER

## 2020-04-13 NOTE — TELEPHONE ENCOUNTER
Received request via: Patient    Was the patient seen in the last year in this department? Yes    Does the patient have an active prescription (recently filled or refills available) for medication(s) requested? No     Continuous Blood Gluc Sensor ([employer]ar WAQAS SENSOR SYSTEM) Misc            Si Applicator by Does not apply route every 14 days. Please give 14 day sensor and reader

## 2020-05-04 DIAGNOSIS — E55.9 VITAMIN D DEFICIENCY: ICD-10-CM

## 2020-05-04 RX ORDER — ERGOCALCIFEROL 1.25 MG/1
1 CAPSULE ORAL
COMMUNITY
End: 2020-09-25

## 2020-05-04 RX ORDER — ERGOCALCIFEROL 1.25 MG/1
50000 CAPSULE ORAL
Qty: 12 CAP | Refills: 3 | Status: SHIPPED | OUTPATIENT
Start: 2020-05-04

## 2020-06-24 ENCOUNTER — OFFICE VISIT (OUTPATIENT)
Dept: ENDOCRINOLOGY | Facility: MEDICAL CENTER | Age: 70
End: 2020-06-24
Payer: COMMERCIAL

## 2020-06-24 VITALS
WEIGHT: 187.1 LBS | OXYGEN SATURATION: 94 % | HEIGHT: 68 IN | DIASTOLIC BLOOD PRESSURE: 68 MMHG | BODY MASS INDEX: 28.36 KG/M2 | HEART RATE: 84 BPM | SYSTOLIC BLOOD PRESSURE: 136 MMHG

## 2020-06-24 DIAGNOSIS — Z79.4 TYPE 2 DIABETES MELLITUS WITH HYPERGLYCEMIA, WITH LONG-TERM CURRENT USE OF INSULIN (HCC): ICD-10-CM

## 2020-06-24 DIAGNOSIS — I10 ESSENTIAL HYPERTENSION: ICD-10-CM

## 2020-06-24 DIAGNOSIS — Z79.4 LONG-TERM INSULIN USE (HCC): ICD-10-CM

## 2020-06-24 DIAGNOSIS — E78.5 DYSLIPIDEMIA: ICD-10-CM

## 2020-06-24 DIAGNOSIS — E11.65 TYPE 2 DIABETES MELLITUS WITH HYPERGLYCEMIA, WITH LONG-TERM CURRENT USE OF INSULIN (HCC): ICD-10-CM

## 2020-06-24 LAB
HBA1C MFR BLD: 7.5 % (ref 0–5.6)
INT CON NEG: NEGATIVE
INT CON POS: POSITIVE

## 2020-06-24 PROCEDURE — 83036 HEMOGLOBIN GLYCOSYLATED A1C: CPT | Performed by: INTERNAL MEDICINE

## 2020-06-24 PROCEDURE — 95251 CONT GLUC MNTR ANALYSIS I&R: CPT | Performed by: INTERNAL MEDICINE

## 2020-06-24 PROCEDURE — 99214 OFFICE O/P EST MOD 30 MIN: CPT | Performed by: INTERNAL MEDICINE

## 2020-06-24 ASSESSMENT — FIBROSIS 4 INDEX: FIB4 SCORE: 1.97

## 2020-06-24 NOTE — PROGRESS NOTES
CHIEF COMPLAINT: Patient is here for follow up of Type 2 Diabetes Mellitus.      HPI:     Ashish Wiley is a 69 y.o. male with Type 2 Diabetes Mellitus here for follow up.    On follow-up his A1c is stable at 7.5% on June 23, 2020  On follow-up his A1c has improved to 7.5% on March 19, 2020.    Baseline labs from December 10, 2019 showed an elevated A1c of 10% while taking Lantus Bydureon and Jardiance.        On follow-up he is taking Lantus 12 units daily, Jardiance 25 mg daily and Ozempic 1.0mg weekly.  On his last visit we replaced the Trulicity with Ozempic  He reports that he is tolerating his medications well and denies any side effects.  He reports less hyperglycemia and denies hypoglycemia.       He admits to dietary noncompliance he stopped cooking and his wife has been cooking more carbohydrate rich meals including pasta  He reports postprandial glucose spikes      He has a freestyle kp CGM which we downloaded today and his average Bg is 170 he is on target 67% .  Overall his average blood sugar is better compared to 3 months ago  Review of sensor data shows postprandial glucose spikes after breakfast, after lunch and supper to as high as 200      Aside from diabetes he has coronary artery disease and hyperlipidemia.  He is taking Lipitor 40 mg daily.   He is tolerating this medication fairly well.  His last LDL cholesterol was well controlled at 83 with triglycerides of 96 on February 2020    He does have chronic kidney disease stage III .  His last serum creatinine is elevated 1.92 with slight deterioration of his GFR to 35 on March 2020 I do not have an up-to-date CMP on hand    Blood pressure is controlled with lisinopril 20 mg daily and he denies problems with his medication.  He does have diabetic kidney disease with significant proteinuria            BG Diary:  Please see glucose sensor download    Weight has been stable    Diabetes Complications   Retinopathy: Known PDR retinopathy.  Last  eye exam: January 2019 at [facility] by Dr. Hunter  Neuropathy: Denies paresthesias or numbness in hands or feet. Denies any foot wounds.  Exercise: Minimal.  Diet: Fair.  Patient's medications, allergies, and social histories were reviewed and updated as appropriate.    ROS:     CONS:     No fever, no chills   EYES:     No diplopia, no blurry vision   CV:           No chest pain, no palpitations   PULM:     No SOB, no cough, no hemoptysis.   GI:            No nausea, no vomiting, no diarrhea, no constipation   ENDO:     No polyuria, no polydipsia, no heat intolerance, no cold intolerance       Past Medical History:  Problem List:  2019-12: Dyslipidemia  2019-06: Olecranon bursitis of right elbow  2019-06: Localized swelling of lower leg  2018-08: CHF due to valvular disease ([facility])  2018-07: Chronic systolic congestive heart failure, NYHA class 1 ([facility])  2018-07: S/P TAVR (transcatheter aortic valve replacement)  2018-06: Stented coronary artery  2018-06: Acute on chronic systolic (congestive) heart failure ([facility])  2018-06: NYHA class 3 acute on chronic systolic heart failure ([facility])  2018-06: Severe aortic stenosis  2018-06: Coronary artery disease due to calcified coronary lesion:   Status post atherectomy and stenting of the LAD in June 2018.    Nonobstructive disease in the RCA and circumflex.  2018-06: Aortic stenosis  2018-06: ACC/AHA stage C systolic heart failure ([facility])  2018-06: Dilated cardiomyopathy ([facility])  2018-06: Acute systolic heart failure ([facility])  2018-05: Orthopnea  2018-05: Bilateral leg edema  2018-05: Shortness of breath on exertion  2017-11: Stage 3 chronic kidney disease ([facility])  2017-10: Vitamin D deficiency  2017-10: Long-term insulin use ([facility])  2017-09: Anemia in stage 3 chronic kidney disease ([facility])  2017-09: CKD (chronic kidney disease) stage 3, GFR 30-59 ml/min ([facility])  2016-10: Macroalbuminuric diabetic nephropathy ([facility])  2016-06: Type 2 diabetes mellitus with microalbuminuria, with  "long-  term current use of insulin ([facility])  2016: Type 2 diabetes mellitus with hyperglycemia, with long-term   current use of insulin ([facility])  2016: Essential hypertension  2016: Diabetic peripheral neuropathy associated with type 2   diabetes mellitus ([facility])      Past Surgical History:  Past Surgical History:   Procedure Laterality Date   • TRANSCATHETER AORTIC VALVE REPLACEMENT  2018    Procedure: TRANSCATHETER AORTIC VALVE REPLACEMENT;  Surgeon: Markus Fuller M.D.;  Location: SURGERY [facility];  Service: Cardiac   • DAYANNA  2018    Procedure: DAYANNA;  Surgeon: Markus Fuller M.D.;  Location: SURGERY [facility];  Service: Cardiac   • AORTIC VALVE REPLACEMENT      S/P TAVR    • TONSILLECTOMY     • ZZZ CARDIAC CATH          Allergies:  Sulfa drugs     Social History:  Social History     Tobacco Use   • Smoking status: Former Smoker     Packs/day: 1.00     Years: 22.00     Pack years: 22.00     Last attempt to quit: 1989     Years since quittin.4   • Smokeless tobacco: Never Used   • Tobacco comment: stop cigar in    Substance Use Topics   • Alcohol use: Yes     Alcohol/week: 0.6 oz     Types: 1 Glasses of wine per week     Frequency: 2-4 times a month     Drinks per session: 1 or 2     Binge frequency: Never     Comment: OCCASIONALLY   • Drug use: No        Family History:   family history includes Cancer in his maternal grandmother; Heart Disease in his father; Heart Failure in his father; Hyperlipidemia in his father; Hypertension in his father; Lung Disease in his mother; Other in his brother and paternal grandmother; Prostate cancer in his brother; Stroke in his maternal grandfather and paternal grandfather.      PHYSICAL EXAM:   OBJECTIVE:  Vital signs: /68 (BP Location: Left arm, Patient Position: Sitting, BP Cuff Size: Adult)   Pulse 84   Ht 1.727 m (5' 8\")   Wt 84.9 kg (187 lb 1.6 oz)   SpO2 94%   BMI 28.45 kg/m²   GENERAL: Well-developed, well-nourished in no " apparent distress.   EYE:  No ocular asymmetry, PERRLA  HENT: Pink, moist mucous membranes.    NECK: No thyromegaly.   CARDIOVASCULAR: Normal precordial impulse seen  LUNGS: Symmetrical chest expansion  ABDOMEN: Soft, no masses seen  EXTREMITIES: No clubbing, cyanosis, or edema.   NEUROLOGICAL: No gross focal motor abnormalities   LYMPH: No cervical adenopathy seen   SKIN: No rashes, lesions.   Monofilament testing with a 10 gram force: sensation: intact bilaterally  Visual Inspection: Feet without maceration, ulcers, or fissures.  Pedal pulses: intact bilaterally      Labs:  Lab Results   Component Value Date/Time    HBA1C 7.5 (A) 06/24/2020 02:07 PM        Lab Results   Component Value Date/Time    WBC 5.3 12/10/2019 06:32 AM    RBC 3.98 (L) 12/10/2019 06:32 AM    HEMOGLOBIN 12.6 (L) 12/10/2019 06:32 AM    MCV 97.0 12/10/2019 06:32 AM    MCH 31.7 12/10/2019 06:32 AM    MCHC 32.6 (L) 12/10/2019 06:32 AM    RDW 45.5 12/10/2019 06:32 AM    MPV 10.5 12/10/2019 06:32 AM       Lab Results   Component Value Date/Time    SODIUM 141 03/19/2020 06:02 AM    POTASSIUM 4.6 03/19/2020 06:02 AM    CHLORIDE 110 03/19/2020 06:02 AM    CO2 22 03/19/2020 06:02 AM    ANION 9.0 03/19/2020 06:02 AM    GLUCOSE 129 (H) 03/19/2020 06:02 AM    BUN 49 (H) 03/19/2020 06:02 AM    CREATININE 1.92 (H) 03/19/2020 06:02 AM    CALCIUM 9.0 03/19/2020 06:02 AM    ASTSGOT 22 03/19/2020 06:02 AM    ALTSGPT 21 03/19/2020 06:02 AM    TBILIRUBIN 0.2 03/19/2020 06:02 AM    ALBUMIN 3.8 03/19/2020 06:02 AM    TOTPROTEIN 6.6 03/19/2020 06:02 AM    GLOBULIN 2.8 03/19/2020 06:02 AM    AGRATIO 1.4 03/19/2020 06:02 AM       Lab Results   Component Value Date/Time    CHOLSTRLTOT 159 12/10/2019 0632    TRIGLYCERIDE 237 (H) 12/10/2019 0632    HDL 47 12/10/2019 0632    LDL 65 12/10/2019 0632       Lab Results   Component Value Date/Time    MALBCRT 856 (H) 03/19/2020 06:02 AM    MICROALBUR 65.3 03/19/2020 06:02 AM        No results found for: TSHULTRASEN  No results  found for: FREEDIR  No results found for: FREET3  No results found for: THYSTIMIG        ASSESSMENT/PLAN:     1. Type 2 diabetes mellitus with hyperglycemia, with long-term current use of insulin (HCC)  Uncontrolled at baseline   His A1c is stable 7.5%  Recommend that he increase his Lantus to 14 units daily  Continue Jardiance and Ozempic  Recommend that he improve hydration  Start taking Humalog 4 units with breakfast, with lunch and with supper especially if he is eating more carbs  I recommend that he go to the ADA website so that he can have some recipes for meal planning  Advised cutting back on carbs   Recommend diet and exercise and lifestyle changes  Recommend watching carb intake  We will plan for follow-up in 3 months with labs    2. Dyslipidemia  Well-controlled  LDL cholesterol is at goal  Triglycerides are minimally elevated  I expect his triglycerides to improve with better glycemic control  Continue atorvastatin follow low-fat diet  We will repeat fasting lipids after 12 months    3. Essential hypertension  Well-controlled  Continue current medications  He does have macroalbuminuria which is better on serial follow up  We will repeat urine microalbumin creatinine ratio in 3 months    4. Long-term insulin use (HCC)  Patient is on long-term basal insulin therapy with a GLP-1 analog and other medications for type 2 diabetes management      Return in about 3 months (around 9/24/2020).      Thank you kindly for allowing me to participate in the diabetes care plan for this patient.    Timothy Botello MD, FACE, ECNU  01/10/20    CC:   Almaz Pond D.O.

## 2020-09-14 ENCOUNTER — HOSPITAL ENCOUNTER (OUTPATIENT)
Dept: LAB | Facility: MEDICAL CENTER | Age: 70
End: 2020-09-14
Attending: INTERNAL MEDICINE
Payer: COMMERCIAL

## 2020-09-14 DIAGNOSIS — I10 ESSENTIAL HYPERTENSION: ICD-10-CM

## 2020-09-14 DIAGNOSIS — E11.65 TYPE 2 DIABETES MELLITUS WITH HYPERGLYCEMIA, WITH LONG-TERM CURRENT USE OF INSULIN (HCC): ICD-10-CM

## 2020-09-14 DIAGNOSIS — Z79.4 LONG-TERM INSULIN USE (HCC): ICD-10-CM

## 2020-09-14 DIAGNOSIS — Z79.4 TYPE 2 DIABETES MELLITUS WITH HYPERGLYCEMIA, WITH LONG-TERM CURRENT USE OF INSULIN (HCC): ICD-10-CM

## 2020-09-14 DIAGNOSIS — E78.5 DYSLIPIDEMIA: ICD-10-CM

## 2020-09-14 LAB
ALBUMIN SERPL BCP-MCNC: 3.9 G/DL (ref 3.2–4.9)
ALBUMIN/GLOB SERPL: 1.5 G/DL
ALP SERPL-CCNC: 90 U/L (ref 30–99)
ALT SERPL-CCNC: 33 U/L (ref 2–50)
ANION GAP SERPL CALC-SCNC: 9 MMOL/L (ref 7–16)
AST SERPL-CCNC: 26 U/L (ref 12–45)
BILIRUB SERPL-MCNC: 0.4 MG/DL (ref 0.1–1.5)
BUN SERPL-MCNC: 35 MG/DL (ref 8–22)
CALCIUM SERPL-MCNC: 9.3 MG/DL (ref 8.5–10.5)
CHLORIDE SERPL-SCNC: 105 MMOL/L (ref 96–112)
CO2 SERPL-SCNC: 24 MMOL/L (ref 20–33)
CREAT SERPL-MCNC: 1.81 MG/DL (ref 0.5–1.4)
CREAT UR-MCNC: 73.43 MG/DL
EST. AVERAGE GLUCOSE BLD GHB EST-MCNC: 166 MG/DL
FASTING STATUS PATIENT QL REPORTED: NORMAL
GLOBULIN SER CALC-MCNC: 2.6 G/DL (ref 1.9–3.5)
GLUCOSE SERPL-MCNC: 189 MG/DL (ref 65–99)
HBA1C MFR BLD: 7.4 % (ref 0–5.6)
MICROALBUMIN UR-MCNC: 61.7 MG/DL
MICROALBUMIN/CREAT UR: 840 MG/G (ref 0–30)
POTASSIUM SERPL-SCNC: 5 MMOL/L (ref 3.6–5.5)
PROT SERPL-MCNC: 6.5 G/DL (ref 6–8.2)
SODIUM SERPL-SCNC: 138 MMOL/L (ref 135–145)

## 2020-09-14 PROCEDURE — 82043 UR ALBUMIN QUANTITATIVE: CPT

## 2020-09-14 PROCEDURE — 36415 COLL VENOUS BLD VENIPUNCTURE: CPT

## 2020-09-14 PROCEDURE — 82570 ASSAY OF URINE CREATININE: CPT

## 2020-09-14 PROCEDURE — 83036 HEMOGLOBIN GLYCOSYLATED A1C: CPT

## 2020-09-14 PROCEDURE — 80053 COMPREHEN METABOLIC PANEL: CPT

## 2020-09-25 ENCOUNTER — OFFICE VISIT (OUTPATIENT)
Dept: ENDOCRINOLOGY | Facility: MEDICAL CENTER | Age: 70
End: 2020-09-25
Attending: INTERNAL MEDICINE
Payer: COMMERCIAL

## 2020-09-25 VITALS
DIASTOLIC BLOOD PRESSURE: 64 MMHG | OXYGEN SATURATION: 98 % | WEIGHT: 186 LBS | HEIGHT: 68 IN | HEART RATE: 62 BPM | BODY MASS INDEX: 28.19 KG/M2 | SYSTOLIC BLOOD PRESSURE: 138 MMHG

## 2020-09-25 DIAGNOSIS — Z79.4 TYPE 2 DIABETES MELLITUS WITH HYPERGLYCEMIA, WITH LONG-TERM CURRENT USE OF INSULIN (HCC): ICD-10-CM

## 2020-09-25 DIAGNOSIS — E11.65 TYPE 2 DIABETES MELLITUS WITH HYPERGLYCEMIA, WITH LONG-TERM CURRENT USE OF INSULIN (HCC): ICD-10-CM

## 2020-09-25 PROCEDURE — 99213 OFFICE O/P EST LOW 20 MIN: CPT | Performed by: INTERNAL MEDICINE

## 2020-09-25 PROCEDURE — 99214 OFFICE O/P EST MOD 30 MIN: CPT | Performed by: INTERNAL MEDICINE

## 2020-09-25 PROCEDURE — 95251 CONT GLUC MNTR ANALYSIS I&R: CPT | Performed by: INTERNAL MEDICINE

## 2020-09-25 RX ORDER — INSULIN LISPRO 100 [IU]/ML
4 INJECTION, SOLUTION INTRAVENOUS; SUBCUTANEOUS
COMMUNITY
End: 2021-01-04 | Stop reason: SDUPTHER

## 2020-09-25 ASSESSMENT — FIBROSIS 4 INDEX: FIB4 SCORE: 1.86

## 2020-09-25 NOTE — PROGRESS NOTES
CHIEF COMPLAINT: Patient is here for follow up of Type 2 Diabetes Mellitus.      HPI:     Ashish Wiley is a 69 y.o. male with Type 2 Diabetes Mellitus here for follow up.    On follow-up his A1c is slightly better at 7.4% on September 14, 2020  On follow-up his A1c is stable at 7.5% on June 23, 2020  On follow-up his A1c has improved to 7.5% on March 19, 2020.    Baseline labs from December 10, 2019 showed an elevated A1c of 10% while taking Lantus Bydureon and Jardiance.        On follow-up he is taking Lantus 14 units daily, Jardiance 25 mg daily and Ozempic 1.0mg weekly.    On his last visit I started him on Humalog 4 units 3 times a day    I discovered today that he stopped taking his Humalog because he misunderstood the role of this insulin and he thought that it was supposed to suppress his appetite  I explained to him that it supposed to control and limit postprandial hyperglycemia    He has been wearing a therapeutic CGM for over 6 months  Download of his kp CGM today shows an improve average blood sugar of 134 with a standard deviation of 30.  Time in range is better at 90%.   Overall his average blood sugar is better compared to 3 months ago  Review of sensor data shows postprandial glucose spikes after breakfast, after lunch and supper to as high as 180      Aside from diabetes he has coronary artery disease and hyperlipidemia.  He is taking Lipitor 40 mg daily.   He is tolerating this medication fairly well.  His last LDL cholesterol was well controlled at 83 with triglycerides of 96 on February 2020    He does have chronic kidney disease stage III .  His last serum creatinine is elevated 1.8` with an improved GFR of 37 on September 14, 2020    Blood pressure is controlled with lisinopril 20 mg daily and he denies problems with his medication.  He does have diabetic kidney disease with significant proteinuria based on labs from September 2020      BG Diary:  Please see glucose sensor  download    Weight has been stable    Diabetes Complications   Retinopathy: Known PDR retinopathy.  Last eye exam: January 2019 at [facility] by Dr. Hunter  Neuropathy: Denies paresthesias or numbness in hands or feet. Denies any foot wounds.  Exercise: Minimal.  Diet: Fair.  Patient's medications, allergies, and social histories were reviewed and updated as appropriate.    ROS:     CONS:     No fever, no chills   EYES:     No diplopia, no blurry vision   CV:           No chest pain, no palpitations   PULM:     No SOB, no cough, no hemoptysis.   GI:            No nausea, no vomiting, no diarrhea, no constipation   ENDO:     No polyuria, no polydipsia, no heat intolerance, no cold intolerance       Past Medical History:  Problem List:  2019-12: Dyslipidemia  2019-06: Olecranon bursitis of right elbow  2019-06: Localized swelling of lower leg  2018-08: CHF due to valvular disease ([facility])  2018-07: Chronic systolic congestive heart failure, NYHA class 1 ([facility])  2018-07: S/P TAVR (transcatheter aortic valve replacement)  2018-06: Stented coronary artery  2018-06: Acute on chronic systolic (congestive) heart failure ([facility])  2018-06: NYHA class 3 acute on chronic systolic heart failure ([facility])  2018-06: Severe aortic stenosis  2018-06: Coronary artery disease due to calcified coronary lesion:   Status post atherectomy and stenting of the LAD in June 2018.    Nonobstructive disease in the RCA and circumflex.  2018-06: Aortic stenosis  2018-06: ACC/AHA stage C systolic heart failure ([facility])  2018-06: Dilated cardiomyopathy ([facility])  2018-06: Acute systolic heart failure ([facility])  2018-05: Orthopnea  2018-05: Bilateral leg edema  2018-05: Shortness of breath on exertion  2017-11: Stage 3 chronic kidney disease ([facility])  2017-10: Vitamin D deficiency  2017-10: Long-term insulin use ([facility])  2017-09: Anemia in stage 3 chronic kidney disease ([facility])  2017-09: CKD (chronic kidney disease) stage 3, GFR 30-59 ml/min ([facility])  2016-10:  "Macroalbuminuric diabetic nephropathy ([facility])  2016: Type 2 diabetes mellitus with microalbuminuria, with long-  term current use of insulin (HCC)  2016: Type 2 diabetes mellitus with hyperglycemia, with long-term   current use of insulin ([facility])  2016: Essential hypertension  2016: Diabetic peripheral neuropathy associated with type 2   diabetes mellitus ([facility])      Past Surgical History:  Past Surgical History:   Procedure Laterality Date   • TRANSCATHETER AORTIC VALVE REPLACEMENT  2018    Procedure: TRANSCATHETER AORTIC VALVE REPLACEMENT;  Surgeon: Markus Fuller M.D.;  Location: SURGERY [facility];  Service: Cardiac   • DAYANNA  2018    Procedure: DAYANNA;  Surgeon: Markus Fullre M.D.;  Location: SURGERY [facility];  Service: Cardiac   • AORTIC VALVE REPLACEMENT      S/P TAVR    • TONSILLECTOMY     • ZZZ CARDIAC CATH          Allergies:  Sulfa drugs     Social History:  Social History     Tobacco Use   • Smoking status: Former Smoker     Packs/day: 1.00     Years: 22.00     Pack years: 22.00     Quit date: 1989     Years since quittin.7   • Smokeless tobacco: Never Used   • Tobacco comment: stop cigar in    Substance Use Topics   • Alcohol use: Yes     Alcohol/week: 0.6 oz     Types: 1 Glasses of wine per week     Frequency: 2-4 times a month     Drinks per session: 1 or 2     Binge frequency: Never     Comment: OCCASIONALLY   • Drug use: No        Family History:   family history includes Cancer in his maternal grandmother; Heart Disease in his father; Heart Failure in his father; Hyperlipidemia in his father; Hypertension in his father; Lung Disease in his mother; Other in his brother and paternal grandmother; Prostate cancer in his brother; Stroke in his maternal grandfather and paternal grandfather.      PHYSICAL EXAM:   OBJECTIVE:  Vital signs: /64   Pulse 62   Ht 1.727 m (5' 8\")   Wt 84.4 kg (186 lb)   SpO2 98%   BMI 28.28 kg/m²   GENERAL: Well-developed, " well-nourished in no apparent distress.   EYE:  No ocular asymmetry, PERRLA  HENT: Pink, moist mucous membranes.    NECK: No thyromegaly.   CARDIOVASCULAR: Normal precordial impulse seen  LUNGS: Symmetrical chest expansion  ABDOMEN: Soft, no masses seen  EXTREMITIES: No clubbing, cyanosis, or edema.   NEUROLOGICAL: No gross focal motor abnormalities   LYMPH: No cervical adenopathy seen   SKIN: No rashes, lesions.   Monofilament testing with a 10 gram force: sensation: intact bilaterally  Visual Inspection: Feet without maceration, ulcers, or fissures.  Pedal pulses: intact bilaterally      Labs:  Lab Results   Component Value Date/Time    HBA1C 7.4 (H) 09/14/2020 06:49 AM        Lab Results   Component Value Date/Time    WBC 5.3 12/10/2019 06:32 AM    RBC 3.98 (L) 12/10/2019 06:32 AM    HEMOGLOBIN 12.6 (L) 12/10/2019 06:32 AM    MCV 97.0 12/10/2019 06:32 AM    MCH 31.7 12/10/2019 06:32 AM    MCHC 32.6 (L) 12/10/2019 06:32 AM    RDW 45.5 12/10/2019 06:32 AM    MPV 10.5 12/10/2019 06:32 AM       Lab Results   Component Value Date/Time    SODIUM 138 09/14/2020 06:49 AM    POTASSIUM 5.0 09/14/2020 06:49 AM    CHLORIDE 105 09/14/2020 06:49 AM    CO2 24 09/14/2020 06:49 AM    ANION 9.0 09/14/2020 06:49 AM    GLUCOSE 189 (H) 09/14/2020 06:49 AM    BUN 35 (H) 09/14/2020 06:49 AM    CREATININE 1.81 (H) 09/14/2020 06:49 AM    CALCIUM 9.3 09/14/2020 06:49 AM    ASTSGOT 26 09/14/2020 06:49 AM    ALTSGPT 33 09/14/2020 06:49 AM    TBILIRUBIN 0.4 09/14/2020 06:49 AM    ALBUMIN 3.9 09/14/2020 06:49 AM    TOTPROTEIN 6.5 09/14/2020 06:49 AM    GLOBULIN 2.6 09/14/2020 06:49 AM    AGRATIO 1.5 09/14/2020 06:49 AM       Lab Results   Component Value Date/Time    CHOLSTRLTOT 159 12/10/2019 0632    TRIGLYCERIDE 237 (H) 12/10/2019 0632    HDL 47 12/10/2019 0632    LDL 65 12/10/2019 0632       Lab Results   Component Value Date/Time    MALBCRT 840 (H) 09/14/2020 06:48 AM    MICROALBUR 61.7 09/14/2020 06:48 AM        No results found for:  TSHULTRASEN  No results found for: FREEDIR  No results found for: FREET3  No results found for: THYSTIMIG        ASSESSMENT/PLAN:     1. Type 2 diabetes mellitus with hyperglycemia, with long-term current use of insulin (HCC)  Uncontrolled at baseline   His A1c is stable 7.4%  Continue Lantus  14 units daily  Continue Jardiance and Ozempic  Restart Humalog 4 units with breakfast, with lunch and with supper especially if he is eating more carbs  Recommend diet and exercise and lifestyle changes  Recommend watching carb intake  We will plan for follow-up in 3 months with labs    2. Dyslipidemia  Well-controlled  LDL cholesterol is at goal  Triglycerides are minimally elevated  I expect his triglycerides to improve with better glycemic control  Continue atorvastatin follow low-fat diet  We will repeat fasting lipids after 12 months    3. Essential hypertension  Well-controlled  Continue current medications  He does have macroalbuminuria which is better on serial follow up  We will repeat urine microalbumin creatinine ratio in 6-12 months    4. Long-term insulin use (HCC)  Patient is on long-term basal insulin therapy with a GLP-1 analog and other medications for type 2 diabetes management      Return in about 3 months (around 12/25/2020).      Thank you kindly for allowing me to participate in the diabetes care plan for this patient.    Timothy Botello MD, FACE, ECNU  01/10/20    CC:   Almaz Pond D.O.

## 2020-10-06 ENCOUNTER — OFFICE VISIT (OUTPATIENT)
Dept: MEDICAL GROUP | Facility: MEDICAL CENTER | Age: 70
End: 2020-10-06
Payer: COMMERCIAL

## 2020-10-06 VITALS
SYSTOLIC BLOOD PRESSURE: 112 MMHG | HEART RATE: 73 BPM | TEMPERATURE: 97.9 F | BODY MASS INDEX: 28.17 KG/M2 | WEIGHT: 185.85 LBS | DIASTOLIC BLOOD PRESSURE: 60 MMHG | OXYGEN SATURATION: 98 % | HEIGHT: 68 IN

## 2020-10-06 DIAGNOSIS — Z23 NEED FOR INFLUENZA VACCINATION: ICD-10-CM

## 2020-10-06 DIAGNOSIS — I42.0 DILATED CARDIOMYOPATHY (HCC): ICD-10-CM

## 2020-10-06 DIAGNOSIS — E11.21 MACROALBUMINURIC DIABETIC NEPHROPATHY (HCC): ICD-10-CM

## 2020-10-06 DIAGNOSIS — Z95.2 S/P TAVR (TRANSCATHETER AORTIC VALVE REPLACEMENT): ICD-10-CM

## 2020-10-06 DIAGNOSIS — N18.32 STAGE 3B CHRONIC KIDNEY DISEASE: ICD-10-CM

## 2020-10-06 PROBLEM — I50.22 CHRONIC SYSTOLIC CONGESTIVE HEART FAILURE, NYHA CLASS 1 (HCC): Status: RESOLVED | Noted: 2018-07-23 | Resolved: 2020-10-06

## 2020-10-06 PROBLEM — N18.30 STAGE 3 CHRONIC KIDNEY DISEASE: Status: ACTIVE | Noted: 2017-10-11

## 2020-10-06 PROBLEM — I50.20 ACC/AHA STAGE C SYSTOLIC HEART FAILURE (HCC): Status: RESOLVED | Noted: 2018-06-21 | Resolved: 2020-10-06

## 2020-10-06 PROCEDURE — 99214 OFFICE O/P EST MOD 30 MIN: CPT | Mod: 25 | Performed by: INTERNAL MEDICINE

## 2020-10-06 PROCEDURE — 90471 IMMUNIZATION ADMIN: CPT | Performed by: INTERNAL MEDICINE

## 2020-10-06 PROCEDURE — 90662 IIV NO PRSV INCREASED AG IM: CPT | Performed by: INTERNAL MEDICINE

## 2020-10-06 ASSESSMENT — FIBROSIS 4 INDEX: FIB4 SCORE: 1.86

## 2020-10-06 ASSESSMENT — PATIENT HEALTH QUESTIONNAIRE - PHQ9: CLINICAL INTERPRETATION OF PHQ2 SCORE: 0

## 2020-10-06 NOTE — ASSESSMENT & PLAN NOTE
Previous problem, stable and improved.  Will reach out to cardiology to see if they would like to have his echocardiogram updated, last completed in July 2019.  He continues to feel well walking in excess of 10 miles daily and denies any cardiac symptoms.

## 2020-10-06 NOTE — ASSESSMENT & PLAN NOTE
Previous problem, improved.  He has done quite well following his TAVR and stent placement.  Last echocardiogram was in July 2019 and improved/stable at that time.  Will reach out to cardiology to see if they want to proceed with annual echocardiograms.  Patient denies any cardiac equivalents and feels that he is doing quite well.

## 2020-10-06 NOTE — ASSESSMENT & PLAN NOTE
Chronic problem, improved.  As his A1c is not under better control his macroalbuminuria has also improved.  He has frequent check-ins with his endocrinologist for lab and urine studies.

## 2020-10-06 NOTE — ASSESSMENT & PLAN NOTE
Chronic and stable problem.  GFR has ranged from the mid 30s to mid 40s over the past 1 to 2 years.  He last saw nephrology about 1 year ago.  His macroalbuminuria has improved as his diabetes is gotten under better control.  No appointments in the near future with nephrology but he sees his endocrinologist every 3 months who updates his kidney function.  He realizes that he needs to drink more water.

## 2020-10-06 NOTE — PROGRESS NOTES
Subjective:   Chief Complaint/History of Present Illness:  Ashish Wiley is a 70 y.o. male established patient who presents today to discuss medical problems as listed below. He is unaccompanied for today's visit.    Problem   S/P Tavr (Transcatheter Aortic Valve Replacement)    Completed in July 2018, he notes significant dyspnea on exertion and fatigue and was found to have a blockage in the LAD as well as aortic stenosis.  He notes that he tolerated the TAVR well and had near resolution of symptoms.  He did complete about a year of cardiac rehab.  Last echo completed in July 2019 was stable.     Dilated Cardiomyopathy (Hcc)    Echocardiogram (6/2018): Moderately reduced left ventricular systolic function. LVEF 35%. Global hypokinesis with regional variation with severe hypokinesis of the anteroapical and septal walls.    Echocardiogram (7/2019): Normal left ventricular systolic function. LVEF 55%. Normal regional wall motion.    In 2018 he was treated with TAVR as well as stent placement to the LAD due to significant blockage.  He had symptoms of dyspnea and fatigue at that time which have essentially resolved.  He completed nearly a year of cardiac rehab.  He feels like he is doing well from a cardiac standpoint overall.  He is walking approximately 10 miles daily.     Stage 3 chronic kidney disease (HCC)       Ref. Range 1/16/2020 06:08 3/19/2020 06:02 9/14/2020 06:49   Bun Latest Ref Range: 8 - 22 mg/dL 36 (H) 49 (H) 35 (H)   Creatinine Latest Ref Range: 0.50 - 1.40 mg/dL 1.71 (H) 1.92 (H) 1.81 (H)   GFR If Non  Latest Ref Range: >60 mL/min/1.73 m 2 40 (A) 35 (A) 37 (A)       He has had progressive kidney disease for the past several years, first noted in 64.  He had acute worsening in May 2017 which prompted referral to nephrology, Dr. Kirkland, who continues to follow him annually.  He is on an ACE inhibitor due to proteinuria.  He reports that he is a poor water drinker and is on  several renally excreted medications.     Macroalbuminuric Diabetic Nephropathy (Hcc)       Ref. Range 1/9/2019 06:34 2/21/2019 06:39 8/29/2019 06:32 12/10/2019 06:32 3/19/2020 06:02 9/14/2020 06:48   Micro Alb Creat Ratio Latest Ref Range: 0 - 30 mg/g 1422 (H) 2576 (H) 1447 (H) 1566 (H) 856 (H) 840 (H)       He has had some level of macroalbuminuria since about 2016.  He previously followed with nephrology and this has improved as his A1c has come down.  He is on ACEI therapy.       Additional History:   Allergies:    Sulfa drugs     Current Medications:     Current Outpatient Medications   Medication Sig Dispense Refill   • insulin lispro (HUMALOG KWIKPEN) 100 UNIT/ML Solution Pen-injector injection PEN Inject 4 Units as instructed 3 times a day before meals.     • ergocalciferol (DRISDOL) 80546 UNIT capsule Take 1 Cap by mouth every 7 days. 12 Cap 3   • Continuous Blood Gluc Sensor ([employer] WAQAS SENSOR SYSTEM) Misc 1 Applicator by Does not apply route every 14 days. Please give 14 day sensor and reader 6 Each 3   • lisinopril (PRINIVIL) 20 MG Tab Take 1 Tab by mouth every day. 100 Tab 3   • Empagliflozin (JARDIANCE) 25 MG Tab Take 25 mg by mouth every day. 100 Tab 3   • Semaglutide, 1 MG/DOSE, (OZEMPIC, 1 MG/DOSE,) 2 MG/1.5ML Solution Pen-injector Inject 1 mg as instructed every 7 days. 6 PEN 2   • carvedilol (COREG) 25 MG Tab Take 1 Tab by mouth 2 times a day, with meals. 200 Tab 3   • doxazosin (CARDURA) 2 MG Tab Take 1 Tab by mouth every day. 100 Tab 3   • insulin glargine (LANTUS SOLOSTAR) 100 UNIT/ML Solution Pen-injector injection Inject 50 Units as instructed every evening. (Patient taking differently: Inject 27 Units as instructed every evening.) 45 mL 3   • atorvastatin (LIPITOR) 40 MG Tab Take 1 Tab by mouth every day. 100 Tab 3   • Multiple Vitamins-Minerals (MULTIVITAMIN PO) Take  by mouth.     • aspirin EC (ECOTRIN) 81 MG Tablet Delayed Response Take 81 mg by mouth every day.       No current  "facility-administered medications for this visit.         Social History:     Social History     Tobacco Use   • Smoking status: Former Smoker     Packs/day: 1.00     Years: 22.00     Pack years: 22.00     Quit date: 1989     Years since quittin.7   • Smokeless tobacco: Never Used   • Tobacco comment: stop cigar in 2015   Substance Use Topics   • Alcohol use: Yes     Alcohol/week: 0.6 oz     Types: 1 Glasses of wine per week     Frequency: 2-4 times a month     Drinks per session: 1 or 2     Binge frequency: Never     Comment: OCCASIONALLY   • Drug use: No       ROS: As above in the HPI      Objective:   Physical Exam:    Vitals: /60   Pulse 73   Temp 36.6 °C (97.9 °F) (Temporal)   Ht 1.727 m (5' 8\")   Wt 84.3 kg (185 lb 13.6 oz)   SpO2 98%    BMI: Body mass index is 28.26 kg/m².   General/Constitutional: Vitals as above, Well nourished, well developed male in no acute distress   Head/Eyes: Head is grossly normal & atraumatic, bilateral conjunctivae clear and not injected, bilateral EOMI, bilateral miosis   ENT: Bilateral external ears grossly normal in appearance, Hearing grossly intact, scant amount of cerumen bilaterally, External nares normal in appearance and without discharge/bleeding   Respiratory: No respiratory distress, bilateral lungs are clear to ausculation in all lung fields, no wheezing/rhonchi/rales   Cardiovascular: Regular rate and rhythm with systolic murmur. No rubs, distal pulses are intact and equal bilaterally (radial, pedal), trace bilateral lower extremity edema, pedal   MSK: Gait grossly normal & not antalgic. He has bilateral charcot deformity of the lateral aspects of the mid foot.    Integumentary: No apparent rashes. No callous formation or ulcers on bilateral feet.   Psych: Judgment grossly appropriate, no apparent depression/anxiety    Health Maintenance:     - Completed. He will check on cost of Shingles and let us know if he wants to proceed.      Assessment and " Plan:     Problem List Items Addressed This Visit     Stage 3 chronic kidney disease (HCC)     Chronic and stable problem.  GFR has ranged from the mid 30s to mid 40s over the past 1 to 2 years.  He last saw nephrology about 1 year ago.  His macroalbuminuria has improved as his diabetes is gotten under better control.  No appointments in the near future with nephrology but he sees his endocrinologist every 3 months who updates his kidney function.  He realizes that he needs to drink more water.         Dilated cardiomyopathy (HCC)     Previous problem, improved.  He has done quite well following his TAVR and stent placement.  Last echocardiogram was in July 2019 and improved/stable at that time.  Will reach out to cardiology to see if they want to proceed with annual echocardiograms.  Patient denies any cardiac equivalents and feels that he is doing quite well.         S/P TAVR (transcatheter aortic valve replacement)     Previous problem, stable and improved.  Will reach out to cardiology to see if they would like to have his echocardiogram updated, last completed in July 2019.  He continues to feel well walking in excess of 10 miles daily and denies any cardiac symptoms.         Macroalbuminuric diabetic nephropathy (HCC)     Chronic problem, improved.  As his A1c is not under better control his macroalbuminuria has also improved.  He has frequent check-ins with his endocrinologist for lab and urine studies.           Other Visit Diagnoses     Need for influenza vaccination        Relevant Orders    INFLUENZA VACCINE, HIGH DOSE (65+ ONLY) (Completed)             RTC: 1 year.    PLEASE NOTE: This dictation was created using voice recognition software. I have made every reasonable attempt to correct obvious errors, but I expect that there are errors of grammar and possibly content that I did not discover before finalizing the note.

## 2020-10-27 ENCOUNTER — OFFICE VISIT (OUTPATIENT)
Dept: MEDICAL GROUP | Facility: MEDICAL CENTER | Age: 70
End: 2020-10-27
Payer: COMMERCIAL

## 2020-10-27 VITALS
TEMPERATURE: 97.5 F | WEIGHT: 195.99 LBS | HEIGHT: 68 IN | SYSTOLIC BLOOD PRESSURE: 140 MMHG | HEART RATE: 78 BPM | DIASTOLIC BLOOD PRESSURE: 60 MMHG | BODY MASS INDEX: 29.7 KG/M2 | OXYGEN SATURATION: 97 %

## 2020-10-27 DIAGNOSIS — J01.00 ACUTE NON-RECURRENT MAXILLARY SINUSITIS: ICD-10-CM

## 2020-10-27 DIAGNOSIS — H10.9 BACTERIAL CONJUNCTIVITIS: ICD-10-CM

## 2020-10-27 PROCEDURE — 99214 OFFICE O/P EST MOD 30 MIN: CPT | Performed by: INTERNAL MEDICINE

## 2020-10-27 RX ORDER — AZELASTINE 1 MG/ML
SPRAY, METERED NASAL
COMMUNITY
Start: 2020-10-24 | End: 2020-12-30

## 2020-10-27 RX ORDER — ERYTHROMYCIN 5 MG/G
1 OINTMENT OPHTHALMIC 4 TIMES DAILY
Qty: 3.5 G | Refills: 0 | Status: SHIPPED | OUTPATIENT
Start: 2020-10-27 | End: 2023-10-17

## 2020-10-27 RX ORDER — AMOXICILLIN AND CLAVULANATE POTASSIUM 875; 125 MG/1; MG/1
1 TABLET, FILM COATED ORAL 2 TIMES DAILY
Qty: 14 TAB | Refills: 0 | Status: SHIPPED | OUTPATIENT
Start: 2020-10-27 | End: 2020-12-30

## 2020-10-27 RX ORDER — FLUTICASONE PROPIONATE 50 MCG
SPRAY, SUSPENSION (ML) NASAL
COMMUNITY
Start: 2020-10-24 | End: 2020-12-30

## 2020-10-27 ASSESSMENT — FIBROSIS 4 INDEX: FIB4 SCORE: 1.86

## 2020-10-27 NOTE — ASSESSMENT & PLAN NOTE
New problem, history of infections. He is at increased risk due to history of diabetes. Will have Augmentin prescription available for him to  in case his allergy symptoms would progress into overt infection. He will monitor for thick/color changes to his phlegm, fevers, or productive cough. Fortunately, lung exam is clear today. He will continue with antihistamines and nasal drops in the mean time for symptomatic relief. He will message me if he ends up needing to use the antibiotic.

## 2020-10-27 NOTE — ASSESSMENT & PLAN NOTE
New and decompensated problem. He has a sulfa allergy. Will prescribe erythromycin ointment QID, if he notes rapid improvement in the next 2 days then he can decrease to BID administration. Asked him to update me over mychart in 1-2 days.

## 2020-10-27 NOTE — PROGRESS NOTES
Subjective:   Chief Complaint/History of Present Illness:  Ashish Wiley is a 70 y.o. male established patient who presents today to discuss medical problems as listed below. He is unaccompanied for today's visit.    Problem   Bacterial Conjunctivitis    Last Thursday he developed sudden onset of itching, irritation, and discharge from his bilateral eyes.  He thinks it started in the left eye and quickly moved over to the right eye.  He has large amount of purulent drainage which is making it difficult to see clearly and sleep comfortably.  He had a similar presentation when his grandson was infected 2-3 yeras ago that cleared up with antibiotic drops. No photosensitivity. No vision changes or overt eye pain. He has tried allergy eye drops without relief.     History of maxillary sinusitis    He has had approximately 4-5 months of nuisance allergy symptoms including clear rhinorrhea with post nasal drip. More recently he developed a dry cough. He has only mild pressure of the sinuses. He has been alternating azelastine and flonase with some improvement. He is diabetic and has history of sinus infection and pneumonia. No current fevers, chills, or other features of systemic infection at this time.          Additional History:   Allergies:    Sulfa drugs     Current Medications:     Current Outpatient Medications   Medication Sig Dispense Refill   • erythromycin 5 MG/GM Ointment Place 1 Application in both eyes 4 times a day. If improved after 2 days can reduce to twice daily administration 3.5 g 0   • amoxicillin-clavulanate (AUGMENTIN) 875-125 MG Tab Take 1 Tab by mouth 2 times a day. Use if sinus symptoms progress into active infection 14 Tab 0   • insulin lispro (HUMALOG KWIKPEN) 100 UNIT/ML Solution Pen-injector injection PEN Inject 4 Units as instructed 3 times a day before meals.     • ergocalciferol (DRISDOL) 09435 UNIT capsule Take 1 Cap by mouth every 7 days. 12 Cap 3   • Continuous Blood Gluc Sensor  "([employer] WAQAS SENSOR SYSTEM) Mis 1 Applicator by Does not apply route every 14 days. Please give 14 day sensor and reader 6 Each 3   • lisinopril (PRINIVIL) 20 MG Tab Take 1 Tab by mouth every day. 100 Tab 3   • Empagliflozin (JARDIANCE) 25 MG Tab Take 25 mg by mouth every day. 100 Tab 3   • Semaglutide, 1 MG/DOSE, (OZEMPIC, 1 MG/DOSE,) 2 MG/1.5ML Solution Pen-injector Inject 1 mg as instructed every 7 days. 6 PEN 2   • carvedilol (COREG) 25 MG Tab Take 1 Tab by mouth 2 times a day, with meals. 200 Tab 3   • doxazosin (CARDURA) 2 MG Tab Take 1 Tab by mouth every day. 100 Tab 3   • insulin glargine (LANTUS SOLOSTAR) 100 UNIT/ML Solution Pen-injector injection Inject 50 Units as instructed every evening. (Patient taking differently: Inject 27 Units as instructed every evening.) 45 mL 3   • atorvastatin (LIPITOR) 40 MG Tab Take 1 Tab by mouth every day. 100 Tab 3   • Multiple Vitamins-Minerals (MULTIVITAMIN PO) Take  by mouth.     • aspirin EC (ECOTRIN) 81 MG Tablet Delayed Response Take 81 mg by mouth every day.       No current facility-administered medications for this visit.         Social History:     Social History     Tobacco Use   • Smoking status: Former Smoker     Packs/day: 1.00     Years: 22.00     Pack years: 22.00     Quit date: 1989     Years since quittin.8   • Smokeless tobacco: Never Used   • Tobacco comment: stop cigar in 2015   Substance Use Topics   • Alcohol use: Yes     Alcohol/week: 0.6 oz     Types: 1 Glasses of wine per week     Frequency: 2-4 times a month     Drinks per session: 1 or 2     Binge frequency: Never     Comment: OCCASIONALLY   • Drug use: No       ROS: As above in the HPI      Objective:   Physical Exam:    Vitals: /60 (BP Location: Left arm, Patient Position: Sitting, BP Cuff Size: Adult)   Pulse 78   Temp 36.4 °C (97.5 °F) (Temporal)   Ht 1.727 m (5' 8\")   Wt 88.9 kg (195 lb 15.8 oz)   SpO2 97%    BMI: Body mass index is 29.8 " kg/m².   General/Constitutional: Vitals as above, Well nourished, well developed male in no acute distress   Head/Eyes: Head is grossly normal & atraumatic, bilateral conjunctivae injected with purulent discharge, surrounding eyelids with mild edema/irritation, bilateral EOMI, bilateral PERRLA, no photosensitivity   ENT: Bilateral external ears grossly normal in appearance, Hearing grossly intact, External nares normal in appearance and without discharge/bleeding, scant bilateral cerumen present in external ear canals   Respiratory: No respiratory distress, bilateral lungs are clear to ausculation in all lung fields, no wheezing/rhonchi/rales   Cardiovascular: Regular rate and rhythm without murmur/rubs, distal pulses are intact and equal bilaterally (radial), no bilateral lower extremity edema   MSK: Gait grossly normal & not antalgic   Integumentary: No apparent rashes on skin exposed areas   Psych: Judgment grossly appropriate, no apparent depression/anxiety    Health Maintenance:     - Completed      Assessment and Plan:     Problem List Items Addressed This Visit     Bacterial conjunctivitis     New and decompensated problem. He has a sulfa allergy. Will prescribe erythromycin ointment QID, if he notes rapid improvement in the next 2 days then he can decrease to BID administration. Asked him to update me over mychart in 1-2 days.         Relevant Medications    erythromycin 5 MG/GM Ointment    History of maxillary sinusitis     New problem, history of infections. He is at increased risk due to history of diabetes. Will have Augmentin prescription available for him to  in case his allergy symptoms would progress into overt infection. He will monitor for thick/color changes to his phlegm, fevers, or productive cough. Fortunately, lung exam is clear today. He will continue with antihistamines and nasal drops in the mean time for symptomatic relief. He will message me if he ends up needing to use the  antibiotic.         Relevant Medications    amoxicillin-clavulanate (AUGMENTIN) 875-125 MG Tab             RTC: as needed.    PLEASE NOTE: This dictation was created using voice recognition software. I have made every reasonable attempt to correct obvious errors, but I expect that there are errors of grammar and possibly content that I did not discover before finalizing the note.

## 2020-12-10 ENCOUNTER — PATIENT MESSAGE (OUTPATIENT)
Dept: ENDOCRINOLOGY | Facility: MEDICAL CENTER | Age: 70
End: 2020-12-10

## 2020-12-10 DIAGNOSIS — E11.65 TYPE 2 DIABETES MELLITUS WITH HYPERGLYCEMIA, WITH LONG-TERM CURRENT USE OF INSULIN (HCC): ICD-10-CM

## 2020-12-10 DIAGNOSIS — Z79.4 TYPE 2 DIABETES MELLITUS WITH HYPERGLYCEMIA, WITH LONG-TERM CURRENT USE OF INSULIN (HCC): ICD-10-CM

## 2020-12-10 RX ORDER — SEMAGLUTIDE 1.34 MG/ML
1 INJECTION, SOLUTION SUBCUTANEOUS
Qty: 6 EACH | Refills: 2 | Status: SHIPPED | OUTPATIENT
Start: 2020-12-10 | End: 2021-03-10

## 2020-12-28 ENCOUNTER — TELEPHONE (OUTPATIENT)
Dept: ENDOCRINOLOGY | Facility: MEDICAL CENTER | Age: 70
End: 2020-12-28

## 2020-12-28 DIAGNOSIS — I10 ESSENTIAL HYPERTENSION: ICD-10-CM

## 2020-12-28 DIAGNOSIS — E11.65 TYPE 2 DIABETES MELLITUS WITH HYPERGLYCEMIA, WITH LONG-TERM CURRENT USE OF INSULIN (HCC): ICD-10-CM

## 2020-12-28 DIAGNOSIS — Z79.4 LONG-TERM INSULIN USE (HCC): ICD-10-CM

## 2020-12-28 DIAGNOSIS — E78.5 DYSLIPIDEMIA: ICD-10-CM

## 2020-12-28 DIAGNOSIS — Z79.4 TYPE 2 DIABETES MELLITUS WITH HYPERGLYCEMIA, WITH LONG-TERM CURRENT USE OF INSULIN (HCC): ICD-10-CM

## 2020-12-28 NOTE — TELEPHONE ENCOUNTER
Patient has f/v labs done back in Sept. He has a f/v appt scheduled 1/4/21. LVM informing if additional labs are needed we will notify him.

## 2020-12-30 ENCOUNTER — OFFICE VISIT (OUTPATIENT)
Dept: MEDICAL GROUP | Facility: LAB | Age: 70
End: 2020-12-30
Payer: COMMERCIAL

## 2020-12-30 VITALS
HEIGHT: 68 IN | DIASTOLIC BLOOD PRESSURE: 80 MMHG | SYSTOLIC BLOOD PRESSURE: 124 MMHG | BODY MASS INDEX: 28.79 KG/M2 | WEIGHT: 190 LBS | TEMPERATURE: 96.7 F | RESPIRATION RATE: 16 BRPM | OXYGEN SATURATION: 96 % | HEART RATE: 82 BPM

## 2020-12-30 DIAGNOSIS — E11.65 TYPE 2 DIABETES MELLITUS WITH HYPERGLYCEMIA, WITH LONG-TERM CURRENT USE OF INSULIN (HCC): ICD-10-CM

## 2020-12-30 DIAGNOSIS — Z76.89 ENCOUNTER TO ESTABLISH CARE: ICD-10-CM

## 2020-12-30 DIAGNOSIS — Z79.4 TYPE 2 DIABETES MELLITUS WITH HYPERGLYCEMIA, WITH LONG-TERM CURRENT USE OF INSULIN (HCC): ICD-10-CM

## 2020-12-30 DIAGNOSIS — N18.32 STAGE 3B CHRONIC KIDNEY DISEASE: ICD-10-CM

## 2020-12-30 DIAGNOSIS — Z23 NEED FOR VACCINATION: ICD-10-CM

## 2020-12-30 DIAGNOSIS — I10 ESSENTIAL HYPERTENSION: ICD-10-CM

## 2020-12-30 PROCEDURE — 90471 IMMUNIZATION ADMIN: CPT | Performed by: FAMILY MEDICINE

## 2020-12-30 PROCEDURE — 90750 HZV VACC RECOMBINANT IM: CPT | Performed by: FAMILY MEDICINE

## 2020-12-30 PROCEDURE — 99214 OFFICE O/P EST MOD 30 MIN: CPT | Mod: 25 | Performed by: FAMILY MEDICINE

## 2020-12-30 ASSESSMENT — ENCOUNTER SYMPTOMS
SHORTNESS OF BREATH: 0
NAUSEA: 0
PALPITATIONS: 0
VOMITING: 0
CHILLS: 0
BLURRED VISION: 0
FEVER: 0
DIARRHEA: 0
HEADACHES: 0
ABDOMINAL PAIN: 0
DEPRESSION: 0
CONSTIPATION: 0
NERVOUS/ANXIOUS: 0
DOUBLE VISION: 0
DIZZINESS: 0
WHEEZING: 0

## 2020-12-30 ASSESSMENT — FIBROSIS 4 INDEX: FIB4 SCORE: 1.86

## 2020-12-30 NOTE — PROGRESS NOTES
Subjective:   Ashish Wiley is a 70 y.o. male here today for   Chief Complaint   Patient presents with   • New Patient   • Paperwork     DMV       #Establish care  -Reviewed all past medical history, family history, social history.  -Reviewed all screening/vaccinations: Due for zoster vaccine.  All other screenings and labs up-to-date.  -Diet and Exercise: [employer] for age.  Walking 5 miles a day.  Working on diet as he is type II diabetic.  -Tobacco, alcohol, recreational drug use: Former tobacco user, denies any vaping.  Occasional alcohol use.  No recreational or illicit drug use.  -Sexually active: Yes, monogamous with partner, no concerns regarding relationship.  -Occupation: Tired.    #Type 2 diabetes:  -Is a chronic condition that is new to me.  Diagnosed several years ago.  He is being followed by endocrinology on a quarterly basis.  Currently treating with Ozempic, Jardiance, Lantus.  Complete with all medications, working on keeping his A1c around 7.  Denies any side effects from medication.  Diabetic health maintenance  Up-to-date on protein creatinine ratio.  Is currently on lisinopril for microalbuminemia.  -Currently on statin.  -Follow-up with ophthalmologist every year.  -Diabetic foot exam up-to-date.    #Stage III chronic kidney disease:  -Chronic condition, new to me.  Is being followed by Dr. Murphy woo.  Is working on proper hydration, avoidance of NSAIDs.  Working on keeping A1c under control.  Is asymptomatic at this time and getting labs checked on a quarterly basis.  No other questions or concerns at this time.    #Hypertension:  -Is a chronic condition, new to me.  Has been followed by cardiologist who is also watching patient who has a history of valvular disease which is caused some CHF, status post TAVR.  Currently on doxazosin, lisinopril, carvedilol.  Is compliant with medications.  Not checking blood pressures regularly.  Denies any side effects with lightheadedness, dizziness,  increased fatigue, syncope presyncopal episodes.  No refills of medication needed at this time, no questions or concerns at this time.    Allergies   Allergen Reactions   • Sulfa Drugs Unspecified     Kidney Stones         Current medicines (including changes today)  Current Outpatient Medications   Medication Sig Dispense Refill   • Semaglutide, 1 MG/DOSE, (OZEMPIC, 1 MG/DOSE,) 2 MG/1.5ML Solution Pen-injector Inject 1 mg under the skin every 7 days for 90 days. 6 Each 2   • erythromycin 5 MG/GM Ointment Place 1 Application in both eyes 4 times a day. If improved after 2 days can reduce to twice daily administration 3.5 g 0   • insulin lispro (HUMALOG KWIKPEN) 100 UNIT/ML Solution Pen-injector injection PEN Inject 4 Units as instructed 3 times a day before meals.     • ergocalciferol (DRISDOL) 50295 UNIT capsule Take 1 Cap by mouth every 7 days. 12 Cap 3   • Continuous Blood Gluc Sensor ([employer] WAQAS SENSOR SYSTEM) Misc 1 Applicator by Does not apply route every 14 days. Please give 14 day sensor and reader 6 Each 3   • lisinopril (PRINIVIL) 20 MG Tab Take 1 Tab by mouth every day. 100 Tab 3   • Empagliflozin (JARDIANCE) 25 MG Tab Take 25 mg by mouth every day. 100 Tab 3   • carvedilol (COREG) 25 MG Tab Take 1 Tab by mouth 2 times a day, with meals. 200 Tab 3   • doxazosin (CARDURA) 2 MG Tab Take 1 Tab by mouth every day. 100 Tab 3   • insulin glargine (LANTUS SOLOSTAR) 100 UNIT/ML Solution Pen-injector injection Inject 50 Units as instructed every evening. (Patient taking differently: Inject 27 Units as instructed every evening.) 45 mL 3   • atorvastatin (LIPITOR) 40 MG Tab Take 1 Tab by mouth every day. 100 Tab 3   • Multiple Vitamins-Minerals (MULTIVITAMIN PO) Take  by mouth.     • aspirin EC (ECOTRIN) 81 MG Tablet Delayed Response Take 81 mg by mouth every day.       No current facility-administered medications for this visit.      He  has a past medical history of ACC/AHA stage C systolic heart failure  "([facility]) (6/21/2018), Acute exacerbation of CHF (congestive heart failure) ([facility]) (6/25/2018), CAD (coronary artery disease), CHF (congestive heart failure) ([facility]), Chronic systolic congestive heart failure, NYHA class 1 ([facility]) (7/23/2018), Diabetes ([facility]), Dilated cardiomyopathy ([facility]), Hyperlipidemia, Hypertension, NYHA class 3 acute on chronic systolic heart failure ([facility]) (6/25/2018), Olecranon bursitis of right elbow (6/17/2019), and Severe aortic stenosis (6/25/2018). He also has no past medical history of Encounter for long-term (current) use of other medications.    ROS   Review of Systems   Constitutional: Negative for chills and fever.   HENT: Negative for hearing loss.    Eyes: Negative for blurred vision.   Respiratory: Negative for shortness of breath and wheezing.    Cardiovascular: Negative for chest pain and palpitations.   Gastrointestinal: Negative for abdominal pain, diarrhea, nausea and vomiting.   Neurological: Negative for dizziness and headaches.   Psychiatric/Behavioral: Negative for depression. The patient is not nervous/anxious.         Objective:     Physical Exam:  /80 (BP Location: Right arm, Patient Position: Sitting, BP Cuff Size: Adult)   Pulse 82   Temp 35.9 °C (96.7 °F) (Temporal)   Resp 16   Ht 1.727 m (5' 8\")   Wt 86.2 kg (190 lb)   SpO2 96%  Body mass index is 28.89 kg/m².   Constitutional: Alert, no distress.  Skin: Warm, dry, good turgor, no rashes in visible areas.  Eye: Equal, round and reactive, conjunctiva clear, lids normal.  ENMT: TM's clear bilaterally, lips without lesions, good dentition, oropharynx clear.  Neck: Trachea midline, no masses, no thyromegaly. No cervical or supraclavicular lymphadenopathy.  Respiratory: Unlabored respiratory effort, lungs clear to auscultation, no wheezes, no rhonchi.  Cardiovascular: Normal S1, S2, no murmur, no edema.  Abdomen: Soft, non-tender, no masses, no hepatosplenomegaly.  Psych: Alert and oriented x3, normal affect and " mood.    Assessment and Plan:     1. Encounter to establish care  -All questions concerns were answered at this time.  -Vaccinations/screenings needed at this time: Shingles  -Labs reviewed, no concerns at this time, will continue quarterly checks of labs.  -Discussed the importance of a healthy, Mediterranean style diet, routine exercise regimen.  -Discussed general safety measures including seatbelts, helmets, avoidance of smoking, sunscreen, hydration.  -Follow-up for general physical exam on a yearly basis, sooner if needed.    2. Stage 3b chronic kidney disease  -Status: Stable.  GFR is stable at this time.  Patient will follow up with nephrology on a yearly basis, sooner if needed.    3. Type 2 diabetes mellitus with hyperglycemia, with long-term current use of insulin (HCC)  -Most recent hemoglobin A1c completed in September at 7.4%.  Patient will continue with all medications as listed above.  He is up-to-date on all health maintenance for diabetes.  -Discussed the importance of a low-carb, low sugar diet and daily exercise.  Patient will continue to follow-up with endocrinology on a quarterly basis.    4. Essential hypertension  -Status: Stable.  Blood pressure is at goal, will continue with all medications.  Discussed DASH diet, daily activity.  Patient will follow up in a quarterly basis.    5. Need for vaccination  -Patient was agreeable to receiving the Shingrix vaccine in clinic today after discussion of potential risks, benefits, and side effects. Vaccine was administered without adverse effects.  - Shingles Vaccine (SHINGRIX)      Followup: Return in about 3 months (around 3/30/2021).         PLEASE NOTE: This dictation was created using voice recognition software. I have made every reasonable attempt to correct obvious errors, but I expect that there are errors of grammar and possibly content that I did not discover before finalizing the note.

## 2020-12-30 NOTE — PROGRESS NOTES
Subjective:   Ashish Wiley is a 70 y.o. male here today for   Chief Complaint   Patient presents with   • New Patient   • Paperwork     DMV       [place]       ***    Allergies   Allergen Reactions   • Sulfa Drugs Unspecified     Kidney Stones         Current medicines (including changes today)  Current Outpatient Medications   Medication Sig Dispense Refill   • Semaglutide, 1 MG/DOSE, (OZEMPIC, 1 MG/DOSE,) 2 MG/1.5ML Solution Pen-injector Inject 1 mg under the skin every 7 days for 90 days. 6 Each 2   • erythromycin 5 MG/GM Ointment Place 1 Application in both eyes 4 times a day. If improved after 2 days can reduce to twice daily administration 3.5 g 0   • insulin lispro (HUMALOG KWIKPEN) 100 UNIT/ML Solution Pen-injector injection PEN Inject 4 Units as instructed 3 times a day before meals.     • ergocalciferol (DRISDOL) 61092 UNIT capsule Take 1 Cap by mouth every 7 days. 12 Cap 3   • Continuous Blood Gluc Sensor ([employer] WAQAS SENSOR SYSTEM) Misc 1 Applicator by Does not apply route every 14 days. Please give 14 day sensor and reader 6 Each 3   • lisinopril (PRINIVIL) 20 MG Tab Take 1 Tab by mouth every day. 100 Tab 3   • Empagliflozin (JARDIANCE) 25 MG Tab Take 25 mg by mouth every day. 100 Tab 3   • carvedilol (COREG) 25 MG Tab Take 1 Tab by mouth 2 times a day, with meals. 200 Tab 3   • doxazosin (CARDURA) 2 MG Tab Take 1 Tab by mouth every day. 100 Tab 3   • insulin glargine (LANTUS SOLOSTAR) 100 UNIT/ML Solution Pen-injector injection Inject 50 Units as instructed every evening. (Patient taking differently: Inject 27 Units as instructed every evening.) 45 mL 3   • atorvastatin (LIPITOR) 40 MG Tab Take 1 Tab by mouth every day. 100 Tab 3   • Multiple Vitamins-Minerals (MULTIVITAMIN PO) Take  by mouth.     • aspirin EC (ECOTRIN) 81 MG Tablet Delayed Response Take 81 mg by mouth every day.       No current facility-administered medications for this visit.      He  has a past medical history of ACC/AHA  "stage C systolic heart failure ([facility]) (6/21/2018), Acute exacerbation of CHF (congestive heart failure) ([facility]) (6/25/2018), CAD (coronary artery disease), CHF (congestive heart failure) ([facility]), Chronic systolic congestive heart failure, NYHA class 1 ([facility]) (7/23/2018), Diabetes ([facility]), Dilated cardiomyopathy ([facility]), Hyperlipidemia, Hypertension, NYHA class 3 acute on chronic systolic heart failure ([facility]) (6/25/2018), Olecranon bursitis of right elbow (6/17/2019), and Severe aortic stenosis (6/25/2018). He also has no past medical history of Encounter for long-term (current) use of other medications.    ROS   Review of Systems   Constitutional: Negative for chills and fever.   HENT: Negative for hearing loss.    Eyes: Negative for blurred vision and double vision.   Respiratory: Negative for shortness of breath and wheezing.    Cardiovascular: Negative for chest pain and palpitations.   Gastrointestinal: Negative for abdominal pain, constipation, diarrhea, nausea and vomiting.   Neurological: Negative for dizziness and headaches.   Psychiatric/Behavioral: Negative for depression. The patient is not nervous/anxious.    All other systems reviewed and are negative.         Objective:     Physical Exam:  /80 (BP Location: Right arm, Patient Position: Sitting, BP Cuff Size: Adult)   Pulse 82   Temp 35.9 °C (96.7 °F) (Temporal)   Resp 16   Ht 1.727 m (5' 8\")   Wt 86.2 kg (190 lb)   SpO2 96%  Body mass index is 28.89 kg/m². ***  Constitutional: Alert, no distress.  Skin: Warm, dry, good turgor, no rashes in visible areas.  Eye: Equal, round and reactive, conjunctiva clear, lids normal.  ENMT: TM's clear bilaterally, lips without lesions, good dentition, oropharynx clear.  Neck: Trachea midline, no masses, no thyromegaly. No cervical or supraclavicular lymphadenopathy.  Respiratory: Unlabored respiratory effort, lungs clear to auscultation, no wheezes, no rhonchi.  Cardiovascular: Normal S1, S2, no murmur, no " edema.  Abdomen: Soft, non-tender, no masses, no hepatosplenomegaly.  Psych: Alert and oriented x3, normal affect and mood.    Assessment and Plan:     There are no diagnoses linked to this encounter.    Followup: No follow-ups on file.         PLEASE NOTE: This dictation was created using voice recognition software. I have made every reasonable attempt to correct obvious errors, but I expect that there are errors of grammar and possibly content that I did not discover before finalizing the note.

## 2020-12-31 ENCOUNTER — HOSPITAL ENCOUNTER (OUTPATIENT)
Dept: LAB | Facility: MEDICAL CENTER | Age: 70
End: 2020-12-31
Attending: INTERNAL MEDICINE
Payer: COMMERCIAL

## 2020-12-31 DIAGNOSIS — Z79.4 LONG-TERM INSULIN USE (HCC): ICD-10-CM

## 2020-12-31 DIAGNOSIS — I10 ESSENTIAL HYPERTENSION: ICD-10-CM

## 2020-12-31 DIAGNOSIS — Z79.4 TYPE 2 DIABETES MELLITUS WITH HYPERGLYCEMIA, WITH LONG-TERM CURRENT USE OF INSULIN (HCC): ICD-10-CM

## 2020-12-31 DIAGNOSIS — E78.5 DYSLIPIDEMIA: ICD-10-CM

## 2020-12-31 DIAGNOSIS — E11.65 TYPE 2 DIABETES MELLITUS WITH HYPERGLYCEMIA, WITH LONG-TERM CURRENT USE OF INSULIN (HCC): ICD-10-CM

## 2020-12-31 LAB
ALBUMIN SERPL BCP-MCNC: 4 G/DL (ref 3.2–4.9)
ALBUMIN/GLOB SERPL: 1.4 G/DL
ALP SERPL-CCNC: 110 U/L (ref 30–99)
ALT SERPL-CCNC: 37 U/L (ref 2–50)
ANION GAP SERPL CALC-SCNC: 10 MMOL/L (ref 7–16)
AST SERPL-CCNC: 28 U/L (ref 12–45)
BASOPHILS # BLD AUTO: 0.4 % (ref 0–1.8)
BASOPHILS # BLD: 0.04 K/UL (ref 0–0.12)
BILIRUB SERPL-MCNC: 0.4 MG/DL (ref 0.1–1.5)
BUN SERPL-MCNC: 38 MG/DL (ref 8–22)
CALCIUM SERPL-MCNC: 9.3 MG/DL (ref 8.5–10.5)
CHLORIDE SERPL-SCNC: 106 MMOL/L (ref 96–112)
CHOLEST SERPL-MCNC: 178 MG/DL (ref 100–199)
CO2 SERPL-SCNC: 23 MMOL/L (ref 20–33)
CREAT SERPL-MCNC: 2.11 MG/DL (ref 0.5–1.4)
CREAT UR-MCNC: 94.44 MG/DL
EOSINOPHIL # BLD AUTO: 0.12 K/UL (ref 0–0.51)
EOSINOPHIL NFR BLD: 1.3 % (ref 0–6.9)
ERYTHROCYTE [DISTWIDTH] IN BLOOD BY AUTOMATED COUNT: 45.5 FL (ref 35.9–50)
EST. AVERAGE GLUCOSE BLD GHB EST-MCNC: 203 MG/DL
FASTING STATUS PATIENT QL REPORTED: NORMAL
GLOBULIN SER CALC-MCNC: 2.8 G/DL (ref 1.9–3.5)
GLUCOSE SERPL-MCNC: 165 MG/DL (ref 65–99)
HBA1C MFR BLD: 8.7 % (ref 0–5.6)
HCT VFR BLD AUTO: 41.2 % (ref 42–52)
HDLC SERPL-MCNC: 50 MG/DL
HGB BLD-MCNC: 13.4 G/DL (ref 14–18)
IMM GRANULOCYTES # BLD AUTO: 0.04 K/UL (ref 0–0.11)
IMM GRANULOCYTES NFR BLD AUTO: 0.4 % (ref 0–0.9)
LDLC SERPL CALC-MCNC: 79 MG/DL
LYMPHOCYTES # BLD AUTO: 0.81 K/UL (ref 1–4.8)
LYMPHOCYTES NFR BLD: 9 % (ref 22–41)
MCH RBC QN AUTO: 31.8 PG (ref 27–33)
MCHC RBC AUTO-ENTMCNC: 32.5 G/DL (ref 33.7–35.3)
MCV RBC AUTO: 97.6 FL (ref 81.4–97.8)
MICROALBUMIN UR-MCNC: 81.7 MG/DL
MICROALBUMIN/CREAT UR: 865 MG/G (ref 0–30)
MONOCYTES # BLD AUTO: 0.83 K/UL (ref 0–0.85)
MONOCYTES NFR BLD AUTO: 9.2 % (ref 0–13.4)
NEUTROPHILS # BLD AUTO: 7.18 K/UL (ref 1.82–7.42)
NEUTROPHILS NFR BLD: 79.7 % (ref 44–72)
NRBC # BLD AUTO: 0 K/UL
NRBC BLD-RTO: 0 /100 WBC
PLATELET # BLD AUTO: 168 K/UL (ref 164–446)
PMV BLD AUTO: 10.6 FL (ref 9–12.9)
POTASSIUM SERPL-SCNC: 4.7 MMOL/L (ref 3.6–5.5)
PROT SERPL-MCNC: 6.8 G/DL (ref 6–8.2)
RBC # BLD AUTO: 4.22 M/UL (ref 4.7–6.1)
SODIUM SERPL-SCNC: 139 MMOL/L (ref 135–145)
T4 FREE SERPL-MCNC: 1.17 NG/DL (ref 0.93–1.7)
TRIGL SERPL-MCNC: 244 MG/DL (ref 0–149)
TSH SERPL DL<=0.005 MIU/L-ACNC: 1.25 UIU/ML (ref 0.38–5.33)
WBC # BLD AUTO: 9 K/UL (ref 4.8–10.8)

## 2020-12-31 PROCEDURE — 84443 ASSAY THYROID STIM HORMONE: CPT

## 2020-12-31 PROCEDURE — 84439 ASSAY OF FREE THYROXINE: CPT

## 2020-12-31 PROCEDURE — 80053 COMPREHEN METABOLIC PANEL: CPT

## 2020-12-31 PROCEDURE — 36415 COLL VENOUS BLD VENIPUNCTURE: CPT

## 2020-12-31 PROCEDURE — 83036 HEMOGLOBIN GLYCOSYLATED A1C: CPT

## 2020-12-31 PROCEDURE — 85025 COMPLETE CBC W/AUTO DIFF WBC: CPT

## 2020-12-31 PROCEDURE — 82043 UR ALBUMIN QUANTITATIVE: CPT

## 2020-12-31 PROCEDURE — 82570 ASSAY OF URINE CREATININE: CPT

## 2020-12-31 PROCEDURE — 80061 LIPID PANEL: CPT

## 2020-12-31 PROCEDURE — 84681 ASSAY OF C-PEPTIDE: CPT

## 2021-01-03 LAB — C PEPTIDE SERPL-MCNC: 2.3 NG/ML (ref 0.8–3.5)

## 2021-01-04 ENCOUNTER — OFFICE VISIT (OUTPATIENT)
Dept: ENDOCRINOLOGY | Facility: MEDICAL CENTER | Age: 71
End: 2021-01-04
Attending: INTERNAL MEDICINE
Payer: COMMERCIAL

## 2021-01-04 VITALS
OXYGEN SATURATION: 95 % | SYSTOLIC BLOOD PRESSURE: 122 MMHG | HEART RATE: 87 BPM | WEIGHT: 188.2 LBS | BODY MASS INDEX: 28.52 KG/M2 | DIASTOLIC BLOOD PRESSURE: 72 MMHG | HEIGHT: 68 IN

## 2021-01-04 DIAGNOSIS — I10 ESSENTIAL HYPERTENSION: ICD-10-CM

## 2021-01-04 DIAGNOSIS — Z79.4 TYPE 2 DIABETES MELLITUS WITH HYPERGLYCEMIA, WITH LONG-TERM CURRENT USE OF INSULIN (HCC): ICD-10-CM

## 2021-01-04 DIAGNOSIS — Z79.4 LONG-TERM INSULIN USE (HCC): ICD-10-CM

## 2021-01-04 DIAGNOSIS — E11.65 TYPE 2 DIABETES MELLITUS WITH HYPERGLYCEMIA, WITH LONG-TERM CURRENT USE OF INSULIN (HCC): ICD-10-CM

## 2021-01-04 DIAGNOSIS — E78.5 DYSLIPIDEMIA: ICD-10-CM

## 2021-01-04 PROCEDURE — 99211 OFF/OP EST MAY X REQ PHY/QHP: CPT | Performed by: INTERNAL MEDICINE

## 2021-01-04 PROCEDURE — 99214 OFFICE O/P EST MOD 30 MIN: CPT | Performed by: INTERNAL MEDICINE

## 2021-01-04 RX ORDER — FLASH GLUCOSE SENSOR
1 KIT MISCELLANEOUS
Qty: 6 EACH | Refills: 3 | Status: SHIPPED | OUTPATIENT
Start: 2021-01-04 | End: 2021-12-29

## 2021-01-04 RX ORDER — INSULIN LISPRO 100 [IU]/ML
4 INJECTION, SOLUTION INTRAVENOUS; SUBCUTANEOUS
Qty: 15 EACH | Refills: 3 | Status: SHIPPED | OUTPATIENT
Start: 2021-01-04 | End: 2021-06-14

## 2021-01-04 ASSESSMENT — FIBROSIS 4 INDEX: FIB4 SCORE: 1.95

## 2021-01-04 NOTE — PROGRESS NOTES
CHIEF COMPLAINT: Patient is here for follow up of Type 2 Diabetes Mellitus.      HPI:     Ashish Wiley is a 69 y.o. male with Type 2 Diabetes Mellitus here for follow up.      On follow-up his A1c darrin to 8.7% on December 31, 2020  On follow-up his A1c was 7.4% on September 14, 2020  On follow-up his A1c was 7.5% on June 23, 2020  Baseline labs from December 10, 2019 showed an elevated A1c of 10% while taking Lantus Bydureon and Jardiance.        On follow-up he is taking Lantus 14 units daily, Jardiance 25 mg daily and Ozempic 1.0mg weekly.  Humalog 4 units 3 times a day (noncompliant)      He reports that he was not compliant with his diet during the Christmas holiday and also he stopped taking his mealtime insulin because he claims that he did not have a prescription and was just using samples    He also did not bring his iRezQ glucose sensor reader today so we could not download the data from his continuous ambulatory CGM      He has coronary artery disease and hyperlipidemia, taking Lipitor 40 mg daily.      LDL cholesterol was 79 on December 31, 2020    He does have chronic kidney disease stage III .    His last serum creatinine is elevated at 2.1 on December 2020      Blood pressure is controlled with lisinopril 20 mg daily    He does have diabetic kidney disease with stable proteinuria      BG Diary:  Please see glucose sensor download    Weight has been stable    Diabetes Complications   Retinopathy: Known PDR retinopathy.  Last eye exam: January 2019 at [facility] by Dr. Hunter  He reports that he has an eye exam appointment with Dr. Lizarraga  Neuropathy: Denies paresthesias or numbness in hands or feet. Denies any foot wounds.  Exercise: Minimal.  Diet: Fair.  Patient's medications, allergies, and social histories were reviewed and updated as appropriate.    ROS:     CONS:     No fever, no chills   EYES:     No diplopia, no blurry vision   CV:           No chest pain, no palpitations    PULM:     No SOB, no cough, no hemoptysis.   GI:            No nausea, no vomiting, no diarrhea, no constipation   ENDO:     No polyuria, no polydipsia, no heat intolerance, no cold intolerance       Past Medical History:  Problem List:  2020-10: Bacterial conjunctivitis  2020-10: History of maxillary sinusitis  2019-12: Dyslipidemia  2019-06: Olecranon bursitis of right elbow  2018-08: CHF due to valvular disease ([facility])  2018-07: Chronic systolic congestive heart failure, NYHA class 1 ([facility])  2018-07: S/P TAVR (transcatheter aortic valve replacement)  2018-06: Stented coronary artery  2018-06: Acute on chronic systolic (congestive) heart failure ([facility])  2018-06: NYHA class 3 acute on chronic systolic heart failure ([facility])  2018-06: Severe aortic stenosis  2018-06: Coronary artery disease due to calcified coronary lesion:   Status post atherectomy and stenting of the LAD in June 2018.    Nonobstructive disease in the RCA and circumflex.  2018-06: Aortic stenosis  2018-06: ACC/AHA stage C systolic heart failure ([facility])  2018-06: Dilated cardiomyopathy ([facility])  2018-06: Acute systolic heart failure ([facility])  2018-05: Orthopnea  2018-05: Bilateral leg edema  2018-05: Shortness of breath on exertion  2017-10: Vitamin D deficiency  2017-10: Stage 3 chronic kidney disease ([facility])  2017-09: Anemia in stage 3 chronic kidney disease ([facility])  2017-09: CKD (chronic kidney disease) stage 3, GFR 30-59 ml/min  2016-10: Macroalbuminuric diabetic nephropathy ([facility])  2016-06: Type 2 diabetes mellitus with microalbuminuria, with long-  term current use of insulin ([facility])  2016-06: Type 2 diabetes mellitus with hyperglycemia, with long-term   current use of insulin ([facility])  2016-06: Essential hypertension  2016-06: Diabetic peripheral neuropathy associated with type 2   diabetes mellitus ([facility])      Past Surgical History:  Past Surgical History:   Procedure Laterality Date   • TRANSCATHETER AORTIC VALVE REPLACEMENT  7/2/2018    Procedure: TRANSCATHETER  "AORTIC VALVE REPLACEMENT;  Surgeon: Markus Fuller M.D.;  Location: SURGERY [facility];  Service: Cardiac   • DAYANNA  2018    Procedure: DAYANNA;  Surgeon: Markus Fuller M.D.;  Location: SURGERY [facility];  Service: Cardiac   • AORTIC VALVE REPLACEMENT      S/P TAVR    • TONSILLECTOMY     • ZZZ CARDIAC CATH          Allergies:  Sulfa drugs     Social History:  Social History     Tobacco Use   • Smoking status: Former Smoker     Packs/day: 1.00     Years: 22.00     Pack years: 22.00     Quit date: 1989     Years since quittin.0   • Smokeless tobacco: Never Used   • Tobacco comment: stop cigar in    Substance Use Topics   • Alcohol use: Yes     Alcohol/week: 0.6 oz     Types: 1 Glasses of wine per week     Frequency: 2-4 times a month     Drinks per session: 1 or 2     Binge frequency: Never     Comment: OCCASIONALLY   • Drug use: No        Family History:   family history includes Cancer in his maternal grandmother; Heart Disease in his father; Heart Failure in his father; Hyperlipidemia in his father; Hypertension in his father; Lung Disease in his mother; Other in his brother and paternal grandmother; Prostate cancer in his brother; Stroke in his maternal grandfather and paternal grandfather.      PHYSICAL EXAM:   OBJECTIVE:  Vital signs: /72 (BP Location: Left arm, Patient Position: Sitting, BP Cuff Size: Adult)   Pulse 87   Ht 1.727 m (5' 8\")   Wt 85.4 kg (188 lb 3.2 oz)   SpO2 95%   BMI 28.62 kg/m²   GENERAL: Well-developed, well-nourished in no apparent distress.   EYE:  No ocular asymmetry, PERRLA  HENT: Pink, moist mucous membranes.    NECK: No thyromegaly.   CARDIOVASCULAR: Normal precordial impulse seen  LUNGS: Symmetrical chest expansion  ABDOMEN: Soft, no masses seen  EXTREMITIES: No clubbing, cyanosis, or edema.   NEUROLOGICAL: No gross focal motor abnormalities   LYMPH: No cervical adenopathy seen   SKIN: No rashes, lesions.   Monofilament testing with a 10 gram force: " sensation: decreased bilaterally  Visual Inspection: Feet without maceration, ulcers, or fissures.  Pedal pulses: intact bilaterally      Labs:  Lab Results   Component Value Date/Time    HBA1C 8.7 (H) 12/31/2020 06:56 AM        Lab Results   Component Value Date/Time    WBC 9.0 12/31/2020 06:56 AM    RBC 4.22 (L) 12/31/2020 06:56 AM    HEMOGLOBIN 13.4 (L) 12/31/2020 06:56 AM    MCV 97.6 12/31/2020 06:56 AM    MCH 31.8 12/31/2020 06:56 AM    MCHC 32.5 (L) 12/31/2020 06:56 AM    RDW 45.5 12/31/2020 06:56 AM    MPV 10.6 12/31/2020 06:56 AM       Lab Results   Component Value Date/Time    SODIUM 139 12/31/2020 06:56 AM    POTASSIUM 4.7 12/31/2020 06:56 AM    CHLORIDE 106 12/31/2020 06:56 AM    CO2 23 12/31/2020 06:56 AM    ANION 10.0 12/31/2020 06:56 AM    GLUCOSE 165 (H) 12/31/2020 06:56 AM    BUN 38 (H) 12/31/2020 06:56 AM    CREATININE 2.11 (H) 12/31/2020 06:56 AM    CALCIUM 9.3 12/31/2020 06:56 AM    ASTSGOT 28 12/31/2020 06:56 AM    ALTSGPT 37 12/31/2020 06:56 AM    TBILIRUBIN 0.4 12/31/2020 06:56 AM    ALBUMIN 4.0 12/31/2020 06:56 AM    TOTPROTEIN 6.8 12/31/2020 06:56 AM    GLOBULIN 2.8 12/31/2020 06:56 AM    AGRATIO 1.4 12/31/2020 06:56 AM       Lab Results   Component Value Date/Time    CHOLSTRLTOT 159 12/10/2019 0632    TRIGLYCERIDE 237 (H) 12/10/2019 0632    HDL 47 12/10/2019 0632    LDL 65 12/10/2019 0632       Lab Results   Component Value Date/Time    MALBCRT 865 (H) 12/31/2020 06:56 AM    MICROALBUR 81.7 12/31/2020 06:56 AM        No results found for: TSHULTRASEN  No results found for: FREEDIR  No results found for: FREET3  No results found for: THYSTIMIG        ASSESSMENT/PLAN:     1. Type 2 diabetes mellitus with hyperglycemia, with long-term current use of insulin (HCC)  Uncontrolled at baseline   A1c darrin to 8.7% because of not taking mealtime insulin again despite repeated request to do so  Continue Lantus  14 units daily  Continue Jardiance and Ozempic  Restart Humalog 4 units with breakfast, with  lunch and with supper especially if he is eating more carbs  Recommend diet and exercise and lifestyle changes  Recommend watching carb intake  We will plan for follow-up in 4 months with labs    2. Dyslipidemia  Well-controlled  LDL cholesterol is at goal  He has mild hypertriglyceridemia  Continue atorvastatin follow low-fat diet  We will repeat fasting lipids after 12 months    3. Essential hypertension  Well-controlled      4. Long-term insulin use (HCC)  Patient is on long-term basal insulin therapy with a GLP-1 analog and other medications for type 2 diabetes management      No follow-ups on file.      Thank you kindly for allowing me to participate in the diabetes care plan for this patient.    Timothy Botello MD, FACE, ECNU  01/10/20    CC:   Almaz Pond D.O.

## 2021-01-15 DIAGNOSIS — Z79.4 TYPE 2 DIABETES MELLITUS WITH HYPERGLYCEMIA, WITH LONG-TERM CURRENT USE OF INSULIN (HCC): ICD-10-CM

## 2021-01-15 DIAGNOSIS — Z23 NEED FOR VACCINATION: ICD-10-CM

## 2021-01-15 DIAGNOSIS — E11.65 TYPE 2 DIABETES MELLITUS WITH HYPERGLYCEMIA, WITH LONG-TERM CURRENT USE OF INSULIN (HCC): ICD-10-CM

## 2021-01-15 RX ORDER — ATORVASTATIN CALCIUM 40 MG/1
40 TABLET, FILM COATED ORAL DAILY
Qty: 100 TAB | Refills: 3 | Status: SHIPPED | OUTPATIENT
Start: 2021-01-15 | End: 2021-06-02 | Stop reason: SDUPTHER

## 2021-01-26 ENCOUNTER — IMMUNIZATION (OUTPATIENT)
Dept: FAMILY PLANNING/WOMEN'S HEALTH CLINIC | Facility: IMMUNIZATION CENTER | Age: 71
End: 2021-01-26
Payer: COMMERCIAL

## 2021-01-26 ENCOUNTER — APPOINTMENT (OUTPATIENT)
Dept: FAMILY PLANNING/WOMEN'S HEALTH CLINIC | Facility: IMMUNIZATION CENTER | Age: 71
End: 2021-01-26
Attending: INTERNAL MEDICINE
Payer: COMMERCIAL

## 2021-01-26 DIAGNOSIS — Z23 ENCOUNTER FOR VACCINATION: Primary | ICD-10-CM

## 2021-01-26 DIAGNOSIS — Z23 NEED FOR VACCINATION: ICD-10-CM

## 2021-01-26 PROCEDURE — 0001A PFIZER SARS-COV-2 VACCINE: CPT | Performed by: INTERNAL MEDICINE

## 2021-01-26 PROCEDURE — 91300 PFIZER SARS-COV-2 VACCINE: CPT | Performed by: INTERNAL MEDICINE

## 2021-02-18 RX ORDER — DOXAZOSIN 2 MG/1
2 TABLET ORAL DAILY
Qty: 100 TABLET | Refills: 3 | Status: SHIPPED | OUTPATIENT
Start: 2021-02-18 | End: 2021-06-02 | Stop reason: SDUPTHER

## 2021-02-19 ENCOUNTER — IMMUNIZATION (OUTPATIENT)
Dept: FAMILY PLANNING/WOMEN'S HEALTH CLINIC | Facility: IMMUNIZATION CENTER | Age: 71
End: 2021-02-19
Attending: INTERNAL MEDICINE
Payer: COMMERCIAL

## 2021-02-19 DIAGNOSIS — Z23 ENCOUNTER FOR VACCINATION: Primary | ICD-10-CM

## 2021-02-19 PROCEDURE — 0002A PFIZER SARS-COV-2 VACCINE: CPT

## 2021-02-19 PROCEDURE — 91300 PFIZER SARS-COV-2 VACCINE: CPT

## 2021-03-09 RX ORDER — CARVEDILOL 25 MG/1
25 TABLET ORAL 2 TIMES DAILY WITH MEALS
Qty: 200 TABLET | Refills: 3 | Status: CANCELLED | OUTPATIENT
Start: 2021-03-09

## 2021-03-09 NOTE — TELEPHONE ENCOUNTER
----- Message from Asihsh Wiley sent at 3/9/2021 12:27 PM PST -----  Regarding: Prescription Question  Contact: [phone]  I need to renew my Carvedilol 25 MG, prescription issued by [facility].  Please approve renewal with [facility].

## 2021-03-10 RX ORDER — CARVEDILOL 25 MG/1
25 TABLET ORAL 2 TIMES DAILY WITH MEALS
Qty: 200 TABLET | Refills: 3 | Status: SHIPPED | OUTPATIENT
Start: 2021-03-10 | End: 2021-06-02 | Stop reason: SDUPTHER

## 2021-03-24 DIAGNOSIS — E11.65 TYPE 2 DIABETES MELLITUS WITH HYPERGLYCEMIA, WITH LONG-TERM CURRENT USE OF INSULIN (HCC): ICD-10-CM

## 2021-03-24 DIAGNOSIS — Z79.4 TYPE 2 DIABETES MELLITUS WITH HYPERGLYCEMIA, WITH LONG-TERM CURRENT USE OF INSULIN (HCC): ICD-10-CM

## 2021-03-24 RX ORDER — INSULIN GLARGINE 100 [IU]/ML
50 INJECTION, SOLUTION SUBCUTANEOUS EVERY EVENING
Qty: 45 ML | Refills: 3 | Status: SHIPPED | OUTPATIENT
Start: 2021-03-24 | End: 2022-01-24 | Stop reason: SDUPTHER

## 2021-03-24 NOTE — TELEPHONE ENCOUNTER
Received request via: Pharmacy    Was the patient seen in the last year in this department? Yes    Does the patient have an active prescription (recently filled or refills available) for medication(s) requested? No       insulin glargine (LANTUS SOLOSTAR) 100 UNIT/ML Solution Pen-injector injection 45 mL 3/3 1/10/2020    Sig - Route: Inject 50 Units as instructed every evening. - Subcutaneous

## 2021-03-29 ENCOUNTER — HOSPITAL ENCOUNTER (OUTPATIENT)
Dept: LAB | Facility: MEDICAL CENTER | Age: 71
End: 2021-03-29
Attending: INTERNAL MEDICINE
Payer: COMMERCIAL

## 2021-03-29 DIAGNOSIS — Z79.4 TYPE 2 DIABETES MELLITUS WITH HYPERGLYCEMIA, WITH LONG-TERM CURRENT USE OF INSULIN (HCC): ICD-10-CM

## 2021-03-29 DIAGNOSIS — Z79.4 LONG-TERM INSULIN USE (HCC): ICD-10-CM

## 2021-03-29 DIAGNOSIS — E11.65 TYPE 2 DIABETES MELLITUS WITH HYPERGLYCEMIA, WITH LONG-TERM CURRENT USE OF INSULIN (HCC): ICD-10-CM

## 2021-03-29 DIAGNOSIS — E78.5 DYSLIPIDEMIA: ICD-10-CM

## 2021-03-29 DIAGNOSIS — I10 ESSENTIAL HYPERTENSION: ICD-10-CM

## 2021-03-29 LAB
ALBUMIN SERPL BCP-MCNC: 3.9 G/DL (ref 3.2–4.9)
ALBUMIN/GLOB SERPL: 1.5 G/DL
ALP SERPL-CCNC: 91 U/L (ref 30–99)
ALT SERPL-CCNC: 23 U/L (ref 2–50)
ANION GAP SERPL CALC-SCNC: 8 MMOL/L (ref 7–16)
AST SERPL-CCNC: 20 U/L (ref 12–45)
BILIRUB SERPL-MCNC: 0.3 MG/DL (ref 0.1–1.5)
BUN SERPL-MCNC: 35 MG/DL (ref 8–22)
CALCIUM SERPL-MCNC: 9.2 MG/DL (ref 8.5–10.5)
CHLORIDE SERPL-SCNC: 108 MMOL/L (ref 96–112)
CO2 SERPL-SCNC: 23 MMOL/L (ref 20–33)
CREAT SERPL-MCNC: 1.91 MG/DL (ref 0.5–1.4)
CREAT UR-MCNC: 81.69 MG/DL
GLOBULIN SER CALC-MCNC: 2.6 G/DL (ref 1.9–3.5)
GLUCOSE SERPL-MCNC: 126 MG/DL (ref 65–99)
MICROALBUMIN UR-MCNC: 93.7 MG/DL
MICROALBUMIN/CREAT UR: 1147 MG/G (ref 0–30)
POTASSIUM SERPL-SCNC: 4.6 MMOL/L (ref 3.6–5.5)
PROT SERPL-MCNC: 6.5 G/DL (ref 6–8.2)
SODIUM SERPL-SCNC: 139 MMOL/L (ref 135–145)

## 2021-03-29 PROCEDURE — 36415 COLL VENOUS BLD VENIPUNCTURE: CPT

## 2021-03-29 PROCEDURE — 82043 UR ALBUMIN QUANTITATIVE: CPT

## 2021-03-29 PROCEDURE — 83036 HEMOGLOBIN GLYCOSYLATED A1C: CPT

## 2021-03-29 PROCEDURE — 82570 ASSAY OF URINE CREATININE: CPT

## 2021-03-29 PROCEDURE — 80053 COMPREHEN METABOLIC PANEL: CPT

## 2021-03-30 LAB
EST. AVERAGE GLUCOSE BLD GHB EST-MCNC: 192 MG/DL
HBA1C MFR BLD: 8.3 % (ref 4–5.6)

## 2021-03-31 ENCOUNTER — OFFICE VISIT (OUTPATIENT)
Dept: MEDICAL GROUP | Facility: LAB | Age: 71
End: 2021-03-31
Payer: COMMERCIAL

## 2021-03-31 VITALS
TEMPERATURE: 96.8 F | HEART RATE: 75 BPM | BODY MASS INDEX: 29.25 KG/M2 | RESPIRATION RATE: 13 BRPM | DIASTOLIC BLOOD PRESSURE: 80 MMHG | OXYGEN SATURATION: 97 % | HEIGHT: 68 IN | SYSTOLIC BLOOD PRESSURE: 148 MMHG | WEIGHT: 193 LBS

## 2021-03-31 DIAGNOSIS — E11.29 TYPE 2 DIABETES MELLITUS WITH MICROALBUMINURIA, WITH LONG-TERM CURRENT USE OF INSULIN (HCC): ICD-10-CM

## 2021-03-31 DIAGNOSIS — Z79.4 TYPE 2 DIABETES MELLITUS WITH MICROALBUMINURIA, WITH LONG-TERM CURRENT USE OF INSULIN (HCC): ICD-10-CM

## 2021-03-31 DIAGNOSIS — I10 ESSENTIAL HYPERTENSION: ICD-10-CM

## 2021-03-31 DIAGNOSIS — N18.32 STAGE 3B CHRONIC KIDNEY DISEASE: ICD-10-CM

## 2021-03-31 DIAGNOSIS — I25.84 CORONARY ARTERY DISEASE DUE TO CALCIFIED CORONARY LESION: ICD-10-CM

## 2021-03-31 DIAGNOSIS — I25.10 CORONARY ARTERY DISEASE DUE TO CALCIFIED CORONARY LESION: ICD-10-CM

## 2021-03-31 DIAGNOSIS — R80.9 TYPE 2 DIABETES MELLITUS WITH MICROALBUMINURIA, WITH LONG-TERM CURRENT USE OF INSULIN (HCC): ICD-10-CM

## 2021-03-31 PROCEDURE — 99214 OFFICE O/P EST MOD 30 MIN: CPT | Performed by: FAMILY MEDICINE

## 2021-03-31 ASSESSMENT — ENCOUNTER SYMPTOMS
CHILLS: 0
VOMITING: 0
FEVER: 0
WHEEZING: 0
PALPITATIONS: 0
SHORTNESS OF BREATH: 0
NAUSEA: 0
ABDOMINAL PAIN: 0

## 2021-03-31 ASSESSMENT — FIBROSIS 4 INDEX: FIB4 SCORE: 1.787266406203497966

## 2021-03-31 NOTE — PROGRESS NOTES
Subjective:   Ashish Wiley is a 71 y.o. male here today for   Chief Complaint   Patient presents with   • Hypertension Follow-up   • Lab Results       #HTN:  -Patient longstanding history of hypertension, coronary artery disease status post stenting, that is post TAVR, dilated cardiomyopathy.  We are currently treating with lisinopril 20 mg, carvedilol 25 mg twice daily.  He states that since the beginning of the year his blood pressures have been a bit elevated above goal.  He does state that his exercise and diet regimen is not what it should be.  He also is unhappy with the amount of medications he is taking.  He states that given his cardiac history he was previously seen at the cardiac rehab outpatient center which she felt like improved his blood pressure, weight significantly and is requesting referral back at this time.  -He denies any changes in vision, headaches, lightheadedness, dizziness, increased fatigue, syncope presyncopal episodes, chest pain    #Type 2 diabetes:  -Currently treating with Lantus, Humalog, Jardiance.  His blood sugars have been high, recent labs show a hemoglobin A1c above 8.  He is feeling quite down about this stating that he feels the combination of medications as well as poor diet and exercise at this time.  He is following with endocrinology tomorrow and will discuss with him changing of medications.    #CKD stage III:  -Labs do show a slight decrease in GFR as well as elevated protein in protein creatinine ratio completed last week.  He does states that change in diet, now work on hydration as well as he should.  He is avoiding NSAIDs at this time.      Allergies   Allergen Reactions   • Sulfa Drugs Unspecified     Kidney Stones         Current medicines (including changes today)  Current Outpatient Medications   Medication Sig Dispense Refill   • insulin glargine (LANTUS SOLOSTAR) 100 UNIT/ML Solution Pen-injector injection Inject 50 Units under the skin every evening.  45 mL 3   • carvedilol (COREG) 25 MG Tab Take 1 tablet by mouth 2 times a day, with meals. 200 tablet 3   • doxazosin (CARDURA) 2 MG Tab Take 1 tablet by mouth every day. 100 tablet 3   • atorvastatin (LIPITOR) 40 MG Tab Take 1 Tab by mouth every day. 100 Tab 3   • insulin lispro (HUMALOG KWIKPEN) 100 UNIT/ML Solution Pen-injector injection PEN Inject 4 Units under the skin 3 times a day before meals. 15 Each 3   • Continuous Blood Gluc Sensor (ShotSpotter WAQAS SENSOR SYSTEM) Misc 1 Applicator every 14 days. Please give 14 day sensor and reader 6 Each 3   • erythromycin 5 MG/GM Ointment Place 1 Application in both eyes 4 times a day. If improved after 2 days can reduce to twice daily administration 3.5 g 0   • ergocalciferol (DRISDOL) 80404 UNIT capsule Take 1 Cap by mouth every 7 days. 12 Cap 3   • lisinopril (PRINIVIL) 20 MG Tab Take 1 Tab by mouth every day. 100 Tab 3   • Empagliflozin (JARDIANCE) 25 MG Tab Take 25 mg by mouth every day. 100 Tab 3   • Multiple Vitamins-Minerals (MULTIVITAMIN PO) Take  by mouth.     • aspirin EC (ECOTRIN) 81 MG Tablet Delayed Response Take 81 mg by mouth every day.       No current facility-administered medications for this visit.     He  has a past medical history of ACC/AHA stage C systolic heart failure ([facility]) (6/21/2018), Acute exacerbation of CHF (congestive heart failure) ([facility]) (6/25/2018), CAD (coronary artery disease), CHF (congestive heart failure) ([facility]), Chronic systolic congestive heart failure, NYHA class 1 ([facility]) (7/23/2018), Diabetes ([facility]), Dilated cardiomyopathy ([facility]), Hyperlipidemia, Hypertension, NYHA class 3 acute on chronic systolic heart failure ([facility]) (6/25/2018), Olecranon bursitis of right elbow (6/17/2019), and Severe aortic stenosis (6/25/2018). He also has no past medical history of Encounter for long-term (current) use of other medications.    ROS   Review of Systems   Constitutional: Negative for chills and fever.   Respiratory: Negative for shortness of  "breath and wheezing.    Cardiovascular: Negative for chest pain and palpitations.   Gastrointestinal: Negative for abdominal pain, nausea and vomiting.          Objective:     Physical Exam:  /80   Pulse 75   Temp 36 °C (96.8 °F) (Temporal)   Resp 13   Ht 1.727 m (5' 7.99\")   Wt 87.5 kg (193 lb)   SpO2 97%  Body mass index is 29.35 kg/m².   Constitutional: Alert, no distress.  Skin: Warm, dry, good turgor, no rashes in visible areas.  Eye: Equal, round and reactive, conjunctiva clear, lids normal.  Neck: Trachea midline, no masses, no thyromegaly. No cervical or supraclavicular lymphadenopathy.  Respiratory: Unlabored respiratory effort, lungs clear to auscultation, no wheezes, no rhonchi.  Cardiovascular: Normal S1, S2, no murmur, no edema.  Abdomen: Soft, non-tender, no masses, no hepatosplenomegaly.  Psych: Alert and oriented x3, normal affect and mood.    Assessment and Plan:     1. Essential hypertension  -Given his significant cardiac history as well as elevated hypertension that is uncontrolled at this time we discussed the possibility of changing/adding medications for better hypertension control.  At this time patient would like to work on improving his diet and exercise regimen.  He is requesting referral to cardiac rehab again which will place at this time.  We will also follow-up with patient in 3 months.  If no improvement seen in blood pressure at the time we will have a longer discussion regarding adjusting medications.  - REFERRAL TO INTENSIVE CARDIAC REHAB/CARDIAC REHAB  - Basic Metabolic Panel; Future    2. Type 2 diabetes mellitus with microalbuminuria, with long-term current use of insulin ([facility])  -Hemoglobin A1c is more elevated since last visit.  He will follow-up with endocrinology at this time.  -Encouraged continuation of good diet and exercise regimen.  - HEMOGLOBIN A1C; Future    3. Stage 3b chronic kidney disease  -Stable.  Continue to monitor.  Discussed appropriate diet, " hydration, avoidance of NSAIDs.  - Basic Metabolic Panel; Future    4. Coronary artery disease due to calcified coronary lesion: Status post atherectomy and stenting of the LAD in June 2018.  Nonobstructive disease in the RCA and circumflex.  -Given significant cardiac history as well as elevated blood pressure will refer to cardiac rehab at this time.  - REFERRAL TO INTENSIVE CARDIAC REHAB/CARDIAC REHAB      Followup: Return in about 3 months (around 6/30/2021).         PLEASE NOTE: This dictation was created using voice recognition software. I have made every reasonable attempt to correct obvious errors, but I expect that there are errors of grammar and possibly content that I did not discover before finalizing the note.

## 2021-04-20 DIAGNOSIS — I10 ESSENTIAL HYPERTENSION: ICD-10-CM

## 2021-04-21 RX ORDER — EMPAGLIFLOZIN 25 MG/1
25 TABLET, FILM COATED ORAL DAILY
Qty: 100 TABLET | Refills: 3 | Status: SHIPPED | OUTPATIENT
Start: 2021-04-21 | End: 2021-06-02 | Stop reason: SDUPTHER

## 2021-04-21 RX ORDER — LISINOPRIL 20 MG/1
20 TABLET ORAL DAILY
Qty: 100 TABLET | Refills: 3 | Status: SHIPPED | OUTPATIENT
Start: 2021-04-21 | End: 2021-06-02 | Stop reason: SDUPTHER

## 2021-04-27 ENCOUNTER — TELEPHONE (OUTPATIENT)
Dept: ENDOCRINOLOGY | Facility: MEDICAL CENTER | Age: 71
End: 2021-04-27

## 2021-04-27 NOTE — TELEPHONE ENCOUNTER
Received VM 4/20/21, pt ststed he wanted to cx and r/s his appt on 5/5/21, called pt and he is will be out of town so we cx'd the appt on 5/5/21 but he will call back to r/s, he is not sure when he will be available.. js

## 2021-06-02 DIAGNOSIS — I10 ESSENTIAL HYPERTENSION: ICD-10-CM

## 2021-06-02 DIAGNOSIS — E11.65 TYPE 2 DIABETES MELLITUS WITH HYPERGLYCEMIA, WITH LONG-TERM CURRENT USE OF INSULIN (HCC): ICD-10-CM

## 2021-06-02 DIAGNOSIS — Z79.4 TYPE 2 DIABETES MELLITUS WITH HYPERGLYCEMIA, WITH LONG-TERM CURRENT USE OF INSULIN (HCC): ICD-10-CM

## 2021-06-02 RX ORDER — CARVEDILOL 25 MG/1
25 TABLET ORAL 2 TIMES DAILY WITH MEALS
Qty: 200 TABLET | Refills: 3 | Status: SHIPPED | OUTPATIENT
Start: 2021-06-02 | End: 2022-07-22

## 2021-06-02 RX ORDER — EMPAGLIFLOZIN 25 MG/1
25 TABLET, FILM COATED ORAL DAILY
Qty: 100 TABLET | Refills: 3 | Status: SHIPPED | OUTPATIENT
Start: 2021-06-02 | End: 2022-07-22

## 2021-06-02 RX ORDER — DOXAZOSIN 2 MG/1
2 TABLET ORAL DAILY
Qty: 100 TABLET | Refills: 3 | Status: SHIPPED | OUTPATIENT
Start: 2021-06-02 | End: 2022-07-22

## 2021-06-02 RX ORDER — LISINOPRIL 20 MG/1
20 TABLET ORAL DAILY
Qty: 100 TABLET | Refills: 3 | Status: SHIPPED | OUTPATIENT
Start: 2021-06-02 | End: 2022-07-22

## 2021-06-02 RX ORDER — ATORVASTATIN CALCIUM 40 MG/1
40 TABLET, FILM COATED ORAL DAILY
Qty: 100 TABLET | Refills: 3 | Status: SHIPPED | OUTPATIENT
Start: 2021-06-02 | End: 2022-07-22

## 2021-06-02 NOTE — TELEPHONE ENCOUNTER
----- Message from Ashish Wiley sent at 6/1/2021  4:43 PM PDT -----  Regarding: Prescription Question  Contact: [phone]  I have changed insurance companies to [place]. As a result, I will need new prescriptions for them for my Lisinipril, Dorazosin, Carvedilol, Atorvastatin and Jardiance.  Could you have your staff email the new prescriptions to [facility] at [phone].    Thank you

## 2021-06-14 DIAGNOSIS — Z79.4 TYPE 2 DIABETES MELLITUS WITH HYPERGLYCEMIA, WITH LONG-TERM CURRENT USE OF INSULIN (HCC): ICD-10-CM

## 2021-06-14 DIAGNOSIS — E11.65 TYPE 2 DIABETES MELLITUS WITH HYPERGLYCEMIA, WITH LONG-TERM CURRENT USE OF INSULIN (HCC): ICD-10-CM

## 2021-06-14 RX ORDER — DULAGLUTIDE 3 MG/.5ML
3 INJECTION, SOLUTION SUBCUTANEOUS
Qty: 6 ML | Refills: 0 | Status: SHIPPED | OUTPATIENT
Start: 2021-06-14 | End: 2021-10-26

## 2021-06-14 RX ORDER — INSULIN ASPART 100 [IU]/ML
5 INJECTION, SOLUTION INTRAVENOUS; SUBCUTANEOUS
Qty: 45 ML | Refills: 0 | Status: SHIPPED | OUTPATIENT
Start: 2021-06-14

## 2021-06-14 NOTE — PROGRESS NOTES
Patient sent me a message requesting replacement for Ozempic and Humalog because of insurance coverage issues    I also discovered the patient has not kept his appointments recently and advised him to make an appointment with the endocrinology office

## 2021-06-30 ENCOUNTER — OFFICE VISIT (OUTPATIENT)
Dept: MEDICAL GROUP | Facility: LAB | Age: 71
End: 2021-06-30
Payer: COMMERCIAL

## 2021-06-30 VITALS
TEMPERATURE: 97.5 F | HEIGHT: 68 IN | SYSTOLIC BLOOD PRESSURE: 122 MMHG | DIASTOLIC BLOOD PRESSURE: 80 MMHG | HEART RATE: 75 BPM | WEIGHT: 190 LBS | BODY MASS INDEX: 28.79 KG/M2 | OXYGEN SATURATION: 96 % | RESPIRATION RATE: 13 BRPM

## 2021-06-30 DIAGNOSIS — E11.22 TYPE 2 DIABETES MELLITUS WITH STAGE 3B CHRONIC KIDNEY DISEASE, WITH LONG-TERM CURRENT USE OF INSULIN (HCC): ICD-10-CM

## 2021-06-30 DIAGNOSIS — Z79.4 TYPE 2 DIABETES MELLITUS WITH STAGE 3B CHRONIC KIDNEY DISEASE, WITH LONG-TERM CURRENT USE OF INSULIN (HCC): ICD-10-CM

## 2021-06-30 DIAGNOSIS — N18.32 TYPE 2 DIABETES MELLITUS WITH STAGE 3B CHRONIC KIDNEY DISEASE, WITH LONG-TERM CURRENT USE OF INSULIN (HCC): ICD-10-CM

## 2021-06-30 PROCEDURE — 99213 OFFICE O/P EST LOW 20 MIN: CPT | Performed by: FAMILY MEDICINE

## 2021-06-30 ASSESSMENT — PATIENT HEALTH QUESTIONNAIRE - PHQ9
CLINICAL INTERPRETATION OF PHQ2 SCORE: 2
SUM OF ALL RESPONSES TO PHQ QUESTIONS 1-9: 2
5. POOR APPETITE OR OVEREATING: 0 - NOT AT ALL

## 2021-06-30 ASSESSMENT — ENCOUNTER SYMPTOMS
NAUSEA: 0
WHEEZING: 0
PALPITATIONS: 0
CONSTIPATION: 0
NERVOUS/ANXIOUS: 0
DIARRHEA: 0
FEVER: 0
DIZZINESS: 0
SHORTNESS OF BREATH: 0
VOMITING: 0
ABDOMINAL PAIN: 0
DEPRESSION: 0
HEADACHES: 0
CHILLS: 0
BLURRED VISION: 0

## 2021-06-30 ASSESSMENT — FIBROSIS 4 INDEX: FIB4 SCORE: 1.787266406203497966

## 2021-06-30 NOTE — LETTER
[facility]  Miguel A Colón M.D.  [address]  [place] [address]  Fax: [phone]   Authorization for Release/Disclosure of   Protected Health Information   Name: ASHISH CLAIRE : 1950 SSN: [id number]   Address: [address] [place]  [place] [address] Phone:    [phone] (home)    I authorize the entity listed below to release/disclose the PHI below to:   [facility]/Miguel A Colón M.D.    Provider or Entity Name:  [facility]   Address   City, State, Zip   Phone:      Fax:     Reason for request: continuity of care   Information to be released:    [  ] LAST COLONOSCOPY,  including any PATH REPORT and follow-up  [  ] LAST FIT/COLOGUARD RESULT [  ] LAST DEXA  [  ] LAST MAMMOGRAM  [  ] LAST PAP  [  ] LAST LABS [  ] RETINA EXAM REPORT  [  ] IMMUNIZATION RECORDS  [  ] Release all info      [  ] Check here and initial the line next to each item to release ALL health information INCLUDING  _____ Care and treatment for drug and / or alcohol abuse  _____ HIV testing, infection status, or AIDS  _____ Genetic Testing    DATES OF SERVICE OR TIME PERIOD TO BE DISCLOSED: _____________  I understand and acknowledge that:  * This Authorization may be revoked at any time by you in writing, except if your health information has already been used or disclosed.  * Your health information that will be used or disclosed as a result of you signing this authorization could be re-disclosed by the recipient. If this occurs, your re-disclosed health information may no longer be protected by State or Federal laws.  * You may refuse to sign this Authorization. Your refusal will not affect your ability to obtain treatment.  * This Authorization becomes effective upon signing and will  on (date) __________.      If no date is indicated, this Authorization will  one (1) year from the signature date.    Name: Ashish Claire    Signature:   Date:     2021       PLEASE  FAX REQUESTED RECORDS BACK TO: [phone]

## 2021-06-30 NOTE — PROGRESS NOTES
Subjective:   Ashish Wiley is a 71 y.o. male here today for   Chief Complaint   Patient presents with   • Diabetes       #Type 2 diabetes:  -Currently treating with Trulicity, Lantus, NovoLog, Jardiance.  Patient is compliant with medications.  Is working on controlling blood sugars.  States that fasting blood sugar is looking good in the mornings, usually around 101 10.  He states that he has been drinking his coffee with sweetened lemonade that has unfortunately been increasing his blood sugar 30-40 points.  He is also noticed spikes at night which she is working on controlling with short acting insulin.  -Is being followed regularly by Dr. Cole.  -Currently treating diabetic nephropathy, CKD, hypertension with lisinopril.  Compliant with medication, no side effects.  -Currently on Lipitor secondary to history of coronary artery disease  -Most current hemoglobin A1c in the 8% which was completed in March 2021.  No recent labs at this time.    Allergies   Allergen Reactions   • Sulfa Drugs Unspecified     Kidney Stones         Current medicines (including changes today)  Current Outpatient Medications   Medication Sig Dispense Refill   • Dulaglutide (TRULICITY) 3 MG/0.5ML Solution Pen-injector Inject 3 mg under the skin every 7 days. 2.0 ml equals 4 pens per month 6 mL 0   • insulin aspart (NOVOLOG FLEXPEN) 100 UNIT/ML injection PEN Inject 5 Units under the skin 3 times a day before meals. 45 mL 0   • lisinopril (PRINIVIL) 20 MG Tab Take 1 tablet by mouth every day. 100 tablet 3   • Empagliflozin (JARDIANCE) 25 MG Tab Take 25 mg by mouth every day. 100 tablet 3   • carvedilol (COREG) 25 MG Tab Take 1 tablet by mouth 2 times a day with meals. 200 tablet 3   • doxazosin (CARDURA) 2 MG Tab Take 1 tablet by mouth every day. 100 tablet 3   • atorvastatin (LIPITOR) 40 MG Tab Take 1 tablet by mouth every day. 100 tablet 3   • insulin glargine (LANTUS SOLOSTAR) 100 UNIT/ML Solution Pen-injector injection Inject  "50 Units under the skin every evening. 45 mL 3   • Continuous Blood Gluc Sensor (FREESTYLE WAQAS SENSOR SYSTEM) Misc 1 Applicator every 14 days. Please give 14 day sensor and reader 6 Each 3   • erythromycin 5 MG/GM Ointment Place 1 Application in both eyes 4 times a day. If improved after 2 days can reduce to twice daily administration 3.5 g 0   • ergocalciferol (DRISDOL) 70597 UNIT capsule Take 1 Cap by mouth every 7 days. 12 Cap 3   • Multiple Vitamins-Minerals (MULTIVITAMIN PO) Take  by mouth.     • aspirin EC (ECOTRIN) 81 MG Tablet Delayed Response Take 81 mg by mouth every day.       No current facility-administered medications for this visit.     He  has a past medical history of ACC/AHA stage C systolic heart failure (Former[facility]) (6/21/2018), Acute exacerbation of CHF (congestive heart failure) ([facility]) (6/25/2018), CAD (coronary artery disease), CHF (congestive heart failure) ([facility]), Chronic systolic congestive heart failure, NYHA class 1 ([facility]) (7/23/2018), Diabetes ([facility]), Dilated cardiomyopathy ([facility]), Hyperlipidemia, Hypertension, NYHA class 3 acute on chronic systolic heart failure ([facility]) (6/25/2018), Olecranon bursitis of right elbow (6/17/2019), and Severe aortic stenosis (6/25/2018). He also has no past medical history of Encounter for long-term (current) use of other medications.    ROS   Review of Systems   Constitutional: Negative for chills and fever.   HENT: Negative for hearing loss.    Eyes: Negative for blurred vision.   Respiratory: Negative for shortness of breath and wheezing.    Cardiovascular: Negative for chest pain and palpitations.   Gastrointestinal: Negative for abdominal pain, constipation, diarrhea, nausea and vomiting.   Neurological: Negative for dizziness and headaches.   Psychiatric/Behavioral: Negative for depression. The patient is not nervous/anxious.       Objective:     Physical Exam:  /80   Pulse 75   Temp 36.4 °C (97.5 °F) (Temporal)   Resp 13   Ht 1.727 m (5' 7.99\")   Wt " 86.2 kg (190 lb)   SpO2 96%  Body mass index is 28.9 kg/m².   Constitutional: Alert, no distress.  Skin: Warm, dry, good turgor, no rashes in visible areas.  Eye: Equal, round and reactive, conjunctiva clear, lids normal.  Respiratory: Unlabored respiratory effort, lungs clear to auscultation, no wheezes, no rhonchi.  Cardiovascular: Normal S1, S2, no murmur, no edema.  Abdomen: Soft, non-tender, no masses, no hepatosplenomegaly.  Psych: Alert and oriented x3, normal affect and mood.    Assessment and Plan:     1. Type 2 diabetes mellitus with stage 3b chronic kidney disease, with long-term current use of insulin ([facility])  -Stable.  Continue with blood glucose monitoring with freestyle kp.  -Continue with all medications as listed above.  -We will continue treatment of diabetic nephropathy/CKD with lisinopril.  -Continue with atorvastatin.  -Reviewed records request with Lisbeth prather  -Continue to follow-up with endocrinology.  Labs were given for patient.  Given insurance which [facility]  We will follow-up with endocrinology.  -Return precautions given, patient will follow up with me every quarter      Followup: Return in about 3 months (around 9/30/2021).         PLEASE NOTE: This dictation was created using voice recognition software. I have made every reasonable attempt to correct obvious errors, but I expect that there are errors of grammar and possibly content that I did not discover before finalizing the note.

## 2021-11-02 LAB — HBA1C MFR BLD: 8.2 % (ref 4.8–5.6)

## 2021-11-04 NOTE — PROGRESS NOTES
Bedside shift report taken from [facility]. Patient is sitting up at the edge of bed eating dinner. Discussed plan of care; all questions answered. Needs addressed; nothing needed at this time. angiomax still infusing through peripheral IV. No complaints/signs of distress. s/p cath via right radial approach; dressing is clean, dry, and intact. Bed is locked and in lowest position. Side rails up x2. Call light, phone, and personal possessions within reach. Will continue to monitor.   Special Stains Stage 1 - Results: Base On Clearance Noted Above

## 2021-11-10 ENCOUNTER — OFFICE VISIT (OUTPATIENT)
Dept: MEDICAL GROUP | Facility: LAB | Age: 71
End: 2021-11-10
Payer: COMMERCIAL

## 2021-11-10 VITALS
TEMPERATURE: 97.6 F | BODY MASS INDEX: 28.79 KG/M2 | HEART RATE: 93 BPM | HEIGHT: 68 IN | OXYGEN SATURATION: 97 % | SYSTOLIC BLOOD PRESSURE: 144 MMHG | WEIGHT: 190 LBS | DIASTOLIC BLOOD PRESSURE: 80 MMHG

## 2021-11-10 DIAGNOSIS — E78.1 PURE HYPERTRIGLYCERIDEMIA: ICD-10-CM

## 2021-11-10 DIAGNOSIS — N18.32 TYPE 2 DIABETES MELLITUS WITH STAGE 3B CHRONIC KIDNEY DISEASE, WITH LONG-TERM CURRENT USE OF INSULIN (HCC): ICD-10-CM

## 2021-11-10 DIAGNOSIS — E11.22 TYPE 2 DIABETES MELLITUS WITH STAGE 3B CHRONIC KIDNEY DISEASE, WITH LONG-TERM CURRENT USE OF INSULIN (HCC): ICD-10-CM

## 2021-11-10 DIAGNOSIS — Z79.4 TYPE 2 DIABETES MELLITUS WITH STAGE 3B CHRONIC KIDNEY DISEASE, WITH LONG-TERM CURRENT USE OF INSULIN (HCC): ICD-10-CM

## 2021-11-10 PROBLEM — E11.21 DIABETIC NEPHROPATHY ASSOCIATED WITH TYPE 2 DIABETES MELLITUS (HCC): Status: ACTIVE | Noted: 2017-10-11

## 2021-11-10 PROCEDURE — 99214 OFFICE O/P EST MOD 30 MIN: CPT | Performed by: FAMILY MEDICINE

## 2021-11-10 ASSESSMENT — ENCOUNTER SYMPTOMS
VOMITING: 0
DIARRHEA: 0
ABDOMINAL PAIN: 0
BLURRED VISION: 0
HEADACHES: 0
DEPRESSION: 0
FEVER: 0
PALPITATIONS: 0
DIZZINESS: 0
NAUSEA: 0
NERVOUS/ANXIOUS: 0
CHILLS: 0

## 2021-11-10 ASSESSMENT — FIBROSIS 4 INDEX: FIB4 SCORE: 1.787266406203497966

## 2021-11-10 NOTE — PROGRESS NOTES
"Subjective:   Ashish Wiley is a 71 y.o. male here today for   Chief Complaint   Patient presents with   • Lab Results     [facility]       #Diabetes:  -Patient is a chronic longstanding history currently treating with Trulicity, Lantus, Jardiance.  Patient is compliant with medications.  He states has not been checking his blood sugars over the last month given the cost of his monitoring system.  He plans on following up with endocrinology to discuss other possible monitoring systems.  Patient denies any symptoms at this time.  Recent hemoglobin A1c completed 2 weeks ago showing continued elevation at 8.2%.  Patient is not happy with this number and is having some low morale stating that he wishes he could get lower.  We discussed with patient he states that while he has been working on good exercise, walking over 5 miles a day, he does state that he has been \"cheating\" a lot recently.  Diabetes health maintenance  -Hemoglobin A1c 8.2% on 11/1/2021.  -Creatinine level elevated 9.1, GFR of 35 per labs drawn on 3/29/2021.  -nephropathy present with a protein creatinine ratio of 1147 on 3/29/2021.  -Currently on lisinopril 20 mg daily.  -Currently on Lipitor 40 mg daily.  -Patient updated on all vaccinations with exception of zoster.      Allergies   Allergen Reactions   • Sulfa Drugs Unspecified     Kidney Stones         Current medicines (including changes today)  Current Outpatient Medications   Medication Sig Dispense Refill   • Dulaglutide (TRULICITY) 3 MG/0.5ML Solution Pen-injector 3 mg by Subconjunctival route every 7 days. Must make appointment for future refills 6 mL 0   • insulin aspart (NOVOLOG FLEXPEN) 100 UNIT/ML injection PEN Inject 5 Units under the skin 3 times a day before meals. 45 mL 0   • lisinopril (PRINIVIL) 20 MG Tab Take 1 tablet by mouth every day. 100 tablet 3   • Empagliflozin (JARDIANCE) 25 MG Tab Take 25 mg by mouth every day. 100 tablet 3   • carvedilol (COREG) 25 MG Tab Take 1 tablet " by mouth 2 times a day with meals. 200 tablet 3   • doxazosin (CARDURA) 2 MG Tab Take 1 tablet by mouth every day. 100 tablet 3   • atorvastatin (LIPITOR) 40 MG Tab Take 1 tablet by mouth every day. 100 tablet 3   • insulin glargine (LANTUS SOLOSTAR) 100 UNIT/ML Solution Pen-injector injection Inject 50 Units under the skin every evening. 45 mL 3   • Continuous Blood Gluc Sensor (TimeetYLE WAQAS SENSOR SYSTEM) Misc 1 Applicator every 14 days. Please give 14 day sensor and reader 6 Each 3   • erythromycin 5 MG/GM Ointment Place 1 Application in both eyes 4 times a day. If improved after 2 days can reduce to twice daily administration 3.5 g 0   • ergocalciferol (DRISDOL) 15761 UNIT capsule Take 1 Cap by mouth every 7 days. 12 Cap 3   • Multiple Vitamins-Minerals (MULTIVITAMIN PO) Take  by mouth.     • aspirin EC (ECOTRIN) 81 MG Tablet Delayed Response Take 81 mg by mouth every day.       No current facility-administered medications for this visit.     He  has a past medical history of ACC/AHA stage C systolic heart failure (formerly [facility]) (6/21/2018), Acute exacerbation of CHF (congestive heart failure) (formerly [facility]) (6/25/2018), CAD (coronary artery disease), CHF (congestive heart failure) (formerly [facility]), Chronic systolic congestive heart failure, NYHA class 1 (formerly [facility]) (7/23/2018), Diabetes (formerly [facility]), Dilated cardiomyopathy (formerly [facility]), Hyperlipidemia, Hypertension, NYHA class 3 acute on chronic systolic heart failure (formerly [facility]) (6/25/2018), Olecranon bursitis of right elbow (6/17/2019), and Severe aortic stenosis (6/25/2018). He also has no past medical history of Encounter for long-term (current) use of other medications.    ROS   Review of Systems   Constitutional: Negative for chills and fever.   Eyes: Negative for blurred vision.   Cardiovascular: Negative for chest pain and palpitations.   Gastrointestinal: Negative for abdominal pain, diarrhea, nausea and vomiting.   Neurological: Negative for dizziness and headaches.   Psychiatric/Behavioral: Negative  "for depression. The patient is not nervous/anxious.       Objective:     Physical Exam:  /80   Pulse 93   Temp 36.4 °C (97.6 °F) (Temporal)   Ht 1.727 m (5' 7.99\")   Wt 86.2 kg (190 lb)   SpO2 97%  Body mass index is 28.9 kg/m².   Constitutional: Alert, no distress.  Skin: Warm, dry, good turgor, no rashes in visible areas.  Eye: Equal, round and reactive, conjunctiva clear, lids normal.  Respiratory: Unlabored respiratory effort, lungs clear to auscultation, no wheezes, no rhonchi.  Cardiovascular: Normal S1, S2, no murmur, no edema.  Abdomen: Soft, non-tender, no masses, no hepatosplenomegaly.  Psych: Alert and oriented x3, normal affect and mood.    Assessment and Plan:     1. Type 2 diabetes mellitus with stage 3b chronic kidney disease, with long-term current use of insulin (HCC)  -At this time, patient has shown a slight improvement hemoglobin A1c, down from 8.3% to 8.2%.  Encouraged continuation of all medications as listed above.  Patient will plan on following up with Dr. Cole of endocrinology soon to discuss other possible modalities for blood sugar monitoring.  -I encouraged continuation of monitoring either with freestyle kp or other methods.  -We will continue with appropriate diet.  Discussed with patient extensively regarding his diet.  Did find several areas in which we can improve.  Something that we did tablet today was decreasing his nightly alcohol intake.  He is normally having a ride with ski nightly.  I suggest that patient begin making small changes so that over time though will aggravate into a greater change.  We will begin by decreasing alcohol intake to every other night.  Also discussed decreasing proportions for example using a smaller proportion of popcorn at the movies instead of buying a large.  Patient states we will continue to work on finding small things which she can improve making small changes.  Patient will follow up with me in 3 months for repeat hemoglobin " A1c as well as follow-up with endocrinology.    2. Pure hypertriglyceridemia  -Seen on previous labs completed at [facility].  We will continue with all medications at this time.  Discussed the importance of changing and working on diet to improve triglyceride numbers given potential health consequences of elevated triglycerides including risk of pancreatitis.  Patient will follow-up as needed.    My total time spent caring for the patient on the day of the encounter was 30 minutes.       Followup: Return in about 3 months (around 2/10/2022).         PLEASE NOTE: This dictation was created using voice recognition software. I have made every reasonable attempt to correct obvious errors, but I expect that there are errors of grammar and possibly content that I did not discover before finalizing the note.

## 2021-12-29 ENCOUNTER — PATIENT MESSAGE (OUTPATIENT)
Dept: MEDICAL GROUP | Facility: LAB | Age: 71
End: 2021-12-29

## 2021-12-29 RX ORDER — PROCHLORPERAZINE 25 MG/1
SUPPOSITORY RECTAL
Qty: 9 EACH | Refills: 3 | Status: SHIPPED | OUTPATIENT
Start: 2021-12-29 | End: 2023-06-19 | Stop reason: SDUPTHER

## 2021-12-29 RX ORDER — PROCHLORPERAZINE 25 MG/1
SUPPOSITORY RECTAL
Qty: 1 EACH | Refills: 0 | Status: SHIPPED | OUTPATIENT
Start: 2021-12-29 | End: 2022-03-21

## 2021-12-29 NOTE — TELEPHONE ENCOUNTER
From: Ashish Wiley  To: Physician Miguel A Colón  Sent: 12/29/2021 1:30 PM PST  Subject: Glucose Monitoring    My new insurance does not cover Freestyle. Can you please send a new proscription to [employer] for the dexcom system? Thank you.

## 2022-01-24 ENCOUNTER — OFFICE VISIT (OUTPATIENT)
Dept: MEDICAL GROUP | Facility: LAB | Age: 72
End: 2022-01-24
Payer: COMMERCIAL

## 2022-01-24 VITALS
BODY MASS INDEX: 29.37 KG/M2 | TEMPERATURE: 97.1 F | SYSTOLIC BLOOD PRESSURE: 132 MMHG | OXYGEN SATURATION: 96 % | WEIGHT: 193.8 LBS | DIASTOLIC BLOOD PRESSURE: 74 MMHG | RESPIRATION RATE: 14 BRPM | HEIGHT: 68 IN | HEART RATE: 78 BPM

## 2022-01-24 DIAGNOSIS — Z79.4 TYPE 2 DIABETES MELLITUS WITH HYPERGLYCEMIA, WITH LONG-TERM CURRENT USE OF INSULIN (HCC): ICD-10-CM

## 2022-01-24 DIAGNOSIS — Z12.83 SKIN CANCER SCREENING: ICD-10-CM

## 2022-01-24 DIAGNOSIS — E11.65 TYPE 2 DIABETES MELLITUS WITH HYPERGLYCEMIA, WITH LONG-TERM CURRENT USE OF INSULIN (HCC): ICD-10-CM

## 2022-01-24 PROCEDURE — 99213 OFFICE O/P EST LOW 20 MIN: CPT | Performed by: FAMILY MEDICINE

## 2022-01-24 RX ORDER — DULAGLUTIDE 3 MG/.5ML
3 INJECTION, SOLUTION SUBCUTANEOUS
Qty: 12 ML | Refills: 3 | Status: SHIPPED | OUTPATIENT
Start: 2022-01-24 | End: 2022-01-24 | Stop reason: SDUPTHER

## 2022-01-24 RX ORDER — INSULIN GLARGINE 100 [IU]/ML
18 INJECTION, SOLUTION SUBCUTANEOUS EVERY EVENING
Qty: 18 ML | Refills: 3 | Status: SHIPPED | OUTPATIENT
Start: 2022-01-24 | End: 2022-01-24 | Stop reason: SDUPTHER

## 2022-01-24 RX ORDER — INSULIN GLARGINE 100 [IU]/ML
18 INJECTION, SOLUTION SUBCUTANEOUS EVERY EVENING
Qty: 18 ML | Refills: 3 | Status: SHIPPED | OUTPATIENT
Start: 2022-01-24 | End: 2022-01-26 | Stop reason: SDUPTHER

## 2022-01-24 RX ORDER — DULAGLUTIDE 3 MG/.5ML
3 INJECTION, SOLUTION SUBCUTANEOUS
Qty: 12 ML | Refills: 3 | Status: SHIPPED | OUTPATIENT
Start: 2022-01-24 | End: 2022-03-28

## 2022-01-24 RX ORDER — INSULIN GLARGINE 100 [IU]/ML
18 INJECTION, SOLUTION SUBCUTANEOUS EVERY EVENING
Qty: 18 ML | Refills: 3 | Status: SHIPPED | OUTPATIENT
Start: 2022-01-24 | End: 2022-01-24

## 2022-01-24 ASSESSMENT — ENCOUNTER SYMPTOMS
DEPRESSION: 0
NERVOUS/ANXIOUS: 0
CHILLS: 0
WHEEZING: 0
DIZZINESS: 0
VOMITING: 0
PALPITATIONS: 0
HEADACHES: 0
ABDOMINAL PAIN: 0
FEVER: 0
SHORTNESS OF BREATH: 0
NAUSEA: 0

## 2022-01-24 ASSESSMENT — FIBROSIS 4 INDEX: FIB4 SCORE: 1.81

## 2022-01-24 NOTE — PROGRESS NOTES
Subjective:   Ashish Wiley is a 72 y.o. male here today for   Chief Complaint   Patient presents with   • Follow-Up   • Referral Needed     [facility]    • Medication Refill     Latus Solostart 100 ML , Trulicity Inj 3/0.5          ***    Allergies   Allergen Reactions   • Sulfa Drugs Unspecified     Kidney Stones         Current medicines (including changes today)  Current Outpatient Medications   Medication Sig Dispense Refill   • Continuous Blood Gluc Transmit (DEXCOM G6 TRANSMITTER) Misc Use dexcom system to check blood glucose 3-4 times daily 1 Each 0   • Continuous Blood Gluc Sensor (DEXCOM G6 SENSOR) Misc Apply sensor subcutaneously to monitor blood glucose. Replace sensor every 10 days. 9 Each 3   • Continuous Blood Gluc  (DEXCOM G6 ) Device Use dexcom system to check blood glucose 3-4 times daily 1 Each 0   • Dulaglutide (TRULICITY) 3 MG/0.5ML Solution Pen-injector 3 mg by Subconjunctival route every 7 days. Must make appointment for future refills 6 mL 0   • insulin aspart (NOVOLOG FLEXPEN) 100 UNIT/ML injection PEN Inject 5 Units under the skin 3 times a day before meals. 45 mL 0   • lisinopril (PRINIVIL) 20 MG Tab Take 1 tablet by mouth every day. 100 tablet 3   • Empagliflozin (JARDIANCE) 25 MG Tab Take 25 mg by mouth every day. 100 tablet 3   • carvedilol (COREG) 25 MG Tab Take 1 tablet by mouth 2 times a day with meals. 200 tablet 3   • doxazosin (CARDURA) 2 MG Tab Take 1 tablet by mouth every day. 100 tablet 3   • atorvastatin (LIPITOR) 40 MG Tab Take 1 tablet by mouth every day. 100 tablet 3   • insulin glargine (LANTUS SOLOSTAR) 100 UNIT/ML Solution Pen-injector injection Inject 50 Units under the skin every evening. 45 mL 3   • erythromycin 5 MG/GM Ointment Place 1 Application in both eyes 4 times a day. If improved after 2 days can reduce to twice daily administration 3.5 g 0   • ergocalciferol (DRISDOL) 21962 UNIT capsule Take 1 Cap by mouth every 7 days. 12 Cap 3   •  "Multiple Vitamins-Minerals (MULTIVITAMIN PO) Take  by mouth.     • aspirin EC (ECOTRIN) 81 MG Tablet Delayed Response Take 81 mg by mouth every day.       No current facility-administered medications for this visit.     He  has a past medical history of ACC/AHA stage C systolic heart failure (HCC) (6/21/2018), Acute exacerbation of CHF (congestive heart failure) ([facility]) (6/25/2018), CAD (coronary artery disease), CHF (congestive heart failure) ([facility]), Chronic systolic congestive heart failure, NYHA class 1 ([facility]) (7/23/2018), Diabetes (HCC), Dilated cardiomyopathy (HCC), Hyperlipidemia, Hypertension, NYHA class 3 acute on chronic systolic heart failure ([facility]) (6/25/2018), Olecranon bursitis of right elbow (6/17/2019), and Severe aortic stenosis (6/25/2018). He also has no past medical history of Encounter for long-term (current) use of other medications.    ROS   ROS       Objective:     Physical Exam:  /74 (BP Location: Left arm, Patient Position: Sitting, BP Cuff Size: Adult)   Pulse 78   Temp 36.2 °C (97.1 °F)   Resp 14   Ht 1.727 m (5' 7.99\")   Wt 87.9 kg (193 lb 12.8 oz)   SpO2 96%  Body mass index is 29.48 kg/m². ***  Constitutional: Alert, no distress.  Skin: Warm, dry, good turgor, no rashes in visible areas.  Eye: Equal, round and reactive, conjunctiva clear, lids normal.  ENMT: TM's clear bilaterally, lips without lesions, good dentition, oropharynx clear.  Neck: Trachea midline, no masses, no thyromegaly. No cervical or supraclavicular lymphadenopathy.  Respiratory: Unlabored respiratory effort, lungs clear to auscultation, no wheezes, no rhonchi.  Cardiovascular: Normal S1, S2, no murmur, no edema.  Abdomen: Soft, non-tender, no masses, no hepatosplenomegaly.  Psych: Alert and oriented x3, normal affect and mood.    Assessment and Plan:     There are no diagnoses linked to this encounter.    Followup: No follow-ups on file.         PLEASE NOTE: This dictation was created using voice recognition " software. I have made every reasonable attempt to correct obvious errors, but I expect that there are errors of grammar and possibly content that I did not discover before finalizing the note.

## 2022-01-24 NOTE — TELEPHONE ENCOUNTER
All Pharmacy Suggested Alternatives:    0 insulin glargine (INSULIN GLARGINE) 100 UNIT/ML Solution Pen-injector injection  0 insulin detemir (LEVEMIR FLEXTOUCH) 100 UNIT/ML injection PEN  0 Insulin Degludec (TRESIBA) 100 UNIT/ML Solution  0 insulin detemir (LEVEMIR) 100 UNIT/ML Solution  0 Insulin Degludec (TRESIBA FLEXTOUCH) 100 UNIT/ML Solution Pen-injector   To prescribe one of the alternatives listed above           Pt's ins will not cover the insulin glarnine

## 2022-01-24 NOTE — PROGRESS NOTES
Subjective:   Ashish Wiley is a 72 y.o. male here today for   Chief Complaint   Patient presents with   • Follow-Up   • Referral Needed     [facility]    • Medication Refill     Latus Solostart 100 ML , Trulicity Inj 3/0.5        #DM  Presents for follow up of diabetes mellitus. He indicates that he is feeling well and denies any symptoms referable to this diagnoses.  Specifically denies chest pain, palpitations, dyspnea, orthopnea, PND or peripheral edema, poyluria, polydipsia, urinary complaints, abdominal complaints, myalgias, numbness, weakness or other related symptoms.   Current medications: Jardiance 25 mg daily, Trulicity weekly, Lantus 18 units daily, NovoLog 5 units 3 times daily before meals.  Last A1c: 8.2% on 11/1/2021.  Last Microalb/Cr ratio: 1147 on 3/29/2021  Fasting sugars: Patient states that her blood sugars have been slightly elevated recently due to the holiday season.  Has been noticing fasting blood sugars in the 160s 170s.  Last diabetic foot exam: Due at next appointment.  Last retinal eye exam: Followed by ophthalmologist every 6 months.  ACEi/ARB?  Lisinopril 20 mg daily  Statin?  Lipitor 40 mg daily  Aspirin?  Yes  Concomitant HTN?  Yes, at goal  Nightly foot checks?  Ye      #Health maintenance:  -Patient is noticed a few different skin growths on head is requesting referral to dermatology for skin cancer screening.  Allergies   Allergen Reactions   • Sulfa Drugs Unspecified     Kidney Stones         Current medicines (including changes today)  Current Outpatient Medications   Medication Sig Dispense Refill   • Dulaglutide (TRULICITY) 3 MG/0.5ML Solution Pen-injector 3 mg by Subconjunctival route every 7 days for 90 days. 12 mL 3   • insulin glargine (LANTUS SOLOSTAR) 100 UNIT/ML Solution Pen-injector injection Inject 18 Units under the skin every evening for 90 days. 18 mL 3   • Continuous Blood Gluc Transmit (DEXCOM G6 TRANSMITTER) Misc Use dexcom system to check blood glucose  3-4 times daily 1 Each 0   • Continuous Blood Gluc Sensor (DEXCOM G6 SENSOR) Misc Apply sensor subcutaneously to monitor blood glucose. Replace sensor every 10 days. 9 Each 3   • Continuous Blood Gluc  (DEXCOM G6 ) Device Use dexcom system to check blood glucose 3-4 times daily 1 Each 0   • insulin aspart (NOVOLOG FLEXPEN) 100 UNIT/ML injection PEN Inject 5 Units under the skin 3 times a day before meals. 45 mL 0   • lisinopril (PRINIVIL) 20 MG Tab Take 1 tablet by mouth every day. 100 tablet 3   • Empagliflozin (JARDIANCE) 25 MG Tab Take 25 mg by mouth every day. 100 tablet 3   • carvedilol (COREG) 25 MG Tab Take 1 tablet by mouth 2 times a day with meals. 200 tablet 3   • doxazosin (CARDURA) 2 MG Tab Take 1 tablet by mouth every day. 100 tablet 3   • atorvastatin (LIPITOR) 40 MG Tab Take 1 tablet by mouth every day. 100 tablet 3   • erythromycin 5 MG/GM Ointment Place 1 Application in both eyes 4 times a day. If improved after 2 days can reduce to twice daily administration 3.5 g 0   • ergocalciferol (DRISDOL) 21279 UNIT capsule Take 1 Cap by mouth every 7 days. 12 Cap 3   • Multiple Vitamins-Minerals (MULTIVITAMIN PO) Take  by mouth.     • aspirin EC (ECOTRIN) 81 MG Tablet Delayed Response Take 81 mg by mouth every day.       No current facility-administered medications for this visit.     He  has a past medical history of ACC/AHA stage C systolic heart failure ([facility]) (6/21/2018), Acute exacerbation of CHF (congestive heart failure) ([facility]) (6/25/2018), CAD (coronary artery disease), CHF (congestive heart failure) ([facility]), Chronic systolic congestive heart failure, NYHA class 1 ([facility]) (7/23/2018), Diabetes ([facility]), Dilated cardiomyopathy ([facility]), Hyperlipidemia, Hypertension, NYHA class 3 acute on chronic systolic heart failure ([facility]) (6/25/2018), Olecranon bursitis of right elbow (6/17/2019), and Severe aortic stenosis (6/25/2018). He also has no past medical history of Encounter for long-term (current) use  "of other medications.    ROS   Review of Systems   Constitutional: Negative for chills and fever.   Respiratory: Negative for shortness of breath and wheezing.    Cardiovascular: Negative for chest pain and palpitations.   Gastrointestinal: Negative for abdominal pain, nausea and vomiting.   Neurological: Negative for dizziness and headaches.   Psychiatric/Behavioral: Negative for depression. The patient is not nervous/anxious.       Objective:     Physical Exam:  /74 (BP Location: Left arm, Patient Position: Sitting, BP Cuff Size: Adult)   Pulse 78   Temp 36.2 °C (97.1 °F)   Resp 14   Ht 1.727 m (5' 7.99\")   Wt 87.9 kg (193 lb 12.8 oz)   SpO2 96%  Body mass index is 29.48 kg/m².   Constitutional: Alert, no distress.  Skin: Warm, dry, good turgor, no rashes in visible areas.  Eye: Equal, round and reactive, conjunctiva clear, lids normal.  ENMT: TM's clear bilaterally, lips without lesions, good dentition, oropharynx clear.  Neck: Trachea midline, no masses, no thyromegaly. No cervical or supraclavicular lymphadenopathy.  Respiratory: Unlabored respiratory effort, lungs clear to auscultation, no wheezes, no rhonchi.  Cardiovascular: Normal S1, S2, no murmur, no edema.  Abdomen: Soft, non-tender, no masses, no hepatosplenomegaly.  Psych: Alert and oriented x3, normal affect and mood.    Assessment and Plan:     1. Type 2 diabetes mellitus with hyperglycemia, with long-term current use of insulin (HCC)  -Stable.  Refill medications at this time.  Discussed with patient importance of continue to work on appropriate diet and exercise regimen.  He states that he has been a lot more regimented and careful with his diet since the holidays have been over.  We will recheck hemoglobin A1c in 3 months and follow-up at that time.  -We will continue with all medications the no changes.  We will discuss changes if needed at next appointment.  - Comp Metabolic Panel; Future  - HEMOGLOBIN A1C; Future  - Lipid Profile; " Future  - MICROALBUMIN CREAT RATIO URINE; Future  - Dulaglutide (TRULICITY) 3 MG/0.5ML Solution Pen-injector; 3 mg by Subconjunctival route every 7 days for 90 days.  Dispense: 12 mL; Refill: 3  - insulin glargine (LANTUS SOLOSTAR) 100 UNIT/ML Solution Pen-injector injection; Inject 18 Units under the skin every evening for 90 days.  Dispense: 18 mL; Refill: 3    2. Skin cancer screening  - Referral to Dermatology      Followup: Return in about 3 months (around 4/24/2022).         PLEASE NOTE: This dictation was created using voice recognition software. I have made every reasonable attempt to correct obvious errors, but I expect that there are errors of grammar and possibly content that I did not discover before finalizing the note.

## 2022-01-26 ENCOUNTER — TELEPHONE (OUTPATIENT)
Dept: MEDICAL GROUP | Facility: LAB | Age: 72
End: 2022-01-26

## 2022-01-26 DIAGNOSIS — E11.65 TYPE 2 DIABETES MELLITUS WITH HYPERGLYCEMIA, WITH LONG-TERM CURRENT USE OF INSULIN (HCC): ICD-10-CM

## 2022-01-26 DIAGNOSIS — Z79.4 TYPE 2 DIABETES MELLITUS WITH HYPERGLYCEMIA, WITH LONG-TERM CURRENT USE OF INSULIN (HCC): ICD-10-CM

## 2022-01-26 RX ORDER — INSULIN GLARGINE 100 [IU]/ML
18 INJECTION, SOLUTION SUBCUTANEOUS EVERY EVENING
Qty: 18 ML | Refills: 3 | Status: SHIPPED | OUTPATIENT
Start: 2022-01-26 | End: 2022-04-26

## 2022-01-26 NOTE — TELEPHONE ENCOUNTER
[facility]  Insurance does not cover LANTUS SOLO pen  Please consider BASAGLAR LEVEMIT or TRESIBA  Or obtain a PAR on the prescribed medication

## 2022-01-27 NOTE — TELEPHONE ENCOUNTER
Marcel.  Originally sent to the wrong pharmacy.  Basaglar prescription sent to [facility].  Thank you.

## 2022-03-02 ENCOUNTER — APPOINTMENT (RX ONLY)
Dept: URBAN - METROPOLITAN AREA CLINIC 6 | Facility: CLINIC | Age: 72
Setting detail: DERMATOLOGY
End: 2022-03-02

## 2022-03-02 DIAGNOSIS — L82.1 OTHER SEBORRHEIC KERATOSIS: ICD-10-CM

## 2022-03-02 DIAGNOSIS — D22 MELANOCYTIC NEVI: ICD-10-CM

## 2022-03-02 DIAGNOSIS — L81.4 OTHER MELANIN HYPERPIGMENTATION: ICD-10-CM

## 2022-03-02 DIAGNOSIS — L57.0 ACTINIC KERATOSIS: ICD-10-CM

## 2022-03-02 PROBLEM — D48.5 NEOPLASM OF UNCERTAIN BEHAVIOR OF SKIN: Status: ACTIVE | Noted: 2022-03-02

## 2022-03-02 PROBLEM — D22.62 MELANOCYTIC NEVI OF LEFT UPPER LIMB, INCLUDING SHOULDER: Status: ACTIVE | Noted: 2022-03-02

## 2022-03-02 PROBLEM — D22.5 MELANOCYTIC NEVI OF TRUNK: Status: ACTIVE | Noted: 2022-03-02

## 2022-03-02 PROBLEM — D23.72 OTHER BENIGN NEOPLASM OF SKIN OF LEFT LOWER LIMB, INCLUDING HIP: Status: ACTIVE | Noted: 2022-03-02

## 2022-03-02 PROBLEM — D22.39 MELANOCYTIC NEVI OF OTHER PARTS OF FACE: Status: ACTIVE | Noted: 2022-03-02

## 2022-03-02 PROBLEM — D22.61 MELANOCYTIC NEVI OF RIGHT UPPER LIMB, INCLUDING SHOULDER: Status: ACTIVE | Noted: 2022-03-02

## 2022-03-02 PROCEDURE — ? PHOTO-DOCUMENTATION

## 2022-03-02 PROCEDURE — 99203 OFFICE O/P NEW LOW 30 MIN: CPT | Mod: 25

## 2022-03-02 PROCEDURE — 11102 TANGNTL BX SKIN SINGLE LES: CPT

## 2022-03-02 PROCEDURE — 17000 DESTRUCT PREMALG LESION: CPT | Mod: 59

## 2022-03-02 PROCEDURE — 17003 DESTRUCT PREMALG LES 2-14: CPT

## 2022-03-02 PROCEDURE — ? LIQUID NITROGEN

## 2022-03-02 PROCEDURE — ? COUNSELING

## 2022-03-02 PROCEDURE — 11103 TANGNTL BX SKIN EA SEP/ADDL: CPT

## 2022-03-02 PROCEDURE — ? BIOPSY BY SHAVE METHOD

## 2022-03-02 ASSESSMENT — LOCATION DETAILED DESCRIPTION DERM
LOCATION DETAILED: RIGHT SUPERIOR HELIX
LOCATION DETAILED: STERNUM
LOCATION DETAILED: LEFT PROXIMAL DORSAL FOREARM
LOCATION DETAILED: RIGHT VENTRAL DISTAL FOREARM
LOCATION DETAILED: LEFT SUPERIOR MEDIAL MIDBACK
LOCATION DETAILED: RIGHT FOREHEAD
LOCATION DETAILED: RIGHT ANTERIOR DISTAL UPPER ARM
LOCATION DETAILED: LEFT VENTRAL PROXIMAL FOREARM
LOCATION DETAILED: LEFT ANTERIOR DISTAL UPPER ARM
LOCATION DETAILED: EPIGASTRIC SKIN
LOCATION DETAILED: SUPERIOR THORACIC SPINE
LOCATION DETAILED: RIGHT DISTAL DORSAL FOREARM
LOCATION DETAILED: RIGHT INFERIOR MEDIAL FOREHEAD
LOCATION DETAILED: RIGHT INFERIOR FOREHEAD
LOCATION DETAILED: LEFT MEDIAL INFERIOR CHEST
LOCATION DETAILED: LEFT SUPERIOR CENTRAL MALAR CHEEK
LOCATION DETAILED: RIGHT CENTRAL MALAR CHEEK
LOCATION DETAILED: LEFT DISTAL DORSAL FOREARM
LOCATION DETAILED: RIGHT PROXIMAL DORSAL FOREARM

## 2022-03-02 ASSESSMENT — LOCATION SIMPLE DESCRIPTION DERM
LOCATION SIMPLE: LEFT LOWER BACK
LOCATION SIMPLE: RIGHT EAR
LOCATION SIMPLE: CHEST
LOCATION SIMPLE: LEFT UPPER ARM
LOCATION SIMPLE: RIGHT CHEEK
LOCATION SIMPLE: ABDOMEN
LOCATION SIMPLE: LEFT FOREARM
LOCATION SIMPLE: UPPER BACK
LOCATION SIMPLE: RIGHT FOREHEAD
LOCATION SIMPLE: LEFT CHEEK
LOCATION SIMPLE: RIGHT FOREARM
LOCATION SIMPLE: RIGHT UPPER ARM

## 2022-03-02 ASSESSMENT — LOCATION ZONE DERM
LOCATION ZONE: EAR
LOCATION ZONE: FACE
LOCATION ZONE: ARM
LOCATION ZONE: TRUNK

## 2022-03-02 NOTE — PROCEDURE: LIQUID NITROGEN
Duration Of Freeze Thaw-Cycle (Seconds): 8
Post-Care Instructions: I reviewed with the patient in detail post-care instructions. Patient is to wear sunprotection, and avoid picking at any of the treated lesions. Pt may apply Vaseline to crusted or scabbing areas.
Show Applicator Variable?: Yes
Detail Level: Zone
Consent: The patient's consent was obtained including but not limited to risks of crusting, scabbing, blistering, scarring, darker or lighter pigmentary change, recurrence, incomplete removal and infection.
Render Post-Care Instructions In Note?: no
Number Of Freeze-Thaw Cycles: 2 freeze-thaw cycles

## 2022-03-02 NOTE — PROCEDURE: PHOTO-DOCUMENTATION
Photo Preface (Leave Blank If You Do Not Want): Photographs were obtained today
Details (Free Text): Differential includes angioma, upon incision lesion flattened and bleed profusely. Will continue to monitor and will recheck at follow-up visit.
Detail Level: Detailed

## 2022-03-21 RX ORDER — PROCHLORPERAZINE 25 MG/1
SUPPOSITORY RECTAL
Qty: 1 EACH | Refills: 0 | Status: SHIPPED | OUTPATIENT
Start: 2022-03-21 | End: 2023-10-17

## 2022-03-21 RX ORDER — PROCHLORPERAZINE 25 MG/1
SUPPOSITORY RECTAL
Qty: 1 EACH | Refills: 0 | Status: SHIPPED | OUTPATIENT
Start: 2022-03-21 | End: 2023-06-19 | Stop reason: SDUPTHER

## 2022-03-25 ENCOUNTER — APPOINTMENT (RX ONLY)
Dept: URBAN - METROPOLITAN AREA CLINIC 36 | Facility: CLINIC | Age: 72
Setting detail: DERMATOLOGY
End: 2022-03-25

## 2022-03-25 PROBLEM — D04.39 CARCINOMA IN SITU OF SKIN OF OTHER PARTS OF FACE: Status: ACTIVE | Noted: 2022-03-25

## 2022-03-25 PROCEDURE — 17311 MOHS 1 STAGE H/N/HF/G: CPT

## 2022-03-25 PROCEDURE — ? MOHS SURGERY

## 2022-03-25 PROCEDURE — 13132 CMPLX RPR F/C/C/M/N/AX/G/H/F: CPT

## 2022-03-25 NOTE — PROCEDURE: MIPS QUALITY
Quality 431: Preventive Care And Screening: Unhealthy Alcohol Use - Screening: Patient not identified as an unhealthy alcohol user when screened for unhealthy alcohol use using a systematic screening method
Quality 226: Preventive Care And Screening: Tobacco Use: Screening And Cessation Intervention: Patient screened for tobacco use and is an ex/non-smoker
Detail Level: Detailed
Quality 111:Pneumonia Vaccination Status For Older Adults: Pneumococcal Vaccination Previously Received
Quality 265: Biopsy Follow-Up: Biopsy results reviewed, communicated, tracked, and documented
Quality 130: Documentation Of Current Medications In The Medical Record: Current Medications Documented

## 2022-03-25 NOTE — PROCEDURE: MOHS SURGERY
Stage 11: Number Of Blocks?: 0
Asc Procedure Text (B): After obtaining clear surgical margins the patient was sent to an ASC for surgical repair.  The patient understands they will receive post-surgical care and follow-up from the ASC physician.
Bilobed Transposition Flap Text: The defect edges were debeveled with a #15 scalpel blade.  Given the location of the defect and the proximity to free margins a bilobed transposition flap was deemed most appropriate.  Using a sterile surgical marker, an appropriate bilobe flap drawn around the defect.    The area thus outlined was incised deep to adipose tissue with a #15 scalpel blade.  The skin margins were undermined to an appropriate distance in all directions utilizing iris scissors.
Show Repair Assistants Variable: Yes
Is There Clinical Or Radiological Involvement Of A Named Nerve (Microscopic Perineural Invasion Handled In Stages Tab)?: No
No Repair - Repaired With Adjacent Surgical Defect Text (Leave Blank If You Do Not Want): After obtaining clear surgical margins the defect was repaired concurrently with another surgical defect which was in close approximation.
Mid-Level Procedure Text (A): After obtaining clear surgical margins the patient was sent to a mid-level provider for surgical repair.  The patient understands they will receive post-surgical care and follow-up from the mid-level provider.
Referring Physician (Optional): Brad Pompa MD
No Residual Tumor Seen Histology Text: There were no malignant cells seen in the sections examined.
Crescentic Complex Repair Preamble Text (Leave Blank If You Do Not Want): Extensive wide undermining was performed.
Home Suture Removal Text: Patient was provided instructions on removing sutures and will remove their sutures at home.  If they have any questions or difficulties they will call the office.
Bilobed Flap Text: The defect edges were debeveled with a #15 scalpel blade.  Given the location of the defect and the proximity to free margins a bilobe flap was deemed most appropriate.  Using a sterile surgical marker, an appropriate bilobe flap drawn around the defect.    The area thus outlined was incised deep to adipose tissue with a #15 scalpel blade.  The skin margins were undermined to an appropriate distance in all directions utilizing iris scissors.
Information: Selecting Yes will display possible errors in your note based on the variables you have selected. This validation is only offered as a suggestion for you. PLEASE NOTE THAT THE VALIDATION TEXT WILL BE REMOVED WHEN YOU FINALIZE YOUR NOTE. IF YOU WANT TO FAX A PRELIMINARY NOTE YOU WILL NEED TO TOGGLE THIS TO 'NO' IF YOU DO NOT WANT IT IN YOUR FAXED NOTE.
Burow's Advancement Flap Text: The defect edges were debeveled with a #15 scalpel blade.  Given the location of the defect and the proximity to free margins a Burow's advancement flap was deemed most appropriate.  Using a sterile surgical marker, the appropriate advancement flap was drawn incorporating the defect and placing the expected incisions within the relaxed skin tension lines where possible.    The area thus outlined was incised deep to adipose tissue with a #15 scalpel blade.  The skin margins were undermined to an appropriate distance in all directions utilizing iris scissors.
Oculoplastic Surgeon Procedure Text (F): After obtaining clear surgical margins the patient was sent to oculoplastics for surgical repair.  The patient understands they will receive post-surgical care and follow-up from the referring physician's office.
Posterior Auricular Interpolation Flap Text: A decision was made to reconstruct the defect utilizing an interpolation axial flap and a staged reconstruction.  A telfa template was made of the defect.  This telfa template was then used to outline the posterior auricular interpolation flap.  The donor area for the pedicle flap was then injected with anesthesia.  The flap was excised through the skin and subcutaneous tissue down to the layer of the underlying musculature.  The pedicle flap was carefully excised within this deep plane to maintain its blood supply.  The edges of the donor site were undermined.   The donor site was closed in a primary fashion.  The pedicle was then rotated into position and sutured.  Once the tube was sutured into place, adequate blood supply was confirmed with blanching and refill.  The pedicle was then wrapped with xeroform gauze and dressed appropriately with a telfa and gauze bandage to ensure continued blood supply and protect the attached pedicle.
Otolaryngologist Procedure Text (A): After obtaining clear surgical margins the patient was sent to otolaryngology for surgical repair.  The patient understands they will receive post-surgical care and follow-up from the referring physician's office.
Surgeon: Martín Arriaga MD
Primary Defect Length In Cm (Final Defect Size - Required For Flaps/Grafts): 1.2
Is There Documentation In The Chart Showing Discussion Of Changes With Another Physician?: Please Select the Appropriate Response
Island Pedicle Flap Text: The defect edges were debeveled with a #15 scalpel blade.  Given the location of the defect, shape of the defect and the proximity to free margins an island pedicle advancement flap was deemed most appropriate.  Using a sterile surgical marker, an appropriate advancement flap was drawn incorporating the defect, outlining the appropriate donor tissue and placing the expected incisions within the relaxed skin tension lines where possible.    The area thus outlined was incised deep to adipose tissue with a #15 scalpel blade.  The skin margins were undermined to an appropriate distance in all directions around the primary defect and laterally outward around the island pedicle utilizing iris scissors.  There was minimal undermining beneath the pedicle flap.
Special Stains Stage 4 - Results: Base On Clearance Noted Above
Double O-Z Plasty Text: The defect edges were debeveled with a #15 scalpel blade.  Given the location of the defect, shape of the defect and the proximity to free margins a Double O-Z plasty (double transposition flap) was deemed most appropriate.  Using a sterile surgical marker, the appropriate transposition flaps were drawn incorporating the defect and placing the expected incisions within the relaxed skin tension lines where possible. The area thus outlined was incised deep to adipose tissue with a #15 scalpel blade.  The skin margins were undermined to an appropriate distance in all directions utilizing iris scissors.  Hemostasis was achieved with electrocautery.  The flaps were then transposed into place, one clockwise and the other counterclockwise, and anchored with interrupted buried subcutaneous sutures.
Initial Size Of Lesion: 0.8
Tissue Cultured Epidermal Autograft Text: The defect edges were debeveled with a #15 scalpel blade.  Given the location of the defect, shape of the defect and the proximity to free margins a tissue cultured epidermal autograft was deemed most appropriate.  The graft was then trimmed to fit the size of the defect.  The graft was then placed in the primary defect and oriented appropriately.
Stage 8: Additional Anesthesia Type: 1% lidocaine with epinephrine
Repair Hemostasis (Optional): Pinpoint electrocautery
Referred To Plastics For Closure Text (Leave Blank If You Do Not Want): After obtaining clear surgical margins the patient was sent to plastics for surgical repair.  The patient understands they will receive post-surgical care and follow-up from the referring physician's office.
M-Plasty Intermediate Repair Preamble Text (Leave Blank If You Do Not Want): Undermining was performed with blunt dissection.
Double Island Pedicle Flap Text: The defect edges were debeveled with a #15 scalpel blade.  Given the location of the defect, shape of the defect and the proximity to free margins a double island pedicle advancement flap was deemed most appropriate.  Using a sterile surgical marker, an appropriate advancement flap was drawn incorporating the defect, outlining the appropriate donor tissue and placing the expected incisions within the relaxed skin tension lines where possible.    The area thus outlined was incised deep to adipose tissue with a #15 scalpel blade.  The skin margins were undermined to an appropriate distance in all directions around the primary defect and laterally outward around the island pedicle utilizing iris scissors.  There was minimal undermining beneath the pedicle flap.
Hemigard Retention Suture: 0-0 Nylon
Staged Advancement Flap Text: The defect edges were debeveled with a #15 scalpel blade.  Given the location of the defect, shape of the defect and the proximity to free margins a staged advancement flap was deemed most appropriate.  Using a sterile surgical marker, an appropriate advancement flap was drawn incorporating the defect and placing the expected incisions within the relaxed skin tension lines where possible. The area thus outlined was incised deep to adipose tissue with a #15 scalpel blade.  The skin margins were undermined to an appropriate distance in all directions utilizing iris scissors.
Post-Care Instructions: I reviewed with the patient in detail post-care instructions. Patient is not to engage in any heavy lifting, exercise, or swimming for the next 14 days. Should the patient develop any fevers, chills, bleeding, severe pain patient will contact the office immediately.
Consent 3/Introductory Paragraph: I gave the patient a chance to ask questions they had about the procedure.  Following this I explained the Mohs procedure and consent was obtained. The risks, benefits and alternatives to therapy were discussed in detail. Specifically, the risks of infection, scarring, bleeding, prolonged wound healing, incomplete removal, allergy to anesthesia, nerve injury and recurrence were addressed. Prior to the procedure, the treatment site was clearly identified and confirmed by the patient. All components of Universal Protocol/PAUSE Rule completed.
Hemostasis: Electrocautery
Distance Of Undermining In Cm (Required): 1.5
Vermilion Border Text: The closure involved the vermilion border.
Mohs Histo Method Verbiage: Each section was then chromacoded and processed in the Mohs lab using the Mohs protocol and submitted for frozen section.
Advancement Flap (Double) Text: The defect edges were debeveled with a #15 scalpel blade.  Given the location of the defect and the proximity to free margins a double advancement flap was deemed most appropriate.  Using a sterile surgical marker, the appropriate advancement flaps were drawn incorporating the defect and placing the expected incisions within the relaxed skin tension lines where possible.    The area thus outlined was incised deep to adipose tissue with a #15 scalpel blade.  The skin margins were undermined to an appropriate distance in all directions utilizing iris scissors.
Melolabial Interpolation Flap Text: A decision was made to reconstruct the defect utilizing an interpolation axial flap and a staged reconstruction.  A telfa template was made of the defect.  This telfa template was then used to outline the melolabial interpolation flap.  The donor area for the pedicle flap was then injected with anesthesia.  The flap was excised through the skin and subcutaneous tissue down to the layer of the underlying musculature.  The pedicle flap was carefully excised within this deep plane to maintain its blood supply.  The edges of the donor site were undermined.   The donor site was closed in a primary fashion.  The pedicle was then rotated into position and sutured.  Once the tube was sutured into place, adequate blood supply was confirmed with blanching and refill.  The pedicle was then wrapped with xeroform gauze and dressed appropriately with a telfa and gauze bandage to ensure continued blood supply and protect the attached pedicle.
Hatchet Flap Text: The defect edges were debeveled with a #15 scalpel blade.  Given the location of the defect, shape of the defect and the proximity to free margins a hatchet flap was deemed most appropriate.  Using a sterile surgical marker, an appropriate hatchet flap was drawn incorporating the defect and placing the expected incisions within the relaxed skin tension lines where possible.    The area thus outlined was incised deep to adipose tissue with a #15 scalpel blade.  The skin margins were undermined to an appropriate distance in all directions utilizing iris scissors.
Mastoid Interpolation Flap Text: A decision was made to reconstruct the defect utilizing an interpolation axial flap and a staged reconstruction.  A telfa template was made of the defect.  This telfa template was then used to outline the mastoid interpolation flap.  The donor area for the pedicle flap was then injected with anesthesia.  The flap was excised through the skin and subcutaneous tissue down to the layer of the underlying musculature.  The pedicle flap was carefully excised within this deep plane to maintain its blood supply.  The edges of the donor site were undermined.   The donor site was closed in a primary fashion.  The pedicle was then rotated into position and sutured.  Once the tube was sutured into place, adequate blood supply was confirmed with blanching and refill.  The pedicle was then wrapped with xeroform gauze and dressed appropriately with a telfa and gauze bandage to ensure continued blood supply and protect the attached pedicle.
Star Wedge Flap Text: The defect edges were debeveled with a #15 scalpel blade.  Given the location of the defect, shape of the defect and the proximity to free margins a star wedge flap was deemed most appropriate.  Using a sterile surgical marker, an appropriate rotation flap was drawn incorporating the defect and placing the expected incisions within the relaxed skin tension lines where possible. The area thus outlined was incised deep to adipose tissue with a #15 scalpel blade.  The skin margins were undermined to an appropriate distance in all directions utilizing iris scissors.
Island Pedicle Flap With Canthal Suspension Text: The defect edges were debeveled with a #15 scalpel blade.  Given the location of the defect, shape of the defect and the proximity to free margins an island pedicle advancement flap was deemed most appropriate.  Using a sterile surgical marker, an appropriate advancement flap was drawn incorporating the defect, outlining the appropriate donor tissue and placing the expected incisions within the relaxed skin tension lines where possible. The area thus outlined was incised deep to adipose tissue with a #15 scalpel blade.  The skin margins were undermined to an appropriate distance in all directions around the primary defect and laterally outward around the island pedicle utilizing iris scissors.  There was minimal undermining beneath the pedicle flap. A suspension suture was placed in the canthal tendon to prevent tension and prevent ectropion.
Mustarde Flap Text: The defect edges were debeveled with a #15 scalpel blade.  Given the size, depth and location of the defect and the proximity to free margins a Mustarde flap was deemed most appropriate.  Using a sterile surgical marker, an appropriate flap was drawn incorporating the defect. The area thus outlined was incised with a #15 scalpel blade.  The skin margins were undermined to an appropriate distance in all directions utilizing iris scissors.
V-Y Flap Text: The defect edges were debeveled with a #15 scalpel blade.  Given the location of the defect, shape of the defect and the proximity to free margins a V-Y flap was deemed most appropriate.  Using a sterile surgical marker, an appropriate advancement flap was drawn incorporating the defect and placing the expected incisions within the relaxed skin tension lines where possible.    The area thus outlined was incised deep to adipose tissue with a #15 scalpel blade.  The skin margins were undermined to an appropriate distance in all directions utilizing iris scissors.
Postop Diagnosis: same
Consent (Scalp)/Introductory Paragraph: The rationale for Mohs was explained to the patient and consent was obtained. The risks, benefits and alternatives to therapy were discussed in detail. Specifically, the risks of changes in hair growth pattern secondary to repair, infection, scarring, bleeding, prolonged wound healing, incomplete removal, allergy to anesthesia, nerve injury and recurrence were addressed. Prior to the procedure, the treatment site was clearly identified and confirmed by the patient. All components of Universal Protocol/PAUSE Rule completed.
Epidermal Closure Graft Donor Site (Optional): running
Melolabial Transposition Flap Text: The defect edges were debeveled with a #15 scalpel blade.  Given the location of the defect and the proximity to free margins a melolabial flap was deemed most appropriate.  Using a sterile surgical marker, an appropriate melolabial transposition flap was drawn incorporating the defect.    The area thus outlined was incised deep to adipose tissue with a #15 scalpel blade.  The skin margins were undermined to an appropriate distance in all directions utilizing iris scissors.
Chonodrocutaneous Helical Advancement Flap Text: The defect edges were debeveled with a #15 scalpel blade.  Given the location of the defect and the proximity to free margins a chondrocutaneous helical advancement flap was deemed most appropriate.  Using a sterile surgical marker, the appropriate advancement flap was drawn incorporating the defect and placing the expected incisions within the relaxed skin tension lines where possible.    The area thus outlined was incised deep to adipose tissue with a #15 scalpel blade.  The skin margins were undermined to an appropriate distance in all directions utilizing iris scissors.
Helical Rim Advancement Flap Text: The defect edges were debeveled with a #15 blade scalpel.  Given the location of the defect and the proximity to free margins (helical rim) a double helical rim advancement flap was deemed most appropriate.  Using a sterile surgical marker, the appropriate advancement flaps were drawn incorporating the defect and placing the expected incisions between the helical rim and antihelix where possible.  The area thus outlined was incised through and through with a #15 scalpel blade.  With a skin hook and iris scissors, the flaps were gently and sharply undermined and freed up.
Repair Anesthesia Type: 1% lidocaine with epinephrine and 0.25% bupivacaine in a 2:3 ration buffered with 8.4% sodium bicarbonate
Consent (Marginal Mandibular)/Introductory Paragraph: The rationale for Mohs was explained to the patient and consent was obtained. The risks, benefits and alternatives to therapy were discussed in detail. Specifically, the risks of damage to the marginal mandibular branch of the facial nerve, infection, scarring, bleeding, prolonged wound healing, incomplete removal, allergy to anesthesia, and recurrence were addressed. Prior to the procedure, the treatment site was clearly identified and confirmed by the patient. All components of Universal Protocol/PAUSE Rule completed.
Partial Purse String (Simple) Text: Given the location of the defect and the characteristics of the surrounding skin a simple purse string closure was deemed most appropriate.  Undermining was performed circumfirentially around the surgical defect.  A purse string suture was then placed and tightened. Wound tension only allowed a partial closure of the circular defect.
Provider Procedure Text (B): After obtaining clear surgical margins the defect was repaired by another provider.
Complex Repair And Flap Additional Text (Will Appearing After The Standard Complex Repair Text): The complex repair was not sufficient to completely close the primary defect. The remaining additional defect was repaired with the flap mentioned below.
Paramedian Forehead Flap Text: A decision was made to reconstruct the defect utilizing an interpolation axial flap and a staged reconstruction.  A telfa template was made of the defect.  This telfa template was then used to outline the paramedian forehead pedicle flap.  The donor area for the pedicle flap was then injected with anesthesia.  The flap was excised through the skin and subcutaneous tissue down to the layer of the underlying musculature.  The pedicle flap was carefully excised within this deep plane to maintain its blood supply.  The edges of the donor site were undermined.   The donor site was closed in a primary fashion.  The pedicle was then rotated into position and sutured.  Once the tube was sutured into place, adequate blood supply was confirmed with blanching and refill.  The pedicle was then wrapped with xeroform gauze and dressed appropriately with a telfa and gauze bandage to ensure continued blood supply and protect the attached pedicle.
Cheek-To-Nose Interpolation Flap Text: A decision was made to reconstruct the defect utilizing an interpolation axial flap and a staged reconstruction.  A telfa template was made of the defect.  This telfa template was then used to outline the Cheek-To-Nose Interpolation flap.  The donor area for the pedicle flap was then injected with anesthesia.  The flap was excised through the skin and subcutaneous tissue down to the layer of the underlying musculature.  The interpolation flap was carefully excised within this deep plane to maintain its blood supply.  The edges of the donor site were undermined.   The donor site was closed in a primary fashion.  The pedicle was then rotated into position and sutured.  Once the tube was sutured into place, adequate blood supply was confirmed with blanching and refill.  The pedicle was then wrapped with xeroform gauze and dressed appropriately with a telfa and gauze bandage to ensure continued blood supply and protect the attached pedicle.
Alternatives Discussed Intro (Do Not Add Period): I discussed alternative treatments to Mohs surgery and specifically discussed the risks and benefits of
Wound Care: Vaseline
Transposition Flap Text: The defect edges were debeveled with a #15 scalpel blade.  Given the location of the defect and the proximity to free margins a transposition flap was deemed most appropriate.  Using a sterile surgical marker, an appropriate transposition flap was drawn incorporating the defect.    The area thus outlined was incised deep to adipose tissue with a #15 scalpel blade.  The skin margins were undermined to an appropriate distance in all directions utilizing iris scissors.
Secondary Intention Text (Leave Blank If You Do Not Want): The defect will heal with secondary intention.
Crescentic Advancement Flap Text: The defect edges were debeveled with a #15 scalpel blade.  Given the location of the defect and the proximity to free margins a crescentic advancement flap was deemed most appropriate.  Using a sterile surgical marker, the appropriate advancement flap was drawn incorporating the defect and placing the expected incisions within the relaxed skin tension lines where possible.    The area thus outlined was incised deep to adipose tissue with a #15 scalpel blade.  The skin margins were undermined to an appropriate distance in all directions utilizing iris scissors.
Previous Accession (Optional): [id number]
Simple / Intermediate / Complex Repair - Final Wound Length In Cm: 3
Double O-Z Flap Text: The defect edges were debeveled with a #15 scalpel blade.  Given the location of the defect, shape of the defect and the proximity to free margins a Double O-Z flap was deemed most appropriate.  Using a sterile surgical marker, an appropriate transposition flap was drawn incorporating the defect and placing the expected incisions within the relaxed skin tension lines where possible. The area thus outlined was incised deep to adipose tissue with a #15 scalpel blade.  The skin margins were undermined to an appropriate distance in all directions utilizing iris scissors.
Bi-Rhombic Flap Text: The defect edges were debeveled with a #15 scalpel blade.  Given the location of the defect and the proximity to free margins a bi-rhombic flap was deemed most appropriate.  Using a sterile surgical marker, an appropriate rhombic flap was drawn incorporating the defect. The area thus outlined was incised deep to adipose tissue with a #15 scalpel blade.  The skin margins were undermined to an appropriate distance in all directions utilizing iris scissors.
Inflammation Suggestive Of Cancer Camouflage Histology Text: There was a dense lymphocytic infiltrate which prevented adequate histologic evaluation of adjacent structures.
Epidermal Autograft Text: The defect edges were debeveled with a #15 scalpel blade.  Given the location of the defect, shape of the defect and the proximity to free margins an epidermal autograft was deemed most appropriate.  Using a sterile surgical marker, the primary defect shape was transferred to the donor site. The epidermal graft was then harvested.  The skin graft was then placed in the primary defect and oriented appropriately.
Cheek Interpolation Flap Text: A decision was made to reconstruct the defect utilizing an interpolation axial flap and a staged reconstruction.  A telfa template was made of the defect.  This telfa template was then used to outline the Cheek Interpolation flap.  The donor area for the pedicle flap was then injected with anesthesia.  The flap was excised through the skin and subcutaneous tissue down to the layer of the underlying musculature.  The interpolation flap was carefully excised within this deep plane to maintain its blood supply.  The edges of the donor site were undermined.   The donor site was closed in a primary fashion.  The pedicle was then rotated into position and sutured.  Once the tube was sutured into place, adequate blood supply was confirmed with blanching and refill.  The pedicle was then wrapped with xeroform gauze and dressed appropriately with a telfa and gauze bandage to ensure continued blood supply and protect the attached pedicle.
Banner Transposition Flap Text: The defect edges were debeveled with a #15 scalpel blade.  Given the location of the defect and the proximity to free margins a Banner transposition flap was deemed most appropriate.  Using a sterile surgical marker, an appropriate flap drawn around the defect. The area thus outlined was incised deep to adipose tissue with a #15 scalpel blade.  The skin margins were undermined to an appropriate distance in all directions utilizing iris scissors.
Graft Donor Site Bandage (Optional-Leave Blank If You Don't Want In Note): Aquaplast was fitted to the graft site and sewn into place. A pressure bandage were applied to the donor site and over the aquaplast bolster.
Repair Type: Complex Repair
Spiral Flap Text: The defect edges were debeveled with a #15 scalpel blade.  Given the location of the defect, shape of the defect and the proximity to free margins a spiral flap was deemed most appropriate.  Using a sterile surgical marker, an appropriate rotation flap was drawn incorporating the defect and placing the expected incisions within the relaxed skin tension lines where possible. The area thus outlined was incised deep to adipose tissue with a #15 scalpel blade.  The skin margins were undermined to an appropriate distance in all directions utilizing iris scissors.
Consent (Near Eyelid Margin)/Introductory Paragraph: The rationale for Mohs was explained to the patient and consent was obtained. The risks, benefits and alternatives to therapy were discussed in detail. Specifically, the risks of ectropion or eyelid deformity, infection, scarring, bleeding, prolonged wound healing, incomplete removal, allergy to anesthesia, nerve injury and recurrence were addressed. Prior to the procedure, the treatment site was clearly identified and confirmed by the patient. All components of Universal Protocol/PAUSE Rule completed.
Tumor Depth: Less than 6mm from granular layer and no invasion beyond the subcutaneous fat
Unna Boot Text: An Unna boot was placed to help immobilize the limb and facilitate more rapid healing.
Localized Dermabrasion With Wire Brush Text: The patient was draped in routine manner.  Localized dermabrasion using 3 x 17 mm wire brush was performed in routine manner to papillary dermis. This spot dermabrasion is being performed to complete skin cancer reconstruction. It also will eliminate the other sun damaged precancerous cells that are known to be part of the regional effect of a lifetime's worth of sun exposure. This localized dermabrasion is therapeutic and should not be considered cosmetic in any regard.
V-Y Plasty Text: The defect edges were debeveled with a #15 scalpel blade.  Given the location of the defect, shape of the defect and the proximity to free margins an V-Y advancement flap was deemed most appropriate.  Using a sterile surgical marker, an appropriate advancement flap was drawn incorporating the defect and placing the expected incisions within the relaxed skin tension lines where possible.    The area thus outlined was incised deep to adipose tissue with a #15 scalpel blade.  The skin margins were undermined to an appropriate distance in all directions utilizing iris scissors.
Split-Thickness Skin Graft Text: The defect edges were debeveled with a #15 scalpel blade.  Given the location of the defect, shape of the defect and the proximity to free margins a split thickness skin graft was deemed most appropriate.  Using a sterile surgical marker, the primary defect shape was transferred to the donor site. The split thickness graft was then harvested.  The skin graft was then placed in the primary defect and oriented appropriately.
Consent (Nose)/Introductory Paragraph: The rationale for Mohs was explained to the patient and consent was obtained. The risks, benefits and alternatives to therapy were discussed in detail. Specifically, the risks of nasal deformity, changes in the flow of air through the nose, infection, scarring, bleeding, prolonged wound healing, incomplete removal, allergy to anesthesia, nerve injury and recurrence were addressed. Prior to the procedure, the treatment site was clearly identified and confirmed by the patient. All components of Universal Protocol/PAUSE Rule completed.
Stage 1: Number Of Blocks?: 1
Consent (Spinal Accessory)/Introductory Paragraph: The rationale for Mohs was explained to the patient and consent was obtained. The risks, benefits and alternatives to therapy were discussed in detail. Specifically, the risks of damage to the spinal accessory nerve, infection, scarring, bleeding, prolonged wound healing, incomplete removal, allergy to anesthesia, and recurrence were addressed. Prior to the procedure, the treatment site was clearly identified and confirmed by the patient. All components of Universal Protocol/PAUSE Rule completed.
Ear Star Wedge Flap Text: The defect edges were debeveled with a #15 blade scalpel.  Given the location of the defect and the proximity to free margins (helical rim) an ear star wedge flap was deemed most appropriate.  Using a sterile surgical marker, the appropriate flap was drawn incorporating the defect and placing the expected incisions between the helical rim and antihelix where possible.  The area thus outlined was incised through and through with a #15 scalpel blade.
Cheiloplasty (Complex) Text: A decision was made to reconstruct the defect with a  cheiloplasty.  The defect was undermined extensively.  Additional obicularis oris muscle was excised with a 15 blade scalpel.  The defect was converted into a full thickness wedge to facilite a better cosmetic result.  Small vessels were then tied off with 5-0 monocyrl. The obicularis oris, superficial fascia, adipose and dermis were then reapproximated.  After the deeper layers were approximated the epidermis was reapproximated with particular care given to realign the vermilion border.
Suture Removal: 10 days
Z Plasty Text: The lesion was extirpated to the level of the fat with a #15 scalpel blade.  Given the location of the defect, shape of the defect and the proximity to free margins a Z-plasty was deemed most appropriate for repair.  Using a sterile surgical marker, the appropriate transposition arms of the Z-plasty were drawn incorporating the defect and placing the expected incisions within the relaxed skin tension lines where possible.    The area thus outlined was incised deep to adipose tissue with a #15 scalpel blade.  The skin margins were undermined to an appropriate distance in all directions utilizing iris scissors.  The opposing transposition arms were then transposed into place in opposite direction and anchored with interrupted buried subcutaneous sutures.
Mohs Rapid Report Verbiage: The area of clinically evident tumor was marked with skin marking ink and appropriately hatched.  The initial incision was made following the Mohs approach through the skin.  The specimen was taken to the lab, divided into the necessary number of pieces, chromacoded and processed according to the Mohs protocol.  This was repeated in successive stages until a tumor free defect was achieved.
O-T Advancement Flap Text: The defect edges were debeveled with a #15 scalpel blade.  Given the location of the defect, shape of the defect and the proximity to free margins an O-T advancement flap was deemed most appropriate.  Using a sterile surgical marker, an appropriate advancement flap was drawn incorporating the defect and placing the expected incisions within the relaxed skin tension lines where possible.    The area thus outlined was incised deep to adipose tissue with a #15 scalpel blade.  The skin margins were undermined to an appropriate distance in all directions utilizing iris scissors.
Consent 1/Introductory Paragraph: The rationale for Mohs was explained to the patient and consent was obtained. The risks, benefits and alternatives to therapy were discussed in detail. Specifically, the risks of infection, scarring, bleeding, prolonged wound healing, incomplete removal, allergy to anesthesia, nerve injury and recurrence were addressed. Prior to the procedure, the treatment site was clearly identified and confirmed by the patient. All components of Universal Protocol/PAUSE Rule completed.
Purse String (Intermediate) Text: Given the location of the defect and the characteristics of the surrounding skin a purse string intermediate closure was deemed most appropriate.  Undermining was performed circumfirentially around the surgical defect.  A purse string suture was then placed and tightened.
Mucosal Advancement Flap Text: Given the location of the defect, shape of the defect and the proximity to free margins a mucosal advancement flap was deemed most appropriate. Incisions were made with a 15 blade scalpel in the appropriate fashion along the cutaneous vermilion border and the mucosal lip. The remaining actinically damaged mucosal tissue was excised.  The mucosal advancement flap was then elevated to the gingival sulcus with care taken to preserve the neurovascular structures and advanced into the primary defect. Care was taken to ensure that precise realignment of the vermilion border was achieved.
Interpolation Flap Text: A decision was made to reconstruct the defect utilizing an interpolation axial flap and a staged reconstruction.  A telfa template was made of the defect.  This telfa template was then used to outline the interpolation flap.  The donor area for the pedicle flap was then injected with anesthesia.  The flap was excised through the skin and subcutaneous tissue down to the layer of the underlying musculature.  The interpolation flap was carefully excised within this deep plane to maintain its blood supply.  The edges of the donor site were undermined.   The donor site was closed in a primary fashion.  The pedicle was then rotated into position and sutured.  Once the tube was sutured into place, adequate blood supply was confirmed with blanching and refill.  The pedicle was then wrapped with xeroform gauze and dressed appropriately with a telfa and gauze bandage to ensure continued blood supply and protect the attached pedicle.
Advancement-Rotation Flap Text: The defect edges were debeveled with a #15 scalpel blade.  Given the location of the defect, shape of the defect and the proximity to free margins an advancement-rotation flap was deemed most appropriate.  Using a sterile surgical marker, an appropriate flap was drawn incorporating the defect and placing the expected incisions within the relaxed skin tension lines where possible. The area thus outlined was incised deep to adipose tissue with a #15 scalpel blade.  The skin margins were undermined to an appropriate distance in all directions utilizing iris scissors.
Alar Island Pedicle Flap Text: The defect edges were debeveled with a #15 scalpel blade.  Given the location of the defect, shape of the defect and the proximity to the alar rim an island pedicle advancement flap was deemed most appropriate.  Using a sterile surgical marker, an appropriate advancement flap was drawn incorporating the defect, outlining the appropriate donor tissue and placing the expected incisions within the nasal ala running parallel to the alar rim. The area thus outlined was incised with a #15 scalpel blade.  The skin margins were undermined minimally to an appropriate distance in all directions around the primary defect and laterally outward around the island pedicle utilizing iris scissors.  There was minimal undermining beneath the pedicle flap.
Area L Indication Text: Tumors in this location are included in Area L (trunk and extremities).  Mohs surgery is indicated for larger tumors, or tumors with aggressive histologic features, in these anatomic locations.
Rhombic Flap Text: The defect edges were debeveled with a #15 scalpel blade.  Given the location of the defect and the proximity to free margins a rhombic flap was deemed most appropriate.  Using a sterile surgical marker, an appropriate rhombic flap was drawn incorporating the defect.    The area thus outlined was incised deep to adipose tissue with a #15 scalpel blade.  The skin margins were undermined to an appropriate distance in all directions utilizing iris scissors.
Repair Anesthesia Method: local infiltration
Mohs Method Verbiage: An incision at a 45 degree angle following the standard Mohs approach was done and the specimen was harvested as a microscopic controlled layer.
Pain Refusal Text: I offered to prescribe pain medication but the patient refused to take this medication.
Trilobed Flap Text: The defect edges were debeveled with a #15 scalpel blade.  Given the location of the defect and the proximity to free margins a trilobed flap was deemed most appropriate.  Using a sterile surgical marker, an appropriate trilobed flap drawn around the defect.    The area thus outlined was incised deep to adipose tissue with a #15 scalpel blade.  The skin margins were undermined to an appropriate distance in all directions utilizing iris scissors.
Cartilage Graft Text: The defect edges were debeveled with a #15 scalpel blade.  Given the location of the defect, shape of the defect, the fact the defect involved a full thickness cartilage defect a cartilage graft was deemed most appropriate.  An appropriate donor site was identified, cleansed, and anesthetized. The cartilage graft was then harvested and transferred to the recipient site, oriented appropriately and then sutured into place.  The secondary defect was then repaired using a primary closure.
S Plasty Text: Given the location and shape of the defect, and the orientation of relaxed skin tension lines, an S-plasty was deemed most appropriate for repair.  Using a sterile surgical marker, the appropriate outline of the S-plasty was drawn, incorporating the defect and placing the expected incisions within the relaxed skin tension lines where possible.  The area thus outlined was incised deep to adipose tissue with a #15 scalpel blade.  The skin margins were undermined to an appropriate distance in all directions utilizing iris scissors. The skin flaps were advanced over the defect.  The opposing margins were then approximated with interrupted buried subcutaneous sutures.
Dressing (No Sutures): pressure dressing with telfa
Estimated Blood Loss (Cc): minimal
Nostril Rim Text: The closure involved the nostril rim.
Zygomaticofacial Flap Text: Given the location of the defect, shape of the defect and the proximity to free margins a zygomaticofacial flap was deemed most appropriate for repair.  Using a sterile surgical marker, the appropriate flap was drawn incorporating the defect and placing the expected incisions within the relaxed skin tension lines where possible. The area thus outlined was incised deep to adipose tissue with a #15 scalpel blade with preservation of a vascular pedicle.  The skin margins were undermined to an appropriate distance in all directions utilizing iris scissors.  The flap was then placed into the defect and anchored with interrupted buried subcutaneous sutures.
Non-Graft Cartilage Fenestration Text: The cartilage was fenestrated with a 2mm punch biopsy to help facilitate healing.
Peng Advancement Flap Text: The defect edges were debeveled with a #15 scalpel blade.  Given the location of the defect, shape of the defect and the proximity to free margins a Peng advancement flap was deemed most appropriate.  Using a sterile surgical marker, an appropriate advancement flap was drawn incorporating the defect and placing the expected incisions within the relaxed skin tension lines where possible. The area thus outlined was incised deep to adipose tissue with a #15 scalpel blade.  The skin margins were undermined to an appropriate distance in all directions utilizing iris scissors.
Subsequent Stages Histo Method Verbiage: Using a similar technique to that described above, a thin layer of tissue was removed from all areas where tumor was visible on the previous stage.  The tissue was again oriented, mapped, dyed, and processed as above.
O-T Plasty Text: The defect edges were debeveled with a #15 scalpel blade.  Given the location of the defect, shape of the defect and the proximity to free margins an O-T plasty was deemed most appropriate.  Using a sterile surgical marker, an appropriate O-T plasty was drawn incorporating the defect and placing the expected incisions within the relaxed skin tension lines where possible.    The area thus outlined was incised deep to adipose tissue with a #15 scalpel blade.  The skin margins were undermined to an appropriate distance in all directions utilizing iris scissors.
Bcc Infiltrative Histology Text: There were numerous aggregates of basaloid cells demonstrating an infiltrative pattern.
Partial Purse String (Intermediate) Text: Given the location of the defect and the characteristics of the surrounding skin an intermediate purse string closure was deemed most appropriate.  Undermining was performed circumfirentially around the surgical defect.  A purse string suture was then placed and tightened. Wound tension only allowed a partial closure of the circular defect.
Helical Rim Text: The closure involved the helical rim.
Ear Wedge Repair Text: A wedge excision was completed by carrying down an excision through the full thickness of the ear and cartilage with an inward facing Burow's triangle. The wound was then closed in a layered fashion.
Rotation Flap Text: The defect edges were debeveled with a #15 scalpel blade.  Given the location of the defect, shape of the defect and the proximity to free margins a rotation flap was deemed most appropriate.  Using a sterile surgical marker, an appropriate rotation flap was drawn incorporating the defect and placing the expected incisions within the relaxed skin tension lines where possible.    The area thus outlined was incised deep to adipose tissue with a #15 scalpel blade.  The skin margins were undermined to an appropriate distance in all directions utilizing iris scissors.
Mercedes Flap Text: The defect edges were debeveled with a #15 scalpel blade.  Given the location of the defect, shape of the defect and the proximity to free margins a Mercedes flap was deemed most appropriate.  Using a sterile surgical marker, an appropriate advancement flap was drawn incorporating the defect and placing the expected incisions within the relaxed skin tension lines where possible. The area thus outlined was incised deep to adipose tissue with a #15 scalpel blade.  The skin margins were undermined to an appropriate distance in all directions utilizing iris scissors.
Bilateral Helical Rim Advancement Flap Text: The defect edges were debeveled with a #15 blade scalpel.  Given the location of the defect and the proximity to free margins (helical rim) a bilateral helical rim advancement flap was deemed most appropriate.  Using a sterile surgical marker, the appropriate advancement flaps were drawn incorporating the defect and placing the expected incisions between the helical rim and antihelix where possible.  The area thus outlined was incised through and through with a #15 scalpel blade.  With a skin hook and iris scissors, the flaps were gently and sharply undermined and freed up.
Mart-1 - Negative Histology Text: MART-1 staining demonstrates a normal density and pattern of melanocytes along the dermal-epidermal junction. The surgical margins are negative for tumor cells.
Manual Repair Warning Statement: We plan on removing the manually selected variable below in favor of our much easier automatic structured text blocks found in the previous tab. We decided to do this to help make the flow better and give you the full power of structured data. Manual selection is never going to be ideal in our platform and I would encourage you to avoid using manual selection from this point on, especially since I will be sunsetting this feature. It is important that you do one of two things with the customized text below. First, you can save all of the text in a word file so you can have it for future reference. Second, transfer the text to the appropriate area in the Library tab. Lastly, if there is a flap or graft type which we do not have you need to let us know right away so I can add it in before the variable is hidden. No need to panic, we plan to give you roughly 6 months to make the change.
Detail Level: Detailed
Composite Graft Text: The defect edges were debeveled with a #15 scalpel blade.  Given the location of the defect, shape of the defect, the proximity to free margins and the fact the defect was full thickness a composite graft was deemed most appropriate.  The defect was outline and then transferred to the donor site.  A full thickness graft was then excised from the donor site. The graft was then placed in the primary defect, oriented appropriately and then sutured into place.  The secondary defect was then repaired using a primary closure.
Tarsorrhaphy Text: A tarsorrhaphy was performed using Frost sutures.
Island Pedicle Flap-Requiring Vessel Identification Text: The defect edges were debeveled with a #15 scalpel blade.  Given the location of the defect, shape of the defect and the proximity to free margins an island pedicle advancement flap was deemed most appropriate.  Using a sterile surgical marker, an appropriate advancement flap was drawn, based on the axial vessel mentioned above, incorporating the defect, outlining the appropriate donor tissue and placing the expected incisions within the relaxed skin tension lines where possible.    The area thus outlined was incised deep to adipose tissue with a #15 scalpel blade.  The skin margins were undermined to an appropriate distance in all directions around the primary defect and laterally outward around the island pedicle utilizing iris scissors.  There was minimal undermining beneath the pedicle flap.
Orbicularis Oris Muscle Flap Text: The defect edges were debeveled with a #15 scalpel blade.  Given that the defect affected the competency of the oral sphincter an orbicularis oris muscle flap was deemed most appropriate to restore this competency and normal muscle function.  Using a sterile surgical marker, an appropriate flap was drawn incorporating the defect. The area thus outlined was incised with a #15 scalpel blade.
Consent (Ear)/Introductory Paragraph: The rationale for Mohs was explained to the patient and consent was obtained. The risks, benefits and alternatives to therapy were discussed in detail. Specifically, the risks of ear deformity, infection, scarring, bleeding, prolonged wound healing, incomplete removal, allergy to anesthesia, nerve injury and recurrence were addressed. Prior to the procedure, the treatment site was clearly identified and confirmed by the patient. All components of Universal Protocol/PAUSE Rule completed.
Consent 2/Introductory Paragraph: Mohs surgery was explained to the patient and consent was obtained. The risks, benefits and alternatives to therapy were discussed in detail. Specifically, the risks of infection, scarring, bleeding, prolonged wound healing, incomplete removal, allergy to anesthesia, nerve injury and recurrence were addressed. Prior to the procedure, the treatment site was clearly identified and confirmed by the patient. All components of Universal Protocol/PAUSE Rule completed.
Dermal Autograft Text: The defect edges were debeveled with a #15 scalpel blade.  Given the location of the defect, shape of the defect and the proximity to free margins a dermal autograft was deemed most appropriate.  Using a sterile surgical marker, the primary defect shape was transferred to the donor site. The area thus outlined was incised deep to adipose tissue with a #15 scalpel blade.  The harvested graft was then trimmed of adipose and epidermal tissue until only dermis was left.  The skin graft was then placed in the primary defect and oriented appropriately.
Surgeon/Pathologist Verbiage (Will Incorporate Name Of Surgeon From Intro If Not Blank): operated in two distinct and integrated capacities as the surgeon and pathologist.
Nasalis-Muscle-Based Myocutaneous Island Pedicle Flap Text: Using a #15 blade, an incision was made around the donor flap to the level of the nasalis muscle. Wide lateral undermining was then performed in both the subcutaneous plane above the nasalis muscle, and in a submuscular plane just above periosteum. This allowed the formation of a free nasalis muscle axial pedicle (based on the angular artery) which was still attached to the actual cutaneous flap, increasing its mobility and vascular viability. Hemostasis was obtained with pinpoint electrocoagulation. The flap was mobilized into position and the pivotal anchor points positioned and stabilized with buried interrupted sutures. Subcutaneous and dermal tissues were closed in a multilayered fashion with sutures. Tissue redundancies were excised, and the epidermal edges were apposed without significant tension and sutured with sutures.
Graft Donor Site Epidermal Sutures (Optional): 5-0 Surgipro
O-L Flap Text: The defect edges were debeveled with a #15 scalpel blade.  Given the location of the defect, shape of the defect and the proximity to free margins an O-L flap was deemed most appropriate.  Using a sterile surgical marker, an appropriate advancement flap was drawn incorporating the defect and placing the expected incisions within the relaxed skin tension lines where possible.    The area thus outlined was incised deep to adipose tissue with a #15 scalpel blade.  The skin margins were undermined to an appropriate distance in all directions utilizing iris scissors.
Advancement Flap (Single) Text: The defect edges were debeveled with a #15 scalpel blade.  Given the location of the defect and the proximity to free margins a single advancement flap was deemed most appropriate.  Using a sterile surgical marker, an appropriate advancement flap was drawn incorporating the defect and placing the expected incisions within the relaxed skin tension lines where possible.    The area thus outlined was incised deep to adipose tissue with a #15 scalpel blade.  The skin margins were undermined to an appropriate distance in all directions utilizing iris scissors.
Full Thickness Lip Wedge Repair (Flap) Text: Given the location of the defect and the proximity to free margins a full thickness wedge repair was deemed most appropriate.  Using a sterile surgical marker, the appropriate repair was drawn incorporating the defect and placing the expected incisions perpendicular to the vermilion border.  The vermilion border was also meticulously outlined to ensure appropriate reapproximation during the repair.  The area thus outlined was incised through and through with a #15 scalpel blade.  The muscularis and dermis were reaproximated with deep sutures following hemostasis. Care was taken to realign the vermilion border before proceeding with the superficial closure.  Once the vermilion was realigned the superfical and mucosal closure was finished.
Consent Type: Consent 1 (Standard)
Brow Lift Text: A midfrontal incision was made medially to the defect to allow access to the tissues just superior to the left eyebrow. Following careful dissection inferiorly in a supraperiosteal plane to the level of the left eyebrow, several 3-0 monocryl sutures were used to resuspend the eyebrow orbicularis oculi muscular unit to the superior frontal bone periosteum. This resulted in an appropriate reapproximation of static eyebrow symmetry and correction of the left brow ptosis.
O-Z Flap Text: The defect edges were debeveled with a #15 scalpel blade.  Given the location of the defect, shape of the defect and the proximity to free margins an O-Z flap was deemed most appropriate.  Using a sterile surgical marker, an appropriate transposition flap was drawn incorporating the defect and placing the expected incisions within the relaxed skin tension lines where possible. The area thus outlined was incised deep to adipose tissue with a #15 scalpel blade.  The skin margins were undermined to an appropriate distance in all directions utilizing iris scissors.
Bcc Histology Text: There were numerous aggregates of basaloid cells.
Consent (Lip)/Introductory Paragraph: The rationale for Mohs was explained to the patient and consent was obtained. The risks, benefits and alternatives to therapy were discussed in detail. Specifically, the risks of lip deformity, changes in the oral aperture, infection, scarring, bleeding, prolonged wound healing, incomplete removal, allergy to anesthesia, nerve injury and recurrence were addressed. Prior to the procedure, the treatment site was clearly identified and confirmed by the patient. All components of Universal Protocol/PAUSE Rule completed.
Burow's Graft Text: The defect edges were debeveled with a #15 scalpel blade.  Given the location of the defect, shape of the defect, the proximity to free margins and the presence of a standing cone deformity a Burow's skin graft was deemed most appropriate. The standing cone was removed and this tissue was then trimmed to the shape of the primary defect. The adipose tissue was also removed until only dermis and epidermis were left.  The skin margins of the secondary defect were undermined to an appropriate distance in all directions utilizing iris scissors.  The secondary defect was closed with interrupted buried subcutaneous sutures.  The skin edges were then re-apposed with running  sutures.  The skin graft was then placed in the primary defect and oriented appropriately.
Same Histology In Subsequent Stages Text: The pattern and morphology of the tumor is as described in the first stage.
Xenograft Text: The defect edges were debeveled with a #15 scalpel blade.  Given the location of the defect, shape of the defect and the proximity to free margins a xenograft was deemed most appropriate.  The graft was then trimmed to fit the size of the defect.  The graft was then placed in the primary defect and oriented appropriately.
Undermining Type: Entire Wound
O-Z Plasty Text: The defect edges were debeveled with a #15 scalpel blade.  Given the location of the defect, shape of the defect and the proximity to free margins an O-Z plasty (double transposition flap) was deemed most appropriate.  Using a sterile surgical marker, the appropriate transposition flaps were drawn incorporating the defect and placing the expected incisions within the relaxed skin tension lines where possible.    The area thus outlined was incised deep to adipose tissue with a #15 scalpel blade.  The skin margins were undermined to an appropriate distance in all directions utilizing iris scissors.  Hemostasis was achieved with electrocautery.  The flaps were then transposed into place, one clockwise and the other counterclockwise, and anchored with interrupted buried subcutaneous sutures.
Closure 4 Information: This tab is for additional flaps and grafts above and beyond our usual structured repairs.  Please note if you enter information here it will not currently bill and you will need to add the billing information manually.
Donor Site Anesthesia Type: same as repair anesthesia
Cheiloplasty (Less Than 50%) Text: A decision was made to reconstruct the defect with a  cheiloplasty.  The defect was undermined extensively.  Additional obicularis oris muscle was excised with a 15 blade scalpel.  The defect was converted into a full thickness wedge, of less than 50% of the vertical height of the lip, to facilite a better cosmetic result.  Small vessels were then tied off with 5-0 monocyrl. The obicularis oris, superficial fascia, adipose and dermis were then reapproximated.  After the deeper layers were approximated the epidermis was reapproximated with particular care given to realign the vermilion border.
Complex Repair And Graft Additional Text (Will Appearing After The Standard Complex Repair Text): The complex repair was not sufficient to completely close the primary defect. The remaining additional defect was repaired with the graft mentioned below.
Modified Advancement Flap Text: The defect edges were debeveled with a #15 scalpel blade.  Given the location of the defect, shape of the defect and the proximity to free margins a modified advancement flap was deemed most appropriate.  Using a sterile surgical marker, an appropriate advancement flap was drawn incorporating the defect and placing the expected incisions within the relaxed skin tension lines where possible.    The area thus outlined was incised deep to adipose tissue with a #15 scalpel blade.  The skin margins were undermined to an appropriate distance in all directions utilizing iris scissors.
Graft Donor Site Dermal Sutures (Optional): 5-0 Polysorb
Graft Cartilage Fenestration Text: The cartilage was fenestrated with a 2mm punch biopsy to help facilitate graft survival and healing.
Epidermal Closure: running and interrupted
Wound Care (No Sutures): Petrolatum
Rhomboid Transposition Flap Text: The defect edges were debeveled with a #15 scalpel blade.  Given the location of the defect and the proximity to free margins a rhomboid transposition flap was deemed most appropriate.  Using a sterile surgical marker, an appropriate rhomboid flap was drawn incorporating the defect.    The area thus outlined was incised deep to adipose tissue with a #15 scalpel blade.  The skin margins were undermined to an appropriate distance in all directions utilizing iris scissors.
Nasal Turnover Hinge Flap Text: The defect edges were debeveled with a #15 scalpel blade.  Given the size, depth, location of the defect and the defect being full thickness a nasal turnover hinge flap was deemed most appropriate.  Using a sterile surgical marker, an appropriate hinge flap was drawn incorporating the defect. The area thus outlined was incised with a #15 scalpel blade. The flap was designed to recreate the nasal mucosal lining and the alar rim. The skin margins were undermined to an appropriate distance in all directions utilizing iris scissors.
Mohs Case Number: [id number]
Ftsg Text: The defect edges were debeveled with a #15 scalpel blade.  Given the location of the defect, shape of the defect and the proximity to free margins a full thickness skin graft was deemed most appropriate.  Using a sterile surgical marker, the primary defect shape was transferred to the donor site. The area thus outlined was incised deep to adipose tissue with a #15 scalpel blade.  The harvested graft was then trimmed of adipose tissue until only dermis and epidermis was left.  The skin margins of the secondary defect were undermined to an appropriate distance in all directions utilizing iris scissors.  The secondary defect was closed with interrupted buried subcutaneous sutures.  The skin edges were then re-apposed with running  sutures.  The skin graft was then placed in the primary defect and oriented appropriately.
Consent (Temporal Branch)/Introductory Paragraph: The rationale for Mohs was explained to the patient and consent was obtained. The risks, benefits and alternatives to therapy were discussed in detail. Specifically, the risks of damage to the temporal branch of the facial nerve, infection, scarring, bleeding, prolonged wound healing, incomplete removal, allergy to anesthesia, and recurrence were addressed. Prior to the procedure, the treatment site was clearly identified and confirmed by the patient. All components of Universal Protocol/PAUSE Rule completed.
Area H Indication Text: Tumors in this location are included in Area H (eyelids, eyebrows, nose, lips, chin, ear, pre-auricular, post-auricular, temple, genitalia, hands, feet, ankles and areola).  Tissue conservation is critical in these anatomic locations.
Medical Necessity Statement: Based on my medical judgement, Mohs surgery is the most appropriate treatment for this cancer compared to other treatments.
Dorsal Nasal Flap Text: The defect edges were debeveled with a #15 scalpel blade.  Given the location of the defect and the proximity to free margins a dorsal nasal flap was deemed most appropriate.  Using a sterile surgical marker, an appropriate dorsal nasal flap was drawn around the defect.    The area thus outlined was incised deep to adipose tissue with a #15 scalpel blade.  The skin margins were undermined to an appropriate distance in all directions utilizing iris scissors.
H Plasty Text: Given the location of the defect, shape of the defect and the proximity to free margins a H-plasty was deemed most appropriate for repair.  Using a sterile surgical marker, the appropriate advancement arms of the H-plasty were drawn incorporating the defect and placing the expected incisions within the relaxed skin tension lines where possible. The area thus outlined was incised deep to adipose tissue with a #15 scalpel blade. The skin margins were undermined to an appropriate distance in all directions utilizing iris scissors.  The opposing advancement arms were then advanced into place in opposite direction and anchored with interrupted buried subcutaneous sutures.
Keystone Flap Text: The defect edges were debeveled with a #15 scalpel blade.  Given the location of the defect, shape of the defect a keystone flap was deemed most appropriate.  Using a sterile surgical marker, an appropriate keystone flap was drawn incorporating the defect, outlining the appropriate donor tissue and placing the expected incisions within the relaxed skin tension lines where possible. The area thus outlined was incised deep to adipose tissue with a #15 scalpel blade.  The skin margins were undermined to an appropriate distance in all directions around the primary defect and laterally outward around the flap utilizing iris scissors.
Eye Protection Verbiage: Before proceeding with the stage, a plastic scleral shield was inserted. The globe was anesthetized with a few drops of 1% lidocaine with 1:100,000 epinephrine. Then, an appropriate sized scleral shield was chosen and coated with lacrilube ointment. The shield was gently inserted and left in place for the duration of each stage. After the stage was completed, the shield was gently removed.
Area M Indication Text: Tumors in this location are included in Area M (cheek, forehead, scalp, neck, jawline and pretibial skin).  Mohs surgery is indicated for tumors in these anatomic locations.
Undermining Location (Optional): in the superficial subcutaneous fat
Skin Substitute Text: The defect edges were debeveled with a #15 scalpel blade.  Given the location of the defect, shape of the defect and the proximity to free margins a skin substitute graft was deemed most appropriate.  The graft material was trimmed to fit the size of the defect. The graft was then placed in the primary defect and oriented appropriately.
Where Do You Want The Question To Include Opioid Counseling Located?: Case Summary Tab
Purse String (Simple) Text: Given the location of the defect and the characteristics of the surrounding skin a purse string closure was deemed most appropriate.  Undermining was performed circumfirentially around the surgical defect.  A purse string suture was then placed and tightened.
Mauc Instructions: By selecting yes to the question below the MAUC number will be added into the note.  This will be calculated automatically based on the diagnosis chosen, the size entered, the body zone selected (H,M,L) and the specific indications you chose. You will also have the option to override the Mohs AUC if you disagree with the automatically calculated number and this option is found in the Case Summary tab.
W Plasty Text: The lesion was extirpated to the level of the fat with a #15 scalpel blade.  Given the location of the defect, shape of the defect and the proximity to free margins a W-plasty was deemed most appropriate for repair.  Using a sterile surgical marker, the appropriate transposition arms of the W-plasty were drawn incorporating the defect and placing the expected incisions within the relaxed skin tension lines where possible.    The area thus outlined was incised deep to adipose tissue with a #15 scalpel blade.  The skin margins were undermined to an appropriate distance in all directions utilizing iris scissors.  The opposing transposition arms were then transposed into place in opposite direction and anchored with interrupted buried subcutaneous sutures.
Hemigard Postcare Instructions: The HEMIGARD strips are to remain completely dry for at least 5-7 days.
Staging Info: By selecting yes to the question above you will include information on AJCC 8 tumor staging in your Mohs note. Information on tumor staging will be automatically added for SCCs on the head and neck. AJCC 8 includes tumor size, tumor depth, perineural involvement and bone invasion.
Mart-1 - Positive Histology Text: MART-1 staining demonstrates areas of higher density and clustering of melanocytes with Pagetoid spread upwards within the epidermis. The surgical margins are positive for tumor cells.
Retention Suture Text: Retention sutures were placed to support the closure and prevent dehiscence.
Retention Suture Bite Size: 1 mm
Closure 2 Information: This tab is for additional flaps and grafts, including complex repair and grafts and complex repair and flaps. You can also specify a different location for the additional defect, if the location is the same you do not need to select a new one. We will insert the automated text for the repair you select below just as we do for solitary flaps and grafts. Please note that at this time if you select a location with a different insurance zone you will need to override the ICD10 and CPT if appropriate.
Suturegard Body: The suture ends were repeatedly re-tightened and re-clamped to achieve the desired tissue expansion.
Suturegard Intro: Intraoperative tissue expansion was performed, utilizing the SUTUREGARD device, in order to reduce wound tension.
Muscle Hinge Flap Text: The defect edges were debeveled with a #15 scalpel blade.  Given the size, depth and location of the defect and the proximity to free margins a muscle hinge flap was deemed most appropriate.  Using a sterile surgical marker, an appropriate hinge flap was drawn incorporating the defect. The area thus outlined was incised with a #15 scalpel blade.  The skin margins were undermined to an appropriate distance in all directions utilizing iris scissors.
Debridement Text: The wound edges were debrided prior to proceeding with the closure to facilitate wound healing.
Hemigard Intro: Due to skin fragility and wound tension, it was decided to use HEMIGARD adhesive retention suture devices to permit a linear closure. The skin was cleaned and dried for a 6cm distance away from the wound. Excessive hair, if present, was removed to allow for adhesion.
A-T Advancement Flap Text: The defect edges were debeveled with a #15 scalpel blade.  Given the location of the defect, shape of the defect and the proximity to free margins an A-T advancement flap was deemed most appropriate.  Using a sterile surgical marker, an appropriate advancement flap was drawn incorporating the defect and placing the expected incisions within the relaxed skin tension lines where possible.    The area thus outlined was incised deep to adipose tissue with a #15 scalpel blade.  The skin margins were undermined to an appropriate distance in all directions utilizing iris scissors.
Location Indication Override (Is Already Calculated Based On Selected Body Location): Area M
Adjacent Tissue Transfer Text: The defect edges were debeveled with a #15 scalpel blade.  Given the location of the defect and the proximity to free margins an adjacent tissue transfer was deemed most appropriate.  Using a sterile surgical marker, an appropriate flap was drawn incorporating the defect and placing the expected incisions within the relaxed skin tension lines where possible.    The area thus outlined was incised deep to adipose tissue with a #15 scalpel blade.  The skin margins were undermined to an appropriate distance in all directions utilizing iris scissors.

## 2022-03-28 DIAGNOSIS — Z79.4 TYPE 2 DIABETES MELLITUS WITH HYPERGLYCEMIA, WITH LONG-TERM CURRENT USE OF INSULIN (HCC): ICD-10-CM

## 2022-03-28 DIAGNOSIS — E11.65 TYPE 2 DIABETES MELLITUS WITH HYPERGLYCEMIA, WITH LONG-TERM CURRENT USE OF INSULIN (HCC): ICD-10-CM

## 2022-03-28 RX ORDER — DULAGLUTIDE 3 MG/.5ML
3 INJECTION, SOLUTION SUBCUTANEOUS
Qty: 6 ML | Refills: 3 | Status: SHIPPED | OUTPATIENT
Start: 2022-03-28 | End: 2022-06-26

## 2022-03-29 ENCOUNTER — APPOINTMENT (RX ONLY)
Dept: URBAN - METROPOLITAN AREA CLINIC 36 | Facility: CLINIC | Age: 72
Setting detail: DERMATOLOGY
End: 2022-03-29

## 2022-03-29 DIAGNOSIS — Z48.817 ENCOUNTER FOR SURGICAL AFTERCARE FOLLOWING SURGERY ON THE SKIN AND SUBCUTANEOUS TISSUE: ICD-10-CM

## 2022-03-29 PROCEDURE — ? POST-OP WOUND CHECK

## 2022-03-29 ASSESSMENT — LOCATION DETAILED DESCRIPTION DERM: LOCATION DETAILED: LEFT CENTRAL MALAR CHEEK

## 2022-03-29 ASSESSMENT — LOCATION SIMPLE DESCRIPTION DERM: LOCATION SIMPLE: LEFT CHEEK

## 2022-03-29 ASSESSMENT — LOCATION ZONE DERM: LOCATION ZONE: FACE

## 2022-03-29 NOTE — PROCEDURE: POST-OP WOUND CHECK
Detail Level: Detailed
Add 64539 Cpt? (Important Note: In 2017 The Use Of 92575 Is Being Tracked By Cms To Determine Future Global Period Reimbursement For Global Periods): no

## 2022-04-04 ENCOUNTER — APPOINTMENT (RX ONLY)
Dept: URBAN - METROPOLITAN AREA CLINIC 36 | Facility: CLINIC | Age: 72
Setting detail: DERMATOLOGY
End: 2022-04-04

## 2022-04-04 DIAGNOSIS — Z48.02 ENCOUNTER FOR REMOVAL OF SUTURES: ICD-10-CM

## 2022-04-04 PROCEDURE — ? SUTURE REMOVAL (GLOBAL PERIOD)

## 2022-04-04 ASSESSMENT — LOCATION SIMPLE DESCRIPTION DERM: LOCATION SIMPLE: LEFT CHEEK

## 2022-04-04 ASSESSMENT — LOCATION ZONE DERM: LOCATION ZONE: FACE

## 2022-04-04 ASSESSMENT — LOCATION DETAILED DESCRIPTION DERM: LOCATION DETAILED: LEFT CENTRAL MALAR CHEEK

## 2022-04-08 ENCOUNTER — APPOINTMENT (RX ONLY)
Dept: URBAN - METROPOLITAN AREA CLINIC 36 | Facility: CLINIC | Age: 72
Setting detail: DERMATOLOGY
End: 2022-04-08

## 2022-04-08 PROBLEM — C44.319 BASAL CELL CARCINOMA OF SKIN OF OTHER PARTS OF FACE: Status: ACTIVE | Noted: 2022-04-08

## 2022-04-08 PROCEDURE — 14040 TIS TRNFR F/C/C/M/N/A/G/H/F: CPT

## 2022-04-08 PROCEDURE — 17311 MOHS 1 STAGE H/N/HF/G: CPT

## 2022-04-08 PROCEDURE — 17312 MOHS ADDL STAGE: CPT

## 2022-04-08 PROCEDURE — ? MOHS SURGERY

## 2022-04-08 NOTE — PROCEDURE: MIPS QUALITY
Quality 226: Preventive Care And Screening: Tobacco Use: Screening And Cessation Intervention: Patient screened for tobacco use and is an ex/non-smoker
Detail Level: Detailed
Quality 130: Documentation Of Current Medications In The Medical Record: Current Medications Documented
Quality 111:Pneumonia Vaccination Status For Older Adults: Pneumococcal Vaccination Previously Received
Quality 265: Biopsy Follow-Up: Biopsy results reviewed, communicated, tracked, and documented
Quality 431: Preventive Care And Screening: Unhealthy Alcohol Use - Screening: Patient not identified as an unhealthy alcohol user when screened for unhealthy alcohol use using a systematic screening method

## 2022-04-08 NOTE — PROCEDURE: MOHS SURGERY
Donor Site Anesthesia Type: same as repair anesthesia
Asc Procedure Text (F): After obtaining clear surgical margins the patient was sent to an ASC for surgical repair.  The patient understands they will receive post-surgical care and follow-up from the ASC physician.
Quadrants Reporting?: 0
Include Tumor Staging In Mohs Note?: No
Hemigard Postcare Instructions: The HEMIGARD strips are to remain completely dry for at least 5-7 days.
Stage 1: Number Of Blocks?: 1
Closure 3 Information: This tab is for additional flaps and grafts above and beyond our usual structured repairs.  Please note if you enter information here it will not currently bill and you will need to add the billing information manually.
Oculoplastic Surgeon Procedure Text (B): After obtaining clear surgical margins the patient was sent to oculoplastics for surgical repair.  The patient understands they will receive post-surgical care and follow-up from the referring physician's office.
Double O-Z Plasty Text: The defect edges were debeveled with a #15 scalpel blade.  Given the location of the defect, shape of the defect and the proximity to free margins a Double O-Z plasty (double transposition flap) was deemed most appropriate.  Using a sterile surgical marker, the appropriate transposition flaps were drawn incorporating the defect and placing the expected incisions within the relaxed skin tension lines where possible. The area thus outlined was incised deep to adipose tissue with a #15 scalpel blade.  The skin margins were undermined to an appropriate distance in all directions utilizing iris scissors.  Hemostasis was achieved with electrocautery.  The flaps were then transposed into place, one clockwise and the other counterclockwise, and anchored with interrupted buried subcutaneous sutures.
Mid-Level Procedure Text (C): After obtaining clear surgical margins the patient was sent to a mid-level provider for surgical repair.  The patient understands they will receive post-surgical care and follow-up from the mid-level provider.
Stage 5: Additional Anesthesia Type: 1% lidocaine with epinephrine
Estimated Blood Loss (Cc): minimal
Advancement-Rotation Flap Text: The defect edges were debeveled with a #15 scalpel blade.  Given the location of the defect, shape of the defect and the proximity to free margins an advancement-rotation flap was deemed most appropriate.  Using a sterile surgical marker, an appropriate flap was drawn incorporating the defect and placing the expected incisions within the relaxed skin tension lines where possible. The area thus outlined was incised deep to adipose tissue with a #15 scalpel blade.  The skin margins were undermined to an appropriate distance in all directions utilizing iris scissors.
Validate Note Data (See Information Below): Yes
Provider Procedure Text (F): After obtaining clear surgical margins the defect was repaired by another provider.
Consent (Near Eyelid Margin)/Introductory Paragraph: The rationale for Mohs was explained to the patient and consent was obtained. The risks, benefits and alternatives to therapy were discussed in detail. Specifically, the risks of ectropion or eyelid deformity, infection, scarring, bleeding, prolonged wound healing, incomplete removal, allergy to anesthesia, nerve injury and recurrence were addressed. Prior to the procedure, the treatment site was clearly identified and confirmed by the patient. All components of Universal Protocol/PAUSE Rule completed.
Special Stains Stage 15 - Results: Base On Clearance Noted Above
Suture Removal: 14 days
Secondary Intention Text (Leave Blank If You Do Not Want): The defect will heal with secondary intention.
Closure 2 Information: This tab is for additional flaps and grafts, including complex repair and grafts and complex repair and flaps. You can also specify a different location for the additional defect, if the location is the same you do not need to select a new one. We will insert the automated text for the repair you select below just as we do for solitary flaps and grafts. Please note that at this time if you select a location with a different insurance zone you will need to override the ICD10 and CPT if appropriate.
Brow Lift Text: A midfrontal incision was made medially to the defect to allow access to the tissues just superior to the left eyebrow. Following careful dissection inferiorly in a supraperiosteal plane to the level of the left eyebrow, several 3-0 monocryl sutures were used to resuspend the eyebrow orbicularis oculi muscular unit to the superior frontal bone periosteum. This resulted in an appropriate reapproximation of static eyebrow symmetry and correction of the left brow ptosis.
X Size Of Lesion In Cm (Optional): 0.7
Bi-Rhombic Flap Text: The defect edges were debeveled with a #15 scalpel blade.  Given the location of the defect and the proximity to free margins a bi-rhombic flap was deemed most appropriate.  Using a sterile surgical marker, an appropriate rhombic flap was drawn incorporating the defect. The area thus outlined was incised deep to adipose tissue with a #15 scalpel blade.  The skin margins were undermined to an appropriate distance in all directions utilizing iris scissors.
Area M Indication Text: Tumors in this location are included in Area M (cheek, forehead, scalp, neck, jawline and pretibial skin).  Mohs surgery is indicated for tumors in these anatomic locations.
Detail Level: Detailed
Bilateral Helical Rim Advancement Flap Text: The defect edges were debeveled with a #15 blade scalpel.  Given the location of the defect and the proximity to free margins (helical rim) a bilateral helical rim advancement flap was deemed most appropriate.  Using a sterile surgical marker, the appropriate advancement flaps were drawn incorporating the defect and placing the expected incisions between the helical rim and antihelix where possible.  The area thus outlined was incised through and through with a #15 scalpel blade.  With a skin hook and iris scissors, the flaps were gently and sharply undermined and freed up.
Nostril Rim Text: The closure involved the nostril rim.
Star Wedge Flap Text: The defect edges were debeveled with a #15 scalpel blade.  Given the location of the defect, shape of the defect and the proximity to free margins a star wedge flap was deemed most appropriate.  Using a sterile surgical marker, an appropriate rotation flap was drawn incorporating the defect and placing the expected incisions within the relaxed skin tension lines where possible. The area thus outlined was incised deep to adipose tissue with a #15 scalpel blade.  The skin margins were undermined to an appropriate distance in all directions utilizing iris scissors.
Consent 3/Introductory Paragraph: I gave the patient a chance to ask questions they had about the procedure.  Following this I explained the Mohs procedure and consent was obtained. The risks, benefits and alternatives to therapy were discussed in detail. Specifically, the risks of infection, scarring, bleeding, prolonged wound healing, incomplete removal, allergy to anesthesia, nerve injury and recurrence were addressed. Prior to the procedure, the treatment site was clearly identified and confirmed by the patient. All components of Universal Protocol/PAUSE Rule completed.
Full Thickness Lip Wedge Repair (Flap) Text: Given the location of the defect and the proximity to free margins a full thickness wedge repair was deemed most appropriate.  Using a sterile surgical marker, the appropriate repair was drawn incorporating the defect and placing the expected incisions perpendicular to the vermilion border.  The vermilion border was also meticulously outlined to ensure appropriate reapproximation during the repair.  The area thus outlined was incised through and through with a #15 scalpel blade.  The muscularis and dermis were reaproximated with deep sutures following hemostasis. Care was taken to realign the vermilion border before proceeding with the superficial closure.  Once the vermilion was realigned the superfical and mucosal closure was finished.
Crescentic Intermediate Repair Preamble Text (Leave Blank If You Do Not Want): Undermining was performed with blunt dissection.
Vermilion Border Text: The closure involved the vermilion border.
Modified Advancement Flap Text: The defect edges were debeveled with a #15 scalpel blade.  Given the location of the defect, shape of the defect and the proximity to free margins a modified advancement flap was deemed most appropriate.  Using a sterile surgical marker, an appropriate advancement flap was drawn incorporating the defect and placing the expected incisions within the relaxed skin tension lines where possible.    The area thus outlined was incised deep to adipose tissue with a #15 scalpel blade.  The skin margins were undermined to an appropriate distance in all directions utilizing iris scissors.
Mohs Rapid Report Verbiage: The area of clinically evident tumor was marked with skin marking ink and appropriately hatched.  The initial incision was made following the Mohs approach through the skin.  The specimen was taken to the lab, divided into the necessary number of pieces, chromacoded and processed according to the Mohs protocol.  This was repeated in successive stages until a tumor free defect was achieved.
Dermal Autograft Text: The defect edges were debeveled with a #15 scalpel blade.  Given the location of the defect, shape of the defect and the proximity to free margins a dermal autograft was deemed most appropriate.  Using a sterile surgical marker, the primary defect shape was transferred to the donor site. The area thus outlined was incised deep to adipose tissue with a #15 scalpel blade.  The harvested graft was then trimmed of adipose and epidermal tissue until only dermis was left.  The skin graft was then placed in the primary defect and oriented appropriately.
Chonodrocutaneous Helical Advancement Flap Text: The defect edges were debeveled with a #15 scalpel blade.  Given the location of the defect and the proximity to free margins a chondrocutaneous helical advancement flap was deemed most appropriate.  Using a sterile surgical marker, the appropriate advancement flap was drawn incorporating the defect and placing the expected incisions within the relaxed skin tension lines where possible.    The area thus outlined was incised deep to adipose tissue with a #15 scalpel blade.  The skin margins were undermined to an appropriate distance in all directions utilizing iris scissors.
Island Pedicle Flap Text: The defect edges were debeveled with a #15 scalpel blade.  Given the location of the defect, shape of the defect and the proximity to free margins an island pedicle advancement flap was deemed most appropriate.  Using a sterile surgical marker, an appropriate advancement flap was drawn incorporating the defect, outlining the appropriate donor tissue and placing the expected incisions within the relaxed skin tension lines where possible.    The area thus outlined was incised deep to adipose tissue with a #15 scalpel blade.  The skin margins were undermined to an appropriate distance in all directions around the primary defect and laterally outward around the island pedicle utilizing iris scissors.  There was minimal undermining beneath the pedicle flap.
Otolaryngologist Procedure Text (D): After obtaining clear surgical margins the patient was sent to otolaryngology for surgical repair.  The patient understands they will receive post-surgical care and follow-up from the referring physician's office.
Primary Defect Width In Cm (Final Defect Size - Required For Flaps/Grafts): 1.5
Number Of Stages: 3
Epidermal Closure Graft Donor Site (Optional): running
Spiral Flap Text: The defect edges were debeveled with a #15 scalpel blade.  Given the location of the defect, shape of the defect and the proximity to free margins a spiral flap was deemed most appropriate.  Using a sterile surgical marker, an appropriate rotation flap was drawn incorporating the defect and placing the expected incisions within the relaxed skin tension lines where possible. The area thus outlined was incised deep to adipose tissue with a #15 scalpel blade.  The skin margins were undermined to an appropriate distance in all directions utilizing iris scissors.
Plastic Surgeon Procedure Text (A): After obtaining clear surgical margins the patient was sent to plastics for surgical repair.  The patient understands they will receive post-surgical care and follow-up from the referring physician's office.
O-Z Flap Text: The defect edges were debeveled with a #15 scalpel blade.  Given the location of the defect, shape of the defect and the proximity to free margins an O-Z flap was deemed most appropriate.  Using a sterile surgical marker, an appropriate transposition flap was drawn incorporating the defect and placing the expected incisions within the relaxed skin tension lines where possible. The area thus outlined was incised deep to adipose tissue with a #15 scalpel blade.  The skin margins were undermined to an appropriate distance in all directions utilizing iris scissors.
No Repair - Repaired With Adjacent Surgical Defect Text (Leave Blank If You Do Not Want): After obtaining clear surgical margins the defect was repaired concurrently with another surgical defect which was in close approximation.
Same Histology In Subsequent Stages Text: The pattern and morphology of the tumor is as described in the first stage.
Repair Hemostasis (Optional): Pinpoint electrocautery
Information: Selecting Yes will display possible errors in your note based on the variables you have selected. This validation is only offered as a suggestion for you. PLEASE NOTE THAT THE VALIDATION TEXT WILL BE REMOVED WHEN YOU FINALIZE YOUR NOTE. IF YOU WANT TO FAX A PRELIMINARY NOTE YOU WILL NEED TO TOGGLE THIS TO 'NO' IF YOU DO NOT WANT IT IN YOUR FAXED NOTE.
Mauc Instructions: By selecting yes to the question below the MAUC number will be added into the note.  This will be calculated automatically based on the diagnosis chosen, the size entered, the body zone selected (H,M,L) and the specific indications you chose. You will also have the option to override the Mohs AUC if you disagree with the automatically calculated number and this option is found in the Case Summary tab.
Island Pedicle Flap With Canthal Suspension Text: The defect edges were debeveled with a #15 scalpel blade.  Given the location of the defect, shape of the defect and the proximity to free margins an island pedicle advancement flap was deemed most appropriate.  Using a sterile surgical marker, an appropriate advancement flap was drawn incorporating the defect, outlining the appropriate donor tissue and placing the expected incisions within the relaxed skin tension lines where possible. The area thus outlined was incised deep to adipose tissue with a #15 scalpel blade.  The skin margins were undermined to an appropriate distance in all directions around the primary defect and laterally outward around the island pedicle utilizing iris scissors.  There was minimal undermining beneath the pedicle flap. A suspension suture was placed in the canthal tendon to prevent tension and prevent ectropion.
Graft Cartilage Fenestration Text: The cartilage was fenestrated with a 2mm punch biopsy to help facilitate graft survival and healing.
No Residual Tumor Seen Histology Text: There were no malignant cells seen in the sections examined.
Crescentic Complex Repair Preamble Text (Leave Blank If You Do Not Want): Extensive wide undermining was performed.
Debridement Text: The wound edges were debrided prior to proceeding with the closure to facilitate wound healing.
O-Z Plasty Text: The defect edges were debeveled with a #15 scalpel blade.  Given the location of the defect, shape of the defect and the proximity to free margins an O-Z plasty (double transposition flap) was deemed most appropriate.  Using a sterile surgical marker, the appropriate transposition flaps were drawn incorporating the defect and placing the expected incisions within the relaxed skin tension lines where possible.    The area thus outlined was incised deep to adipose tissue with a #15 scalpel blade.  The skin margins were undermined to an appropriate distance in all directions utilizing iris scissors.  Hemostasis was achieved with electrocautery.  The flaps were then transposed into place, one clockwise and the other counterclockwise, and anchored with interrupted buried subcutaneous sutures.
Anesthesia Type: 1% lidocaine with epinephrine and 0.25% bupivacaine in a 2:3 ration buffered with 8.4% sodium bicarbonate
Interpolation Flap Text: A decision was made to reconstruct the defect utilizing an interpolation axial flap and a staged reconstruction.  A telfa template was made of the defect.  This telfa template was then used to outline the interpolation flap.  The donor area for the pedicle flap was then injected with anesthesia.  The flap was excised through the skin and subcutaneous tissue down to the layer of the underlying musculature.  The interpolation flap was carefully excised within this deep plane to maintain its blood supply.  The edges of the donor site were undermined.   The donor site was closed in a primary fashion.  The pedicle was then rotated into position and sutured.  Once the tube was sutured into place, adequate blood supply was confirmed with blanching and refill.  The pedicle was then wrapped with xeroform gauze and dressed appropriately with a telfa and gauze bandage to ensure continued blood supply and protect the attached pedicle.
Ear Star Wedge Flap Text: The defect edges were debeveled with a #15 blade scalpel.  Given the location of the defect and the proximity to free margins (helical rim) an ear star wedge flap was deemed most appropriate.  Using a sterile surgical marker, the appropriate flap was drawn incorporating the defect and placing the expected incisions between the helical rim and antihelix where possible.  The area thus outlined was incised through and through with a #15 scalpel blade.
Cheiloplasty (Complex) Text: A decision was made to reconstruct the defect with a  cheiloplasty.  The defect was undermined extensively.  Additional obicularis oris muscle was excised with a 15 blade scalpel.  The defect was converted into a full thickness wedge to facilite a better cosmetic result.  Small vessels were then tied off with 5-0 monocyrl. The obicularis oris, superficial fascia, adipose and dermis were then reapproximated.  After the deeper layers were approximated the epidermis was reapproximated with particular care given to realign the vermilion border.
Where Do You Want The Question To Include Opioid Counseling Located?: Case Summary Tab
Bcc Infiltrative Histology Text: There were numerous aggregates of basaloid cells demonstrating an infiltrative pattern.
Area H Indication Text: Tumors in this location are included in Area H (eyelids, eyebrows, nose, lips, chin, ear, pre-auricular, post-auricular, temple, genitalia, hands, feet, ankles and areola).  Tissue conservation is critical in these anatomic locations.
Graft Donor Site Dermal Sutures (Optional): 5-0 Polysorb
Partial Purse String (Simple) Text: Given the location of the defect and the characteristics of the surrounding skin a simple purse string closure was deemed most appropriate.  Undermining was performed circumfirentially around the surgical defect.  A purse string suture was then placed and tightened. Wound tension only allowed a partial closure of the circular defect.
Zygomaticofacial Flap Text: Given the location of the defect, shape of the defect and the proximity to free margins a zygomaticofacial flap was deemed most appropriate for repair.  Using a sterile surgical marker, the appropriate flap was drawn incorporating the defect and placing the expected incisions within the relaxed skin tension lines where possible. The area thus outlined was incised deep to adipose tissue with a #15 scalpel blade with preservation of a vascular pedicle.  The skin margins were undermined to an appropriate distance in all directions utilizing iris scissors.  The flap was then placed into the defect and anchored with interrupted buried subcutaneous sutures.
Advancement Flap (Single) Text: The defect edges were debeveled with a #15 scalpel blade.  Given the location of the defect and the proximity to free margins a single advancement flap was deemed most appropriate.  Using a sterile surgical marker, an appropriate advancement flap was drawn incorporating the defect and placing the expected incisions within the relaxed skin tension lines where possible.    The area thus outlined was incised deep to adipose tissue with a #15 scalpel blade.  The skin margins were undermined to an appropriate distance in all directions utilizing iris scissors.
Mohs Case Number: [id number]
Consent (Ear)/Introductory Paragraph: The rationale for Mohs was explained to the patient and consent was obtained. The risks, benefits and alternatives to therapy were discussed in detail. Specifically, the risks of ear deformity, infection, scarring, bleeding, prolonged wound healing, incomplete removal, allergy to anesthesia, nerve injury and recurrence were addressed. Prior to the procedure, the treatment site was clearly identified and confirmed by the patient. All components of Universal Protocol/PAUSE Rule completed.
Date Of Previous Biopsy (Optional): 3/2/22
Consent 2/Introductory Paragraph: Mohs surgery was explained to the patient and consent was obtained. The risks, benefits and alternatives to therapy were discussed in detail. Specifically, the risks of infection, scarring, bleeding, prolonged wound healing, incomplete removal, allergy to anesthesia, nerve injury and recurrence were addressed. Prior to the procedure, the treatment site was clearly identified and confirmed by the patient. All components of Universal Protocol/PAUSE Rule completed.
Initial Size Of Lesion: 0.8
V-Y Flap Text: The defect edges were debeveled with a #15 scalpel blade.  Given the location of the defect, shape of the defect and the proximity to free margins a V-Y flap was deemed most appropriate.  Using a sterile surgical marker, an appropriate advancement flap was drawn incorporating the defect and placing the expected incisions within the relaxed skin tension lines where possible.    The area thus outlined was incised deep to adipose tissue with a #15 scalpel blade.  The skin margins were undermined to an appropriate distance in all directions utilizing iris scissors.
Suturegard Intro: Intraoperative tissue expansion was performed, utilizing the SUTUREGARD device, in order to reduce wound tension.
Hemigard Intro: Due to skin fragility and wound tension, it was decided to use HEMIGARD adhesive retention suture devices to permit a linear closure. The skin was cleaned and dried for a 6cm distance away from the wound. Excessive hair, if present, was removed to allow for adhesion.
Localized Dermabrasion With Wire Brush Text: The patient was draped in routine manner.  Localized dermabrasion using 3 x 17 mm wire brush was performed in routine manner to papillary dermis. This spot dermabrasion is being performed to complete skin cancer reconstruction. It also will eliminate the other sun damaged precancerous cells that are known to be part of the regional effect of a lifetime's worth of sun exposure. This localized dermabrasion is therapeutic and should not be considered cosmetic in any regard.
Post-Care Instructions: I reviewed with the patient in detail post-care instructions. Patient is not to engage in any heavy lifting, exercise, or swimming for the next 14 days. Should the patient develop any fevers, chills, bleeding, severe pain patient will contact the office immediately.
Tarsorrhaphy Text: A tarsorrhaphy was performed using Frost sutures.
Previous Accession (Optional): [id number]
Surgeon Performing Repair (Optional): Martín Arriaga MD
Orbicularis Oris Muscle Flap Text: The defect edges were debeveled with a #15 scalpel blade.  Given that the defect affected the competency of the oral sphincter an orbicularis oris muscle flap was deemed most appropriate to restore this competency and normal muscle function.  Using a sterile surgical marker, an appropriate flap was drawn incorporating the defect. The area thus outlined was incised with a #15 scalpel blade.
Nasal Turnover Hinge Flap Text: The defect edges were debeveled with a #15 scalpel blade.  Given the size, depth, location of the defect and the defect being full thickness a nasal turnover hinge flap was deemed most appropriate.  Using a sterile surgical marker, an appropriate hinge flap was drawn incorporating the defect. The area thus outlined was incised with a #15 scalpel blade. The flap was designed to recreate the nasal mucosal lining and the alar rim. The skin margins were undermined to an appropriate distance in all directions utilizing iris scissors.
Retention Suture Text: Retention sutures were placed to support the closure and prevent dehiscence.
Undermining Location (Optional): in the superficial subcutaneous fat
Suturegard Retention Suture: 0-0 Nylon
Hemostasis: Electrocautery
Helical Rim Text: The closure involved the helical rim.
Skin Substitute Text: The defect edges were debeveled with a #15 scalpel blade.  Given the location of the defect, shape of the defect and the proximity to free margins a skin substitute graft was deemed most appropriate.  The graft material was trimmed to fit the size of the defect. The graft was then placed in the primary defect and oriented appropriately.
Ear Wedge Repair Text: A wedge excision was completed by carrying down an excision through the full thickness of the ear and cartilage with an inward facing Burow's triangle. The wound was then closed in a layered fashion.
Is There Documentation In The Chart Showing Discussion Of Changes With Another Physician?: Please Select the Appropriate Response
Mastoid Interpolation Flap Text: A decision was made to reconstruct the defect utilizing an interpolation axial flap and a staged reconstruction.  A telfa template was made of the defect.  This telfa template was then used to outline the mastoid interpolation flap.  The donor area for the pedicle flap was then injected with anesthesia.  The flap was excised through the skin and subcutaneous tissue down to the layer of the underlying musculature.  The pedicle flap was carefully excised within this deep plane to maintain its blood supply.  The edges of the donor site were undermined.   The donor site was closed in a primary fashion.  The pedicle was then rotated into position and sutured.  Once the tube was sutured into place, adequate blood supply was confirmed with blanching and refill.  The pedicle was then wrapped with xeroform gauze and dressed appropriately with a telfa and gauze bandage to ensure continued blood supply and protect the attached pedicle.
Partial Purse String (Intermediate) Text: Given the location of the defect and the characteristics of the surrounding skin an intermediate purse string closure was deemed most appropriate.  Undermining was performed circumfirentially around the surgical defect.  A purse string suture was then placed and tightened. Wound tension only allowed a partial closure of the circular defect.
Keystone Flap Text: The defect edges were debeveled with a #15 scalpel blade.  Given the location of the defect, shape of the defect a keystone flap was deemed most appropriate.  Using a sterile surgical marker, an appropriate keystone flap was drawn incorporating the defect, outlining the appropriate donor tissue and placing the expected incisions within the relaxed skin tension lines where possible. The area thus outlined was incised deep to adipose tissue with a #15 scalpel blade.  The skin margins were undermined to an appropriate distance in all directions around the primary defect and laterally outward around the flap utilizing iris scissors.
Staged Advancement Flap Text: The defect edges were debeveled with a #15 scalpel blade.  Given the location of the defect, shape of the defect and the proximity to free margins a staged advancement flap was deemed most appropriate.  Using a sterile surgical marker, an appropriate advancement flap was drawn incorporating the defect and placing the expected incisions within the relaxed skin tension lines where possible. The area thus outlined was incised deep to adipose tissue with a #15 scalpel blade.  The skin margins were undermined to an appropriate distance in all directions utilizing iris scissors.
Mucosal Advancement Flap Text: Given the location of the defect, shape of the defect and the proximity to free margins a mucosal advancement flap was deemed most appropriate. Incisions were made with a 15 blade scalpel in the appropriate fashion along the cutaneous vermilion border and the mucosal lip. The remaining actinically damaged mucosal tissue was excised.  The mucosal advancement flap was then elevated to the gingival sulcus with care taken to preserve the neurovascular structures and advanced into the primary defect. Care was taken to ensure that precise realignment of the vermilion border was achieved.
Epidermal Closure: running and interrupted
Location Indication Override (Is Already Calculated Based On Selected Body Location): Area H
Double O-Z Flap Text: The defect edges were debeveled with a #15 scalpel blade.  Given the location of the defect, shape of the defect and the proximity to free margins a Double O-Z flap was deemed most appropriate.  Using a sterile surgical marker, an appropriate transposition flap was drawn incorporating the defect and placing the expected incisions within the relaxed skin tension lines where possible. The area thus outlined was incised deep to adipose tissue with a #15 scalpel blade.  The skin margins were undermined to an appropriate distance in all directions utilizing iris scissors.
Bilobed Flap Text: The defect edges were debeveled with a #15 scalpel blade.  Given the location of the defect and the proximity to free margins a bilobe flap was deemed most appropriate.  Using a sterile surgical marker, an appropriate bilobe flap drawn around the defect.    The area thus outlined was incised deep to adipose tissue with a #15 scalpel blade.  The skin margins were undermined to an appropriate distance in all directions utilizing iris scissors.
Secondary Defect Width In Cm (Required For Flaps): 2.5
Dorsal Nasal Flap Text: The defect edges were debeveled with a #15 scalpel blade.  Given the location of the defect and the proximity to free margins a dorsal nasal flap was deemed most appropriate.  Using a sterile surgical marker, an appropriate dorsal nasal flap was drawn around the defect.    The area thus outlined was incised deep to adipose tissue with a #15 scalpel blade.  The skin margins were undermined to an appropriate distance in all directions utilizing iris scissors.
Home Suture Removal Text: Patient was provided instructions on removing sutures and will remove their sutures at home.  If they have any questions or difficulties they will call the office.
Wound Care (No Sutures): Petrolatum
Epidermal Autograft Text: The defect edges were debeveled with a #15 scalpel blade.  Given the location of the defect, shape of the defect and the proximity to free margins an epidermal autograft was deemed most appropriate.  Using a sterile surgical marker, the primary defect shape was transferred to the donor site. The epidermal graft was then harvested.  The skin graft was then placed in the primary defect and oriented appropriately.
Postop Diagnosis: same
Hatchet Flap Text: The defect edges were debeveled with a #15 scalpel blade.  Given the location of the defect, shape of the defect and the proximity to free margins a hatchet flap was deemed most appropriate.  Using a sterile surgical marker, an appropriate hatchet flap was drawn incorporating the defect and placing the expected incisions within the relaxed skin tension lines where possible.    The area thus outlined was incised deep to adipose tissue with a #15 scalpel blade.  The skin margins were undermined to an appropriate distance in all directions utilizing iris scissors.
Z Plasty Text: The lesion was extirpated to the level of the fat with a #15 scalpel blade.  Given the location of the defect, shape of the defect and the proximity to free margins a Z-plasty was deemed most appropriate for repair.  Using a sterile surgical marker, the appropriate transposition arms of the Z-plasty were drawn incorporating the defect and placing the expected incisions within the relaxed skin tension lines where possible.    The area thus outlined was incised deep to adipose tissue with a #15 scalpel blade.  The skin margins were undermined to an appropriate distance in all directions utilizing iris scissors.  The opposing transposition arms were then transposed into place in opposite direction and anchored with interrupted buried subcutaneous sutures.
Subsequent Stages Histo Method Verbiage: Using a similar technique to that described above, a thin layer of tissue was removed from all areas where tumor was visible on the previous stage.  The tissue was again oriented, mapped, dyed, and processed as above.
Tissue Cultured Epidermal Autograft Text: The defect edges were debeveled with a #15 scalpel blade.  Given the location of the defect, shape of the defect and the proximity to free margins a tissue cultured epidermal autograft was deemed most appropriate.  The graft was then trimmed to fit the size of the defect.  The graft was then placed in the primary defect and oriented appropriately.
Purse String (Simple) Text: Given the location of the defect and the characteristics of the surrounding skin a purse string closure was deemed most appropriate.  Undermining was performed circumfirentially around the surgical defect.  A purse string suture was then placed and tightened.
A-T Advancement Flap Text: The defect edges were debeveled with a #15 scalpel blade.  Given the location of the defect, shape of the defect and the proximity to free margins an A-T advancement flap was deemed most appropriate.  Using a sterile surgical marker, an appropriate advancement flap was drawn incorporating the defect and placing the expected incisions within the relaxed skin tension lines where possible.    The area thus outlined was incised deep to adipose tissue with a #15 scalpel blade.  The skin margins were undermined to an appropriate distance in all directions utilizing iris scissors.
Consent (Marginal Mandibular)/Introductory Paragraph: The rationale for Mohs was explained to the patient and consent was obtained. The risks, benefits and alternatives to therapy were discussed in detail. Specifically, the risks of damage to the marginal mandibular branch of the facial nerve, infection, scarring, bleeding, prolonged wound healing, incomplete removal, allergy to anesthesia, and recurrence were addressed. Prior to the procedure, the treatment site was clearly identified and confirmed by the patient. All components of Universal Protocol/PAUSE Rule completed.
Melolabial Interpolation Flap Text: A decision was made to reconstruct the defect utilizing an interpolation axial flap and a staged reconstruction.  A telfa template was made of the defect.  This telfa template was then used to outline the melolabial interpolation flap.  The donor area for the pedicle flap was then injected with anesthesia.  The flap was excised through the skin and subcutaneous tissue down to the layer of the underlying musculature.  The pedicle flap was carefully excised within this deep plane to maintain its blood supply.  The edges of the donor site were undermined.   The donor site was closed in a primary fashion.  The pedicle was then rotated into position and sutured.  Once the tube was sutured into place, adequate blood supply was confirmed with blanching and refill.  The pedicle was then wrapped with xeroform gauze and dressed appropriately with a telfa and gauze bandage to ensure continued blood supply and protect the attached pedicle.
Rhombic Flap Text: The defect edges were debeveled with a #15 scalpel blade.  Given the location of the defect and the proximity to free margins a rhombic flap was deemed most appropriate.  Using a sterile surgical marker, an appropriate rhombic flap was drawn incorporating the defect.    The area thus outlined was incised deep to adipose tissue with a #15 scalpel blade.  The skin margins were undermined to an appropriate distance in all directions utilizing iris scissors.
Consent (Nose)/Introductory Paragraph: The rationale for Mohs was explained to the patient and consent was obtained. The risks, benefits and alternatives to therapy were discussed in detail. Specifically, the risks of nasal deformity, changes in the flow of air through the nose, infection, scarring, bleeding, prolonged wound healing, incomplete removal, allergy to anesthesia, nerve injury and recurrence were addressed. Prior to the procedure, the treatment site was clearly identified and confirmed by the patient. All components of Universal Protocol/PAUSE Rule completed.
Cheiloplasty (Less Than 50%) Text: A decision was made to reconstruct the defect with a  cheiloplasty.  The defect was undermined extensively.  Additional obicularis oris muscle was excised with a 15 blade scalpel.  The defect was converted into a full thickness wedge, of less than 50% of the vertical height of the lip, to facilite a better cosmetic result.  Small vessels were then tied off with 5-0 monocyrl. The obicularis oris, superficial fascia, adipose and dermis were then reapproximated.  After the deeper layers were approximated the epidermis was reapproximated with particular care given to realign the vermilion border.
Staging Info: By selecting yes to the question above you will include information on AJCC 8 tumor staging in your Mohs note. Information on tumor staging will be automatically added for SCCs on the head and neck. AJCC 8 includes tumor size, tumor depth, perineural involvement and bone invasion.
W Plasty Text: The lesion was extirpated to the level of the fat with a #15 scalpel blade.  Given the location of the defect, shape of the defect and the proximity to free margins a W-plasty was deemed most appropriate for repair.  Using a sterile surgical marker, the appropriate transposition arms of the W-plasty were drawn incorporating the defect and placing the expected incisions within the relaxed skin tension lines where possible.    The area thus outlined was incised deep to adipose tissue with a #15 scalpel blade.  The skin margins were undermined to an appropriate distance in all directions utilizing iris scissors.  The opposing transposition arms were then transposed into place in opposite direction and anchored with interrupted buried subcutaneous sutures.
Dressing: pressure dressing with telfa
Muscle Hinge Flap Text: The defect edges were debeveled with a #15 scalpel blade.  Given the size, depth and location of the defect and the proximity to free margins a muscle hinge flap was deemed most appropriate.  Using a sterile surgical marker, an appropriate hinge flap was drawn incorporating the defect. The area thus outlined was incised with a #15 scalpel blade.  The skin margins were undermined to an appropriate distance in all directions utilizing iris scissors.
Wound Care: Vaseline
Xenograft Text: The defect edges were debeveled with a #15 scalpel blade.  Given the location of the defect, shape of the defect and the proximity to free margins a xenograft was deemed most appropriate.  The graft was then trimmed to fit the size of the defect.  The graft was then placed in the primary defect and oriented appropriately.
Bilobed Transposition Flap Text: The defect edges were debeveled with a #15 scalpel blade.  Given the location of the defect and the proximity to free margins a bilobed transposition flap was deemed most appropriate.  Using a sterile surgical marker, an appropriate bilobe flap drawn around the defect.    The area thus outlined was incised deep to adipose tissue with a #15 scalpel blade.  The skin margins were undermined to an appropriate distance in all directions utilizing iris scissors.
Consent Type: Consent 1 (Standard)
Mart-1 - Negative Histology Text: MART-1 staining demonstrates a normal density and pattern of melanocytes along the dermal-epidermal junction. The surgical margins are negative for tumor cells.
Consent (Lip)/Introductory Paragraph: The rationale for Mohs was explained to the patient and consent was obtained. The risks, benefits and alternatives to therapy were discussed in detail. Specifically, the risks of lip deformity, changes in the oral aperture, infection, scarring, bleeding, prolonged wound healing, incomplete removal, allergy to anesthesia, nerve injury and recurrence were addressed. Prior to the procedure, the treatment site was clearly identified and confirmed by the patient. All components of Universal Protocol/PAUSE Rule completed.
Nasalis-Muscle-Based Myocutaneous Island Pedicle Flap Text: Using a #15 blade, an incision was made around the donor flap to the level of the nasalis muscle. Wide lateral undermining was then performed in both the subcutaneous plane above the nasalis muscle, and in a submuscular plane just above periosteum. This allowed the formation of a free nasalis muscle axial pedicle (based on the angular artery) which was still attached to the actual cutaneous flap, increasing its mobility and vascular viability. Hemostasis was obtained with pinpoint electrocoagulation. The flap was mobilized into position and the pivotal anchor points positioned and stabilized with buried interrupted sutures. Subcutaneous and dermal tissues were closed in a multilayered fashion with sutures. Tissue redundancies were excised, and the epidermal edges were apposed without significant tension and sutured with sutures.
Consent (Spinal Accessory)/Introductory Paragraph: The rationale for Mohs was explained to the patient and consent was obtained. The risks, benefits and alternatives to therapy were discussed in detail. Specifically, the risks of damage to the spinal accessory nerve, infection, scarring, bleeding, prolonged wound healing, incomplete removal, allergy to anesthesia, and recurrence were addressed. Prior to the procedure, the treatment site was clearly identified and confirmed by the patient. All components of Universal Protocol/PAUSE Rule completed.
Medical Necessity Statement: Based on my medical judgement, Mohs surgery is the most appropriate treatment for this cancer compared to other treatments.
S Plasty Text: Given the location and shape of the defect, and the orientation of relaxed skin tension lines, an S-plasty was deemed most appropriate for repair.  Using a sterile surgical marker, the appropriate outline of the S-plasty was drawn, incorporating the defect and placing the expected incisions within the relaxed skin tension lines where possible.  The area thus outlined was incised deep to adipose tissue with a #15 scalpel blade.  The skin margins were undermined to an appropriate distance in all directions utilizing iris scissors. The skin flaps were advanced over the defect.  The opposing margins were then approximated with interrupted buried subcutaneous sutures.
Consent (Temporal Branch)/Introductory Paragraph: The rationale for Mohs was explained to the patient and consent was obtained. The risks, benefits and alternatives to therapy were discussed in detail. Specifically, the risks of damage to the temporal branch of the facial nerve, infection, scarring, bleeding, prolonged wound healing, incomplete removal, allergy to anesthesia, and recurrence were addressed. Prior to the procedure, the treatment site was clearly identified and confirmed by the patient. All components of Universal Protocol/PAUSE Rule completed.
Cheek-To-Nose Interpolation Flap Text: A decision was made to reconstruct the defect utilizing an interpolation axial flap and a staged reconstruction.  A telfa template was made of the defect.  This telfa template was then used to outline the Cheek-To-Nose Interpolation flap.  The donor area for the pedicle flap was then injected with anesthesia.  The flap was excised through the skin and subcutaneous tissue down to the layer of the underlying musculature.  The interpolation flap was carefully excised within this deep plane to maintain its blood supply.  The edges of the donor site were undermined.   The donor site was closed in a primary fashion.  The pedicle was then rotated into position and sutured.  Once the tube was sutured into place, adequate blood supply was confirmed with blanching and refill.  The pedicle was then wrapped with xeroform gauze and dressed appropriately with a telfa and gauze bandage to ensure continued blood supply and protect the attached pedicle.
O-T Advancement Flap Text: The defect edges were debeveled with a #15 scalpel blade.  Given the location of the defect, shape of the defect and the proximity to free margins an O-T advancement flap was deemed most appropriate.  Using a sterile surgical marker, an appropriate advancement flap was drawn incorporating the defect and placing the expected incisions within the relaxed skin tension lines where possible.    The area thus outlined was incised deep to adipose tissue with a #15 scalpel blade.  The skin margins were undermined to an appropriate distance in all directions utilizing iris scissors.
H Plasty Text: Given the location of the defect, shape of the defect and the proximity to free margins a H-plasty was deemed most appropriate for repair.  Using a sterile surgical marker, the appropriate advancement arms of the H-plasty were drawn incorporating the defect and placing the expected incisions within the relaxed skin tension lines where possible. The area thus outlined was incised deep to adipose tissue with a #15 scalpel blade. The skin margins were undermined to an appropriate distance in all directions utilizing iris scissors.  The opposing advancement arms were then advanced into place in opposite direction and anchored with interrupted buried subcutaneous sutures.
Eye Protection Verbiage: Before proceeding with the stage, a plastic scleral shield was inserted. The globe was anesthetized with a few drops of 1% lidocaine with 1:100,000 epinephrine. Then, an appropriate sized scleral shield was chosen and coated with lacrilube ointment. The shield was gently inserted and left in place for the duration of each stage. After the stage was completed, the shield was gently removed.
Inflammation Suggestive Of Cancer Camouflage Histology Text: There was a dense lymphocytic infiltrate which prevented adequate histologic evaluation of adjacent structures.
Complex Repair And Graft Additional Text (Will Appearing After The Standard Complex Repair Text): The complex repair was not sufficient to completely close the primary defect. The remaining additional defect was repaired with the graft mentioned below.
Double Island Pedicle Flap Text: The defect edges were debeveled with a #15 scalpel blade.  Given the location of the defect, shape of the defect and the proximity to free margins a double island pedicle advancement flap was deemed most appropriate.  Using a sterile surgical marker, an appropriate advancement flap was drawn incorporating the defect, outlining the appropriate donor tissue and placing the expected incisions within the relaxed skin tension lines where possible.    The area thus outlined was incised deep to adipose tissue with a #15 scalpel blade.  The skin margins were undermined to an appropriate distance in all directions around the primary defect and laterally outward around the island pedicle utilizing iris scissors.  There was minimal undermining beneath the pedicle flap.
Island Pedicle Flap-Requiring Vessel Identification Text: The defect edges were debeveled with a #15 scalpel blade.  Given the location of the defect, shape of the defect and the proximity to free margins an island pedicle advancement flap was deemed most appropriate.  Using a sterile surgical marker, an appropriate advancement flap was drawn, based on the axial vessel mentioned above, incorporating the defect, outlining the appropriate donor tissue and placing the expected incisions within the relaxed skin tension lines where possible.    The area thus outlined was incised deep to adipose tissue with a #15 scalpel blade.  The skin margins were undermined to an appropriate distance in all directions around the primary defect and laterally outward around the island pedicle utilizing iris scissors.  There was minimal undermining beneath the pedicle flap.
Tumor Depth: Less than 6mm from granular layer and no invasion beyond the subcutaneous fat
Posterior Auricular Interpolation Flap Text: A decision was made to reconstruct the defect utilizing an interpolation axial flap and a staged reconstruction.  A telfa template was made of the defect.  This telfa template was then used to outline the posterior auricular interpolation flap.  The donor area for the pedicle flap was then injected with anesthesia.  The flap was excised through the skin and subcutaneous tissue down to the layer of the underlying musculature.  The pedicle flap was carefully excised within this deep plane to maintain its blood supply.  The edges of the donor site were undermined.   The donor site was closed in a primary fashion.  The pedicle was then rotated into position and sutured.  Once the tube was sutured into place, adequate blood supply was confirmed with blanching and refill.  The pedicle was then wrapped with xeroform gauze and dressed appropriately with a telfa and gauze bandage to ensure continued blood supply and protect the attached pedicle.
Burow's Advancement Flap Text: The defect edges were debeveled with a #15 scalpel blade.  Given the location of the defect and the proximity to free margins a Burow's advancement flap was deemed most appropriate.  Using a sterile surgical marker, the appropriate advancement flap was drawn incorporating the defect and placing the expected incisions within the relaxed skin tension lines where possible.    The area thus outlined was incised deep to adipose tissue with a #15 scalpel blade.  The skin margins were undermined to an appropriate distance in all directions utilizing iris scissors.
O-L Flap Text: The defect edges were debeveled with a #15 scalpel blade.  Given the location of the defect, shape of the defect and the proximity to free margins an O-L flap was deemed most appropriate.  Using a sterile surgical marker, an appropriate advancement flap was drawn incorporating the defect and placing the expected incisions within the relaxed skin tension lines where possible.    The area thus outlined was incised deep to adipose tissue with a #15 scalpel blade.  The skin margins were undermined to an appropriate distance in all directions utilizing iris scissors.
Crescentic Advancement Flap Text: The defect edges were debeveled with a #15 scalpel blade.  Given the location of the defect and the proximity to free margins a crescentic advancement flap was deemed most appropriate.  Using a sterile surgical marker, the appropriate advancement flap was drawn incorporating the defect and placing the expected incisions within the relaxed skin tension lines where possible.    The area thus outlined was incised deep to adipose tissue with a #15 scalpel blade.  The skin margins were undermined to an appropriate distance in all directions utilizing iris scissors.
Paramedian Forehead Flap Text: A decision was made to reconstruct the defect utilizing an interpolation axial flap and a staged reconstruction.  A telfa template was made of the defect.  This telfa template was then used to outline the paramedian forehead pedicle flap.  The donor area for the pedicle flap was then injected with anesthesia.  The flap was excised through the skin and subcutaneous tissue down to the layer of the underlying musculature.  The pedicle flap was carefully excised within this deep plane to maintain its blood supply.  The edges of the donor site were undermined.   The donor site was closed in a primary fashion.  The pedicle was then rotated into position and sutured.  Once the tube was sutured into place, adequate blood supply was confirmed with blanching and refill.  The pedicle was then wrapped with xeroform gauze and dressed appropriately with a telfa and gauze bandage to ensure continued blood supply and protect the attached pedicle.
Mustarde Flap Text: The defect edges were debeveled with a #15 scalpel blade.  Given the size, depth and location of the defect and the proximity to free margins a Mustarde flap was deemed most appropriate.  Using a sterile surgical marker, an appropriate flap was drawn incorporating the defect. The area thus outlined was incised with a #15 scalpel blade.  The skin margins were undermined to an appropriate distance in all directions utilizing iris scissors.
Epidermal Sutures: 5-0 Surgipro
Helical Rim Advancement Flap Text: The defect edges were debeveled with a #15 blade scalpel.  Given the location of the defect and the proximity to free margins (helical rim) a double helical rim advancement flap was deemed most appropriate.  Using a sterile surgical marker, the appropriate advancement flaps were drawn incorporating the defect and placing the expected incisions between the helical rim and antihelix where possible.  The area thus outlined was incised through and through with a #15 scalpel blade.  With a skin hook and iris scissors, the flaps were gently and sharply undermined and freed up.
Composite Graft Text: The defect edges were debeveled with a #15 scalpel blade.  Given the location of the defect, shape of the defect, the proximity to free margins and the fact the defect was full thickness a composite graft was deemed most appropriate.  The defect was outline and then transferred to the donor site.  A full thickness graft was then excised from the donor site. The graft was then placed in the primary defect, oriented appropriately and then sutured into place.  The secondary defect was then repaired using a primary closure.
Transposition Flap Text: The defect edges were debeveled with a #15 scalpel blade.  Given the location of the defect and the proximity to free margins a transposition flap was deemed most appropriate.  Using a sterile surgical marker, an appropriate transposition flap was drawn incorporating the defect.    The area thus outlined was incised deep to adipose tissue with a #15 scalpel blade.  The skin margins were undermined to an appropriate distance in all directions utilizing iris scissors.
Ftsg Text: The defect edges were debeveled with a #15 scalpel blade.  Given the location of the defect, shape of the defect and the proximity to free margins a full thickness skin graft was deemed most appropriate.  Using a sterile surgical marker, the primary defect shape was transferred to the donor site. The area thus outlined was incised deep to adipose tissue with a #15 scalpel blade.  The harvested graft was then trimmed of adipose tissue until only dermis and epidermis was left.  The skin margins of the secondary defect were undermined to an appropriate distance in all directions utilizing iris scissors.  The secondary defect was closed with interrupted buried subcutaneous sutures.  The skin edges were then re-apposed with running  sutures.  The skin graft was then placed in the primary defect and oriented appropriately.
Bcc Histology Text: There were numerous aggregates of basaloid cells.
Mercedes Flap Text: The defect edges were debeveled with a #15 scalpel blade.  Given the location of the defect, shape of the defect and the proximity to free margins a Mercedes flap was deemed most appropriate.  Using a sterile surgical marker, an appropriate advancement flap was drawn incorporating the defect and placing the expected incisions within the relaxed skin tension lines where possible. The area thus outlined was incised deep to adipose tissue with a #15 scalpel blade.  The skin margins were undermined to an appropriate distance in all directions utilizing iris scissors.
Non-Graft Cartilage Fenestration Text: The cartilage was fenestrated with a 2mm punch biopsy to help facilitate healing.
Purse String (Intermediate) Text: Given the location of the defect and the characteristics of the surrounding skin a purse string intermediate closure was deemed most appropriate.  Undermining was performed circumfirentially around the surgical defect.  A purse string suture was then placed and tightened.
Depth Of Tumor Invasion (For Histology): dermis
Retention Suture Bite Size: 1 mm
Referring Physician (Optional): Brad Pompa DO
Burow's Graft Text: The defect edges were debeveled with a #15 scalpel blade.  Given the location of the defect, shape of the defect, the proximity to free margins and the presence of a standing cone deformity a Burow's skin graft was deemed most appropriate. The standing cone was removed and this tissue was then trimmed to the shape of the primary defect. The adipose tissue was also removed until only dermis and epidermis were left.  The skin margins of the secondary defect were undermined to an appropriate distance in all directions utilizing iris scissors.  The secondary defect was closed with interrupted buried subcutaneous sutures.  The skin edges were then re-apposed with running  sutures.  The skin graft was then placed in the primary defect and oriented appropriately.
Area L Indication Text: Tumors in this location are included in Area L (trunk and extremities).  Mohs surgery is indicated for larger tumors, or tumors with aggressive histologic features, in these anatomic locations.
Surgeon/Pathologist Verbiage (Will Incorporate Name Of Surgeon From Intro If Not Blank): operated in two distinct and integrated capacities as the surgeon and pathologist.
Mohs Histo Method Verbiage: Each section was then chromacoded and processed in the Mohs lab using the Mohs protocol and submitted for frozen section.
Alar Island Pedicle Flap Text: The defect edges were debeveled with a #15 scalpel blade.  Given the location of the defect, shape of the defect and the proximity to the alar rim an island pedicle advancement flap was deemed most appropriate.  Using a sterile surgical marker, an appropriate advancement flap was drawn incorporating the defect, outlining the appropriate donor tissue and placing the expected incisions within the nasal ala running parallel to the alar rim. The area thus outlined was incised with a #15 scalpel blade.  The skin margins were undermined minimally to an appropriate distance in all directions around the primary defect and laterally outward around the island pedicle utilizing iris scissors.  There was minimal undermining beneath the pedicle flap.
Cartilage Graft Text: The defect edges were debeveled with a #15 scalpel blade.  Given the location of the defect, shape of the defect, the fact the defect involved a full thickness cartilage defect a cartilage graft was deemed most appropriate.  An appropriate donor site was identified, cleansed, and anesthetized. The cartilage graft was then harvested and transferred to the recipient site, oriented appropriately and then sutured into place.  The secondary defect was then repaired using a primary closure.
Cheek Interpolation Flap Text: A decision was made to reconstruct the defect utilizing an interpolation axial flap and a staged reconstruction.  A telfa template was made of the defect.  This telfa template was then used to outline the Cheek Interpolation flap.  The donor area for the pedicle flap was then injected with anesthesia.  The flap was excised through the skin and subcutaneous tissue down to the layer of the underlying musculature.  The interpolation flap was carefully excised within this deep plane to maintain its blood supply.  The edges of the donor site were undermined.   The donor site was closed in a primary fashion.  The pedicle was then rotated into position and sutured.  Once the tube was sutured into place, adequate blood supply was confirmed with blanching and refill.  The pedicle was then wrapped with xeroform gauze and dressed appropriately with a telfa and gauze bandage to ensure continued blood supply and protect the attached pedicle.
Complex Repair And Flap Additional Text (Will Appearing After The Standard Complex Repair Text): The complex repair was not sufficient to completely close the primary defect. The remaining additional defect was repaired with the flap mentioned below.
Rotation Flap Text: The defect edges were debeveled with a #15 scalpel blade.  Given the location of the defect, shape of the defect and the proximity to free margins a rotation flap was deemed most appropriate.  Using a sterile surgical marker, an appropriate rotation flap was drawn incorporating the defect and placing the expected incisions within the relaxed skin tension lines where possible.    The area thus outlined was incised deep to adipose tissue with a #15 scalpel blade.  The skin margins were undermined to an appropriate distance in all directions utilizing iris scissors.
Consent 1/Introductory Paragraph: The rationale for Mohs was explained to the patient and consent was obtained. The risks, benefits and alternatives to therapy were discussed in detail. Specifically, the risks of infection, scarring, bleeding, prolonged wound healing, incomplete removal, allergy to anesthesia, nerve injury and recurrence were addressed. Prior to the procedure, the treatment site was clearly identified and confirmed by the patient. All components of Universal Protocol/PAUSE Rule completed.
Peng Advancement Flap Text: The defect edges were debeveled with a #15 scalpel blade.  Given the location of the defect, shape of the defect and the proximity to free margins a Peng advancement flap was deemed most appropriate.  Using a sterile surgical marker, an appropriate advancement flap was drawn incorporating the defect and placing the expected incisions within the relaxed skin tension lines where possible. The area thus outlined was incised deep to adipose tissue with a #15 scalpel blade.  The skin margins were undermined to an appropriate distance in all directions utilizing iris scissors.
Consent (Scalp)/Introductory Paragraph: The rationale for Mohs was explained to the patient and consent was obtained. The risks, benefits and alternatives to therapy were discussed in detail. Specifically, the risks of changes in hair growth pattern secondary to repair, infection, scarring, bleeding, prolonged wound healing, incomplete removal, allergy to anesthesia, nerve injury and recurrence were addressed. Prior to the procedure, the treatment site was clearly identified and confirmed by the patient. All components of Universal Protocol/PAUSE Rule completed.
Graft Donor Site Bandage (Optional-Leave Blank If You Don't Want In Note): Aquaplast was fitted to the graft site and sewn into place. A pressure bandage were applied to the donor site and over the aquaplast bolster.
Melolabial Transposition Flap Text: The defect edges were debeveled with a #15 scalpel blade.  Given the location of the defect and the proximity to free margins a melolabial flap was deemed most appropriate.  Using a sterile surgical marker, an appropriate melolabial transposition flap was drawn incorporating the defect.    The area thus outlined was incised deep to adipose tissue with a #15 scalpel blade.  The skin margins were undermined to an appropriate distance in all directions utilizing iris scissors.
Mohs Method Verbiage: An incision at a 45 degree angle following the standard Mohs approach was done and the specimen was harvested as a microscopic controlled layer.
Primary Defect Length In Cm (Final Defect Size - Required For Flaps/Grafts): 1.6
O-T Plasty Text: The defect edges were debeveled with a #15 scalpel blade.  Given the location of the defect, shape of the defect and the proximity to free margins an O-T plasty was deemed most appropriate.  Using a sterile surgical marker, an appropriate O-T plasty was drawn incorporating the defect and placing the expected incisions within the relaxed skin tension lines where possible.    The area thus outlined was incised deep to adipose tissue with a #15 scalpel blade.  The skin margins were undermined to an appropriate distance in all directions utilizing iris scissors.
Unna Boot Text: An Unna boot was placed to help immobilize the limb and facilitate more rapid healing.
Repair Anesthesia Method: local infiltration
Flap Type: Advancement Flap (Single)
V-Y Plasty Text: The defect edges were debeveled with a #15 scalpel blade.  Given the location of the defect, shape of the defect and the proximity to free margins an V-Y advancement flap was deemed most appropriate.  Using a sterile surgical marker, an appropriate advancement flap was drawn incorporating the defect and placing the expected incisions within the relaxed skin tension lines where possible.    The area thus outlined was incised deep to adipose tissue with a #15 scalpel blade.  The skin margins were undermined to an appropriate distance in all directions utilizing iris scissors.
Undermining Type: Entire Wound
Banner Transposition Flap Text: The defect edges were debeveled with a #15 scalpel blade.  Given the location of the defect and the proximity to free margins a Banner transposition flap was deemed most appropriate.  Using a sterile surgical marker, an appropriate flap drawn around the defect. The area thus outlined was incised deep to adipose tissue with a #15 scalpel blade.  The skin margins were undermined to an appropriate distance in all directions utilizing iris scissors.
Alternatives Discussed Intro (Do Not Add Period): I discussed alternative treatments to Mohs surgery and specifically discussed the risks and benefits of
Manual Repair Warning Statement: We plan on removing the manually selected variable below in favor of our much easier automatic structured text blocks found in the previous tab. We decided to do this to help make the flow better and give you the full power of structured data. Manual selection is never going to be ideal in our platform and I would encourage you to avoid using manual selection from this point on, especially since I will be sunsetting this feature. It is important that you do one of two things with the customized text below. First, you can save all of the text in a word file so you can have it for future reference. Second, transfer the text to the appropriate area in the Library tab. Lastly, if there is a flap or graft type which we do not have you need to let us know right away so I can add it in before the variable is hidden. No need to panic, we plan to give you roughly 6 months to make the change.
Split-Thickness Skin Graft Text: The defect edges were debeveled with a #15 scalpel blade.  Given the location of the defect, shape of the defect and the proximity to free margins a split thickness skin graft was deemed most appropriate.  Using a sterile surgical marker, the primary defect shape was transferred to the donor site. The split thickness graft was then harvested.  The skin graft was then placed in the primary defect and oriented appropriately.
Suturegard Body: The suture ends were repeatedly re-tightened and re-clamped to achieve the desired tissue expansion.
Pain Refusal Text: I offered to prescribe pain medication but the patient refused to take this medication.
Rhomboid Transposition Flap Text: The defect edges were debeveled with a #15 scalpel blade.  Given the location of the defect and the proximity to free margins a rhomboid transposition flap was deemed most appropriate.  Using a sterile surgical marker, an appropriate rhomboid flap was drawn incorporating the defect.    The area thus outlined was incised deep to adipose tissue with a #15 scalpel blade.  The skin margins were undermined to an appropriate distance in all directions utilizing iris scissors.
Mart-1 - Positive Histology Text: MART-1 staining demonstrates areas of higher density and clustering of melanocytes with Pagetoid spread upwards within the epidermis. The surgical margins are positive for tumor cells.
Advancement Flap (Double) Text: The defect edges were debeveled with a #15 scalpel blade.  Given the location of the defect and the proximity to free margins a double advancement flap was deemed most appropriate.  Using a sterile surgical marker, the appropriate advancement flaps were drawn incorporating the defect and placing the expected incisions within the relaxed skin tension lines where possible.    The area thus outlined was incised deep to adipose tissue with a #15 scalpel blade.  The skin margins were undermined to an appropriate distance in all directions utilizing iris scissors.
Repair Type: Flap
Trilobed Flap Text: The defect edges were debeveled with a #15 scalpel blade.  Given the location of the defect and the proximity to free margins a trilobed flap was deemed most appropriate.  Using a sterile surgical marker, an appropriate trilobed flap drawn around the defect.    The area thus outlined was incised deep to adipose tissue with a #15 scalpel blade.  The skin margins were undermined to an appropriate distance in all directions utilizing iris scissors.
Adjacent Tissue Transfer Text: The defect edges were debeveled with a #15 scalpel blade.  Given the location of the defect and the proximity to free margins an adjacent tissue transfer was deemed most appropriate.  Using a sterile surgical marker, an appropriate flap was drawn incorporating the defect and placing the expected incisions within the relaxed skin tension lines where possible.    The area thus outlined was incised deep to adipose tissue with a #15 scalpel blade.  The skin margins were undermined to an appropriate distance in all directions utilizing iris scissors.

## 2022-04-20 ENCOUNTER — APPOINTMENT (RX ONLY)
Dept: URBAN - METROPOLITAN AREA CLINIC 36 | Facility: CLINIC | Age: 72
Setting detail: DERMATOLOGY
End: 2022-04-20

## 2022-04-20 DIAGNOSIS — Z48.02 ENCOUNTER FOR REMOVAL OF SUTURES: ICD-10-CM

## 2022-04-20 PROCEDURE — ? SUTURE REMOVAL (GLOBAL PERIOD)

## 2022-04-20 ASSESSMENT — LOCATION DETAILED DESCRIPTION DERM: LOCATION DETAILED: RIGHT CENTRAL TEMPLE

## 2022-04-20 ASSESSMENT — LOCATION SIMPLE DESCRIPTION DERM: LOCATION SIMPLE: RIGHT TEMPLE

## 2022-04-20 ASSESSMENT — LOCATION ZONE DERM: LOCATION ZONE: FACE

## 2022-04-20 NOTE — PROCEDURE: SUTURE REMOVAL (GLOBAL PERIOD)
Detail Level: Detailed
Add 21420 Cpt? (Important Note: In 2017 The Use Of 32833 Is Being Tracked By Cms To Determine Future Global Period Reimbursement For Global Periods): no

## 2022-04-22 LAB
ALBUMIN SERPL-MCNC: 3.5 G/DL (ref 3.7–4.7)
ALBUMIN/CREAT UR: 1885 MG/G CREAT (ref 0–29)
ALBUMIN/GLOB SERPL: 1.3 {RATIO} (ref 1.2–2.2)
ALP SERPL-CCNC: 108 IU/L (ref 44–121)
ALT SERPL-CCNC: 23 IU/L (ref 0–44)
AST SERPL-CCNC: 19 IU/L (ref 0–40)
BILIRUB SERPL-MCNC: 0.3 MG/DL (ref 0–1.2)
BUN SERPL-MCNC: 34 MG/DL (ref 8–27)
BUN/CREAT SERPL: 16 (ref 10–24)
CALCIUM SERPL-MCNC: 8.8 MG/DL (ref 8.6–10.2)
CHLORIDE SERPL-SCNC: 109 MMOL/L (ref 96–106)
CHOLEST SERPL-MCNC: 184 MG/DL (ref 100–199)
CO2 SERPL-SCNC: 20 MMOL/L (ref 20–29)
CREAT SERPL-MCNC: 2.15 MG/DL (ref 0.76–1.27)
CREAT UR-MCNC: 57.3 MG/DL
EGFRCR SERPLBLD CKD-EPI 2021: 32 ML/MIN/1.73
GLOBULIN SER CALC-MCNC: 2.6 G/DL (ref 1.5–4.5)
GLUCOSE SERPL-MCNC: 193 MG/DL (ref 65–99)
HBA1C MFR BLD: 9.9 % (ref 4.8–5.6)
HDLC SERPL-MCNC: 49 MG/DL
LABORATORY COMMENT REPORT: ABNORMAL
LDLC SERPL CALC-MCNC: 105 MG/DL (ref 0–99)
MICROALBUMIN UR-MCNC: 1080.3 UG/ML
POTASSIUM SERPL-SCNC: 4.4 MMOL/L (ref 3.5–5.2)
PROT SERPL-MCNC: 6.1 G/DL (ref 6–8.5)
SODIUM SERPL-SCNC: 144 MMOL/L (ref 134–144)
TRIGL SERPL-MCNC: 171 MG/DL (ref 0–149)
VLDLC SERPL CALC-MCNC: 30 MG/DL (ref 5–40)

## 2022-07-22 DIAGNOSIS — E11.65 TYPE 2 DIABETES MELLITUS WITH HYPERGLYCEMIA, WITH LONG-TERM CURRENT USE OF INSULIN (HCC): ICD-10-CM

## 2022-07-22 DIAGNOSIS — Z79.4 TYPE 2 DIABETES MELLITUS WITH HYPERGLYCEMIA, WITH LONG-TERM CURRENT USE OF INSULIN (HCC): ICD-10-CM

## 2022-07-22 DIAGNOSIS — I10 ESSENTIAL HYPERTENSION: ICD-10-CM

## 2022-07-22 RX ORDER — LISINOPRIL 20 MG/1
20 TABLET ORAL DAILY
Qty: 100 TABLET | Refills: 3 | Status: SHIPPED | OUTPATIENT
Start: 2022-07-22 | End: 2023-07-31

## 2022-07-22 RX ORDER — EMPAGLIFLOZIN 25 MG/1
TABLET, FILM COATED ORAL
Qty: 100 TABLET | Refills: 3 | Status: SHIPPED | OUTPATIENT
Start: 2022-07-22 | End: 2023-09-20

## 2022-07-22 RX ORDER — CARVEDILOL 25 MG/1
TABLET ORAL
Qty: 200 TABLET | Refills: 3 | Status: SHIPPED | OUTPATIENT
Start: 2022-07-22 | End: 2023-07-31

## 2022-07-22 RX ORDER — DOXAZOSIN 2 MG/1
2 TABLET ORAL DAILY
Qty: 100 TABLET | Refills: 3 | Status: SHIPPED | OUTPATIENT
Start: 2022-07-22 | End: 2023-07-28

## 2022-07-22 RX ORDER — ATORVASTATIN CALCIUM 40 MG/1
40 TABLET, FILM COATED ORAL DAILY
Qty: 100 TABLET | Refills: 3 | Status: SHIPPED | OUTPATIENT
Start: 2022-07-22 | End: 2023-07-31

## 2022-07-27 NOTE — ASSESSMENT & PLAN NOTE
Patient was seen in clinic 2 weeks ago for increasing shortness of breath, orthopnea, peripheral edema and cough. I prescribed Lasix 40 mg daily for 2 weeks. I ordered chest x-ray on 5/23/18 that showed pulmonary edema. BNP was 980 on 5/23/18.  Echocardiogram on 61/18 showed moderately reduced left ventricular systolic function with EF 35%, global hypokinesis, moderate aortic stenosis, moderate mitral regurgitation.  Patient denied chest pain or chest pressure or chest congestion currently. Patient reports that he feels much better overall. He played golf yesterday.  Patient has appointment with cardiologist in 2 weeks.  I discussed with patient and wife for ER precaution. I advised him to call 911 and to go to ER if he has chest pain or chest congestion or shortness of breath or palpitation or irregular heartbeat or dizziness or syncopal or any worsening symptoms.  I also advised patient to keep in touch with me if he has any concern.     I discussed with patient to continue aspirin 81 mg daily, atorvastatin 40 mg every evening, Lasix 20 mg daily, lisinopril 40 mg daily and metoprolol XL 25 mg daily. I advised patient to discontinue amlodipine. I recommend patient to close monitor his blood pressure and pulse at home. I also advised patient to limit his sodium intake.  I also discussed with patient to add on spironolactone after discussion with cardiologist. We also discussed to cut down lisinopril if he add on spironolactone due to risks of hyperkalemia and hypotension. Patient and wife understand and they will discuss with cardiologist in appointment.   This is a new condition currently active  - Continue to take medication and follow-up with endocrinology as ordered  - Discussed importance of eye exams and retinal screenings annually will refer if needed  Foot care discussed at length.  Monofilament exam performed  Monofilament testing with a 10 gram force: sensation intact: intact bilaterally  Visual Inspection: Feet without maceration, ulcers, fissures.  Pedal pulses: intact bilaterally

## 2022-08-29 ENCOUNTER — APPOINTMENT (RX ONLY)
Dept: URBAN - METROPOLITAN AREA CLINIC 6 | Facility: CLINIC | Age: 72
Setting detail: DERMATOLOGY
End: 2022-08-29

## 2022-08-29 DIAGNOSIS — L81.4 OTHER MELANIN HYPERPIGMENTATION: ICD-10-CM

## 2022-08-29 DIAGNOSIS — L82.1 OTHER SEBORRHEIC KERATOSIS: ICD-10-CM

## 2022-08-29 DIAGNOSIS — L57.0 ACTINIC KERATOSIS: ICD-10-CM

## 2022-08-29 DIAGNOSIS — D22 MELANOCYTIC NEVI: ICD-10-CM

## 2022-08-29 DIAGNOSIS — L72.0 EPIDERMAL CYST: ICD-10-CM

## 2022-08-29 PROBLEM — D22.62 MELANOCYTIC NEVI OF LEFT UPPER LIMB, INCLUDING SHOULDER: Status: ACTIVE | Noted: 2022-08-29

## 2022-08-29 PROBLEM — D22.5 MELANOCYTIC NEVI OF TRUNK: Status: ACTIVE | Noted: 2022-08-29

## 2022-08-29 PROBLEM — D23.72 OTHER BENIGN NEOPLASM OF SKIN OF LEFT LOWER LIMB, INCLUDING HIP: Status: ACTIVE | Noted: 2022-08-29

## 2022-08-29 PROBLEM — D48.5 NEOPLASM OF UNCERTAIN BEHAVIOR OF SKIN: Status: ACTIVE | Noted: 2022-08-29

## 2022-08-29 PROBLEM — D22.39 MELANOCYTIC NEVI OF OTHER PARTS OF FACE: Status: ACTIVE | Noted: 2022-08-29

## 2022-08-29 PROBLEM — D22.61 MELANOCYTIC NEVI OF RIGHT UPPER LIMB, INCLUDING SHOULDER: Status: ACTIVE | Noted: 2022-08-29

## 2022-08-29 PROCEDURE — 17000 DESTRUCT PREMALG LESION: CPT | Mod: 59

## 2022-08-29 PROCEDURE — ? ADDITIONAL NOTES

## 2022-08-29 PROCEDURE — ? LIQUID NITROGEN

## 2022-08-29 PROCEDURE — ? BIOPSY BY SHAVE METHOD

## 2022-08-29 PROCEDURE — 11102 TANGNTL BX SKIN SINGLE LES: CPT

## 2022-08-29 PROCEDURE — 17003 DESTRUCT PREMALG LES 2-14: CPT

## 2022-08-29 PROCEDURE — ? COUNSELING

## 2022-08-29 PROCEDURE — 99213 OFFICE O/P EST LOW 20 MIN: CPT | Mod: 25

## 2022-08-29 ASSESSMENT — LOCATION DETAILED DESCRIPTION DERM
LOCATION DETAILED: LEFT VENTRAL PROXIMAL FOREARM
LOCATION DETAILED: LEFT SUPERIOR MEDIAL MIDBACK
LOCATION DETAILED: LEFT SUPERIOR CENTRAL MALAR CHEEK
LOCATION DETAILED: LEFT ANTERIOR DISTAL UPPER ARM
LOCATION DETAILED: NASAL DORSUM
LOCATION DETAILED: NASAL SUPRATIP
LOCATION DETAILED: RIGHT INFERIOR MEDIAL FOREHEAD
LOCATION DETAILED: RIGHT SUPERIOR PREAURICULAR CHEEK
LOCATION DETAILED: EPIGASTRIC SKIN
LOCATION DETAILED: RIGHT ANTERIOR DISTAL UPPER ARM
LOCATION DETAILED: LEFT PROXIMAL RADIAL DORSAL FOREARM
LOCATION DETAILED: LEFT MEDIAL INFERIOR CHEST
LOCATION DETAILED: RIGHT DISTAL DORSAL FOREARM
LOCATION DETAILED: LEFT DISTAL DORSAL FOREARM
LOCATION DETAILED: LEFT INFERIOR CRUS OF ANTIHELIX
LOCATION DETAILED: RIGHT VENTRAL DISTAL FOREARM
LOCATION DETAILED: RIGHT INFERIOR FOREHEAD
LOCATION DETAILED: LEFT LATERAL ZYGOMA
LOCATION DETAILED: STERNUM
LOCATION DETAILED: LEFT AXILLARY VAULT

## 2022-08-29 ASSESSMENT — LOCATION SIMPLE DESCRIPTION DERM
LOCATION SIMPLE: RIGHT FOREARM
LOCATION SIMPLE: ABDOMEN
LOCATION SIMPLE: LEFT AXILLARY VAULT
LOCATION SIMPLE: CHEST
LOCATION SIMPLE: RIGHT UPPER ARM
LOCATION SIMPLE: RIGHT CHEEK
LOCATION SIMPLE: LEFT ZYGOMA
LOCATION SIMPLE: NOSE
LOCATION SIMPLE: LEFT EAR
LOCATION SIMPLE: LEFT CHEEK
LOCATION SIMPLE: LEFT UPPER ARM
LOCATION SIMPLE: LEFT LOWER BACK
LOCATION SIMPLE: LEFT FOREARM
LOCATION SIMPLE: RIGHT FOREHEAD

## 2022-08-29 ASSESSMENT — LOCATION ZONE DERM
LOCATION ZONE: ARM
LOCATION ZONE: EAR
LOCATION ZONE: NOSE
LOCATION ZONE: FACE
LOCATION ZONE: AXILLAE
LOCATION ZONE: TRUNK

## 2022-08-29 NOTE — PROCEDURE: LIQUID NITROGEN
Post-Care Instructions: I reviewed with the patient in detail post-care instructions. Patient is to wear sunprotection, and avoid picking at any of the treated lesions. Pt may apply Vaseline to crusted or scabbing areas.
Number Of Freeze-Thaw Cycles: 2 freeze-thaw cycles
Duration Of Freeze Thaw-Cycle (Seconds): 8
Render Post-Care Instructions In Note?: no
Detail Level: Zone
Consent: The patient's consent was obtained including but not limited to risks of crusting, scabbing, blistering, scarring, darker or lighter pigmentary change, recurrence, incomplete removal and infection.
Show Aperture Variable?: Yes

## 2022-08-29 NOTE — PROCEDURE: ADDITIONAL NOTES
Detail Level: Detailed
Render Risk Assessment In Note?: no
Additional Notes: No changes noted from previous visit, treated with electrodessication due to recurrent episodes of trauma leading to bleeding.
Additional Notes: Declines treatment, not currently bothersome.

## 2022-08-29 NOTE — PROCEDURE: MIPS QUALITY
Detail Level: Detailed
Quality 130: Documentation Of Current Medications In The Medical Record: Current Medications Documented
Quality 110: Preventive Care And Screening: Influenza Immunization: Influenza immunization was not ordered or administered, reason not given
Quality 226: Preventive Care And Screening: Tobacco Use: Screening And Cessation Intervention: Patient screened for tobacco use and is an ex/non-smoker
Quality 111:Pneumonia Vaccination Status For Older Adults: Pneumococcal vaccine administered on or after patient’s 60th birthday and before the end of the measurement period
Quality 431: Preventive Care And Screening: Unhealthy Alcohol Use - Screening: Patient not identified as an unhealthy alcohol user when screened for unhealthy alcohol use using a systematic screening method

## 2022-09-19 ENCOUNTER — APPOINTMENT (RX ONLY)
Dept: URBAN - METROPOLITAN AREA CLINIC 6 | Facility: CLINIC | Age: 72
Setting detail: DERMATOLOGY
End: 2022-09-19

## 2022-09-19 PROBLEM — C44.41 BASAL CELL CARCINOMA OF SKIN OF SCALP AND NECK: Status: ACTIVE | Noted: 2022-09-19

## 2022-09-19 PROCEDURE — ? CURETTAGE AND DESTRUCTION

## 2022-09-19 PROCEDURE — 17272 DSTR MAL LES S/N/H/F/G 1.1-2: CPT

## 2022-09-19 NOTE — PROCEDURE: CURETTAGE AND DESTRUCTION
Detail Level: Detailed
Biopsy Photograph Reviewed: Yes
Accession # (Optional): [id number]
Number Of Curettages: 3
Size Of Lesion In Cm: 1
Size Of Lesion After Curettage: 1.6
Add Intralesional Injection: No
Anesthesia Type: 1% lidocaine with epinephrine
Cautery Type: electrodesiccation
What Was Performed First?: Curettage
Final Size Statement: The size of the lesion after treatment was
Additional Information: (Optional): The wound was cleaned, and a pressure dressing was applied.  The patient received detailed post-op instructions.
Consent: Written consent was obtained from the patient. The risks, benefits and alternatives to therapy were discussed in detail. Specifically, the risks of infection, scarring, bleeding, prolonged wound healing, nerve injury, incomplete removal, allergy to anesthesia and recurrence were addressed. Alternatives to ED&C, such as: surgical removal and XRT were also discussed.  Prior to the procedure, the treatment site was clearly identified and confirmed by the patient. All components of Universal Protocol/PAUSE Rule completed.
Post-Care Instructions: I reviewed with the patient in detail post-care instructions. Patient is to keep the area dry for 24-48 hours, and not to engage in any swimming until the area is healed. Should the patient develop any fevers, chills, bleeding, severe pain patient will contact the office immediately.
Bill As A Line Item Or As Units: Line Item

## 2022-11-22 LAB
ALBUMIN SERPL-MCNC: 3.9 G/DL (ref 3.7–4.7)
ALBUMIN/CREAT UR: 2177 MG/G CREAT (ref 0–29)
ALBUMIN/GLOB SERPL: 1.6 {RATIO} (ref 1.2–2.2)
ALP SERPL-CCNC: 91 IU/L (ref 44–121)
ALT SERPL-CCNC: 27 IU/L (ref 0–44)
AST SERPL-CCNC: 24 IU/L (ref 0–40)
BILIRUB SERPL-MCNC: 0.3 MG/DL (ref 0–1.2)
BUN SERPL-MCNC: 40 MG/DL (ref 8–27)
BUN/CREAT SERPL: 17 (ref 10–24)
CALCIUM SERPL-MCNC: 9 MG/DL (ref 8.6–10.2)
CHLORIDE SERPL-SCNC: 106 MMOL/L (ref 96–106)
CHOLEST SERPL-MCNC: 159 MG/DL (ref 100–199)
CO2 SERPL-SCNC: 22 MMOL/L (ref 20–29)
CREAT SERPL-MCNC: 2.3 MG/DL (ref 0.76–1.27)
CREAT UR-MCNC: 68 MG/DL
EGFRCR SERPLBLD CKD-EPI 2021: 29 ML/MIN/1.73
GLOBULIN SER CALC-MCNC: 2.4 G/DL (ref 1.5–4.5)
GLUCOSE SERPL-MCNC: 157 MG/DL (ref 70–99)
HBA1C MFR BLD: 9.4 % (ref 4.8–5.6)
HDLC SERPL-MCNC: 50 MG/DL
LABORATORY COMMENT REPORT: ABNORMAL
LDLC SERPL CALC-MCNC: 83 MG/DL (ref 0–99)
MICROALBUMIN UR-MCNC: 1480.1 UG/ML
POTASSIUM SERPL-SCNC: 5 MMOL/L (ref 3.5–5.2)
PROT SERPL-MCNC: 6.3 G/DL (ref 6–8.5)
SODIUM SERPL-SCNC: 142 MMOL/L (ref 134–144)
TRIGL SERPL-MCNC: 152 MG/DL (ref 0–149)
VLDLC SERPL CALC-MCNC: 26 MG/DL (ref 5–40)

## 2022-11-23 ENCOUNTER — OFFICE VISIT (OUTPATIENT)
Dept: MEDICAL GROUP | Facility: LAB | Age: 72
End: 2022-11-23
Payer: COMMERCIAL

## 2022-11-23 VITALS
DIASTOLIC BLOOD PRESSURE: 80 MMHG | TEMPERATURE: 96.4 F | RESPIRATION RATE: 14 BRPM | BODY MASS INDEX: 29.25 KG/M2 | HEART RATE: 78 BPM | WEIGHT: 193 LBS | OXYGEN SATURATION: 94 % | SYSTOLIC BLOOD PRESSURE: 128 MMHG | HEIGHT: 68 IN

## 2022-11-23 DIAGNOSIS — N18.32 TYPE 2 DIABETES MELLITUS WITH STAGE 3B CHRONIC KIDNEY DISEASE, WITH LONG-TERM CURRENT USE OF INSULIN (HCC): ICD-10-CM

## 2022-11-23 DIAGNOSIS — Z79.4 TYPE 2 DIABETES MELLITUS WITH STAGE 3B CHRONIC KIDNEY DISEASE, WITH LONG-TERM CURRENT USE OF INSULIN (HCC): ICD-10-CM

## 2022-11-23 DIAGNOSIS — E11.22 TYPE 2 DIABETES MELLITUS WITH STAGE 3B CHRONIC KIDNEY DISEASE, WITH LONG-TERM CURRENT USE OF INSULIN (HCC): ICD-10-CM

## 2022-11-23 PROCEDURE — 99214 OFFICE O/P EST MOD 30 MIN: CPT | Performed by: FAMILY MEDICINE

## 2022-11-23 RX ORDER — DULAGLUTIDE 3 MG/.5ML
INJECTION, SOLUTION SUBCUTANEOUS
COMMUNITY
Start: 2022-11-04 | End: 2023-02-03 | Stop reason: SDUPTHER

## 2022-11-23 RX ORDER — INSULIN GLARGINE 100 [IU]/ML
22 INJECTION, SOLUTION SUBCUTANEOUS EVERY EVENING
Qty: 21 ML | Refills: 3 | Status: SHIPPED | OUTPATIENT
Start: 2022-11-23 | End: 2022-11-30

## 2022-11-23 RX ORDER — MOXIFLOXACIN 5 MG/ML
SOLUTION/ DROPS OPHTHALMIC
COMMUNITY
Start: 2022-08-30 | End: 2023-10-17

## 2022-11-23 RX ORDER — INSULIN GLARGINE 100 [IU]/ML
INJECTION, SOLUTION SUBCUTANEOUS
COMMUNITY
Start: 2022-11-04 | End: 2022-11-23 | Stop reason: SDUPTHER

## 2022-11-23 ASSESSMENT — PATIENT HEALTH QUESTIONNAIRE - PHQ9: CLINICAL INTERPRETATION OF PHQ2 SCORE: 0

## 2022-11-23 ASSESSMENT — FIBROSIS 4 INDEX: FIB4 SCORE: 2.01

## 2022-11-24 NOTE — PROGRESS NOTES
Subjective:   Ashish Wiley is a 72 y.o. male here today for   Chief Complaint   Patient presents with    Lab Results    Paperwork     Health screening        #DM  Presents for follow up of diabetes mellitus. He indicates that he is feeling well and denies any symptoms referable to this diagnoses.  Specifically denies chest pain, palpitations, dyspnea, orthopnea, PND or peripheral edema, poyluria, polydipsia, urinary complaints, abdominal complaints, myalgias, numbness, weakness or other related symptoms.   Current medications: Currently on glargine 18 units daily.  NovoLog 5 units before meals, Jardiance.  Last A1c:   Glycohemoglobin   Date Value Ref Range Status   11/21/2022 9.4 (H) 4.8 - 5.6 % Final     Comment:     Prediabetes: 5.7 - 6.4  Diabetes: >6.4  Glycemic control for adults with diabetes: <7.0       Last Microalb/Cr ratio:   Microalbumin, Urine Random   Date Value Ref Range Status   03/29/2021 93.7 mg/dL Final     Comment:     Results obtained by dilution.     Creatinine, Urine   Date Value Ref Range Status   03/29/2021 81.69 mg/dL Final     Micro Alb Creat Ratio   Date Value Ref Range Status   03/29/2021 1147 (H) 0 - 30 mg/g Final     Last lipid:   Cholesterol,Tot   Date Value Ref Range Status   11/21/2022 159 100 - 199 mg/dL Final     HDL   Date Value Ref Range Status   11/21/2022 50 >39 mg/dL Final     LDL   Date Value Ref Range Status   12/31/2020 79 <100 mg/dL Final     Triglycerides   Date Value Ref Range Status   11/21/2022 152 (H) 0 - 149 mg/dL Final     Fasting sugars: 120s 140s  Diabetic foot exam: up to date  Retinal eye exam: up to date  ACEi/ARB?  Syncopal 20 mg  Statin?  Lipitor 40 mg  Aspirin?  Yes  Concomitant HTN?  Yes, at goal  Nightly foot checks?  Ye        Allergies   Allergen Reactions    Sulfa Drugs Unspecified     Kidney Stones         Current medicines (including changes today)  Current Outpatient Medications   Medication Sig Dispense Refill    INSULIN GLARGINE 100 UNIT/ML  injection PEN Inject 22 Units under the skin every evening for 360 days. 21 mL 3    TRULICITY 3 MG/0.5ML Solution Pen-injector INJECT 3 MG UNDER THE SKIN EVERY 7 DAYS FOR 90 DAYS.      moxifloxacin (VIGAMOX) 0.5 % Solution INSTILL 1 DROP INTO POST OPERATIVE EYE 3 TIMES A DAY      carvedilol (COREG) 25 MG Tab TAKE 1 TABLET BY MOUTH TWICE A DAY WITH MEALS 200 Tablet 3    JARDIANCE 25 MG Tab TAKE 1 TABLET BY MOUTH EVERY  Tablet 3    doxazosin (CARDURA) 2 MG Tab TAKE 1 TABLET BY MOUTH EVERY  Tablet 3    lisinopril (PRINIVIL) 20 MG Tab TAKE 1 TABLET BY MOUTH EVERY  Tablet 3    atorvastatin (LIPITOR) 40 MG Tab TAKE 1 TABLET BY MOUTH EVERY  Tablet 3    Continuous Blood Gluc Transmit (DEXCOM G6 TRANSMITTER) Misc USE TO CHECK BLOOD GLUCOSE 3 TO 4 TIMES DAILY 1 Each 0    Continuous Blood Gluc  (DEXCOM G6 ) Device USE TO CHECK BLOOD GLUCOSE 3 TO 4 TIMES DAILY 1 Each 0    Continuous Blood Gluc Sensor (DEXCOM G6 SENSOR) Misc Apply sensor subcutaneously to monitor blood glucose. Replace sensor every 10 days. 9 Each 3    insulin aspart (NOVOLOG FLEXPEN) 100 UNIT/ML injection PEN Inject 5 Units under the skin 3 times a day before meals. 45 mL 0    erythromycin 5 MG/GM Ointment Place 1 Application in both eyes 4 times a day. If improved after 2 days can reduce to twice daily administration 3.5 g 0    ergocalciferol (DRISDOL) 28755 UNIT capsule Take 1 Cap by mouth every 7 days. 12 Cap 3    Multiple Vitamins-Minerals (MULTIVITAMIN PO) Take  by mouth.      aspirin EC (ECOTRIN) 81 MG Tablet Delayed Response Take 81 mg by mouth every day.       No current facility-administered medications for this visit.     He  has a past medical history of ACC/AHA stage C systolic heart failure ([facility]) (6/21/2018), Acute exacerbation of CHF (congestive heart failure) ([facility]) (6/25/2018), CAD (coronary artery disease), CHF (congestive heart failure) ([facility]), Chronic systolic congestive heart failure, NYHA class 1  "(HCC) (7/23/2018), Diabetes ([facility]), Dilated cardiomyopathy ([facility]), Hyperlipidemia, Hypertension, NYHA class 3 acute on chronic systolic heart failure ([facility]) (6/25/2018), Olecranon bursitis of right elbow (6/17/2019), and Severe aortic stenosis (6/25/2018).    He has no past medical history of Encounter for long-term (current) use of other medications.    ROS   -See HPI       Objective:     Physical Exam:  /80   Pulse 78   Temp (!) 35.8 °C (96.4 °F) (Temporal)   Resp 14   Ht 1.727 m (5' 7.99\")   Wt 87.5 kg (193 lb)   SpO2 94%  Body mass index is 29.35 kg/m².   Constitutional: Alert, no distress.  Skin: Warm, dry, good turgor, no rashes in visible areas.  Eye: Equal, round and reactive, conjunctiva clear, lids normal.  Respiratory: Unlabored respiratory effort, lungs clear to auscultation, no wheezes, no rhonchi.  Cardiovascular: Normal S1, S2, no murmur, no edema.  Abdomen: Soft, non-tender, no masses, no hepatosplenomegaly.  Psych: Alert and oriented x3, normal affect and mood.    Assessment and Plan:     1. Type 2 diabetes mellitus with stage 3b chronic kidney disease, with long-term current use of insulin ([facility])  -Reviewed labs with patient.  Patient is unstable at this time with elevated A1c, not in control.  We discussed change of medication and will augment insulin glargine to 22 units.  We discussed patient becoming more active and adjusting her own insulin according to fasting blood glucose and postprandial blood glucose.  We will increase 2 units of Lantus for every fasting blood glucose above 100.  We will have also noted changes in diet are becoming a problem and leading to irregular numbers and elevated A1c.  Because of this we will refer to nutrition services.  Patient will follow-up for repeat hemoglobin A1c in 3 months.  - Referral to Nutrition Services  - INSULIN GLARGINE 100 UNIT/ML injection PEN; Inject 22 Units under the skin every evening for 360 days.  Dispense: 21 mL; Refill: " 3      Followup: No follow-ups on file.         PLEASE NOTE: This dictation was created using voice recognition software. I have made every reasonable attempt to correct obvious errors, but I expect that there are errors of grammar and possibly content that I did not discover before finalizing the note.

## 2023-02-03 RX ORDER — DULAGLUTIDE 3 MG/.5ML
INJECTION, SOLUTION SUBCUTANEOUS
Qty: 0.5 ML | Refills: 11 | Status: SHIPPED | OUTPATIENT
Start: 2023-02-03 | End: 2023-06-19

## 2023-02-03 NOTE — TELEPHONE ENCOUNTER
Received request via: Pharmacy    Was the patient seen in the last year in this department? Yes  11/23/22  Does the patient have an active prescription (recently filled or refills available) for medication(s) requested? No    Does the patient have retirement Plus and need 100 day supply (blood pressure, diabetes and cholesterol meds only)? Patient does not have SCP

## 2023-02-03 NOTE — TELEPHONE ENCOUNTER
"Patient had office visit on 11/17/22    \"5.  Not improving with use of tamsulosin, still urinating frequently, incomplete emptying, and nocturia. Offered to increase this dose or place referral to urology but patient declines.\"    Patient is still having frequency and ready to see urology.   Would you like follow up visit prior to placing referral?    Maria C SHOREN, RN  [facility]    " 1. Caller Name: Ashish Wiley                                           Call Back Number: [phone] (home)         Patient approves a detailed voicemail message: N\A    2. SPECIFIC Action To Be Taken: Orders pending, please sign.    3. Diagnosis/Clinical Reason for Request: Cardiology    4. Specialty & Provider Name/Lab/Imaging Location: [facility]    5. Is appointment scheduled for requested order/referral: no    Patient was not informed they will receive a return phone call from the office ONLY if there are any questions before processing their request. Advised to call back if they haven't received a call from the referral department in 5 days.      Dr. Silva phoned in to inform he was reading ECHO and was showing symptoms of HF. If we can place order they will contact patient.

## 2023-02-09 ENCOUNTER — OFFICE VISIT (OUTPATIENT)
Dept: MEDICAL GROUP | Facility: LAB | Age: 73
End: 2023-02-09
Payer: COMMERCIAL

## 2023-02-09 VITALS
DIASTOLIC BLOOD PRESSURE: 80 MMHG | WEIGHT: 195 LBS | HEIGHT: 68 IN | SYSTOLIC BLOOD PRESSURE: 122 MMHG | OXYGEN SATURATION: 98 % | HEART RATE: 84 BPM | TEMPERATURE: 96.2 F | BODY MASS INDEX: 29.55 KG/M2 | RESPIRATION RATE: 15 BRPM

## 2023-02-09 DIAGNOSIS — N18.32 TYPE 2 DIABETES MELLITUS WITH STAGE 3B CHRONIC KIDNEY DISEASE, WITH LONG-TERM CURRENT USE OF INSULIN (HCC): ICD-10-CM

## 2023-02-09 DIAGNOSIS — E11.22 TYPE 2 DIABETES MELLITUS WITH STAGE 3B CHRONIC KIDNEY DISEASE, WITH LONG-TERM CURRENT USE OF INSULIN (HCC): ICD-10-CM

## 2023-02-09 DIAGNOSIS — Z23 NEED FOR VACCINATION: ICD-10-CM

## 2023-02-09 DIAGNOSIS — Z13.6 ENCOUNTER FOR ABDOMINAL AORTIC ANEURYSM (AAA) SCREENING: ICD-10-CM

## 2023-02-09 DIAGNOSIS — Z79.4 TYPE 2 DIABETES MELLITUS WITH STAGE 3B CHRONIC KIDNEY DISEASE, WITH LONG-TERM CURRENT USE OF INSULIN (HCC): ICD-10-CM

## 2023-02-09 LAB
HBA1C MFR BLD: 9.1 % (ref ?–5.8)
POCT INT CON NEG: NEGATIVE
POCT INT CON POS: POSITIVE

## 2023-02-09 PROCEDURE — 90471 IMMUNIZATION ADMIN: CPT | Performed by: FAMILY MEDICINE

## 2023-02-09 PROCEDURE — 90662 IIV NO PRSV INCREASED AG IM: CPT | Performed by: FAMILY MEDICINE

## 2023-02-09 PROCEDURE — 83036 HEMOGLOBIN GLYCOSYLATED A1C: CPT | Performed by: FAMILY MEDICINE

## 2023-02-09 PROCEDURE — 99214 OFFICE O/P EST MOD 30 MIN: CPT | Mod: 25 | Performed by: FAMILY MEDICINE

## 2023-02-09 ASSESSMENT — PATIENT HEALTH QUESTIONNAIRE - PHQ9: CLINICAL INTERPRETATION OF PHQ2 SCORE: 0

## 2023-02-09 NOTE — PROGRESS NOTES
Subjective:   Ashish Wiley is a 73 y.o. male here today for   Chief Complaint   Patient presents with    Requesting Labs     #DM   Presents for follow up of diabetes mellitus. He indicates that he is feeling well and denies any symptoms referable to this diagnoses.  Specifically denies chest pain, palpitations, dyspnea, orthopnea, PND or peripheral edema, poyluria, polydipsia, urinary complaints, abdominal complaints, myalgias, numbness, weakness or other related symptoms.   Current medications: Insulin glargine 22 units nightly, Trulicity  Last A1c:   Glycohemoglobin   Date Value Ref Range Status   11/21/2022 9.4 (H) 4.8 - 5.6 % Final     Comment:     Prediabetes: 5.7 - 6.4  Diabetes: >6.4  Glycemic control for adults with diabetes: <7.0       Last Microalb/Cr ratio:   Microalbumin, Urine Random   Date Value Ref Range Status   03/29/2021 93.7 mg/dL Final     Comment:     Results obtained by dilution.     Creatinine, Urine   Date Value Ref Range Status   03/29/2021 81.69 mg/dL Final     Micro Alb Creat Ratio   Date Value Ref Range Status   03/29/2021 1147 (H) 0 - 30 mg/g Final     Last lipid:   Cholesterol,Tot   Date Value Ref Range Status   11/21/2022 159 100 - 199 mg/dL Final     HDL   Date Value Ref Range Status   11/21/2022 50 >39 mg/dL Final     LDL   Date Value Ref Range Status   12/31/2020 79 <100 mg/dL Final     Triglycerides   Date Value Ref Range Status   11/21/2022 152 (H) 0 - 149 mg/dL Final     Fasting sugars: Not checking regularly  Diabetic foot exam: up to date  Retinal eye exam: up to date  ACEi/ARB?  Lisinopril 20 mg  Statin?  Atorvastatin 40 mg  Aspirin?  Yes  Concomitant HTN?  At goal    #AAA screening:  -Patient has previous smoking history.  No previous AAA screening has been completed.    Allergies   Allergen Reactions    Sulfa Drugs Unspecified     Kidney Stones         Current medicines (including changes today)  Current Outpatient Medications   Medication Sig Dispense Refill     TRULICITY 3 MG/0.5ML Solution Pen-injector INJECT 3 MG UNDER THE SKIN EVERY 7 DAYS FOR 90 DAYS. 0.5 mL 11    insulin glargine 100 UNIT/ML Solution Pen-injector injection Inject 22 Units under the skin every evening for 360 days. 15 mL 3    moxifloxacin (VIGAMOX) 0.5 % Solution INSTILL 1 DROP INTO POST OPERATIVE EYE 3 TIMES A DAY      carvedilol (COREG) 25 MG Tab TAKE 1 TABLET BY MOUTH TWICE A DAY WITH MEALS 200 Tablet 3    JARDIANCE 25 MG Tab TAKE 1 TABLET BY MOUTH EVERY  Tablet 3    doxazosin (CARDURA) 2 MG Tab TAKE 1 TABLET BY MOUTH EVERY  Tablet 3    lisinopril (PRINIVIL) 20 MG Tab TAKE 1 TABLET BY MOUTH EVERY  Tablet 3    atorvastatin (LIPITOR) 40 MG Tab TAKE 1 TABLET BY MOUTH EVERY  Tablet 3    Continuous Blood Gluc Transmit (DEXCOM G6 TRANSMITTER) Misc USE TO CHECK BLOOD GLUCOSE 3 TO 4 TIMES DAILY 1 Each 0    Continuous Blood Gluc  (DEXCOM G6 ) Device USE TO CHECK BLOOD GLUCOSE 3 TO 4 TIMES DAILY 1 Each 0    Continuous Blood Gluc Sensor (DEXCOM G6 SENSOR) Misc Apply sensor subcutaneously to monitor blood glucose. Replace sensor every 10 days. 9 Each 3    insulin aspart (NOVOLOG FLEXPEN) 100 UNIT/ML injection PEN Inject 5 Units under the skin 3 times a day before meals. 45 mL 0    erythromycin 5 MG/GM Ointment Place 1 Application in both eyes 4 times a day. If improved after 2 days can reduce to twice daily administration 3.5 g 0    ergocalciferol (DRISDOL) 86972 UNIT capsule Take 1 Cap by mouth every 7 days. 12 Cap 3    Multiple Vitamins-Minerals (MULTIVITAMIN PO) Take  by mouth.      aspirin EC (ECOTRIN) 81 MG Tablet Delayed Response Take 81 mg by mouth every day.       No current facility-administered medications for this visit.     He  has a past medical history of ACC/AHA stage C systolic heart failure ([facility]) (6/21/2018), Acute exacerbation of CHF (congestive heart failure) ([facility]) (6/25/2018), CAD (coronary artery disease), CHF (congestive heart failure) ([facility]),  "Chronic systolic congestive heart failure, NYHA class 1 ([facility]) (7/23/2018), Diabetes ([facility]), Dilated cardiomyopathy ([facility]), Hyperlipidemia, Hypertension, NYHA class 3 acute on chronic systolic heart failure ([facility]) (6/25/2018), Olecranon bursitis of right elbow (6/17/2019), and Severe aortic stenosis (6/25/2018).    He has no past medical history of Encounter for long-term (current) use of other medications.    ROS   -See HPI     Objective:     Physical Exam:  /80   Pulse 84   Temp (!) 35.7 °C (96.2 °F) (Temporal)   Resp 15   Ht 1.727 m (5' 7.99\")   Wt 88.5 kg (195 lb)   SpO2 98%  Body mass index is 29.66 kg/m².   Constitutional: Alert, no distress.  Skin: Warm, dry, good turgor, no rashes in visible areas.  Eye: Equal, round and reactive, conjunctiva clear, lids normal.  Respiratory: Unlabored respiratory effort, lungs clear to auscultation, no wheezes, no rhonchi.  Cardiovascular: Normal S1, S2, no murmur, no edema.  Abdomen: Soft, non-tender, no masses, no hepatosplenomegaly.  Psych: Alert and oriented x3, normal affect and mood.  Monofilament testing with a 10 gram force: sensation intact: intact bilaterally  Visual Inspection: Feet without maceration, ulcers, fissures.  Pedal pulses: intact bilaterally    Assessment and Plan:     1. Type 2 diabetes mellitus with stage 3b chronic kidney disease, with long-term current use of insulin ([facility])  -Status: Unstable.  Diabetes still uncontrolled with a hemoglobin A1c of 9.1.  We discussed improving diet and exercise regimen.  We discussed restarting Humalog before meals 3 times a day which she will restart.  We will continue with regular screenings.  We will follow-up in 3 months for repeat hemoglobin A1c.  - POCT Hemoglobin A1C  - Diabetic Monofilament LE Exam    2. Encounter for abdominal aortic aneurysm (AAA) screening  - US-ABDOMINAL AORTA W/O DOPPLER; Future    3. Need for vaccination  -Patient was agreeable to receiving the influenza vaccine in clinic today " after discussion of potential risks, benefits, and side effects. Vaccine was administered without adverse effects.  - Influenza Vaccine, High Dose (65+ Only)    Followup: No follow-ups on file.         PLEASE NOTE: This dictation was created using voice recognition software. I have made every reasonable attempt to correct obvious errors, but I expect that there are errors of grammar and possibly content that I did not discover before finalizing the note.

## 2023-02-23 ENCOUNTER — APPOINTMENT (RX ONLY)
Dept: URBAN - METROPOLITAN AREA CLINIC 6 | Facility: CLINIC | Age: 73
Setting detail: DERMATOLOGY
End: 2023-02-23

## 2023-02-23 DIAGNOSIS — L72.0 EPIDERMAL CYST: ICD-10-CM

## 2023-02-23 DIAGNOSIS — Z85.828 PERSONAL HISTORY OF OTHER MALIGNANT NEOPLASM OF SKIN: ICD-10-CM

## 2023-02-23 DIAGNOSIS — D22 MELANOCYTIC NEVI: ICD-10-CM

## 2023-02-23 DIAGNOSIS — L81.4 OTHER MELANIN HYPERPIGMENTATION: ICD-10-CM

## 2023-02-23 DIAGNOSIS — L57.0 ACTINIC KERATOSIS: ICD-10-CM

## 2023-02-23 DIAGNOSIS — D18.0 HEMANGIOMA: ICD-10-CM

## 2023-02-23 DIAGNOSIS — L82.1 OTHER SEBORRHEIC KERATOSIS: ICD-10-CM

## 2023-02-23 PROBLEM — D48.5 NEOPLASM OF UNCERTAIN BEHAVIOR OF SKIN: Status: ACTIVE | Noted: 2023-02-23

## 2023-02-23 PROBLEM — D23.72 OTHER BENIGN NEOPLASM OF SKIN OF LEFT LOWER LIMB, INCLUDING HIP: Status: ACTIVE | Noted: 2023-02-23

## 2023-02-23 PROBLEM — D22.5 MELANOCYTIC NEVI OF TRUNK: Status: ACTIVE | Noted: 2023-02-23

## 2023-02-23 PROBLEM — D18.01 HEMANGIOMA OF SKIN AND SUBCUTANEOUS TISSUE: Status: ACTIVE | Noted: 2023-02-23

## 2023-02-23 PROCEDURE — ? LIQUID NITROGEN

## 2023-02-23 PROCEDURE — ? BIOPSY BY SHAVE METHOD

## 2023-02-23 PROCEDURE — ? COUNSELING

## 2023-02-23 PROCEDURE — ? PHOTO-DOCUMENTATION

## 2023-02-23 PROCEDURE — 99213 OFFICE O/P EST LOW 20 MIN: CPT | Mod: 25

## 2023-02-23 PROCEDURE — 17003 DESTRUCT PREMALG LES 2-14: CPT

## 2023-02-23 PROCEDURE — 17000 DESTRUCT PREMALG LESION: CPT | Mod: 59

## 2023-02-23 PROCEDURE — 11102 TANGNTL BX SKIN SINGLE LES: CPT

## 2023-02-23 ASSESSMENT — LOCATION SIMPLE DESCRIPTION DERM
LOCATION SIMPLE: RIGHT FOREHEAD
LOCATION SIMPLE: LEFT UPPER BACK
LOCATION SIMPLE: RIGHT TEMPLE
LOCATION SIMPLE: LEFT AXILLARY VAULT
LOCATION SIMPLE: ABDOMEN
LOCATION SIMPLE: UPPER BACK
LOCATION SIMPLE: LEFT EAR
LOCATION SIMPLE: LEFT FOREHEAD
LOCATION SIMPLE: LEFT CHEEK
LOCATION SIMPLE: LEFT ZYGOMA
LOCATION SIMPLE: LEFT OCCIPITAL SCALP
LOCATION SIMPLE: LEFT CALF
LOCATION SIMPLE: RIGHT HAND
LOCATION SIMPLE: RIGHT PRETIBIAL REGION

## 2023-02-23 ASSESSMENT — LOCATION DETAILED DESCRIPTION DERM
LOCATION DETAILED: RIGHT LATERAL PROXIMAL PRETIBIAL REGION
LOCATION DETAILED: LEFT AXILLARY VAULT
LOCATION DETAILED: LEFT INFERIOR FOREHEAD
LOCATION DETAILED: LEFT SUPERIOR MEDIAL UPPER BACK
LOCATION DETAILED: RIGHT FOREHEAD
LOCATION DETAILED: LEFT SUPERIOR CRUS OF ANTIHELIX
LOCATION DETAILED: LEFT CENTRAL ZYGOMA
LOCATION DETAILED: LEFT INFERIOR MEDIAL FOREHEAD
LOCATION DETAILED: INFERIOR THORACIC SPINE
LOCATION DETAILED: LEFT FOREHEAD
LOCATION DETAILED: LEFT RIB CAGE
LOCATION DETAILED: RIGHT ULNAR DORSAL HAND
LOCATION DETAILED: LEFT DISTAL CALF
LOCATION DETAILED: LEFT INFERIOR CENTRAL MALAR CHEEK
LOCATION DETAILED: RIGHT MEDIAL TEMPLE
LOCATION DETAILED: LEFT CENTRAL MALAR CHEEK
LOCATION DETAILED: LEFT SUPERIOR OCCIPITAL SCALP

## 2023-02-23 ASSESSMENT — LOCATION ZONE DERM
LOCATION ZONE: AXILLAE
LOCATION ZONE: EAR
LOCATION ZONE: SCALP
LOCATION ZONE: LEG
LOCATION ZONE: FACE
LOCATION ZONE: HAND
LOCATION ZONE: TRUNK

## 2023-02-23 NOTE — PROCEDURE: PHOTO-DOCUMENTATION
Photo Preface (Leave Blank If You Do Not Want): Photographs were obtained today
Detail Level: Detailed
Details (Free Text): Captured clinical photos of nevus on right mid-upper back, junctional nevus vs. dysplastic nevus. Will monitor and recheck at follow-up visit.

## 2023-02-23 NOTE — PROCEDURE: LIQUID NITROGEN
Post-Care Instructions: I reviewed with the patient in detail post-care instructions. Patient is to wear sunprotection, and avoid picking at any of the treated lesions. Pt may apply Vaseline to crusted or scabbing areas.
Render Post-Care Instructions In Note?: no
Number Of Freeze-Thaw Cycles: 2 freeze-thaw cycles
Consent: The patient's consent was obtained including but not limited to risks of crusting, scabbing, blistering, scarring, darker or lighter pigmentary change, recurrence, incomplete removal and infection.
Show Aperture Variable?: Yes
Duration Of Freeze Thaw-Cycle (Seconds): 8
Detail Level: Zone

## 2023-03-15 ENCOUNTER — HOSPITAL ENCOUNTER (OUTPATIENT)
Dept: RADIOLOGY | Facility: MEDICAL CENTER | Age: 73
End: 2023-03-15
Attending: FAMILY MEDICINE
Payer: COMMERCIAL

## 2023-03-15 DIAGNOSIS — Z13.6 ENCOUNTER FOR ABDOMINAL AORTIC ANEURYSM (AAA) SCREENING: ICD-10-CM

## 2023-03-15 PROCEDURE — 93978 VASCULAR STUDY: CPT

## 2023-03-15 PROCEDURE — 93978 VASCULAR STUDY: CPT | Mod: 26 | Performed by: INTERNAL MEDICINE

## 2023-05-10 ENCOUNTER — OFFICE VISIT (OUTPATIENT)
Dept: MEDICAL GROUP | Facility: LAB | Age: 73
End: 2023-05-10
Payer: COMMERCIAL

## 2023-05-10 VITALS
OXYGEN SATURATION: 96 % | SYSTOLIC BLOOD PRESSURE: 156 MMHG | DIASTOLIC BLOOD PRESSURE: 76 MMHG | RESPIRATION RATE: 16 BRPM | TEMPERATURE: 96.7 F | HEART RATE: 74 BPM | BODY MASS INDEX: 29.55 KG/M2 | WEIGHT: 195 LBS | HEIGHT: 68 IN

## 2023-05-10 DIAGNOSIS — N18.32 TYPE 2 DIABETES MELLITUS WITH STAGE 3B CHRONIC KIDNEY DISEASE, WITH LONG-TERM CURRENT USE OF INSULIN (HCC): ICD-10-CM

## 2023-05-10 DIAGNOSIS — I10 ESSENTIAL HYPERTENSION: ICD-10-CM

## 2023-05-10 DIAGNOSIS — Z79.4 TYPE 2 DIABETES MELLITUS WITH STAGE 3B CHRONIC KIDNEY DISEASE, WITH LONG-TERM CURRENT USE OF INSULIN (HCC): ICD-10-CM

## 2023-05-10 DIAGNOSIS — E11.22 TYPE 2 DIABETES MELLITUS WITH STAGE 3B CHRONIC KIDNEY DISEASE, WITH LONG-TERM CURRENT USE OF INSULIN (HCC): ICD-10-CM

## 2023-05-10 LAB
HBA1C MFR BLD: 9.5 % (ref ?–5.8)
POCT INT CON NEG: NEGATIVE
POCT INT CON POS: POSITIVE

## 2023-05-10 PROCEDURE — 83036 HEMOGLOBIN GLYCOSYLATED A1C: CPT | Performed by: FAMILY MEDICINE

## 2023-05-10 PROCEDURE — 99214 OFFICE O/P EST MOD 30 MIN: CPT | Performed by: FAMILY MEDICINE

## 2023-05-10 NOTE — PROGRESS NOTES
Subjective:   Ashish Wiley is a 73 y.o. male here today for   Chief Complaint   Patient presents with    Annual Exam       #DM  Presents for follow up of diabetes mellitus. He indicates that he is feeling well and denies any symptoms referable to this diagnoses.  Specifically denies chest pain, palpitations, dyspnea, orthopnea, PND or peripheral edema, poyluria, polydipsia, urinary complaints, abdominal complaints, myalgias, numbness, weakness or other related symptoms.   Current medications: Trulicity, Jardiance, insluin glardine, novolog.  Has had difficulty with compliance with NovoLog.  Last A1c:   Glycohemoglobin   Date Value Ref Range Status   02/09/2023 9.1 (A) 5.8 % Final     Last Microalb/Cr ratio:   Microalbumin, Urine Random   Date Value Ref Range Status   03/29/2021 93.7 mg/dL Final     Comment:     Results obtained by dilution.     Creatinine, Urine   Date Value Ref Range Status   03/29/2021 81.69 mg/dL Final     Micro Alb Creat Ratio   Date Value Ref Range Status   03/29/2021 1147 (H) 0 - 30 mg/g Final     Last lipid:   Cholesterol,Tot   Date Value Ref Range Status   11/21/2022 159 100 - 199 mg/dL Final     HDL   Date Value Ref Range Status   11/21/2022 50 >39 mg/dL Final     LDL   Date Value Ref Range Status   12/31/2020 79 <100 mg/dL Final     Triglycerides   Date Value Ref Range Status   11/21/2022 152 (H) 0 - 149 mg/dL Final     Fasting sugars: 120's although inconsistent  Diabetic foot exam: up to date  Retinal eye exam: up to date  ACEi/ARB? yes  Statin? yes  Aspirin? yes  Concomitant HTN? Not controlled today  Nightly foot checks? yes      #Hypertension:  -Currently strength carvedilol, lisinopril.  Has been compliant with medication.  Exercise regimen includes walking a few miles 4-5 times a week.  No real changes to diet regiment.  Has not been checking blood pressures at home.  Blood pressure today elevated at 156/76.  Denies any headaches, changes in vision, chest pain, shortness of  breath.    Allergies   Allergen Reactions    Sulfa Drugs Unspecified     Kidney Stones         Current medicines (including changes today)  Current Outpatient Medications   Medication Sig Dispense Refill    TRULICITY 3 MG/0.5ML Solution Pen-injector INJECT 3 MG UNDER THE SKIN EVERY 7 DAYS FOR 90 DAYS. 0.5 mL 11    insulin glargine 100 UNIT/ML Solution Pen-injector injection Inject 22 Units under the skin every evening for 360 days. 15 mL 3    moxifloxacin (VIGAMOX) 0.5 % Solution INSTILL 1 DROP INTO POST OPERATIVE EYE 3 TIMES A DAY      carvedilol (COREG) 25 MG Tab TAKE 1 TABLET BY MOUTH TWICE A DAY WITH MEALS 200 Tablet 3    JARDIANCE 25 MG Tab TAKE 1 TABLET BY MOUTH EVERY  Tablet 3    doxazosin (CARDURA) 2 MG Tab TAKE 1 TABLET BY MOUTH EVERY  Tablet 3    lisinopril (PRINIVIL) 20 MG Tab TAKE 1 TABLET BY MOUTH EVERY  Tablet 3    atorvastatin (LIPITOR) 40 MG Tab TAKE 1 TABLET BY MOUTH EVERY  Tablet 3    Continuous Blood Gluc Transmit (DEXCOM G6 TRANSMITTER) Misc USE TO CHECK BLOOD GLUCOSE 3 TO 4 TIMES DAILY 1 Each 0    Continuous Blood Gluc  (DEXCOM G6 ) Device USE TO CHECK BLOOD GLUCOSE 3 TO 4 TIMES DAILY 1 Each 0    Continuous Blood Gluc Sensor (DEXCOM G6 SENSOR) Misc Apply sensor subcutaneously to monitor blood glucose. Replace sensor every 10 days. 9 Each 3    insulin aspart (NOVOLOG FLEXPEN) 100 UNIT/ML injection PEN Inject 5 Units under the skin 3 times a day before meals. 45 mL 0    erythromycin 5 MG/GM Ointment Place 1 Application in both eyes 4 times a day. If improved after 2 days can reduce to twice daily administration 3.5 g 0    ergocalciferol (DRISDOL) 91290 UNIT capsule Take 1 Cap by mouth every 7 days. 12 Cap 3    Multiple Vitamins-Minerals (MULTIVITAMIN PO) Take  by mouth.      aspirin EC (ECOTRIN) 81 MG Tablet Delayed Response Take 81 mg by mouth every day.       No current facility-administered medications for this visit.     He  has a past medical  "history of ACC/AHA stage C systolic heart failure (HCC) (6/21/2018), Acute exacerbation of CHF (congestive heart failure) (HCC) (6/25/2018), CAD (coronary artery disease), CHF (congestive heart failure) (HCC), Chronic systolic congestive heart failure, NYHA class 1 (HCC) (7/23/2018), Diabetes (HCC), Dilated cardiomyopathy (HCC), Hyperlipidemia, Hypertension, NYHA class 3 acute on chronic systolic heart failure (HCC) (6/25/2018), Olecranon bursitis of right elbow (6/17/2019), and Severe aortic stenosis (6/25/2018).    He has no past medical history of Encounter for long-term (current) use of other medications.    ROS   -See HPI       Objective:     Physical Exam:  BP (!) 156/76   Pulse 74   Temp 35.9 °C (96.7 °F) (Temporal)   Resp 16   Ht 1.727 m (5' 7.99\")   Wt 88.5 kg (195 lb)   SpO2 96%  Body mass index is 29.66 kg/m².   Constitutional: Alert, no distress.  Skin: Warm, dry, good turgor, no rashes in visible areas.  Eye: Equal, round and reactive, conjunctiva clear, lids normal.  Respiratory: Unlabored respiratory effort, lungs clear to auscultation, no wheezes, no rhonchi.  Cardiovascular: Normal S1, S2, no murmur, no edema.  Abdomen: Soft, non-tender, no masses, no hepatosplenomegaly.  Psych: Alert and oriented x3, normal affect and mood.    Assessment and Plan:     1. Type 2 diabetes mellitus with stage 3b chronic kidney disease, with long-term current use of insulin ([facility])  -Status: Unstable.  Hemoglobin A1c again elevated at 9.5%.  Discussed the importance of compliance with medications including NovoLog.  I am also concerned that patient is no longer sensitive to non insulin diabetes medications.  We will check fasting insulin levels.  We will also refer to endocrinology for continued treatment and better understanding/utilization of insulin.  In the meantime we will continue with all other medications, continue with appropriate diet and exercise regiment.  - POCT Hemoglobin A1C  - Referral to " Endocrinology  - Lipid Profile; Future  - INSULIN FASTING; Future  - Basic Metabolic Panel; Future    2. Essential hypertension  -Today blood pressure is elevated unstable.  Repeat blood pressure at the end of appointment shows similar numbers.  Patient be checking blood pressure at home on a regular basis and follow-up in 2 weeks.  If it continues to be elevated at that point then we will go ahead and work on management of medication regiment.  - Basic Metabolic Panel; Future      Followup: Return in about 2 weeks (around 5/24/2023).         PLEASE NOTE: This dictation was created using voice recognition software. I have made every reasonable attempt to correct obvious errors, but I expect that there are errors of grammar and possibly content that I did not discover before finalizing the note.

## 2023-05-27 LAB
BUN SERPL-MCNC: 36 MG/DL (ref 8–27)
BUN/CREAT SERPL: 14 (ref 10–24)
CALCIUM SERPL-MCNC: 9.7 MG/DL (ref 8.6–10.2)
CHLORIDE SERPL-SCNC: 109 MMOL/L (ref 96–106)
CHOLEST SERPL-MCNC: 166 MG/DL (ref 100–199)
CO2 SERPL-SCNC: 22 MMOL/L (ref 20–29)
CREAT SERPL-MCNC: 2.65 MG/DL (ref 0.76–1.27)
EGFRCR SERPLBLD CKD-EPI 2021: 25 ML/MIN/1.73
GLUCOSE SERPL-MCNC: 132 MG/DL (ref 70–99)
HDLC SERPL-MCNC: 52 MG/DL
INSULIN SERPL-ACNC: 4 UIU/ML (ref 2.6–24.9)
LABORATORY COMMENT REPORT: ABNORMAL
LDLC SERPL CALC-MCNC: 84 MG/DL (ref 0–99)
POTASSIUM SERPL-SCNC: 5.2 MMOL/L (ref 3.5–5.2)
SODIUM SERPL-SCNC: 143 MMOL/L (ref 134–144)
TRIGL SERPL-MCNC: 180 MG/DL (ref 0–149)
VLDLC SERPL CALC-MCNC: 30 MG/DL (ref 5–40)

## 2023-05-31 ENCOUNTER — OFFICE VISIT (OUTPATIENT)
Dept: MEDICAL GROUP | Facility: LAB | Age: 73
End: 2023-05-31
Payer: COMMERCIAL

## 2023-05-31 VITALS
RESPIRATION RATE: 15 BRPM | TEMPERATURE: 97 F | SYSTOLIC BLOOD PRESSURE: 144 MMHG | HEIGHT: 68 IN | OXYGEN SATURATION: 97 % | DIASTOLIC BLOOD PRESSURE: 72 MMHG | HEART RATE: 78 BPM | BODY MASS INDEX: 29.25 KG/M2 | WEIGHT: 193 LBS

## 2023-05-31 DIAGNOSIS — I10 ESSENTIAL HYPERTENSION: ICD-10-CM

## 2023-05-31 DIAGNOSIS — N18.4 CKD STAGE 4 DUE TO TYPE 2 DIABETES MELLITUS (HCC): ICD-10-CM

## 2023-05-31 DIAGNOSIS — Z79.4 TYPE 2 DIABETES MELLITUS WITH STAGE 3B CHRONIC KIDNEY DISEASE, WITH LONG-TERM CURRENT USE OF INSULIN (HCC): ICD-10-CM

## 2023-05-31 DIAGNOSIS — N18.32 TYPE 2 DIABETES MELLITUS WITH STAGE 3B CHRONIC KIDNEY DISEASE, WITH LONG-TERM CURRENT USE OF INSULIN (HCC): ICD-10-CM

## 2023-05-31 DIAGNOSIS — E11.22 CKD STAGE 4 DUE TO TYPE 2 DIABETES MELLITUS (HCC): ICD-10-CM

## 2023-05-31 DIAGNOSIS — E11.22 TYPE 2 DIABETES MELLITUS WITH STAGE 3B CHRONIC KIDNEY DISEASE, WITH LONG-TERM CURRENT USE OF INSULIN (HCC): ICD-10-CM

## 2023-05-31 PROCEDURE — 3077F SYST BP >= 140 MM HG: CPT | Performed by: FAMILY MEDICINE

## 2023-05-31 PROCEDURE — 99214 OFFICE O/P EST MOD 30 MIN: CPT | Performed by: FAMILY MEDICINE

## 2023-05-31 PROCEDURE — 3078F DIAST BP <80 MM HG: CPT | Performed by: FAMILY MEDICINE

## 2023-05-31 RX ORDER — AMLODIPINE BESYLATE 5 MG/1
5 TABLET ORAL DAILY
Qty: 90 TABLET | Refills: 3 | Status: SHIPPED | OUTPATIENT
Start: 2023-05-31 | End: 2024-05-25

## 2023-05-31 ASSESSMENT — ENCOUNTER SYMPTOMS
CONSTIPATION: 0
BLURRED VISION: 0
NAUSEA: 0
VOMITING: 0
PALPITATIONS: 0
WHEEZING: 0
NERVOUS/ANXIOUS: 0
DIARRHEA: 0
ABDOMINAL PAIN: 0
FEVER: 0
CHILLS: 0
DEPRESSION: 0
SHORTNESS OF BREATH: 0

## 2023-05-31 NOTE — PROGRESS NOTES
Subjective:   Ashish Wiley is a 73 y.o. male here today for   Chief Complaint   Patient presents with    Paperwork    Lab Results    Referral Needed     Possible referral to endo        #DM  -Chronic ongoing condition currently treating with Trulicity: We will insulin glargine, will, Jardiance.  He continues to have difficulty with blood glucose especially in relation to meals.  Continues to see elevated postprandial blood glucose above 200.  No real improvement in diet and exercise regimen.  Is following up with endocrinology in the next 2 weeks.    CKD:  -Chronic ongoing condition currently on lisinopril, Jardiance.  Recent labs show decrease in GFR from 35-25.  Is working on low-sodium diet, avoidance of NSAIDs.  Patient is previously followed by nephrology; however, has not seen nephrologist over 2 years.  Denies any new symptoms at this time.    #Hypertension:  -Patient continues to have elevated blood pressure above 130/80.  He is taking all his medications, checking blood pressures regularly.  States that they remain above 140 in the systolic range.  Denies any chest pain, shortness of breath, changes in vision.      Allergies   Allergen Reactions    Sulfa Drugs Unspecified     Kidney Stones         Current medicines (including changes today)  Current Outpatient Medications   Medication Sig Dispense Refill    amLODIPine (NORVASC) 5 MG Tab Take 1 Tablet by mouth every day for 360 days. 90 Tablet 3    TRULICITY 3 MG/0.5ML Solution Pen-injector INJECT 3 MG UNDER THE SKIN EVERY 7 DAYS FOR 90 DAYS. 0.5 mL 11    insulin glargine 100 UNIT/ML Solution Pen-injector injection Inject 22 Units under the skin every evening for 360 days. 15 mL 3    moxifloxacin (VIGAMOX) 0.5 % Solution INSTILL 1 DROP INTO POST OPERATIVE EYE 3 TIMES A DAY      carvedilol (COREG) 25 MG Tab TAKE 1 TABLET BY MOUTH TWICE A DAY WITH MEALS 200 Tablet 3    JARDIANCE 25 MG Tab TAKE 1 TABLET BY MOUTH EVERY  Tablet 3    doxazosin  (CARDURA) 2 MG Tab TAKE 1 TABLET BY MOUTH EVERY  Tablet 3    lisinopril (PRINIVIL) 20 MG Tab TAKE 1 TABLET BY MOUTH EVERY  Tablet 3    atorvastatin (LIPITOR) 40 MG Tab TAKE 1 TABLET BY MOUTH EVERY  Tablet 3    Continuous Blood Gluc Transmit (DEXCOM G6 TRANSMITTER) Misc USE TO CHECK BLOOD GLUCOSE 3 TO 4 TIMES DAILY 1 Each 0    Continuous Blood Gluc  (DEXCOM G6 ) Device USE TO CHECK BLOOD GLUCOSE 3 TO 4 TIMES DAILY 1 Each 0    Continuous Blood Gluc Sensor (DEXCOM G6 SENSOR) Misc Apply sensor subcutaneously to monitor blood glucose. Replace sensor every 10 days. 9 Each 3    insulin aspart (NOVOLOG FLEXPEN) 100 UNIT/ML injection PEN Inject 5 Units under the skin 3 times a day before meals. 45 mL 0    erythromycin 5 MG/GM Ointment Place 1 Application in both eyes 4 times a day. If improved after 2 days can reduce to twice daily administration 3.5 g 0    ergocalciferol (DRISDOL) 69842 UNIT capsule Take 1 Cap by mouth every 7 days. 12 Cap 3    Multiple Vitamins-Minerals (MULTIVITAMIN PO) Take  by mouth.      aspirin EC (ECOTRIN) 81 MG Tablet Delayed Response Take 81 mg by mouth every day.       No current facility-administered medications for this visit.     He  has a past medical history of ACC/AHA stage C systolic heart failure ([facility]) (6/21/2018), Acute exacerbation of CHF (congestive heart failure) ([facility]) (6/25/2018), CAD (coronary artery disease), CHF (congestive heart failure) ([facility]), Chronic systolic congestive heart failure, NYHA class 1 ([facility]) (7/23/2018), Diabetes ([facility]), Dilated cardiomyopathy ([facility]), Hyperlipidemia, Hypertension, NYHA class 3 acute on chronic systolic heart failure ([facility]) (6/25/2018), Olecranon bursitis of right elbow (6/17/2019), and Severe aortic stenosis (6/25/2018).    He has no past medical history of Encounter for long-term (current) use of other medications.    ROS   Review of Systems   Constitutional:  Negative for chills and fever.   HENT:  Negative for  "hearing loss.    Eyes:  Negative for blurred vision.   Respiratory:  Negative for shortness of breath and wheezing.    Cardiovascular:  Negative for chest pain and palpitations.   Gastrointestinal:  Negative for abdominal pain, constipation, diarrhea, nausea and vomiting.   Psychiatric/Behavioral:  Negative for depression. The patient is not nervous/anxious.       Objective:     Physical Exam:  BP (!) 144/72   Pulse 78   Temp 36.1 °C (97 °F) (Temporal)   Resp 15   Ht 1.727 m (5' 7.99\")   Wt 87.5 kg (193 lb)   SpO2 97%  Body mass index is 29.35 kg/m².   Constitutional: Alert, no distress.  Skin: Warm, dry, good turgor, no rashes in visible areas.  Eye: Equal, round and reactive, conjunctiva clear, lids normal.  Respiratory: Unlabored respiratory effort, lungs clear to auscultation, no wheezes, no rhonchi.  Cardiovascular: Normal S1, S2, no murmur, no edema.  Psych: Alert and oriented x3, normal affect and mood.    Assessment and Plan:     1. Type 2 diabetes mellitus with stage 3b chronic kidney disease, with long-term current use of insulin (HCC)  -Status: Unstable.  Continue to work on appropriate diet and exercise regiment given hemoglobin A1c above 9.  Discussed increasing NovoLog to 7 units before meals.  Patient will also be following up with endocrinology next few weeks.    2. CKD stage 4 due to type 2 diabetes mellitus (HCC)  -Status: Unstable.  We will continue patient on Jardiance, lisinopril.  Discussed the importance of good glycemic control.  We will refer to nephrology for further evaluation and treatment if needed.  - Referral to Nephrology    3. Essential hypertension  -Status: Unstable.  Blood pressure remains elevated, above goal of 130/80.  We will continue with lisinopril, Coreg, Jardiance.  We will augment treatment with amlodipine at this time.  Continued appropriate diet and exercise regimen.  Follow-up in 3 months.  - amLODIPine (NORVASC) 5 MG Tab; Take 1 Tablet by mouth every day for 360 " days.  Dispense: 90 Tablet; Refill: 3        Followup: Return in about 3 months (around 8/31/2023).         PLEASE NOTE: This dictation was created using voice recognition software. I have made every reasonable attempt to correct obvious errors, but I expect that there are errors of grammar and possibly content that I did not discover before finalizing the note.

## 2023-06-07 ENCOUNTER — OFFICE VISIT (OUTPATIENT)
Dept: NEPHROLOGY | Facility: MEDICAL CENTER | Age: 73
End: 2023-06-07
Payer: COMMERCIAL

## 2023-06-07 VITALS
HEIGHT: 68 IN | SYSTOLIC BLOOD PRESSURE: 130 MMHG | OXYGEN SATURATION: 98 % | WEIGHT: 197 LBS | TEMPERATURE: 97.4 F | DIASTOLIC BLOOD PRESSURE: 70 MMHG | BODY MASS INDEX: 29.86 KG/M2 | HEART RATE: 79 BPM

## 2023-06-07 DIAGNOSIS — E11.22 TYPE 2 DIABETES MELLITUS WITH STAGE 4 CHRONIC KIDNEY DISEASE, WITH LONG-TERM CURRENT USE OF INSULIN (HCC): ICD-10-CM

## 2023-06-07 DIAGNOSIS — Z79.4 TYPE 2 DIABETES MELLITUS WITH STAGE 4 CHRONIC KIDNEY DISEASE, WITH LONG-TERM CURRENT USE OF INSULIN (HCC): ICD-10-CM

## 2023-06-07 DIAGNOSIS — I10 ESSENTIAL HYPERTENSION: ICD-10-CM

## 2023-06-07 DIAGNOSIS — N18.4 TYPE 2 DIABETES MELLITUS WITH STAGE 4 CHRONIC KIDNEY DISEASE, WITH LONG-TERM CURRENT USE OF INSULIN (HCC): ICD-10-CM

## 2023-06-07 DIAGNOSIS — N18.4 CKD (CHRONIC KIDNEY DISEASE) STAGE 4, GFR 15-29 ML/MIN (HCC): ICD-10-CM

## 2023-06-07 PROCEDURE — 3078F DIAST BP <80 MM HG: CPT | Performed by: INTERNAL MEDICINE

## 2023-06-07 PROCEDURE — 99204 OFFICE O/P NEW MOD 45 MIN: CPT | Performed by: INTERNAL MEDICINE

## 2023-06-07 PROCEDURE — 3075F SYST BP GE 130 - 139MM HG: CPT | Performed by: INTERNAL MEDICINE

## 2023-06-07 ASSESSMENT — ENCOUNTER SYMPTOMS
CHILLS: 0
FEVER: 0
VOMITING: 0
NAUSEA: 0
SHORTNESS OF BREATH: 0
HYPERTENSION: 1
COUGH: 0

## 2023-06-07 NOTE — PROGRESS NOTES
"Subjective     Ashish Wiley is a 73 y.o. male who presents with Hypertension and Chronic Kidney Disease            Patient has a history of longstanding diabetes, not very well controlled    Hypertension  This is a chronic problem. The current episode started more than 1 year ago. The problem is unchanged. The problem is controlled. Pertinent negatives include no chest pain, malaise/fatigue, peripheral edema or shortness of breath. Risk factors for coronary artery disease include male gender and diabetes mellitus. Past treatments include calcium channel blockers, ACE inhibitors and beta blockers. The current treatment provides significant improvement. There are no compliance problems.  Hypertensive end-organ damage includes kidney disease. Identifiable causes of hypertension include chronic renal disease.   Chronic Kidney Disease  This is a chronic problem. The current episode started more than 1 year ago. The problem occurs constantly. The problem has been unchanged. Pertinent negatives include no chest pain, chills, coughing, fever, nausea, urinary symptoms or vomiting.       Review of Systems   Constitutional:  Negative for chills, fever and malaise/fatigue.   Respiratory:  Negative for cough and shortness of breath.    Cardiovascular:  Negative for chest pain and leg swelling.   Gastrointestinal:  Negative for nausea and vomiting.   Genitourinary:  Negative for dysuria, frequency and urgency.   All other systems reviewed and are negative.             Objective     /70 (BP Location: Right arm, Patient Position: Sitting, BP Cuff Size: Adult)   Pulse 79   Temp 36.3 °C (97.4 °F) (Temporal)   Ht 1.727 m (5' 8\")   Wt 89.4 kg (197 lb)   SpO2 98%   BMI 29.95 kg/m²      Physical Exam  Vitals and nursing note reviewed.   Constitutional:       General: He is awake.      Appearance: He is not ill-appearing.   HENT:      Head: Normocephalic and atraumatic.      Right Ear: External ear normal.      Left " Ear: External ear normal.      Nose: Nose normal.      Mouth/Throat:      Pharynx: No oropharyngeal exudate or posterior oropharyngeal erythema.   Eyes:      General:         Right eye: No discharge.         Left eye: No discharge.      Conjunctiva/sclera: Conjunctivae normal.   Cardiovascular:      Rate and Rhythm: Normal rate and regular rhythm.   Pulmonary:      Effort: Pulmonary effort is normal. No respiratory distress.      Breath sounds: Normal breath sounds. No wheezing.   Abdominal:      General: Abdomen is flat. Bowel sounds are normal.   Musculoskeletal:         General: No tenderness.      Cervical back: No rigidity. No muscular tenderness.      Right lower leg: No edema.      Left lower leg: No edema.   Skin:     General: Skin is warm and dry.      Coloration: Skin is not jaundiced.      Findings: No lesion or rash.   Neurological:      General: No focal deficit present.      Mental Status: He is alert and oriented to person, place, and time. Mental status is at baseline.   Psychiatric:         Mood and Affect: Mood normal.         Behavior: Behavior normal.         Thought Content: Thought content normal.       Past Medical History:   Diagnosis Date    ACC/AHA stage C systolic heart failure ([facility]) 6/21/2018    Acute exacerbation of CHF (congestive heart failure) ([facility]) 6/25/2018    CAD (coronary artery disease)     CHF (congestive heart failure) ([facility])     Chronic systolic congestive heart failure, NYHA class 1 ([facility]) 7/23/2018    Diabetes ([facility])     Dilated cardiomyopathy ([facility])     Hyperlipidemia     Hypertension     NYHA class 3 acute on chronic systolic heart failure ([facility]) 6/25/2018    Olecranon bursitis of right elbow 6/17/2019    Severe aortic stenosis 6/25/2018     Social History     Socioeconomic History    Marital status:      Spouse name: Not on file    Number of children: Not on file    Years of education: Not on file    Highest education level: Not on file   Occupational History    Not on  file   Tobacco Use    Smoking status: Former     Packs/day: 1.00     Years: 22.00     Pack years: 22.00     Types: Cigarettes     Quit date: 1989     Years since quittin.4    Smokeless tobacco: Never    Tobacco comments:     stop cigar in 2015   Vaping Use    Vaping Use: Never used   Substance and Sexual Activity    Alcohol use: Yes     Alcohol/week: 0.6 oz     Types: 1 Glasses of wine per week     Comment: OCCASIONALLY    Drug use: No    Sexual activity: Yes     Partners: Female   Other Topics Concern    Not on file   Social History Narrative    Ashish was born in [place], raised in [place]. He moved to [place] in , he was worked for [employer]. He retired in 2016. He is  to [place] for the past 40 years. He has 2 kids- Neno (39, [place]) and Radha (36, [place]). He enjoys golfing and they also try to travel when they can.     Social Determinants of Health     Financial Resource Strain: Not on file   Food Insecurity: Not on file   Transportation Needs: Not on file   Physical Activity: Not on file   Stress: Not on file   Social Connections: Not on file   Intimate Partner Violence: Not on file   Housing Stability: Not on file     Family History   Problem Relation Age of Onset    Lung Disease Mother         COPD    Heart Disease Father         valve disease    Heart Failure Father     Hypertension Father     Hyperlipidemia Father     Cancer Maternal Grandmother         BRAIN TUMOR    Stroke Maternal Grandfather     Other Paternal Grandmother         INTESTINAL PROBLEM    Stroke Paternal Grandfather     Prostate cancer Brother     Other Brother         ortho     Recent Labs     22  0424 23  0434   ALBUMIN 3.9  --    HDL 50 52   TRIGLYCERIDE 152* 180*   SODIUM 142 143   POTASSIUM 5.0 5.2   CHLORIDE 106 109*   CO2 22 22   BUN 40* 36*   CREATININE 2.30* 2.65*                             Assessment & Plan        1. Essential hypertension  Controlled  Continue same medication  regimen  Continue low-sodium diet      2. CKD (chronic kidney disease) stage 4, GFR 15-29 ml/min ([facility])  Most likely diabetic nephropathy  No uremic symptoms  Renal dose of medication  Avoid nephrotoxins  Continue same medication regimen  Patient was advised to call us if symptoms worsen  No acute need for dialysis  Continue SGLT2 inhibitor  Recheck labs in 3 months    3. Type 2 diabetes mellitus with stage 4 chronic kidney disease, with long-term current use of insulin ([facility])  Optimize diabetes control for hemoglobin A1c below 7%  Patient scheduled to see an endocrinologist

## 2023-06-19 ENCOUNTER — OFFICE VISIT (OUTPATIENT)
Dept: ENDOCRINOLOGY | Facility: MEDICAL CENTER | Age: 73
End: 2023-06-19
Payer: COMMERCIAL

## 2023-06-19 VITALS
WEIGHT: 196.1 LBS | DIASTOLIC BLOOD PRESSURE: 78 MMHG | OXYGEN SATURATION: 94 % | HEIGHT: 68 IN | HEART RATE: 83 BPM | SYSTOLIC BLOOD PRESSURE: 168 MMHG | BODY MASS INDEX: 29.72 KG/M2

## 2023-06-19 DIAGNOSIS — N18.32 STAGE 3B CHRONIC KIDNEY DISEASE: ICD-10-CM

## 2023-06-19 DIAGNOSIS — E78.5 DYSLIPIDEMIA: ICD-10-CM

## 2023-06-19 DIAGNOSIS — G62.9 POLYNEUROPATHY: ICD-10-CM

## 2023-06-19 DIAGNOSIS — Z79.4 TYPE 2 DIABETES MELLITUS WITHOUT COMPLICATION, WITH LONG-TERM CURRENT USE OF INSULIN (HCC): ICD-10-CM

## 2023-06-19 DIAGNOSIS — R80.9 MICROALBUMINURIA: ICD-10-CM

## 2023-06-19 DIAGNOSIS — Z79.4 LONG TERM (CURRENT) USE OF INSULIN (HCC): ICD-10-CM

## 2023-06-19 DIAGNOSIS — I10 ESSENTIAL HYPERTENSION: ICD-10-CM

## 2023-06-19 DIAGNOSIS — E11.9 TYPE 2 DIABETES MELLITUS WITHOUT COMPLICATION, WITH LONG-TERM CURRENT USE OF INSULIN (HCC): ICD-10-CM

## 2023-06-19 PROCEDURE — 99211 OFF/OP EST MAY X REQ PHY/QHP: CPT

## 2023-06-19 PROCEDURE — 3078F DIAST BP <80 MM HG: CPT

## 2023-06-19 PROCEDURE — 3077F SYST BP >= 140 MM HG: CPT

## 2023-06-19 PROCEDURE — 99214 OFFICE O/P EST MOD 30 MIN: CPT

## 2023-06-19 RX ORDER — PROCHLORPERAZINE 25 MG/1
SUPPOSITORY RECTAL
Qty: 1 EACH | Refills: 0 | Status: SHIPPED | OUTPATIENT
Start: 2023-06-19 | End: 2023-09-20 | Stop reason: SDUPTHER

## 2023-06-19 RX ORDER — PROCHLORPERAZINE 25 MG/1
SUPPOSITORY RECTAL
Qty: 9 EACH | Refills: 3 | Status: SHIPPED | OUTPATIENT
Start: 2023-06-19

## 2023-06-19 RX ORDER — DULAGLUTIDE 4.5 MG/.5ML
4.5 INJECTION, SOLUTION SUBCUTANEOUS
Qty: 6 ML | Refills: 6 | Status: SHIPPED | OUTPATIENT
Start: 2023-06-19

## 2023-06-19 NOTE — PROGRESS NOTES
Chief Complaint:  Consult requested by Miguel A Colón M.D. for initial evaluation of The following conditions   HPI:   Ashish Wiley is a 73 y.o. male       Type 2 DM Mellitus:   He denies hospitalizations for DKA in the past.    He does not monitors blood glucose       Latest Reference Range & Units 11/21/22 04:24 02/09/23 07:40 05/10/23 07:00   Glycohemoglobin 5.8 % 9.4 (H) 9.1 ! 9.5 !     Medication regimen:  Lantus 22 units night  Jardiance 25 mg daily  Trulicity 3mg weekly   Humalog 4 units with breakfast lunch and dinner-15 min before meals     He reports hypoglycemic episodes occurring when he exercises   He reports episodes of severe hypoglycemia requiring third party assistance.    He  is not wearing a medical alert bracelet or necklace.    He does not a glucagon emergency kit.    He reports attending diabetes education classes-1 year ago.  Diet: Healthy in general     Diabetes Complications   He  reports history of retinopathy.    He denies laser eye surgery.   Last eye exam: Retinopathy-every 6 week-[facility]     He denies history of kidney damage.    He is on ACE inhibitor or ARB.   He denies history of coronary artery disease.    He  denies history of stroke and denies TIA.    He denies history of PAD.  He reports history of hyperlipidemia.     Latest Reference Range & Units 12/31/20 06:56   C-Peptide 0.8 - 3.5 ng/mL 2.3     2.  Polyneuropathy:   He reports history of peripheral sensory neuropathy.    He reports numbness, tingling in both feet.    He denies history of foot sores.     3. Dyslipidemia:  Currently taking atorvastatin 40 mg daily   Latest Reference Range & Units 05/26/23 04:34   Cholesterol,Tot 100 - 199 mg/dL 166   Triglycerides 0 - 149 mg/dL 180 (H)   HDL >39 mg/dL 52   LDL Chol Calc (NIH) 0 - 99 mg/dL 84     4.  Essential hypertension:  Followed by PCP  currently taking amlodipine 5 (Norvasc) 5 mg daily, carvedilol (Coreg) 25 mg daily, lisinopril 20 mg  BP today  168/78    5.  Microalbuminuria:  Followed by nephrologist  Currently taking lisinopril 20 mg daily  BP today 168/78   Latest Reference Range & Units 03/29/21 07:05   Micro Alb Creat Ratio 0 - 30 mg/g 1147 (H)     6.Chronic kidney disease:   FV by neurologist  Dr. Najjar-every 3 months    Latest Reference Range & Units 12/31/20 06:56 03/29/21 07:05   GFR If Non African American >60 mL/min/1.73 m 2 31 ! 35 !     7. Long-term current use of insulin:  On insulin for diabetes treatment    ROS:     CONS:     No fever, no chills, no weight loss, no fatigue   EYES:      No diplopia, no blurry vision, no redness of eyes, no swelling of eyelids   ENT:    No hearing loss, No ear pain, No sore throat, no dysphagia, no neck swelling   CV:     No chest pain, no palpitations, no claudication, no orthopnea, no PND   PULM:    No SOB, no cough, no hemoptysis, no wheezing    GI:   No nausea, no vomiting, no diarrhea, no constipation, no bloody stools   :  Passing urine well, no dysuria, no hematuria   ENDO:   No polyuria, no polydipsia, no heat intolerance, no cold intolerance   NEURO: No headaches, no dizziness, no convulsions, no tremors   MUSC:  No joint swellings, no arthralgias, no myalgias, no weakness   SKIN:   No rash, no ulcers, no dry skin   PSYCH:   No depression, no anxiety, no difficulty sleeping       Past Medical History:  Patient Active Problem List    Diagnosis Date Noted    Bacterial conjunctivitis 10/27/2020    History of maxillary sinusitis 10/27/2020    Dyslipidemia 12/27/2019    CHF due to valvular disease (HCC) 08/09/2018    S/P TAVR (transcatheter aortic valve replacement) 07/02/2018    Stented coronary artery 06/29/2018    Coronary artery disease due to calcified coronary lesion: Status post atherectomy and stenting of the LAD in June 2018.  Nonobstructive disease in the RCA and circumflex. 06/25/2018    Dilated cardiomyopathy (HCC) 06/21/2018    Vitamin D deficiency 10/24/2017    Stage 3 chronic kidney  disease ([facility]) 10/11/2017    Diabetic nephropathy associated with type 2 diabetes mellitus ([facility]) 10/11/2017    Anemia in stage 3 chronic kidney disease ([facility]) 09/12/2017    Macroalbuminuric diabetic nephropathy ([facility]) 10/18/2016    Type 2 diabetes mellitus with chronic kidney disease, with long-term current use of insulin ([facility]) 06/16/2016    Essential hypertension 06/16/2016    Diabetic peripheral neuropathy associated with type 2 diabetes mellitus ([facility]) 06/16/2016       Past Surgical History:  Past Surgical History:   Procedure Laterality Date    TRANSCATHETER AORTIC VALVE REPLACEMENT  7/2/2018    Procedure: TRANSCATHETER AORTIC VALVE REPLACEMENT;  Surgeon: Markus Fuller M.D.;  Location: SURGERY [facility];  Service: Cardiac    DAYANNA  7/2/2018    Procedure: DAYANNA;  Surgeon: Markus Fuller M.D.;  Location: SURGERY [facility];  Service: Cardiac    AORTIC VALVE REPLACEMENT      S/P TAVR     TONSILLECTOMY      ZZZ CARDIAC CATH          Allergies:  Sulfa drugs     Current Medications:    Current Outpatient Medications:     amLODIPine (NORVASC) 5 MG Tab, Take 1 Tablet by mouth every day for 360 days., Disp: 90 Tablet, Rfl: 3    TRULICITY 3 MG/0.5ML Solution Pen-injector, INJECT 3 MG UNDER THE SKIN EVERY 7 DAYS FOR 90 DAYS., Disp: 0.5 mL, Rfl: 11    insulin glargine 100 UNIT/ML Solution Pen-injector injection, Inject 22 Units under the skin every evening for 360 days., Disp: 15 mL, Rfl: 3    moxifloxacin (VIGAMOX) 0.5 % Solution, INSTILL 1 DROP INTO POST OPERATIVE EYE 3 TIMES A DAY, Disp: , Rfl:     carvedilol (COREG) 25 MG Tab, TAKE 1 TABLET BY MOUTH TWICE A DAY WITH MEALS, Disp: 200 Tablet, Rfl: 3    JARDIANCE 25 MG Tab, TAKE 1 TABLET BY MOUTH EVERY DAY, Disp: 100 Tablet, Rfl: 3    doxazosin (CARDURA) 2 MG Tab, TAKE 1 TABLET BY MOUTH EVERY DAY, Disp: 100 Tablet, Rfl: 3    lisinopril (PRINIVIL) 20 MG Tab, TAKE 1 TABLET BY MOUTH EVERY DAY, Disp: 100 Tablet, Rfl: 3    atorvastatin (LIPITOR) 40 MG Tab, TAKE 1 TABLET BY  MOUTH EVERY DAY, Disp: 100 Tablet, Rfl: 3    Continuous Blood Gluc Transmit (DEXCOM G6 TRANSMITTER) Misc, USE TO CHECK BLOOD GLUCOSE 3 TO 4 TIMES DAILY, Disp: 1 Each, Rfl: 0    Continuous Blood Gluc  (DEXCOM G6 ) Device, USE TO CHECK BLOOD GLUCOSE 3 TO 4 TIMES DAILY, Disp: 1 Each, Rfl: 0    Continuous Blood Gluc Sensor (DEXCOM G6 SENSOR) Misc, Apply sensor subcutaneously to monitor blood glucose. Replace sensor every 10 days., Disp: 9 Each, Rfl: 3    insulin aspart (NOVOLOG FLEXPEN) 100 UNIT/ML injection PEN, Inject 5 Units under the skin 3 times a day before meals., Disp: 45 mL, Rfl: 0    erythromycin 5 MG/GM Ointment, Place 1 Application in both eyes 4 times a day. If improved after 2 days can reduce to twice daily administration, Disp: 3.5 g, Rfl: 0    ergocalciferol (DRISDOL) 34039 UNIT capsule, Take 1 Cap by mouth every 7 days., Disp: 12 Cap, Rfl: 3    Multiple Vitamins-Minerals (MULTIVITAMIN PO), Take  by mouth., Disp: , Rfl:     aspirin EC (ECOTRIN) 81 MG Tablet Delayed Response, Take 81 mg by mouth every day., Disp: , Rfl:     Social History:  Social History     Socioeconomic History    Marital status:      Spouse name: Not on file    Number of children: Not on file    Years of education: Not on file    Highest education level: Not on file   Occupational History    Not on file   Tobacco Use    Smoking status: Former     Packs/day: 1.00     Years: 22.00     Pack years: 22.00     Types: Cigarettes     Quit date: 1989     Years since quittin.4    Smokeless tobacco: Never    Tobacco comments:     stop cigar in    Vaping Use    Vaping Use: Never used   Substance and Sexual Activity    Alcohol use: Yes     Alcohol/week: 0.6 oz     Types: 1 Glasses of wine per week     Comment: OCCASIONALLY    Drug use: No    Sexual activity: Yes     Partners: Female   Other Topics Concern    Not on file   Social History Narrative    Ashish was born in [place], raised in [place]. He  "moved to [place] in 1992, he was worked for [employer]. He retired in 2016. He is  to [place] for the past 40 years. He has 2 kids- Neno (39, [place]) and Radha (36, [place]). He enjoys golfing and they also try to travel when they can.     Social Determinants of Health     Financial Resource Strain: Not on file   Food Insecurity: Not on file   Transportation Needs: Not on file   Physical Activity: Not on file   Stress: Not on file   Social Connections: Not on file   Intimate Partner Violence: Not on file   Housing Stability: Not on file        Family History:   Family History   Problem Relation Age of Onset    Lung Disease Mother         COPD    Heart Disease Father         valve disease    Heart Failure Father     Hypertension Father     Hyperlipidemia Father     Cancer Maternal Grandmother         BRAIN TUMOR    Stroke Maternal Grandfather     Other Paternal Grandmother         INTESTINAL PROBLEM    Stroke Paternal Grandfather     Prostate cancer Brother     Other Brother         ortho       PHYSICAL EXAM:   Vital signs: BP (!) 168/78 (BP Location: Left arm, Patient Position: Sitting)   Pulse 83   Ht 1.727 m (5' 8\")   Wt 89 kg (196 lb 1.6 oz)   SpO2 94%   BMI 29.82 kg/m²   GENERAL: Well-developed, well-nourished  in no apparent distress.   FOOT: Normal sensation to monofilament testing, normal pulses, no ulcers.  Normal Vibration quantitative sensation test.    Labs:  Lab Results   Component Value Date/Time    HBA1C 9.5 (A) 05/10/2023 0700    AVGLUC 192 03/29/2021 0705       Lab Results   Component Value Date/Time    WBC 9.0 12/31/2020 06:56 AM    RBC 4.22 (L) 12/31/2020 06:56 AM    HEMOGLOBIN 13.4 (L) 12/31/2020 06:56 AM    MCV 97.6 12/31/2020 06:56 AM    MCH 31.8 12/31/2020 06:56 AM    MCHC 32.5 (L) 12/31/2020 06:56 AM    RDW 45.5 12/31/2020 06:56 AM    MPV 10.6 12/31/2020 06:56 AM       Lab Results   Component Value Date/Time    SODIUM 143 05/26/2023 04:34 AM    SODIUM 139 03/29/2021 07:05 AM    POTASSIUM 5.2 " 05/26/2023 04:34 AM    POTASSIUM 4.6 03/29/2021 07:05 AM    CHLORIDE 109 (H) 05/26/2023 04:34 AM    CHLORIDE 108 03/29/2021 07:05 AM    CO2 22 05/26/2023 04:34 AM    CO2 23 03/29/2021 07:05 AM    ANION 8.0 03/29/2021 07:05 AM    GLUCOSE 132 (H) 05/26/2023 04:34 AM    GLUCOSE 126 (H) 03/29/2021 07:05 AM    BUN 36 (H) 05/26/2023 04:34 AM    BUN 35 (H) 03/29/2021 07:05 AM    CREATININE 2.65 (H) 05/26/2023 04:34 AM    CREATININE 1.91 (H) 03/29/2021 07:05 AM    CALCIUM 9.7 05/26/2023 04:34 AM    CALCIUM 9.2 03/29/2021 07:05 AM    ASTSGOT 24 11/21/2022 04:24 AM    ASTSGOT 20 03/29/2021 07:05 AM    ALTSGPT 27 11/21/2022 04:24 AM    ALTSGPT 23 03/29/2021 07:05 AM    TBILIRUBIN 0.3 11/21/2022 04:24 AM    TBILIRUBIN 0.3 03/29/2021 07:05 AM    ALBUMIN 3.9 11/21/2022 04:24 AM    ALBUMIN 3.9 03/29/2021 07:05 AM    TOTPROTEIN 6.3 11/21/2022 04:24 AM    TOTPROTEIN 6.5 03/29/2021 07:05 AM    GLOBULIN 2.4 11/21/2022 04:24 AM    GLOBULIN 2.6 03/29/2021 07:05 AM    AGRATIO 1.6 11/21/2022 04:24 AM    AGRATIO 1.5 03/29/2021 07:05 AM       Lab Results   Component Value Date/Time    CHOLSTRLTOT 166 05/26/2023 0434    TRIGLYCERIDE 180 (H) 05/26/2023 0434    HDL 52 05/26/2023 0434    LDL 79 12/31/2020 0656       Lab Results   Component Value Date/Time    MALBCRT 1147 (H) 03/29/2021 07:05 AM    MICROALBUR 93.7 03/29/2021 07:05 AM    MICRALB 1,480.1 11/21/2022 04:24 AM        Lab Results   Component Value Date/Time    MICHELLE 1.250 12/31/2020 0656         ASSESSMENT/PLAN:   1. Type 2 diabetes mellitus without complication, with long-term current use of insulin (HCC)  Unstable with an A1c of 9.5%  Lifestyle modifications discussed  Diabetes complications, hypertension complications discussed  Dexcom CGM applied in office (Pt's own)  Discussed to check CGM and eat a snack before exercising      Medication regimen:  Lantus 22 units night-continue  Jardiance 25 mg daily-continue  Trulicity 3mg weekly-increase to 4.5 mg weekly  Humalog 4 units  with breakfast lunch and dinner-15 min before meals-continue      - Dulaglutide (TRULICITY) 4.5 MG/0.5ML Solution Pen-injector; Inject 4.5 mg under the skin every 7 days. 2.0ml equals 4 pens per month  Dispense: 6 mL; Refill: 6  - Continuous Blood Gluc Transmit (DEXCOM G6 TRANSMITTER) Misc; USE TO CHECK BLOOD GLUCOSE 3 TO 4 TIMES DAILY  Dispense: 1 Each; Refill: 0  - Continuous Blood Gluc Sensor (DEXCOM G6 SENSOR) Misc; Apply sensor subcutaneously to monitor blood glucose. Replace sensor every 10 days.  Dispense: 9 Each; Refill: 3  - TSH; Future  - FREE THYROXINE; Future  - Comp Metabolic Panel; Future  - VITAMIN D,25 HYDROXY (DEFICIENCY); Future  - MICROALBUMIN CREAT RATIO URINE; Future    2. Polyneuropathy  Unstable  Discussed to take folic acid, B12 and B6     3. Dyslipidemia  Unstable  Continue regimen-HPI    4. Essential hypertension  Unstable  Follow up PCP    5. Microalbuminuria  Unstable  Followed by nephrologist    6. Stage 3b chronic kidney disease (HCC)  Unstable  Following nephrologist    7. Long term (current) use of insulin (HCC)  On insulin for diabetes treatment      Disposition: Make an appointment to follow-up in 3 months  Do blood work 2 weeks prior to your appointment    Thank you kindly for allowing me to participate in the diabetes care plan for this patient.    Amos Malave, A.P.R.N.  06/19/23    CC:   Miguel A Colón M.D.

## 2023-06-29 ENCOUNTER — OFFICE VISIT (OUTPATIENT)
Dept: URGENT CARE | Facility: CLINIC | Age: 73
End: 2023-06-29
Payer: COMMERCIAL

## 2023-06-29 VITALS
TEMPERATURE: 97.6 F | OXYGEN SATURATION: 95 % | HEIGHT: 68 IN | HEART RATE: 86 BPM | WEIGHT: 190 LBS | BODY MASS INDEX: 28.79 KG/M2 | RESPIRATION RATE: 14 BRPM

## 2023-06-29 DIAGNOSIS — S51.812A SKIN TEAR OF LEFT FOREARM WITHOUT COMPLICATION, INITIAL ENCOUNTER: ICD-10-CM

## 2023-06-29 DIAGNOSIS — R03.0 ELEVATED BLOOD PRESSURE READING: ICD-10-CM

## 2023-06-29 PROCEDURE — 99213 OFFICE O/P EST LOW 20 MIN: CPT | Performed by: REGISTERED NURSE
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
## 2023-07-28 RX ORDER — DOXAZOSIN 2 MG/1
2 TABLET ORAL DAILY
Qty: 90 TABLET | Refills: 4 | Status: SHIPPED | OUTPATIENT
Start: 2023-07-28

## 2023-07-28 NOTE — TELEPHONE ENCOUNTER
Received request via: Pharmacy    Was the patient seen in the last year in this department? Yes    Does the patient have an active prescription (recently filled or refills available) for medication(s) requested? No    Does the patient have [facility] and need 100 day supply (blood pressure, diabetes and cholesterol meds only)? Patient does not have SCP    DOXAZOSIN MESYLATE 2 MG TAB

## 2023-07-29 DIAGNOSIS — I10 ESSENTIAL HYPERTENSION: ICD-10-CM

## 2023-07-29 DIAGNOSIS — Z79.4 TYPE 2 DIABETES MELLITUS WITH HYPERGLYCEMIA, WITH LONG-TERM CURRENT USE OF INSULIN (HCC): ICD-10-CM

## 2023-07-29 DIAGNOSIS — E11.65 TYPE 2 DIABETES MELLITUS WITH HYPERGLYCEMIA, WITH LONG-TERM CURRENT USE OF INSULIN (HCC): ICD-10-CM

## 2023-07-30 NOTE — TELEPHONE ENCOUNTER
Received request via: Pharmacy    Was the patient seen in the last year in this department? Yes  LOV : 5/31/2023   Does the patient have an active prescription (recently filled or refills available) for medication(s) requested? No    Does the patient have nursing home Plus and need 100 day supply (blood pressure, diabetes and cholesterol meds only)? Patient does not have SCP

## 2023-07-31 RX ORDER — LISINOPRIL 20 MG/1
20 TABLET ORAL DAILY
Qty: 90 TABLET | Refills: 4 | Status: SHIPPED | OUTPATIENT
Start: 2023-07-31

## 2023-07-31 RX ORDER — CARVEDILOL 25 MG/1
TABLET ORAL
Qty: 180 TABLET | Refills: 4 | Status: SHIPPED | OUTPATIENT
Start: 2023-07-31

## 2023-07-31 RX ORDER — ATORVASTATIN CALCIUM 40 MG/1
40 TABLET, FILM COATED ORAL DAILY
Qty: 90 TABLET | Refills: 4 | Status: SHIPPED | OUTPATIENT
Start: 2023-07-31

## 2023-08-14 ENCOUNTER — OFFICE VISIT (OUTPATIENT)
Dept: MEDICAL GROUP | Facility: LAB | Age: 73
End: 2023-08-14
Payer: COMMERCIAL

## 2023-08-14 VITALS
SYSTOLIC BLOOD PRESSURE: 132 MMHG | HEART RATE: 76 BPM | TEMPERATURE: 97.2 F | BODY MASS INDEX: 29.55 KG/M2 | OXYGEN SATURATION: 96 % | HEIGHT: 68 IN | RESPIRATION RATE: 16 BRPM | DIASTOLIC BLOOD PRESSURE: 82 MMHG | WEIGHT: 195 LBS

## 2023-08-14 DIAGNOSIS — Z79.4 TYPE 2 DIABETES MELLITUS WITH STAGE 3B CHRONIC KIDNEY DISEASE, WITH LONG-TERM CURRENT USE OF INSULIN (HCC): ICD-10-CM

## 2023-08-14 DIAGNOSIS — E11.22 TYPE 2 DIABETES MELLITUS WITH STAGE 3B CHRONIC KIDNEY DISEASE, WITH LONG-TERM CURRENT USE OF INSULIN (HCC): ICD-10-CM

## 2023-08-14 DIAGNOSIS — N18.32 TYPE 2 DIABETES MELLITUS WITH STAGE 3B CHRONIC KIDNEY DISEASE, WITH LONG-TERM CURRENT USE OF INSULIN (HCC): ICD-10-CM

## 2023-08-14 DIAGNOSIS — N18.32 STAGE 3B CHRONIC KIDNEY DISEASE: ICD-10-CM

## 2023-08-14 DIAGNOSIS — I10 ESSENTIAL HYPERTENSION: ICD-10-CM

## 2023-08-14 PROCEDURE — 3075F SYST BP GE 130 - 139MM HG: CPT | Performed by: FAMILY MEDICINE

## 2023-08-14 PROCEDURE — 99213 OFFICE O/P EST LOW 20 MIN: CPT | Performed by: FAMILY MEDICINE

## 2023-08-14 PROCEDURE — 3079F DIAST BP 80-89 MM HG: CPT | Performed by: FAMILY MEDICINE

## 2023-08-14 NOTE — PROGRESS NOTES
Subjective:   Ashish Wiley is a 73 y.o. male here today for   No chief complaint on file.      #DM   -Has been unstable. Currently treating with Trulicity (recently increased to 4.5 mg), NovoLog, glargine, Jardiance.  Patient has been using Dexcom which is added more awareness worse.  He is starting to see lower fasting blood glucose, 2 episodes of hypoglycemia.  No significant changes in symptoms, continues to have neuropathy but stable.  Up with endocrinology with no concerns.    #HTN   -Chronic condition currently treating with doxazosin, lisinopril, felodipine.  Patient is working on appropriate diet and exercise regiment.  Checking blood pressures regularly.  Blood pressures remain controlled with no concerns.  Denies any headaches, changes in vision, lightheadedness, dizziness.    #CKD   -Secondary to diabetes.  Being followed by nephrology.  Will be continue with Jardiance, lisinopril.  No new concerns.    Allergies   Allergen Reactions    Sulfa Drugs Unspecified     Kidney Stones         Current medicines (including changes today)  Current Outpatient Medications   Medication Sig Dispense Refill    carvedilol (COREG) 25 MG Tab TAKE 1 TABLET BY MOUTH TWICE A DAY WITH MEALS 180 Tablet 4    lisinopril (PRINIVIL) 20 MG Tab TAKE 1 TABLET BY MOUTH EVERY DAY 90 Tablet 4    atorvastatin (LIPITOR) 40 MG Tab TAKE 1 TABLET BY MOUTH EVERY DAY 90 Tablet 4    doxazosin (CARDURA) 2 MG Tab TAKE 1 TABLET BY MOUTH EVERY DAY 90 Tablet 4    Dulaglutide (TRULICITY) 4.5 MG/0.5ML Solution Pen-injector Inject 4.5 mg under the skin every 7 days. 2.0ml equals 4 pens per month 6 mL 6    Continuous Blood Gluc Transmit (DEXCOM G6 TRANSMITTER) Misc USE TO CHECK BLOOD GLUCOSE 3 TO 4 TIMES DAILY 1 Each 0    Continuous Blood Gluc Sensor (DEXCOM G6 SENSOR) Misc Apply sensor subcutaneously to monitor blood glucose. Replace sensor every 10 days. 9 Each 3    amLODIPine (NORVASC) 5 MG Tab Take 1 Tablet by mouth every day for 360 days. 90  "Tablet 3    insulin glargine 100 UNIT/ML Solution Pen-injector injection Inject 22 Units under the skin every evening for 360 days. 15 mL 3    moxifloxacin (VIGAMOX) 0.5 % Solution INSTILL 1 DROP INTO POST OPERATIVE EYE 3 TIMES A DAY      JARDIANCE 25 MG Tab TAKE 1 TABLET BY MOUTH EVERY  Tablet 3    Continuous Blood Gluc  (DEXCOM G6 ) Device USE TO CHECK BLOOD GLUCOSE 3 TO 4 TIMES DAILY 1 Each 0    insulin aspart (NOVOLOG FLEXPEN) 100 UNIT/ML injection PEN Inject 5 Units under the skin 3 times a day before meals. 45 mL 0    erythromycin 5 MG/GM Ointment Place 1 Application in both eyes 4 times a day. If improved after 2 days can reduce to twice daily administration 3.5 g 0    ergocalciferol (DRISDOL) 08761 UNIT capsule Take 1 Cap by mouth every 7 days. 12 Cap 3    Multiple Vitamins-Minerals (MULTIVITAMIN PO) Take  by mouth.      aspirin EC (ECOTRIN) 81 MG Tablet Delayed Response Take 81 mg by mouth every day.       No current facility-administered medications for this visit.     He  has a past medical history of ACC/AHA stage C systolic heart failure ([facility]) (6/21/2018), Acute exacerbation of CHF (congestive heart failure) ([facility]) (6/25/2018), CAD (coronary artery disease), CHF (congestive heart failure) ([facility]), Chronic systolic congestive heart failure, NYHA class 1 ([facility]) (7/23/2018), Diabetes ([facility]), Dilated cardiomyopathy ([facility]), Hyperlipidemia, Hypertension, NYHA class 3 acute on chronic systolic heart failure ([facility]) (6/25/2018), Olecranon bursitis of right elbow (6/17/2019), and Severe aortic stenosis (6/25/2018).    He has no past medical history of Encounter for long-term (current) use of other medications.    ROS   -See HPI       Objective:     Physical Exam:  /82   Pulse 76   Temp 36.2 °C (97.2 °F) (Temporal)   Resp 16   Ht 1.727 m (5' 7.99\")   Wt 88.5 kg (195 lb)   SpO2 96%  Body mass index is 29.66 kg/m².   Constitutional: Alert, no distress, well-groomed.  Skin: No rashes in " visible areas.  Eye: Round. Conjunctiva clear, lids normal. No icterus.   ENMT: Lips pink without lesions, good dentition, moist mucous membranes. Phonation normal.  Neck: No masses, no thyromegaly. Moves freely without pain.  Respiratory: Unlabored respiratory effort, no cough or audible wheeze  Psych: Alert and oriented x3, normal affect and mood.       Assessment and Plan:     1. Type 2 diabetes mellitus with stage 3b chronic kidney disease, with long-term current use of insulin (HCC)  -Stable.  Patient continue with all medications as listed above.  Continuing appropriate diet and exercise regimen.  Patient will be following up with blood work, endocrinology in the next month.  Patient continues to have some neuropathy in his feet but is tolerating well.  No new medications needed at this time.    2. Essential hypertension  -Chronic condition, stable.  We will continue with amlodipine, carvedilol, Cardura, lisinopril at this time.  Continue appropriate diet and exercise regimen.    3. Stage 3b chronic kidney disease (HCC)  -Stable.  We will continue to follow-up with nephrology.  Kidney precautions given.  We will continue with Jardiance, lisinopril.    Followup: No follow-ups on file.         PLEASE NOTE: This dictation was created using voice recognition software. I have made every reasonable attempt to correct obvious errors, but I expect that there are errors of grammar and possibly content that I did not discover before finalizing the note.

## 2023-09-09 LAB
25(OH)D3+25(OH)D2 SERPL-MCNC: 30.1 NG/ML (ref 30–100)
ALBUMIN SERPL-MCNC: 3.9 G/DL (ref 3.8–4.8)
ALBUMIN/CREAT UR: 1220 MG/G CREAT (ref 0–29)
ALBUMIN/GLOB SERPL: 1.6 {RATIO} (ref 1.2–2.2)
ALP SERPL-CCNC: 96 IU/L (ref 44–121)
ALT SERPL-CCNC: 19 IU/L (ref 0–44)
AST SERPL-CCNC: 22 IU/L (ref 0–40)
BILIRUB SERPL-MCNC: 0.3 MG/DL (ref 0–1.2)
BUN SERPL-MCNC: 38 MG/DL (ref 8–27)
BUN/CREAT SERPL: 12 (ref 10–24)
CALCIUM SERPL-MCNC: 9.5 MG/DL (ref 8.6–10.2)
CHLORIDE SERPL-SCNC: 109 MMOL/L (ref 96–106)
CO2 SERPL-SCNC: 21 MMOL/L (ref 20–29)
CREAT SERPL-MCNC: 3.1 MG/DL (ref 0.76–1.27)
CREAT UR-MCNC: 107.1 MG/DL
EGFRCR SERPLBLD CKD-EPI 2021: 20 ML/MIN/1.73
GLOBULIN SER CALC-MCNC: 2.4 G/DL (ref 1.5–4.5)
GLUCOSE SERPL-MCNC: 102 MG/DL (ref 70–99)
MICROALBUMIN UR-MCNC: 1306.6 UG/ML
POTASSIUM SERPL-SCNC: 4.6 MMOL/L (ref 3.5–5.2)
PROT SERPL-MCNC: 6.3 G/DL (ref 6–8.5)
SODIUM SERPL-SCNC: 143 MMOL/L (ref 134–144)
T4 FREE SERPL-MCNC: 1.16 NG/DL (ref 0.82–1.77)
TSH SERPL DL<=0.005 MIU/L-ACNC: 1.08 UIU/ML (ref 0.45–4.5)

## 2023-09-20 ENCOUNTER — OFFICE VISIT (OUTPATIENT)
Dept: ENDOCRINOLOGY | Facility: MEDICAL CENTER | Age: 73
End: 2023-09-20
Payer: COMMERCIAL

## 2023-09-20 VITALS
HEIGHT: 68 IN | BODY MASS INDEX: 29.1 KG/M2 | SYSTOLIC BLOOD PRESSURE: 138 MMHG | OXYGEN SATURATION: 95 % | DIASTOLIC BLOOD PRESSURE: 78 MMHG | WEIGHT: 192 LBS | HEART RATE: 85 BPM

## 2023-09-20 DIAGNOSIS — E11.9 TYPE 2 DIABETES MELLITUS WITHOUT COMPLICATION, WITH LONG-TERM CURRENT USE OF INSULIN (HCC): ICD-10-CM

## 2023-09-20 DIAGNOSIS — Z79.4 TYPE 2 DIABETES MELLITUS WITHOUT COMPLICATION, WITH LONG-TERM CURRENT USE OF INSULIN (HCC): ICD-10-CM

## 2023-09-20 DIAGNOSIS — Z79.4 LONG TERM (CURRENT) USE OF INSULIN (HCC): ICD-10-CM

## 2023-09-20 DIAGNOSIS — N18.32 STAGE 3B CHRONIC KIDNEY DISEASE: ICD-10-CM

## 2023-09-20 DIAGNOSIS — R80.9 MICROALBUMINURIA: ICD-10-CM

## 2023-09-20 DIAGNOSIS — I10 ESSENTIAL HYPERTENSION: ICD-10-CM

## 2023-09-20 DIAGNOSIS — G62.9 POLYNEUROPATHY: ICD-10-CM

## 2023-09-20 DIAGNOSIS — E78.5 DYSLIPIDEMIA: ICD-10-CM

## 2023-09-20 LAB
HBA1C MFR BLD: 6.5 % (ref ?–5.8)
POCT INT CON NEG: NEGATIVE
POCT INT CON POS: POSITIVE

## 2023-09-20 PROCEDURE — 3078F DIAST BP <80 MM HG: CPT

## 2023-09-20 PROCEDURE — 99215 OFFICE O/P EST HI 40 MIN: CPT

## 2023-09-20 PROCEDURE — 99213 OFFICE O/P EST LOW 20 MIN: CPT

## 2023-09-20 PROCEDURE — 3075F SYST BP GE 130 - 139MM HG: CPT

## 2023-09-20 PROCEDURE — 83036 HEMOGLOBIN GLYCOSYLATED A1C: CPT

## 2023-09-20 RX ORDER — PROCHLORPERAZINE 25 MG/1
SUPPOSITORY RECTAL
Qty: 1 EACH | Refills: 2 | Status: SHIPPED | OUTPATIENT
Start: 2023-09-20 | End: 2023-10-17

## 2023-09-20 NOTE — PROGRESS NOTES
"RN-CDE Note    Subjective:   Endocrinology Clinic Progress Note  PCP: Miguel A Colón M.D.    HPI:  Ashish Wiley is a 73 y.o. old patient who is seen today by the Diabetes Nurse Specialist for review of his uncontrolled type 2 diabetes with long term use of insulin.    Recent changes in health: none  DM:   Last A1c:   Lab Results   Component Value Date/Time    HBA1C 6.5 (A) 2023 10:14 AM      Previous A1c was 9.5 on 5/10/23  A1C GOAL: < 7    Diabetes Medications:   Glargine insulin 22 units per day  Novolog 5 units with meals (hasn't taken since Dexcom transmitter  as he has no way of knowing how his blood sugars are doing. )  Jardiance 25 mg daily  Trulicity 4.5 mg weekly  (just started higher dose 2 weeks ago)      Exercise: walking 3-5 miles per day (leisure walk) and golfing.   Diet: \"healthy\" diet  in general  Patient's body mass index is 29.19 kg/m². Exercise and nutrition counseling were performed at this visit.    Glucose monitoring frequency: using Dexcom (hasn't used since , states transmitter failed)  Not testing blood sugars since transmitter failure    Hypoglycemic episodes: was having low blood sugars when taking Novolog    Last Retinal Exam:  request for records sent.   Foot Exam:  Monofilament: current.   Lab Results   Component Value Date/Time    MALBCRT 1147 (H) 2021 07:05 AM    MICROALBUR 93.7 2021 07:05 AM    MICRALB 1,306.6 2023 04:31 AM        ACR Albumin/Creatinine Ratio goal <30     HTN:   Blood pressure goal <130/<80 .   Currently Rx ACE/ARB:  yes    Dyslipidemia:    Lab Results   Component Value Date/Time    CHOLSTRLTOT 166 2023 04:34 AM    CHOLSTRLTOT 178 2020 06:56 AM    LDL 79 2020 06:56 AM    HDL 52 2023 04:34 AM    HDL 50 2020 06:56 AM    TRIGLYCERIDE 180 (H) 2023 04:34 AM    TRIGLYCERIDE 244 (H) 2020 06:56 AM         Currently Rx Statin: Yes     He  reports that he quit smoking about 34 " years ago. His smoking use included cigarettes. He started smoking about 56 years ago. He has a 22.0 pack-year smoking history. He has never used smokeless tobacco.      Plan:     Discussed and educated on:   - All medications, side effects and compliance (discussed carefully)  - Annual eye examinations at Ophthalmology  - Home glucose monitoring emphasized  - Weight control and daily exercise    Recommended medication changes:

## 2023-09-20 NOTE — LETTER
"[employer]  Miguel A Colón M.D.  [address]  [place] [address]  Fax: [phone]   Authorization for Release/Disclosure of   Protected Health Information   Name: AMAYA CLAIRE : 1950 SSN: [id number]   Address: [address]  [place] [address] Phone:    [phone] (home)    I authorize the entity listed below to release/disclose the PHI below to:   [facility]/Miguel A Colón M.D. and ANG [place]   Provider or Entity Name:    Lida Suresh MD   Address   City, State, Zip   Phone:      Fax:     Reason for request: continuity of care   Information to be released:    Retinal exam    [  ] Check here and initial the line next to each item to release ALL health information INCLUDING  _____ Care and treatment for drug and / or alcohol abuse  _____ HIV testing, infection status, or AIDS  _____ Genetic Testing    DATES OF SERVICE OR TIME PERIOD TO BE DISCLOSED: _____________  I understand and acknowledge that:  * This Authorization may be revoked at any time by you in writing, except if your health information has already been used or disclosed.  * Your health information that will be used or disclosed as a result of you signing this authorization could be re-disclosed by the recipient. If this occurs, your re-disclosed health information may no longer be protected by State or Federal laws.  * You may refuse to sign this Authorization. Your refusal will not affect your ability to obtain treatment.  * This Authorization becomes effective upon signing and will  on (date) __________.      If no date is indicated, this Authorization will  one (1) year from the signature date.    Name: Amaya Claire  Signature:  continued medical care Date:   2023     PLEASE FAX REQUESTED RECORDS BACK TO: [phone]

## 2023-09-20 NOTE — PROGRESS NOTES
Chief Complaint: Follow-up for the following conditions   HPI:   Ashish Wiley is a 73 y.o. male       Type 2 DM Mellitus:   He denies hospitalizations for DKA in the past.    Dexcom used but not using since August 10th      Latest Reference Range & Units 11/21/22 04:24 02/09/23 07:40 05/10/23 07:00   Glycohemoglobin 5.8 % 9.4 (H) 9.1 ! 9.5 !     Medication regimen:  Lantus 22 units night  Jardiance 25 mg daily  Trulicity 3mg weekly -4.5mg weekly for the past two weeks   Humalog 5 units with  meals     He reports hypoglycemic episodes occurring when he exercises, and after lunch  He reports episodes of severe hypoglycemia requiring third party assistance.        Diet: Healthy in general   Exercise: 4-5 miles walking each and golf     Diabetes Complications   He  reports history of retinopathy.    He denies laser eye surgery.   Last eye exam: Retinopathy-every 6 week-[facility]        Latest Reference Range & Units 12/31/20 06:56   C-Peptide 0.8 - 3.5 ng/mL 2.3     2.  Polyneuropathy:   He reports history of peripheral sensory neuropathy.    He reports numbness, tingling in both feet.    He denies history of foot sores.   Metanx 1 tab twice a day for the 8 weeks     3. Dyslipidemia:  Currently taking atorvastatin 40 mg daily  Denies SE    Latest Reference Range & Units 05/26/23 04:34   Cholesterol,Tot 100 - 199 mg/dL 166   Triglycerides 0 - 149 mg/dL 180 (H)   HDL >39 mg/dL 52   LDL Chol Calc ([facility]) 0 - 99 mg/dL 84     4.  Essential hypertension:  Followed by PCP  currently taking amlodipine 5 (Norvasc) 5 mg daily, carvedilol (Coreg) 25 mg daily, lisinopril 20 mg  BP today 138/78-reports this is due to white coat syndrome, BP at home 130s/80    5.  Microalbuminuria:  Followed by nephrologist  Currently taking lisinopril 20 mg daily  BP today 138/78   Latest Reference Range & Units 03/29/21 07:05   Micro Alb Creat Ratio 0 - 30 mg/g 1147 (H)     6.Chronic kidney disease:   [facility] by neurologist  Dr. Najjar-every 3  months    Latest Reference Range & Units 12/31/20 06:56 03/29/21 07:05   GFR If Non African American >60 mL/min/1.73 m 2 31 ! 35 !     7. Long-term current use of insulin:  On insulin for diabetes treatment    ROS:     CONS:     No fever, no chills, no weight loss, no fatigue   EYES:      No diplopia, no blurry vision, no redness of eyes, no swelling of eyelids   ENT:    No hearing loss, No ear pain, No sore throat, no dysphagia, no neck swelling   CV:     No chest pain, no palpitations, no claudication, no orthopnea, no PND   PULM:    No SOB, no cough, no hemoptysis, no wheezing    GI:   No nausea, no vomiting, no diarrhea, no constipation, no bloody stools   :  Passing urine well, no dysuria, no hematuria   ENDO:   No polyuria, no polydipsia, no heat intolerance, no cold intolerance   NEURO: No headaches, no dizziness, no convulsions, no tremors   MUSC:  No joint swellings, no arthralgias, no myalgias, no weakness   SKIN:   No rash, no ulcers, no dry skin   PSYCH:   No depression, no anxiety, no difficulty sleeping       Past Medical History:  Patient Active Problem List    Diagnosis Date Noted    Bacterial conjunctivitis 10/27/2020    History of maxillary sinusitis 10/27/2020    Dyslipidemia 12/27/2019    CHF due to valvular disease ([facility]) 08/09/2018    S/P TAVR (transcatheter aortic valve replacement) 07/02/2018    Stented coronary artery 06/29/2018    Coronary artery disease due to calcified coronary lesion: Status post atherectomy and stenting of the LAD in June 2018.  Nonobstructive disease in the RCA and circumflex. 06/25/2018    Dilated cardiomyopathy ([facility]) 06/21/2018    Vitamin D deficiency 10/24/2017    Stage 3 chronic kidney disease ([facility]) 10/11/2017    Diabetic nephropathy associated with type 2 diabetes mellitus ([facility]) 10/11/2017    Anemia in stage 3 chronic kidney disease ([facility]) 09/12/2017    Type 2 diabetes mellitus with chronic kidney disease, with long-term current use of insulin ([facility]) 06/16/2016     Essential hypertension 06/16/2016    Diabetic peripheral neuropathy associated with type 2 diabetes mellitus (HCC) 06/16/2016       Past Surgical History:  Past Surgical History:   Procedure Laterality Date    TRANSCATHETER AORTIC VALVE REPLACEMENT  7/2/2018    Procedure: TRANSCATHETER AORTIC VALVE REPLACEMENT;  Surgeon: Markus Fuller M.D.;  Location: SURGERY [facility];  Service: Cardiac    DAYANNA  7/2/2018    Procedure: DAYANNA;  Surgeon: Markus Fuller M.D.;  Location: SURGERY [facility];  Service: Cardiac    AORTIC VALVE REPLACEMENT      S/P TAVR     TONSILLECTOMY      Z CARDIAC CATH          Allergies:  Sulfa drugs     Current Medications:    Current Outpatient Medications:     carvedilol (COREG) 25 MG Tab, TAKE 1 TABLET BY MOUTH TWICE A DAY WITH MEALS, Disp: 180 Tablet, Rfl: 4    lisinopril (PRINIVIL) 20 MG Tab, TAKE 1 TABLET BY MOUTH EVERY DAY, Disp: 90 Tablet, Rfl: 4    atorvastatin (LIPITOR) 40 MG Tab, TAKE 1 TABLET BY MOUTH EVERY DAY, Disp: 90 Tablet, Rfl: 4    doxazosin (CARDURA) 2 MG Tab, TAKE 1 TABLET BY MOUTH EVERY DAY, Disp: 90 Tablet, Rfl: 4    Dulaglutide (TRULICITY) 4.5 MG/0.5ML Solution Pen-injector, Inject 4.5 mg under the skin every 7 days. 2.0ml equals 4 pens per month, Disp: 6 mL, Rfl: 6    Continuous Blood Gluc Transmit (DEXCOM G6 TRANSMITTER) Misc, USE TO CHECK BLOOD GLUCOSE 3 TO 4 TIMES DAILY, Disp: 1 Each, Rfl: 0    Continuous Blood Gluc Sensor (DEXCOM G6 SENSOR) Misc, Apply sensor subcutaneously to monitor blood glucose. Replace sensor every 10 days., Disp: 9 Each, Rfl: 3    amLODIPine (NORVASC) 5 MG Tab, Take 1 Tablet by mouth every day for 360 days., Disp: 90 Tablet, Rfl: 3    insulin glargine 100 UNIT/ML Solution Pen-injector injection, Inject 22 Units under the skin every evening for 360 days., Disp: 15 mL, Rfl: 3    moxifloxacin (VIGAMOX) 0.5 % Solution, INSTILL 1 DROP INTO POST OPERATIVE EYE 3 TIMES A DAY, Disp: , Rfl:     JARDIANCE 25 MG Tab, TAKE 1 TABLET BY MOUTH EVERY DAY,  Disp: 100 Tablet, Rfl: 3    Continuous Blood Gluc  (DEXCOM G6 ) Device, USE TO CHECK BLOOD GLUCOSE 3 TO 4 TIMES DAILY, Disp: 1 Each, Rfl: 0    insulin aspart (NOVOLOG FLEXPEN) 100 UNIT/ML injection PEN, Inject 5 Units under the skin 3 times a day before meals., Disp: 45 mL, Rfl: 0    erythromycin 5 MG/GM Ointment, Place 1 Application in both eyes 4 times a day. If improved after 2 days can reduce to twice daily administration, Disp: 3.5 g, Rfl: 0    ergocalciferol (DRISDOL) 09611 UNIT capsule, Take 1 Cap by mouth every 7 days., Disp: 12 Cap, Rfl: 3    Multiple Vitamins-Minerals (MULTIVITAMIN PO), Take  by mouth., Disp: , Rfl:     aspirin EC (ECOTRIN) 81 MG Tablet Delayed Response, Take 81 mg by mouth every day., Disp: , Rfl:     Social History:  Social History     Socioeconomic History    Marital status:      Spouse name: Not on file    Number of children: Not on file    Years of education: Not on file    Highest education level: Not on file   Occupational History    Not on file   Tobacco Use    Smoking status: Former     Current packs/day: 0.00     Average packs/day: 1 pack/day for 22.0 years (22.0 ttl pk-yrs)     Types: Cigarettes     Start date: 1967     Quit date: 1989     Years since quittin.7    Smokeless tobacco: Never    Tobacco comments:     stop cigar in    Vaping Use    Vaping Use: Never used   Substance and Sexual Activity    Alcohol use: Yes     Alcohol/week: 0.6 oz     Types: 1 Glasses of wine per week     Comment: OCCASIONALLY    Drug use: No    Sexual activity: Yes     Partners: Female   Other Topics Concern    Not on file   Social History Narrative    Ashish was born in [place], raised in [place]. He moved to [place] in , he was worked for [employer]. He retired in 2016. He is  to [place] for the past 40 years. He has 2 kids- Neno (39, [place]) and Radha (36, [place]). He enjoys golfing and they also try to travel when they can.     Social  "Determinants of Health     Financial Resource Strain: Not on file   Food Insecurity: Not on file   Transportation Needs: Not on file   Physical Activity: Not on file   Stress: Not on file   Social Connections: Not on file   Intimate Partner Violence: Not on file   Housing Stability: Not on file        Family History:   Family History   Problem Relation Age of Onset    Lung Disease Mother         COPD    Heart Disease Father         valve disease    Heart Failure Father     Hypertension Father     Hyperlipidemia Father     Cancer Maternal Grandmother         BRAIN TUMOR    Stroke Maternal Grandfather     Other Paternal Grandmother         INTESTINAL PROBLEM    Stroke Paternal Grandfather     Prostate cancer Brother     Other Brother         ortho       PHYSICAL EXAM:   Vital signs: /78 (BP Location: Left arm, Patient Position: Sitting)   Pulse 85   Ht 1.727 m (5' 8\")   Wt 87.1 kg (192 lb)   SpO2 95%   BMI 29.19 kg/m²   GENERAL: Well-developed, well-nourished  in no apparent distress.   FOOT: Normal sensation to monofilament testing, normal pulses, no ulcers.  Normal Vibration quantitative sensation test.    Labs:  Lab Results   Component Value Date/Time    HBA1C 9.5 (A) 05/10/2023 0700    AVGLUC 192 03/29/2021 0705       Lab Results   Component Value Date/Time    WBC 9.0 12/31/2020 06:56 AM    RBC 4.22 (L) 12/31/2020 06:56 AM    HEMOGLOBIN 13.4 (L) 12/31/2020 06:56 AM    MCV 97.6 12/31/2020 06:56 AM    MCH 31.8 12/31/2020 06:56 AM    MCHC 32.5 (L) 12/31/2020 06:56 AM    RDW 45.5 12/31/2020 06:56 AM    MPV 10.6 12/31/2020 06:56 AM       Lab Results   Component Value Date/Time    SODIUM 143 09/08/2023 04:31 AM    SODIUM 139 03/29/2021 07:05 AM    POTASSIUM 4.6 09/08/2023 04:31 AM    POTASSIUM 4.6 03/29/2021 07:05 AM    CHLORIDE 109 (H) 09/08/2023 04:31 AM    CHLORIDE 108 03/29/2021 07:05 AM    CO2 21 09/08/2023 04:31 AM    CO2 23 03/29/2021 07:05 AM    ANION 8.0 03/29/2021 07:05 AM    GLUCOSE 102 (H) " 09/08/2023 04:31 AM    GLUCOSE 126 (H) 03/29/2021 07:05 AM    BUN 38 (H) 09/08/2023 04:31 AM    BUN 35 (H) 03/29/2021 07:05 AM    CREATININE 3.10 (H) 09/08/2023 04:31 AM    CREATININE 1.91 (H) 03/29/2021 07:05 AM    CALCIUM 9.5 09/08/2023 04:31 AM    CALCIUM 9.2 03/29/2021 07:05 AM    ASTSGOT 22 09/08/2023 04:31 AM    ASTSGOT 20 03/29/2021 07:05 AM    ALTSGPT 19 09/08/2023 04:31 AM    ALTSGPT 23 03/29/2021 07:05 AM    TBILIRUBIN 0.3 09/08/2023 04:31 AM    TBILIRUBIN 0.3 03/29/2021 07:05 AM    ALBUMIN 3.9 09/08/2023 04:31 AM    ALBUMIN 3.9 03/29/2021 07:05 AM    TOTPROTEIN 6.3 09/08/2023 04:31 AM    TOTPROTEIN 6.5 03/29/2021 07:05 AM    GLOBULIN 2.4 09/08/2023 04:31 AM    GLOBULIN 2.6 03/29/2021 07:05 AM    AGRATIO 1.6 09/08/2023 04:31 AM    AGRATIO 1.5 03/29/2021 07:05 AM       Lab Results   Component Value Date/Time    CHOLSTRLTOT 166 05/26/2023 0434    TRIGLYCERIDE 180 (H) 05/26/2023 0434    HDL 52 05/26/2023 0434    LDL 79 12/31/2020 0656       Lab Results   Component Value Date/Time    MALBCRT 1147 (H) 03/29/2021 07:05 AM    MICROALBUR 93.7 03/29/2021 07:05 AM    MICRALB 1,306.6 09/08/2023 04:31 AM        Lab Results   Component Value Date/Time    TSHULTRASEN 1.250 12/31/2020 0656         ASSESSMENT/PLAN:   1. Type 2 diabetes mellitus without complication, with long-term current use of insulin (HCC)  Stable with an A1c of 6.5%  - Exercise for least 150 minutes/week  - Stable hydration  - Daily feet check  - Maintain a healthy diet, minimal carbohydrate, portion control   -Discussed importance of minimizing hypoglycemic events      Medication regimen:  Lantus 22 units night-continue  Jardiance 25 mg daily-continue  Trulicity 3mg weekly -4.5mg weekly for the past two weeks-continue weekly 4.5 mg weekly dose  Humalog 5 units with  meals-5 units before breakfast and dinner, 4 units before lunch to avoid hypoglycemic episodes after lunch, he will have to snack before exercising to avoid hypoglycemia before exercise  activity    - POCT Hemoglobin A1C  - Continuous Blood Gluc Transmit (DEXCOM G6 TRANSMITTER) Misc; USE TO CHECK BLOOD GLUCOSE 3 TO 4 TIMES DAILY  Dispense: 1 Each; Refill: 2  - Comp Metabolic Panel; Future  - FREE THYROXINE; Future  - TSH; Future  - VITAMIN D,25 HYDROXY (DEFICIENCY); Future    2. Polyneuropathy  Unstable  Continue regimen-HPI    3. Dyslipidemia  Unstable  Continue regimen-HPI  - Lipid Profile; Future    4. Essential hypertension  Stable  Follow-up with PCP    5. Microalbuminuria  Unstable  Following nephrology  - MICROALBUMIN CREAT RATIO URINE; Future    6. Stage 3b chronic kidney disease (HCC)  Unstable  Following nephrology  - MICROALBUMIN CREAT RATIO URINE; Future    7. Long term (current) use of insulin (HCC)  On insulin for diabetes treatment    Disposition: Make an appointment to follow-up in 6 months  Do blood work 2 weeks prior to your appointment    Thank you kindly for allowing me to participate in the diabetes care plan for this patient.  I spent 40+ minutes on this visit between precharting, discussing diagnosis and with patient, the time with the nurse    Amos Malave A.P.R.N.  09/20/23      CC:   Miguel A Colón M.D.

## 2023-10-17 ENCOUNTER — OFFICE VISIT (OUTPATIENT)
Dept: NEPHROLOGY | Facility: MEDICAL CENTER | Age: 73
End: 2023-10-17
Payer: COMMERCIAL

## 2023-10-17 VITALS
WEIGHT: 192 LBS | BODY MASS INDEX: 29.1 KG/M2 | HEIGHT: 68 IN | DIASTOLIC BLOOD PRESSURE: 80 MMHG | OXYGEN SATURATION: 97 % | TEMPERATURE: 97.9 F | SYSTOLIC BLOOD PRESSURE: 122 MMHG | HEART RATE: 79 BPM | RESPIRATION RATE: 18 BRPM

## 2023-10-17 DIAGNOSIS — I10 ESSENTIAL HYPERTENSION: ICD-10-CM

## 2023-10-17 DIAGNOSIS — N18.4 CKD (CHRONIC KIDNEY DISEASE) STAGE 4, GFR 15-29 ML/MIN (HCC): ICD-10-CM

## 2023-10-17 DIAGNOSIS — N18.4 TYPE 2 DIABETES MELLITUS WITH STAGE 4 CHRONIC KIDNEY DISEASE, WITH LONG-TERM CURRENT USE OF INSULIN (HCC): ICD-10-CM

## 2023-10-17 DIAGNOSIS — E11.22 TYPE 2 DIABETES MELLITUS WITH STAGE 4 CHRONIC KIDNEY DISEASE, WITH LONG-TERM CURRENT USE OF INSULIN (HCC): ICD-10-CM

## 2023-10-17 DIAGNOSIS — Z79.4 TYPE 2 DIABETES MELLITUS WITH STAGE 4 CHRONIC KIDNEY DISEASE, WITH LONG-TERM CURRENT USE OF INSULIN (HCC): ICD-10-CM

## 2023-10-17 PROCEDURE — 99214 OFFICE O/P EST MOD 30 MIN: CPT | Performed by: INTERNAL MEDICINE

## 2023-10-17 PROCEDURE — 3074F SYST BP LT 130 MM HG: CPT | Performed by: INTERNAL MEDICINE

## 2023-10-17 PROCEDURE — 3079F DIAST BP 80-89 MM HG: CPT | Performed by: INTERNAL MEDICINE

## 2023-10-17 ASSESSMENT — ENCOUNTER SYMPTOMS
NAUSEA: 0
VOMITING: 0
FEVER: 0
CHILLS: 0
HYPERTENSION: 1
SHORTNESS OF BREATH: 0
COUGH: 0

## 2023-10-17 NOTE — PROGRESS NOTES
"Subjective     Ashish Wiley is a 73 y.o. male who presents with Hypertension and Chronic Kidney Disease            Hypertension  This is a chronic problem. The problem is unchanged. The problem is controlled. Pertinent negatives include no chest pain, malaise/fatigue, peripheral edema or shortness of breath. Risk factors for coronary artery disease include diabetes mellitus and male gender. Past treatments include ACE inhibitors and beta blockers. The current treatment provides significant improvement. There are no compliance problems.  Hypertensive end-organ damage includes kidney disease. Identifiable causes of hypertension include chronic renal disease.   Chronic Kidney Disease  This is a chronic problem. The current episode started more than 1 year ago. The problem occurs constantly. The problem has been unchanged. Pertinent negatives include no chest pain, chills, coughing, fever, nausea, urinary symptoms or vomiting.       Review of Systems   Constitutional:  Negative for chills, fever and malaise/fatigue.   Respiratory:  Negative for cough and shortness of breath.    Cardiovascular:  Negative for chest pain and leg swelling.   Gastrointestinal:  Negative for nausea and vomiting.   Genitourinary:  Negative for dysuria, frequency and urgency.              Objective     /80 (BP Location: Right arm, Patient Position: Sitting, BP Cuff Size: Adult)   Pulse 79   Temp 36.6 °C (97.9 °F) (Temporal)   Resp 18   Ht 1.727 m (5' 8\")   Wt 87.1 kg (192 lb)   SpO2 97%   BMI 29.19 kg/m²      Physical Exam  Vitals and nursing note reviewed.   Constitutional:       General: He is not in acute distress.     Appearance: He is not ill-appearing.   HENT:      Head: Normocephalic and atraumatic.      Right Ear: External ear normal.      Left Ear: External ear normal.      Nose: Nose normal.   Eyes:      General:         Right eye: No discharge.         Left eye: No discharge.      Conjunctiva/sclera: Conjunctivae " normal.   Cardiovascular:      Rate and Rhythm: Normal rate and regular rhythm.      Heart sounds: No murmur heard.  Pulmonary:      Effort: Pulmonary effort is normal. No respiratory distress.      Breath sounds: Normal breath sounds. No wheezing.   Musculoskeletal:         General: No tenderness or deformity.      Right lower leg: No edema.      Left lower leg: No edema.   Skin:     General: Skin is warm.   Neurological:      General: No focal deficit present.      Mental Status: He is alert and oriented to person, place, and time.   Psychiatric:         Mood and Affect: Mood normal.         Behavior: Behavior normal.       Past Medical History:   Diagnosis Date    ACC/AHA stage C systolic heart failure ([facility]) 2018    Acute exacerbation of CHF (congestive heart failure) ([facility]) 2018    CAD (coronary artery disease)     CHF (congestive heart failure) ([facility])     Chronic systolic congestive heart failure, NYHA class 1 ([facility]) 2018    Diabetes ([facility])     Dilated cardiomyopathy ([facility])     Hyperlipidemia     Hypertension     NYHA class 3 acute on chronic systolic heart failure ([facility]) 2018    Olecranon bursitis of right elbow 2019    Severe aortic stenosis 2018     Social History     Socioeconomic History    Marital status:      Spouse name: Not on file    Number of children: Not on file    Years of education: Not on file    Highest education level: Not on file   Occupational History    Not on file   Tobacco Use    Smoking status: Former     Current packs/day: 0.00     Average packs/day: 1 pack/day for 22.0 years (22.0 ttl pk-yrs)     Types: Cigarettes     Start date: 1967     Quit date: 1989     Years since quittin.8    Smokeless tobacco: Never    Tobacco comments:     stop cigar in    Vaping Use    Vaping Use: Never used   Substance and Sexual Activity    Alcohol use: Yes     Alcohol/week: 0.6 oz     Types: 1 Glasses of wine per week     Comment: OCCASIONALLY    Drug use: No     Sexual activity: Yes     Partners: Female   Other Topics Concern    Not on file   Social History Narrative    Ashish was born in [place], raised in [place] and [place]. He moved to [place] in 1992, he was worked for [employer]. He retired in 2016. He is  to [place] for the past 40 years. He has 2 kids- Neno (39, [place]) and Radha (36, [place]). He enjoys golfing and they also try to travel when they can.     Social Determinants of Health     Financial Resource Strain: Not on file   Food Insecurity: Not on file   Transportation Needs: Not on file   Physical Activity: Not on file   Stress: Not on file   Social Connections: Not on file   Intimate Partner Violence: Not on file   Housing Stability: Not on file     Family History   Problem Relation Age of Onset    Lung Disease Mother         COPD    Heart Disease Father         valve disease    Heart Failure Father     Hypertension Father     Hyperlipidemia Father     Cancer Maternal Grandmother         BRAIN TUMOR    Stroke Maternal Grandfather     Other Paternal Grandmother         INTESTINAL PROBLEM    Stroke Paternal Grandfather     Prostate cancer Brother     Other Brother         ortho     Recent Labs     11/21/22  0424 05/26/23  0434 09/08/23  0431   ALBUMIN 3.9  --  3.9   HDL 50 52  --    TRIGLYCERIDE 152* 180*  --    SODIUM 142 143 143   POTASSIUM 5.0 5.2 4.6   CHLORIDE 106 109* 109*   CO2 22 22 21   BUN 40* 36* 38*   CREATININE 2.30* 2.65* 3.10*                             Assessment & Plan        1. CKD (chronic kidney disease) stage 4, GFR 15-29 ml/min ([facility])  No uremic symptoms  No acute need for dialysis  Renal dose of medication  Avoid nephrotoxins  Continue same medication regimen  Patient was advised to call us if symptoms worsen  Predialysis education  Recheck labs in 2 to 3 months  Continue ACE inhibitor  2. Essential hypertension  Controlled  Continue same medication regimen  Continue low-sodium diet      3. Type 2 diabetes mellitus with stage 4 chronic  kidney disease, with long-term current use of insulin (HCC)  Continue to optimize diabetes control  Continue SGLT2 inhibitor

## 2023-11-13 ENCOUNTER — OFFICE VISIT (OUTPATIENT)
Dept: MEDICAL GROUP | Facility: LAB | Age: 73
End: 2023-11-13
Payer: COMMERCIAL

## 2023-11-13 VITALS
HEIGHT: 68 IN | DIASTOLIC BLOOD PRESSURE: 70 MMHG | HEART RATE: 69 BPM | RESPIRATION RATE: 18 BRPM | OXYGEN SATURATION: 98 % | WEIGHT: 195 LBS | BODY MASS INDEX: 29.55 KG/M2 | TEMPERATURE: 97.7 F | SYSTOLIC BLOOD PRESSURE: 128 MMHG

## 2023-11-13 DIAGNOSIS — E11.22 TYPE 2 DIABETES MELLITUS WITH STAGE 4 CHRONIC KIDNEY DISEASE, WITH LONG-TERM CURRENT USE OF INSULIN (HCC): ICD-10-CM

## 2023-11-13 DIAGNOSIS — N18.4 TYPE 2 DIABETES MELLITUS WITH STAGE 4 CHRONIC KIDNEY DISEASE, WITH LONG-TERM CURRENT USE OF INSULIN (HCC): ICD-10-CM

## 2023-11-13 DIAGNOSIS — Z79.4 TYPE 2 DIABETES MELLITUS WITH STAGE 4 CHRONIC KIDNEY DISEASE, WITH LONG-TERM CURRENT USE OF INSULIN (HCC): ICD-10-CM

## 2023-11-13 PROCEDURE — 99213 OFFICE O/P EST LOW 20 MIN: CPT | Performed by: FAMILY MEDICINE

## 2023-11-13 PROCEDURE — 3074F SYST BP LT 130 MM HG: CPT | Performed by: FAMILY MEDICINE

## 2023-11-13 PROCEDURE — 3078F DIAST BP <80 MM HG: CPT | Performed by: FAMILY MEDICINE

## 2023-11-13 NOTE — PROGRESS NOTES
Subjective:   Ashish Wiley is a 73 y.o. male here today for   No chief complaint on file.      #DM2  Presents for follow up of diabetes mellitus. He indicates that he is feeling well and denies any symptoms referable to this diagnoses.  Specifically denies chest pain, palpitations, dyspnea, orthopnea, PND or peripheral edema, poyluria, polydipsia, urinary complaints, abdominal complaints, myalgias, numbness, weakness or other related symptoms.   Current medications: Glargine, NovoLog.  Has decreased NovoLog down to 3 to 5 units with meals given that he is having difficulty with his CGM.  He is also having difficulty with Trulicity and finding available in pharmacy.  Is working on better diet, portion control.  Last A1c:   Glycohemoglobin   Date Value Ref Range Status   09/20/2023 6.5 (A) 5.8 % Final     Comment:     [id number] 02/26/25     Last Microalb/Cr ratio:   Microalbumin, Urine Random   Date Value Ref Range Status   03/29/2021 93.7 mg/dL Final     Comment:     Results obtained by dilution.     Creatinine, Urine   Date Value Ref Range Status   03/29/2021 81.69 mg/dL Final     Micro Alb Creat Ratio   Date Value Ref Range Status   03/29/2021 1147 (H) 0 - 30 mg/g Final     Last lipid:   Cholesterol,Tot   Date Value Ref Range Status   05/26/2023 166 100 - 199 mg/dL Final     HDL   Date Value Ref Range Status   05/26/2023 52 >39 mg/dL Final     LDL   Date Value Ref Range Status   12/31/2020 79 <100 mg/dL Final     Triglycerides   Date Value Ref Range Status   05/26/2023 180 (H) 0 - 149 mg/dL Final     Fasting sugars: n/a, no connected to CGM. Not interested in standard blood glucose monitor   Diabetic foot exam: up to date  Retinal eye exam: up to date  ACEi/ARB? Lisinopril  Statin? yes  Aspirin? yes  Concomitant HTN? At goal   Nightly foot checks? yes    Allergies   Allergen Reactions    Sulfa Drugs Unspecified     Kidney Stones       Current medicines (including changes today)  Current Outpatient  Medications   Medication Sig Dispense Refill    JARDIANCE 25 MG Tab TAKE 1 TABLET BY MOUTH EVERY  Tablet 4    carvedilol (COREG) 25 MG Tab TAKE 1 TABLET BY MOUTH TWICE A DAY WITH MEALS 180 Tablet 4    lisinopril (PRINIVIL) 20 MG Tab TAKE 1 TABLET BY MOUTH EVERY DAY 90 Tablet 4    atorvastatin (LIPITOR) 40 MG Tab TAKE 1 TABLET BY MOUTH EVERY DAY 90 Tablet 4    doxazosin (CARDURA) 2 MG Tab TAKE 1 TABLET BY MOUTH EVERY DAY 90 Tablet 4    Dulaglutide (TRULICITY) 4.5 MG/0.5ML Solution Pen-injector Inject 4.5 mg under the skin every 7 days. 2.0ml equals 4 pens per month 6 mL 6    Continuous Blood Gluc Sensor (DEXCOM G6 SENSOR) Misc Apply sensor subcutaneously to monitor blood glucose. Replace sensor every 10 days. 9 Each 3    amLODIPine (NORVASC) 5 MG Tab Take 1 Tablet by mouth every day for 360 days. 90 Tablet 3    insulin glargine 100 UNIT/ML Solution Pen-injector injection Inject 22 Units under the skin every evening for 360 days. 15 mL 3    insulin aspart (NOVOLOG FLEXPEN) 100 UNIT/ML injection PEN Inject 5 Units under the skin 3 times a day before meals. 45 mL 0    ergocalciferol (DRISDOL) 46696 UNIT capsule Take 1 Cap by mouth every 7 days. 12 Cap 3    Multiple Vitamins-Minerals (MULTIVITAMIN PO) Take  by mouth.      aspirin EC (ECOTRIN) 81 MG Tablet Delayed Response Take 81 mg by mouth every day.       No current facility-administered medications for this visit.     He  has a past medical history of ACC/AHA stage C systolic heart failure ([facility]) (6/21/2018), Acute exacerbation of CHF (congestive heart failure) ([facility]) (6/25/2018), CAD (coronary artery disease), CHF (congestive heart failure) ([facility]), Chronic systolic congestive heart failure, NYHA class 1 ([facility]) (7/23/2018), Diabetes ([facility]), Dilated cardiomyopathy ([facility]), Hyperlipidemia, Hypertension, NYHA class 3 acute on chronic systolic heart failure ([facility]) (6/25/2018), Olecranon bursitis of right elbow (6/17/2019), and Severe aortic stenosis (6/25/2018).    He has  "no past medical history of Encounter for long-term (current) use of other medications.    ROS   -Yes       Objective:     Physical Exam:  /70   Pulse 69   Temp 36.5 °C (97.7 °F) (Temporal)   Resp 18   Ht 1.727 m (5' 7.99\")   Wt 88.5 kg (195 lb)   SpO2 98%  Body mass index is 29.66 kg/m².   Constitutional: Alert, no distress.  Skin: Warm, dry, good turgor, no rashes in visible areas.  Eye: Equal, round and reactive, conjunctiva clear, lids normal.  ENMT: TM's clear bilaterally, lips without lesions, good dentition, oropharynx clear.  Neck: Trachea midline, no masses, no thyromegaly. No cervical or supraclavicular lymphadenopathy.  Respiratory: Unlabored respiratory effort, lungs clear to auscultation, no wheezes, no rhonchi.  Cardiovascular: Normal S1, S2, no murmur, no edema.  Abdomen: Soft, non-tender, no masses, no hepatosplenomegaly.  Psych: Alert and oriented x3, normal affect and mood.    Assessment and Plan:     1. Type 2 diabetes mellitus with stage 4 chronic kidney disease, with long-term current use of insulin (HCC)  -Chronic condition, stable.  Patient continues to work on appropriate diet and exercise regimen.  Is worried about the holiday season and we discussed making small adjustments to his diet that could potentially limit the effect typical caloric increase in the holidays.  He will continue with glargine, NovoLog.  I did discuss with patient the importance of following up with insurance company and pharmacy to get a functioning CGM to continue glucose monitoring especially while using insulin.  He will continue to follow-up with endocrinology regularly.  We will follow-up with me every 3 to 6 months.    2. Need for vaccination  - INFLUENZA VACCINE, HIGH DOSE (65+ ONLY)      Followup: No follow-ups on file.         PLEASE NOTE: This dictation was created using voice recognition software. I have made every reasonable attempt to correct obvious errors, but I expect that there are errors of " grammar and possibly content that I did not discover before finalizing the note.

## 2023-12-08 ENCOUNTER — OFFICE VISIT (OUTPATIENT)
Dept: MEDICAL GROUP | Facility: LAB | Age: 73
End: 2023-12-08
Payer: COMMERCIAL

## 2023-12-08 VITALS
HEART RATE: 80 BPM | TEMPERATURE: 96.4 F | SYSTOLIC BLOOD PRESSURE: 130 MMHG | DIASTOLIC BLOOD PRESSURE: 74 MMHG | OXYGEN SATURATION: 98 % | HEIGHT: 68 IN | WEIGHT: 188 LBS | RESPIRATION RATE: 16 BRPM | BODY MASS INDEX: 28.49 KG/M2

## 2023-12-08 DIAGNOSIS — I50.9 CHF DUE TO VALVULAR DISEASE (HCC): ICD-10-CM

## 2023-12-08 DIAGNOSIS — N18.31 ANEMIA IN STAGE 3A CHRONIC KIDNEY DISEASE: ICD-10-CM

## 2023-12-08 DIAGNOSIS — Z95.2 S/P TAVR (TRANSCATHETER AORTIC VALVE REPLACEMENT): ICD-10-CM

## 2023-12-08 DIAGNOSIS — Z79.4 TYPE 2 DIABETES MELLITUS WITH STAGE 4 CHRONIC KIDNEY DISEASE, WITH LONG-TERM CURRENT USE OF INSULIN (HCC): ICD-10-CM

## 2023-12-08 DIAGNOSIS — D63.1 ANEMIA IN STAGE 3A CHRONIC KIDNEY DISEASE: ICD-10-CM

## 2023-12-08 DIAGNOSIS — Z00.00 ANNUAL PHYSICAL EXAM: ICD-10-CM

## 2023-12-08 DIAGNOSIS — N18.4 TYPE 2 DIABETES MELLITUS WITH STAGE 4 CHRONIC KIDNEY DISEASE, WITH LONG-TERM CURRENT USE OF INSULIN (HCC): ICD-10-CM

## 2023-12-08 DIAGNOSIS — N18.32 STAGE 3B CHRONIC KIDNEY DISEASE: ICD-10-CM

## 2023-12-08 DIAGNOSIS — E11.42 DIABETIC PERIPHERAL NEUROPATHY ASSOCIATED WITH TYPE 2 DIABETES MELLITUS (HCC): ICD-10-CM

## 2023-12-08 DIAGNOSIS — Z23 NEED FOR VACCINATION: ICD-10-CM

## 2023-12-08 DIAGNOSIS — I10 ESSENTIAL HYPERTENSION: ICD-10-CM

## 2023-12-08 DIAGNOSIS — E11.22 TYPE 2 DIABETES MELLITUS WITH STAGE 4 CHRONIC KIDNEY DISEASE, WITH LONG-TERM CURRENT USE OF INSULIN (HCC): ICD-10-CM

## 2023-12-08 DIAGNOSIS — I38 CHF DUE TO VALVULAR DISEASE (HCC): ICD-10-CM

## 2023-12-08 PROBLEM — E11.21 DIABETIC NEPHROPATHY ASSOCIATED WITH TYPE 2 DIABETES MELLITUS (HCC): Status: RESOLVED | Noted: 2017-10-11 | Resolved: 2023-12-08

## 2023-12-08 PROBLEM — J01.00 ACUTE NON-RECURRENT MAXILLARY SINUSITIS: Status: RESOLVED | Noted: 2020-10-27 | Resolved: 2023-12-08

## 2023-12-08 PROCEDURE — 3078F DIAST BP <80 MM HG: CPT | Performed by: FAMILY MEDICINE

## 2023-12-08 PROCEDURE — 90471 IMMUNIZATION ADMIN: CPT | Performed by: FAMILY MEDICINE

## 2023-12-08 PROCEDURE — 90662 IIV NO PRSV INCREASED AG IM: CPT | Performed by: FAMILY MEDICINE

## 2023-12-08 PROCEDURE — 99397 PER PM REEVAL EST PAT 65+ YR: CPT | Mod: 25 | Performed by: FAMILY MEDICINE

## 2023-12-08 PROCEDURE — 3075F SYST BP GE 130 - 139MM HG: CPT | Performed by: FAMILY MEDICINE

## 2023-12-08 NOTE — PROGRESS NOTES
Subjective:   Ashish Wiley is a 73 y.o. male here today for   Chief Complaint   Patient presents with    Annual Exam    Paperwork     #Annual   -Reviewed all past medical history, family history, social history.  -Reviewed all screening/vaccinations: Patient up-to-date on all same zoster.  Recommend zoster at this time.  -Diet and Exercise: Appropriate for age and condition, no concerns.  -Tobacco, alcohol, recreational drug use: No changes, see chart  -Sexually active: No changes, see chart    #DM  Presents for follow up of diabetes mellitus. He indicates that he is feeling well and denies any symptoms referable to this diagnoses.  Specifically denies chest pain, palpitations, dyspnea, orthopnea, PND or peripheral edema, poyluria, polydipsia, urinary complaints, abdominal complaints, myalgias, numbness, weakness or other related symptoms.   Current medications: Trulicity, Jardiance, insulin.  Last A1c:   Glycohemoglobin   Date Value Ref Range Status   09/20/2023 6.5 (A) 5.8 % Final     Comment:     [id number] 02/26/25     Last Microalb/Cr ratio:   Microalbumin, Urine Random   Date Value Ref Range Status   03/29/2021 93.7 mg/dL Final     Comment:     Results obtained by dilution.     Creatinine, Urine   Date Value Ref Range Status   03/29/2021 81.69 mg/dL Final     Micro Alb Creat Ratio   Date Value Ref Range Status   03/29/2021 1147 (H) 0 - 30 mg/g Final     Last lipid:   Cholesterol,Tot   Date Value Ref Range Status   05/26/2023 166 100 - 199 mg/dL Final     HDL   Date Value Ref Range Status   05/26/2023 52 >39 mg/dL Final     LDL   Date Value Ref Range Status   12/31/2020 79 <100 mg/dL Final     Triglycerides   Date Value Ref Range Status   05/26/2023 180 (H) 0 - 149 mg/dL Final     Fasting sugars: 110s to 130s  Diabetic foot exam: up to date  Retinal eye exam: up to date  ACEi/ARB?  Lisinopril  Statin?  Atorvastatin  Aspirin?  Yes  Concomitant HTN?  At goal  Nightly foot checks?    #HTN  Doing well.   Reviewed labs with the patient. Taking medications as prescribed. No chest pain palpitations or shortness of breath. No headache loss or change in vision.     # CHF:  -Secondary to valvular disease.  Patient currently on Jardiance, carvedilol, lisinopril.  Patient doing extremely well.  Denies any shortness of breath, paroxysmal nocturnal dyspnea, lower extremity edema, weight gain.  Continues to follow-up with cardiology regularly.    # CKD:  -Complications from CKD include slight anemia.  Is working on appropriate renal diet.  No concerns at this time.    Allergies   Allergen Reactions    Sulfa Drugs Unspecified     Kidney Stones         Current medicines (including changes today)  Current Outpatient Medications   Medication Sig Dispense Refill    JARDIANCE 25 MG Tab TAKE 1 TABLET BY MOUTH EVERY  Tablet 4    carvedilol (COREG) 25 MG Tab TAKE 1 TABLET BY MOUTH TWICE A DAY WITH MEALS 180 Tablet 4    lisinopril (PRINIVIL) 20 MG Tab TAKE 1 TABLET BY MOUTH EVERY DAY 90 Tablet 4    atorvastatin (LIPITOR) 40 MG Tab TAKE 1 TABLET BY MOUTH EVERY DAY 90 Tablet 4    doxazosin (CARDURA) 2 MG Tab TAKE 1 TABLET BY MOUTH EVERY DAY 90 Tablet 4    Dulaglutide (TRULICITY) 4.5 MG/0.5ML Solution Pen-injector Inject 4.5 mg under the skin every 7 days. 2.0ml equals 4 pens per month 6 mL 6    Continuous Blood Gluc Sensor (DEXCOM G6 SENSOR) Misc Apply sensor subcutaneously to monitor blood glucose. Replace sensor every 10 days. 9 Each 3    amLODIPine (NORVASC) 5 MG Tab Take 1 Tablet by mouth every day for 360 days. 90 Tablet 3    insulin aspart (NOVOLOG FLEXPEN) 100 UNIT/ML injection PEN Inject 5 Units under the skin 3 times a day before meals. 45 mL 0    ergocalciferol (DRISDOL) 06506 UNIT capsule Take 1 Cap by mouth every 7 days. 12 Cap 3    Multiple Vitamins-Minerals (MULTIVITAMIN PO) Take  by mouth.      aspirin EC (ECOTRIN) 81 MG Tablet Delayed Response Take 81 mg by mouth every day.       No current facility-administered  "medications for this visit.     He  has a past medical history of ACC/AHA stage C systolic heart failure (HCC) (6/21/2018), Acute exacerbation of CHF (congestive heart failure) ([facility]) (6/25/2018), CAD (coronary artery disease), CHF (congestive heart failure) ([facility]), Chronic systolic congestive heart failure, NYHA class 1 ([facility]) (7/23/2018), Diabetes ([facility]), Dilated cardiomyopathy ([facility]), Hyperlipidemia, Hypertension, NYHA class 3 acute on chronic systolic heart failure (HCC) (6/25/2018), Olecranon bursitis of right elbow (6/17/2019), and Severe aortic stenosis (6/25/2018).    He has no past medical history of Encounter for long-term (current) use of other medications.    ROS   -See HPI     Objective:     Physical Exam:  /74   Pulse 80   Temp (!) 35.8 °C (96.4 °F) (Temporal)   Resp 16   Ht 1.727 m (5' 7.99\")   Wt 85.3 kg (188 lb)   SpO2 98%  Body mass index is 28.59 kg/m².   Constitutional: Alert, no distress.  Skin: Warm, dry, good turgor, no rashes in visible areas.  Eye: Equal, round and reactive, conjunctiva clear, lids normal.  ENMT: TM's clear bilaterally, lips without lesions, good dentition, oropharynx clear.  Neck: Trachea midline, no masses, no thyromegaly. No cervical or supraclavicular lymphadenopathy.  Respiratory: Unlabored respiratory effort, lungs clear to auscultation, no wheezes, no rhonchi.  Cardiovascular: Normal S1, S2, no murmur, no edema.  Abdomen: Soft, non-tender, no masses, no hepatosplenomegaly.  Psych: Alert and oriented x3, normal affect and mood.    Assessment and Plan:     1. Annual physical exam  -All questions concerns were answered at this time.  -Vaccinations/screenings needed at this time: Up-to-date on vaccinations.  No concerns.  -Labs reviewed,, no concerns.  Will continue to keep diabetic screening labs updated.  -Discussed the importance of a healthy, Mediterranean style diet, routine exercise regimen.  -Discussed general safety measures including seatbelts, helmets, " avoidance of smoking, sunscreen, hydration.  -Follow-up for general physical exam on a yearly basis, sooner if needed.    2. Need for vaccination  - Influenza Vaccine, High Dose (65+ Only)    3. Type 2 diabetes mellitus with stage 4 chronic kidney disease, with long-term current use of insulin (HCC)  -Stable.  Continue to work on appropriate blood glucose control with medications including insulin.  Patient difficulty with continuous blood glucose monitor, will continue blood glucose checking with normal finger per glucometer.    4. Diabetic peripheral neuropathy associated with type 2 diabetes mellitus (HCC)  -Status: Stable.  Will continue monitoring feet regularly.  Return precautions given.    5. Essential hypertension  -Chronic addition, stable.  Blood pressure is at goal.  Continue with carvedilol, amlodipine, lisinopril.    6. S/P TAVR (transcatheter aortic valve replacement)  -Status: Stable.  No concerns for exacerbation of CHF.  Return precautions given.    7. Stage 3b chronic kidney disease (HCC)  -Stable, no concerns.  Continue checking labs on a regular basis.  Continue with lisinopril.  Continue with Jardiance.  Continued appropriate diet and exercise regimen.    8. CHF due to valvular disease (HCC)  Status: Stable.  Continue all medications as listed above.  Will continue follow-up with cardiology.    9. Anemia in stage 3a chronic kidney disease (HCC)  -Will continue monitoring closely.  No concerning symptoms at this time.      Followup: No follow-ups on file.         PLEASE NOTE: This dictation was created using voice recognition software. I have made every reasonable attempt to correct obvious errors, but I expect that there are errors of grammar and possibly content that I did not discover before finalizing the note.

## 2024-01-02 ENCOUNTER — TELEPHONE (OUTPATIENT)
Dept: ENDOCRINOLOGY | Facility: MEDICAL CENTER | Age: 74
End: 2024-01-02
Payer: COMMERCIAL

## 2024-01-02 DIAGNOSIS — Z79.4 TYPE 2 DIABETES MELLITUS WITHOUT COMPLICATION, WITH LONG-TERM CURRENT USE OF INSULIN (HCC): ICD-10-CM

## 2024-01-02 DIAGNOSIS — E11.9 TYPE 2 DIABETES MELLITUS WITHOUT COMPLICATION, WITH LONG-TERM CURRENT USE OF INSULIN (HCC): ICD-10-CM

## 2024-01-02 RX ORDER — INSULIN GLARGINE 100 [IU]/ML
30 INJECTION, SOLUTION SUBCUTANEOUS EVERY EVENING
Qty: 30 ML | Refills: 3 | Status: SHIPPED | OUTPATIENT
Start: 2024-01-02 | End: 2024-01-04

## 2024-01-02 NOTE — TELEPHONE ENCOUNTER
Patient stated that he only has 2 days left of his medication : BASAGLAR 100 UNIT/ML and is requesting a refill. Pharmacy is [facility] off of [facility] [address]. Thank you.

## 2024-01-04 DIAGNOSIS — Z79.4 TYPE 2 DIABETES MELLITUS WITHOUT COMPLICATION, WITH LONG-TERM CURRENT USE OF INSULIN (HCC): ICD-10-CM

## 2024-01-04 DIAGNOSIS — E11.9 TYPE 2 DIABETES MELLITUS WITHOUT COMPLICATION, WITH LONG-TERM CURRENT USE OF INSULIN (HCC): ICD-10-CM

## 2024-01-04 RX ORDER — INSULIN GLARGINE 100 [IU]/ML
30 INJECTION, SOLUTION SUBCUTANEOUS EVERY EVENING
Qty: 15 ML | Refills: 5 | Status: SHIPPED | OUTPATIENT
Start: 2024-01-04

## 2024-01-27 LAB
ALBUMIN/CREAT UR: 1494 MG/G CREAT (ref 0–29)
BUN SERPL-MCNC: 42 MG/DL (ref 8–27)
BUN/CREAT SERPL: 15 (ref 10–24)
CALCIUM SERPL-MCNC: 9.5 MG/DL (ref 8.6–10.2)
CHLORIDE SERPL-SCNC: 107 MMOL/L (ref 96–106)
CO2 SERPL-SCNC: 18 MMOL/L (ref 20–29)
CREAT SERPL-MCNC: 2.81 MG/DL (ref 0.76–1.27)
CREAT UR-MCNC: 63 MG/DL
EGFRCR SERPLBLD CKD-EPI 2021: 23 ML/MIN/1.73
ERYTHROCYTE [DISTWIDTH] IN BLOOD BY AUTOMATED COUNT: 12.5 % (ref 11.6–15.4)
GLUCOSE SERPL-MCNC: 113 MG/DL (ref 70–99)
HCT VFR BLD AUTO: 37.7 % (ref 37.5–51)
HGB BLD-MCNC: 12.4 G/DL (ref 13–17.7)
MCH RBC QN AUTO: 30.6 PG (ref 26.6–33)
MCHC RBC AUTO-ENTMCNC: 32.9 G/DL (ref 31.5–35.7)
MCV RBC AUTO: 93 FL (ref 79–97)
MICROALBUMIN UR-MCNC: 941 UG/ML
NRBC BLD AUTO-RTO: ABNORMAL %
PLATELET # BLD AUTO: 204 X10E3/UL (ref 150–450)
POTASSIUM SERPL-SCNC: 4.7 MMOL/L (ref 3.5–5.2)
RBC # BLD AUTO: 4.05 X10E6/UL (ref 4.14–5.8)
SODIUM SERPL-SCNC: 140 MMOL/L (ref 134–144)
WBC # BLD AUTO: 8.4 X10E3/UL (ref 3.4–10.8)

## 2024-02-16 ENCOUNTER — PATIENT MESSAGE (OUTPATIENT)
Dept: HEALTH INFORMATION MANAGEMENT | Facility: OTHER | Age: 74
End: 2024-02-16

## 2024-03-20 ENCOUNTER — OFFICE VISIT (OUTPATIENT)
Dept: ENDOCRINOLOGY | Facility: MEDICAL CENTER | Age: 74
End: 2024-03-20
Payer: COMMERCIAL

## 2024-03-20 VITALS
OXYGEN SATURATION: 98 % | DIASTOLIC BLOOD PRESSURE: 68 MMHG | HEIGHT: 68 IN | BODY MASS INDEX: 29.4 KG/M2 | SYSTOLIC BLOOD PRESSURE: 132 MMHG | WEIGHT: 194 LBS | RESPIRATION RATE: 16 BRPM | HEART RATE: 87 BPM

## 2024-03-20 DIAGNOSIS — E11.9 TYPE 2 DIABETES MELLITUS WITHOUT COMPLICATION, WITH LONG-TERM CURRENT USE OF INSULIN (HCC): ICD-10-CM

## 2024-03-20 DIAGNOSIS — E78.5 DYSLIPIDEMIA: ICD-10-CM

## 2024-03-20 DIAGNOSIS — N18.32 STAGE 3B CHRONIC KIDNEY DISEASE: ICD-10-CM

## 2024-03-20 DIAGNOSIS — R80.9 MICROALBUMINURIA: ICD-10-CM

## 2024-03-20 DIAGNOSIS — Z79.4 TYPE 2 DIABETES MELLITUS WITHOUT COMPLICATION, WITH LONG-TERM CURRENT USE OF INSULIN (HCC): ICD-10-CM

## 2024-03-20 DIAGNOSIS — G62.9 POLYNEUROPATHY: ICD-10-CM

## 2024-03-20 DIAGNOSIS — Z79.4 LONG TERM (CURRENT) USE OF INSULIN (HCC): ICD-10-CM

## 2024-03-20 DIAGNOSIS — I10 ESSENTIAL HYPERTENSION: ICD-10-CM

## 2024-03-20 LAB
HBA1C MFR BLD: 7.5 % (ref ?–5.8)
POCT INT CON NEG: NEGATIVE
POCT INT CON POS: POSITIVE

## 2024-03-20 PROCEDURE — 99214 OFFICE O/P EST MOD 30 MIN: CPT

## 2024-03-20 PROCEDURE — 3078F DIAST BP <80 MM HG: CPT

## 2024-03-20 PROCEDURE — 83036 HEMOGLOBIN GLYCOSYLATED A1C: CPT

## 2024-03-20 PROCEDURE — 3075F SYST BP GE 130 - 139MM HG: CPT

## 2024-03-20 PROCEDURE — 99213 OFFICE O/P EST LOW 20 MIN: CPT

## 2024-03-20 ASSESSMENT — FIBROSIS 4 INDEX: FIB4 SCORE: 1.86

## 2024-03-20 NOTE — PROGRESS NOTES
Chief Complaint: Follow-up for the following conditions   HPI:   Ashish Wiley is a 74 y.o. male       Type 2 DM Mellitus:   He denies hospitalizations for DKA in the past.    Dexcom used but not using since August 10th -not using Dexcom  His insurance wont covered the kp     POC A1c on [id number] at 7.5%   Latest Reference Range & Units 11/21/22 04:24 02/09/23 07:40 05/10/23 07:00   Glycohemoglobin 5.8 % 9.4 (H) 9.1 ! 9.5 !     Medication regimen:  Lantus 22 units night  Jardiance 25 mg daily  Trulicity 4.5 mg weekly   Humalog 5 units with  meals Pt has not been taking his short acting insulin, due to not having a CGM, he would like not to check his blood sugars and only take 3 units of his humalog       He reports hypoglycemic episodes occurring when he exercises, and after lunch  He reports episodes of severe hypoglycemia requiring third party assistance.        Diet: Healthy in general   Exercise: 4-5 miles walking each and golf     Diabetes Complications   He  reports history of retinopathy.    He denies laser eye surgery.   Last eye exam: Retinopathy-every 6 week-[facility]        Latest Reference Range & Units 12/31/20 06:56   C-Peptide 0.8 - 3.5 ng/mL 2.3     2.  Polyneuropathy:   He reports history of peripheral sensory neuropathy.    He reports numbness, tingling in both feet.    He denies history of foot sores.   Metanx 1 tab twice a day for the 8 weeks     3. Dyslipidemia:  Currently taking atorvastatin 40 mg daily  Denies SE    Latest Reference Range & Units 05/26/23 04:34   Cholesterol,Tot 100 - 199 mg/dL 166   Triglycerides 0 - 149 mg/dL 180 (H)   HDL >39 mg/dL 52   LDL Chol Calc (NIH) 0 - 99 mg/dL 84     4.  Essential hypertension:  Followed by PCP  currently taking amlodipine 5 (Norvasc) 5 mg daily, carvedilol (Coreg) 25 mg daily, lisinopril 20 mg  BP today 132/68-reports this is due to white coat syndrome, BP at home 130s/80    5.  Microalbuminuria:  Followed by nephrologist  Currently  taking lisinopril 20 mg daily  BP today 132/68   Latest Reference Range & Units 01/26/24 11:56   Microalbumin, Urine Random Not Estab. ug/mL 941.0   Microalbumin-Creatinine 0 - 29 mg/g creat 1,494 (H)      Latest Reference Range & Units 01/26/24 04:04   Potassium 3.5 - 5.2 mmol/L 4.7     6.Chronic kidney disease:   FV by neurologist  Dr. Najjar-every 3 months         7. Long-term current use of insulin:  On insulin for diabetes treatment    ROS:     CONS:     No fever, no chills, no weight loss, no fatigue   EYES:      No diplopia, no blurry vision, no redness of eyes, no swelling of eyelids   ENT:    No hearing loss, No ear pain, No sore throat, no dysphagia, no neck swelling   CV:     No chest pain, no palpitations, no claudication, no orthopnea, no PND   PULM:    No SOB, no cough, no hemoptysis, no wheezing    GI:   No nausea, no vomiting, no diarrhea, no constipation, no bloody stools   :  Passing urine well, no dysuria, no hematuria   ENDO:   No polyuria, no polydipsia, no heat intolerance, no cold intolerance   NEURO: No headaches, no dizziness, no convulsions, no tremors   MUSC:  No joint swellings, no arthralgias, no myalgias, no weakness   SKIN:   No rash, no ulcers, no dry skin   PSYCH:   No depression, no anxiety, no difficulty sleeping       Past Medical History:  Patient Active Problem List    Diagnosis Date Noted    Bacterial conjunctivitis 10/27/2020    Dyslipidemia 12/27/2019    CHF due to valvular disease ([facility]) 08/09/2018    S/P TAVR (transcatheter aortic valve replacement) 07/02/2018    Stented coronary artery 06/29/2018    Coronary artery disease due to calcified coronary lesion: Status post atherectomy and stenting of the LAD in June 2018.  Nonobstructive disease in the RCA and circumflex. 06/25/2018    Dilated cardiomyopathy ([facility]) 06/21/2018    Vitamin D deficiency 10/24/2017    Stage 3 chronic kidney disease ([facility]) 10/11/2017    Anemia in stage 3 chronic kidney disease ([facility]) 09/12/2017    Type  2 diabetes mellitus with chronic kidney disease, with long-term current use of insulin ([facility]) 06/16/2016    Essential hypertension 06/16/2016    Diabetic peripheral neuropathy associated with type 2 diabetes mellitus ([facility]) 06/16/2016       Past Surgical History:  Past Surgical History:   Procedure Laterality Date    TRANSCATHETER AORTIC VALVE REPLACEMENT  7/2/2018    Procedure: TRANSCATHETER AORTIC VALVE REPLACEMENT;  Surgeon: Markus Fuller M.D.;  Location: SURGERY [facility];  Service: Cardiac    DAYANNA  7/2/2018    Procedure: DAYANNA;  Surgeon: Markus Fuller M.D.;  Location: SURGERY [facility];  Service: Cardiac    AORTIC VALVE REPLACEMENT      S/P TAVR     TONSILLECTOMY      ZZZ CARDIAC CATH          Allergies:  Sulfa drugs     Current Medications:    Current Outpatient Medications:     insulin glargine (LANTUS SOLOSTAR) 100 UNIT/ML Solution Pen-injector injection, Inject 30 Units under the skin every evening. 5 pens per month, Disp: 15 mL, Rfl: 5    JARDIANCE 25 MG Tab, TAKE 1 TABLET BY MOUTH EVERY DAY, Disp: 100 Tablet, Rfl: 4    carvedilol (COREG) 25 MG Tab, TAKE 1 TABLET BY MOUTH TWICE A DAY WITH MEALS, Disp: 180 Tablet, Rfl: 4    lisinopril (PRINIVIL) 20 MG Tab, TAKE 1 TABLET BY MOUTH EVERY DAY, Disp: 90 Tablet, Rfl: 4    atorvastatin (LIPITOR) 40 MG Tab, TAKE 1 TABLET BY MOUTH EVERY DAY, Disp: 90 Tablet, Rfl: 4    doxazosin (CARDURA) 2 MG Tab, TAKE 1 TABLET BY MOUTH EVERY DAY, Disp: 90 Tablet, Rfl: 4    Dulaglutide (TRULICITY) 4.5 MG/0.5ML Solution Pen-injector, Inject 4.5 mg under the skin every 7 days. 2.0ml equals 4 pens per month, Disp: 6 mL, Rfl: 6    Continuous Blood Gluc Sensor (DEXCOM G6 SENSOR) Misc, Apply sensor subcutaneously to monitor blood glucose. Replace sensor every 10 days., Disp: 9 Each, Rfl: 3    amLODIPine (NORVASC) 5 MG Tab, Take 1 Tablet by mouth every day for 360 days., Disp: 90 Tablet, Rfl: 3    insulin aspart (NOVOLOG FLEXPEN) 100 UNIT/ML injection PEN, Inject 5 Units under the  skin 3 times a day before meals., Disp: 45 mL, Rfl: 0    ergocalciferol (DRISDOL) 85577 UNIT capsule, Take 1 Cap by mouth every 7 days., Disp: 12 Cap, Rfl: 3    Multiple Vitamins-Minerals (MULTIVITAMIN PO), Take  by mouth., Disp: , Rfl:     aspirin EC (ECOTRIN) 81 MG Tablet Delayed Response, Take 81 mg by mouth every day., Disp: , Rfl:     Social History:  Social History     Socioeconomic History    Marital status:      Spouse name: Not on file    Number of children: Not on file    Years of education: Not on file    Highest education level: Not on file   Occupational History    Not on file   Tobacco Use    Smoking status: Former     Current packs/day: 0.00     Average packs/day: 1 pack/day for 22.0 years (22.0 ttl pk-yrs)     Types: Cigarettes     Start date: 1967     Quit date: 1989     Years since quittin.2    Smokeless tobacco: Never    Tobacco comments:     stop cigar in    Vaping Use    Vaping Use: Never used   Substance and Sexual Activity    Alcohol use: Yes     Alcohol/week: 0.6 oz     Types: 1 Glasses of wine per week     Comment: OCCASIONALLY    Drug use: No    Sexual activity: Yes     Partners: Female   Other Topics Concern    Not on file   Social History Narrative    Ashish was born in [place], raised in [place] and [place]. He moved to [place] in , he was worked for [employer]. He retired in 2016. He is  to [place] for the past 40 years. He has 2 kids- Neno (39, [place]) and Radha (36, [place]). He enjoys golfing and they also try to travel when they can.     Social Determinants of Health     Financial Resource Strain: Not on file   Food Insecurity: Not on file   Transportation Needs: Not on file   Physical Activity: Not on file   Stress: Not on file   Social Connections: Not on file   Intimate Partner Violence: Not on file   Housing Stability: Not on file        Family History:   Family History   Problem Relation Age of Onset    Lung Disease Mother         COPD    Heart Disease  "Father         valve disease    Heart Failure Father     Hypertension Father     Hyperlipidemia Father     Cancer Maternal Grandmother         BRAIN TUMOR    Stroke Maternal Grandfather     Other Paternal Grandmother         INTESTINAL PROBLEM    Stroke Paternal Grandfather     Prostate cancer Brother     Other Brother         ortho       PHYSICAL EXAM:   Vital signs: /68 (BP Location: Left arm, Patient Position: Sitting, BP Cuff Size: Large adult)   Pulse 87   Resp 16   Ht 1.727 m (5' 8\")   Wt 88 kg (194 lb)   SpO2 98%   BMI 29.50 kg/m²   GENERAL: Well-developed, well-nourished  in no apparent distress.   FOOT: Normal sensation to monofilament testing, normal pulses, no ulcers.  Normal Vibration quantitative sensation test.    Labs:  Lab Results   Component Value Date/Time    HBA1C 9.5 (A) 05/10/2023 0700    AVGLUC 192 03/29/2021 0705       Lab Results   Component Value Date/Time    WBC 8.4 01/26/2024 04:04 AM    WBC 9.0 12/31/2020 06:56 AM    RBC 4.05 (L) 01/26/2024 04:04 AM    RBC 4.22 (L) 12/31/2020 06:56 AM    HEMOGLOBIN 12.4 (L) 01/26/2024 04:04 AM    HEMOGLOBIN 13.4 (L) 12/31/2020 06:56 AM    MCV 93 01/26/2024 04:04 AM    MCV 97.6 12/31/2020 06:56 AM    MCH 30.6 01/26/2024 04:04 AM    MCH 31.8 12/31/2020 06:56 AM    MCHC 32.9 01/26/2024 04:04 AM    MCHC 32.5 (L) 12/31/2020 06:56 AM    RDW 12.5 01/26/2024 04:04 AM    RDW 45.5 12/31/2020 06:56 AM    MPV 10.6 12/31/2020 06:56 AM       Lab Results   Component Value Date/Time    SODIUM 140 01/26/2024 04:04 AM    SODIUM 139 03/29/2021 07:05 AM    POTASSIUM 4.7 01/26/2024 04:04 AM    POTASSIUM 4.6 03/29/2021 07:05 AM    CHLORIDE 107 (H) 01/26/2024 04:04 AM    CHLORIDE 108 03/29/2021 07:05 AM    CO2 18 (L) 01/26/2024 04:04 AM    CO2 23 03/29/2021 07:05 AM    ANION 8.0 03/29/2021 07:05 AM    GLUCOSE 113 (H) 01/26/2024 04:04 AM    GLUCOSE 126 (H) 03/29/2021 07:05 AM    BUN 42 (H) 01/26/2024 04:04 AM    BUN 35 (H) 03/29/2021 07:05 AM    CREATININE 2.81 (H) " 01/26/2024 04:04 AM    CREATININE 1.91 (H) 03/29/2021 07:05 AM    CALCIUM 9.5 01/26/2024 04:04 AM    CALCIUM 9.2 03/29/2021 07:05 AM    ASTSGOT 22 09/08/2023 04:31 AM    ASTSGOT 20 03/29/2021 07:05 AM    ALTSGPT 19 09/08/2023 04:31 AM    ALTSGPT 23 03/29/2021 07:05 AM    TBILIRUBIN 0.3 09/08/2023 04:31 AM    TBILIRUBIN 0.3 03/29/2021 07:05 AM    ALBUMIN 3.9 09/08/2023 04:31 AM    ALBUMIN 3.9 03/29/2021 07:05 AM    TOTPROTEIN 6.3 09/08/2023 04:31 AM    TOTPROTEIN 6.5 03/29/2021 07:05 AM    GLOBULIN 2.4 09/08/2023 04:31 AM    GLOBULIN 2.6 03/29/2021 07:05 AM    AGRATIO 1.6 09/08/2023 04:31 AM    AGRATIO 1.5 03/29/2021 07:05 AM       Lab Results   Component Value Date/Time    CHOLSTRLTOT 166 05/26/2023 0434    TRIGLYCERIDE 180 (H) 05/26/2023 0434    HDL 52 05/26/2023 0434    LDL 79 12/31/2020 0656       Lab Results   Component Value Date/Time    MALBCRT 1147 (H) 03/29/2021 07:05 AM    MICROALBUR 93.7 03/29/2021 07:05 AM    MICRALB 941.0 01/26/2024 11:56 AM        Lab Results   Component Value Date/Time    TSHULTRASEN 1.250 12/31/2020 0656         ASSESSMENT/PLAN:   1. Type 2 diabetes mellitus without complication, with long-term current use of insulin (HCC)  Stable with an A1c of 7.5%  - Exercise for least 150 minutes/week  - Stable hydration  - Daily feet check  - Maintain a healthy diet, minimal carbohydrate, portion control         Medication regimen:  Lantus 22 units night-continue  Jardiance 25 mg daily-continue  Trulicity 4.5 mg weekly -continue  Humalog 5 units with  meals Pt has not been taking his short acting insulin, due to not having a CGM, he would like not to check his blood sugars and only take 3 units of his humalog   - POCT Hemoglobin A1C  - Diabetic Monofilament LE Exam  - TSH; Future  - FREE THYROXINE; Future  - Comp Metabolic Panel; Future  - MICROALBUMIN CREAT RATIO URINE; Future  - VITAMIN D,25 HYDROXY (DEFICIENCY); Future      2. Polyneuropathy  Unstable  Continue regimen-HPI    3.  Dyslipidemia  Unstable  Continue regimen-HPI  - Lipid Profile; Future    4. Essential hypertension  Stable  Follow-up with PCP    5. Microalbuminuria  Unstable  Following nephrology  - MICROALBUMIN CREAT RATIO URINE; Future    6. Stage 3b chronic kidney disease (HCC)  Unstable  Following nephrology  - MICROALBUMIN CREAT RATIO URINE; Future    7. Long term (current) use of insulin (HCC)  On insulin for diabetes treatment    Disposition: Make an appointment to follow-up in 4-5 months  Do blood work 2 weeks prior to your appointment    Thank you kindly for allowing me to participate in the diabetes care plan for this patient.      [email or web address]  03/20/24        CC:   Miguel A Colón M.D.

## 2024-03-20 NOTE — PROGRESS NOTES
RN-CDE Note    Subjective:   Endocrinology Clinic Progress Note  PCP: Miguel A Colón M.D.    HPI:  Ashish Wiley is a 74 y.o. old patient who is seen today by the Diabetes Nurse Specialist for review of his controlled type 2 diabetes with long term use of insulin.   Recent changes in health: states he has been having issues with his Dexcom so has not been able to check blood sugars.  Not consistent with insulin injections.   DM:   Last A1c:   Lab Results   Component Value Date/Time    HBA1C 7.5 (A) 03/20/2024 08:50 AM      Previous A1c was 6.5 on 9/20/23  A1C GOAL: < 7    Diabetes Medications:   Lantus 22 units per day  Novolog 4-5 with meals. Forgets to take on occasion, also since his Dexcom wasn't working he was fearful of taking insulin and going to low without knowing what his blood sugar was prior.   Jardiance 25 mg daily      Exercise: usually walking 5 miles a couple times a week, plans to increase due to weather being warmer.   Diet: feels his diet is healthy but his portion sizes are to big.   Patient's body mass index is 29.5 kg/m². Exercise and nutrition counseling were performed at this visit.    Glucose monitoring frequency: not testing at this time.     Hypoglycemic episodes: no  Last Retinal Exam: on file and up-to-date  Daily Foot Exam: Yes  decreased feeling in his feet.   Foot Exam:  Monofilament testing with a 10 gram force: sensation intact: decreased bilaterally  Visual Inspection: Feet without maceration, ulcers, fissures.  Pedal pulses: intact bilaterally    Lab Results   Component Value Date/Time    MALBCRT 1147 (H) 03/29/2021 07:05 AM    MICROALBUR 93.7 03/29/2021 07:05 AM    MICRALB 941.0 01/26/2024 11:56 AM        ACR Albumin/Creatinine Ratio goal <30     HTN:   Blood pressure goal <130/<80 .   Currently Rx ACE/ARB: Yes     Dyslipidemia:    Lab Results   Component Value Date/Time    CHOLSTRLTOT 166 05/26/2023 04:34 AM    CHOLSTRLTOT 178 12/31/2020 06:56 AM    LDL 79  12/31/2020 06:56 AM    HDL 52 05/26/2023 04:34 AM    HDL 50 12/31/2020 06:56 AM    TRIGLYCERIDE 180 (H) 05/26/2023 04:34 AM    TRIGLYCERIDE 244 (H) 12/31/2020 06:56 AM         Currently Rx Statin: Yes     He  reports that he quit smoking about 35 years ago. His smoking use included cigarettes. He started smoking about 57 years ago. He has a 22 pack-year smoking history. He has never used smokeless tobacco.      Plan:     Discussed and educated on:   - All medications, side effects and compliance (discussed carefully)  - Annual eye examinations at Ophthalmology  - Foot Care: what to look for when checking feet every day  - Home glucose monitoring emphasized  - Weight control and daily exercise

## 2024-05-10 ENCOUNTER — TELEPHONE (OUTPATIENT)
Dept: NEPHROLOGY | Facility: MEDICAL CENTER | Age: 74
End: 2024-05-10
Payer: COMMERCIAL

## 2024-05-21 DIAGNOSIS — E11.9 TYPE 2 DIABETES MELLITUS WITHOUT COMPLICATION, WITH LONG-TERM CURRENT USE OF INSULIN (HCC): ICD-10-CM

## 2024-05-21 DIAGNOSIS — Z79.4 TYPE 2 DIABETES MELLITUS WITHOUT COMPLICATION, WITH LONG-TERM CURRENT USE OF INSULIN (HCC): ICD-10-CM

## 2024-05-21 RX ORDER — INSULIN GLARGINE 100 [IU]/ML
30 INJECTION, SOLUTION SUBCUTANEOUS EVERY EVENING
Qty: 30 ML | Refills: 1 | Status: SHIPPED | OUTPATIENT
Start: 2024-05-21

## 2024-05-26 DIAGNOSIS — I10 ESSENTIAL HYPERTENSION: ICD-10-CM

## 2024-05-30 RX ORDER — AMLODIPINE BESYLATE 5 MG/1
5 TABLET ORAL DAILY
Qty: 90 TABLET | Refills: 1 | Status: SHIPPED | OUTPATIENT
Start: 2024-05-30 | End: 2025-05-25

## 2024-07-18 LAB
ALBUMIN/CREAT UR: 1358 MG/G CREAT (ref 0–29)
BASOPHILS # BLD AUTO: 0.1 X10E3/UL (ref 0–0.2)
BASOPHILS NFR BLD AUTO: 1 %
BUN SERPL-MCNC: 44 MG/DL (ref 8–27)
BUN/CREAT SERPL: 14 (ref 10–24)
CHLORIDE SERPL-SCNC: 112 MMOL/L (ref 96–106)
CO2 SERPL-SCNC: 16 MMOL/L (ref 20–29)
CREAT SERPL-MCNC: 3.16 MG/DL (ref 0.76–1.27)
CREAT UR-MCNC: 62 MG/DL
EGFRCR SERPLBLD CKD-EPI 2021: 20 ML/MIN/1.73
EOSINOPHIL # BLD AUTO: 0.1 X10E3/UL (ref 0–0.4)
EOSINOPHIL NFR BLD AUTO: 2 %
ERYTHROCYTE [DISTWIDTH] IN BLOOD BY AUTOMATED COUNT: 12.7 % (ref 11.6–15.4)
GLUCOSE SERPL-MCNC: 91 MG/DL (ref 70–99)
HCT VFR BLD AUTO: 36.1 % (ref 37.5–51)
HGB BLD-MCNC: 11.9 G/DL (ref 13–17.7)
IMM GRANULOCYTES # BLD AUTO: 0 X10E3/UL (ref 0–0.1)
IMM GRANULOCYTES NFR BLD AUTO: 0 %
IMMATURE CELLS  115398: ABNORMAL
LYMPHOCYTES # BLD AUTO: 0.9 X10E3/UL (ref 0.7–3.1)
LYMPHOCYTES NFR BLD AUTO: 11 %
MCH RBC QN AUTO: 31.2 PG (ref 26.6–33)
MCHC RBC AUTO-ENTMCNC: 33 G/DL (ref 31.5–35.7)
MCV RBC AUTO: 95 FL (ref 79–97)
MICROALBUMIN UR-MCNC: 842 UG/ML
MONOCYTES # BLD AUTO: 0.7 X10E3/UL (ref 0.1–0.9)
MONOCYTES NFR BLD AUTO: 9 %
MORPHOLOGY BLD-IMP: ABNORMAL
NEUTROPHILS # BLD AUTO: 6 X10E3/UL (ref 1.4–7)
NEUTROPHILS NFR BLD AUTO: 77 %
NRBC BLD AUTO-RTO: ABNORMAL %
PLATELET # BLD AUTO: 191 X10E3/UL (ref 150–450)
POTASSIUM SERPL-SCNC: 5.2 MMOL/L (ref 3.5–5.2)
RBC # BLD AUTO: 3.82 X10E6/UL (ref 4.14–5.8)
SODIUM SERPL-SCNC: 142 MMOL/L (ref 134–144)
WBC # BLD AUTO: 7.8 X10E3/UL (ref 3.4–10.8)

## 2024-07-29 RX ORDER — DOXAZOSIN 2 MG/1
2 TABLET ORAL DAILY
Qty: 90 TABLET | Refills: 4 | Status: SHIPPED | OUTPATIENT
Start: 2024-07-29

## 2024-07-30 ENCOUNTER — APPOINTMENT (OUTPATIENT)
Dept: NEPHROLOGY | Facility: MEDICAL CENTER | Age: 74
End: 2024-07-30
Payer: COMMERCIAL

## 2024-07-30 VITALS
OXYGEN SATURATION: 97 % | DIASTOLIC BLOOD PRESSURE: 66 MMHG | WEIGHT: 188 LBS | BODY MASS INDEX: 28.49 KG/M2 | HEIGHT: 68 IN | HEART RATE: 81 BPM | TEMPERATURE: 97.8 F | SYSTOLIC BLOOD PRESSURE: 124 MMHG

## 2024-07-30 DIAGNOSIS — N18.4 CKD (CHRONIC KIDNEY DISEASE) STAGE 4, GFR 15-29 ML/MIN (HCC): ICD-10-CM

## 2024-07-30 DIAGNOSIS — E11.22 TYPE 2 DIABETES MELLITUS WITH STAGE 4 CHRONIC KIDNEY DISEASE, WITH LONG-TERM CURRENT USE OF INSULIN (HCC): ICD-10-CM

## 2024-07-30 DIAGNOSIS — N18.4 TYPE 2 DIABETES MELLITUS WITH STAGE 4 CHRONIC KIDNEY DISEASE, WITH LONG-TERM CURRENT USE OF INSULIN (HCC): ICD-10-CM

## 2024-07-30 DIAGNOSIS — Z79.4 TYPE 2 DIABETES MELLITUS WITH STAGE 4 CHRONIC KIDNEY DISEASE, WITH LONG-TERM CURRENT USE OF INSULIN (HCC): ICD-10-CM

## 2024-07-30 DIAGNOSIS — I10 ESSENTIAL HYPERTENSION: ICD-10-CM

## 2024-07-30 DIAGNOSIS — E87.20 METABOLIC ACIDOSIS: ICD-10-CM

## 2024-07-30 RX ORDER — SODIUM BICARBONATE 650 MG/1
650 TABLET ORAL 2 TIMES DAILY
Qty: 180 TABLET | Refills: 3 | Status: SHIPPED | OUTPATIENT
Start: 2024-07-30 | End: 2025-07-30

## 2024-07-30 ASSESSMENT — ENCOUNTER SYMPTOMS
HYPERTENSION: 1
VOMITING: 0
NAUSEA: 0
CHILLS: 0
COUGH: 0
SHORTNESS OF BREATH: 0
FEVER: 0

## 2024-07-30 ASSESSMENT — FIBROSIS 4 INDEX: FIB4 SCORE: 1.981863669562444575

## 2024-08-13 DIAGNOSIS — E11.9 TYPE 2 DIABETES MELLITUS WITHOUT COMPLICATION, WITH LONG-TERM CURRENT USE OF INSULIN (HCC): ICD-10-CM

## 2024-08-13 DIAGNOSIS — Z79.4 TYPE 2 DIABETES MELLITUS WITHOUT COMPLICATION, WITH LONG-TERM CURRENT USE OF INSULIN (HCC): ICD-10-CM

## 2024-08-13 RX ORDER — DULAGLUTIDE 4.5 MG/.5ML
4.5 INJECTION, SOLUTION SUBCUTANEOUS
Qty: 6 ML | Refills: 6 | Status: CANCELLED | OUTPATIENT
Start: 2024-08-13

## 2024-08-13 NOTE — TELEPHONE ENCOUNTER
Received request via: Pharmacy    Was the patient seen in the last year in this department? Yes    Does the patient have an active prescription (recently filled or refills available) for medication(s) requested? No    Pharmacy Name: [facility] [address] [id number]    Does the patient have group home Plus and need 100-day supply? (This applies to ALL medications) Patient does not have SCP

## 2024-08-15 ENCOUNTER — OFFICE VISIT (OUTPATIENT)
Dept: ENDOCRINOLOGY | Facility: MEDICAL CENTER | Age: 74
End: 2024-08-15
Attending: INTERNAL MEDICINE
Payer: COMMERCIAL

## 2024-08-15 VITALS
OXYGEN SATURATION: 95 % | DIASTOLIC BLOOD PRESSURE: 68 MMHG | BODY MASS INDEX: 28.78 KG/M2 | WEIGHT: 189.9 LBS | RESPIRATION RATE: 17 BRPM | SYSTOLIC BLOOD PRESSURE: 140 MMHG | HEART RATE: 87 BPM | HEIGHT: 68 IN

## 2024-08-15 DIAGNOSIS — E11.65 TYPE 2 DIABETES MELLITUS WITH HYPERGLYCEMIA, WITH LONG-TERM CURRENT USE OF INSULIN (HCC): ICD-10-CM

## 2024-08-15 DIAGNOSIS — Z79.4 TYPE 2 DIABETES MELLITUS WITHOUT COMPLICATION, WITH LONG-TERM CURRENT USE OF INSULIN (HCC): ICD-10-CM

## 2024-08-15 DIAGNOSIS — I10 ESSENTIAL HYPERTENSION: ICD-10-CM

## 2024-08-15 DIAGNOSIS — E11.9 TYPE 2 DIABETES MELLITUS WITHOUT COMPLICATION, WITH LONG-TERM CURRENT USE OF INSULIN (HCC): ICD-10-CM

## 2024-08-15 DIAGNOSIS — N18.4 CHRONIC KIDNEY DISEASE (CKD), STAGE IV (SEVERE) (HCC): ICD-10-CM

## 2024-08-15 DIAGNOSIS — E78.5 DYSLIPIDEMIA: ICD-10-CM

## 2024-08-15 DIAGNOSIS — E55.9 VITAMIN D DEFICIENCY: ICD-10-CM

## 2024-08-15 DIAGNOSIS — Z79.4 LONG TERM (CURRENT) USE OF INSULIN (HCC): ICD-10-CM

## 2024-08-15 DIAGNOSIS — Z79.4 TYPE 2 DIABETES MELLITUS WITH HYPERGLYCEMIA, WITH LONG-TERM CURRENT USE OF INSULIN (HCC): ICD-10-CM

## 2024-08-15 PROBLEM — N18.30 STAGE 3 CHRONIC KIDNEY DISEASE: Status: RESOLVED | Noted: 2017-10-11 | Resolved: 2024-08-15

## 2024-08-15 LAB
HBA1C MFR BLD: 6.7 % (ref ?–5.8)
POCT INT CON NEG: NEGATIVE
POCT INT CON POS: POSITIVE

## 2024-08-15 PROCEDURE — 3078F DIAST BP <80 MM HG: CPT | Performed by: INTERNAL MEDICINE

## 2024-08-15 PROCEDURE — 99213 OFFICE O/P EST LOW 20 MIN: CPT | Performed by: INTERNAL MEDICINE

## 2024-08-15 PROCEDURE — 83036 HEMOGLOBIN GLYCOSYLATED A1C: CPT | Performed by: INTERNAL MEDICINE

## 2024-08-15 PROCEDURE — 3077F SYST BP >= 140 MM HG: CPT | Performed by: INTERNAL MEDICINE

## 2024-08-15 PROCEDURE — 99214 OFFICE O/P EST MOD 30 MIN: CPT | Performed by: INTERNAL MEDICINE

## 2024-08-15 RX ORDER — INSULIN GLARGINE 100 [IU]/ML
30 INJECTION, SOLUTION SUBCUTANEOUS EVERY EVENING
Qty: 15 ML | Refills: 5 | Status: SHIPPED | OUTPATIENT
Start: 2024-08-15 | End: 2024-08-15 | Stop reason: SDUPTHER

## 2024-08-15 RX ORDER — INSULIN GLARGINE 100 [IU]/ML
30 INJECTION, SOLUTION SUBCUTANEOUS EVERY EVENING
Qty: 45 ML | Refills: 3 | Status: SHIPPED | OUTPATIENT
Start: 2024-08-15 | End: 2024-08-19

## 2024-08-15 RX ORDER — DULAGLUTIDE 4.5 MG/.5ML
4.5 INJECTION, SOLUTION SUBCUTANEOUS
Qty: 6 ML | Refills: 6 | Status: SHIPPED | OUTPATIENT
Start: 2024-08-15

## 2024-08-15 RX ORDER — EMPAGLIFLOZIN 25 MG/1
1 TABLET, FILM COATED ORAL
Qty: 100 TABLET | Refills: 4 | Status: SHIPPED | OUTPATIENT
Start: 2024-08-15

## 2024-08-15 ASSESSMENT — FIBROSIS 4 INDEX: FIB4 SCORE: 1.981863669562444575

## 2024-08-15 NOTE — PROGRESS NOTES
"RN-CDE Note    Subjective:   Endocrinology Clinic Progress Note  PCP: Miguel A Colón M.D.    HPI:  Ashish Wiley is a 74 y.o. old patient who is seen today by the Diabetes Nurse Specialist for review of his type 2 diabetes with long term use of insulin.    Recent changes in health: starting to have symptoms of back pain and decreased endurance.  These are symptoms he had prior to stent placement several years ago.  Has appt. with  cardiology.  DM:   Last A1c:   Lab Results   Component Value Date/Time    HBA1C 6.7 (A) 08/15/2024 07:43 AM      Previous A1c was 7.5 on 03/20/2024  A1C GOAL: < 7    Diabetes Medications:   Lantus 22 units per day  Novolog 5 units with dinner (forgets with breakfast and not eating lunch)  Trulicity 4.5 mg weekly  Jardiance 25 mg daily      Exercise: walking 1-1.5 miles most days, golfing one day a week.   Diet: \"healthy\" diet  in general  Patient's body mass index is 28.87 kg/m². Exercise and nutrition counseling were performed at this visit.    Glucose monitoring frequency:  not testing    Hypoglycemic episodes: no  Last Retinal Exam: on file and up-to-date  Daily Foot Exam: Yes   Foot Exam:  Monofilament: current.   Lab Results   Component Value Date/Time    MALBCRT 1147 (H) 03/29/2021 07:05 AM    MICROALBUR 93.7 03/29/2021 07:05 AM    MICRALB 842.0 07/16/2024 04:18 AM        ACR Albumin/Creatinine Ratio goal <30     HTN:   Blood pressure goal <130/<80 .   Currently Rx ACE/ARB:  Lisinopril 20 mg daily    Dyslipidemia:    Lab Results   Component Value Date/Time    CHOLSTRLTOT 166 05/26/2023 04:34 AM    CHOLSTRLTOT 178 12/31/2020 06:56 AM    LDL 79 12/31/2020 06:56 AM    HDL 52 05/26/2023 04:34 AM    HDL 50 12/31/2020 06:56 AM    TRIGLYCERIDE 180 (H) 05/26/2023 04:34 AM    TRIGLYCERIDE 244 (H) 12/31/2020 06:56 AM         Currently Rx Statin:  Atorvastatin 40 mg daily    He  reports that he quit smoking about 35 years ago. His smoking use included cigarettes. He started " smoking about 57 years ago. He has a 22 pack-year smoking history. He has never used smokeless tobacco.      Plan:     Discussed and educated on:   - All medications, side effects and compliance (discussed carefully)  - Annual eye examinations at Ophthalmology  - HbA1C: target  - Home glucose monitoring emphasized  - Weight control and daily exercise    Recommended medication changes: no changes

## 2024-08-15 NOTE — PROGRESS NOTES
CHIEF COMPLAINT: Patient is here for follow up of Type 2 Diabetes Mellitus.      HPI:     Ashish Wiley is a 74 y.o. male with Type 2 Diabetes Mellitus here for follow up.    Labs from 8/15/2024 show A1c is 6.7%  Baseline labs from December 10, 2019 showed an elevated A1c of 10% while taking Lantus Bydureon and Jardiance.        On follow-up he is taking   Lantus 22 units daily,   Jardiance 25 mg daily   Ozempic 1.0mg weekly.    Novolog 4 units with dinner ( not always)      He denies SE with his meds  He admits that he doesn't like to check his BGs  He is not a candidate for Kerendia as eGFR is < 25      He has coronary artery disease and hyperlipidemia, and is taking Lipitor 40 mg daily.       Latest Reference Range & Units 05/26/23 04:34   Cholesterol,Tot 100 - 199 mg/dL 166   Triglycerides 0 - 149 mg/dL 180 (H)   HDL >39 mg/dL 52   LDL Chol Calc (NIH) 0 - 99 mg/dL 84   VLDL Cholesterol Calc 5 - 40 mg/dL 30       He does have diabetic kidney disease  Hre has CKD st IV  Blood pressure is controlled with lisinopril 20 mg daily  , coreg 25mg bid, amlodipine 5mg daily    Latest Reference Range & Units 07/16/24 04:18   Creatinine, Random Urine Not Estab. mg/dL 62.0   Microalbumin, Urine Random Not Estab. ug/mL 842.0   Microalbumin-Creatinine 0 - 29 mg/g creat 1,358 (H)       He had an eye exam on 9/2023        BG Diary:  Please see glucose sensor download    Weight has been stable    Diabetes Complications   Retinopathy: Known PDR retinopathy.  Last eye exam: 9/2023 at [facility] by Dr. Hunter  He reports that he has an eye exam appointment with Dr. Lizarraga  Neuropathy: Denies paresthesias or numbness in hands or feet. Denies any foot wounds.  Exercise: Minimal.  Diet: Fair.  Patient's medications, allergies, and social histories were reviewed and updated as appropriate.    ROS:     CONS:     No fever, no chills   EYES:     No diplopia, no blurry vision   CV:           No chest pain, no  palpitations   PULM:     No SOB, no cough, no hemoptysis.   GI:            No nausea, no vomiting, no diarrhea, no constipation   ENDO:     No polyuria, no polydipsia, no heat intolerance, no cold intolerance       Past Medical History:  Problem List:  2020-10: Bacterial conjunctivitis  2020-10: History of maxillary sinusitis  2019-12: Dyslipidemia  2019-06: Olecranon bursitis of right elbow  2018-08: CHF due to valvular disease ([facility])  2018-07: Chronic systolic congestive heart failure, NYHA class 1 ([facility])  2018-07: S/P TAVR (transcatheter aortic valve replacement)  2018-06: Stented coronary artery  2018-06: Acute on chronic systolic (congestive) heart failure ([facility])  2018-06: NYHA class 3 acute on chronic systolic heart failure ([facility])  2018-06: Severe aortic stenosis  2018-06: Coronary artery disease due to calcified coronary lesion:   Status post atherectomy and stenting of the LAD in June 2018.    Nonobstructive disease in the RCA and circumflex.  2018-06: Aortic stenosis  2018-06: ACC/AHA stage C systolic heart failure ([facility])  2018-06: Dilated cardiomyopathy ([facility])  2018-06: Acute systolic heart failure ([facility])  2018-05: Orthopnea  2018-05: Bilateral leg edema  2018-05: Shortness of breath on exertion  2017-10: Vitamin D deficiency  2017-10: Stage 3 chronic kidney disease ([facility])  2017-10: Diabetic nephropathy associated with type 2 diabetes   mellitus ([facility])  2017-09: Anemia in stage 3 chronic kidney disease ([facility])  2017-09: CKD (chronic kidney disease) stage 3, GFR 30-59 ml/min  2016-10: Macroalbuminuric diabetic nephropathy ([facility])  2016-06: Type 2 diabetes mellitus with microalbuminuria, with long-  term current use of insulin ([facility])  2016-06: Type 2 diabetes mellitus with chronic kidney disease, with   long-term current use of insulin ([facility])  2016-06: Essential hypertension  2016-06: Diabetic peripheral neuropathy associated with type 2   diabetes mellitus ([facility])      Past Surgical History:  Past Surgical History:  "  Procedure Laterality Date    TRANSCATHETER AORTIC VALVE REPLACEMENT  2018    Procedure: TRANSCATHETER AORTIC VALVE REPLACEMENT;  Surgeon: Markus Fuller M.D.;  Location: SURGERY [facility];  Service: Cardiac    DAYANNA  2018    Procedure: DAYANNA;  Surgeon: Markus Fuller M.D.;  Location: SURGERY [facility];  Service: Cardiac    AORTIC VALVE REPLACEMENT      S/P TAVR     TONSILLECTOMY      ZZZ CARDIAC CATH          Allergies:  Sulfa drugs     Social History:  Social History     Tobacco Use    Smoking status: Former     Current packs/day: 0.00     Average packs/day: 1 pack/day for 22.0 years (22.0 ttl pk-yrs)     Types: Cigarettes     Start date: 1967     Quit date: 1989     Years since quittin.6    Smokeless tobacco: Never    Tobacco comments:     stop cigar in    Vaping Use    Vaping status: Never Used   Substance Use Topics    Alcohol use: Yes     Alcohol/week: 0.6 oz     Types: 1 Glasses of wine per week     Comment: OCCASIONALLY    Drug use: No        Family History:   family history includes Cancer in his maternal grandmother; Heart Disease in his father; Heart Failure in his father; Hyperlipidemia in his father; Hypertension in his father; Lung Disease in his mother; Other in his brother and paternal grandmother; Prostate cancer in his brother; Stroke in his maternal grandfather and paternal grandfather.      PHYSICAL EXAM:   OBJECTIVE:  Vital signs: BP (!) 140/68 (BP Location: Left arm, Patient Position: Sitting)   Pulse 87   Resp 17   Ht 1.727 m (5' 8\")   Wt 86.1 kg (189 lb 14.4 oz)   SpO2 95%   BMI 28.87 kg/m²   GENERAL: Well-developed, well-nourished in no apparent distress.   EYE:  No ocular asymmetry, PERRLA  HENT: Pink, moist mucous membranes.    NECK: No thyromegaly.   CARDIOVASCULAR: Normal precordial impulse seen  LUNGS: Symmetrical chest expansion  ABDOMEN: Soft, no masses seen  EXTREMITIES: No clubbing, cyanosis, or edema.   NEUROLOGICAL: No gross focal motor " abnormalities   LYMPH: No cervical adenopathy seen   SKIN: No rashes, lesions.   Monofilament testing with a 10 gram force: sensation: decreased bilaterally  Visual Inspection: Feet without maceration, ulcers, or fissures.  Pedal pulses: intact bilaterally      Labs:  Lab Results   Component Value Date/Time    HBA1C 6.7 (A) 08/15/2024 07:43 AM        Lab Results   Component Value Date/Time    WBC 7.8 07/16/2024 04:18 AM    WBC 9.0 12/31/2020 06:56 AM    RBC 3.82 (L) 07/16/2024 04:18 AM    RBC 4.22 (L) 12/31/2020 06:56 AM    HEMOGLOBIN 11.9 (L) 07/16/2024 04:18 AM    HEMOGLOBIN 13.4 (L) 12/31/2020 06:56 AM    MCV 95 07/16/2024 04:18 AM    MCV 97.6 12/31/2020 06:56 AM    MCH 31.2 07/16/2024 04:18 AM    MCH 31.8 12/31/2020 06:56 AM    MCHC 33.0 07/16/2024 04:18 AM    MCHC 32.5 (L) 12/31/2020 06:56 AM    RDW 12.7 07/16/2024 04:18 AM    RDW 45.5 12/31/2020 06:56 AM    MPV 10.6 12/31/2020 06:56 AM       Lab Results   Component Value Date/Time    SODIUM 142 07/16/2024 04:18 AM    SODIUM 139 03/29/2021 07:05 AM    POTASSIUM 5.2 07/16/2024 04:18 AM    POTASSIUM 4.6 03/29/2021 07:05 AM    CHLORIDE 112 (H) 07/16/2024 04:18 AM    CHLORIDE 108 03/29/2021 07:05 AM    CO2 16 (L) 07/16/2024 04:18 AM    CO2 23 03/29/2021 07:05 AM    ANION 8.0 03/29/2021 07:05 AM    GLUCOSE 91 07/16/2024 04:18 AM    GLUCOSE 126 (H) 03/29/2021 07:05 AM    BUN 44 (H) 07/16/2024 04:18 AM    BUN 35 (H) 03/29/2021 07:05 AM    CREATININE 3.16 (H) 07/16/2024 04:18 AM    CREATININE 1.91 (H) 03/29/2021 07:05 AM    CALCIUM 9.5 01/26/2024 04:04 AM    CALCIUM 9.2 03/29/2021 07:05 AM    ASTSGOT 22 09/08/2023 04:31 AM    ASTSGOT 20 03/29/2021 07:05 AM    ALTSGPT 19 09/08/2023 04:31 AM    ALTSGPT 23 03/29/2021 07:05 AM    TBILIRUBIN 0.3 09/08/2023 04:31 AM    TBILIRUBIN 0.3 03/29/2021 07:05 AM    ALBUMIN 3.9 09/08/2023 04:31 AM    ALBUMIN 3.9 03/29/2021 07:05 AM    TOTPROTEIN 6.3 09/08/2023 04:31 AM    TOTPROTEIN 6.5 03/29/2021 07:05 AM    GLOBULIN 2.4 09/08/2023  04:31 AM    GLOBULIN 2.6 03/29/2021 07:05 AM    AGRATIO 1.6 09/08/2023 04:31 AM    AGRATIO 1.5 03/29/2021 07:05 AM       Lab Results   Component Value Date/Time    CHOLSTRLTOT 159 12/10/2019 0632    TRIGLYCERIDE 237 (H) 12/10/2019 0632    HDL 47 12/10/2019 0632    LDL 65 12/10/2019 0632       Lab Results   Component Value Date/Time    MALBCRT 1147 (H) 03/29/2021 07:05 AM    MICROALBUR 93.7 03/29/2021 07:05 AM    MICRALB 842.0 07/16/2024 04:18 AM        No results found for: TSHULTRASEN  No results found for: FREEDIR  No results found for: FREET3  No results found for: THYSTIMIG        ASSESSMENT/PLAN:     1. Type 2 diabetes mellitus with hyperglycemia, with long-term current use of insulin (HCC)  Improved control  Continue Lantus 22 units daily  Continue Jardiance and Ozempic  Continue Novolog 5u with dinner   Recommend eye exam and fasting lipids   We will plan for follow-up in 3 months per patient request     2. Dyslipidemia  Well-controlled  LDL cholesterol was at goal  Continue atorvastatin follow low-fat diet  We will repeat fasting lipids this week    3. Essential hypertension  Well-controlled  Continue current meds    4. Chronic kidney disease (CKD), stage IV (severe) (HCC)  Stable  Because his GFR is less than 25 he is not a candidate for a MRA Kerendia  Continue SGLT2 inhibitor  Recommend adequate hydration  Avoid potential nephrotoxins such as NSAIDs and IV contrast    5. Long term (current) use of insulin (HCC)  Patient is on long-term basal insulin therapy with a GLP-1 analog and other medications for type 2 diabetes management      Return in about 3 months (around 11/15/2024).      Thank you kindly for allowing me to participate in the diabetes care plan for this patient.    Timothy Botello MD, FACE, NU      CC:   Miguel A Colón M.D.

## 2024-08-16 ENCOUNTER — OFFICE VISIT (OUTPATIENT)
Dept: MEDICAL GROUP | Facility: LAB | Age: 74
End: 2024-08-16
Payer: COMMERCIAL

## 2024-08-16 VITALS
DIASTOLIC BLOOD PRESSURE: 68 MMHG | HEIGHT: 68 IN | WEIGHT: 172.2 LBS | BODY MASS INDEX: 26.1 KG/M2 | OXYGEN SATURATION: 97 % | SYSTOLIC BLOOD PRESSURE: 132 MMHG | HEART RATE: 90 BPM | TEMPERATURE: 97.3 F | RESPIRATION RATE: 18 BRPM

## 2024-08-16 DIAGNOSIS — I38 CHF DUE TO VALVULAR DISEASE (HCC): ICD-10-CM

## 2024-08-16 DIAGNOSIS — Z95.2 S/P TAVR (TRANSCATHETER AORTIC VALVE REPLACEMENT): ICD-10-CM

## 2024-08-16 DIAGNOSIS — I50.9 CHF DUE TO VALVULAR DISEASE (HCC): ICD-10-CM

## 2024-08-16 DIAGNOSIS — R06.02 SOB (SHORTNESS OF BREATH): ICD-10-CM

## 2024-08-16 PROCEDURE — 3075F SYST BP GE 130 - 139MM HG: CPT | Performed by: FAMILY MEDICINE

## 2024-08-16 PROCEDURE — 99213 OFFICE O/P EST LOW 20 MIN: CPT | Performed by: FAMILY MEDICINE

## 2024-08-16 PROCEDURE — 3078F DIAST BP <80 MM HG: CPT | Performed by: FAMILY MEDICINE

## 2024-08-16 ASSESSMENT — PATIENT HEALTH QUESTIONNAIRE - PHQ9: CLINICAL INTERPRETATION OF PHQ2 SCORE: 0

## 2024-08-16 ASSESSMENT — FIBROSIS 4 INDEX: FIB4 SCORE: 1.981863669562444575

## 2024-08-16 NOTE — PROGRESS NOTES
Verbal consent was acquired by the patient to use [employer] ambient listening note generation during this visit Yes    Subjective:   Ashish Wiley is a 74 y.o. male here today for   Chief Complaint   Patient presents with    Difficulty Walking     X Winded by 7-8-9 holes out of 18 holes      History of Present Illness  The patient is a 74-year-old male with a history of congestive heart failure (CHF), chronic kidney disease (CKD), and hypertension, presenting today with concerns regarding new onset fatigue and shortness of breath.    He reports that his condition has been deteriorating. Last week, he was able to engage in activities such as hunting, fishing, swimming, boating, and golfing, but now he struggles to walk more than 7 or 8 holes of golf. His wife and daughters have observed him panting and slurring his speech. He experiences back pain that extends to his knees and feels extremely fatigued. His legs are more affected than the rest of his body.     Despite these symptoms, he continues to walk a mile and a half every morning without panting, provided he stretches for a minute before starting. He is not experiencing any chest pain or leg swelling. He has not gained weight recently. His blood pressure has slightly increased over the past 2 months, ranging from 130s to 160s. He has not made any changes to his medication regimen.    He visited his endocrinologist yesterday due to these symptoms. His A1c level was 6.807. He had his kidney function tested 3 to 4 weeks ago. He plans to undergo all necessary tests in the first week of November 2024 so that he can consult with all his doctors in November 2024 when his insurance coverage will be renewed.    FAMILY HISTORY  His brother and sister have chronic kidney disease.      Allergies   Allergen Reactions    Sulfa Drugs Unspecified     Kidney Stones         Current medicines (including changes today)  Current Outpatient Medications   Medication Sig  Dispense Refill    Dulaglutide (TRULICITY) 4.5 MG/0.5ML Solution Pen-injector Inject 4.5 mg under the skin every 7 days. 2.0ml equals 4 pens per month 6 mL 6    Empagliflozin (JARDIANCE) 25 MG Tab Take 1 Tablet by mouth every day. 100 Tablet 4    insulin glargine (LANTUS SOLOSTAR) 100 UNIT/ML Solution Pen-injector injection Inject 30 Units under the skin every evening. 90 day 45 mL 3    sodium bicarbonate (SODIUM BICARBONATE) 650 MG Tab Take 1 Tablet by mouth 2 times a day. 180 Tablet 3    doxazosin (CARDURA) 2 MG Tab TAKE 1 TABLET BY MOUTH EVERY DAY 90 Tablet 4    amLODIPine (NORVASC) 5 MG Tab TAKE 1 TABLET BY MOUTH EVERY DAY  DAYS 90 Tablet 1    carvedilol (COREG) 25 MG Tab TAKE 1 TABLET BY MOUTH TWICE A DAY WITH MEALS 180 Tablet 4    lisinopril (PRINIVIL) 20 MG Tab TAKE 1 TABLET BY MOUTH EVERY DAY 90 Tablet 4    atorvastatin (LIPITOR) 40 MG Tab TAKE 1 TABLET BY MOUTH EVERY DAY 90 Tablet 4    Continuous Blood Gluc Sensor (DEXCOM G6 SENSOR) Misc Apply sensor subcutaneously to monitor blood glucose. Replace sensor every 10 days. (Patient not taking: Reported on 3/20/2024) 9 Each 3    insulin aspart (NOVOLOG FLEXPEN) 100 UNIT/ML injection PEN Inject 5 Units under the skin 3 times a day before meals. 45 mL 0    ergocalciferol (DRISDOL) 32404 UNIT capsule Take 1 Cap by mouth every 7 days. 12 Cap 3    Multiple Vitamins-Minerals (MULTIVITAMIN PO) Take  by mouth.      aspirin EC (ECOTRIN) 81 MG Tablet Delayed Response Take 81 mg by mouth every day.       No current facility-administered medications for this visit.     He  has a past medical history of ACC/AHA stage C systolic heart failure ([facility]) (06/21/2018), Acute exacerbation of CHF (congestive heart failure) ([facility]) (06/25/2018), CAD (coronary artery disease), CHF (congestive heart failure) ([facility]), Chronic systolic congestive heart failure, NYHA class 1 ([facility]) (07/23/2018), Diabetes ([facility]), Dilated cardiomyopathy ([facility]), History of maxillary sinusitis (10/27/2020),  "Hyperlipidemia, Hypertension, NYHA class 3 acute on chronic systolic heart failure (HCC) (06/25/2018), Olecranon bursitis of right elbow (06/17/2019), and Severe aortic stenosis (06/25/2018).    He has no past medical history of Encounter for long-term (current) use of other medications.    ROS   ROS  -See HPI     Objective:     Physical Exam:  /68   Pulse 90   Temp 36.3 °C (97.3 °F) (Temporal)   Resp 18   Ht 1.722 m (5' 7.8\")   Wt 78.1 kg (172 lb 3.2 oz)   SpO2 97%  Body mass index is 26.34 kg/m².   Constitutional: Alert, no distress.  Skin: Warm, dry, good turgor, no rashes in visible areas.  Eye: Equal, round and reactive, conjunctiva clear, lids normal.  Respiratory: Unlabored respiratory effort, lungs clear to auscultation, no wheezes, no rhonchi.  Cardiovascular: Normal S1, S2, no murmur, no edema.  Abdomen: Soft, non-tender, no masses, no hepatosplenomegaly.  Psych: Alert and oriented x3, normal affect and mood.    Results  Laboratory Studies  A1c was at 6.8. Hemoglobin was slightly lower.    Assessment and Plan:     Assessment & Plan  1. Shortness of breath.  He is experiencing ongoing shortness of breath. Differential diagnosis includes possible acute on chronic congestive heart failure (CHF) due to valvular issues. He is status post transcatheter aortic valve replacement (TAVR) approximately 7 years ago. Given these symptoms, an echocardiogram is needed to evaluate the heart valves and ensure they are functioning properly. Potential return precautions were discussed, including increased weight gain, worsening shortness of breath, and lower extremity edema. Blood work was reviewed and is stable, although hemoglobin is slightly lower, which could indicate anemia of chronic disease from chronic kidney disease (CKD) and potentially contribute to the shortness of breath.     2. Chronic Kidney Disease (CKD).  His GFR is stable with no current concerns. Chronic anemia due to CKD may be contributing to " his shortness of breath. Monitoring of kidney function and hemoglobin levels will continue.      Orders:  1. SOB (shortness of breath)  - EC-ECHOCARDIOGRAM COMPLETE W/O CONT; Future    2. CHF due to valvular disease (HCC)  - EC-ECHOCARDIOGRAM COMPLETE W/O CONT; Future    3. S/P TAVR (transcatheter aortic valve replacement)  - EC-ECHOCARDIOGRAM COMPLETE W/O CONT; Future        Followup: No follow-ups on file.         PLEASE NOTE: This dictation was created using voice recognition and [employer] ambient listening software. I have made every reasonable attempt to correct obvious errors, but I expect that there are errors of grammar and possibly content that I did not discover before finalizing the note.

## 2024-08-19 RX ORDER — INSULIN GLARGINE 100 [IU]/ML
30 INJECTION, SOLUTION SUBCUTANEOUS EVERY EVENING
Qty: 30 ML | Refills: 3 | Status: SHIPPED
Start: 2024-08-19

## 2024-08-22 ENCOUNTER — HOSPITAL ENCOUNTER (OUTPATIENT)
Dept: CARDIOLOGY | Facility: MEDICAL CENTER | Age: 74
End: 2024-08-22
Attending: FAMILY MEDICINE
Payer: COMMERCIAL

## 2024-08-22 DIAGNOSIS — I38 CHF DUE TO VALVULAR DISEASE (HCC): ICD-10-CM

## 2024-08-22 DIAGNOSIS — R06.02 SOB (SHORTNESS OF BREATH): ICD-10-CM

## 2024-08-22 DIAGNOSIS — I50.9 CHF DUE TO VALVULAR DISEASE (HCC): ICD-10-CM

## 2024-08-22 DIAGNOSIS — Z95.2 S/P TAVR (TRANSCATHETER AORTIC VALVE REPLACEMENT): ICD-10-CM

## 2024-08-22 LAB
LV EJECT FRACT  99904: 60
LV EJECT FRACT MOD 2C 99903: 51.44
LV EJECT FRACT MOD 4C 99902: 61.5
LV EJECT FRACT MOD BP 99901: 57.19

## 2024-08-22 PROCEDURE — 93306 TTE W/DOPPLER COMPLETE: CPT

## 2024-08-22 PROCEDURE — 93306 TTE W/DOPPLER COMPLETE: CPT | Mod: 26 | Performed by: INTERNAL MEDICINE

## 2024-10-01 DIAGNOSIS — I10 ESSENTIAL HYPERTENSION: ICD-10-CM

## 2024-10-02 RX ORDER — AMLODIPINE BESYLATE 5 MG/1
5 TABLET ORAL DAILY
Qty: 90 TABLET | Refills: 3 | Status: SHIPPED | OUTPATIENT
Start: 2024-10-02 | End: 2025-09-27

## 2024-10-09 ENCOUNTER — OFFICE VISIT (OUTPATIENT)
Dept: MEDICAL GROUP | Facility: LAB | Age: 74
End: 2024-10-09
Payer: COMMERCIAL

## 2024-10-09 VITALS
SYSTOLIC BLOOD PRESSURE: 122 MMHG | DIASTOLIC BLOOD PRESSURE: 68 MMHG | HEART RATE: 76 BPM | RESPIRATION RATE: 18 BRPM | BODY MASS INDEX: 28.79 KG/M2 | WEIGHT: 190 LBS | OXYGEN SATURATION: 97 % | HEIGHT: 68 IN | TEMPERATURE: 96.9 F

## 2024-10-09 DIAGNOSIS — I25.10 CORONARY ARTERY DISEASE DUE TO CALCIFIED CORONARY LESION: ICD-10-CM

## 2024-10-09 DIAGNOSIS — Z23 NEED FOR VACCINATION: ICD-10-CM

## 2024-10-09 DIAGNOSIS — R68.89 EXERCISE INTOLERANCE: ICD-10-CM

## 2024-10-09 DIAGNOSIS — I25.84 CORONARY ARTERY DISEASE DUE TO CALCIFIED CORONARY LESION: ICD-10-CM

## 2024-10-09 DIAGNOSIS — Z95.5 STENTED CORONARY ARTERY: ICD-10-CM

## 2024-10-09 PROCEDURE — 3078F DIAST BP <80 MM HG: CPT

## 2024-10-09 PROCEDURE — 90662 IIV NO PRSV INCREASED AG IM: CPT

## 2024-10-09 PROCEDURE — 90471 IMMUNIZATION ADMIN: CPT

## 2024-10-09 PROCEDURE — 99214 OFFICE O/P EST MOD 30 MIN: CPT | Mod: 25

## 2024-10-09 PROCEDURE — 3074F SYST BP LT 130 MM HG: CPT

## 2024-10-09 ASSESSMENT — ENCOUNTER SYMPTOMS
PALPITATIONS: 0
VOMITING: 0
ABDOMINAL PAIN: 0
NAUSEA: 0
FEVER: 0
WHEEZING: 0
SHORTNESS OF BREATH: 0
DIARRHEA: 0
CHILLS: 0

## 2024-10-09 ASSESSMENT — FIBROSIS 4 INDEX: FIB4 SCORE: 1.981863669562444575

## 2024-10-26 DIAGNOSIS — E11.65 TYPE 2 DIABETES MELLITUS WITH HYPERGLYCEMIA, WITH LONG-TERM CURRENT USE OF INSULIN (HCC): ICD-10-CM

## 2024-10-26 DIAGNOSIS — Z79.4 TYPE 2 DIABETES MELLITUS WITH HYPERGLYCEMIA, WITH LONG-TERM CURRENT USE OF INSULIN (HCC): ICD-10-CM

## 2024-10-26 DIAGNOSIS — I10 ESSENTIAL HYPERTENSION: ICD-10-CM

## 2024-10-29 RX ORDER — ATORVASTATIN CALCIUM 40 MG/1
40 TABLET, FILM COATED ORAL DAILY
Qty: 90 TABLET | Refills: 4 | Status: SHIPPED | OUTPATIENT
Start: 2024-10-29

## 2024-10-29 RX ORDER — CARVEDILOL 25 MG/1
25 TABLET ORAL 2 TIMES DAILY WITH MEALS
Qty: 180 TABLET | Refills: 4 | Status: SHIPPED | OUTPATIENT
Start: 2024-10-29

## 2024-10-29 RX ORDER — LISINOPRIL 20 MG/1
20 TABLET ORAL DAILY
Qty: 90 TABLET | Refills: 4 | Status: SHIPPED | OUTPATIENT
Start: 2024-10-29

## 2024-11-11 ENCOUNTER — APPOINTMENT (OUTPATIENT)
Dept: MEDICAL GROUP | Facility: LAB | Age: 74
End: 2024-11-11
Payer: COMMERCIAL

## 2024-11-16 LAB
25(OH)D3+25(OH)D2 SERPL-MCNC: 25.7 NG/ML (ref 30–100)
ALBUMIN SERPL-MCNC: 4.2 G/DL (ref 3.8–4.8)
ALBUMIN/CREAT UR: 2002 MG/G CREAT (ref 0–29)
ALP SERPL-CCNC: 99 IU/L (ref 44–121)
ALT SERPL-CCNC: 23 IU/L (ref 0–44)
AST SERPL-CCNC: 20 IU/L (ref 0–40)
BILIRUB SERPL-MCNC: 0.3 MG/DL (ref 0–1.2)
BUN SERPL-MCNC: 50 MG/DL (ref 8–27)
BUN/CREAT SERPL: 16 (ref 10–24)
CALCIUM SERPL-MCNC: 9.8 MG/DL (ref 8.6–10.2)
CHLORIDE SERPL-SCNC: 111 MMOL/L (ref 96–106)
CHOLEST SERPL-MCNC: 144 MG/DL (ref 100–199)
CO2 SERPL-SCNC: 19 MMOL/L (ref 20–29)
CREAT SERPL-MCNC: 3.08 MG/DL (ref 0.76–1.27)
CREAT UR-MCNC: 66.6 MG/DL
EGFRCR SERPLBLD CKD-EPI 2021: 20 ML/MIN/1.73
ERYTHROCYTE [DISTWIDTH] IN BLOOD BY AUTOMATED COUNT: 13 % (ref 11.6–15.4)
GLOBULIN SER CALC-MCNC: 2.4 G/DL (ref 1.5–4.5)
GLUCOSE SERPL-MCNC: 100 MG/DL (ref 70–99)
HCT VFR BLD AUTO: 36.3 % (ref 37.5–51)
HDLC SERPL-MCNC: 54 MG/DL
HGB BLD-MCNC: 11.9 G/DL (ref 13–17.7)
LDL CALC COMMENT:: NORMAL
LDLC SERPL CALC-MCNC: 66 MG/DL (ref 0–99)
MCH RBC QN AUTO: 31.1 PG (ref 26.6–33)
MCHC RBC AUTO-ENTMCNC: 32.8 G/DL (ref 31.5–35.7)
MCV RBC AUTO: 95 FL (ref 79–97)
MICROALBUMIN UR-MCNC: 1333.2 UG/ML
NRBC BLD AUTO-RTO: ABNORMAL %
PLATELET # BLD AUTO: 213 X10E3/UL (ref 150–450)
POTASSIUM SERPL-SCNC: 5.6 MMOL/L (ref 3.5–5.2)
PROT SERPL-MCNC: 6.6 G/DL (ref 6–8.5)
RBC # BLD AUTO: 3.83 X10E6/UL (ref 4.14–5.8)
SODIUM SERPL-SCNC: 145 MMOL/L (ref 134–144)
T4 FREE SERPL-MCNC: 1.05 NG/DL (ref 0.82–1.77)
TRIGL SERPL-MCNC: 136 MG/DL (ref 0–149)
TSH SERPL DL<=0.005 MIU/L-ACNC: 1.04 UIU/ML (ref 0.45–4.5)
VLDLC SERPL CALC-MCNC: 24 MG/DL (ref 5–40)
WBC # BLD AUTO: 8.3 X10E3/UL (ref 3.4–10.8)

## 2024-11-27 ENCOUNTER — OFFICE VISIT (OUTPATIENT)
Dept: NEPHROLOGY | Facility: MEDICAL CENTER | Age: 74
End: 2024-11-27
Payer: COMMERCIAL

## 2024-11-27 VITALS
TEMPERATURE: 97.8 F | SYSTOLIC BLOOD PRESSURE: 130 MMHG | WEIGHT: 193 LBS | HEIGHT: 69 IN | DIASTOLIC BLOOD PRESSURE: 84 MMHG | HEART RATE: 80 BPM | BODY MASS INDEX: 28.58 KG/M2 | OXYGEN SATURATION: 98 %

## 2024-11-27 DIAGNOSIS — E87.20 METABOLIC ACIDOSIS: ICD-10-CM

## 2024-11-27 DIAGNOSIS — I10 ESSENTIAL HYPERTENSION: ICD-10-CM

## 2024-11-27 DIAGNOSIS — N18.4 CKD (CHRONIC KIDNEY DISEASE) STAGE 4, GFR 15-29 ML/MIN (HCC): ICD-10-CM

## 2024-11-27 DIAGNOSIS — E87.5 HYPERKALEMIA: ICD-10-CM

## 2024-11-27 ASSESSMENT — ENCOUNTER SYMPTOMS
NAUSEA: 0
VOMITING: 0
HYPERTENSION: 1
CHILLS: 0
COUGH: 0
FEVER: 0
SHORTNESS OF BREATH: 0

## 2024-11-27 ASSESSMENT — FIBROSIS 4 INDEX: FIB4 SCORE: 1.47

## 2024-11-27 NOTE — PROGRESS NOTES
"Subjective     Ashish Wiley is a 74 y.o. male who presents with Chronic Kidney Disease            Chronic Kidney Disease  This is a chronic problem. The current episode started more than 1 year ago. The problem occurs constantly. The problem has been unchanged. Pertinent negatives include no chest pain, chills, coughing, fever, nausea, urinary symptoms or vomiting.   Hypertension  This is a chronic problem. The current episode started more than 1 year ago. The problem is unchanged. The problem is controlled. Pertinent negatives include no chest pain, malaise/fatigue, peripheral edema or shortness of breath. Risk factors for coronary artery disease include male gender and diabetes mellitus. Past treatments include ACE inhibitors. The current treatment provides significant improvement. There are no compliance problems.  Hypertensive end-organ damage includes kidney disease. Identifiable causes of hypertension include chronic renal disease.       Review of Systems   Constitutional:  Negative for chills, fever and malaise/fatigue.   Respiratory:  Negative for cough and shortness of breath.    Cardiovascular:  Negative for chest pain and leg swelling.   Gastrointestinal:  Negative for nausea and vomiting.   Genitourinary:  Negative for dysuria, frequency and urgency.              Objective     /84 (BP Location: Right arm, Patient Position: Sitting, BP Cuff Size: Adult)   Pulse 80   Temp 36.6 °C (97.8 °F) (Temporal)   Ht 1.753 m (5' 9\")   Wt 87.5 kg (193 lb)   SpO2 98%   BMI 28.50 kg/m²      Physical Exam  Vitals and nursing note reviewed.   Constitutional:       General: He is not in acute distress.     Appearance: He is not ill-appearing.   HENT:      Head: Normocephalic and atraumatic.      Right Ear: External ear normal.      Left Ear: External ear normal.      Nose: Nose normal.   Eyes:      General:         Right eye: No discharge.         Left eye: No discharge.      Conjunctiva/sclera: " Conjunctivae normal.   Cardiovascular:      Rate and Rhythm: Normal rate and regular rhythm.      Heart sounds: No murmur heard.  Pulmonary:      Effort: Pulmonary effort is normal. No respiratory distress.      Breath sounds: Normal breath sounds. No wheezing.   Musculoskeletal:         General: No tenderness or deformity.      Right lower leg: No edema.      Left lower leg: No edema.   Skin:     General: Skin is warm.   Neurological:      General: No focal deficit present.      Mental Status: He is alert and oriented to person, place, and time.   Psychiatric:         Mood and Affect: Mood normal.         Behavior: Behavior normal.       Past Medical History:   Diagnosis Date    ACC/AHA stage C systolic heart failure ([facility]) 06/21/2018    Acute exacerbation of CHF (congestive heart failure) ([facility]) 06/25/2018    CAD (coronary artery disease)     CHF (congestive heart failure) ([facility])     Chronic systolic congestive heart failure, NYHA class 1 ([facility]) 07/23/2018    Diabetes ([facility])     Dilated cardiomyopathy ([facility])     History of maxillary sinusitis 10/27/2020    He has had approximately 4-5 months of nuisance allergy symptoms including clear rhinorrhea with post nasal drip. More recently he developed a dry cough. He has only mild pressure of the sinuses. He has been alternating azelastine and flonase with some improvement. He is diabetic and has history of sinus infection and pneumonia. No current fevers, chills, or other features of systemic infection at    [facility]     Hypertension     NYHA class 3 acute on chronic systolic heart failure ([facility]) 06/25/2018    Olecranon bursitis of right elbow 06/17/2019    Severe aortic stenosis 06/25/2018     Social History     Socioeconomic History    Marital status:      Spouse name: Not on file    Number of children: Not on file    Years of education: Not on file    Highest education level: Not on file   Occupational History    Not on file   Tobacco Use    Smoking status:  Former     Current packs/day: 0.00     Average packs/day: 1 pack/day for 22.0 years (22.0 ttl pk-yrs)     Types: Cigarettes     Start date: 1967     Quit date: 1989     Years since quittin.9    Smokeless tobacco: Never    Tobacco comments:     stop cigar in    Vaping Use    Vaping status: Never Used   Substance and Sexual Activity    Alcohol use: Yes     Alcohol/week: 0.6 oz     Types: 1 Glasses of wine per week     Comment: OCCASIONALLY    Drug use: No    Sexual activity: Yes     Partners: Female   Other Topics Concern    Not on file   Social History Narrative    Ashish was born in [place], raised in [place]. He moved to [place] in , he was worked for [employer]. He retired in 2016. He is  to [place] for the past 40 years. He has 2 kids- Neno (39, [place]) and Radha (36, [place]). He enjoys golfing and they also try to travel when they can.     Social Drivers of Health     Financial Resource Strain: Not on file   Food Insecurity: Not on file   Transportation Needs: Not on file   Physical Activity: Not on file   Stress: Not on file   Social Connections: Not on file   Intimate Partner Violence: Not on file   Housing Stability: Not on file     Family History   Problem Relation Age of Onset    Lung Disease Mother         COPD    Heart Disease Father         valve disease    Heart Failure Father     Hypertension Father     Hyperlipidemia Father     Cancer Maternal Grandmother         BRAIN TUMOR    Stroke Maternal Grandfather     Other Paternal Grandmother         INTESTINAL PROBLEM    Stroke Paternal Grandfather     Prostate cancer Brother     Other Brother         ortho     Recent Labs     24  0404 24  0418 11/15/24  0605   ALBUMIN  --   --  4.2   HDL  --   --  54   TRIGLYCERIDE  --   --  136   SODIUM 140 142 145*   POTASSIUM 4.7 5.2 5.6*   CHLORIDE 107* 112* 111*   CO2 18* 16* 19*   BUN 42* 44* 50*   CREATININE 2.81* 3.16* 3.08*                             Assessment & Plan         Assessment & Plan  CKD (chronic kidney disease) stage 4, GFR 15-29 ml/min ([facility])  Stable  No uremic symptoms  Renal dose of medication  Avoid nephrotoxins  Continue same medication regimen  Patient was advised to call us if symptoms worsen         Essential hypertension  Controlled  Continue same medication regimen  Continue low-sodium diet         Metabolic acidosis  Improved  Increase sodium bicarbonate dose    Hyperkalemia  Patient was advised to be on low potassium diet  Increase sodium bicarbonate dose  Recheck labs in 1 to 2 weeks, if hyperkalemia persist we will add a loop diuretic

## 2024-12-03 DIAGNOSIS — E11.9 TYPE 2 DIABETES MELLITUS WITHOUT COMPLICATION, WITH LONG-TERM CURRENT USE OF INSULIN (HCC): ICD-10-CM

## 2024-12-03 DIAGNOSIS — Z79.4 TYPE 2 DIABETES MELLITUS WITHOUT COMPLICATION, WITH LONG-TERM CURRENT USE OF INSULIN (HCC): ICD-10-CM

## 2024-12-04 ENCOUNTER — NON-PROVIDER VISIT (OUTPATIENT)
Dept: ENDOCRINOLOGY | Facility: MEDICAL CENTER | Age: 74
End: 2024-12-04
Attending: INTERNAL MEDICINE
Payer: COMMERCIAL

## 2024-12-04 VITALS
OXYGEN SATURATION: 96 % | DIASTOLIC BLOOD PRESSURE: 64 MMHG | RESPIRATION RATE: 17 BRPM | WEIGHT: 192 LBS | HEIGHT: 68 IN | BODY MASS INDEX: 29.1 KG/M2 | SYSTOLIC BLOOD PRESSURE: 126 MMHG | HEART RATE: 85 BPM

## 2024-12-04 DIAGNOSIS — Z79.4 TYPE 2 DIABETES MELLITUS WITHOUT COMPLICATION, WITH LONG-TERM CURRENT USE OF INSULIN (HCC): ICD-10-CM

## 2024-12-04 DIAGNOSIS — E11.65 TYPE 2 DIABETES MELLITUS WITH HYPERGLYCEMIA, WITH LONG-TERM CURRENT USE OF INSULIN (HCC): ICD-10-CM

## 2024-12-04 DIAGNOSIS — Z79.4 TYPE 2 DIABETES MELLITUS WITH HYPERGLYCEMIA, WITH LONG-TERM CURRENT USE OF INSULIN (HCC): ICD-10-CM

## 2024-12-04 DIAGNOSIS — E11.9 TYPE 2 DIABETES MELLITUS WITHOUT COMPLICATION, WITH LONG-TERM CURRENT USE OF INSULIN (HCC): ICD-10-CM

## 2024-12-04 LAB
HBA1C MFR BLD: 7.4 % (ref ?–5.8)
POCT INT CON NEG: NEGATIVE
POCT INT CON POS: POSITIVE

## 2024-12-04 PROCEDURE — 99213 OFFICE O/P EST LOW 20 MIN: CPT | Performed by: INTERNAL MEDICINE

## 2024-12-04 PROCEDURE — 83036 HEMOGLOBIN GLYCOSYLATED A1C: CPT

## 2024-12-04 RX ORDER — INSULIN GLARGINE 100 [IU]/ML
30 INJECTION, SOLUTION SUBCUTANEOUS EVERY EVENING
Qty: 30 ML | Refills: 3 | Status: SHIPPED | OUTPATIENT
Start: 2024-12-04

## 2024-12-04 RX ORDER — EMPAGLIFLOZIN 25 MG/1
1 TABLET, FILM COATED ORAL
Qty: 100 TABLET | Refills: 4 | Status: SHIPPED | OUTPATIENT
Start: 2024-12-04 | End: 2024-12-04 | Stop reason: SDUPTHER

## 2024-12-04 RX ORDER — EMPAGLIFLOZIN 25 MG/1
1 TABLET, FILM COATED ORAL
Qty: 90 TABLET | Refills: 3 | Status: SHIPPED | OUTPATIENT
Start: 2024-12-04

## 2024-12-04 RX ORDER — INSULIN ASPART 100 [IU]/ML
5 INJECTION, SOLUTION INTRAVENOUS; SUBCUTANEOUS
Qty: 45 ML | Refills: 0 | Status: SHIPPED | OUTPATIENT
Start: 2024-12-04

## 2024-12-04 RX ORDER — DULAGLUTIDE 4.5 MG/.5ML
0.5 INJECTION, SOLUTION SUBCUTANEOUS
Qty: 6 ML | Refills: 3 | Status: SHIPPED | OUTPATIENT
Start: 2024-12-04

## 2024-12-04 ASSESSMENT — FIBROSIS 4 INDEX: FIB4 SCORE: 1.47

## 2024-12-04 NOTE — PROGRESS NOTES
"RN-CDE Note    Subjective:   Endocrinology Clinic Progress Note  PCP: Miguel A Colón M.D.    HPI:  Ashish Wiley is a 74 y.o. old patient who is seen today by the Diabetes Nurse Specialist for review of his controlled type 2 diabetes with long term use of insulin.    Recent changes in health:  denies any changes.   DM:   Last A1c:   Lab Results   Component Value Date/Time    HBA1C 7.4 (A) 12/04/2024 08:52 AM      Previous A1c was 6.7 on 8/15/2024  A1C GOAL: < 7    Diabetes Medications:   Lantus 30 units per day  Novolog  5 units with meals (forgets at times. )  Trulicity 4.5 mg weekly  Jardiance 25 mg daily         Exercise: tries to walk between 1.5-5 miles most days. During the winter it is closer to 1.5 miles due to cold  Diet: \"healthy\" diet  in general.  Has been eating and drinking more due to the holidays.    Patient's body mass index is 29.19 kg/m². Exercise and nutrition counseling were performed at this visit.    Glucose monitoring frequency:  testing his blood sugar couple times a week.     Hypoglycemic episodes: yes -  on occasion he will feel symptomatic if his blood sugar gets below 100     Last Retinal Exam: on file and up-to-date  Daily Foot Exam: Yes   Foot Exam:  Monofilament: current   Lab Results   Component Value Date/Time    MALBCRT 1147 (H) 03/29/2021 07:05 AM    MICROALBUR 93.7 03/29/2021 07:05 AM    MICRALB 1,333.2 11/15/2024 06:05 AM        ACR Albumin/Creatinine Ratio goal <30     HTN:   Blood pressure goal <130/<80   Currently Rx ACE/ARB:  Lisinopril 20 mg daily    Dyslipidemia:    Lab Results   Component Value Date/Time    CHOLSTRLTOT 144 11/15/2024 06:05 AM    CHOLSTRLTOT 178 12/31/2020 06:56 AM    LDL 79 12/31/2020 06:56 AM    HDL 54 11/15/2024 06:05 AM    HDL 50 12/31/2020 06:56 AM    TRIGLYCERIDE 136 11/15/2024 06:05 AM    TRIGLYCERIDE 244 (H) 12/31/2020 06:56 AM         Currently Rx Statin:  Atorvastatin 40 mg daily    He  reports that he quit smoking about 35 years " ago. His smoking use included cigarettes. He started smoking about 57 years ago. He has a 22 pack-year smoking history. He has never used smokeless tobacco.    Plan:     Discussed and educated on:   - All medications, side effects and compliance (discussed carefully)  - HbA1C: target  - Home glucose monitoring emphasized  - Weight control and daily exercise    Recommended medication changes: none at this time.    .

## 2025-02-21 LAB
BUN SERPL-MCNC: 43 MG/DL (ref 8–27)
BUN/CREAT SERPL: 14 (ref 10–24)
CALCIUM SERPL-MCNC: 9.2 MG/DL (ref 8.6–10.2)
CHLORIDE SERPL-SCNC: 108 MMOL/L (ref 96–106)
CO2 SERPL-SCNC: 20 MMOL/L (ref 20–29)
CREAT SERPL-MCNC: 3.13 MG/DL (ref 0.76–1.27)
EGFRCR SERPLBLD CKD-EPI 2021: 20 ML/MIN/1.73
GLUCOSE SERPL-MCNC: 234 MG/DL (ref 70–99)
POTASSIUM SERPL-SCNC: 4.7 MMOL/L (ref 3.5–5.2)
SODIUM SERPL-SCNC: 140 MMOL/L (ref 134–144)

## 2025-02-25 ENCOUNTER — OFFICE VISIT (OUTPATIENT)
Dept: NEPHROLOGY | Facility: MEDICAL CENTER | Age: 75
End: 2025-02-25
Payer: COMMERCIAL

## 2025-02-25 VITALS
BODY MASS INDEX: 29.67 KG/M2 | WEIGHT: 195.8 LBS | TEMPERATURE: 97.6 F | DIASTOLIC BLOOD PRESSURE: 82 MMHG | HEART RATE: 80 BPM | HEIGHT: 68 IN | OXYGEN SATURATION: 98 % | SYSTOLIC BLOOD PRESSURE: 130 MMHG

## 2025-02-25 DIAGNOSIS — E11.22 TYPE 2 DIABETES MELLITUS WITH STAGE 4 CHRONIC KIDNEY DISEASE, WITH LONG-TERM CURRENT USE OF INSULIN (HCC): ICD-10-CM

## 2025-02-25 DIAGNOSIS — I10 ESSENTIAL HYPERTENSION: ICD-10-CM

## 2025-02-25 DIAGNOSIS — N18.4 TYPE 2 DIABETES MELLITUS WITH STAGE 4 CHRONIC KIDNEY DISEASE, WITH LONG-TERM CURRENT USE OF INSULIN (HCC): ICD-10-CM

## 2025-02-25 DIAGNOSIS — Z79.4 TYPE 2 DIABETES MELLITUS WITH STAGE 4 CHRONIC KIDNEY DISEASE, WITH LONG-TERM CURRENT USE OF INSULIN (HCC): ICD-10-CM

## 2025-02-25 DIAGNOSIS — N18.4 CKD (CHRONIC KIDNEY DISEASE) STAGE 4, GFR 15-29 ML/MIN (HCC): ICD-10-CM

## 2025-02-25 DIAGNOSIS — E87.20 METABOLIC ACIDOSIS: ICD-10-CM

## 2025-02-25 ASSESSMENT — ENCOUNTER SYMPTOMS
FEVER: 0
NAUSEA: 0
CHILLS: 0
COUGH: 0
SHORTNESS OF BREATH: 0
VOMITING: 0
HYPERTENSION: 1

## 2025-02-25 ASSESSMENT — FIBROSIS 4 INDEX: FIB4 SCORE: 1.49

## 2025-02-25 ASSESSMENT — PATIENT HEALTH QUESTIONNAIRE - PHQ9: CLINICAL INTERPRETATION OF PHQ2 SCORE: 0

## 2025-02-25 NOTE — ASSESSMENT & PLAN NOTE
Controlled  Continue same medication regimen  Continue low-sodium diet    Orders:    Basic Metabolic Panel; Future    CBC WITHOUT DIFFERENTIAL; Future

## 2025-02-25 NOTE — PROGRESS NOTES
"Subjective     Ashish Wiley is a 75 y.o. male who presents with Chronic Kidney Disease and Hypertension            Chronic Kidney Disease  This is a chronic problem. The current episode started more than 1 year ago. The problem occurs constantly. The problem has been unchanged. Pertinent negatives include no chest pain, chills, coughing, fever, nausea, urinary symptoms or vomiting.   Hypertension  This is a chronic problem. The current episode started more than 1 year ago. The problem is unchanged. The problem is controlled. Pertinent negatives include no chest pain, malaise/fatigue, peripheral edema or shortness of breath. Risk factors for coronary artery disease include male gender and diabetes mellitus. Past treatments include ACE inhibitors. The current treatment provides significant improvement. There are no compliance problems.  Hypertensive end-organ damage includes kidney disease. Identifiable causes of hypertension include chronic renal disease.       Review of Systems   Constitutional:  Negative for chills, fever and malaise/fatigue.   Respiratory:  Negative for cough and shortness of breath.    Cardiovascular:  Negative for chest pain and leg swelling.   Gastrointestinal:  Negative for nausea and vomiting.   Genitourinary:  Negative for dysuria, frequency and urgency.              Objective     /82 (BP Location: Left arm, Patient Position: Sitting, BP Cuff Size: Adult)   Pulse 80   Temp 36.4 °C (97.6 °F) (Temporal)   Ht 1.727 m (5' 8\")   Wt 88.8 kg (195 lb 12.8 oz)   SpO2 98%   BMI 29.77 kg/m²      Physical Exam  Vitals and nursing note reviewed.   Constitutional:       General: He is not in acute distress.     Appearance: He is not ill-appearing.   HENT:      Head: Normocephalic and atraumatic.      Right Ear: External ear normal.      Left Ear: External ear normal.      Nose: Nose normal.   Eyes:      General:         Right eye: No discharge.         Left eye: No discharge.      " Conjunctiva/sclera: Conjunctivae normal.   Cardiovascular:      Rate and Rhythm: Normal rate and regular rhythm.      Heart sounds: No murmur heard.  Pulmonary:      Effort: Pulmonary effort is normal. No respiratory distress.      Breath sounds: Normal breath sounds. No wheezing.   Musculoskeletal:         General: No tenderness or deformity.      Right lower leg: No edema.      Left lower leg: No edema.   Skin:     General: Skin is warm.   Neurological:      General: No focal deficit present.      Mental Status: He is alert and oriented to person, place, and time.   Psychiatric:         Mood and Affect: Mood normal.         Behavior: Behavior normal.       Past Medical History:   Diagnosis Date    ACC/AHA stage C systolic heart failure ([facility]) 06/21/2018    Acute exacerbation of CHF (congestive heart failure) ([facility]) 06/25/2018    CAD (coronary artery disease)     CHF (congestive heart failure) ([facility])     Chronic systolic congestive heart failure, NYHA class 1 ([facility]) 07/23/2018    Diabetes ([facility])     Dilated cardiomyopathy ([facility])     History of maxillary sinusitis 10/27/2020    He has had approximately 4-5 months of nuisance allergy symptoms including clear rhinorrhea with post nasal drip. More recently he developed a dry cough. He has only mild pressure of the sinuses. He has been alternating azelastine and flonase with some improvement. He is diabetic and has history of sinus infection and pneumonia. No current fevers, chills, or other features of systemic infection at    [facility]     Hypertension     NYHA class 3 acute on chronic systolic heart failure ([facility]) 06/25/2018    Olecranon bursitis of right elbow 06/17/2019    Severe aortic stenosis 06/25/2018     Social History     Socioeconomic History    Marital status:      Spouse name: Not on file    Number of children: Not on file    Years of education: Not on file    Highest education level: Not on file   Occupational History    Not on file   Tobacco Use     Smoking status: Former     Current packs/day: 0.00     Average packs/day: 1 pack/day for 22.0 years (22.0 ttl pk-yrs)     Types: Cigarettes     Start date: 1967     Quit date: 1989     Years since quittin.1    Smokeless tobacco: Never    Tobacco comments:     stop cigar in    Vaping Use    Vaping status: Never Used   Substance and Sexual Activity    Alcohol use: Yes     Alcohol/week: 0.6 oz     Types: 1 Glasses of wine per week     Comment: OCCASIONALLY    Drug use: No    Sexual activity: Yes     Partners: Female   Other Topics Concern    Not on file   Social History Narrative    Ashish was born in [place], raised in [place] and [place]. He moved to [place] in , he was worked for [employer]. He retired in 2016. He is  to [place] for the past 40 years. He has 2 kids- Neno (39, [place]) and Radha (36, [place]). He enjoys golfing and they also try to travel when they can.     Social Drivers of Health     Financial Resource Strain: Not on file   Food Insecurity: Not on file   Transportation Needs: Not on file   Physical Activity: Not on file   Stress: Not on file   Social Connections: Not on file   Intimate Partner Violence: Not on file   Housing Stability: Not on file     Family History   Problem Relation Age of Onset    Lung Disease Mother         COPD    Heart Disease Father         valve disease    Heart Failure Father     Hypertension Father     Hyperlipidemia Father     Cancer Maternal Grandmother         BRAIN TUMOR    Stroke Maternal Grandfather     Other Paternal Grandmother         INTESTINAL PROBLEM    Stroke Paternal Grandfather     Prostate cancer Brother     Other Brother         ortho     Recent Labs     24  0418 11/15/24  0605 25  0839   ALBUMIN  --  4.2  --    HDL  --  54  --    TRIGLYCERIDE  --  136  --    SODIUM 142 145* 140   POTASSIUM 5.2 5.6* 4.7   CHLORIDE 112* 111* 108*   CO2 16* 19* 20   BUN 44* 50* 43*   CREATININE 3.16* 3.08* 3.13*                                 Assessment & Plan  CKD (chronic kidney disease) stage 4, GFR 15-29 ml/min ([facility])  Stable  No uremic symptoms  Renal dose of medication  Avoid nephrotoxins  Continue same medication regimen  Patient was advised to call us if symptoms worsen  No acute need for dialysis  Patient is leaving for home dialysis if needed  Patient is not interested in getting evaluated for a kidney transplant because of age  Orders:    Basic Metabolic Panel; Future    CBC WITHOUT DIFFERENTIAL; Future    Essential hypertension  Controlled  Continue same medication regimen  Continue low-sodium diet    Orders:    Basic Metabolic Panel; Future    CBC WITHOUT DIFFERENTIAL; Future    Metabolic acidosis  Improved  Continue sodium bicarbonate  Orders:    Basic Metabolic Panel; Future    CBC WITHOUT DIFFERENTIAL; Future    Type 2 diabetes mellitus with stage 4 chronic kidney disease, with long-term current use of insulin ([facility])

## 2025-03-03 DIAGNOSIS — E11.9 TYPE 2 DIABETES MELLITUS WITHOUT COMPLICATION, WITH LONG-TERM CURRENT USE OF INSULIN (HCC): ICD-10-CM

## 2025-03-03 DIAGNOSIS — Z79.4 TYPE 2 DIABETES MELLITUS WITHOUT COMPLICATION, WITH LONG-TERM CURRENT USE OF INSULIN (HCC): ICD-10-CM

## 2025-03-04 RX ORDER — INSULIN GLARGINE-YFGN 100 [IU]/ML
30 INJECTION, SOLUTION SUBCUTANEOUS EVERY EVENING
Qty: 30 ML | Refills: 3 | Status: SHIPPED | OUTPATIENT
Start: 2025-03-04 | End: 2025-03-06

## 2025-03-05 DIAGNOSIS — Z79.4 TYPE 2 DIABETES MELLITUS WITHOUT COMPLICATION, WITH LONG-TERM CURRENT USE OF INSULIN (HCC): ICD-10-CM

## 2025-03-05 DIAGNOSIS — E11.9 TYPE 2 DIABETES MELLITUS WITHOUT COMPLICATION, WITH LONG-TERM CURRENT USE OF INSULIN (HCC): ICD-10-CM

## 2025-03-06 RX ORDER — INSULIN GLARGINE-YFGN 100 [IU]/ML
30 INJECTION, SOLUTION SUBCUTANEOUS EVERY EVENING
Qty: 30 ML | Refills: 3 | Status: SHIPPED | OUTPATIENT
Start: 2025-03-06

## 2025-04-02 ENCOUNTER — OFFICE VISIT (OUTPATIENT)
Dept: MEDICAL GROUP | Facility: LAB | Age: 75
End: 2025-04-02
Payer: COMMERCIAL

## 2025-04-02 VITALS
SYSTOLIC BLOOD PRESSURE: 116 MMHG | OXYGEN SATURATION: 93 % | BODY MASS INDEX: 30.92 KG/M2 | DIASTOLIC BLOOD PRESSURE: 66 MMHG | WEIGHT: 204 LBS | RESPIRATION RATE: 16 BRPM | HEIGHT: 68 IN | HEART RATE: 83 BPM | TEMPERATURE: 97 F

## 2025-04-02 DIAGNOSIS — R06.00 DYSPNEA, UNSPECIFIED TYPE: ICD-10-CM

## 2025-04-02 DIAGNOSIS — J06.9 UPPER RESPIRATORY TRACT INFECTION, UNSPECIFIED TYPE: ICD-10-CM

## 2025-04-02 PROCEDURE — 99213 OFFICE O/P EST LOW 20 MIN: CPT | Performed by: FAMILY MEDICINE

## 2025-04-02 PROCEDURE — 3074F SYST BP LT 130 MM HG: CPT | Performed by: FAMILY MEDICINE

## 2025-04-02 PROCEDURE — 3078F DIAST BP <80 MM HG: CPT | Performed by: FAMILY MEDICINE

## 2025-04-02 ASSESSMENT — FIBROSIS 4 INDEX: FIB4 SCORE: 1.49

## 2025-04-02 NOTE — PROGRESS NOTES
Verbal consent was acquired by the patient to use [employer] ambient listening note generation during this visit Yes    Subjective:   Ashish Wiley is a 75 y.o. male here today for   Chief Complaint   Patient presents with    Shortness of Breath     2 weeks SOB When active, not when resting.      History of Present Illness  75-year-old male with CKD, CHF due to aortic stenosis post-TAVR, type 2 diabetes, and CAD. Presents with new-onset dyspnea for 2 weeks.    Reports dyspnea, decreased leg strength, similar to an episode 2 years ago attributed to influenza. No fevers or chills. Transient cough upon lying down, resolves after a few minutes. No orthopnea, but dyspnea on exertion. Mild leg edema, predominantly left side. Nephrologist noted pitting edema 2 weeks ago. Adhering to medication regimen. Wife initiated dietary regimen post-vacation. Under care of multiple specialists with biweekly appointments.      Allergies   Allergen Reactions    Sulfa Drugs Unspecified     Kidney Stones         Current medicines (including changes today)  Current Outpatient Medications   Medication Sig Dispense Refill    SEMGLTYRON, YFGN, 100 UNIT/ML Solution Pen-injector INJECT 30 UNITS UNDER THE SKIN EVERY EVENING. 30 mL 3    insulin aspart (NOVOLOG FLEXPEN) 100 UNIT/ML injection PEN Inject 5 Units under the skin 3 times a day before meals. 45 mL 0    Dulaglutide (TRULICITY) 4.5 MG/0.5ML Solution Auto-injector Inject 0.5 mg under the skin every 7 days. 6 mL 3    Empagliflozin (JARDIANCE) 25 MG Tab Take 1 Tablet by mouth every day. 90 Tablet 3    lisinopril (PRINIVIL) 20 MG Tab TAKE 1 TABLET BY MOUTH EVERY DAY 90 Tablet 4    atorvastatin (LIPITOR) 40 MG Tab TAKE 1 TABLET BY MOUTH EVERY DAY 90 Tablet 4    carvedilol (COREG) 25 MG Tab TAKE 1 TABLET BY MOUTH TWICE A DAY WITH FOOD 180 Tablet 4    amLODIPine (NORVASC) 5 MG Tab TAKE 1 TABLET BY MOUTH EVERY DAY  DAYS 90 Tablet 3    sodium bicarbonate (SODIUM BICARBONATE) 650 MG Tab  "Take 1 Tablet by mouth 2 times a day. 180 Tablet 3    doxazosin (CARDURA) 2 MG Tab TAKE 1 TABLET BY MOUTH EVERY DAY 90 Tablet 4    Continuous Blood Gluc Sensor (DEXCOM G6 SENSOR) Misc Apply sensor subcutaneously to monitor blood glucose. Replace sensor every 10 days. 9 Each 3    ergocalciferol (DRISDOL) 94542 UNIT capsule Take 1 Cap by mouth every 7 days. 12 Cap 3    Multiple Vitamins-Minerals (MULTIVITAMIN PO) Take  by mouth.      aspirin EC (ECOTRIN) 81 MG Tablet Delayed Response Take 81 mg by mouth every day.       No current facility-administered medications for this visit.     He  has a past medical history of ACC/AHA stage C systolic heart failure ([facility]) (06/21/2018), Acute exacerbation of CHF (congestive heart failure) ([facility]) (06/25/2018), CAD (coronary artery disease), CHF (congestive heart failure) ([facility]), Chronic systolic congestive heart failure, NYHA class 1 ([facility]) (07/23/2018), Diabetes ([facility]), Dilated cardiomyopathy ([facility]), History of maxillary sinusitis (10/27/2020), Hyperlipidemia, Hypertension, NYHA class 3 acute on chronic systolic heart failure ([facility]) (06/25/2018), Olecranon bursitis of right elbow (06/17/2019), and Severe aortic stenosis (06/25/2018).  He has no past medical history of Encounter for long-term (current) use of other medications.    ROS   ROS  -See HPI     Objective:     Physical Exam:  /66   Pulse 83   Temp 36.1 °C (97 °F) (Temporal)   Resp 16   Ht 1.727 m (5' 7.99\")   Wt 92.5 kg (204 lb)   SpO2 93%  Body mass index is 31.03 kg/m².   Constitutional: Alert, no distress.  Skin: Warm, dry, good turgor, no rashes in visible areas.  Neck: Trachea midline, no masses, no thyromegaly. No cervical or supraclavicular lymphadenopathy.  Respiratory: Unlabored respiratory effort, lungs clear to auscultation, no wheezes, no rhonchi.  Cardiovascular: Normal S1, S2, no murmur.  +1 pitting edema noted in the left anterior tib-fib  Abdomen: Soft, non-tender, no masses, no " hepatosplenomegaly.  Psych: Alert and oriented x3, normal affect and mood.    Results      Assessment and Plan:     Assessment & Plan  Dyspnea  Oxygen saturation slightly reduced. Differential includes common cold or heart failure onset.  Treatment plan: Advise daily weight monitoring and report weight gain or increased leg swelling.  Clinical decision making: Contact clinic if symptoms worsen.    Orders:  1. Dyspnea, unspecified type    2. Upper respiratory tract infection, unspecified type        Followup: No follow-ups on file.         PLEASE NOTE: This dictation was created using voice recognition and [employer] ambient listening software. I have made every reasonable attempt to correct obvious errors, but I expect that there are errors of grammar and possibly content that I did not discover before finalizing the note.

## 2025-05-21 LAB
BASOPHILS # BLD AUTO: 0 X10E3/UL (ref 0–0.2)
BASOPHILS NFR BLD AUTO: 1 %
BUN SERPL-MCNC: 43 MG/DL (ref 8–27)
BUN/CREAT SERPL: 15 (ref 10–24)
CALCIUM SERPL-MCNC: 9.8 MG/DL (ref 8.6–10.2)
CHLORIDE SERPL-SCNC: 112 MMOL/L (ref 96–106)
CO2 SERPL-SCNC: 21 MMOL/L (ref 20–29)
CREAT SERPL-MCNC: 2.83 MG/DL (ref 0.76–1.27)
EGFRCR SERPLBLD CKD-EPI 2021: 22 ML/MIN/1.73
EOSINOPHIL # BLD AUTO: 0.1 X10E3/UL (ref 0–0.4)
EOSINOPHIL NFR BLD AUTO: 2 %
ERYTHROCYTE [DISTWIDTH] IN BLOOD BY AUTOMATED COUNT: 12.7 % (ref 11.6–15.4)
GLUCOSE SERPL-MCNC: 120 MG/DL (ref 70–99)
HCT VFR BLD AUTO: 31.4 % (ref 37.5–51)
HGB BLD-MCNC: 10.2 G/DL (ref 13–17.7)
IMM GRANULOCYTES # BLD AUTO: 0 X10E3/UL (ref 0–0.1)
IMM GRANULOCYTES NFR BLD AUTO: 0 %
IMMATURE CELLS  115398: ABNORMAL
LYMPHOCYTES # BLD AUTO: 0.6 X10E3/UL (ref 0.7–3.1)
LYMPHOCYTES NFR BLD AUTO: 8 %
MCH RBC QN AUTO: 31.2 PG (ref 26.6–33)
MCHC RBC AUTO-ENTMCNC: 32.5 G/DL (ref 31.5–35.7)
MCV RBC AUTO: 96 FL (ref 79–97)
MONOCYTES # BLD AUTO: 0.7 X10E3/UL (ref 0.1–0.9)
MONOCYTES NFR BLD AUTO: 10 %
MORPHOLOGY BLD-IMP: ABNORMAL
NEUTROPHILS # BLD AUTO: 6 X10E3/UL (ref 1.4–7)
NEUTROPHILS NFR BLD AUTO: 79 %
NRBC BLD AUTO-RTO: ABNORMAL %
PLATELET # BLD AUTO: 226 X10E3/UL (ref 150–450)
POTASSIUM SERPL-SCNC: 4.2 MMOL/L (ref 3.5–5.2)
RBC # BLD AUTO: 3.27 X10E6/UL (ref 4.14–5.8)
SODIUM SERPL-SCNC: 145 MMOL/L (ref 134–144)
WBC # BLD AUTO: 7.5 X10E3/UL (ref 3.4–10.8)

## 2025-05-28 ENCOUNTER — OFFICE VISIT (OUTPATIENT)
Dept: NEPHROLOGY | Facility: MEDICAL CENTER | Age: 75
End: 2025-05-28
Payer: COMMERCIAL

## 2025-05-28 VITALS
DIASTOLIC BLOOD PRESSURE: 52 MMHG | RESPIRATION RATE: 12 BRPM | WEIGHT: 198 LBS | HEIGHT: 68 IN | BODY MASS INDEX: 30.01 KG/M2 | SYSTOLIC BLOOD PRESSURE: 122 MMHG | TEMPERATURE: 97.3 F | HEART RATE: 82 BPM | OXYGEN SATURATION: 93 %

## 2025-05-28 DIAGNOSIS — I10 ESSENTIAL HYPERTENSION: ICD-10-CM

## 2025-05-28 DIAGNOSIS — N18.4 CKD (CHRONIC KIDNEY DISEASE) STAGE 4, GFR 15-29 ML/MIN (HCC): Primary | ICD-10-CM

## 2025-05-28 DIAGNOSIS — N18.4 TYPE 2 DIABETES MELLITUS WITH STAGE 4 CHRONIC KIDNEY DISEASE, WITH LONG-TERM CURRENT USE OF INSULIN (HCC): ICD-10-CM

## 2025-05-28 DIAGNOSIS — Z79.4 TYPE 2 DIABETES MELLITUS WITH STAGE 4 CHRONIC KIDNEY DISEASE, WITH LONG-TERM CURRENT USE OF INSULIN (HCC): ICD-10-CM

## 2025-05-28 DIAGNOSIS — E87.20 METABOLIC ACIDOSIS: ICD-10-CM

## 2025-05-28 DIAGNOSIS — E11.22 TYPE 2 DIABETES MELLITUS WITH STAGE 4 CHRONIC KIDNEY DISEASE, WITH LONG-TERM CURRENT USE OF INSULIN (HCC): ICD-10-CM

## 2025-05-28 ASSESSMENT — ENCOUNTER SYMPTOMS
COUGH: 0
VOMITING: 0
NAUSEA: 0
CHILLS: 0
FEVER: 0
HYPERTENSION: 1
SHORTNESS OF BREATH: 0

## 2025-05-28 ASSESSMENT — FIBROSIS 4 INDEX: FIB4 SCORE: 1.4

## 2025-05-28 NOTE — ASSESSMENT & PLAN NOTE
Optimize diabetes control for hemoglobin A1c below 7%  Orders:    Basic Metabolic Panel; Future    CBC WITHOUT DIFFERENTIAL; Future    MICROALB/CREAT RATIO RAND. UR

## 2025-05-28 NOTE — PROGRESS NOTES
"Subjective     Ashish Wiley is a 75 y.o. male who presents with Chronic Kidney Disease and Hypertension            Chronic Kidney Disease  This is a chronic problem. The current episode started more than 1 year ago. The problem occurs constantly. The problem has been unchanged. Pertinent negatives include no chest pain, chills, coughing, fever, nausea, urinary symptoms or vomiting.   Hypertension  This is a chronic problem. The current episode started more than 1 year ago. The problem is unchanged. The problem is controlled. Pertinent negatives include no chest pain, malaise/fatigue, peripheral edema or shortness of breath. Risk factors for coronary artery disease include male gender and diabetes mellitus. Past treatments include ACE inhibitors and calcium channel blockers. The current treatment provides significant improvement. There are no compliance problems.  Hypertensive end-organ damage includes kidney disease. Identifiable causes of hypertension include chronic renal disease.       Review of Systems   Constitutional:  Negative for chills, fever and malaise/fatigue.   Respiratory:  Negative for cough and shortness of breath.    Cardiovascular:  Negative for chest pain and leg swelling.   Gastrointestinal:  Negative for nausea and vomiting.   Genitourinary:  Negative for dysuria, frequency and urgency.              Objective     /52 (BP Location: Right arm, Patient Position: Sitting, BP Cuff Size: Adult)   Pulse 82   Temp 36.3 °C (97.3 °F) (Temporal)   Resp 12   Ht 1.727 m (5' 8\")   Wt 89.8 kg (198 lb)   SpO2 93%   BMI 30.11 kg/m²      Physical Exam  Vitals and nursing note reviewed.   Constitutional:       General: He is not in acute distress.     Appearance: He is not ill-appearing.   HENT:      Head: Normocephalic and atraumatic.      Right Ear: External ear normal.      Left Ear: External ear normal.      Nose: Nose normal.   Eyes:      General:         Right eye: No discharge.         " Left eye: No discharge.      Conjunctiva/sclera: Conjunctivae normal.   Cardiovascular:      Rate and Rhythm: Normal rate and regular rhythm.      Heart sounds: No murmur heard.  Pulmonary:      Effort: Pulmonary effort is normal. No respiratory distress.      Breath sounds: Normal breath sounds. No wheezing.   Musculoskeletal:         General: No tenderness or deformity.      Right lower leg: No edema.      Left lower leg: No edema.   Skin:     General: Skin is warm.   Neurological:      General: No focal deficit present.      Mental Status: He is alert and oriented to person, place, and time.   Psychiatric:         Mood and Affect: Mood normal.         Behavior: Behavior normal.     Past Medical History[1]  Social History     Socioeconomic History    Marital status:      Spouse name: Not on file    Number of children: Not on file    Years of education: Not on file    Highest education level: Not on file   Occupational History    Not on file   Tobacco Use    Smoking status: Former     Current packs/day: 0.00     Average packs/day: 1 pack/day for 22.0 years (22.0 ttl pk-yrs)     Types: Cigarettes     Start date: 1967     Quit date: 1989     Years since quittin.4    Smokeless tobacco: Never    Tobacco comments:     stop cigar in    Vaping Use    Vaping status: Never Used   Substance and Sexual Activity    Alcohol use: Yes     Alcohol/week: 0.6 oz     Types: 1 Glasses of wine per week     Comment: OCCASIONALLY    Drug use: No    Sexual activity: Yes     Partners: Female   Other Topics Concern    Not on file   Social History Narrative    Ashish was born in [place], raised in [place]. He moved to [place] in , he was worked for [employer]. He retired in 2016. He is  to [place] for the past 40 years. He has 2 kids- Neno (39, [place]) and Radha (36, [place]). He enjoys golfing and they also try to travel when they can.     Social Drivers of Health     Financial Resource Strain: Not on  file   Food Insecurity: Not on file   Transportation Needs: Not on file   Physical Activity: Not on file   Stress: Not on file   Social Connections: Not on file   Intimate Partner Violence: Not on file   Housing Stability: Not on file     Family History   Problem Relation Age of Onset    Lung Disease Mother         COPD    Heart Disease Father         valve disease    Heart Failure Father     Hypertension Father     Hyperlipidemia Father     Cancer Maternal Grandmother         BRAIN TUMOR    Stroke Maternal Grandfather     Other Paternal Grandmother         INTESTINAL PROBLEM    Stroke Paternal Grandfather     Prostate cancer Brother     Other Brother         ortho     Recent Labs     11/15/24  0605 02/20/25  0839 05/20/25  0805   ALBUMIN 4.2  --   --    HDL 54  --   --    TRIGLYCERIDE 136  --   --    SODIUM 145* 140 145*   POTASSIUM 5.6* 4.7 4.2   CHLORIDE 111* 108* 112*   CO2 19* 20 21   BUN 50* 43* 43*   CREATININE 3.08* 3.13* 2.83*                                  Assessment & Plan  CKD (chronic kidney disease) stage 4, GFR 15-29 ml/min ([facility])  Stable  No uremic symptoms  Renal dose of medication  Avoid nephrotoxins  Continue same medication regimen  Patient was advised to call us if symptoms worsen  Continue SGLT2 inhibitor  Orders:    Basic Metabolic Panel; Future    CBC WITHOUT DIFFERENTIAL; Future    MICROALB/CREAT RATIO RAND. UR    Essential hypertension  Controlled  Continue same medication regimen  Continue low-sodium diet    Orders:    Basic Metabolic Panel; Future    CBC WITHOUT DIFFERENTIAL; Future    MICROALB/CREAT RATIO RAND. UR    Type 2 diabetes mellitus with stage 4 chronic kidney disease, with long-term current use of insulin ([facility])  Optimize diabetes control for hemoglobin A1c below 7%  Orders:    Basic Metabolic Panel; Future    CBC WITHOUT DIFFERENTIAL; Future    MICROALB/CREAT RATIO RAND. UR    Metabolic acidosis  Improved  Orders:    Basic Metabolic Panel; Future    CBC WITHOUT DIFFERENTIAL;  Future    MICROALB/CREAT RATIO RAND. UR                      [1]   Past Medical History:  Diagnosis Date    ACC/AHA stage C systolic heart failure ([facility]) 06/21/2018    Acute exacerbation of CHF (congestive heart failure) ([facility]) 06/25/2018    CAD (coronary artery disease)     CHF (congestive heart failure) ([facility])     Chronic systolic congestive heart failure, NYHA class 1 ([facility]) 07/23/2018    Diabetes ([facility])     Dilated cardiomyopathy ([facility])     History of maxillary sinusitis 10/27/2020    He has had approximately 4-5 months of nuisance allergy symptoms including clear rhinorrhea with post nasal drip. More recently he developed a dry cough. He has only mild pressure of the sinuses. He has been alternating azelastine and flonase with some improvement. He is diabetic and has history of sinus infection and pneumonia. No current fevers, chills, or other features of systemic infection at    Hyperlipidemia     Hypertension     NYHA class 3 acute on chronic systolic heart failure ([facility]) 06/25/2018    Olecranon bursitis of right elbow 06/17/2019    Severe aortic stenosis 06/25/2018

## 2025-06-04 NOTE — PROGRESS NOTES
"Endocrinology Clinic Progress Note  PCP: Miguel A Colón M.D.    HPI:  Ashish Wiley is a 75 y.o. old patient who is seen today by the Diabetes Nurse Specialist for review of  his type 2 diabetes with long term use of insulin.   Recent changes in health:  states he injured his back skiing several months ago and since his activity level has diminished  DM:   Last A1c:   Lab Results   Component Value Date/Time    HBA1C 6.2 (A) 06/05/2025 08:26 AM      Previous A1c was 7.4 on 12/4/2024  A1C GOAL: < 7    Diabetes Medications:   Semglee insulin 30 units per day  Novolog  Not taking unless he eats more than usual and then he will take 5 units  Jardiance 25 mg daily  Trulicity 4.5 mg weekly      Exercise: sporadic irregular exercise, <half hour walking weekly  Diet: \"healthy\" diet  in general  Patient's body mass index is 30.04 kg/m². Exercise and nutrition counseling were performed at this visit.    Glucose monitoring frequency: not testing his blood sugars.   Hypoglycemic episodes: no  Last Retinal Exam: on file and up-to-date    Foot Exam:  Monofilament: current.       Plan:     Discussed and educated on:   - All medications, side effects and compliance (discussed carefully)  - HbA1C: target  - Home glucose monitoring emphasized  - Weight control and daily exercise    Recommended medication changes: none    "

## 2025-06-05 ENCOUNTER — PHARMACY VISIT (OUTPATIENT)
Dept: PHARMACY | Facility: MEDICAL CENTER | Age: 75
End: 2025-06-05
Payer: COMMERCIAL

## 2025-06-05 ENCOUNTER — OFFICE VISIT (OUTPATIENT)
Dept: ENDOCRINOLOGY | Facility: MEDICAL CENTER | Age: 75
End: 2025-06-05
Attending: INTERNAL MEDICINE
Payer: COMMERCIAL

## 2025-06-05 VITALS
HEART RATE: 68 BPM | WEIGHT: 197.6 LBS | DIASTOLIC BLOOD PRESSURE: 74 MMHG | HEIGHT: 68 IN | OXYGEN SATURATION: 96 % | BODY MASS INDEX: 29.95 KG/M2 | SYSTOLIC BLOOD PRESSURE: 136 MMHG

## 2025-06-05 DIAGNOSIS — E11.9 TYPE 2 DIABETES MELLITUS WITHOUT COMPLICATION, WITH LONG-TERM CURRENT USE OF INSULIN (HCC): Primary | ICD-10-CM

## 2025-06-05 DIAGNOSIS — E55.9 VITAMIN D DEFICIENCY: ICD-10-CM

## 2025-06-05 DIAGNOSIS — Z79.4 LONG TERM (CURRENT) USE OF INSULIN (HCC): ICD-10-CM

## 2025-06-05 DIAGNOSIS — I10 ESSENTIAL HYPERTENSION: ICD-10-CM

## 2025-06-05 DIAGNOSIS — N18.4 CHRONIC KIDNEY DISEASE (CKD), STAGE IV (SEVERE) (HCC): ICD-10-CM

## 2025-06-05 DIAGNOSIS — Z79.4 TYPE 2 DIABETES MELLITUS WITHOUT COMPLICATION, WITH LONG-TERM CURRENT USE OF INSULIN (HCC): Primary | ICD-10-CM

## 2025-06-05 DIAGNOSIS — E78.5 DYSLIPIDEMIA: ICD-10-CM

## 2025-06-05 LAB
HBA1C MFR BLD: 6.2 % (ref ?–5.8)
POCT INT CON NEG: NEGATIVE
POCT INT CON POS: POSITIVE

## 2025-06-05 PROCEDURE — 3078F DIAST BP <80 MM HG: CPT | Performed by: INTERNAL MEDICINE

## 2025-06-05 PROCEDURE — RXMED WILLOW AMBULATORY MEDICATION CHARGE: Performed by: INTERNAL MEDICINE

## 2025-06-05 PROCEDURE — 83036 HEMOGLOBIN GLYCOSYLATED A1C: CPT | Performed by: INTERNAL MEDICINE

## 2025-06-05 PROCEDURE — 99214 OFFICE O/P EST MOD 30 MIN: CPT | Performed by: INTERNAL MEDICINE

## 2025-06-05 PROCEDURE — 3075F SYST BP GE 130 - 139MM HG: CPT | Performed by: INTERNAL MEDICINE

## 2025-06-05 RX ORDER — EMPAGLIFLOZIN 25 MG/1
1 TABLET, FILM COATED ORAL DAILY
Qty: 30 TABLET | Refills: 0 | Status: SHIPPED | OUTPATIENT
Start: 2025-06-05

## 2025-06-05 ASSESSMENT — FIBROSIS 4 INDEX: FIB4 SCORE: 1.4

## 2025-06-05 NOTE — PROGRESS NOTES
CHIEF COMPLAINT: Patient is here for follow up of Type 2 Diabetes Mellitus.      HPI:     Ashish Wiley is a 75 y.o. male with Type 2 Diabetes Mellitus here for follow up.    Labs from 6/5/2025 show is 6.2%  Labs from 8/15/2024 show A1c is 6.7%  Baseline labs from December 10, 2019 showed an elevated A1c of 10% while taking Lantus Bydureon and Jardiance.        On follow-up he is taking   Lantus 30 units daily,   Jardiance 25 mg daily   Trulicity 4.5mg weekly   Novolog 4 units with dinner ( not always)      He denies SE with his meds  He had to switch from Ozempic to Trulicity due to insurance   He reports that sugars are better controlled  He denies hypoglycemic events      He has coronary artery disease and hyperlipidemia, and is taking Lipitor 40 mg daily.       Latest Reference Range & Units 11/15/24 06:05   Cholesterol,Tot 100 - 199 mg/dL 144   Triglycerides 0 - 149 mg/dL 136   HDL >39 mg/dL 54   LDL Chol Calc (NIH) 0 - 99 mg/dL 66   VLDL Cholesterol Calc 5 - 40 mg/dL 24       He does have diabetic kidney disease  Hre has CKD st IV  Blood pressure is controlled with lisinopril 20 mg daily  , coreg 25mg bid, amlodipine 5mg daily    Latest Reference Range & Units 11/15/24 06:05   Microalbumin, Urine Random Not Estab. ug/mL 1,333.2   Microalbumin-Creatinine 0 - 29 mg/g creat 2,002 (H)     He had an eye exam on 9/2024        BG Diary:  Please see glucose sensor download    Weight has been stable    Diabetes Complications   Retinopathy: Known PDR retinopathy.  Last eye exam: 9/2024 at [facility] by Dr. Hunter  He reports that he has an eye exam appointment with Dr. Lizarraga  Neuropathy: Denies paresthesias or numbness in hands or feet. Denies any foot wounds.  Exercise: Minimal.  Diet: Fair.  Patient's medications, allergies, and social histories were reviewed and updated as appropriate.    ROS:     CONS:     No fever, no chills   EYES:     No diplopia, no blurry vision   CV:           No chest  pain, no palpitations   PULM:     No SOB, no cough, no hemoptysis.   GI:            No nausea, no vomiting, no diarrhea, no constipation   ENDO:     No polyuria, no polydipsia, no heat intolerance, no cold intolerance       Past Medical History:  Problem List:  2024-08: Chronic kidney disease (CKD), stage IV (severe) ([facility])  2020-10: Bacterial conjunctivitis  2020-10: History of maxillary sinusitis  2019-12: Dyslipidemia  2019-06: Olecranon bursitis of right elbow  2018-08: CHF due to valvular disease ([facility])  2018-07: Chronic systolic congestive heart failure, NYHA class 1 ([facility])  2018-07: S/P TAVR (transcatheter aortic valve replacement)  2018-06: Stented coronary artery  2018-06: Acute on chronic systolic (congestive) heart failure ([facility])  2018-06: NYHA class 3 acute on chronic systolic heart failure ([facility])  2018-06: Severe aortic stenosis  2018-06: Coronary artery disease due to calcified coronary lesion:   Status post atherectomy and stenting of the LAD in June 2018.    Nonobstructive disease in the RCA and circumflex.  2018-06: Aortic stenosis  2018-06: ACC/AHA stage C systolic heart failure ([facility])  2018-06: Dilated cardiomyopathy ([facility])  2018-06: Acute systolic heart failure ([facility])  2018-05: Orthopnea  2018-05: Bilateral leg edema  2018-05: Shortness of breath on exertion  2017-10: Vitamin D deficiency  2017-10: Stage 3 chronic kidney disease ([facility])  2017-10: Diabetic nephropathy associated with type 2 diabetes   mellitus ([facility])  2017-09: Anemia in stage 3 chronic kidney disease ([facility])  2017-09: CKD (chronic kidney disease) stage 3, GFR 30-59 ml/min  2016-10: Macroalbuminuric diabetic nephropathy ([facility])  2016-06: Type 2 diabetes mellitus with microalbuminuria, with long-  term current use of insulin ([facility])  2016-06: Type 2 diabetes mellitus with chronic kidney disease, with   long-term current use of insulin ([facility])  2016-06: Essential hypertension  2016-06: Diabetic peripheral neuropathy associated with type 2   diabetes  "mellitus (HCC)      Past Surgical History:  Past Surgical History:   Procedure Laterality Date    STENT PLACEMENT N/A 2020    Stents x3 placed concomitantly with TAVR    TRANSCATHETER AORTIC VALVE REPLACEMENT  2018    Procedure: TRANSCATHETER AORTIC VALVE REPLACEMENT;  Surgeon: Markus Fuller M.D.;  Location: SURGERY [facility];  Service: Cardiac    DAYANNA  2018    Procedure: DAYANNA;  Surgeon: Markus Fuller M.D.;  Location: SURGERY [facility];  Service: Cardiac    AORTIC VALVE REPLACEMENT      S/P TAVR     TONSILLECTOMY      ZZZ CARDIAC CATH          Allergies:  Sulfa drugs     Social History:  Social History     Tobacco Use    Smoking status: Former     Current packs/day: 0.00     Average packs/day: 1 pack/day for 22.0 years (22.0 ttl pk-yrs)     Types: Cigarettes     Start date: 1967     Quit date: 1989     Years since quittin.4    Smokeless tobacco: Never    Tobacco comments:     stop cigar in    Vaping Use    Vaping status: Never Used   Substance Use Topics    Alcohol use: Yes     Alcohol/week: 0.6 oz     Types: 1 Glasses of wine per week     Comment: OCCASIONALLY    Drug use: No        Family History:   family history includes Cancer in his maternal grandmother; Heart Disease in his father; Heart Failure in his father; Hyperlipidemia in his father; Hypertension in his father; Lung Disease in his mother; Other in his brother and paternal grandmother; Prostate cancer in his brother; Stroke in his maternal grandfather and paternal grandfather.      PHYSICAL EXAM:   OBJECTIVE:  Vital signs: /74   Pulse 68   Ht 1.727 m (5' 8\")   Wt 89.6 kg (197 lb 9.6 oz)   SpO2 96%   BMI 30.04 kg/m²   GENERAL: Well-developed, well-nourished in no apparent distress.   EYE:  No ocular asymmetry, PERRLA  HENT: Pink, moist mucous membranes.    NECK: No thyromegaly.   CARDIOVASCULAR: Normal precordial impulse seen  LUNGS: Symmetrical chest expansion  ABDOMEN: Soft, no masses " seen  EXTREMITIES: No clubbing, cyanosis, or edema.   NEUROLOGICAL: No gross focal motor abnormalities   LYMPH: No cervical adenopathy seen   SKIN: No rashes, lesions.   Monofilament testing with a 10 gram force: sensation: decreased bilaterally  Visual Inspection: Feet without maceration, ulcers, or fissures.  Pedal pulses: intact bilaterally      Labs:  Lab Results   Component Value Date/Time    HBA1C 6.2 (A) 06/05/2025 08:26 AM        Lab Results   Component Value Date/Time    WBC 7.5 05/20/2025 08:05 AM    WBC 9.0 12/31/2020 06:56 AM    RBC 3.27 (L) 05/20/2025 08:05 AM    RBC 4.22 (L) 12/31/2020 06:56 AM    HEMOGLOBIN 10.2 (L) 05/20/2025 08:05 AM    HEMOGLOBIN 13.4 (L) 12/31/2020 06:56 AM    MCV 96 05/20/2025 08:05 AM    MCV 97.6 12/31/2020 06:56 AM    MCH 31.2 05/20/2025 08:05 AM    MCH 31.8 12/31/2020 06:56 AM    MCHC 32.5 05/20/2025 08:05 AM    MCHC 32.5 (L) 12/31/2020 06:56 AM    RDW 12.7 05/20/2025 08:05 AM    RDW 45.5 12/31/2020 06:56 AM    MPV 10.6 12/31/2020 06:56 AM       Lab Results   Component Value Date/Time    SODIUM 145 (H) 05/20/2025 08:05 AM    SODIUM 139 03/29/2021 07:05 AM    POTASSIUM 4.2 05/20/2025 08:05 AM    POTASSIUM 4.6 03/29/2021 07:05 AM    CHLORIDE 112 (H) 05/20/2025 08:05 AM    CHLORIDE 108 03/29/2021 07:05 AM    CO2 21 05/20/2025 08:05 AM    CO2 23 03/29/2021 07:05 AM    ANION 8.0 03/29/2021 07:05 AM    GLUCOSE 120 (H) 05/20/2025 08:05 AM    GLUCOSE 126 (H) 03/29/2021 07:05 AM    BUN 43 (H) 05/20/2025 08:05 AM    BUN 35 (H) 03/29/2021 07:05 AM    CREATININE 2.83 (H) 05/20/2025 08:05 AM    CREATININE 1.91 (H) 03/29/2021 07:05 AM    CALCIUM 9.8 05/20/2025 08:05 AM    CALCIUM 9.2 03/29/2021 07:05 AM    ASTSGOT 20 11/15/2024 06:05 AM    ASTSGOT 20 03/29/2021 07:05 AM    ALTSGPT 23 11/15/2024 06:05 AM    ALTSGPT 23 03/29/2021 07:05 AM    TBILIRUBIN 0.3 11/15/2024 06:05 AM    TBILIRUBIN 0.3 03/29/2021 07:05 AM    ALBUMIN 4.2 11/15/2024 06:05 AM    ALBUMIN 3.9 03/29/2021 07:05 AM     TOTPROTEIN 6.6 11/15/2024 06:05 AM    TOTPROTEIN 6.5 03/29/2021 07:05 AM    GLOBULIN 2.4 11/15/2024 06:05 AM    GLOBULIN 2.6 03/29/2021 07:05 AM    AGRATIO 1.6 09/08/2023 04:31 AM    AGRATIO 1.5 03/29/2021 07:05 AM       Lab Results   Component Value Date/Time    CHOLSTRLTOT 159 12/10/2019 0632    TRIGLYCERIDE 237 (H) 12/10/2019 0632    HDL 47 12/10/2019 0632    LDL 65 12/10/2019 0632       Lab Results   Component Value Date/Time    MALBCRT 1147 (H) 03/29/2021 07:05 AM    MICROALBUR 93.7 03/29/2021 07:05 AM    MICRALB 1,333.2 11/15/2024 06:05 AM        No results found for: TSHULTRASEN  No results found for: FREEDIR  No results found for: FREET3  No results found for: THYSTIMIG        ASSESSMENT/PLAN:     1. Type 2 diabetes mellitus with hyperglycemia, with long-term current use of insulin ([facility])  Improved control  Continue Lantus 22 units daily  Continue Jardiance  I ordered samples of Jardiance to [facility]  Continue Trulicity 4.5 mg weekly  Continue Novolog 5u with dinner   Recommend follow-up with [facility] in 3 months with complete fasting labs    2. Dyslipidemia  Well-controlled  LDL cholesterol was at goal  Continue atorvastatin follow low-fat diet  Follow-up in 3 months with fasting labs    3. Essential hypertension  Well-controlled  Continue current meds    4. Chronic kidney disease (CKD), stage IV (severe) ([facility])  Stable  Because his GFR is less than 25 he is not a candidate for an  MRA like  Kerendia  Continue SGLT2 inhibitor  Recommend adequate hydration  Avoid potential nephrotoxins such as NSAIDs and IV contrast    5. Long term (current) use of insulin (HCC)  Patient is on long-term basal insulin therapy with a GLP-1 analog and other medications for type 2 diabetes management      Return in about 3 months (around 9/5/2025).      Thank you kindly for allowing me to participate in the diabetes care plan for this patient.    Timothy Botello MD, FACE, ECNU      CC:   Miguel A Colón M.D.

## 2025-06-10 NOTE — Clinical Note
REFERRAL APPROVAL NOTICE         Sent on Cristina 10, 2025                   Asihsh Wiley  [address]  [facility] [address]                   Dear Mr. Wiley,    After a careful review of the medical information and benefit coverage, [facility] has processed your referral. See below for additional details.    If applicable, you must be actively enrolled with your insurance for coverage of the authorized service. If you have any questions regarding your coverage, please contact your insurance directly.    REFERRAL INFORMATION   Referral #:  [id number]  Referred-To Department    Referred-By Provider:  Whit Botello M.D.   Endocrinology [facility]      [address]  [address]  [facility] [place] [facility] [address]  [phone] [address]  [facility] [id number] [phone]    Referral Start Date:  06/05/2025  Referral End Date:   06/05/2026           SCHEDULING  If you do not already have an appointment, please call [phone] to make an appointment.   MORE INFORMATION  As a reminder, [facility] ownership has changed, meaning this location is now owned and operated by [facility]. As such, we want to clarify that our patients should expect to receive two separate bills for the services received at [facility] - one representing the [facility] facility fees as the owner of the establishment, and the other to represent the physician's services and subsequent fees. You can speak with your insurance carrier for a pricing estimate by calling the customer service number on the back of your card and ask about charges for a hospital outpatient visit.  If you do not already have a [employer] account, sign up at: [email or web address]  You can access your medical information, make appointments, see lab results, billing information, and more.  If you have questions regarding this referral, please contact  the [facility] Referrals department at:              [phone]. Monday - Friday 7:30AM - 5:00PM.      Sincerely,  [facility]

## (undated) DEVICE — GLOVE BIOGEL SZ 7.5 SURGICAL PF LTX - (50PR/BX 4BX/CA)

## (undated) DEVICE — CATHETER 6FR AL1 100CM (5/BX)

## (undated) DEVICE — SET EXTENSION WITH 2 PORTS (48EA/CA) ***PART #2C8610 IS A SUBSTITUTE*****

## (undated) DEVICE — SUTURE 0 ETHIBOND MO6 C/R - (12/BX) 8-18 INCH ETHICON

## (undated) DEVICE — COVER LIGHT HANDLE FLEXIBLE - SOFT (2EA/PK 80PK/CA)

## (undated) DEVICE — BLADE SURGICAL #11 - (50/BX)

## (undated) DEVICE — GLOVE BIOGEL SZ 7 SURGICAL PF LTX - (50PR/BX 4BX/CA)

## (undated) DEVICE — TUBE E-T HI-LO CUFF 7.5MM (10EA/PK)

## (undated) DEVICE — ELECTRODE 850 FOAM ADHESIVE - HYDROGEL RADIOTRNSPRNT (50/PK)

## (undated) DEVICE — WIRE GUIDE LUNDQST.035X180 - TSMG-35-180-4-LES ORDER BY BOX (5EA/BX)

## (undated) DEVICE — GOWN WARMING STANDARD FLEX - (30/CA)

## (undated) DEVICE — KIT ANESTHESIA W/CIRCUIT & 3/LT BAG W/FILTER (20EA/CA)

## (undated) DEVICE — SET FLUID WARMING STANDARD FLOW - (10/CA)

## (undated) DEVICE — CATHETER PIGTAIL 6FR 145 (5EA/BX)

## (undated) DEVICE — CHLORAPREP 26 ML APPLICATOR - ORANGE TINT(25/CA)

## (undated) DEVICE — SUTURE OHS

## (undated) DEVICE — BAG RESUSCITATION DISPOSABLE - WITH MASK (10 EA/CA)

## (undated) DEVICE — STOPCOCK 3-WAY W/SWIVEL LEVER LOCK (50EA/CA)

## (undated) DEVICE — KIT ROOM DECONTAMINATION

## (undated) DEVICE — SUCTION INSTRUMENT YANKAUER OPEN TIP W/O VENT (50EA/CA)

## (undated) DEVICE — SODIUM CHL. INJ. 0.9% 500ML (24EA/CA 50CA/PF)

## (undated) DEVICE — SUTURE 4-0 VICRYL PLUS SH - UNDYED 27 INCH (36PK/BX)

## (undated) DEVICE — KIT RADIAL ARTERY 20GA W/MAX BARRIER AND BIOPATCH  (5EA/CA) #10740 IS FOR THE SET RADIAL ARTERIAL

## (undated) DEVICE — BAG DECANTER (50EA/CS)

## (undated) DEVICE — SOD. CHL. INJ. 0.9% 1000 ML - (14EA/CA 60CA/PF)

## (undated) DEVICE — DERMABOND ADVANCED - (12EA/BX)

## (undated) DEVICE — TRANSDUCER BIFURCATED MONITORING KIT (10EA/CA)

## (undated) DEVICE — SYS DLV COST CLS RM TEMP - INJECTATE (CO-SET II) (10EA/CA)

## (undated) DEVICE — ARMBOARD  SMALL IV 9 INLONG - (25EA/CA)

## (undated) DEVICE — TUBING PRSS MNTR 72IN M/ M LL - (25/BX) MONIT. LINE W/MALE L/L

## (undated) DEVICE — GLOVE BIOGEL PI INDICATOR SZ 6.5 SURGICAL PF LF - (50/BX 4BX/CA)

## (undated) DEVICE — SET LEADWIRE 5 LEAD BEDSIDE DISPOSABLE ECG (1SET OF 5/EA)

## (undated) DEVICE — TUBE CONNECT SUCTION CLEAR 120 X 1/4" (50EA/CA)"

## (undated) DEVICE — GUIDEWIRE STARTER J CURVED FIXED CORE .035 260CM 10 3MM PTFE/HEPARIN COATED (5EA/BX)

## (undated) DEVICE — NEEDLE PERCUTANEOUS THINWALL 7CM 18GA BSDN 18-7.0

## (undated) DEVICE — GOWN SURGEONS X-LARGE - DISP. (30/CA)

## (undated) DEVICE — GUIDEWIRE STARTER STRAIGHT FIXED CORE .035 150CM 4 STRAIGHT PTFE/HEPARIN COATED (10/BX)

## (undated) DEVICE — CONTAINER, SPECIMEN, STERILE

## (undated) DEVICE — INTRODUCER SHEATH 6FR 2.5CM - DILATOR PROTRUDING (10/BX)

## (undated) DEVICE — LACTATED RINGERS INJ 1000 ML - (14EA/CA 60CA/PF)

## (undated) DEVICE — GLOVE BIOGEL INDICATOR SZ 8 SURGICAL PF LTX - (50/BX 4BX/CA)

## (undated) DEVICE — MICRODRIP PRIMARY VENTED 60 (48EA/CA) THIS WAS PART #2C8428 WHICH WAS DISCONTINUED

## (undated) DEVICE — SLEEVE, VASO, THIGH, MED

## (undated) DEVICE — ELECTRODE DUAL RETURN W/ CORD - (50/PK)

## (undated) DEVICE — SENSOR SPO2 NEO LNCS ADHESIVE (20/BX) SEE USER NOTES

## (undated) DEVICE — CANISTER SUCTION 3000ML MECHANICAL FILTER AUTO SHUTOFF MEDI-VAC NONSTERILE LF DISP  (40EA/CA)

## (undated) DEVICE — ELECTRODE RADIOLUCNT SOLID GEL DEFIB PADS (12EA/CA)

## (undated) DEVICE — WIRE GUIDE AES .035 260CM WITH 3MM J TIP"

## (undated) DEVICE — CABLE TEMPORARY PACING

## (undated) DEVICE — TUBING CLEARLINK DUO-VENT - C-FLO (48EA/CA)

## (undated) DEVICE — BLANKET UNDERBODY FULL ACCES - (5/CA)

## (undated) DEVICE — STOPCOCK IV 400 PSI 3W ROT (50EA/BX)

## (undated) DEVICE — MASK ANESTHESIA ADULT  - (100/CA)

## (undated) DEVICE — INTRODUCER CATHETER  DILATOR PROTRUDING 8FR 2.5CM (10EA/BX)

## (undated) DEVICE — PACK TAVR (3EA/CA)

## (undated) DEVICE — IV SET, NON-VENT PLUMB PUMP

## (undated) DEVICE — GLOVE BIOGEL PI ORTHO SZ 7.5 PF LF (40PR/BX)

## (undated) DEVICE — DRESSING TRANSPARENT FILM TEGADERM 4 X 4.75" (50EA/BX)"

## (undated) DEVICE — DEVICE CLOSER PERCLOSE - (10/BX) PROGLIDE SYSTEM

## (undated) DEVICE — SET BIFURCATED BLOOD - (48EA/CS)

## (undated) DEVICE — PROTECTOR ULNA NERVE - (36PR/CA)

## (undated) DEVICE — DRAPE MAYO STAND - (30/CA)

## (undated) DEVICE — HEAD HOLDER JUNIOR/ADULT

## (undated) DEVICE — SUCTION INSTRUMENT YANKAUER BULBOUS TIP W/O VENT (50EA/CA)